# Patient Record
Sex: FEMALE | Race: WHITE | NOT HISPANIC OR LATINO | URBAN - METROPOLITAN AREA
[De-identification: names, ages, dates, MRNs, and addresses within clinical notes are randomized per-mention and may not be internally consistent; named-entity substitution may affect disease eponyms.]

---

## 2017-01-09 ENCOUNTER — OFFICE (OUTPATIENT)
Dept: URBAN - METROPOLITAN AREA INFUSION 3 | Facility: INFUSION | Age: 67
End: 2017-01-09

## 2017-01-09 VITALS
DIASTOLIC BLOOD PRESSURE: 69 MMHG | HEART RATE: 74 BPM | TEMPERATURE: 96.8 F | WEIGHT: 248 LBS | SYSTOLIC BLOOD PRESSURE: 153 MMHG | DIASTOLIC BLOOD PRESSURE: 75 MMHG | SYSTOLIC BLOOD PRESSURE: 148 MMHG | DIASTOLIC BLOOD PRESSURE: 76 MMHG | SYSTOLIC BLOOD PRESSURE: 136 MMHG | SYSTOLIC BLOOD PRESSURE: 140 MMHG | RESPIRATION RATE: 20 BRPM | HEART RATE: 75 BPM | DIASTOLIC BLOOD PRESSURE: 74 MMHG | SYSTOLIC BLOOD PRESSURE: 154 MMHG | HEIGHT: 64 IN | RESPIRATION RATE: 16 BRPM | SYSTOLIC BLOOD PRESSURE: 155 MMHG | SYSTOLIC BLOOD PRESSURE: 132 MMHG | SYSTOLIC BLOOD PRESSURE: 145 MMHG | HEART RATE: 73 BPM | RESPIRATION RATE: 22 BRPM | DIASTOLIC BLOOD PRESSURE: 70 MMHG | HEART RATE: 77 BPM | HEART RATE: 71 BPM

## 2017-01-09 DIAGNOSIS — K50.818 CROHN'S DISEASE OF BOTH SMALL AND LARGE INTESTINE WITH OTHER: ICD-10-CM

## 2017-01-09 PROBLEM — K50.118 CROHN'S DISEASE OF LARGE INTESTINE WITH OTHER COMPLICATION: Status: ACTIVE | Noted: 2017-01-09

## 2017-01-09 PROCEDURE — 96413 CHEMO IV INFUSION 1 HR: CPT

## 2017-01-09 PROCEDURE — 96415 CHEMO IV INFUSION ADDL HR: CPT

## 2017-01-09 RX ORDER — VERAPAMIL HYDROCHLORIDE 120 MG/1
CAPSULE, EXTENDED RELEASE ORAL
Qty: 30 CAPSULE | Refills: 0 | Status: SHIPPED | OUTPATIENT
Start: 2017-01-09 | End: 2017-03-06 | Stop reason: CLARIF

## 2017-01-09 RX ADMIN — Medication 25 MG: at 08:39

## 2017-01-16 ENCOUNTER — TELEPHONE (OUTPATIENT)
Dept: FAMILY MEDICINE CLINIC | Facility: CLINIC | Age: 67
End: 2017-01-16

## 2017-01-16 RX ORDER — NITROFURANTOIN 25; 75 MG/1; MG/1
100 CAPSULE ORAL 2 TIMES DAILY
Qty: 14 CAPSULE | Refills: 0 | Status: SHIPPED | OUTPATIENT
Start: 2017-01-16 | End: 2017-02-13

## 2017-01-16 NOTE — TELEPHONE ENCOUNTER
notify patient I have sent Macrobid antibiotic to pharmacy.  She has no improvement please schedule appointment.

## 2017-01-16 NOTE — TELEPHONE ENCOUNTER
Patient called and stated that she has a UTI, and is requesting something be called in if possible?

## 2017-01-23 ENCOUNTER — OFFICE VISIT (OUTPATIENT)
Dept: FAMILY MEDICINE CLINIC | Facility: CLINIC | Age: 67
End: 2017-01-23

## 2017-01-23 ENCOUNTER — HOSPITAL ENCOUNTER (OUTPATIENT)
Dept: GENERAL RADIOLOGY | Facility: HOSPITAL | Age: 67
Discharge: HOME | End: 2017-01-23

## 2017-01-23 ENCOUNTER — HOSPITAL ENCOUNTER (OUTPATIENT)
Dept: GENERAL RADIOLOGY | Facility: HOSPITAL | Age: 67
Discharge: HOME | End: 2017-01-23
Admitting: PHYSICIAN ASSISTANT

## 2017-01-23 VITALS
RESPIRATION RATE: 16 BRPM | WEIGHT: 252 LBS | SYSTOLIC BLOOD PRESSURE: 140 MMHG | BODY MASS INDEX: 43.02 KG/M2 | TEMPERATURE: 97.9 F | OXYGEN SATURATION: 94 % | DIASTOLIC BLOOD PRESSURE: 70 MMHG | HEIGHT: 64 IN | HEART RATE: 100 BPM

## 2017-01-23 DIAGNOSIS — G89.29 CHRONIC RIGHT HIP PAIN: Primary | ICD-10-CM

## 2017-01-23 DIAGNOSIS — M25.512 CHRONIC PAIN OF BOTH SHOULDERS: ICD-10-CM

## 2017-01-23 DIAGNOSIS — M25.551 CHRONIC RIGHT HIP PAIN: Primary | ICD-10-CM

## 2017-01-23 DIAGNOSIS — M25.511 CHRONIC PAIN OF BOTH SHOULDERS: ICD-10-CM

## 2017-01-23 DIAGNOSIS — G89.29 CHRONIC PAIN OF BOTH SHOULDERS: ICD-10-CM
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
## 2017-01-23 PROCEDURE — 73030 X-RAY EXAM OF SHOULDER: CPT

## 2017-01-23 PROCEDURE — 73502 X-RAY EXAM HIP UNI 2-3 VIEWS: CPT

## 2017-01-23 PROCEDURE — 99213 OFFICE O/P EST LOW 20 MIN: CPT | Performed by: PHYSICIAN ASSISTANT

## 2017-01-23 RX ORDER — HYDROCODONE BITARTRATE AND ACETAMINOPHEN 7.5; 325 MG/1; MG/1
1 TABLET ORAL EVERY 6 HOURS PRN
Qty: 30 TABLET | Refills: 0 | Status: CANCELLED
Start: 2017-01-23

## 2017-01-23 NOTE — MR AVS SNAPSHOT
Mar Camilo   1/23/2017 10:45 AM   Office Visit    Provider:  Vianey Barrios PA-C   Department:  [facility]   Dept Phone:  [phone]                Your Full Care Plan              Your Updated Medication List          This list is accurate as of: 1/23/17 11:59 AM.  Always use your most recent med list.                albuterol (2.5 MG/3ML) 0.083% nebulizer solution   Commonly known as:  PROVENTIL   Take 2.5 mg by nebulization every 4 (four) hours as needed for wheezing.       aspirin 325 MG tablet   Take 1 tablet by mouth daily.       CENTRUM SILVER PO       FLUoxetine 20 MG capsule   Commonly known as:  PROzac   Take 1 capsule by mouth daily.       Fluticasone Furoate-Vilanterol 100-25 MCG/INH aerosol powder    Inhale 1 puff daily.       * HYDROCHLOROTHIAZIDE PO       * hydrochlorothiazide 25 MG tablet   Commonly known as:  HYDRODIURIL   Take 1 tablet by mouth daily.       lisinopril 40 MG tablet   Commonly known as:  PRINIVIL,ZESTRIL   TAKE ONE TABLET BY MOUTH TWICE DAILY       mesalamine 1.2 G EC tablet   Commonly known as:  LIALDA       MUCINEX MAXIMUM STRENGTH PO       nitrofurantoin (macrocrystal-monohydrate) 100 MG capsule   Commonly known as:  MACROBID   Take 1 capsule by mouth 2 (Two) Times a Day.       rosuvastatin 20 MG tablet   Commonly known as:  CRESTOR   Take 1 tablet by mouth daily.       tiotropium 18 MCG per inhalation capsule   Commonly known as:  SPIRIVA HANDIHALER   Place 2 puffs into inhaler and inhale 1 (one) time daily.       * VERAPAMIL HCL ER PO       * verapamil  MG 24 hr capsule   Commonly known as:  VERELAN PM   TAKE ONE CAPSULE BY MOUTH AT BEDTIME       * Notice:  This list has 4 medication(s) that are the same as other medications prescribed for you. Read the directions carefully, and ask your doctor or other care provider to review them with you.            We Performed the Following     XR Hip With or Without  Pelvis 2 - 3 View Right     XR Shoulder 2+ View Bilateral       You Were Diagnosed With        Codes Comments    Chronic right hip pain    -  Primary ICD-10-CM: M25.551, G89.29  ICD-9-CM: 719.45, 338.29     Chronic pain of both shoulders     ICD-10-CM: M25.512, G89.29, M25.511  ICD-9-CM: 719.41, 338.29     Fall, initial encounter     ICD-10-CM: W19.XXXA  ICD-9-CM: E888.9       Instructions     None    Patient Instructions History      [employer] Signup     [employer] allows you to send messages to your doctor, view your test results, renew your prescriptions, schedule appointments, and more. To sign up, go to [employer] and click on the Sign Up Now link in the New User? box. Enter your [employer] Activation Code exactly as it appears below along with the last four digits of your Social Security Number and your Date of Birth () to complete the sign-up process. If you do not sign up before the expiration date, you must request a new code.    [employer] Activation Code: [email or web address]  Expires: 2017  5:34 AM    If you have questions, you can email [email or web address] or call [phone] to talk to our [employer] staff. Remember, [employer] is NOT to be used for urgent needs. For medical emergencies, dial 911.               Other Info from Your Visit           Your Appointments     2017 12:00 PM EST   XR HIP W OR WO PELVIS 2-3 VIEW RIGHT with LAG CREST XR 1   [facility]    [address] [place] [address] [phone]           Bring along any outside films relating to this procedure. Arrive 15 minutes before your scheduled time.            2017 12:15 PM EST   XR lag shoulder 2+ vw bilat with LAG CREST XR 1   [facility] ([place])    [address] [facility] [place] [address] [phone]           Bring along any outside films relating to this procedure. Arrive 15 minutes before your scheduled time.     "          Allergies     Contrast Dye Allergy Hives    Tenoretic [Atenolol-chlorthalidone]  Anaphylaxis    Iodinated Diagnostic Agents        Reason for Visit     Hip Pain Right.    Arm Pain Bilateral, above the elbow.       Vital Signs     Blood Pressure Pulse Temperature Respirations Height Weight    140/70 (BP Location: Right arm, Patient Position: Sitting, Cuff Size: Adult) 100 97.9 °F (36.6 °C) (Oral) 16 64\" (162.6 cm) 252 lb (114 kg)    Last Menstrual Period Oxygen Saturation Body Mass Index Smoking Status          (LMP Unknown) 94% 43.26 kg/m2 Former Smoker        Problems and Diagnoses Noted     Chronic pain of both shoulders    Chronic right hip pain    Fall    Low back pain    Degenerative arthritis of lumbar spine      "

## 2017-01-23 NOTE — PROGRESS NOTES
Subjective   Mar Camilo is a 66 y.o. female.   Chief Complaint   Patient presents with   • Hip Pain     Right.   • Arm Pain     Bilateral, above the elbow.        History of Present Illness     Krys is a 66-year-old female who presents with right hip pain, and bilateral arm pain for the past 1-2 months.  States she fell over the dog about 2 months ago and landed on her arms and knees.   She has been having pain since her fall.  Describes the hip pain as a constant pain with radiation down the back of her right leg. Sitting increases the pain. Her knees get weak after standing for a while. She has been taking Tylenol 600 mg tid.  State it is not helping with her pain.  Describes the pain in arms as a constant pain in shoulders/upper arms.  She has been having problems lifting things.   Sleep has been decreased due to the pain.  Krys has tried Ice and heat without relief.  She can't take NSAID's due to stomach issues.  She has tried OTC Bengay,salonpas and  Lidoderm patches.  Appetite has been normal.  Bowel movements are daily.    The following portions of the patient's history were reviewed and updated as appropriate: allergies, current medications, past family history, past medical history, past social history and past surgical history.    Review of Systems   Constitutional: Negative.  Negative for chills, fatigue and fever.   HENT: Negative.    Eyes: Negative.    Respiratory: Negative.  Negative for cough, shortness of breath and wheezing.    Cardiovascular: Negative.  Negative for chest pain, palpitations and leg swelling.   Gastrointestinal: Negative.    Endocrine: Negative.    Genitourinary: Negative.    Musculoskeletal: Positive for gait problem.        Right Hip pain and bilateral shoulder pain due to fall 2 months ago.   Skin: Negative.    Allergic/Immunologic: Negative.    Hematological: Negative.    Psychiatric/Behavioral: Positive for sleep disturbance.   All other systems reviewed and are  "negative.    Vitals:    01/23/17 1053   BP: 140/70   BP Location: Right arm   Patient Position: Sitting   Cuff Size: Adult   Pulse: 100   Resp: 16   Temp: 97.9 °F (36.6 °C)   TempSrc: Oral   SpO2: 94%   Weight: 252 lb (114 kg)   Height: 64\" (162.6 cm)     Wt Readings from Last 3 Encounters:   01/23/17 252 lb (114 kg)   10/21/16 248 lb (112 kg)   07/22/16 231 lb (105 kg)       BP Readings from Last 3 Encounters:   01/23/17 140/70   10/21/16 136/68   07/22/16 130/58     Body mass index is 43.26 kg/(m^2).    Allergies   Allergen Reactions   • Contrast Dye Hives   • Tenoretic [Atenolol-Chlorthalidone] Anaphylaxis   • Iodinated Diagnostic Agents        Objective   Physical Exam   Constitutional: She is oriented to person, place, and time. Vital signs are normal. She appears well-developed and well-nourished.   Neck: Trachea normal and phonation normal. Neck supple.   Cardiovascular: Normal rate, regular rhythm, S1 normal, S2 normal, normal heart sounds and normal pulses.    Pulmonary/Chest: Effort normal and breath sounds normal.   Abdominal: Soft. Normal appearance and bowel sounds are normal. There is no hepatomegaly. There is no tenderness.   Musculoskeletal:        Right shoulder: She exhibits decreased range of motion, tenderness and pain.        Left shoulder: She exhibits decreased range of motion, tenderness and pain.        Right hip: She exhibits decreased range of motion, decreased strength, tenderness and bony tenderness.   B/L shoulders: Tender to palpation along AC joint to bilateral deltoid area,3/5 muscle strength, decreased range of motion in all fields and 2+ distal pulses.    Right hip:  Tender to palpation along Pelvic girdle of right hip.  Decreased range of motion in all fields and decreased strength.   Neurological: She is alert and oriented to person, place, and time. A sensory deficit is present.   Skin: Skin is warm, dry and intact.   Psychiatric: She has a normal mood and affect. Her speech is " "normal and behavior is normal. Judgment and thought content normal. Cognition and memory are normal.       Assessment/Plan   Mar was seen today for hip pain and arm pain.    Diagnoses and all orders for this visit:    Chronic right hip pain  -     Cancel: XR Hip With or Without Pelvis 2 - 3 View Right; Future  -     XR Hip With or Without Pelvis 2 - 3 View Right  -     HYDROcodone-acetaminophen (NORCO) 7.5-325 MG per tablet; Take 1 tablet by mouth Every 6 (Six) Hours As Needed for moderate pain (4-6).    Chronic pain of both shoulders  -     Cancel: XR Shoulder 2+ View Bilateral; Future  -     XR Shoulder 2+ View Bilateral  -     HYDROcodone-acetaminophen (NORCO) 7.5-325 MG per tablet; Take 1 tablet by mouth Every 6 (Six) Hours As Needed for moderate pain (4-6).    Fall, initial encounter  -     XR Hip With or Without Pelvis 2 - 3 View Right  -     XR Shoulder 2+ View Bilateral  -     HYDROcodone-acetaminophen (NORCO) 7.5-325 MG per tablet; Take 1 tablet by mouth Every 6 (Six) Hours As Needed for moderate pain (4-6).    Other orders  -     Cancel: HYDROcodone-acetaminophen (NORCO) 7.5-325 MG per tablet; Take 1 tablet by mouth Every 6 (Six) Hours As Needed for moderate pain (4-6).      Ms. Manuel \"Krys Camilo was seen in office today for new right hip and bilateral shoulder pain.  Mar fell over her dog approximately 2 months ago.  She did not seek medical attention at that time.  I will schedule a right hip x-ray and bilateral shoulder x-rays for further evaluation of her pain and discomfort.  Dr. Castanon has approved a Lortab 7.5/325 mg prescription for her pain.  She has signed the appropriate agreement and consent forms.  See below for Geoffrey information.  I'll consider possible referral to orthopedist and/or physical therapy if warranted.      As part of this patient's treatment plan I am prescribing controlled substances.  The patient has been made aware of appropriate use of such medications, including " potential risk of somnolence. Limited ability to drive and/or work safely, and potential for dependence or overdose.  It has also been made clear that these medications are for use by this patient only, without concomitant use of alcohol or other substances unless prescribed.  GASPER report has been reviewed and scanned into the patient's chart.  Gasper number is [id number] dated January 21, 2017.    Dragon transcription disclaimer    Much of this encounter note is an electronic transcription/translation of spoken language to printed text.  The electronic translation of spoken language may permit erroneous, or at times, nonsensical words or phrases to be inadvertently transcribed.  Although I have reviewed the note for such errors, some may still exist.    Vianey Barrios PA-C  [facility]

## 2017-01-25 RX ORDER — HYDROCODONE BITARTRATE AND ACETAMINOPHEN 7.5; 325 MG/1; MG/1
1 TABLET ORAL EVERY 6 HOURS PRN
Qty: 30 TABLET | Refills: 0 | Status: ON HOLD
Start: 2017-01-25 | End: 2017-03-21

## 2017-01-25 NOTE — PROGRESS NOTES
I have reviewed the notes, assessments, and/or procedures performed by MICHELLE Grimes, I concur with her/his documentation of.Mar Camilo.

## 2017-01-30 ENCOUNTER — OFFICE VISIT (OUTPATIENT)
Dept: ORTHOPEDIC SURGERY | Facility: CLINIC | Age: 67
End: 2017-01-30

## 2017-01-30 VITALS
WEIGHT: 254 LBS | SYSTOLIC BLOOD PRESSURE: 182 MMHG | DIASTOLIC BLOOD PRESSURE: 88 MMHG | HEART RATE: 97 BPM | HEIGHT: 64 IN | BODY MASS INDEX: 43.36 KG/M2

## 2017-01-30 DIAGNOSIS — M75.50 BURSITIS, SUBACROMIAL: ICD-10-CM

## 2017-01-30 DIAGNOSIS — M54.5 CHRONIC RIGHT-SIDED LOW BACK PAIN, WITH SCIATICA PRESENCE UNSPECIFIED: Primary | ICD-10-CM

## 2017-01-30 DIAGNOSIS — G89.29 CHRONIC RIGHT-SIDED LOW BACK PAIN, WITH SCIATICA PRESENCE UNSPECIFIED: Primary | ICD-10-CM

## 2017-01-30 PROCEDURE — 20610 DRAIN/INJ JOINT/BURSA W/O US: CPT | Performed by: NURSE PRACTITIONER

## 2017-01-30 PROCEDURE — 99213 OFFICE O/P EST LOW 20 MIN: CPT | Performed by: NURSE PRACTITIONER

## 2017-01-30 RX ORDER — BUPIVACAINE HYDROCHLORIDE 5 MG/ML
2 INJECTION, SOLUTION EPIDURAL; INTRACAUDAL
Status: COMPLETED | OUTPATIENT
Start: 2017-01-30 | End: 2017-01-30

## 2017-01-30 RX ORDER — BETAMETHASONE SODIUM PHOSPHATE AND BETAMETHASONE ACETATE 3; 3 MG/ML; MG/ML
12 INJECTION, SUSPENSION INTRA-ARTICULAR; INTRALESIONAL; INTRAMUSCULAR; SOFT TISSUE
Status: COMPLETED | OUTPATIENT
Start: 2017-01-30 | End: 2017-01-30

## 2017-01-30 RX ORDER — NITROFURANTOIN 25; 75 MG/1; MG/1
CAPSULE ORAL
Qty: 14 CAPSULE | Refills: 0 | OUTPATIENT
Start: 2017-01-30

## 2017-01-30 RX ORDER — LIDOCAINE HYDROCHLORIDE 10 MG/ML
2 INJECTION, SOLUTION INFILTRATION; PERINEURAL
Status: COMPLETED | OUTPATIENT
Start: 2017-01-30 | End: 2017-01-30

## 2017-01-30 RX ADMIN — BUPIVACAINE HYDROCHLORIDE 2 ML: 5 INJECTION, SOLUTION EPIDURAL; INTRACAUDAL at 08:59

## 2017-01-30 RX ADMIN — BETAMETHASONE SODIUM PHOSPHATE AND BETAMETHASONE ACETATE 12 MG: 3; 3 INJECTION, SUSPENSION INTRA-ARTICULAR; INTRALESIONAL; INTRAMUSCULAR; SOFT TISSUE at 08:59

## 2017-01-30 RX ADMIN — LIDOCAINE HYDROCHLORIDE 2 ML: 10 INJECTION, SOLUTION INFILTRATION; PERINEURAL at 08:59

## 2017-01-30 NOTE — MR AVS SNAPSHOT
[facility]  [phone]                    Mar Camilo   1/30/2017 8:45 AM   Office Visit    Dept Phone:  [phone]   Encounter #:  [id number]    Provider:  DALE Tang   Department:  [facility]                Your Full Care Plan              Today's Medication Changes          These changes are accurate as of: 1/30/17  9:15 AM.  If you have any questions, ask your nurse or doctor.               New Medication(s)Ordered:     PredniSONE 5 MG tablet therapy pack dosepak   Take 1 tablet by mouth Take As Directed. Take as directed on package instructions.   Started by:  DALE Tang            Where to Get Your Medications      These medications were sent to [id number] - [place] [address] - [phone]  - [phone] FX  [address] [place] [address]     Phone:  [phone]     PredniSONE 5 MG tablet therapy pack dosepak                  Your Updated Medication List          This list is accurate as of: 1/30/17  9:15 AM.  Always use your most recent med list.                albuterol (2.5 MG/3ML) 0.083% nebulizer solution   Commonly known as:  PROVENTIL   Take 2.5 mg by nebulization every 4 (four) hours as needed for wheezing.       aspirin 325 MG tablet   Take 1 tablet by mouth daily.       CENTRUM SILVER PO       FLUoxetine 20 MG capsule   Commonly known as:  PROzac   Take 1 capsule by mouth daily.       Fluticasone Furoate-Vilanterol 100-25 MCG/INH aerosol powder    Inhale 1 puff daily.       * HYDROCHLOROTHIAZIDE PO       * hydrochlorothiazide 25 MG tablet   Commonly known as:  HYDRODIURIL   Take 1 tablet by mouth daily.       HYDROcodone-acetaminophen 7.5-325 MG per tablet   Commonly known as:  NORCO   Take 1 tablet by mouth Every 6 (Six) Hours As Needed for moderate pain (4-6).       lisinopril 40 MG tablet   Commonly known as:  PRINIVIL,ZESTRIL   TAKE ONE TABLET BY MOUTH TWICE DAILY          mesalamine 1.2 G EC tablet   Commonly known as:  LIALDA       MUCINEX MAXIMUM STRENGTH PO       nitrofurantoin (macrocrystal-monohydrate) 100 MG capsule   Commonly known as:  MACROBID   Take 1 capsule by mouth 2 (Two) Times a Day.       PredniSONE 5 MG tablet therapy pack dosepak   Take 1 tablet by mouth Take As Directed. Take as directed on package instructions.       rosuvastatin 20 MG tablet   Commonly known as:  CRESTOR   Take 1 tablet by mouth daily.       tiotropium 18 MCG per inhalation capsule   Commonly known as:  SPIRIVA HANDIHALER   Place 2 puffs into inhaler and inhale 1 (one) time daily.       * VERAPAMIL HCL ER PO       * verapamil  MG 24 hr capsule   Commonly known as:  VERELAN PM   TAKE ONE CAPSULE BY MOUTH AT BEDTIME       * Notice:  This list has 4 medication(s) that are the same as other medications prescribed for you. Read the directions carefully, and ask your doctor or other care provider to review them with you.            We Performed the Following     Ambulatory Referral to Physical Therapy Evaluate and treat     Large Joint Arthrocentesis       You Were Diagnosed With        Codes Comments    Chronic right-sided low back pain, with sciatica presence unspecified    -  Primary ICD-10-CM: M54.5, G89.29  ICD-9-CM: 724.2, 338.29       Instructions           Patient Instructions History      Upcoming Appointments     Visit Type Date Time Department    NEW PATIENT 2017  8:45 AM [facility]    FOLLOW UP 2017  8:30 AM [facility]      [employer]ia[employer] Signup     [facility] [employer]Bill allows you to send messages to your doctor, view your test results, renew your prescriptions, schedule appointments, and more. To sign up, go to [employer] and click on the Sign Up Now link in the New User? box. Enter your [employer] Activation Code exactly as it appears below along with the last four digits of your Social Security Number and your Date of Birth () to  "complete the sign-up process. If you do not sign up before the expiration date, you must request a new code.    [employer] Activation Code: [email or web address]  Expires: 2/1/2017  5:34 AM    If you have questions, you can email [email or web address] or call [phone] to talk to our [employer] staff. Remember, [employer] is NOT to be used for urgent needs. For medical emergencies, dial 911.               Other Info from Your Visit           Your Appointments     Feb 27, 2017  8:30 AM EST   Follow Up with DALE Tang   [facility] (--)    [address]  [facility] [address] [phone]           Arrive 15 minutes prior to appointment.              Allergies     Contrast Dye Allergy Hives    Tenoretic [Atenolol-chlorthalidone]  Anaphylaxis    Iodinated Diagnostic Agents        Reason for Visit     Left Shoulder - Pain     Right Shoulder - Pain     Right Hip - Pain           Vital Signs     Blood Pressure Pulse Height Weight Last Menstrual Period Body Mass Index    182/88 97 64\" (162.6 cm) 254 lb (115 kg) (LMP Unknown) 43.6 kg/m2    Smoking Status                   Former Smoker           Problems and Diagnoses Noted     Chronic back pain        "

## 2017-02-02 ENCOUNTER — APPOINTMENT (OUTPATIENT)
Dept: PHYSICAL THERAPY | Facility: HOSPITAL | Age: 67
End: 2017-02-02

## 2017-02-02 ENCOUNTER — TELEPHONE (OUTPATIENT)
Dept: FAMILY MEDICINE CLINIC | Facility: CLINIC | Age: 67
End: 2017-02-02

## 2017-02-02 NOTE — TELEPHONE ENCOUNTER
----- Message from Vianey Barrios PA-C sent at 1/23/2017  5:18 PM EST -----  notify patient that her hip x-ray showed moderate arthritis.  I will schedule appointment with orthopedist

## 2017-02-02 NOTE — TELEPHONE ENCOUNTER
----- Message from Vianey Barrios PA-C sent at 1/25/2017  7:20 AM EST -----  Please notify patient that bilateral shoulder x-ray showed a mild arthritis in left shoulder.  Right shoulder was normal.  Use over-the-counter arthritis strength Tylenol as needed.  If no improvement,I will schedule appointment with orthopedist for shoulder.

## 2017-02-06 ENCOUNTER — HOSPITAL ENCOUNTER (OUTPATIENT)
Dept: PHYSICAL THERAPY | Facility: HOSPITAL | Age: 67
Setting detail: THERAPIES SERIES
Discharge: HOME | End: 2017-02-06

## 2017-02-06 DIAGNOSIS — M54.5 CHRONIC RIGHT-SIDED LOW BACK PAIN, WITH SCIATICA PRESENCE UNSPECIFIED: Primary | ICD-10-CM

## 2017-02-06 DIAGNOSIS — G89.29 CHRONIC RIGHT-SIDED LOW BACK PAIN, WITH SCIATICA PRESENCE UNSPECIFIED: Primary | ICD-10-CM

## 2017-02-06 PROCEDURE — 97110 THERAPEUTIC EXERCISES: CPT | Performed by: PHYSICAL THERAPIST

## 2017-02-06 PROCEDURE — G0283 ELEC STIM OTHER THAN WOUND: HCPCS | Performed by: PHYSICAL THERAPIST

## 2017-02-06 PROCEDURE — 97162 PT EVAL MOD COMPLEX 30 MIN: CPT | Performed by: PHYSICAL THERAPIST

## 2017-02-07 PROCEDURE — G8981 BODY POS CURRENT STATUS: HCPCS | Performed by: PHYSICAL THERAPIST

## 2017-02-07 PROCEDURE — G8982 BODY POS GOAL STATUS: HCPCS | Performed by: PHYSICAL THERAPIST

## 2017-02-07 NOTE — PROGRESS NOTES
Outpatient Physical Therapy Ortho Initial Evaluation  LIONEL MartinezPowell     Patient Name: Mar Camilo  : 1950  MRN: [id number]  Today's Date: 2017      Visit Date: 2017    Patient Active Problem List   Diagnosis   • Abdominal pain   • Chronic allergic conjunctivitis   • Chronic back pain   • Chronic bronchitis   • Non-specific colitis   • Chronic obstructive pulmonary disease with acute exacerbation   • Degeneration of intervertebral disc of lumbar region   • Depression   • Fatigue   • Heartburn   • Herpes labialis   • Hyperglycemia   • Hyperlipidemia   • Essential hypertension   • Hypocalcemia   • Hypokalemia   • Sprain of back   • Spinal stenosis of lumbar region   • Morbid obesity   • Osteopenia   • Lumbar radiculopathy   • Shortness of breath   • Type 2 diabetes mellitus   • Increased frequency of urination   • Anemia   • Hypopotassemia   • Asymptomatic stenosis of right carotid artery   • Carotid stenosis, asymptomatic   • Chronic respiratory failure with hypoxia   • Carotid stenosis   • Hospital discharge follow-up   • Cellulitis of neck   • Low back pain   • Osteoarthritis of lumbar spine   • Need for immunization against influenza   • Well controlled type 2 diabetes mellitus   • Chronic right hip pain   • Chronic pain of both shoulders   • Fall   • Bursitis, subacromial, right        Past Medical History   Diagnosis Date   • Back pain    • CAD (coronary artery disease)    • COPD (chronic obstructive pulmonary disease)    • Crohn's disease    • DDD (degenerative disc disease)    • Depression    • Hyperlipidemia    • Hypertension    • Hypokalemia    • Morbid obesity    • On home oxygen therapy      2L UNLESS STRESSED THEN 2.5 L   • PVD (peripheral vascular disease)      RT CAROTID STENOSIS   • Radiculopathy      NECK PAIN        Past Surgical History   Procedure Laterality Date   • Appendectomy     • Wrist arthroscopy       WITH RELEASE OF TRANSVERSE CARPAL LIGAMENT   • Lung biopsy        NEGATIVE   • Colon resection     • Pr thromboendartectmy neck,neck incis Right 3/14/2016     Procedure: RT CAROTID ENDARTERECTOMY;  Surgeon: Federico Schuler MD;  Location: [facility];  Service: Vascular       Visit Dx:     ICD-10-CM ICD-9-CM   1. Chronic right-sided low back pain, with sciatica presence unspecified M54.5 724.2    G89.29 338.29             Patient History       02/06/17 0945          History    Chief Complaint Difficulty Walking;Difficulty with daily activities;Pain  -GC      Type of Pain Back pain  -GC      Date Current Problem(s) Began 10/06/16  -GC      Brief Description of Current Complaint Pt reports a several month history of right sided LBP, possibly due to falling over he dog.She was being treated by a chiropractor for existing disc issues (as reported by the pt), but she did not feel comfortable going back to him for this issue. She states she has had 15 shots in her backthat did not really help. She was seen by NARCISO Molina who started her on a steroid pack that she states has helped some. She is now referred to therapy.  -GC      Patient/Caregiver Goals Relieve pain;Return to prior level of function;Improve mobility  -GC      Patient's Rating of General Health Fair  -GC      Hand Dominance right-handed  -GC      History of Previous Related Injuries Pt has history of respiratory issues and has been on O2 for years.  -GC      Pain     Pain Location Back   right sided  -GC      Pain at Present 8  -GC      Pain at Best 6  -GC      Pain at Worst 9  -GC      Pain Frequency Constant/continuous  -GC      Pain Description Aching;Spasm;Sharp  -GC      What Performance Factors Make the Current Problem(s) WORSE? Pt c/opain with sitting, walking, standing  -GC      What Performance Factors Make the Current Problem(s) BETTER? Pt feels better if she rests  -GC      Difficulties at work? Pt is currently on disability  -GC      Difficulties with ADL's? Pt has pain with cooking, cleaning, and  some self care  -GC      Daily Activities    Primary Language English  -GC      Are you able to read Yes  -GC      Are you able to write Yes  -GC      Teaching needs identified Home Exercise Program;Management of Condition  -GC      Patient is concerned about/has problems with Bed Mobility;Climbing Stairs;Difficulty with self care (i.e. bathing, dressing, toileting:;Performing home management (household chores, shopping, care of dependents);Performing job responsibilities/community activities (work, school,;Sitting;Standing;Walking;Transfers (getting out of a chair, bed)  -GC      Barriers to learning None  -GC      Pt Participated in POC and Goals Yes  -GC      Safety    Are you being hurt, hit, or frightened by anyone at home or in your life? No  -GC      Are you being neglected by a caregiver No  -GC        User Key  (r) = Recorded By, (t) = Taken By, (c) = Cosigned By    Initials Name Provider Type    GC Deuce Donahue, PT Physical Therapist                PT Ortho       02/06/17 0945    Posture/Observations    Scoliosis Moderate  -GC    Lumbar lordosis Decreased  -GC    Iliac crests Right:;Elevated  -GC    Posture/Observations Comments Pt has significant lateral shift of shoulders to the left. Pt is on O2 at 3L per nasal canula.  -GC    Sensation    Light Touch No apparent deficits  -GC    Lumbosacral Palpation    SI Right:;Tender  -GC    Lumbosacral Segment Right:;Tender  -GC    Piriformis Right:;Tender;Guarded/taut  -GC    Gluteus Steve Right:;Tender;Guarded/taut  -GC    Erector Spinae (Paraspinals) Right:;Tender;Guarded/taut  -GC    Trunk    Flexion AROM Deficit 50% range with pain  -GC    Extension AROM Deficit 25% range with pain  -GC    Lt Lat Flexion AROM Deficit 25% range with pain  -GC    Right Lateral Flexion AROM Deficit 25% range with pain  -GC    Lt Rotation AROM Deficit 50% range with pain  -GC    Right Rotation AROM Deficit 50% range with pain  -GC    Trunk    Trunk Flexion Gross Movement (3+/5)  fair plus  -GC    Trunk Extension Gross Movement (3+/5) fair plus  -GC    Lower Extremity Flexibility    Hamstrings Bilateral:;Moderately limited  -GC    Hip Flexors Bilateral:;Moderately limited  -GC    Hip External Rotators Bilateral:;Mildly limited  -GC    Hip Internal Rotators Bilateral:;Moderately limited  -GC    Transfers    Transfer, Comment Pt is independent with all bed mobility and transfers.  -    Gait Assessment/Treatment    Gait, Comment Pt ambulates with antalgic gait on right LE.  -      User Key  (r) = Recorded By, (t) = Taken By, (c) = Cosigned By    Initials Name Provider Type    LAVERNE Donahue PT Physical Therapist                            Therapy Education       02/07/17 0851    Therapy Education    Given HEP;Symptoms/condition management;Pain management;Posture/body mechanics  -GC    Program New  -GC    How Provided Verbal;Demonstration  -GC    Provided to Patient  -GC    Level of Understanding Teach back education performed;Verbalized;Demonstrated  -GC      User Key  (r) = Recorded By, (t) = Taken By, (c) = Cosigned By    Initials Name Provider Type    LAVERNE Donahue PT Physical Therapist                PT OP Goals       02/06/17 0945          PT Short Term Goals    STG Date to Achieve 03/06/17  -      STG 1 Decrease LBP to 3-4/10 with activity.  -      STG 2 Pt will be able to stand without lateral shifted posture for light ADLs.  -      STG 3 Increase LE flexibility to only a minimal restriction with testing.  -      STG 4 Increase trunk ROM to at least 75% range with testing.  -      STG 5 Pt will be independent with her HEP issued by this therapist.  -      Long Term Goals    LTG Date to Achieve 04/03/17  -      LTG 1 Decrease LBP to 1-2/10 with activity.  -      LTG 2 Increase LE flexibility to WFL with testing.  -      LTG 3 Increase trunk ROM to WFL all planes with testing.  -      LTG 4 Increase core strength to at least 4+/5 with testing.  -      LTG 5  Pt will be independent with all ADLs without increased LBP.  -      Time Calculation    PT Goal Re-Cert Due Date 03/06/17  -GC        User Key  (r) = Recorded By, (t) = Taken By, (c) = Cosigned By    Initials Name Provider Type    LAVERNE Donahue PT Physical Therapist                PT Assessment/Plan       02/06/17 0980          PT Assessment    Functional Limitations Impaired gait;Limitation in home management;Limitations in community activities;Limitations in functional capacity and performance;Performance in leisure activities;Performance in self-care ADL  -GC      Impairments Gait;Impaired flexibility;Range of motion;Pain;Muscle strength;Posture  -GC      Assessment Comments Pt presents wit several month history of LBP and right LE pain. She rates her pain up to an 8/10 with activity. She has decreased trunk ROM, decreased LE flexibility, decreased core strength, poor posture, decreased ambulatory status, and decreased function secondary to the above.  -      Please refer to paper survey for additional self-reported information Yes  -GC      Rehab Potential Fair  -GC      Patient/caregiver participated in establishment of treatment plan and goals Yes  -GC      Patient would benefit from skilled therapy intervention Yes  -GC      PT Plan    PT Frequency 2x/week  -      Predicted Duration of Therapy Intervention (days/wks) 8 weeks  -GC      Planned CPT's? PT EVAL: 21732;PT THER PROC EA 15 MIN: 44245;PT MANUAL THERAPY EA 15 MIN: 06795;PT HOT OR COLD PACK TREAT MCARE;PT ELECTRICAL STIM UNATTEND: ;PT ULTRASOUND EA 15 MIN: 72474  -GC      PT Plan Comments Pt is to continue her HEP 2x daily.  -GC        User Key  (r) = Recorded By, (t) = Taken By, (c) = Cosigned By    Initials Name Provider Type    LAVERNE Donahue PT Physical Therapist                Modalities       02/06/17 0997          Moist Heat    MH Applied Yes  -GC      Location low back  -GC      Rx Minutes 15 mins   with IFC  -GC      MH Prior  to Rx Yes  -GC      ELECTRICAL STIMULATION    Attended/Unattended Unattended  -GC      Stimulation Type IFC  -GC      Location/Electrode Placement/Other lumbar spine  -GC      Rx Minutes 15 mins  -GC        User Key  (r) = Recorded By, (t) = Taken By, (c) = Cosigned By    Initials Name Provider Type    GC Deuce Donahue, PT Physical Therapist              Exercises       02/06/17 0945          Exercise 1    Exercise Name 1 Sidelying trunk rotation stretch  -GC      Reps 1 5  -GC      Time (Seconds) 1 20 secs  -GC      Exercise 2    Exercise Name 2 Hamstring stretch  -GC      Reps 2 5  -GC      Time (Seconds) 2 10 secs  -GC      Exercise 3    Exercise Name 3 Supine piriformis stretch  -GC      Reps 3 5  -GC      Time (Seconds) 3 10 secs  -GC        User Key  (r) = Recorded By, (t) = Taken By, (c) = Cosigned By    Initials Name Provider Type    GC Deuce Donahue, PT Physical Therapist                              Time Calculation:   Start Time: 0945  Stop Time: 1045  Time Calculation (min): 60 min     Therapy Charges for Today     Code Description Service Date Service Provider Modifiers Qty    37131796253 HC PT EVAL MOD COMPLEXITY 2 2/6/2017 Deuce Donahue, PT GP 1    03726952497 HC PT HOT OR COLD PACK TREAT MCARE 2/6/2017 Deuce Donahue, PT GP 1    74263860761 HC PT THER PROC EA 15 MIN 2/6/2017 Deuce Donahue PT GP 1    73515797538 HC ELECTRICAL STIM UNATTENDED 2/6/2017 Deuce Donahue PT  1    63594976940  PT CHNG MAIN POS CURRENT 2/7/2017 Deuce Donahue PT GP, CK 1    80395454122 HC PT CHNG MAIN POS PROJECTED 2/7/2017 Deuce Donahue PT GP, CI 1          PT G-Codes  PT Professional Judgement Used?: Yes (based on observed limitations in ROM, flexibility, strength, and functional mobility)  Functional Limitation: Changing and maintaining body position  Changing and Maintaining Body Position Current Status (): At least 40 percent but less than 60 percent impaired, limited or restricted  Changing and Maintaining Body  Position Goal Status (): At least 1 percent but less than 20 percent impaired, limited or restricted         Deuce Donahue, PT  2/7/2017

## 2017-02-09 ENCOUNTER — APPOINTMENT (OUTPATIENT)
Dept: PHYSICAL THERAPY | Facility: HOSPITAL | Age: 67
End: 2017-02-09

## 2017-02-13 ENCOUNTER — APPOINTMENT (OUTPATIENT)
Dept: PHYSICAL THERAPY | Facility: HOSPITAL | Age: 67
End: 2017-02-13

## 2017-02-13 ENCOUNTER — OFFICE VISIT (OUTPATIENT)
Dept: FAMILY MEDICINE CLINIC | Facility: CLINIC | Age: 67
End: 2017-02-13

## 2017-02-13 VITALS
RESPIRATION RATE: 16 BRPM | WEIGHT: 254 LBS | DIASTOLIC BLOOD PRESSURE: 70 MMHG | HEIGHT: 64 IN | SYSTOLIC BLOOD PRESSURE: 150 MMHG | BODY MASS INDEX: 43.36 KG/M2 | HEART RATE: 106 BPM | TEMPERATURE: 98 F | OXYGEN SATURATION: 95 %

## 2017-02-13 DIAGNOSIS — N30.01 ACUTE CYSTITIS WITH HEMATURIA: ICD-10-CM

## 2017-02-13 DIAGNOSIS — R35.0 URINE FREQUENCY: Primary | ICD-10-CM

## 2017-02-13 LAB
BILIRUB BLD-MCNC: NEGATIVE MG/DL
CLARITY, POC: ABNORMAL
COLOR UR: YELLOW
GLUCOSE UR STRIP-MCNC: NEGATIVE MG/DL
KETONES UR QL: NEGATIVE
LEUKOCYTE EST, POC: ABNORMAL
NITRITE UR-MCNC: NEGATIVE MG/ML
PH UR: 5 [PH] (ref 5–8)
PROT UR STRIP-MCNC: NEGATIVE MG/DL
RBC # UR STRIP: ABNORMAL /UL
SP GR UR: 1 (ref 1–1.03)
UROBILINOGEN UR QL: NORMAL

## 2017-02-13 PROCEDURE — 99213 OFFICE O/P EST LOW 20 MIN: CPT | Performed by: PHYSICIAN ASSISTANT

## 2017-02-13 PROCEDURE — 81002 URINALYSIS NONAUTO W/O SCOPE: CPT | Performed by: PHYSICIAN ASSISTANT

## 2017-02-13 RX ORDER — NITROFURANTOIN 25; 75 MG/1; MG/1
100 CAPSULE ORAL 2 TIMES DAILY
Qty: 14 CAPSULE | Refills: 0 | Status: SHIPPED | OUTPATIENT
Start: 2017-02-13 | End: 2017-03-21 | Stop reason: HOSPADM

## 2017-02-13 NOTE — PROGRESS NOTES
"Subjective   Mar Camilo is a 66 y.o. female.   Chief Complaint   Patient presents with   • Urinary Tract Infection       History of Present Illness   Mar is a 66-year-old female who presents with urine hesitancy,urine frequency,urgency, and hematuria for the past week. States that her urine has a strong odor. Mar was seen in office last  month and was diagnosed with an UTI.  She had finish her antibiotic at end of January 2016.  Denied any back pain, fevers, chills, vaginal discharge, or change in bowel movements.  Appetite and sleep have been normal.    The following portions of the patient's history were reviewed and updated as appropriate: allergies, current medications, past family history, past medical history, past social history and past surgical history.    Review of Systems   Constitutional: Negative.  Negative for chills, fatigue and fever.   HENT: Negative.    Eyes: Negative.    Respiratory: Negative.    Cardiovascular: Negative.    Gastrointestinal: Negative.    Endocrine: Negative.    Genitourinary: Positive for frequency, hematuria and urgency. Negative for decreased urine volume, dysuria, vaginal bleeding, vaginal discharge and vaginal pain.   Musculoskeletal: Negative.    Skin: Negative.    Allergic/Immunologic: Negative.    Neurological: Negative.  Negative for dizziness and light-headedness.   Hematological: Negative.    Psychiatric/Behavioral: Negative.    All other systems reviewed and are negative.    Vitals:    02/13/17 1427   BP: 150/70   BP Location: Right arm   Patient Position: Sitting   Cuff Size: Adult   Pulse: 106   Resp: 16   Temp: 98 °F (36.7 °C)   TempSrc: Oral   SpO2: 95%   Weight: 254 lb (115 kg)   Height: 64\" (162.6 cm)     Wt Readings from Last 3 Encounters:   02/13/17 254 lb (115 kg)   01/30/17 254 lb (115 kg)   01/23/17 252 lb (114 kg)     BP Readings from Last 3 Encounters:   02/13/17 150/70   01/30/17 (!) 182/88   01/23/17 140/70     SpO2 Readings from Last 3 " Encounters:   02/13/17 95%   01/23/17 94%   10/21/16 92%     Body mass index is 43.6 kg/(m^2).    Allergies   Allergen Reactions   • Contrast Dye Hives   • Tenoretic [Atenolol-Chlorthalidone] Anaphylaxis   • Iodinated Diagnostic Agents        Objective   Physical Exam   Constitutional: She is oriented to person, place, and time. Vital signs are normal. She appears well-developed and well-nourished.   On portable oxygen tank with nasal canula   Neck: Trachea normal and phonation normal. No edema present.   Cardiovascular: Normal rate, regular rhythm, S1 normal, S2 normal, normal heart sounds and normal pulses.    Pulmonary/Chest: Effort normal and breath sounds normal.   Abdominal: Soft. Normal appearance and bowel sounds are normal. There is no hepatomegaly. There is no tenderness. There is no rigidity, no rebound, no guarding, no CVA tenderness, no tenderness at McBurney's point and negative Victor's sign.   Neurological: She is alert and oriented to person, place, and time.   Skin: Skin is warm, dry and intact.   Psychiatric: She has a normal mood and affect. Her speech is normal and behavior is normal. Judgment and thought content normal. Cognition and memory are normal.       Results for orders placed or performed in visit on 02/13/17   POC Urinalysis Dipstick   Result Value Ref Range    Color Yellow Yellow, Straw, Dark Yellow, Cha    Clarity, UA Cloudy (A) Clear    Glucose, UA Negative Negative, 1000 mg/dL (3+) mg/dL    Bilirubin Negative Negative    Ketones, UA Negative Negative    Specific Gravity  1.005 1.005 - 1.030    Blood, UA Large (A) Negative    pH, Urine 5.0 5.0 - 8.0    Protein, POC Negative Negative mg/dL    Urobilinogen, UA Normal Normal    Leukocytes Moderate (2+) (A) Negative    Nitrite, UA Negative Negative     Assessment/Plan   Mar was seen today for urinary tract infection.    Diagnoses and all orders for this visit:    Urine frequency  -     POC Urinalysis Dipstick    Acute cystitis with  hematuria  -     POC Urinalysis Dipstick  -     Urine Culture  -     nitrofurantoin, macrocrystal-monohydrate, (MACROBID) 100 MG capsule; Take 1 capsule by mouth 2 (Two) Times a Day.      Ms. Mar Camilo was seen in office visit today for urine frequency and was diagnosed with acute urinary tract infection.  Her in office urinalysis showed leukocytes and red blood cells.  A urine culture was sent to the laboratory for further evaluation.  I have prescribed Macrobid antibiotic to pharmacy.  Mar will return to office next week for fasting blood work and repeat  urinalysis with culture at that time.  She voiced understanding.        [employer] transcription disclaimer    Much of this encounter note is an electronic transcription/translation of spoken language to printed text.  The electronic translation of spoken language may permit erroneous, or at times, nonsensical words or phrases to be inadvertently transcribed.  Although I have reviewed the note for such errors, some may still exist.    Vianey Barrios PA-C  Family Practice

## 2017-02-16 ENCOUNTER — TELEPHONE (OUTPATIENT)
Dept: FAMILY MEDICINE CLINIC | Facility: CLINIC | Age: 67
End: 2017-02-16

## 2017-02-16 LAB
BACTERIA UR CULT: ABNORMAL
BACTERIA UR CULT: ABNORMAL
OTHER ANTIBIOTIC SUSC ISLT: ABNORMAL

## 2017-02-16 NOTE — TELEPHONE ENCOUNTER
----- Message from Vianey Barrios PA-C sent at 2/16/2017  9:49 AM EST -----  Please notify patient that her urine culture was positive for Escherichia coli.  Please continue antibiotic prescribed at office visit.  Plan to follow up with repeat urinalysis and culture completion of her antibiotic.

## 2017-02-23 ENCOUNTER — TELEPHONE (OUTPATIENT)
Dept: FAMILY MEDICINE CLINIC | Facility: CLINIC | Age: 67
End: 2017-02-23

## 2017-02-23 DIAGNOSIS — R30.0 DYSURIA: Primary | ICD-10-CM

## 2017-02-23 LAB
BILIRUB BLD-MCNC: NEGATIVE MG/DL
CLARITY, POC: ABNORMAL
COLOR UR: YELLOW
GLUCOSE UR STRIP-MCNC: NEGATIVE MG/DL
KETONES UR QL: NEGATIVE
LEUKOCYTE EST, POC: NEGATIVE
NITRITE UR-MCNC: NEGATIVE MG/ML
PH UR: 5 [PH] (ref 5–8)
PROT UR STRIP-MCNC: NEGATIVE MG/DL
RBC # UR STRIP: ABNORMAL /UL
SP GR UR: 1.01 (ref 1–1.03)
UROBILINOGEN UR QL: NORMAL

## 2017-02-23 PROCEDURE — 81002 URINALYSIS NONAUTO W/O SCOPE: CPT | Performed by: PHYSICIAN ASSISTANT

## 2017-02-23 RX ORDER — SULFAMETHOXAZOLE AND TRIMETHOPRIM 800; 160 MG/1; MG/1
1 TABLET ORAL 2 TIMES DAILY
Qty: 14 TABLET | Refills: 0 | Status: SHIPPED | OUTPATIENT
Start: 2017-02-23 | End: 2017-03-21 | Stop reason: HOSPADM

## 2017-02-23 NOTE — TELEPHONE ENCOUNTER
Please notify patient that urine analysis in office was positive for white cells.  A urine culture was sent to the laboratory for further evaluation.  I have prescribed Bactrim antibiotic to pharmacy.  Please notify patient.

## 2017-02-24 DIAGNOSIS — E78.1 HYPERTRIGLYCERIDEMIA: ICD-10-CM

## 2017-02-24 DIAGNOSIS — E78.2 MIXED HYPERLIPIDEMIA: Primary | ICD-10-CM

## 2017-02-24 DIAGNOSIS — E11.8 TYPE 2 DIABETES MELLITUS WITH COMPLICATION, WITHOUT LONG-TERM CURRENT USE OF INSULIN (HCC): Primary | ICD-10-CM

## 2017-02-24 DIAGNOSIS — E78.2 MIXED HYPERLIPIDEMIA: ICD-10-CM

## 2017-02-24 RX ORDER — ROSUVASTATIN CALCIUM 40 MG/1
40 TABLET, COATED ORAL DAILY
Qty: 30 TABLET | Refills: 3 | Status: SHIPPED | OUTPATIENT
Start: 2017-02-24 | End: 2017-10-18 | Stop reason: SDUPTHER

## 2017-02-25 LAB
BACTERIA UR CULT: ABNORMAL
BACTERIA UR CULT: ABNORMAL
OTHER ANTIBIOTIC SUSC ISLT: ABNORMAL

## 2017-02-27 ENCOUNTER — TELEPHONE (OUTPATIENT)
Dept: FAMILY MEDICINE CLINIC | Facility: CLINIC | Age: 67
End: 2017-02-27

## 2017-02-27 ENCOUNTER — OFFICE VISIT (OUTPATIENT)
Dept: ORTHOPEDIC SURGERY | Facility: CLINIC | Age: 67
End: 2017-02-27

## 2017-02-27 DIAGNOSIS — G89.29 CHRONIC RIGHT-SIDED LOW BACK PAIN, WITH SCIATICA PRESENCE UNSPECIFIED: ICD-10-CM

## 2017-02-27 DIAGNOSIS — M75.50 BURSITIS, SUBACROMIAL: Primary | ICD-10-CM

## 2017-02-27 DIAGNOSIS — M54.5 CHRONIC RIGHT-SIDED LOW BACK PAIN, WITH SCIATICA PRESENCE UNSPECIFIED: ICD-10-CM

## 2017-02-27 PROCEDURE — 99212 OFFICE O/P EST SF 10 MIN: CPT | Performed by: NURSE PRACTITIONER

## 2017-02-27 PROCEDURE — 20610 DRAIN/INJ JOINT/BURSA W/O US: CPT | Performed by: NURSE PRACTITIONER

## 2017-02-27 RX ORDER — LIDOCAINE HYDROCHLORIDE 10 MG/ML
2 INJECTION, SOLUTION INFILTRATION; PERINEURAL
Status: COMPLETED | OUTPATIENT
Start: 2017-02-27 | End: 2017-02-27

## 2017-02-27 RX ORDER — ACETAMINOPHEN 325 MG/1
650 TABLET ORAL EVERY 4 HOURS PRN
COMMUNITY
End: 2020-01-01 | Stop reason: HOSPADM

## 2017-02-27 RX ORDER — BUPIVACAINE HYDROCHLORIDE 5 MG/ML
2 INJECTION, SOLUTION EPIDURAL; INTRACAUDAL
Status: COMPLETED | OUTPATIENT
Start: 2017-02-27 | End: 2017-02-27

## 2017-02-27 RX ORDER — BETAMETHASONE SODIUM PHOSPHATE AND BETAMETHASONE ACETATE 3; 3 MG/ML; MG/ML
12 INJECTION, SUSPENSION INTRA-ARTICULAR; INTRALESIONAL; INTRAMUSCULAR; SOFT TISSUE
Status: COMPLETED | OUTPATIENT
Start: 2017-02-27 | End: 2017-02-27

## 2017-02-27 RX ADMIN — BUPIVACAINE HYDROCHLORIDE 2 ML: 5 INJECTION, SOLUTION EPIDURAL; INTRACAUDAL at 08:53

## 2017-02-27 RX ADMIN — BETAMETHASONE SODIUM PHOSPHATE AND BETAMETHASONE ACETATE 12 MG: 3; 3 INJECTION, SUSPENSION INTRA-ARTICULAR; INTRALESIONAL; INTRAMUSCULAR; SOFT TISSUE at 08:53

## 2017-02-27 RX ADMIN — LIDOCAINE HYDROCHLORIDE 2 ML: 10 INJECTION, SOLUTION INFILTRATION; PERINEURAL at 08:53

## 2017-02-27 NOTE — TELEPHONE ENCOUNTER
----- Message from Skylar Rivera sent at 2/27/2017 12:35 PM EST -----      ----- Message -----     From: Vianey Barrios PA-C     Sent: 2/27/2017   7:17 AM       To: Dipesh Wilder MA    Please notify patient that urine culture was still positive.  Please take antibiotic prescribed last week.  Plan to repeat her urine culture after completion of antibiotic.

## 2017-02-27 NOTE — PROGRESS NOTES
Subjective:     Patient ID: Mar Camilo is a 66 y.o. female.    Chief Complaint: Right shoulder pain, subacromial bursitis    History of Present Illness  Patient is 66 y.o female who presents for four week follow-up right shoulder pain. Completed one visit with physical therapy and refuses to return due to increased pain with motion. States that the concern was mainly focused on breathing and meanwhile she continued to experience pain at right shoulder. Has been noncompliant with home exercises as previously discussed. States she is unsure where the exercises are and declines reprint at this time. Has been doing dishes at home which has helped. Requesting pain medication to deal with pain at low back and right shoulder. Denies all other concerns present at this time.      Social History     Occupational History   • Not on file.     Social History Main Topics   • Smoking status: Former Smoker     Years: 22.00   • Smokeless tobacco: Never Used   • Alcohol use No   • Drug use: No   • Sexual activity: Defer      Past Medical History   Diagnosis Date   • Back pain    • CAD (coronary artery disease)    • COPD (chronic obstructive pulmonary disease)    • Crohn's disease    • DDD (degenerative disc disease)    • Depression    • Hyperlipidemia    • Hypertension    • Hypokalemia    • Morbid obesity    • On home oxygen therapy      2L UNLESS STRESSED THEN 2.5 L   • PVD (peripheral vascular disease)      RT CAROTID STENOSIS   • Radiculopathy      NECK PAIN     Past Surgical History   Procedure Laterality Date   • Appendectomy     • Wrist arthroscopy       WITH RELEASE OF TRANSVERSE CARPAL LIGAMENT   • Lung biopsy       NEGATIVE   • Colon resection     • Pr thromboendartectmy neck,neck incis Right 3/14/2016     Procedure: RT CAROTID ENDARTERECTOMY;  Surgeon: Federico Schuler MD;  Location: [facility]  Service: Vascular       Family History   Problem Relation Age of Onset   • Diabetes Mother    • Stroke Mother    •  Other Father      RESPIRATORY FAILURE   • Cancer Other          Review of Systems   Constitutional: Negative for chills, diaphoresis, fever and unexpected weight change.   HENT: Negative for hearing loss, nosebleeds, sore throat and tinnitus.    Eyes: Negative for pain and visual disturbance.   Respiratory: Negative for cough, shortness of breath and wheezing.    Cardiovascular: Negative for chest pain and palpitations.   Gastrointestinal: Negative for abdominal pain, diarrhea, nausea and vomiting.   Endocrine: Negative for cold intolerance, heat intolerance and polydipsia.   Genitourinary: Negative for difficulty urinating, dysuria and hematuria.   Musculoskeletal: Positive for arthralgias. Negative for joint swelling and myalgias.   Skin: Negative for rash and wound.   Allergic/Immunologic: Negative for environmental allergies.   Neurological: Negative for dizziness, syncope and numbness.   Hematological: Does not bruise/bleed easily.   Psychiatric/Behavioral: Negative for dysphoric mood and sleep disturbance. The patient is not nervous/anxious.            Objective:  Physical Exam    General: No acute distress.  Eyes: conjunctiva clear; pupils equally round and reactive  ENT: external ears and nose atraumatic; oropharynx clear  CV: no peripheral edema  Resp: normal respiratory effort  Skin: no rashes or wounds; normal turgor  Psych: mood and affect appropriate; recent and remote memory intact    There were no vitals filed for this visit.  There were no vitals filed for this visit.  There is no height or weight on file to calculate BMI.     Right Shoulder Exam     Tenderness   The patient is experiencing tenderness in the acromion.    Range of Motion   Forward Flexion: 180+   External Rotation: 80     Muscle Strength   Internal Rotation: 4/5   External Rotation: 4/5   Supraspinatus: 4/5   Subscapularis: 4/5   Biceps: 3/5     Tests   Apprehension: negative  Cross Arm: negative  Drop Arm: negative  Hawkin's test:  "negative  Impingement: negative  Sulcus: present    Other   Erythema: absent  Scars: absent  Sensation: normal  Pulse: present      Left Shoulder Exam     Tenderness   The patient is experiencing tenderness in the acromion.    Range of Motion   Forward Flexion: 180+   External Rotation: 80     Muscle Strength   Internal Rotation: 4/5   External Rotation: 4/5   Supraspinatus: 4/5   Subscapularis: 4/5   Biceps: 4/5     Tests   Apprehension: negative  Drop Arm: negative  Hawkin's test: negative  Impingement: negative  Sulcus: present    Other   Erythema: absent  Scars: absent  Sensation: normal  Pulse: present           Assessment       1. Bursitis, subacromial, right    2. Chronic right-sided low back pain, with sciatica presence unspecified          Plan:  1. Discussed plan of care with patient. Wishes to proceed with corticosteroid injection right subacromial aspect. Strength and range of motion has improved since last visit.   2. Will have patient continue home activities, such as washing dishes to help with symptom relief. Patient refuses to return to physical therapy due to increased pain and the only focus was on breathing.   3. Discussed with patient that she will not receive \"pain medication\" from our office unless that she has had surgery. Patient verbalized understanding of all information and agrees with plan of care. Denies all other concerns present at this time.     Large Joint Arthrocentesis  Date/Time: 2/27/2017 8:53 AM  Consent given by: patient  Site marked: site marked  Timeout: Immediately prior to procedure a time out was called to verify the correct patient, procedure, equipment, support staff and site/side marked as required   Supporting Documentation  Indications: pain   Procedure Details  Location: shoulder - R subacromial bursa  Preparation: Patient was prepped and draped in the usual sterile fashion  Needle size: 22 G  Approach: lateral  Medications administered: 2 mL bupivacaine (PF) 0.5 %; " 2 mL lidocaine 1 %; 12 mg betamethasone acetate-betamethasone sodium phosphate 6 (3-3) MG/ML  Patient tolerance: patient tolerated the procedure well with no immediate complications

## 2017-03-01 ENCOUNTER — TELEPHONE (OUTPATIENT)
Dept: FAMILY MEDICINE CLINIC | Facility: CLINIC | Age: 67
End: 2017-03-01

## 2017-03-01 NOTE — TELEPHONE ENCOUNTER
Patient notified of labs.  Appointment made in 1 month for FBS.  Will make other appoint for follow up labs in 3 months at a later date.  Is aware of taking metformin and increasing Crestor.

## 2017-03-01 NOTE — TELEPHONE ENCOUNTER
----- Message from Vianey Barrios PA-C sent at 2/24/2017  7:33 AM EST -----  1.  Please notify patient that fasting blood sugar was 154 with hemoglobin A1c of 7.5.  This has increased.  The diabetes is not controlled with diet at this time.  Need to start medication daily.  I've sent to pharmacy metformin 500 mg to take 1 tablet daily.  Plan to follow-up with fasting blood sugar in one month.  Decrease carbohydrates and sugar in diet.  2.  Cholesterol was 178, triglycerides 359, HDL 48 and LDL was 58.  Her triglycerides were elevated.  This may be related to her blood sugars and hemoglobin A1c.  Please decrease carbohydrates and sugar in diet.  Increase Crestor to 40 mg a day.  I will send a new prescription to pharmacy.   Plan to repeat lipid profile,CMP and A1c in 3 months.

## 2017-03-06 RX ORDER — VERAPAMIL HYDROCHLORIDE 80 MG/1
80 TABLET ORAL 3 TIMES DAILY
Qty: 90 TABLET | Refills: 1 | Status: SHIPPED | OUTPATIENT
Start: 2017-03-06 | End: 2017-03-29 | Stop reason: SDUPTHER

## 2017-03-18 ENCOUNTER — APPOINTMENT (OUTPATIENT)
Dept: CT IMAGING | Facility: HOSPITAL | Age: 67
End: 2017-03-18

## 2017-03-18 ENCOUNTER — ANESTHESIA EVENT (OUTPATIENT)
Dept: PERIOP | Facility: HOSPITAL | Age: 67
End: 2017-03-18

## 2017-03-18 ENCOUNTER — ANESTHESIA (OUTPATIENT)
Dept: PERIOP | Facility: HOSPITAL | Age: 67
End: 2017-03-18

## 2017-03-18 ENCOUNTER — HOSPITAL ENCOUNTER (INPATIENT)
Facility: HOSPITAL | Age: 67
LOS: 3 days | Discharge: HOME | End: 2017-03-21
Attending: EMERGENCY MEDICINE | Admitting: UROLOGY

## 2017-03-18 ENCOUNTER — APPOINTMENT (OUTPATIENT)
Dept: GENERAL RADIOLOGY | Facility: HOSPITAL | Age: 67
End: 2017-03-18

## 2017-03-18 DIAGNOSIS — M25.512 CHRONIC PAIN OF BOTH SHOULDERS: ICD-10-CM

## 2017-03-18 DIAGNOSIS — M25.551 CHRONIC RIGHT HIP PAIN: ICD-10-CM

## 2017-03-18 DIAGNOSIS — G89.29 CHRONIC PAIN OF BOTH SHOULDERS: ICD-10-CM

## 2017-03-18 DIAGNOSIS — M25.511 CHRONIC PAIN OF BOTH SHOULDERS: ICD-10-CM

## 2017-03-18 DIAGNOSIS — G89.29 CHRONIC RIGHT HIP PAIN: ICD-10-CM

## 2017-03-18 DIAGNOSIS — N20.1 RIGHT URETERAL STONE: Primary | ICD-10-CM

## 2017-03-18 DIAGNOSIS — N12 PYELONEPHRITIS: ICD-10-CM
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
## 2017-03-18 LAB
ALBUMIN SERPL-MCNC: 3.8 G/DL (ref 3.5–5.2)
ALBUMIN/GLOB SERPL: 1 G/DL
ALP SERPL-CCNC: 93 U/L (ref 40–129)
ALT SERPL W P-5'-P-CCNC: 51 U/L (ref 5–33)
ANION GAP SERPL CALCULATED.3IONS-SCNC: 15.5 MMOL/L
AST SERPL-CCNC: 47 U/L (ref 5–32)
BACTERIA UR QL AUTO: ABNORMAL /HPF
BASOPHILS # BLD AUTO: 0.04 10*3/MM3 (ref 0–0.2)
BASOPHILS NFR BLD AUTO: 0.2 % (ref 0–2)
BILIRUB SERPL-MCNC: 0.3 MG/DL (ref 0.2–1.2)
BILIRUB UR QL STRIP: ABNORMAL
BUN BLD-MCNC: 24 MG/DL (ref 8–23)
BUN/CREAT SERPL: 24 (ref 7–25)
CALCIUM SPEC-SCNC: 9.7 MG/DL (ref 8.8–10.5)
CHLORIDE SERPL-SCNC: 101 MMOL/L (ref 98–107)
CLARITY UR: ABNORMAL
CO2 SERPL-SCNC: 22.5 MMOL/L (ref 22–29)
COLOR UR: YELLOW
CREAT BLD-MCNC: 1 MG/DL (ref 0.57–1)
DEPRECATED RDW RBC AUTO: 49.9 FL (ref 37–54)
EOSINOPHIL # BLD AUTO: 0.23 10*3/MM3 (ref 0.1–0.3)
EOSINOPHIL NFR BLD AUTO: 1.4 % (ref 0–4)
ERYTHROCYTE [DISTWIDTH] IN BLOOD BY AUTOMATED COUNT: 15.1 % (ref 11.5–14.5)
GFR SERPL CREATININE-BSD FRML MDRD: 55 ML/MIN/1.73
GLOBULIN UR ELPH-MCNC: 3.7 GM/DL
GLUCOSE BLD-MCNC: 189 MG/DL (ref 65–99)
GLUCOSE UR STRIP-MCNC: NEGATIVE MG/DL
HCT VFR BLD AUTO: 44.6 % (ref 37–47)
HGB BLD-MCNC: 14.2 G/DL (ref 12–16)
HGB UR QL STRIP.AUTO: ABNORMAL
HYALINE CASTS UR QL AUTO: ABNORMAL /LPF
IMM GRANULOCYTES # BLD: 0.09 10*3/MM3 (ref 0–0.03)
IMM GRANULOCYTES NFR BLD: 0.5 % (ref 0–0.5)
KETONES UR QL STRIP: ABNORMAL
LEUKOCYTE ESTERASE UR QL STRIP.AUTO: ABNORMAL
LYMPHOCYTES # BLD AUTO: 2.85 10*3/MM3 (ref 0.6–4.8)
LYMPHOCYTES NFR BLD AUTO: 16.8 % (ref 20–45)
MCH RBC QN AUTO: 29 PG (ref 27–31)
MCHC RBC AUTO-ENTMCNC: 31.8 G/DL (ref 31–37)
MCV RBC AUTO: 91 FL (ref 81–99)
MONOCYTES # BLD AUTO: 1.22 10*3/MM3 (ref 0–1)
MONOCYTES NFR BLD AUTO: 7.2 % (ref 3–8)
NEUTROPHILS # BLD AUTO: 12.53 10*3/MM3 (ref 1.5–8.3)
NEUTROPHILS NFR BLD AUTO: 73.9 % (ref 45–70)
NITRITE UR QL STRIP: POSITIVE
NRBC BLD MANUAL-RTO: 0 /100 WBC (ref 0–0)
PH UR STRIP.AUTO: <=5 [PH] (ref 4.5–8)
PLATELET # BLD AUTO: 313 10*3/MM3 (ref 140–500)
PMV BLD AUTO: 9.2 FL (ref 7.4–10.4)
POTASSIUM BLD-SCNC: 4.2 MMOL/L (ref 3.5–5.2)
PROT SERPL-MCNC: 7.5 G/DL (ref 6–8.5)
PROT UR QL STRIP: ABNORMAL
RBC # BLD AUTO: 4.9 10*6/MM3 (ref 4.2–5.4)
RBC # UR: ABNORMAL /HPF
REF LAB TEST METHOD: ABNORMAL
SODIUM BLD-SCNC: 139 MMOL/L (ref 136–145)
SP GR UR STRIP: >1.03 (ref 1–1.03)
SQUAMOUS #/AREA URNS HPF: ABNORMAL /HPF
UROBILINOGEN UR QL STRIP: ABNORMAL
WBC NRBC COR # BLD: 16.96 10*3/MM3 (ref 4.8–10.8)
WBC UR QL AUTO: ABNORMAL /HPF

## 2017-03-18 PROCEDURE — 93010 ELECTROCARDIOGRAM REPORT: CPT | Performed by: INTERNAL MEDICINE

## 2017-03-18 PROCEDURE — 87086 URINE CULTURE/COLONY COUNT: CPT | Performed by: EMERGENCY MEDICINE

## 2017-03-18 PROCEDURE — 76000 FLUOROSCOPY <1 HR PHYS/QHP: CPT

## 2017-03-18 PROCEDURE — 25010000002 DIPHENHYDRAMINE PER 50 MG: Performed by: NURSE ANESTHETIST, CERTIFIED REGISTERED

## 2017-03-18 PROCEDURE — 93005 ELECTROCARDIOGRAM TRACING: CPT | Performed by: NURSE ANESTHETIST, CERTIFIED REGISTERED

## 2017-03-18 PROCEDURE — 74176 CT ABD & PELVIS W/O CONTRAST: CPT

## 2017-03-18 PROCEDURE — C1769 GUIDE WIRE: HCPCS | Performed by: UROLOGY

## 2017-03-18 PROCEDURE — 25010000002 ONDANSETRON PER 1 MG: Performed by: EMERGENCY MEDICINE

## 2017-03-18 PROCEDURE — 25010000002 PROPOFOL 10 MG/ML EMULSION: Performed by: NURSE ANESTHETIST, CERTIFIED REGISTERED

## 2017-03-18 PROCEDURE — 87186 SC STD MICRODIL/AGAR DIL: CPT | Performed by: EMERGENCY MEDICINE

## 2017-03-18 PROCEDURE — C2617 STENT, NON-COR, TEM W/O DEL: HCPCS | Performed by: UROLOGY

## 2017-03-18 PROCEDURE — C1758 CATHETER, URETERAL: HCPCS | Performed by: UROLOGY

## 2017-03-18 PROCEDURE — 25010000002 MIDAZOLAM PER 1 MG: Performed by: NURSE ANESTHETIST, CERTIFIED REGISTERED

## 2017-03-18 PROCEDURE — 81001 URINALYSIS AUTO W/SCOPE: CPT | Performed by: EMERGENCY MEDICINE

## 2017-03-18 PROCEDURE — 94799 UNLISTED PULMONARY SVC/PX: CPT

## 2017-03-18 PROCEDURE — 0T768DZ DILATION OF RIGHT URETER WITH INTRALUMINAL DEVICE, VIA NATURAL OR ARTIFICIAL OPENING ENDOSCOPIC: ICD-10-PCS | Performed by: UROLOGY

## 2017-03-18 PROCEDURE — 25010000002 ONDANSETRON PER 1 MG: Performed by: NURSE ANESTHETIST, CERTIFIED REGISTERED

## 2017-03-18 PROCEDURE — 80053 COMPREHEN METABOLIC PANEL: CPT | Performed by: EMERGENCY MEDICINE

## 2017-03-18 PROCEDURE — 85025 COMPLETE CBC W/AUTO DIFF WBC: CPT | Performed by: EMERGENCY MEDICINE

## 2017-03-18 PROCEDURE — 99283 EMERGENCY DEPT VISIT LOW MDM: CPT

## 2017-03-18 PROCEDURE — 94640 AIRWAY INHALATION TREATMENT: CPT

## 2017-03-18 PROCEDURE — 99284 EMERGENCY DEPT VISIT MOD MDM: CPT | Performed by: EMERGENCY MEDICINE

## 2017-03-18 PROCEDURE — 25010000002 CEFTRIAXONE PER 250 MG: Performed by: EMERGENCY MEDICINE

## 2017-03-18 PROCEDURE — 25010000002 HYDROMORPHONE PER 4 MG: Performed by: EMERGENCY MEDICINE

## 2017-03-18 DEVICE — URETERAL STENT
Type: IMPLANTABLE DEVICE | Status: FUNCTIONAL
Brand: CONTOUR™

## 2017-03-18 RX ORDER — LIDOCAINE HYDROCHLORIDE 20 MG/ML
INJECTION, SOLUTION INFILTRATION; PERINEURAL AS NEEDED
Status: DISCONTINUED | OUTPATIENT
Start: 2017-03-18 | End: 2017-03-18 | Stop reason: SURG

## 2017-03-18 RX ORDER — ALBUTEROL SULFATE 2.5 MG/3ML
2.5 SOLUTION RESPIRATORY (INHALATION) EVERY 4 HOURS PRN
Status: DISCONTINUED | OUTPATIENT
Start: 2017-03-18 | End: 2017-03-21 | Stop reason: HOSPADM

## 2017-03-18 RX ORDER — IPRATROPIUM BROMIDE AND ALBUTEROL SULFATE 2.5; .5 MG/3ML; MG/3ML
SOLUTION RESPIRATORY (INHALATION)
Status: COMPLETED
Start: 2017-03-18 | End: 2017-03-18

## 2017-03-18 RX ORDER — ONDANSETRON 2 MG/ML
4 INJECTION INTRAMUSCULAR; INTRAVENOUS ONCE AS NEEDED
Status: COMPLETED | OUTPATIENT
Start: 2017-03-18 | End: 2017-03-18

## 2017-03-18 RX ORDER — NALOXONE HCL 0.4 MG/ML
0.1 VIAL (ML) INJECTION
Status: DISCONTINUED | OUTPATIENT
Start: 2017-03-18 | End: 2017-03-21 | Stop reason: HOSPADM

## 2017-03-18 RX ORDER — VERAPAMIL HYDROCHLORIDE 80 MG/1
80 TABLET ORAL 3 TIMES DAILY
Status: DISCONTINUED | OUTPATIENT
Start: 2017-03-18 | End: 2017-03-19

## 2017-03-18 RX ORDER — DIPHENHYDRAMINE HYDROCHLORIDE 50 MG/ML
12.5 INJECTION INTRAMUSCULAR; INTRAVENOUS ONCE
Status: COMPLETED | OUTPATIENT
Start: 2017-03-18 | End: 2017-03-18

## 2017-03-18 RX ORDER — MAGNESIUM HYDROXIDE 1200 MG/15ML
LIQUID ORAL AS NEEDED
Status: DISCONTINUED | OUTPATIENT
Start: 2017-03-18 | End: 2017-03-18 | Stop reason: HOSPADM

## 2017-03-18 RX ORDER — KETAMINE HYDROCHLORIDE 100 MG/ML
INJECTION INTRAMUSCULAR; INTRAVENOUS AS NEEDED
Status: DISCONTINUED | OUTPATIENT
Start: 2017-03-18 | End: 2017-03-18 | Stop reason: SURG

## 2017-03-18 RX ORDER — SENNA AND DOCUSATE SODIUM 50; 8.6 MG/1; MG/1
2 TABLET, FILM COATED ORAL 2 TIMES DAILY
Status: DISCONTINUED | OUTPATIENT
Start: 2017-03-18 | End: 2017-03-21 | Stop reason: HOSPADM

## 2017-03-18 RX ORDER — ACETAMINOPHEN 325 MG/1
650 TABLET ORAL EVERY 6 HOURS PRN
Status: DISCONTINUED | OUTPATIENT
Start: 2017-03-18 | End: 2017-03-21 | Stop reason: HOSPADM

## 2017-03-18 RX ORDER — MIDAZOLAM HYDROCHLORIDE 1 MG/ML
1 INJECTION INTRAMUSCULAR; INTRAVENOUS
Status: DISCONTINUED | OUTPATIENT
Start: 2017-03-18 | End: 2017-03-18

## 2017-03-18 RX ORDER — SODIUM CHLORIDE 9 MG/ML
100 INJECTION, SOLUTION INTRAVENOUS CONTINUOUS
Status: DISCONTINUED | OUTPATIENT
Start: 2017-03-18 | End: 2017-03-20

## 2017-03-18 RX ORDER — HYDROCHLOROTHIAZIDE 25 MG/1
25 TABLET ORAL DAILY
Status: DISCONTINUED | OUTPATIENT
Start: 2017-03-19 | End: 2017-03-21 | Stop reason: HOSPADM

## 2017-03-18 RX ORDER — ONDANSETRON 4 MG/1
4 TABLET, FILM COATED ORAL EVERY 6 HOURS PRN
Status: DISCONTINUED | OUTPATIENT
Start: 2017-03-18 | End: 2017-03-21 | Stop reason: HOSPADM

## 2017-03-18 RX ORDER — SODIUM CHLORIDE 0.9 % (FLUSH) 0.9 %
1-10 SYRINGE (ML) INJECTION AS NEEDED
Status: DISCONTINUED | OUTPATIENT
Start: 2017-03-18 | End: 2017-03-18

## 2017-03-18 RX ORDER — SODIUM CHLORIDE 9 MG/ML
INJECTION, SOLUTION INTRAVENOUS CONTINUOUS PRN
Status: DISCONTINUED | OUTPATIENT
Start: 2017-03-18 | End: 2017-03-18 | Stop reason: SURG

## 2017-03-18 RX ORDER — ONDANSETRON 2 MG/ML
4 INJECTION INTRAMUSCULAR; INTRAVENOUS ONCE AS NEEDED
Status: DISCONTINUED | OUTPATIENT
Start: 2017-03-18 | End: 2017-03-18 | Stop reason: HOSPADM

## 2017-03-18 RX ORDER — SODIUM CHLORIDE 9 MG/ML
9 INJECTION, SOLUTION INTRAVENOUS CONTINUOUS PRN
Status: DISCONTINUED | OUTPATIENT
Start: 2017-03-18 | End: 2017-03-18

## 2017-03-18 RX ORDER — VERAPAMIL HYDROCHLORIDE 80 MG/1
80 TABLET ORAL EVERY MORNING
COMMUNITY
End: 2017-03-21 | Stop reason: HOSPADM

## 2017-03-18 RX ORDER — IPRATROPIUM BROMIDE AND ALBUTEROL SULFATE 2.5; .5 MG/3ML; MG/3ML
3 SOLUTION RESPIRATORY (INHALATION) ONCE
Status: DISCONTINUED | OUTPATIENT
Start: 2017-03-18 | End: 2017-03-18

## 2017-03-18 RX ORDER — ONDANSETRON 2 MG/ML
4 INJECTION INTRAMUSCULAR; INTRAVENOUS EVERY 6 HOURS PRN
Status: DISCONTINUED | OUTPATIENT
Start: 2017-03-18 | End: 2017-03-21 | Stop reason: HOSPADM

## 2017-03-18 RX ORDER — ONDANSETRON 4 MG/1
4 TABLET, ORALLY DISINTEGRATING ORAL EVERY 6 HOURS PRN
Status: DISCONTINUED | OUTPATIENT
Start: 2017-03-18 | End: 2017-03-21 | Stop reason: HOSPADM

## 2017-03-18 RX ORDER — MIDAZOLAM HYDROCHLORIDE 1 MG/ML
2 INJECTION INTRAMUSCULAR; INTRAVENOUS
Status: DISCONTINUED | OUTPATIENT
Start: 2017-03-18 | End: 2017-03-18

## 2017-03-18 RX ORDER — PROPOFOL 10 MG/ML
VIAL (ML) INTRAVENOUS AS NEEDED
Status: DISCONTINUED | OUTPATIENT
Start: 2017-03-18 | End: 2017-03-18 | Stop reason: SURG

## 2017-03-18 RX ORDER — LISINOPRIL 20 MG/1
40 TABLET ORAL
Status: DISCONTINUED | OUTPATIENT
Start: 2017-03-19 | End: 2017-03-19

## 2017-03-18 RX ORDER — FAMOTIDINE 10 MG/ML
20 INJECTION, SOLUTION INTRAVENOUS
Status: DISCONTINUED | OUTPATIENT
Start: 2017-03-18 | End: 2017-03-18

## 2017-03-18 RX ORDER — BUDESONIDE AND FORMOTEROL FUMARATE DIHYDRATE 80; 4.5 UG/1; UG/1
2 AEROSOL RESPIRATORY (INHALATION)
Status: DISCONTINUED | OUTPATIENT
Start: 2017-03-18 | End: 2017-03-21 | Stop reason: HOSPADM

## 2017-03-18 RX ORDER — ONDANSETRON 2 MG/ML
4 INJECTION INTRAMUSCULAR; INTRAVENOUS ONCE
Status: COMPLETED | OUTPATIENT
Start: 2017-03-18 | End: 2017-03-18

## 2017-03-18 RX ORDER — FAMOTIDINE 20 MG/1
20 TABLET, FILM COATED ORAL
Status: DISCONTINUED | OUTPATIENT
Start: 2017-03-18 | End: 2017-03-18

## 2017-03-18 RX ORDER — FLUOXETINE HYDROCHLORIDE 20 MG/1
20 CAPSULE ORAL DAILY
Status: DISCONTINUED | OUTPATIENT
Start: 2017-03-19 | End: 2017-03-21 | Stop reason: HOSPADM

## 2017-03-18 RX ORDER — HYDROCODONE BITARTRATE AND ACETAMINOPHEN 7.5; 325 MG/1; MG/1
1 TABLET ORAL EVERY 6 HOURS PRN
Status: DISCONTINUED | OUTPATIENT
Start: 2017-03-18 | End: 2017-03-21 | Stop reason: HOSPADM

## 2017-03-18 RX ORDER — LISINOPRIL 40 MG/1
40 TABLET ORAL EVERY MORNING
COMMUNITY
End: 2017-03-21 | Stop reason: HOSPADM

## 2017-03-18 RX ADMIN — SODIUM CHLORIDE: 9 INJECTION, SOLUTION INTRAVENOUS at 19:53

## 2017-03-18 RX ADMIN — DIPHENHYDRAMINE HYDROCHLORIDE 12.5 MG: 50 INJECTION, SOLUTION INTRAMUSCULAR; INTRAVENOUS at 19:57

## 2017-03-18 RX ADMIN — ONDANSETRON 4 MG: 2 INJECTION, SOLUTION INTRAMUSCULAR; INTRAVENOUS at 19:58

## 2017-03-18 RX ADMIN — CEFTRIAXONE 1 G: 1 INJECTION, SOLUTION INTRAVENOUS at 18:44

## 2017-03-18 RX ADMIN — DOCUSATE SODIUM AND SENNOSIDES 2 TABLET: 8.6; 5 TABLET, FILM COATED ORAL at 22:19

## 2017-03-18 RX ADMIN — HYDROCODONE BITARTRATE AND ACETAMINOPHEN 1 TABLET: 7.5; 325 TABLET ORAL at 22:29

## 2017-03-18 RX ADMIN — PROPOFOL 10 MG: 10 INJECTION, EMULSION INTRAVENOUS at 20:22

## 2017-03-18 RX ADMIN — KETAMINE HYDROCHLORIDE 10 MG: 100 INJECTION INTRAMUSCULAR; INTRAVENOUS at 20:20

## 2017-03-18 RX ADMIN — IPRATROPIUM BROMIDE AND ALBUTEROL SULFATE 3 ML: 2.5; .5 SOLUTION RESPIRATORY (INHALATION) at 19:41

## 2017-03-18 RX ADMIN — LIDOCAINE HYDROCHLORIDE 100 MG: 20 INJECTION, SOLUTION INFILTRATION; PERINEURAL at 20:19

## 2017-03-18 RX ADMIN — MIDAZOLAM HYDROCHLORIDE 1 MG: 1 INJECTION, SOLUTION INTRAMUSCULAR; INTRAVENOUS at 20:17

## 2017-03-18 RX ADMIN — HYDROMORPHONE HYDROCHLORIDE 1 MG: 1 INJECTION, SOLUTION INTRAMUSCULAR; INTRAVENOUS; SUBCUTANEOUS at 17:15

## 2017-03-18 RX ADMIN — IPRATROPIUM BROMIDE AND ALBUTEROL SULFATE 3 ML: .5; 3 SOLUTION RESPIRATORY (INHALATION) at 19:41

## 2017-03-18 RX ADMIN — ONDANSETRON 4 MG: 2 INJECTION, SOLUTION INTRAMUSCULAR; INTRAVENOUS at 17:15

## 2017-03-18 RX ADMIN — MIDAZOLAM HYDROCHLORIDE 1 MG: 1 INJECTION, SOLUTION INTRAMUSCULAR; INTRAVENOUS at 20:06

## 2017-03-18 RX ADMIN — SODIUM CHLORIDE 100 ML/HR: 9 INJECTION, SOLUTION INTRAVENOUS at 22:18

## 2017-03-18 RX ADMIN — FAMOTIDINE 20 MG: 10 INJECTION, SOLUTION INTRAVENOUS at 19:57

## 2017-03-18 NOTE — ANESTHESIA PREPROCEDURE EVALUATION
Anesthesia Evaluation     Patient summary reviewed and Nursing notes reviewed   NPO Status: > 4 hours   Airway   Mallampati: II  TM distance: >3 FB  no difficulty expected  Dental    (+) edentulous    Pulmonary    (+) a smoker (quit 3 yrs ago, 45 pk yr hx) Former, COPD moderate, shortness of breath, wheezes,     ROS comment: Home O2 prn, uses when active  Cardiovascular   Exercise tolerance: poor (<4 METS)    ECG reviewed  Rhythm: regular  Rate: normal    (+) hypertension well controlled, CAD, YADAV, PVD, hyperlipidemia    ROS comment: Accererated Junctional, NSIVC, ST Depression,consider  ischemia    Neuro/Psych  (+) numbness, psychiatric history Anxiety,    GI/Hepatic/Renal/Endo    (+) morbid obesity, diabetes mellitus type 2 poorly controlled,     Musculoskeletal     (+) back pain, chronic pain,   Abdominal   (+) obese,    Substance History - negative use     OB/GYN          Other   (+) arthritis                               Anesthesia Plan    ASA 3 - emergent     MAC     Anesthetic plan and risks discussed with patient.  Use of blood products discussed with patient  Consented to blood products.

## 2017-03-18 NOTE — ED PROVIDER NOTES
Subjective   History of Present Illness  History of Present Illness    Chief complaint: Right flank pain    Location: Right flank and right lower quadrant    Quality/Severity:  Moderate, sharp    Timing/Onset/Duration:  Acute onset    Modifying Factors: Nothing seems to make it better or worse    Associated Symptoms: No headache.  No fever chills or cough.  No sore throat earache or nasal congestion.  No chest pain or shortness of breath.  No diarrhea or burning when she urinates.  No nausea or vomiting.     Narrative: This 66-year-old white female presents with right flank pain and right lower quadrant pain that started a couple of hours ago.  The patient denies any fever or chills.  No cough sore throat earache or nasal congestion.  No headache.  No chest pain or shortness of breath.  Patient denies any diarrhea or burning when she urinates.  She states she's had similar symptoms when she's had a kidney infection before.    PCP:  Vianey Barrios      Review of Systems   Constitutional: Negative for chills and fever.   HENT: Negative for congestion, ear pain and sore throat.    Eyes: Negative for pain and discharge.   Respiratory: Negative for cough, chest tightness, shortness of breath and wheezing.    Cardiovascular: Negative for chest pain.   Gastrointestinal: Positive for abdominal pain. Negative for blood in stool, constipation, diarrhea, nausea and vomiting.   Genitourinary: Negative for decreased urine volume, dysuria, flank pain, frequency, hematuria, pelvic pain, urgency, vaginal bleeding, vaginal discharge and vaginal pain.   Musculoskeletal: Negative for back pain.   Skin: Negative for color change, pallor, rash and wound.   Neurological: Negative for weakness and headaches.   Hematological: Negative for adenopathy.   Psychiatric/Behavioral: Negative for confusion.        Medication List      ASK your doctor about these medications          acetaminophen 325 MG tablet   Commonly known as:  TYLENOL        albuterol (2.5 MG/3ML) 0.083% nebulizer solution   Commonly known as:  PROVENTIL   Take 2.5 mg by nebulization every 4 (four) hours as needed for wheezing.       aspirin 325 MG tablet   Take 1 tablet by mouth daily.       CENTRUM SILVER PO       FLUoxetine 20 MG capsule   Commonly known as:  PROzac   Take 1 capsule by mouth daily.       Fluticasone Furoate-Vilanterol 100-25 MCG/INH aerosol powder    Inhale 1 puff daily.       * HYDROCHLOROTHIAZIDE PO       * hydrochlorothiazide 25 MG tablet   Commonly known as:  HYDRODIURIL   Take 1 tablet by mouth daily.       HYDROcodone-acetaminophen 7.5-325 MG per tablet   Commonly known as:  NORCO   Take 1 tablet by mouth Every 6 (Six) Hours As Needed for moderate pain   (4-6).       lisinopril 40 MG tablet   Commonly known as:  PRINIVIL,ZESTRIL   TAKE ONE TABLET BY MOUTH TWICE DAILY       mesalamine 1.2 G EC tablet   Commonly known as:  LIALDA       metFORMIN 500 MG tablet   Commonly known as:  GLUCOPHAGE   Take 1 tablet by mouth Daily With Breakfast.       MUCINEX MAXIMUM STRENGTH PO       nitrofurantoin (macrocrystal-monohydrate) 100 MG capsule   Commonly known as:  MACROBID   Take 1 capsule by mouth 2 (Two) Times a Day.       rosuvastatin 40 MG tablet   Commonly known as:  CRESTOR   Take 1 tablet by mouth Daily.       sulfamethoxazole-trimethoprim 800-160 MG per tablet   Commonly known as:  BACTRIM DS,SEPTRA DS   Take 1 tablet by mouth 2 (Two) Times a Day.       tiotropium 18 MCG per inhalation capsule   Commonly known as:  SPIRIVA HANDIHALER   Place 2 puffs into inhaler and inhale 1 (one) time daily.       verapamil 80 MG tablet   Commonly known as:  CALAN   Take 1 tablet by mouth 3 (Three) Times a Day.       * Notice:  This list has 2 medication(s) that are the same as other   medications prescribed for you. Read the directions carefully, and ask   your doctor or other care provider to review them with you.        Past Medical History   Diagnosis Date   • Back pain    •  CAD (coronary artery disease)    • COPD (chronic obstructive pulmonary disease)    • Crohn's disease    • DDD (degenerative disc disease)    • Depression    • Diabetes mellitus    • Hyperlipidemia    • Hypertension    • Hypokalemia    • Morbid obesity    • On home oxygen therapy      2L UNLESS STRESSED THEN 2.5 L   • PVD (peripheral vascular disease)      RT CAROTID STENOSIS   • Radiculopathy      NECK PAIN       Allergies   Allergen Reactions   • Contrast Dye Hives   • Tenoretic [Atenolol-Chlorthalidone] Anaphylaxis   • Iodinated Diagnostic Agents        Past Surgical History   Procedure Laterality Date   • Appendectomy     • Wrist arthroscopy       WITH RELEASE OF TRANSVERSE CARPAL LIGAMENT   • Lung biopsy       NEGATIVE   • Colon resection     • Pr thromboendartectmy neck,neck incis Right 3/14/2016     Procedure: RT CAROTID ENDARTERECTOMY;  Surgeon: Federico Schuler MD;  Location: [facility];  Service: Vascular       Family History   Problem Relation Age of Onset   • Diabetes Mother    • Stroke Mother    • Other Father      RESPIRATORY FAILURE   • Cancer Other        Social History     Social History   • Marital status:      Spouse name: N/A   • Number of children: N/A   • Years of education: N/A     Social History Main Topics   • Smoking status: Former Smoker     Years: 22.00   • Smokeless tobacco: Never Used   • Alcohol use No   • Drug use: No   • Sexual activity: Defer     Other Topics Concern   • None     Social History Narrative   • None           Objective   Physical Exam   Constitutional: She is oriented to person, place, and time. She appears well-developed and well-nourished. No distress.   ED Triage Vitals:  Temp: 98 °F (36.7 °C) (03/18/17 1647)  Heart Rate: 109 (03/18/17 1647)  Resp: 20 (03/18/17 1647)  BP: 189/82 (03/18/17 1647)  SpO2: 94 % (03/18/17 1647)  Temp src: Oral (03/18/17 1647)  Heart Rate Source: Monitor (03/18/17 1647)  Patient Position: Lying (03/18/17 1647)  BP Location:  Right arm (03/18/17 1647)  FiO2 (%): n/a    The patient's vitals were reviewed by me.  Unless otherwise noted they are within normal limits.  The patient is tachycardic with a heart rate of 109.  The patient is tight hypertensive with blood pressure 189/82.     HENT:   Head: Normocephalic and atraumatic.   Right Ear: External ear normal.   Left Ear: External ear normal.   Nose: Nose normal.   Mouth/Throat: Oropharynx is clear and moist.   Eyes: Conjunctivae and EOM are normal. Pupils are equal, round, and reactive to light. Right eye exhibits no discharge. Left eye exhibits no discharge.   Neck: Normal range of motion. Neck supple. No JVD present. No tracheal deviation present. No thyromegaly present.   Cardiovascular: Normal rate, regular rhythm, normal heart sounds and intact distal pulses.  Exam reveals no gallop and no friction rub.    No murmur heard.  Pulmonary/Chest: Effort normal and breath sounds normal. No stridor. No respiratory distress. She has no wheezes. She has no rales. She exhibits no tenderness.   Abdominal: Soft. Bowel sounds are normal. She exhibits no distension and no mass. There is tenderness (moderate right lower quadrant and right CVA tenderness.). There is no rebound and no guarding. No hernia.   Musculoskeletal: Normal range of motion. She exhibits no edema or deformity.   Lymphadenopathy:     She has no cervical adenopathy.   Neurological: She is alert and oriented to person, place, and time.   Skin: Skin is warm. No rash noted. She is diaphoretic. No erythema. No pallor.   Psychiatric: Her behavior is normal.   Nursing note and vitals reviewed.      Procedures         ED Course  ED Course   Comment By Time   The laboratory values were reviewed by me.  The glucose is 189.  The BUN is 24.  The ALT is 51.  AST is 47.  The GFR is 55.  The white blood cell count is 16.9.  The neutrophil percent is 73%.  The urine microscopic shows 31-50 RBCs, too numerous to count WBCs, 4+ bacteria, 30-20  squamous epithelial cells.  Leukocyte esterase is small, nitrite is positive.  The laboratory values otherwise are unremarkable. Bladimir Dennis MD 03/18 1754                  [facility]  CT Abdomen Pelvis Without Contrast    (Results Pending)     Labs Reviewed   URINALYSIS W/ CULTURE IF INDICATED - Abnormal; Notable for the following:        Result Value    Appearance, UA Cloudy (*)     Specific Gravity, UA >1.030 (*)     Ketones, UA 15 mg/dL (1+) (*)     Bilirubin, UA Small (1+) (*)     Blood, UA Large (3+) (*)     Protein,  mg/dL (2+) (*)     Leuk Esterase, UA Small (1+) (*)     Nitrite, UA Positive (*)     All other components within normal limits   URINE CULTURE   URINALYSIS, MICROSCOPIC ONLY   COMPREHENSIVE METABOLIC PANEL   CBC WITH AUTO DIFFERENTIAL   CBC AND DIFFERENTIAL    Narrative:     The following orders were created for panel order CBC & Differential.  Procedure                               Abnormality         Status                     ---------                               -----------         ------                     CBC Auto Differential[[id number]]                                                          Please view results for these tests on the individual orders.     6:26 PM, 03/18/17:  CT of the abdomen and pelvis indicates the patient has a 5 mm obstructing stone in the right UVJ with moderate right hydronephrosis.  Fatty infiltrate of the liver.    6:26 PM, 03/18/17: The patient was reassessed.  She has no pain.  Her vital signs were reviewed and are stable.  Abdominal exam: Soft nontender no masses positive bowel sounds.    6:37 PM, 03/18/17:  Spoke with Dr. Sudhakar Bean, on call for urology, he will come in to evaluate the patient.      Final diagnoses:   None         ED Medications:  Medications   HYDROmorphone (DILAUDID) injection 1 mg (not administered)   ondansetron (ZOFRAN) injection 4 mg (not administered)       New Medications:     Medication List      ASK your doctor about these  medications          acetaminophen 325 MG tablet   Commonly known as:  TYLENOL       albuterol (2.5 MG/3ML) 0.083% nebulizer solution   Commonly known as:  PROVENTIL   Take 2.5 mg by nebulization every 4 (four) hours as needed for wheezing.       aspirin 325 MG tablet   Take 1 tablet by mouth daily.       CENTRUM SILVER PO       FLUoxetine 20 MG capsule   Commonly known as:  PROzac   Take 1 capsule by mouth daily.       Fluticasone Furoate-Vilanterol 100-25 MCG/INH aerosol powder    Inhale 1 puff daily.       * HYDROCHLOROTHIAZIDE PO       * hydrochlorothiazide 25 MG tablet   Commonly known as:  HYDRODIURIL   Take 1 tablet by mouth daily.       HYDROcodone-acetaminophen 7.5-325 MG per tablet   Commonly known as:  NORCO   Take 1 tablet by mouth Every 6 (Six) Hours As Needed for moderate pain   (4-6).       lisinopril 40 MG tablet   Commonly known as:  PRINIVIL,ZESTRIL   TAKE ONE TABLET BY MOUTH TWICE DAILY       mesalamine 1.2 G EC tablet   Commonly known as:  LIALDA       metFORMIN 500 MG tablet   Commonly known as:  GLUCOPHAGE   Take 1 tablet by mouth Daily With Breakfast.       MUCINEX MAXIMUM STRENGTH PO       nitrofurantoin (macrocrystal-monohydrate) 100 MG capsule   Commonly known as:  MACROBID   Take 1 capsule by mouth 2 (Two) Times a Day.       rosuvastatin 40 MG tablet   Commonly known as:  CRESTOR   Take 1 tablet by mouth Daily.       sulfamethoxazole-trimethoprim 800-160 MG per tablet   Commonly known as:  BACTRIM DS,SEPTRA DS   Take 1 tablet by mouth 2 (Two) Times a Day.       tiotropium 18 MCG per inhalation capsule   Commonly known as:  SPIRIVA HANDIHALER   Place 2 puffs into inhaler and inhale 1 (one) time daily.       verapamil 80 MG tablet   Commonly known as:  CALAN   Take 1 tablet by mouth 3 (Three) Times a Day.       * Notice:  This list has 2 medication(s) that are the same as other   medications prescribed for you. Read the directions carefully, and ask   your doctor or other care provider to  review them with you.        Stopped Medications:     Medication List      ASK your doctor about these medications          acetaminophen 325 MG tablet   Commonly known as:  TYLENOL       albuterol (2.5 MG/3ML) 0.083% nebulizer solution   Commonly known as:  PROVENTIL   Take 2.5 mg by nebulization every 4 (four) hours as needed for wheezing.       aspirin 325 MG tablet   Take 1 tablet by mouth daily.       CENTRUM SILVER PO       FLUoxetine 20 MG capsule   Commonly known as:  PROzac   Take 1 capsule by mouth daily.       Fluticasone Furoate-Vilanterol 100-25 MCG/INH aerosol powder    Inhale 1 puff daily.       * HYDROCHLOROTHIAZIDE PO       * hydrochlorothiazide 25 MG tablet   Commonly known as:  HYDRODIURIL   Take 1 tablet by mouth daily.       HYDROcodone-acetaminophen 7.5-325 MG per tablet   Commonly known as:  NORCO   Take 1 tablet by mouth Every 6 (Six) Hours As Needed for moderate pain   (4-6).       lisinopril 40 MG tablet   Commonly known as:  PRINIVIL,ZESTRIL   TAKE ONE TABLET BY MOUTH TWICE DAILY       mesalamine 1.2 G EC tablet   Commonly known as:  LIALDA       metFORMIN 500 MG tablet   Commonly known as:  GLUCOPHAGE   Take 1 tablet by mouth Daily With Breakfast.       MUCINEX MAXIMUM STRENGTH PO       nitrofurantoin (macrocrystal-monohydrate) 100 MG capsule   Commonly known as:  MACROBID   Take 1 capsule by mouth 2 (Two) Times a Day.       rosuvastatin 40 MG tablet   Commonly known as:  CRESTOR   Take 1 tablet by mouth Daily.       sulfamethoxazole-trimethoprim 800-160 MG per tablet   Commonly known as:  BACTRIM DS,SEPTRA DS   Take 1 tablet by mouth 2 (Two) Times a Day.       tiotropium 18 MCG per inhalation capsule   Commonly known as:  SPIRIVA HANDIHALER   Place 2 puffs into inhaler and inhale 1 (one) time daily.       verapamil 80 MG tablet   Commonly known as:  CALAN   Take 1 tablet by mouth 3 (Three) Times a Day.       * Notice:  This list has 2 medication(s) that are the same as other    medications prescribed for you. Read the directions carefully, and ask   your doctor or other care provider to review them with you.          Final diagnoses:   Right ureteral stone   Pyelonephritis            Bladimir Dennis MD  03/18/17 3995

## 2017-03-19 LAB
ANION GAP SERPL CALCULATED.3IONS-SCNC: 9.2 MMOL/L
BUN BLD-MCNC: 21 MG/DL (ref 8–23)
BUN/CREAT SERPL: 24.1 (ref 7–25)
CALCIUM SPEC-SCNC: 8.7 MG/DL (ref 8.8–10.5)
CHLORIDE SERPL-SCNC: 104 MMOL/L (ref 98–107)
CO2 SERPL-SCNC: 23.8 MMOL/L (ref 22–29)
CREAT BLD-MCNC: 0.87 MG/DL (ref 0.57–1)
DEPRECATED RDW RBC AUTO: 53 FL (ref 37–54)
ERYTHROCYTE [DISTWIDTH] IN BLOOD BY AUTOMATED COUNT: 15.4 % (ref 11.5–14.5)
GFR SERPL CREATININE-BSD FRML MDRD: 65 ML/MIN/1.73
GLUCOSE BLD-MCNC: 162 MG/DL (ref 65–99)
HCT VFR BLD AUTO: 40.2 % (ref 37–47)
HGB BLD-MCNC: 12.5 G/DL (ref 12–16)
MCH RBC QN AUTO: 28.9 PG (ref 27–31)
MCHC RBC AUTO-ENTMCNC: 31.1 G/DL (ref 31–37)
MCV RBC AUTO: 93.1 FL (ref 81–99)
PLATELET # BLD AUTO: 270 10*3/MM3 (ref 140–500)
PMV BLD AUTO: 9.5 FL (ref 7.4–10.4)
POTASSIUM BLD-SCNC: 4.5 MMOL/L (ref 3.5–5.2)
RBC # BLD AUTO: 4.32 10*6/MM3 (ref 4.2–5.4)
SODIUM BLD-SCNC: 137 MMOL/L (ref 136–145)
WBC NRBC COR # BLD: 14.28 10*3/MM3 (ref 4.8–10.8)

## 2017-03-19 PROCEDURE — 85027 COMPLETE CBC AUTOMATED: CPT | Performed by: UROLOGY

## 2017-03-19 PROCEDURE — 25010000002 CEFTRIAXONE PER 250 MG: Performed by: UROLOGY

## 2017-03-19 PROCEDURE — 93010 ELECTROCARDIOGRAM REPORT: CPT | Performed by: INTERNAL MEDICINE

## 2017-03-19 PROCEDURE — 80048 BASIC METABOLIC PNL TOTAL CA: CPT | Performed by: UROLOGY

## 2017-03-19 PROCEDURE — 93005 ELECTROCARDIOGRAM TRACING: CPT | Performed by: INTERNAL MEDICINE

## 2017-03-19 PROCEDURE — 94799 UNLISTED PULMONARY SVC/PX: CPT

## 2017-03-19 PROCEDURE — 94640 AIRWAY INHALATION TREATMENT: CPT

## 2017-03-19 RX ORDER — LISINOPRIL 20 MG/1
40 TABLET ORAL DAILY
Status: DISCONTINUED | OUTPATIENT
Start: 2017-03-19 | End: 2017-03-21 | Stop reason: HOSPADM

## 2017-03-19 RX ORDER — VERAPAMIL HYDROCHLORIDE 80 MG/1
80 TABLET ORAL DAILY
Status: DISCONTINUED | OUTPATIENT
Start: 2017-03-19 | End: 2017-03-21 | Stop reason: HOSPADM

## 2017-03-19 RX ADMIN — SODIUM CHLORIDE 100 ML/HR: 9 INJECTION, SOLUTION INTRAVENOUS at 17:53

## 2017-03-19 RX ADMIN — HYDROCODONE BITARTRATE AND ACETAMINOPHEN 1 TABLET: 7.5; 325 TABLET ORAL at 06:31

## 2017-03-19 RX ADMIN — VERAPAMIL HYDROCHLORIDE 80 MG: 80 TABLET, FILM COATED ORAL at 08:27

## 2017-03-19 RX ADMIN — LISINOPRIL 40 MG: 20 TABLET ORAL at 08:27

## 2017-03-19 RX ADMIN — TIOTROPIUM BROMIDE 1 CAPSULE: 18 CAPSULE ORAL; RESPIRATORY (INHALATION) at 09:16

## 2017-03-19 RX ADMIN — DOCUSATE SODIUM AND SENNOSIDES 2 TABLET: 8.6; 5 TABLET, FILM COATED ORAL at 08:28

## 2017-03-19 RX ADMIN — CEFTRIAXONE 1 G: 1 INJECTION, SOLUTION INTRAVENOUS at 17:53

## 2017-03-19 RX ADMIN — BUDESONIDE AND FORMOTEROL FUMARATE DIHYDRATE 2 PUFF: 80; 4.5 AEROSOL RESPIRATORY (INHALATION) at 09:11

## 2017-03-19 RX ADMIN — DOCUSATE SODIUM AND SENNOSIDES 2 TABLET: 8.6; 5 TABLET, FILM COATED ORAL at 19:10

## 2017-03-19 RX ADMIN — HYDROCODONE BITARTRATE AND ACETAMINOPHEN 1 TABLET: 7.5; 325 TABLET ORAL at 12:53

## 2017-03-19 RX ADMIN — BUDESONIDE AND FORMOTEROL FUMARATE DIHYDRATE 2 PUFF: 80; 4.5 AEROSOL RESPIRATORY (INHALATION) at 20:47

## 2017-03-19 RX ADMIN — FLUOXETINE 20 MG: 20 CAPSULE ORAL at 08:27

## 2017-03-19 RX ADMIN — HYDROCHLOROTHIAZIDE 25 MG: 25 TABLET ORAL at 08:28

## 2017-03-19 RX ADMIN — HYDROCODONE BITARTRATE AND ACETAMINOPHEN 1 TABLET: 7.5; 325 TABLET ORAL at 21:12

## 2017-03-19 NOTE — PROGRESS NOTES
"   LOS: 1 day   Patient Care Team:  Vianey Barrios PA-C as PCP - General (Family Medicine)  Bhanu Mata MD as Consulting Physician (Cardiology)  Mann Hernadez MD as Consulting Physician (Pulmonary Disease)    Chief Complaint: ureteral stone/pyleonephritis    Subjective     Female Dysuria    The current episode started in the past 7 days.       Subjective:  Symptoms:  Stable.    Activity level: Normal.    Pain:  She complains of pain that is moderate.  She reports pain is unchanged.        History taken from: patient    Objective     Vital Signs  Temp:  [97.2 °F (36.2 °C)-98.4 °F (36.9 °C)] 97.5 °F (36.4 °C)  Heart Rate:  [] 91  Resp:  [16-20] 20  BP: (138-211)/() 138/62    Objective:  General Appearance:  Comfortable and well-appearing.    Vital signs: (most recent): Blood pressure 138/62, pulse 91, temperature 97.5 °F (36.4 °C), temperature source Oral, resp. rate 20, height 64\" (162.6 cm), weight 254 lb (115 kg), SpO2 91 %, not currently breastfeeding.    Output: Producing urine.    HEENT: Normal HEENT exam.    Lungs:  Normal effort.    Heart: Normal rate.    Abdomen: Abdomen is soft.              Results Review:     I reviewed the patient's new clinical results.    Medication Review: reviewed      Assessment/Plan     Principal Problem:    Obstructive pyelonephritis  Active Problems:    Right ureteral stone      Assessment:    Condition: In stable condition.  Improving.       Plan:   Transfer Plan: discharge home Monday.  Encourage ambulation.        Glenis Borrego, DALE  03/19/17  7:41 AM    Time: More than 50% of time spent in counseling and coordination of care:  Total face-to-face/floor time 10 min.  Time spent in counseling 10 min. Counseling included the following topics: 10      "

## 2017-03-19 NOTE — PROGRESS NOTES
Asked to see patient for report of junctional rhythm.  EKG from yesterday shows NSR.  No tele is yet scanned into EPIC.      Will recheck EKG; will review paper chart tomorrow to see if paper strips are available to confirm reported junctional rhythm.    Full consult will be done 3/20/17.

## 2017-03-19 NOTE — PLAN OF CARE
Problem: Patient Care Overview (Adult)  Goal: Plan of Care Review  Outcome: Ongoing (interventions implemented as appropriate)    03/19/17 1725   Coping/Psychosocial Response Interventions   Plan Of Care Reviewed With patient   Outcome Evaluation   Outcome Summary/Follow up Plan patient feeling much better than yesterday, pain pills are controlling the pain today- cardiology consulted & will see the patient tomorrow- ekg done today & cardiologist said it looked fine. IV antibiotics daily. Anticipate home in next day or two.         Problem: Pain, Acute (Adult)  Goal: Identify Related Risk Factors and Signs and Symptoms  Outcome: Ongoing (interventions implemented as appropriate)    03/19/17 1725   Pain, Acute   Related Risk Factors (Acute Pain) surgery   Signs and Symptoms (Acute Pain) questions meaning of pain         03/19/17 1725   Pain, Acute   Related Risk Factors (Acute Pain) surgery   Signs and Symptoms (Acute Pain) questions meaning of pain       Goal: Acceptable Pain Control/Comfort Level  Outcome: Ongoing (interventions implemented as appropriate)    03/19/17 1725   Pain, Acute (Adult)   Acceptable Pain Control/Comfort Level making progress toward outcome

## 2017-03-19 NOTE — ANESTHESIA POSTPROCEDURE EVALUATION
Patient: Mar Camilo    Procedure Summary     Date Anesthesia Start Anesthesia Stop Room / Location    03/18/17 2009 2037 [facility] / [facility]       Procedure Diagnosis Surgeon Provider    CYSTOSCOPY URETERAL CATHETER/STENT INSERTION (Right ) No diagnosis on file. MD Dipesh Lau CRNA          Anesthesia Type: MAC  Last vitals  /57 (03/18/17 2036)    Temp 97.4 °F (36.3 °C) (03/18/17 2036)    Pulse 101 (03/18/17 2036)   Resp 16 (03/18/17 2036)    SpO2 93 % (03/18/17 2036)      Post Anesthesia Care and Evaluation    Patient location during evaluation: PACU  Patient participation: complete - patient participated  Level of consciousness: awake and alert  Pain score: 2  Pain management: adequate  Airway patency: patent  Anesthetic complications: No anesthetic complications  PONV Status: none  Cardiovascular status: acceptable  Respiratory status: acceptable  Hydration status: acceptable

## 2017-03-19 NOTE — NURSING NOTE
3/19/17 @ 1120- I SPOKE WITH PATIENT AT BEDSIDE- SHE LIVES WITH HER SISTER NITHYA- SHE IS NORMALLY ACTIVE, INDEPENDENT WITH ADLS, SHE CAN DRIVE BUT LETS HER SISTER DO THE DRIVING MOST OF THE TIME- SHE HAS HOME OXYGEN WITH [employer] SHE WEARS 2 L @ HS AND PRN, AND A NEBULIZER.  DENIES ANY OTHER MEDICAL EQUIPMENT- SHE PLANS ON RETURNING HOME AT DISCHARGE AND STATES HER SISTER WILL BE THERE TO ASSIST WITH RECOVERY IF NEEDED- DENIES ANY NEEDS AT THIS TIME

## 2017-03-19 NOTE — PLAN OF CARE
Problem: Patient Care Overview (Adult)  Goal: Plan of Care Review  Outcome: Ongoing (interventions implemented as appropriate)    03/18/17 2025   Coping/Psychosocial Response Interventions   Plan Of Care Reviewed With patient;sibling   Outcome Evaluation   Outcome Summary/Follow up Plan VSS. NO C/O. READY FOR PROCEDURE.       Goal: Adult Individualization and Mutuality  Outcome: Ongoing (interventions implemented as appropriate)    Problem: Perioperative Period (Adult)  Goal: Signs and Symptoms of Listed Potential Problems Will be Absent or Manageable (Perioperative Period)  Outcome: Ongoing (interventions implemented as appropriate)

## 2017-03-19 NOTE — PLAN OF CARE
Problem: Patient Care Overview (Adult)  Goal: Discharge Needs Assessment  Outcome: Ongoing (interventions implemented as appropriate)    03/18/17 2217 03/19/17 1119 03/19/17 1125   Discharge Needs Assessment   Concerns To Be Addressed --  denies needs/concerns at this time --    Discharge Planning Comments --  --  PLANS HOME AT DISCHARGE WITH HER SISTER   Living Environment   Transportation Available none --  --    Self-Care   Equipment Currently Used at Home --  oxygen  (NEBULIZER) --

## 2017-03-19 NOTE — H&P
[facility] Physical    Patient Care Team:  Vianey Barrios PA-C as PCP - General (Family Medicine)  Bhanu Mata MD as Consulting Physician (Cardiology)  Mann Hernadez MD as Consulting Physician (Pulmonary Disease)    Chief complaint right flank pain    Subjective     Patient is a 66 y.o. female presents with right distal obstructing stone and elevated WBC with fevers and pyuria. Ct shows obstructing hydro.. Onset of symptoms was abrupt starting several hours ago.   Associated symptoms include nausea. No prior stones or  issues.      Review of Systems   Pertinent items are noted in HPI    Past Medical History   Diagnosis Date   • Back pain    • CAD (coronary artery disease)    • COPD (chronic obstructive pulmonary disease)    • Crohn's disease    • DDD (degenerative disc disease)    • Depression    • Diabetes mellitus    • Hyperlipidemia    • Hypertension    • Hypokalemia    • Morbid obesity    • On home oxygen therapy      2L UNLESS STRESSED THEN 2.5 L   • PVD (peripheral vascular disease)      RT CAROTID STENOSIS   • Radiculopathy      NECK PAIN     Past Surgical History   Procedure Laterality Date   • Appendectomy     • Wrist arthroscopy       WITH RELEASE OF TRANSVERSE CARPAL LIGAMENT   • Lung biopsy       NEGATIVE   • Colon resection     • Pr thromboendartectmy neck,neck incis Right 3/14/2016     Procedure: RT CAROTID ENDARTERECTOMY;  Surgeon: Federico Schuler MD;  Location: [facility] OR;  Service: Vascular     Family History   Problem Relation Age of Onset   • Diabetes Mother    • Stroke Mother    • Other Father      RESPIRATORY FAILURE   • Cancer Other      Social History   Substance Use Topics   • Smoking status: Former Smoker     Years: 22.00   • Smokeless tobacco: Never Used   • Alcohol use No     Prescriptions Prior to Admission   Medication Sig Dispense Refill Last Dose   • acetaminophen (TYLENOL) 325 MG tablet Take 650 mg by mouth Every 6 (Six) Hours As Needed for mild  pain (1-3).   Taking   • albuterol (PROVENTIL) (2.5 MG/3ML) 0.083% nebulizer solution Take 2.5 mg by nebulization every 4 (four) hours as needed for wheezing. 25 vial 12 Taking   • aspirin 325 MG tablet Take 1 tablet by mouth daily. (Patient taking differently: Take 325 mg by mouth every morning. TO STOP DAY OF SX) 90 tablet  Taking   • FLUoxetine (PROzac) 20 MG capsule Take 1 capsule by mouth daily. 90 capsule 3 Taking   • Fluticasone Furoate-Vilanterol 100-25 MCG/INH aerosol powder  Inhale 1 puff daily. 60 each 11 Taking   • GuaiFENesin (MUCINEX MAXIMUM STRENGTH PO) Take 1 tablet by mouth daily as needed.   Taking   • hydrochlorothiazide (HYDRODIURIL) 25 MG tablet Take 1 tablet by mouth daily. 30 tablet 12 Taking   • HYDROCHLOROTHIAZIDE PO Take 25 mg by mouth daily.   Taking   • HYDROcodone-acetaminophen (NORCO) 7.5-325 MG per tablet Take 1 tablet by mouth Every 6 (Six) Hours As Needed for moderate pain (4-6). 30 tablet 0 Not Taking   • lisinopril (PRINIVIL,ZESTRIL) 40 MG tablet TAKE ONE TABLET BY MOUTH TWICE DAILY 60 tablet 0 Taking   • mesalamine (LIALDA) 1.2 G EC tablet Take  by mouth daily with breakfast. 4 TABS in the am   Taking   • metFORMIN (GLUCOPHAGE) 500 MG tablet Take 1 tablet by mouth Daily With Breakfast. 30 tablet 3 Taking   • Multiple Vitamins-Minerals (CENTRUM SILVER PO) Take 1 tablet by mouth every morning.   Taking   • nitrofurantoin, macrocrystal-monohydrate, (MACROBID) 100 MG capsule Take 1 capsule by mouth 2 (Two) Times a Day. 14 capsule 0 Not Taking   • rosuvastatin (CRESTOR) 40 MG tablet Take 1 tablet by mouth Daily. 30 tablet 3 Taking   • sulfamethoxazole-trimethoprim (BACTRIM DS,SEPTRA DS) 800-160 MG per tablet Take 1 tablet by mouth 2 (Two) Times a Day. 14 tablet 0 Taking   • tiotropium (SPIRIVA HANDIHALER) 18 MCG per inhalation capsule Place 2 puffs into inhaler and inhale 1 (one) time daily. 30 capsule 11 Taking   • verapamil (CALAN) 80 MG tablet Take 1 tablet by mouth 3 (Three) Times  "a Day. 90 tablet 1      Allergies:  Contrast dye; Tenoretic [atenolol-chlorthalidone]; and Iodinated diagnostic agents    Objective     Vital Signs  Temp:  [97.4 °F (36.3 °C)-98 °F (36.7 °C)] 97.4 °F (36.3 °C)  Heart Rate:  [] 101  Resp:  [20] 20  BP: (167-189)/(70-82) 185/81  No intake or output data in the 24 hours ending 03/18/17 2008  Flowsheet Rows         First Filed Value    Admission Height  64\" (162.6 cm) Documented at 03/18/2017 1647    Admission Weight  254 lb (115 kg) Documented at 03/18/2017 1647           Physical Exam:      General Appearance:    Alert, cooperative, in no acute distress   Head:    Normocephalic, without obvious abnormality, atraumatic   Eyes:            Lids and lashes normal, conjunctivae and sclerae normal, no   icterus, no pallor, corneas clear, PERRLA   Ears:    Ears appear intact with no abnormalities noted   Throat:   No oral lesions, no thrush, oral mucosa moist   Neck:   No adenopathy, supple, trachea midline, no thyromegaly, no     carotid bruit, no JVD   Back:     No kyphosis present, no scoliosis present, no skin lesions,       erythema or scars, no tenderness to percussion or                   palpation,   range of motion normal   Lungs:     Clear to auscultation,respirations regular, even and                   unlabored    Heart:    Regular rhythm and normal rate, normal S1 and S2, no            murmur, no gallop, no rub, no click   Breast Exam:    Deferred   Abdomen:     Normal bowel sounds, no masses, no organomegaly, soft        non-tender, non-distended, no guarding, no rebound                 tenderness   Genitalia:    Deferred   Extremities:   Moves all extremities well, no edema, no cyanosis, no              redness   Pulses:   Pulses palpable and equal bilaterally   Skin:   No bleeding, bruising or rash   Lymph nodes:   No palpable adenopathy   Neurologic:   Cranial nerves 2 - 12 grossly intact, sensation intact, DTR        present and equal bilaterally "     Results Review:    I reviewed the patient's new clinical results.  Results for orders placed or performed during the hospital encounter of 03/18/17   Urinalysis With / Culture If Indicated   Result Value Ref Range    Color, UA Yellow Yellow, Straw    Appearance, UA Cloudy (A) Clear    pH, UA <=5.0 4.5 - 8.0    Specific Gravity, UA >1.030 (H) 1.003 - 1.030    Glucose, UA Negative Negative    Ketones, UA 15 mg/dL (1+) (A) Negative, 80 mg/dL (3+), >=160 mg/dL (4+)    Bilirubin, UA Small (1+) (A) Negative    Blood, UA Large (3+) (A) Negative    Protein,  mg/dL (2+) (A) Negative    Leuk Esterase, UA Small (1+) (A) Negative    Nitrite, UA Positive (A) Negative    Urobilinogen, UA 1.0 E.U./dL 0.2 - 1.0 E.U./dL   Urinalysis, Microscopic Only   Result Value Ref Range    RBC, UA 31-50 (A) None Seen /HPF    WBC, UA Too Numerous to Count (A) None Seen /HPF    Bacteria, UA 4+ (A) None Seen /HPF    Squamous Epithelial Cells, UA 13-20 (A) None Seen, 0-2 /HPF    Hyaline Casts, UA None Seen None Seen /LPF    Methodology Manual Light Microscopy    Comprehensive Metabolic Panel   Result Value Ref Range    Glucose 189 (H) 65 - 99 mg/dL    BUN 24 (H) 8 - 23 mg/dL    Creatinine 1.00 0.57 - 1.00 mg/dL    Sodium 139 136 - 145 mmol/L    Potassium 4.2 3.5 - 5.2 mmol/L    Chloride 101 98 - 107 mmol/L    CO2 22.5 22.0 - 29.0 mmol/L    Calcium 9.7 8.8 - 10.5 mg/dL    Total Protein 7.5 6.0 - 8.5 g/dL    Albumin 3.80 3.50 - 5.20 g/dL    ALT (SGPT) 51 (H) 5 - 33 U/L    AST (SGOT) 47 (H) 5 - 32 U/L    Alkaline Phosphatase 93 40 - 129 U/L    Total Bilirubin 0.3 0.2 - 1.2 mg/dL    eGFR Non African Amer 55 (L) >60 mL/min/1.73    Globulin 3.7 gm/dL    A/G Ratio 1.0 g/dL    BUN/Creatinine Ratio 24.0 7.0 - 25.0    Anion Gap 15.5 mmol/L   CBC Auto Differential   Result Value Ref Range    WBC 16.96 (H) 4.80 - 10.80 10*3/mm3    RBC 4.90 4.20 - 5.40 10*6/mm3    Hemoglobin 14.2 12.0 - 16.0 g/dL    Hematocrit 44.6 37.0 - 47.0 %    MCV 91.0 81.0 -  99.0 fL    MCH 29.0 27.0 - 31.0 pg    MCHC 31.8 31.0 - 37.0 g/dL    RDW 15.1 (H) 11.5 - 14.5 %    RDW-SD 49.9 37.0 - 54.0 fl    MPV 9.2 7.4 - 10.4 fL    Platelets 313 140 - 500 10*3/mm3    Neutrophil % 73.9 (H) 45.0 - 70.0 %    Lymphocyte % 16.8 (L) 20.0 - 45.0 %    Monocyte % 7.2 3.0 - 8.0 %    Eosinophil % 1.4 0.0 - 4.0 %    Basophil % 0.2 0.0 - 2.0 %    Immature Grans % 0.5 0.0 - 0.5 %    Neutrophils, Absolute 12.53 (H) 1.50 - 8.30 10*3/mm3    Lymphocytes, Absolute 2.85 0.60 - 4.80 10*3/mm3    Monocytes, Absolute 1.22 (H) 0.00 - 1.00 10*3/mm3    Eosinophils, Absolute 0.23 0.10 - 0.30 10*3/mm3    Basophils, Absolute 0.04 0.00 - 0.20 10*3/mm3    Immature Grans, Absolute 0.09 (H) 0.00 - 0.03 10*3/mm3    nRBC 0.0 0.0 - 0.0 /100 WBC        Assessment/Plan:  Assessment/Plan     Active Problems:    Right ureteral stone      Obstructing Pyelo  Plan: stent placement tonite    I discussed the patients findings and my recommendations with patient.     Dipesh Bean MD  03/18/17  8:08 PM

## 2017-03-19 NOTE — OP NOTE
Date of Operation:  03/18/2017    PREOPERATIVE DIAGNOSIS: Distal obstructing right ureteral calculus with obstructing pyelonephritis.     POSTOPERATIVE DIAGNOSIS: Distal obstructing right ureteral calculus with obstructing pyelonephritis.     PROCEDURE PERFORMED: Cystoscopy with right stent placement.     SURGEON: Dipesh Bean MD    ANESTHESIA: MAC.     SPECIMENS: None.     COMPLICATIONS: None.     DRAINS: A 6-Central African x 24 cm stent.    INDICATIONS: This is a 66-year-old female who presents with acute abdominal pain, low-grade fevers, and chills. She is elevated white count, pyuria and a CT scan that shows a stone obstructing the distal segment of the ureter close to the bladder. She now presents for emergent stent placement.     DESCRIPTION OF PROCEDURE: The patient was taken to the operative suite and given IV sedation. She was placed in lithotomy position. She was prepped and draped in the usual sterile fashion. Lidocaine was placed in the urethra with the Urojet. The 19-Central African cystoscope was inserted after adequate anesthesia time. The bladder was visualized. No acute cystitis was seen. The right orifice was edematous. With some difficulty, we were able to cannulate it with a sensor guidewire. This was passed up the renal pelvis. A 6-Central African x 24 cm double-J stent was then placed with good curl proximal and distal. The urine started draining out of the stent. Her bladder was drained. No catheter was placed. She was awoken and taken to recovery in stable condition.         Dipesh Bean M.D.  JULIETH/sergio  D:  03/18/2017 20:46:26   T:  03/18/2017 20:59:43   Job ID:  [id number]   Document ID:  [id number]  cc:  WILIAM Narvaez

## 2017-03-20 LAB — BACTERIA SPEC AEROBE CULT: ABNORMAL

## 2017-03-20 PROCEDURE — 94799 UNLISTED PULMONARY SVC/PX: CPT

## 2017-03-20 PROCEDURE — 99222 1ST HOSP IP/OBS MODERATE 55: CPT | Performed by: INTERNAL MEDICINE

## 2017-03-20 RX ORDER — LEVOFLOXACIN 500 MG/1
500 TABLET, FILM COATED ORAL EVERY 24 HOURS
Status: DISCONTINUED | OUTPATIENT
Start: 2017-03-20 | End: 2017-03-21 | Stop reason: HOSPADM

## 2017-03-20 RX ADMIN — TIOTROPIUM BROMIDE 1 CAPSULE: 18 CAPSULE ORAL; RESPIRATORY (INHALATION) at 08:20

## 2017-03-20 RX ADMIN — FLUOXETINE 20 MG: 20 CAPSULE ORAL at 09:23

## 2017-03-20 RX ADMIN — HYDROCHLOROTHIAZIDE 25 MG: 25 TABLET ORAL at 09:23

## 2017-03-20 RX ADMIN — ACETAMINOPHEN 650 MG: 325 TABLET, FILM COATED ORAL at 02:01

## 2017-03-20 RX ADMIN — SODIUM CHLORIDE 100 ML/HR: 9 INJECTION, SOLUTION INTRAVENOUS at 07:25

## 2017-03-20 RX ADMIN — DOCUSATE SODIUM AND SENNOSIDES 2 TABLET: 8.6; 5 TABLET, FILM COATED ORAL at 09:22

## 2017-03-20 RX ADMIN — HYDROCODONE BITARTRATE AND ACETAMINOPHEN 1 TABLET: 7.5; 325 TABLET ORAL at 03:02

## 2017-03-20 RX ADMIN — HYDROCODONE BITARTRATE AND ACETAMINOPHEN 1 TABLET: 7.5; 325 TABLET ORAL at 09:21

## 2017-03-20 RX ADMIN — HYDROCODONE BITARTRATE AND ACETAMINOPHEN 1 TABLET: 7.5; 325 TABLET ORAL at 15:31

## 2017-03-20 RX ADMIN — LISINOPRIL 40 MG: 20 TABLET ORAL at 09:22

## 2017-03-20 RX ADMIN — DOCUSATE SODIUM AND SENNOSIDES 2 TABLET: 8.6; 5 TABLET, FILM COATED ORAL at 19:49

## 2017-03-20 RX ADMIN — LEVOFLOXACIN 500 MG: 500 TABLET, FILM COATED ORAL at 15:31

## 2017-03-20 RX ADMIN — VERAPAMIL HYDROCHLORIDE 80 MG: 80 TABLET, FILM COATED ORAL at 09:22

## 2017-03-20 NOTE — CONSULTS
Date of Hospital Visit: [unfilled]ENC@  Encounter Provider: Jasmin Kurtz MD  Place of Service: [facility] CARDIOLOGY  Patient Name: Mar Camilo  :1950  Referral Provider: Vianey Barrios PA-C    Chief complaint  Junctional rhythm, pyelonephritis.     History of Present Illness       At this time, she feels well. She is still taking pain medications for the renal calculi. She has no chest pain. She has mild chronic short-windedness, which is unchanged. She has had no fevers or chills and she is not coughing up anything. She has mild chronic wheezing. She denies nausea or vomiting. She says she feels much better and her pain is well controlled with the kidney stone. She has not felt her heart racing or skipping. She has had no dizziness or falls. However, she was taking her medications as directed.        Past Medical History   Diagnosis Date   • Back pain    • CAD (coronary artery disease)    • COPD (chronic obstructive pulmonary disease)    • Crohn's disease    • DDD (degenerative disc disease)    • Depression    • Diabetes mellitus    • Hyperlipidemia    • Hypertension    • Hypokalemia    • Morbid obesity    • Obstructive pyelonephritis    • On home oxygen therapy      2L UNLESS STRESSED THEN 2.5 L   • PVD (peripheral vascular disease)      RT CAROTID STENOSIS   • Radiculopathy      NECK PAIN       Past Surgical History   Procedure Laterality Date   • Appendectomy     • Wrist arthroscopy       WITH RELEASE OF TRANSVERSE CARPAL LIGAMENT   • Lung biopsy       NEGATIVE   • Colon resection     • Pr thromboendartectmy neck,neck incis Right 3/14/2016     Procedure: RT CAROTID ENDARTERECTOMY;  Surgeon: Federico Schuler MD;  Location: [facility];  Service: Vascular      CARDIOLOGY CONSULTATION     CONSULTING PHYSICIAN:  Jasmin Kurtz MD     REASON FOR CONSULTATION:  Junctional rhythm.    HISTORY OF PRESENT ILLNESS:  The patient has previously been evaluated in  our group. She has a history of atrial fibrillation which was diagnosed after a colectomy in 2016. She was loaded on amiodarone, with recommendation of anticoagulation on Pradaxa. But because of an increased bleeding risk, after much discussion they decided to keep her on full-dose aspirin instead. She has chronic dyspnea due to chronic lung disease and is on home oxygen. She saw Dr. Erich Spaulding in the office 03/2016. I do not see that she has been evaluated since then. Her chronic lung disease is due to COPD, and she also has a history of essential hypertension. She had a right carotid ultrasound done 03/14/2016 which was normal. She had a stress nuclear perfusion study done in 07/2015; this showed no evidence of ischemia or infarct, and her ejection fraction at that time was 54%. I do not see anything that looks like an echocardiogram. She has a history of carotid artery disease and had a right carotid endarterectomy done with Dr. Schuler in the past. She also has diabetes mellitus type 2, hyperlipidemia, and hypertension, as well as morbid obesity. There is a history noted on her past medical history of Crohn's disease and a history of also of coronary artery disease. She was seen in consultation by Dr. Carmona after her carotid endarterectomy in 03/2016. I do not see that she has really been followed by anyone else in our group.         Prescriptions Prior to Admission   Medication Sig Dispense Refill Last Dose   • acetaminophen (TYLENOL) 325 MG tablet Take 650 mg by mouth Every 6 (Six) Hours As Needed for mild pain (1-3).   Taking   • albuterol (PROVENTIL) (2.5 MG/3ML) 0.083% nebulizer solution Take 2.5 mg by nebulization every 4 (four) hours as needed for wheezing. 25 vial 12 Taking   • aspirin 325 MG tablet Take 1 tablet by mouth daily. (Patient taking differently: Take 325 mg by mouth every morning. TO STOP DAY OF SX) 90 tablet  Taking   • FLUoxetine (PROzac) 20 MG capsule Take 1 capsule by mouth daily.  90 capsule 3 Taking   • Fluticasone Furoate-Vilanterol 100-25 MCG/INH aerosol powder  Inhale 1 puff daily. 60 each 11 Taking   • hydrochlorothiazide (HYDRODIURIL) 25 MG tablet Take 1 tablet by mouth daily. 30 tablet 12 Taking   • lisinopril (PRINIVIL,ZESTRIL) 40 MG tablet Take 40 mg by mouth Every Morning.      • metFORMIN (GLUCOPHAGE) 500 MG tablet Take 1 tablet by mouth Daily With Breakfast. 30 tablet 3 Taking   • Multiple Vitamins-Minerals (CENTRUM SILVER PO) Take 1 tablet by mouth every morning.   Taking   • rosuvastatin (CRESTOR) 40 MG tablet Take 1 tablet by mouth Daily. 30 tablet 3 Taking   • tiotropium (SPIRIVA HANDIHALER) 18 MCG per inhalation capsule Place 2 puffs into inhaler and inhale 1 (one) time daily. 30 capsule 11 Taking   • verapamil (CALAN) 80 MG tablet Take 80 mg by mouth Every Morning.      • GuaiFENesin (MUCINEX MAXIMUM STRENGTH PO) Take 1 tablet by mouth daily as needed.   Taking   • HYDROCHLOROTHIAZIDE PO Take 25 mg by mouth daily.   Taking   • HYDROcodone-acetaminophen (NORCO) 7.5-325 MG per tablet Take 1 tablet by mouth Every 6 (Six) Hours As Needed for moderate pain (4-6). 30 tablet 0 Not Taking   • lisinopril (PRINIVIL,ZESTRIL) 40 MG tablet TAKE ONE TABLET BY MOUTH TWICE DAILY 60 tablet 0 Taking   • mesalamine (LIALDA) 1.2 G EC tablet Take  by mouth daily with breakfast. 4 TABS in the am   Taking   • nitrofurantoin, macrocrystal-monohydrate, (MACROBID) 100 MG capsule Take 1 capsule by mouth 2 (Two) Times a Day. 14 capsule 0 Not Taking   • sulfamethoxazole-trimethoprim (BACTRIM DS,SEPTRA DS) 800-160 MG per tablet Take 1 tablet by mouth 2 (Two) Times a Day. 14 tablet 0 Taking   • verapamil (CALAN) 80 MG tablet Take 1 tablet by mouth 3 (Three) Times a Day. 90 tablet 1        Current Meds  Scheduled Meds:  budesonide-formoterol 2 puff Inhalation BID - RT   ceftriaxone 1 g Intravenous Q24H   FLUoxetine 20 mg Oral Daily   hydrochlorothiazide 25 mg Oral Daily   lisinopril 40 mg Oral Daily  "  sennosides-docusate sodium 2 tablet Oral BID   tiotropium 1 capsule Inhalation Daily - RT   verapamil 80 mg Oral Daily     Continuous Infusions:  sodium chloride 100 mL/hr Last Rate: 100 mL/hr (03/20/17 0725)     PRN Meds:.•  acetaminophen  •  albuterol  •  HYDROcodone-acetaminophen  •  HYDROmorphone **AND** naloxone  •  ondansetron **OR** ondansetron ODT **OR** ondansetron    Allergies as of 03/18/2017 - Ever as Reviewed 03/18/2017   Allergen Reaction Noted   • Contrast dye Hives 03/08/2016   • Tenoretic [atenolol-chlorthalidone] Anaphylaxis 02/10/2016   • Iodinated diagnostic agents  02/05/2016       Social History     Social History   • Marital status:      Spouse name: N/A   • Number of children: N/A   • Years of education: N/A     Occupational History   • Not on file.     Social History Main Topics   • Smoking status: Former Smoker     Years: 22.00   • Smokeless tobacco: Never Used      Comment: quit 3 yrs ago   • Alcohol use No      Comment: history of heavy drinker    • Drug use: No   • Sexual activity: Defer     Other Topics Concern   • Not on file     Social History Narrative   • No narrative on file       Family History   Problem Relation Age of Onset   • Diabetes Mother    • Stroke Mother    • Other Father      RESPIRATORY FAILURE   • Cancer Other        REVIEW OF SYSTEMS:   12 point ROS was performed and is negative except as outlined in HPI           Objective:   Temp:  [97.5 °F (36.4 °C)-98.5 °F (36.9 °C)] 98.5 °F (36.9 °C)  Heart Rate:  [68-96] 81  Resp:  [18-20] 18  BP: (105-130)/(40-78) 124/75  Body mass index is 43.6 kg/(m^2).  Flowsheet Rows         First Filed Value    Admission Height  64\" (162.6 cm) Documented at 03/18/2017 1647    Admission Weight  254 lb (115 kg) Documented at 03/18/2017 1647        Vitals:    03/20/17 0620   BP: 124/75   Pulse: 81   Resp: 18   Temp: 98.5 °F (36.9 °C)   SpO2: 99%       General Appearance:    Alert, cooperative, in no acute distress   Head:    " Normocephalic, without obvious abnormality, atraumatic   Eyes:            Lids and lashes normal, conjunctivae and sclerae normal, no   icterus, no pallor, corneas clear, PERRLA   Ears:    Ears appear intact with no abnormalities noted   Throat:    oral mucosa moist, dentures in place   Neck:   No adenopathy, supple, trachea midline, no thyromegaly, no   carotid bruit, no JVD   Back:     No kyphosis present, no scoliosis present, no skin lesions, erythema or scars, no tenderness    Lungs:     Decreased with wheezing on the left    Heart:    Regular rhythm and normal rate, normal S1 and S2, no murmur, no gallop, no rub, no click   Chest Wall:    No abnormalities observed   Abdomen:     Normal bowel sounds, no masses, no organomegaly, soft        non-tender, non-distended, no guarding, no rebound  tenderness   Extremities:   Moves all extremities well, no edema, no cyanosis, no redness   Pulses:   Pulses palpable and equal bilaterally. Normal radial, carotid, dorsalis pedis and posterior tibial pulses bilaterally   Skin:  Psychiatric:   No bleeding, bruising or rash    Alert and oriented x 3, normal mood and affect           Lab Review:      Results from last 7 days  Lab Units 03/19/17  0322 03/18/17  1711   SODIUM mmol/L 137 139   POTASSIUM mmol/L 4.5 4.2   CHLORIDE mmol/L 104 101   TOTAL CO2 mmol/L 23.8 22.5   BUN mg/dL 21 24*   CREATININE mg/dL 0.87 1.00   CALCIUM mg/dL 8.7* 9.7   BILIRUBIN mg/dL  --  0.3   ALK PHOS U/L  --  93   ALT (SGPT) U/L  --  51*   AST (SGOT) U/L  --  47*   GLUCOSE mg/dL 162* 189*         @LABRCNTbnp@    Results from last 7 days  Lab Units 03/19/17  0322 03/18/17  1711   WBC 10*3/mm3 14.28* 16.96*   HEMOGLOBIN g/dL 12.5 14.2   HEMATOCRIT % 40.2 44.6   PLATELETS 10*3/mm3 270 313                 I personally viewed and interpreted the patient's EKG/Telemetry data  )  Patient Active Problem List   Diagnosis   • Obstructive pyelonephritis   • Chronic allergic conjunctivitis   • Chronic back pain    • Chronic bronchitis   • Non-specific colitis   • Chronic obstructive pulmonary disease with acute exacerbation   • Degeneration of intervertebral disc of lumbar region   • Depression   • Fatigue   • Heartburn   • Herpes labialis   • Hyperglycemia   • Mixed hyperlipidemia   • Essential hypertension   • Hypocalcemia   • Hypokalemia   • Sprain of back   • Spinal stenosis of lumbar region   • Morbid obesity   • Osteopenia   • Lumbar radiculopathy   • Shortness of breath   • Type 2 diabetes mellitus with complication, without long-term current use of insulin   • Increased frequency of urination   • Anemia   • Hypopotassemia   • Asymptomatic stenosis of right carotid artery   • Carotid stenosis, asymptomatic   • Chronic respiratory failure with hypoxia   • Carotid stenosis   • Hospital discharge follow-up   • Cellulitis of neck   • Low back pain   • Osteoarthritis of lumbar spine   • Need for immunization against influenza   • Well controlled type 2 diabetes mellitus   • Chronic right hip pain   • Chronic pain of both shoulders   • Fall   • Bursitis, subacromial, right   • Urine frequency   • Acute cystitis with hematuria   • Right ureteral stone     Assessment and Plan:  1. Obstructing pyelonephritis, s/p right stent for ureteral stone.   2. Sinus rhythm, no junctional rhythm  3. Copd, on oxygen  4. Htn, controlled.      Overall stable CV status, no arrhythmia.  Will see as needed, f/u in office in 3 months.     Jasmin Kurzt MD  03/20/17  7:38 AM.  Time spent in reviewing chart, discussion and examination:

## 2017-03-20 NOTE — PLAN OF CARE
Problem: Patient Care Overview (Adult)  Goal: Plan of Care Review  Outcome: Ongoing (interventions implemented as appropriate)    03/20/17 0513   Coping/Psychosocial Response Interventions   Plan Of Care Reviewed With patient   Patient Care Overview   Progress improving       Goal: Adult Individualization and Mutuality  Outcome: Ongoing (interventions implemented as appropriate)    03/20/17 0513   Individualization   Patient Specific Preferences Prefers to be called Krys       Goal: Discharge Needs Assessment  Outcome: Ongoing (interventions implemented as appropriate)    03/20/17 0513   Discharge Needs Assessment   Concerns To Be Addressed denies needs/concerns at this time   Readmission Within The Last 30 Days no previous admission in last 30 days   Self-Care   Equipment Currently Used at Home oxygen   Living Environment   Transportation Available family or friend will provide         Problem: Perioperative Period (Adult)  Goal: Signs and Symptoms of Listed Potential Problems Will be Absent or Manageable (Perioperative Period)  Outcome: Ongoing (interventions implemented as appropriate)    03/20/17 0513   Perioperative Period   Problems Assessed (Perioperative Period) all   Problems Present (Perioperative Period) pain         Problem: Pain, Acute (Adult)  Goal: Identify Related Risk Factors and Signs and Symptoms  Outcome: Ongoing (interventions implemented as appropriate)    03/20/17 0513   Pain, Acute   Related Risk Factors (Acute Pain) surgery   Signs and Symptoms (Acute Pain) verbalization of pain descriptors       Goal: Acceptable Pain Control/Comfort Level  Outcome: Ongoing (interventions implemented as appropriate)    03/20/17 0513   Pain, Acute (Adult)   Acceptable Pain Control/Comfort Level making progress toward outcome

## 2017-03-21 VITALS
WEIGHT: 254 LBS | TEMPERATURE: 97.4 F | BODY MASS INDEX: 43.36 KG/M2 | SYSTOLIC BLOOD PRESSURE: 125 MMHG | DIASTOLIC BLOOD PRESSURE: 60 MMHG | HEART RATE: 76 BPM | HEIGHT: 64 IN | OXYGEN SATURATION: 93 % | RESPIRATION RATE: 20 BRPM

## 2017-03-21 PROCEDURE — 94799 UNLISTED PULMONARY SVC/PX: CPT

## 2017-03-21 RX ORDER — LEVOFLOXACIN 500 MG/1
500 TABLET, FILM COATED ORAL EVERY 24 HOURS
Qty: 10 TABLET | Refills: 0 | Status: SHIPPED | OUTPATIENT
Start: 2017-03-21 | End: 2017-05-03

## 2017-03-21 RX ORDER — HYDROCODONE BITARTRATE AND ACETAMINOPHEN 7.5; 325 MG/1; MG/1
1 TABLET ORAL EVERY 6 HOURS PRN
Qty: 30 TABLET | Refills: 0
Start: 2017-03-21 | End: 2017-04-11

## 2017-03-21 RX ADMIN — FLUOXETINE 20 MG: 20 CAPSULE ORAL at 08:30

## 2017-03-21 RX ADMIN — VERAPAMIL HYDROCHLORIDE 80 MG: 80 TABLET, FILM COATED ORAL at 08:32

## 2017-03-21 RX ADMIN — TIOTROPIUM BROMIDE 1 CAPSULE: 18 CAPSULE ORAL; RESPIRATORY (INHALATION) at 07:37

## 2017-03-21 RX ADMIN — DOCUSATE SODIUM AND SENNOSIDES 2 TABLET: 8.6; 5 TABLET, FILM COATED ORAL at 08:31

## 2017-03-21 RX ADMIN — HYDROCODONE BITARTRATE AND ACETAMINOPHEN 1 TABLET: 7.5; 325 TABLET ORAL at 06:03

## 2017-03-21 RX ADMIN — HYDROCHLOROTHIAZIDE 25 MG: 25 TABLET ORAL at 08:32

## 2017-03-21 RX ADMIN — LISINOPRIL 40 MG: 20 TABLET ORAL at 08:33

## 2017-03-21 RX ADMIN — HYDROCODONE BITARTRATE AND ACETAMINOPHEN 1 TABLET: 7.5; 325 TABLET ORAL at 00:05

## 2017-03-21 NOTE — PLAN OF CARE
Problem: Patient Care Overview (Adult)  Goal: Plan of Care Review  Outcome: Outcome(s) achieved Date Met:  03/21/17 03/21/17 0921   Coping/Psychosocial Response Interventions   Plan Of Care Reviewed With patient   Outcome Evaluation   Outcome Summary/Follow up Plan home today   Patient Care Overview   Progress improving       Goal: Adult Individualization and Mutuality  Outcome: Outcome(s) achieved Date Met:  03/21/17  Goal: Discharge Needs Assessment  Outcome: Outcome(s) achieved Date Met:  03/21/17    Problem: Perioperative Period (Adult)  Goal: Signs and Symptoms of Listed Potential Problems Will be Absent or Manageable (Perioperative Period)  Outcome: Outcome(s) achieved Date Met:  03/21/17    Problem: Pain, Acute (Adult)  Goal: Identify Related Risk Factors and Signs and Symptoms  Outcome: Outcome(s) achieved Date Met:  03/21/17  Goal: Acceptable Pain Control/Comfort Level  Outcome: Outcome(s) achieved Date Met:  03/21/17

## 2017-03-21 NOTE — DISCHARGE INSTR - APPOINTMENTS
03/21/17 0845  Dipesh Fuentes MD            Order Details                                Start Date/Time        Start Date       03/21/17           Order Questions        Question Answer Comment       To: ernst fuentes        Follow Up Details my office will call to schedule follow-up        Has the patient’s follow-up appointment been scheduled and documented in the discharge navigator? Patient to schedule, documented in the follow-up section

## 2017-03-29 ENCOUNTER — OFFICE VISIT (OUTPATIENT)
Dept: FAMILY MEDICINE CLINIC | Facility: CLINIC | Age: 67
End: 2017-03-29

## 2017-03-29 VITALS
SYSTOLIC BLOOD PRESSURE: 142 MMHG | HEIGHT: 64 IN | DIASTOLIC BLOOD PRESSURE: 88 MMHG | OXYGEN SATURATION: 93 % | BODY MASS INDEX: 43.19 KG/M2 | TEMPERATURE: 98 F | RESPIRATION RATE: 16 BRPM | WEIGHT: 253 LBS | HEART RATE: 118 BPM

## 2017-03-29 DIAGNOSIS — N20.1 RIGHT URETERAL STONE: ICD-10-CM

## 2017-03-29 DIAGNOSIS — I10 ESSENTIAL HYPERTENSION: Primary | ICD-10-CM

## 2017-03-29 PROCEDURE — 99213 OFFICE O/P EST LOW 20 MIN: CPT | Performed by: PHYSICIAN ASSISTANT

## 2017-03-29 RX ORDER — VERAPAMIL HYDROCHLORIDE 80 MG/1
80 TABLET ORAL 3 TIMES DAILY
Qty: 90 TABLET | Refills: 1 | Status: SHIPPED | OUTPATIENT
Start: 2017-03-29 | End: 2017-05-03 | Stop reason: DRUGHIGH

## 2017-03-29 NOTE — PROGRESS NOTES
Subjective   Mra Camilo is a 66 y.o. female.   Chief Complaint   Patient presents with   • Flank Pain     Kidney stone removal, [facility] F/U       History of Present Illness   Krys is a 66 year old female who presents for follow up from [facility].  She was admitted [facility] on March 18, 2017 due to a obstructing ureter stone. Krys was told she had an obstructing ureter stone which was causing hydronephrosis.  She was admitted to hospital with discharge date of March 21, 2017.  While at Hospital she saw Dr. Dipesh Bean, urologist, who placed a stent. Krys states that she was informed that the urologist office would call with an appointment for follow-up.  Krys has not heard from Dr. Bean's office at this time.  She is still having increased right lower quadrant pain with gross hematuria.  She has been compliant with her antibiotic and pain medications.  Denied any fevers, chills, nausea, vomiting, diarrhea, or dysuria.  Her appetite has been slightly decreased and sleep has been restless due to the stent placement. She has been out of her verapamil medication for the past couple weeks.  States she was told by insurance there was formulary change.  Denied any chest pain, shortness of air, dizziness or swelling of her ankles.      The following portions of the patient's history were reviewed and updated as appropriate: allergies, current medications, past family history, past medical history, past social history and past surgical history.    Review of Systems   Constitutional: Negative.  Negative for chills, fatigue and fever.   HENT: Negative.    Eyes: Negative.    Respiratory: Negative.  Negative for cough, shortness of breath and wheezing.    Cardiovascular: Negative.  Negative for chest pain, palpitations and leg swelling.   Gastrointestinal: Positive for abdominal pain. Negative for diarrhea, nausea and vomiting.   Endocrine: Negative.    Genitourinary: Positive for frequency and hematuria.  "Negative for decreased urine volume, dysuria, flank pain and vaginal discharge.   Musculoskeletal: Negative.    Skin: Negative.    Allergic/Immunologic: Negative.    Neurological: Negative.    Hematological: Negative.    Psychiatric/Behavioral: Negative.    All other systems reviewed and are negative.    Vitals:    03/29/17 1338   BP: 142/88   BP Location: Right arm   Patient Position: Sitting   Cuff Size: Adult   Pulse: 118   Resp: 16   Temp: 98 °F (36.7 °C)   TempSrc: Oral   SpO2: 93%   Weight: 253 lb (115 kg)   Height: 64\" (162.6 cm)     Wt Readings from Last 3 Encounters:   03/29/17 253 lb (115 kg)   03/18/17 254 lb (115 kg)   02/13/17 254 lb (115 kg)       BP Readings from Last 3 Encounters:   03/29/17 142/88   03/21/17 125/60   02/13/17 150/70     Body mass index is 43.43 kg/(m^2).    Allergies   Allergen Reactions   • Contrast Dye Hives   • Tenoretic [Atenolol-Chlorthalidone] Anaphylaxis   • Iodinated Diagnostic Agents        Objective   Physical Exam   Constitutional: She is oriented to person, place, and time. Vital signs are normal. She appears well-developed and well-nourished.   Wearing nasal canula with portable O2.     Neck: Trachea normal and phonation normal. Neck supple. No edema present.   Cardiovascular: Normal rate, regular rhythm, S1 normal, S2 normal, normal heart sounds and normal pulses.    Pulmonary/Chest: Effort normal and breath sounds normal.   Abdominal: Soft. Normal appearance and bowel sounds are normal. There is no hepatomegaly. There is no tenderness. There is no CVA tenderness.   Neurological: She is alert and oriented to person, place, and time.   Skin: Skin is warm, dry and intact.   Psychiatric: She has a normal mood and affect. Her speech is normal and behavior is normal. Judgment and thought content normal. Cognition and memory are normal.       Assessment/Plan   Mar was seen today for flank pain.    Diagnoses and all orders for this visit:    Essential hypertension  -     " verapamil (CALAN) 80 MG tablet; Take 1 tablet by mouth 3 (Three) Times a Day.    Right ureteral stone      1.  Stable hypertension: Krys is doing well with her medication.  She has been out oft he verapamil medication due to a change in formulary.  I have refilled plain verapamil 80 mg to take 3 times a day.  I will reevaluate blood pressure in one month with office visit.  2.  New right ureteral stone with follow-up from admission to [facility]: I have reviewed Krys's urology history and physical report and lab results with her at today's office visit.  She was admitted to [facility] on March 18, 2017 and discharged on March 21, 2017.  I will have office contact Dr. Bean's office about a follow-up appointment for Krys's urethral stone and stent placement.  She is still symptomatic.  I have instructed her if her symptoms worsen she is to call Dr. Bean's office or go to the nearest emergency room.  She voiced understanding.        Yasmine transcription disclaimer    Much of this encounter note is an electronic transcription/translation of spoken language to printed text.  The electronic translation of spoken language may permit erroneous, or at times, nonsensical words or phrases to be inadvertently transcribed.  Although I have reviewed the note for such errors, some may still exist.    Vianey Barrios PA-C  Family Practice

## 2017-03-31 ENCOUNTER — TELEPHONE (OUTPATIENT)
Dept: FAMILY MEDICINE CLINIC | Facility: CLINIC | Age: 67
End: 2017-03-31

## 2017-04-03 DIAGNOSIS — E11.8 TYPE 2 DIABETES MELLITUS WITH COMPLICATION, WITHOUT LONG-TERM CURRENT USE OF INSULIN (HCC): Primary | ICD-10-CM

## 2017-04-11 ENCOUNTER — APPOINTMENT (OUTPATIENT)
Dept: PREADMISSION TESTING | Facility: HOSPITAL | Age: 67
End: 2017-04-11

## 2017-04-11 ENCOUNTER — ANESTHESIA EVENT (OUTPATIENT)
Dept: PERIOP | Facility: HOSPITAL | Age: 67
End: 2017-04-11

## 2017-04-11 VITALS
OXYGEN SATURATION: 94 % | SYSTOLIC BLOOD PRESSURE: 166 MMHG | DIASTOLIC BLOOD PRESSURE: 84 MMHG | BODY MASS INDEX: 43.19 KG/M2 | RESPIRATION RATE: 18 BRPM | WEIGHT: 253 LBS | HEIGHT: 64 IN | HEART RATE: 112 BPM

## 2017-04-11 LAB
ANION GAP SERPL CALCULATED.3IONS-SCNC: 16.8 MMOL/L
BUN BLD-MCNC: 20 MG/DL (ref 8–23)
BUN/CREAT SERPL: 23.3 (ref 7–25)
CALCIUM SPEC-SCNC: 9.3 MG/DL (ref 8.8–10.5)
CHLORIDE SERPL-SCNC: 100 MMOL/L (ref 98–107)
CO2 SERPL-SCNC: 20.2 MMOL/L (ref 22–29)
CREAT BLD-MCNC: 0.86 MG/DL (ref 0.57–1)
DEPRECATED RDW RBC AUTO: 49.1 FL (ref 37–54)
ERYTHROCYTE [DISTWIDTH] IN BLOOD BY AUTOMATED COUNT: 14.6 % (ref 11.5–14.5)
GFR SERPL CREATININE-BSD FRML MDRD: 66 ML/MIN/1.73
GLUCOSE BLD-MCNC: 174 MG/DL (ref 65–99)
HCT VFR BLD AUTO: 46.1 % (ref 37–47)
HGB BLD-MCNC: 14.5 G/DL (ref 12–16)
MCH RBC QN AUTO: 28.7 PG (ref 27–31)
MCHC RBC AUTO-ENTMCNC: 31.5 G/DL (ref 31–37)
MCV RBC AUTO: 91.1 FL (ref 81–99)
PLATELET # BLD AUTO: 322 10*3/MM3 (ref 140–500)
PMV BLD AUTO: 9.2 FL (ref 7.4–10.4)
POTASSIUM BLD-SCNC: 4.2 MMOL/L (ref 3.5–5.2)
RBC # BLD AUTO: 5.06 10*6/MM3 (ref 4.2–5.4)
SODIUM BLD-SCNC: 137 MMOL/L (ref 136–145)
WBC NRBC COR # BLD: 10.88 10*3/MM3 (ref 4.8–10.8)

## 2017-04-11 PROCEDURE — 36415 COLL VENOUS BLD VENIPUNCTURE: CPT

## 2017-04-11 PROCEDURE — 85027 COMPLETE CBC AUTOMATED: CPT

## 2017-04-11 PROCEDURE — 80048 BASIC METABOLIC PNL TOTAL CA: CPT

## 2017-04-11 RX ORDER — OXYCODONE AND ACETAMINOPHEN 7.5; 325 MG/1; MG/1
1 TABLET ORAL EVERY 4 HOURS PRN
COMMUNITY
End: 2017-05-03

## 2017-04-11 RX ORDER — DIPHENHYDRAMINE HCL 25 MG
50 CAPSULE ORAL EVERY 6 HOURS PRN
COMMUNITY
End: 2018-01-06 | Stop reason: HOSPADM

## 2017-04-11 RX ORDER — ALBUTEROL SULFATE 90 UG/1
2 AEROSOL, METERED RESPIRATORY (INHALATION) EVERY 4 HOURS PRN
COMMUNITY
End: 2018-02-28 | Stop reason: SDUPTHER

## 2017-04-11 NOTE — PAT
Pt here for PAT visit.  Pre-op tests completed, chg soap given, and instructions reviewed.  Dr Kurtz consulted on patient in hospital 3/2017, cleared for procedure at that time.  Pt states was told no f/u needed.  Dr Hernadez follows for COPD, will obtain lov notes. Will have anesthesia review.  Needs accu-check and potassium day of surgery.

## 2017-04-11 NOTE — DISCHARGE INSTRUCTIONS
PRE-ADMISSION TESTING INSTRUCTIONS FOR ADULTS  Take your Crestor, Prozac, and Verapamil the morning of surgery.  Use your Spiriva and your albuterol mini-neb the morning of surgery.    No aspirin, advil, aleve, ibuprofen, naproxen, diet pills, decongestants, or herbal/vitamins for a week prior to surgery.    General Instructions:    • Do not eat or drink after midnight: includes water, mints, or gum. You may brush your teeth.  • Do not smoke, chew tobacco, or drink alcohol.  • Bring medications in original bottles, any inhalers and if applicable your C-PAP/ BI-PAP machine.  • Wear clean comfortable clothes and socks.  • Do not wear contact lenses, lotion, deodorant, or make-up.  Bring a case for your glasses if applicable.   • Bring crutches or walker if applicable.  • Leave all other valuables and jewelry at home.      Preventing a Surgical Site Infection:    • Shower the night before and on the morning of surgery using the chlorhexidine soap you were given.  Use a clean washcloth with the soap. Do not use the CHG soap on your hair, face, or private areas. Wash your body gently for five (5) minutes. Do not scrub your skin too hard.  Dry with a clean towel and dress in clean clothing.    • Do not shave the surgical area for a week prior to surgery  because the razor can irritate skin and make it easier to develop an infection.    • Make sure you, your family, and all healthcare providers clean their hands with soap and water or an alcohol based hand  before caring for you or your wound.    • If at all possible, quit smoking as many days before surgery as you can.    Day of surgery:    Your surgeon’s office will advise you of your arrival time for the day of surgery.    Upon arrival, a Pre-op nurse and Anesthesia provider will review your health history, obtain vital signs, and answer questions you may have.  The only belongings needed at this time will be your home medications and if applicable your  C-PAP/BI-PAP machine.  If you are staying overnight your family can leave the rest of your belongings in the car and bring them to your room later.  A Pre-op nurse will start an IV and you may receive medication in preparation for surgery, including something to help you relax.  Your family will be able to see you in the Pre-op area.  While you are in surgery your family should notify the waiting room  if they leave the waiting room area and provide a contact phone number.    IF you have any questions, you can call the Pre-Admission Department at [phone] or your surgeon's office.    Please be aware that surgery does come with discomfort.  We want to make every effort to control your discomfort so please discuss any uncontrolled symptoms with your nurse.   Your doctor will most likely have prescribed pain medications.      If you are going home after surgery, you will receive individualized written care instructions before being discharged.  A responsible adult (over the age of 18) must drive you to and from the hospital on the day of your surgery and stay with you for 24 hours after anesthesia.    If you are staying overnight following surgery, you will be transported to your hospital room following the recovery period.  [facility] has all private rooms.    Deductibles and co-payments are collected on the day of service. Please be prepared to pay the required co-pay, deductible or deposit on the day of service as defined by your plan.

## 2017-04-11 NOTE — PAT
Please obtain pulmonary clearance from Dr Hernadez for general anesthesia due to her chronic respiratory status.

## 2017-04-17 ENCOUNTER — APPOINTMENT (OUTPATIENT)
Dept: GENERAL RADIOLOGY | Facility: HOSPITAL | Age: 67
End: 2017-04-17

## 2017-04-17 ENCOUNTER — HOSPITAL ENCOUNTER (OUTPATIENT)
Facility: HOSPITAL | Age: 67
Setting detail: HOSPITAL OUTPATIENT SURGERY
Discharge: HOME | End: 2017-04-17
Attending: UROLOGY | Admitting: UROLOGY

## 2017-04-17 ENCOUNTER — ANESTHESIA (OUTPATIENT)
Dept: PERIOP | Facility: HOSPITAL | Age: 67
End: 2017-04-17

## 2017-04-17 VITALS
BODY MASS INDEX: 42.43 KG/M2 | WEIGHT: 247.2 LBS | OXYGEN SATURATION: 97 % | SYSTOLIC BLOOD PRESSURE: 142 MMHG | HEART RATE: 89 BPM | DIASTOLIC BLOOD PRESSURE: 48 MMHG | TEMPERATURE: 98.4 F | RESPIRATION RATE: 18 BRPM

## 2017-04-17 DIAGNOSIS — N20.2 CALCULUS OF KIDNEY AND URETER: ICD-10-CM

## 2017-04-17 LAB
GLUCOSE BLDC GLUCOMTR-MCNC: 149 MG/DL (ref 70–130)
POTASSIUM BLD-SCNC: 3.5 MMOL/L (ref 3.5–5.2)

## 2017-04-17 PROCEDURE — 82360 CALCULUS ASSAY QUANT: CPT | Performed by: UROLOGY

## 2017-04-17 PROCEDURE — 84132 ASSAY OF SERUM POTASSIUM: CPT | Performed by: NURSE ANESTHETIST, CERTIFIED REGISTERED

## 2017-04-17 PROCEDURE — 82962 GLUCOSE BLOOD TEST: CPT

## 2017-04-17 PROCEDURE — 25010000002 PROPOFOL 10 MG/ML EMULSION: Performed by: NURSE ANESTHETIST, CERTIFIED REGISTERED

## 2017-04-17 PROCEDURE — 25010000002 GENTAMICIN PER 80 MG

## 2017-04-17 PROCEDURE — 25010000002 ONDANSETRON PER 1 MG: Performed by: NURSE ANESTHETIST, CERTIFIED REGISTERED

## 2017-04-17 PROCEDURE — C1758 CATHETER, URETERAL: HCPCS | Performed by: UROLOGY

## 2017-04-17 PROCEDURE — 25010000002 MIDAZOLAM PER 1 MG: Performed by: NURSE ANESTHETIST, CERTIFIED REGISTERED

## 2017-04-17 PROCEDURE — 76000 FLUOROSCOPY <1 HR PHYS/QHP: CPT

## 2017-04-17 PROCEDURE — 25010000002 FENTANYL CITRATE (PF) 100 MCG/2ML SOLUTION: Performed by: NURSE ANESTHETIST, CERTIFIED REGISTERED

## 2017-04-17 PROCEDURE — 94640 AIRWAY INHALATION TREATMENT: CPT

## 2017-04-17 PROCEDURE — 25010000002 DIPHENHYDRAMINE PER 50 MG: Performed by: NURSE ANESTHETIST, CERTIFIED REGISTERED

## 2017-04-17 RX ORDER — ONDANSETRON 2 MG/ML
4 INJECTION INTRAMUSCULAR; INTRAVENOUS ONCE AS NEEDED
Status: COMPLETED | OUTPATIENT
Start: 2017-04-17 | End: 2017-04-17

## 2017-04-17 RX ORDER — PROPOFOL 10 MG/ML
VIAL (ML) INTRAVENOUS AS NEEDED
Status: DISCONTINUED | OUTPATIENT
Start: 2017-04-17 | End: 2017-04-17 | Stop reason: SURG

## 2017-04-17 RX ORDER — ACETAMINOPHEN 325 MG/1
650 TABLET ORAL ONCE AS NEEDED
Status: DISCONTINUED | OUTPATIENT
Start: 2017-04-17 | End: 2017-04-17 | Stop reason: HOSPADM

## 2017-04-17 RX ORDER — DIPHENHYDRAMINE HYDROCHLORIDE 50 MG/ML
12.5 INJECTION INTRAMUSCULAR; INTRAVENOUS
Status: DISCONTINUED | OUTPATIENT
Start: 2017-04-17 | End: 2017-04-17 | Stop reason: HOSPADM

## 2017-04-17 RX ORDER — MAGNESIUM HYDROXIDE 1200 MG/15ML
LIQUID ORAL AS NEEDED
Status: DISCONTINUED | OUTPATIENT
Start: 2017-04-17 | End: 2017-04-17 | Stop reason: HOSPADM

## 2017-04-17 RX ORDER — LIDOCAINE HYDROCHLORIDE 10 MG/ML
0.3 INJECTION, SOLUTION EPIDURAL; INFILTRATION; INTRACAUDAL; PERINEURAL ONCE
Status: COMPLETED | OUTPATIENT
Start: 2017-04-17 | End: 2017-04-17

## 2017-04-17 RX ORDER — GENTAMICIN SULFATE 40 MG/ML
INJECTION, SOLUTION INTRAMUSCULAR; INTRAVENOUS
Status: COMPLETED
Start: 2017-04-17 | End: 2017-04-17

## 2017-04-17 RX ORDER — FAMOTIDINE 10 MG/ML
20 INJECTION, SOLUTION INTRAVENOUS
Status: DISCONTINUED | OUTPATIENT
Start: 2017-04-17 | End: 2017-04-17 | Stop reason: HOSPADM

## 2017-04-17 RX ORDER — MIDAZOLAM HYDROCHLORIDE 1 MG/ML
1 INJECTION INTRAMUSCULAR; INTRAVENOUS
Status: DISCONTINUED | OUTPATIENT
Start: 2017-04-17 | End: 2017-04-17 | Stop reason: HOSPADM

## 2017-04-17 RX ORDER — LIDOCAINE HYDROCHLORIDE 20 MG/ML
INJECTION, SOLUTION INFILTRATION; PERINEURAL AS NEEDED
Status: DISCONTINUED | OUTPATIENT
Start: 2017-04-17 | End: 2017-04-17 | Stop reason: SURG

## 2017-04-17 RX ORDER — ONDANSETRON 2 MG/ML
4 INJECTION INTRAMUSCULAR; INTRAVENOUS ONCE AS NEEDED
Status: DISCONTINUED | OUTPATIENT
Start: 2017-04-17 | End: 2017-04-17 | Stop reason: HOSPADM

## 2017-04-17 RX ORDER — ACETAMINOPHEN 650 MG/1
650 SUPPOSITORY RECTAL ONCE AS NEEDED
Status: DISCONTINUED | OUTPATIENT
Start: 2017-04-17 | End: 2017-04-17 | Stop reason: HOSPADM

## 2017-04-17 RX ORDER — FENTANYL CITRATE 50 UG/ML
INJECTION, SOLUTION INTRAMUSCULAR; INTRAVENOUS AS NEEDED
Status: DISCONTINUED | OUTPATIENT
Start: 2017-04-17 | End: 2017-04-17 | Stop reason: SURG

## 2017-04-17 RX ORDER — DIPHENHYDRAMINE HYDROCHLORIDE 50 MG/ML
12.5 INJECTION INTRAMUSCULAR; INTRAVENOUS ONCE
Status: COMPLETED | OUTPATIENT
Start: 2017-04-17 | End: 2017-04-17

## 2017-04-17 RX ORDER — MIDAZOLAM HYDROCHLORIDE 1 MG/ML
2 INJECTION INTRAMUSCULAR; INTRAVENOUS
Status: DISCONTINUED | OUTPATIENT
Start: 2017-04-17 | End: 2017-04-17 | Stop reason: HOSPADM

## 2017-04-17 RX ORDER — MEPERIDINE HYDROCHLORIDE 25 MG/ML
12.5 INJECTION INTRAMUSCULAR; INTRAVENOUS; SUBCUTANEOUS
Status: DISCONTINUED | OUTPATIENT
Start: 2017-04-17 | End: 2017-04-17 | Stop reason: HOSPADM

## 2017-04-17 RX ORDER — OXYCODONE AND ACETAMINOPHEN 7.5; 325 MG/1; MG/1
1 TABLET ORAL ONCE AS NEEDED
Status: COMPLETED | OUTPATIENT
Start: 2017-04-17 | End: 2017-04-17

## 2017-04-17 RX ORDER — LEVOFLOXACIN 5 MG/ML
500 INJECTION, SOLUTION INTRAVENOUS EVERY 24 HOURS
Status: DISCONTINUED | OUTPATIENT
Start: 2017-04-17 | End: 2017-04-17

## 2017-04-17 RX ORDER — SODIUM CHLORIDE 0.9 % (FLUSH) 0.9 %
1-10 SYRINGE (ML) INJECTION AS NEEDED
Status: DISCONTINUED | OUTPATIENT
Start: 2017-04-17 | End: 2017-04-17 | Stop reason: HOSPADM

## 2017-04-17 RX ORDER — IPRATROPIUM BROMIDE AND ALBUTEROL SULFATE 2.5; .5 MG/3ML; MG/3ML
3 SOLUTION RESPIRATORY (INHALATION) ONCE
Status: COMPLETED | OUTPATIENT
Start: 2017-04-17 | End: 2017-04-17

## 2017-04-17 RX ORDER — SODIUM CHLORIDE, SODIUM LACTATE, POTASSIUM CHLORIDE, CALCIUM CHLORIDE 600; 310; 30; 20 MG/100ML; MG/100ML; MG/100ML; MG/100ML
9 INJECTION, SOLUTION INTRAVENOUS CONTINUOUS
Status: DISCONTINUED | OUTPATIENT
Start: 2017-04-17 | End: 2017-04-17 | Stop reason: HOSPADM

## 2017-04-17 RX ADMIN — FAMOTIDINE 20 MG: 10 INJECTION INTRAVENOUS at 11:16

## 2017-04-17 RX ADMIN — OXYCODONE HYDROCHLORIDE AND ACETAMINOPHEN 1 TABLET: 7.5; 325 TABLET ORAL at 13:44

## 2017-04-17 RX ADMIN — GENTAMICIN SULFATE 80 MG: 40 INJECTION, SOLUTION INTRAMUSCULAR; INTRAVENOUS at 12:08

## 2017-04-17 RX ADMIN — SODIUM CHLORIDE, POTASSIUM CHLORIDE, SODIUM LACTATE AND CALCIUM CHLORIDE: 600; 310; 30; 20 INJECTION, SOLUTION INTRAVENOUS at 12:03

## 2017-04-17 RX ADMIN — FENTANYL CITRATE 25 MCG: 50 INJECTION, SOLUTION INTRAMUSCULAR; INTRAVENOUS at 12:47

## 2017-04-17 RX ADMIN — MIDAZOLAM HYDROCHLORIDE 1 MG: 1 INJECTION, SOLUTION INTRAMUSCULAR; INTRAVENOUS at 11:47

## 2017-04-17 RX ADMIN — FENTANYL CITRATE 25 MCG: 50 INJECTION, SOLUTION INTRAMUSCULAR; INTRAVENOUS at 12:30

## 2017-04-17 RX ADMIN — FENTANYL CITRATE 50 MCG: 50 INJECTION, SOLUTION INTRAMUSCULAR; INTRAVENOUS at 12:18

## 2017-04-17 RX ADMIN — MIDAZOLAM HYDROCHLORIDE 1 MG: 1 INJECTION, SOLUTION INTRAMUSCULAR; INTRAVENOUS at 11:54

## 2017-04-17 RX ADMIN — SODIUM CHLORIDE, POTASSIUM CHLORIDE, SODIUM LACTATE AND CALCIUM CHLORIDE 9 ML/HR: 600; 310; 30; 20 INJECTION, SOLUTION INTRAVENOUS at 10:30

## 2017-04-17 RX ADMIN — DIPHENHYDRAMINE HYDROCHLORIDE 12.5 MG: 50 INJECTION, SOLUTION INTRAMUSCULAR; INTRAVENOUS at 11:16

## 2017-04-17 RX ADMIN — PROPOFOL 150 MG: 10 INJECTION, EMULSION INTRAVENOUS at 12:06

## 2017-04-17 RX ADMIN — IPRATROPIUM BROMIDE AND ALBUTEROL SULFATE 3 ML: .5; 3 SOLUTION RESPIRATORY (INHALATION) at 11:03

## 2017-04-17 RX ADMIN — ONDANSETRON 4 MG: 2 INJECTION, SOLUTION INTRAMUSCULAR; INTRAVENOUS at 11:15

## 2017-04-17 RX ADMIN — LIDOCAINE HYDROCHLORIDE 100 MG: 20 INJECTION, SOLUTION INFILTRATION; PERINEURAL at 12:05

## 2017-04-17 RX ADMIN — LIDOCAINE HYDROCHLORIDE 0.1 ML: 10 INJECTION, SOLUTION EPIDURAL; INFILTRATION; INTRACAUDAL; PERINEURAL at 10:30

## 2017-04-17 NOTE — ANESTHESIA PREPROCEDURE EVALUATION
Anesthesia Evaluation     Patient summary reviewed and Nursing notes reviewed   no history of anesthetic complications:  NPO Status: > 8 hours   Airway   Mallampati: II  TM distance: >3 FB  Neck ROM: full  possible difficult intubation  Dental    (+) edentulous, upper dentures and lower dentures    Pulmonary    (+) COPD moderate, shortness of breath, sleep apnea (snores), decreased breath sounds,   Cardiovascular - normal exam    ECG reviewed  Rhythm: regular  Rate: normal    (+) hypertension well controlled, CAD,   Past MI: denies.      Neuro/Psych  (+) psychiatric history Depression,    GI/Hepatic/Renal/Endo    (+) obesity, morbid obesity, chronic renal disease stones, diabetes mellitus type 2,     Musculoskeletal     (+) back pain, chronic pain,   Abdominal   (+) obese,    Substance History - negative use     OB/GYN negative ob/gyn ROS         Other   (+) arthritis                                 Anesthesia Plan    ASA 3     general     intravenous induction   Anesthetic plan and risks discussed with patient.  Use of blood products discussed with patient  Consented to blood products.

## 2017-04-17 NOTE — OP NOTE
DATE OF OPERATION:  04/17/2017    PREOPERATIVE DIAGNOSES: Right mid ureteral calculus.     POSTOPERATIVE DIAGNOSIS:  Right mid ureteral calculus.      PROCEDURES PERFORMED:   1.   Cystoscopy with right stent extraction.   2.   Right ureteroscopy with laser lithotripsy and basket extraction of stone fragments.     SURGEON: Dipesh Bean MD     ASSISTANT:  None.     ANESTHESIA: General.     DRAINS: None.     COMPLICATIONS: None.     SPECIMENS: Stone fragments.     INDICATIONS: This 66-year-old female who has a right ureteral stone and recent episode of pyelonephritis.  She now presents for stone extraction.     DESCRIPTION OF PROCEDURE:  The patient was taken to the operative suite and given general anesthesia. She was placed in a comfortable supine position and prepped and draped in the usual sterile fashion in lithotomy. The cystoscope was inserted in the bladder. The bladder was thoroughly visualized. The stent was visualized and pulled out intact. The rigid ureteroscope was then passed into the bladder. The orifice was cannulated.   I passed this all the way up to the mid ureter, where the stone was encountered. Using a 200 micron holmium fiber, I was able to break the stone down into several some more manageable pieces and these were extracted intact with a basket. I elected not to replace her stent. Everything was flowing freely. Minimal trauma of the ureter was noted. She was subsequently awoken and taken to recovery in stable condition. I will have her follow up in the office in a few weeks to recheck her urine and make sure everything is cleared out.            Dipesh Bean M.D.  JULIETH/anna  D:  04/17/2017 12:40:20   T:  04/17/2017 13:33:57   Job ID:  [id number]   Document ID:  [id number]  cc:  WILIAM Narvaez

## 2017-04-17 NOTE — PLAN OF CARE
Problem: Patient Care Overview (Adult)  Goal: Plan of Care Review  Outcome: Outcome(s) achieved Date Met:  04/17/17 04/17/17 1403   Coping/Psychosocial Response Interventions   Plan Of Care Reviewed With patient;sibling   Patient Care Overview   Progress improving   Outcome Evaluation   Outcome Summary/Follow up Plan VSS, PAIN MED. GIVEN WITH SOME RELIEF, TAKING PO, PT. WANTING TO GO HOME, READY FOR D/C HOME       Goal: Adult Individualization and Mutuality  Outcome: Outcome(s) achieved Date Met:  04/17/17    Problem: Perioperative Period (Adult)  Goal: Signs and Symptoms of Listed Potential Problems Will be Absent or Manageable (Perioperative Period)  Outcome: Outcome(s) achieved Date Met:  04/17/17

## 2017-04-17 NOTE — ANESTHESIA POSTPROCEDURE EVALUATION
Patient: Mar Camilo    Procedure Summary     Date Anesthesia Start Anesthesia Stop Room / Location    04/17/17 1203 1248 [facility] / [facility]       Procedure Diagnosis Surgeon Provider    CYSTOSCOPY with  right stent removal, right URETEROSCOPY LASER LITHOTRIPSY,  with STONE BASKET EXTRACTION (Right Ureter) Right ureteral calculus  (N20.0) MD Bruce Lau CRNA          Anesthesia Type: general  Last vitals  /48 (04/17/17 1350)    Temp      Pulse 89 (04/17/17 1350)   Resp 18 (04/17/17 1350)    SpO2 97 % (04/17/17 1350)      Post Anesthesia Care and Evaluation    Patient location during evaluation: bedside  Patient participation: complete - patient participated  Level of consciousness: awake and alert  Pain score: 0  Pain management: adequate  Airway patency: patent  Anesthetic complications: No anesthetic complications    Cardiovascular status: acceptable  Respiratory status: acceptable  Hydration status: acceptable

## 2017-04-17 NOTE — BRIEF OP NOTE
CYSTOSCOPY URETEROSCOPY LASER LITHOTRIPSY  Procedure Note    Mar MARROQUIN Scriver  4/17/2017    Pre-op Diagnosis:   N20.0    Post-op Diagnosis:     Post-Op Diagnosis Codes:     * Right ureteral calculus [N20.1]    Procedure/CPT® Codes:      Procedure(s):  CYSTOSCOPY URETEROSCOPY LASER LITHOTRIPSY, STONE BASKET EXTRACTION    Surgeon(s):  Dipesh Bean MD    Anesthesia: General    Staff:   Circulator: Mary Uribe RN  Radiology Technologist: Constance Lee Snellen  Scrub Person: Judy Gomez    Estimated Blood Loss: * No values recorded between 4/17/2017 11:55 AM and 4/17/2017 12:34 PM *  Urine Voided: * No values recorded between 4/17/2017 11:55 AM and 4/17/2017 12:34 PM *    Specimens:                * No specimens in log *      Drains:           Findings: mid ureteal stone litho and extracted    Complications: none      Dipesh Bean MD     Date: 4/17/2017  Time: 12:43 PM

## 2017-04-17 NOTE — PLAN OF CARE
Problem: Patient Care Overview (Adult)  Goal: Plan of Care Review  Outcome: Ongoing (interventions implemented as appropriate)    04/17/17 1046   Coping/Psychosocial Response Interventions   Plan Of Care Reviewed With patient;sibling   Patient Care Overview   Progress no change   Outcome Evaluation   Outcome Summary/Follow up Plan VSS, C/O RT. FLANK PAIN, READY FOR PROCEDURE       Goal: Adult Individualization and Mutuality  Outcome: Ongoing (interventions implemented as appropriate)    Problem: Perioperative Period (Adult)  Goal: Signs and Symptoms of Listed Potential Problems Will be Absent or Manageable (Perioperative Period)  Outcome: Ongoing (interventions implemented as appropriate)

## 2017-04-17 NOTE — H&P
[facility] History and Physical    Patient Care Team:  Vianey Barrios PA-C as PCP - General (Family Medicine)  Bhanu Mata MD as Consulting Physician (Cardiology)  Mann Hernadez MD as Consulting Physician (Pulmonary Disease)    Chief complaint right flank pain    Subjective     Patient is a 66 y.o. female presented with right distal obstructing stone and elevated WBC with fevers and pyuria last month. Ct shows obstructing hydro. She underwent emergent stent placement.  Her onset of symptoms was abrupt starting several hours priro to last admission with fevers and chills.   Associated symptoms include nausea. No prior stones or  issues. She has tolerated her stent fairly well.      Review of Systems   Pertinent items are noted in HPI    Past Medical History:   Diagnosis Date   • Artery stenosis     left, states no bp/hr in Left arm d/t inaccuracy   • Back pain    • CAD (coronary artery disease)    • COPD (chronic obstructive pulmonary disease)    • Crohn's disease     Remicade on hold d/t antibiotics   • DDD (degenerative disc disease)    • Depression    • Diabetes mellitus     states not diabetic, sugars only high when sick   • Hyperlipidemia    • Hypertension    • Hypokalemia    • Kidney stone     sched scope, lithotripsy   • Morbid obesity    • Obstructive pyelonephritis    • On home oxygen therapy     2L UNLESS STRESSED THEN 2.5-3 L   • PVD (peripheral vascular disease)     RT CAROTID STENOSIS   • Radiculopathy     NECK PAIN     Past Surgical History:   Procedure Laterality Date   • APPENDECTOMY     • COLON RESECTION     • COLONOSCOPY      x2   • CYSTOSCOPY W/ URETERAL STENT PLACEMENT Right 3/18/2017    Procedure: CYSTOSCOPY URETERAL CATHETER/STENT INSERTION;  Surgeon: Dipesh Bean MD;  Location: [facility] OR;  Service:    • LUNG BIOPSY      NEGATIVE   • KS THROMBOENDARTECTMY NECK,NECK INCIS Right 3/14/2016    Procedure: RT CAROTID ENDARTERECTOMY;  Surgeon: Federico Schuler MD;   Location: [facility] MAIN OR;  Service: Vascular   • WRIST ARTHROSCOPY Right     WITH RELEASE OF TRANSVERSE CARPAL LIGAMENT     Family History   Problem Relation Age of Onset   • Diabetes Mother    • Stroke Mother    • Other Father      RESPIRATORY FAILURE   • Cancer Other      Social History   Substance Use Topics   • Smoking status: Former Smoker     Packs/day: 2.00     Years: 40.00     Types: Cigarettes     Quit date: 04/2014   • Smokeless tobacco: Never Used   • Alcohol use No      Comment: history of heavy drinker      Prescriptions Prior to Admission   Medication Sig Dispense Refill Last Dose   • acetaminophen (TYLENOL) 325 MG tablet Take 650 mg by mouth 3 (Three) Times a Day.   4/16/2017 at 0800   • albuterol (PROVENTIL HFA;VENTOLIN HFA) 108 (90 BASE) MCG/ACT inhaler Inhale 2 puffs Every 4 (Four) Hours As Needed for Wheezing or Shortness of Air.   4/17/2017 at 0800S   • FLUoxetine (PROzac) 20 MG capsule Take 1 capsule by mouth daily. 90 capsule 3 4/17/2017 at 0800   • Fluticasone Furoate-Vilanterol 100-25 MCG/INH aerosol powder  Inhale 1 puff daily. (Patient taking differently: Inhale 1 puff Every Night.) 60 each 11 4/16/2017 at 2000   • hydrochlorothiazide (HYDRODIURIL) 25 MG tablet Take 1 tablet by mouth daily. 30 tablet 12 4/17/2017 at 0800   • levoFLOXacin (LEVAQUIN) 500 MG tablet Take 1 tablet by mouth Daily. Indications: Urinary Tract Infection 10 tablet 0 4/17/2017 at 0800   • lisinopril (PRINIVIL,ZESTRIL) 40 MG tablet TAKE ONE TABLET BY MOUTH TWICE DAILY 60 tablet 0 4/17/2017 at 0800   • oxyCODONE-acetaminophen (PERCOCET) 7.5-325 MG per tablet Take 1 tablet by mouth Every 4 (Four) Hours As Needed for Moderate Pain (4-6).   4/16/2017 at 2000   • rosuvastatin (CRESTOR) 40 MG tablet Take 1 tablet by mouth Daily. 30 tablet 3 4/17/2017 at 0800   • tiotropium (SPIRIVA HANDIHALER) 18 MCG per inhalation capsule Place 2 puffs into inhaler and inhale 1 (one) time daily. 30 capsule 11 4/17/2017 at 0800   • verapamil  "(CALAN) 80 MG tablet Take 1 tablet by mouth 3 (Three) Times a Day. 90 tablet 1 4/17/2017 at 0800   • albuterol (PROVENTIL) (2.5 MG/3ML) 0.083% nebulizer solution Take 2.5 mg by nebulization every 4 (four) hours as needed for wheezing. 25 vial 12 Unknown at Unknown time   • aspirin 325 MG tablet Take 1 tablet by mouth daily. (Patient taking differently: Take 325 mg by mouth Every Morning.) 90 tablet  4/11/2017   • diphenhydrAMINE (BENADRYL) 25 mg capsule Take 50 mg by mouth Every 6 (Six) Hours As Needed for Itching.   3/27/2017   • metFORMIN (GLUCOPHAGE) 500 MG tablet Take 1 tablet by mouth Daily With Breakfast. 30 tablet 3 3/27/2017   • Multiple Vitamins-Minerals (CENTRUM SILVER PO) Take 1 tablet by mouth every morning.   4/11/2017   • Multiple Vitamins-Minerals (VISION FORMULA/LUTEIN PO) Take 1 tablet by mouth Daily.   4/10/2017     Allergies:  Contrast dye; Tenoretic [atenolol-chlorthalidone]; and Iodinated diagnostic agents    Objective     Vital Signs  Temp:  [97.4 °F (36.3 °C)] 97.4 °F (36.3 °C)  Heart Rate:  [101-103] 103  Resp:  [16-18] 16  BP: (136)/(72) 136/72  No intake or output data in the 24 hours ending 04/17/17 1151  Flowsheet Rows         First Filed Value    Admission Height  64\" (162.6 cm) Documented at 03/18/2017 1647    Admission Weight  254 lb (115 kg) Documented at 03/18/2017 1647           Physical Exam:      General Appearance:    Alert, cooperative, in no acute distress   Head:    Normocephalic, without obvious abnormality, atraumatic   Eyes:            Lids and lashes normal, conjunctivae and sclerae normal, no   icterus, no pallor, corneas clear, PERRLA   Ears:    Ears appear intact with no abnormalities noted   Throat:   No oral lesions, no thrush, oral mucosa moist   Neck:   No adenopathy, supple, trachea midline, no thyromegaly, no     carotid bruit, no JVD   Back:     No kyphosis present, no scoliosis present, no skin lesions,       erythema or scars, no tenderness to percussion or     "               palpation,   range of motion normal   Lungs:     Clear to auscultation,respirations regular, even and                   unlabored    Heart:    Regular rhythm and normal rate, normal S1 and S2, no            murmur, no gallop, no rub, no click   Breast Exam:    Deferred   Abdomen:     Normal bowel sounds, no masses, no organomegaly, soft        non-tender, non-distended, no guarding, no rebound                 tenderness   Genitalia:    Deferred   Extremities:   Moves all extremities well, no edema, no cyanosis, no              redness   Pulses:   Pulses palpable and equal bilaterally   Skin:   No bleeding, bruising or rash   Lymph nodes:   No palpable adenopathy   Neurologic:   Cranial nerves 2 - 12 grossly intact, sensation intact, DTR        present and equal bilaterally     Results Review:    I reviewed the patient's new clinical results.  Results for orders placed or performed during the hospital encounter of 04/17/17   Potassium   Result Value Ref Range    Potassium 3.5 3.5 - 5.2 mmol/L   POC Glucose Fingerstick   Result Value Ref Range    Glucose 149 (H) 70 - 130 mg/dL        Assessment/Plan:  Assessment/Plan     Active Problems:    * No active hospital problems. *      Right Ureteral Stone    Plan: cysto, stent extraction ureteroscopy with laserlitho    I discussed the patients findings and my recommendations with patient.     Dipesh Bean MD  04/17/17  11:51 AM

## 2017-04-17 NOTE — PLAN OF CARE
Problem: Patient Care Overview (Adult)  Goal: Plan of Care Review  Outcome: Ongoing (interventions implemented as appropriate)    04/17/17 1304   Coping/Psychosocial Response Interventions   Plan Of Care Reviewed With patient   Patient Care Overview   Progress improving   Outcome Evaluation   Outcome Summary/Follow up Plan VSS, PAIN MED. GIVEN WITH SOME RELIEF, TAKING PO, READY FOR PHASE 2       Goal: Adult Individualization and Mutuality  Outcome: Ongoing (interventions implemented as appropriate)    Problem: Perioperative Period (Adult)  Goal: Signs and Symptoms of Listed Potential Problems Will be Absent or Manageable (Perioperative Period)  Outcome: Ongoing (interventions implemented as appropriate)

## 2017-04-24 LAB
CA PHOS CRY STONE QL IR: 30 %
COD CRY STONE QL IR: 10 %
COLOR STONE: NORMAL
COM CRY STONE QL IR: 60 %
COMPN STONE: NORMAL
Lab: NORMAL
Lab: NORMAL
NIDUS STONE QL: NORMAL
PATH REPORT.COMMENTS IMP SPEC: NORMAL
SIZE STONE: NORMAL MM
SURFACE CRYSTALS: NORMAL
WT STONE: 28 MG

## 2017-04-27 ENCOUNTER — TELEPHONE (OUTPATIENT)
Dept: FAMILY MEDICINE CLINIC | Facility: CLINIC | Age: 67
End: 2017-04-27

## 2017-04-27 NOTE — TELEPHONE ENCOUNTER
[facility] nurse called.  She said she saw Mrs Camilo today.  She was clammy and her blood pressure was high.  She had her take another BP pill.  They want to see her 2 times a week for 6 weeks.

## 2017-04-27 NOTE — TELEPHONE ENCOUNTER
Please notify [facility] that is okay.  Please keep us posted about her blood pressure.  When she is available have her schedule appointment for reevaluation in office.

## 2017-05-03 ENCOUNTER — OFFICE VISIT (OUTPATIENT)
Dept: FAMILY MEDICINE CLINIC | Facility: CLINIC | Age: 67
End: 2017-05-03

## 2017-05-03 VITALS
OXYGEN SATURATION: 94 % | BODY MASS INDEX: 42.51 KG/M2 | WEIGHT: 249 LBS | SYSTOLIC BLOOD PRESSURE: 144 MMHG | HEART RATE: 103 BPM | RESPIRATION RATE: 24 BRPM | HEIGHT: 64 IN | DIASTOLIC BLOOD PRESSURE: 60 MMHG

## 2017-05-03 DIAGNOSIS — M25.512 CHRONIC PAIN OF BOTH SHOULDERS: ICD-10-CM

## 2017-05-03 DIAGNOSIS — R56.9 SEIZURE (HCC): Primary | ICD-10-CM

## 2017-05-03 DIAGNOSIS — M25.511 CHRONIC PAIN OF BOTH SHOULDERS: ICD-10-CM

## 2017-05-03 DIAGNOSIS — Z86.73 HISTORY OF STROKE: ICD-10-CM

## 2017-05-03 DIAGNOSIS — Z09 HOSPITAL DISCHARGE FOLLOW-UP: ICD-10-CM

## 2017-05-03 DIAGNOSIS — I10 ESSENTIAL HYPERTENSION: ICD-10-CM

## 2017-05-03 DIAGNOSIS — G89.29 CHRONIC PAIN OF BOTH SHOULDERS: ICD-10-CM

## 2017-05-03 PROBLEM — D72.829 LEUKOCYTOSIS: Status: ACTIVE | Noted: 2017-04-23

## 2017-05-03 PROBLEM — R35.0 URINE FREQUENCY: Status: RESOLVED | Noted: 2017-02-13 | Resolved: 2017-05-03

## 2017-05-03 PROBLEM — N30.01 ACUTE CYSTITIS WITH HEMATURIA: Status: RESOLVED | Noted: 2017-02-13 | Resolved: 2017-05-03

## 2017-05-03 PROCEDURE — 99214 OFFICE O/P EST MOD 30 MIN: CPT | Performed by: PHYSICIAN ASSISTANT

## 2017-05-03 RX ORDER — CLOPIDOGREL BISULFATE 75 MG/1
75 TABLET ORAL DAILY
COMMUNITY
End: 2017-08-16 | Stop reason: SDUPTHER

## 2017-05-03 RX ORDER — LEVETIRACETAM 500 MG/1
500 TABLET ORAL
COMMUNITY
End: 2018-07-30 | Stop reason: SINTOL

## 2017-05-03 RX ORDER — VERAPAMIL HYDROCHLORIDE 120 MG/1
120 TABLET, FILM COATED ORAL 3 TIMES DAILY
Qty: 90 TABLET | Refills: 0 | Status: SHIPPED | OUTPATIENT
Start: 2017-05-03 | End: 2017-09-22 | Stop reason: SDUPTHER

## 2017-05-03 RX ORDER — LISINOPRIL 40 MG/1
40 TABLET ORAL DAILY
Qty: 30 TABLET | Refills: 6 | Status: SHIPPED | OUTPATIENT
Start: 2017-05-03 | End: 2018-03-27 | Stop reason: SDUPTHER

## 2017-05-03 RX ORDER — ASPIRIN 81 MG/1
81 TABLET ORAL DAILY
COMMUNITY
End: 2018-01-06 | Stop reason: HOSPADM

## 2017-05-04 RX ORDER — HYDROCODONE BITARTRATE AND ACETAMINOPHEN 5; 325 MG/1; MG/1
TABLET ORAL
Qty: 60 TABLET | Refills: 0
Start: 2017-05-04 | End: 2018-01-22 | Stop reason: SDUPTHER

## 2017-05-07 DIAGNOSIS — J44.1 CHRONIC OBSTRUCTIVE PULMONARY DISEASE WITH ACUTE EXACERBATION (HCC): ICD-10-CM

## 2017-05-07 RX ORDER — TIOTROPIUM BROMIDE 18 UG/1
CAPSULE ORAL; RESPIRATORY (INHALATION)
Qty: 30 CAPSULE | Refills: 6 | Status: SHIPPED | OUTPATIENT
Start: 2017-05-07 | End: 2018-08-13 | Stop reason: SDUPTHER

## 2017-05-08 ENCOUNTER — OFFICE VISIT (OUTPATIENT)
Dept: ORTHOPEDIC SURGERY | Facility: CLINIC | Age: 67
End: 2017-05-08

## 2017-05-08 DIAGNOSIS — M75.50 BURSITIS, SUBACROMIAL: Primary | ICD-10-CM

## 2017-05-08 DIAGNOSIS — M67.911 TENDINOPATHY OF ROTATOR CUFF, RIGHT: ICD-10-CM

## 2017-05-08 DIAGNOSIS — M75.50 SUBACROMIAL BURSITIS: ICD-10-CM

## 2017-05-08 DIAGNOSIS — M19.011 PRIMARY OSTEOARTHRITIS, RIGHT SHOULDER: ICD-10-CM

## 2017-05-08 DIAGNOSIS — F32.A DEPRESSION: ICD-10-CM

## 2017-05-08 DIAGNOSIS — M19.012 PRIMARY OSTEOARTHRITIS, LEFT SHOULDER: ICD-10-CM

## 2017-05-08 PROBLEM — M67.919 TENDINOPATHY OF ROTATOR CUFF: Status: ACTIVE | Noted: 2017-05-08

## 2017-05-08 PROCEDURE — 20610 DRAIN/INJ JOINT/BURSA W/O US: CPT | Performed by: NURSE PRACTITIONER

## 2017-05-08 PROCEDURE — 99213 OFFICE O/P EST LOW 20 MIN: CPT | Performed by: NURSE PRACTITIONER

## 2017-05-08 RX ORDER — LIDOCAINE HYDROCHLORIDE 10 MG/ML
2 INJECTION, SOLUTION INFILTRATION; PERINEURAL
Status: COMPLETED | OUTPATIENT
Start: 2017-05-08 | End: 2017-05-08

## 2017-05-08 RX ORDER — FLUOXETINE HYDROCHLORIDE 20 MG/1
CAPSULE ORAL
Qty: 90 CAPSULE | Refills: 3 | Status: SHIPPED | OUTPATIENT
Start: 2017-05-08 | End: 2018-08-24 | Stop reason: DRUGHIGH

## 2017-05-08 RX ORDER — BETAMETHASONE SODIUM PHOSPHATE AND BETAMETHASONE ACETATE 3; 3 MG/ML; MG/ML
12 INJECTION, SUSPENSION INTRA-ARTICULAR; INTRALESIONAL; INTRAMUSCULAR; SOFT TISSUE
Status: COMPLETED | OUTPATIENT
Start: 2017-05-08 | End: 2017-05-08

## 2017-05-08 RX ORDER — BUPIVACAINE HYDROCHLORIDE 5 MG/ML
2 INJECTION, SOLUTION EPIDURAL; INTRACAUDAL
Status: COMPLETED | OUTPATIENT
Start: 2017-05-08 | End: 2017-05-08

## 2017-05-08 RX ADMIN — LIDOCAINE HYDROCHLORIDE 2 ML: 10 INJECTION, SOLUTION INFILTRATION; PERINEURAL at 09:50

## 2017-05-08 RX ADMIN — BUPIVACAINE HYDROCHLORIDE 2 ML: 5 INJECTION, SOLUTION EPIDURAL; INTRACAUDAL at 09:50

## 2017-05-08 RX ADMIN — BETAMETHASONE SODIUM PHOSPHATE AND BETAMETHASONE ACETATE 12 MG: 3; 3 INJECTION, SUSPENSION INTRA-ARTICULAR; INTRALESIONAL; INTRAMUSCULAR; SOFT TISSUE at 09:50

## 2017-05-11 ENCOUNTER — HOSPITAL ENCOUNTER (OUTPATIENT)
Dept: MRI IMAGING | Facility: HOSPITAL | Age: 67
Discharge: HOME | End: 2017-05-11
Admitting: NURSE PRACTITIONER

## 2017-05-11 DIAGNOSIS — M19.011 PRIMARY OSTEOARTHRITIS, RIGHT SHOULDER: ICD-10-CM

## 2017-05-11 DIAGNOSIS — M75.50 BURSITIS, SUBACROMIAL: ICD-10-CM

## 2017-05-11 DIAGNOSIS — M67.911 TENDINOPATHY OF ROTATOR CUFF, RIGHT: ICD-10-CM

## 2017-05-11 PROCEDURE — 73221 MRI JOINT UPR EXTREM W/O DYE: CPT

## 2017-05-15 ENCOUNTER — TELEPHONE (OUTPATIENT)
Dept: ORTHOPEDIC SURGERY | Facility: CLINIC | Age: 67
End: 2017-05-15

## 2017-05-23 ENCOUNTER — DOCUMENTATION (OUTPATIENT)
Dept: PHYSICAL THERAPY | Facility: HOSPITAL | Age: 67
End: 2017-05-23

## 2017-05-23 DIAGNOSIS — G89.29 CHRONIC RIGHT-SIDED LOW BACK PAIN, WITH SCIATICA PRESENCE UNSPECIFIED: Primary | ICD-10-CM

## 2017-05-23 DIAGNOSIS — M54.5 CHRONIC RIGHT-SIDED LOW BACK PAIN, WITH SCIATICA PRESENCE UNSPECIFIED: Primary | ICD-10-CM

## 2017-06-11 DIAGNOSIS — I10 ESSENTIAL HYPERTENSION: ICD-10-CM

## 2017-06-12 DIAGNOSIS — I10 ESSENTIAL HYPERTENSION: ICD-10-CM

## 2017-06-12 RX ORDER — HYDROCHLOROTHIAZIDE 25 MG/1
TABLET ORAL
Qty: 30 TABLET | Refills: 3 | Status: SHIPPED | OUTPATIENT
Start: 2017-06-12 | End: 2017-06-12 | Stop reason: SDUPTHER

## 2017-06-12 RX ORDER — HYDROCHLOROTHIAZIDE 25 MG/1
25 TABLET ORAL DAILY
Qty: 30 TABLET | Refills: 3 | Status: SHIPPED | OUTPATIENT
Start: 2017-06-12 | End: 2018-01-06 | Stop reason: HOSPADM

## 2017-06-27 DIAGNOSIS — J44.1 CHRONIC OBSTRUCTIVE PULMONARY DISEASE WITH ACUTE EXACERBATION (HCC): ICD-10-CM

## 2017-08-07 ENCOUNTER — OFFICE (OUTPATIENT)
Dept: URBAN - METROPOLITAN AREA CLINIC 71 | Facility: CLINIC | Age: 67
End: 2017-08-07

## 2017-08-07 VITALS
WEIGHT: 230 LBS | HEART RATE: 92 BPM | SYSTOLIC BLOOD PRESSURE: 160 MMHG | HEIGHT: 64 IN | DIASTOLIC BLOOD PRESSURE: 55 MMHG

## 2017-08-07 DIAGNOSIS — K56.69 OTHER INTESTINAL OBSTRUCTION: ICD-10-CM

## 2017-08-07 DIAGNOSIS — K50.818 CROHN'S DISEASE OF BOTH SMALL AND LARGE INTESTINE WITH OTHER: ICD-10-CM

## 2017-08-07 PROCEDURE — 99213 OFFICE O/P EST LOW 20 MIN: CPT

## 2017-08-16 RX ORDER — CLOPIDOGREL BISULFATE 75 MG/1
75 TABLET ORAL DAILY
Qty: 30 TABLET | Refills: 3 | Status: SHIPPED | OUTPATIENT
Start: 2017-08-16 | End: 2018-07-02

## 2017-08-23 ENCOUNTER — TRANSCRIBE ORDERS (OUTPATIENT)
Dept: ADMINISTRATIVE | Facility: HOSPITAL | Age: 67
End: 2017-08-23

## 2017-08-23 DIAGNOSIS — K50.118 CROHN'S COLITIS, OTHER COMPLICATION (HCC): Primary | ICD-10-CM

## 2017-08-24 ENCOUNTER — OFFICE VISIT (OUTPATIENT)
Dept: FAMILY MEDICINE CLINIC | Facility: CLINIC | Age: 67
End: 2017-08-24

## 2017-08-24 VITALS
OXYGEN SATURATION: 93 % | BODY MASS INDEX: 42.51 KG/M2 | RESPIRATION RATE: 16 BRPM | SYSTOLIC BLOOD PRESSURE: 124 MMHG | WEIGHT: 249 LBS | HEIGHT: 64 IN | HEART RATE: 106 BPM | DIASTOLIC BLOOD PRESSURE: 80 MMHG | TEMPERATURE: 97.9 F

## 2017-08-24 DIAGNOSIS — K56.699 STRICTURE OF COLON (HCC): ICD-10-CM

## 2017-08-24 DIAGNOSIS — J44.1 CHRONIC OBSTRUCTIVE PULMONARY DISEASE WITH ACUTE EXACERBATION (HCC): Primary | ICD-10-CM

## 2017-08-24 DIAGNOSIS — K50.818 CROHN'S DISEASE OF BOTH SMALL AND LARGE INTESTINE WITH OTHER COMPLICATION (HCC): ICD-10-CM

## 2017-08-24 DIAGNOSIS — E11.8 TYPE 2 DIABETES MELLITUS WITH COMPLICATION, WITHOUT LONG-TERM CURRENT USE OF INSULIN (HCC): Primary | ICD-10-CM

## 2017-08-24 PROCEDURE — 99213 OFFICE O/P EST LOW 20 MIN: CPT | Performed by: PHYSICIAN ASSISTANT

## 2017-08-24 RX ORDER — AMOXICILLIN 875 MG/1
875 TABLET, COATED ORAL 2 TIMES DAILY
Qty: 20 TABLET | Refills: 0 | Status: SHIPPED | OUTPATIENT
Start: 2017-08-24 | End: 2017-09-03

## 2017-08-24 NOTE — PROGRESS NOTES
Subjective   Mar Camilo is a 67 y.o. female.   Chief Complaint   Patient presents with   • Sinusitis       History of Present Illness     Mar is a 67 year old female who presents chest tightness, headaches,head congestion,stuffy nose,wheezing,shortness of air,chills,ears are itching,scratchy throat,post nasal drip and dry cough for the past several days.  Mar has not been using her nebulizer but has been using her inhaler. States that she' hates it'.  Appetite and sleep has been normal.  She has seen Dr. Hernadez for her COPD.  She has been off of the Metformin since she was in the hospital with her seizure.  Mar has not been using any over-the-counter medications.  Denied any fevers, nausea, vomiting, diarrhea, chest pain, or rhinorrhea.      The following portions of the patient's history were reviewed and updated as appropriate: allergies, current medications, past family history, past medical history, past social history and past surgical history.    Review of Systems   Constitutional: Positive for chills. Negative for fatigue and fever.   HENT: Positive for congestion, ear pain, postnasal drip, sinus pressure and sore throat. Negative for rhinorrhea.    Eyes: Negative.    Respiratory: Positive for cough, chest tightness, shortness of breath and wheezing.    Cardiovascular: Negative.  Negative for chest pain, palpitations and leg swelling.   Gastrointestinal: Negative.  Negative for constipation, diarrhea, nausea and vomiting.   Endocrine: Negative.    Genitourinary: Negative.    Musculoskeletal: Negative.    Skin: Negative.    Allergic/Immunologic: Negative.    Neurological: Positive for headaches.   Hematological: Negative.    Psychiatric/Behavioral: Negative.  Negative for sleep disturbance.   All other systems reviewed and are negative.    Vitals:    08/24/17 0910   BP: 124/80   BP Location: Left arm   Patient Position: Sitting   Cuff Size: Large Adult   Pulse: 106   Resp: 16   Temp: 97.9 °F (36.6  "°C)   TempSrc: Oral   SpO2: 93%   Weight: 249 lb (113 kg)   Height: 64\" (162.6 cm)     Wt Readings from Last 3 Encounters:   08/24/17 249 lb (113 kg)   05/03/17 249 lb (113 kg)   04/17/17 247 lb 3.2 oz (112 kg)     SpO2 Readings from Last 3 Encounters:   08/24/17 93%   05/03/17 94%   04/17/17 97%     BP Readings from Last 3 Encounters:   08/24/17 124/80   05/03/17 144/60   04/17/17 142/48     Body mass index is 42.74 kg/(m^2).  Allergies   Allergen Reactions   • Contrast Dye Hives   • Tenoretic [Atenolol-Chlorthalidone] Anaphylaxis   • Iodinated Diagnostic Agents        Objective   Physical Exam   Constitutional: She is oriented to person, place, and time. Vital signs are normal. She appears well-developed and well-nourished.   Sitting in wheelchair with nasal cannula with 2 L of oxygen   HENT:   Head: Normocephalic and atraumatic.   Right Ear: Hearing, external ear and ear canal normal. Tympanic membrane is bulging.   Left Ear: Hearing, external ear and ear canal normal. Tympanic membrane is bulging.   Nose: Nose normal. Right sinus exhibits no maxillary sinus tenderness and no frontal sinus tenderness. Left sinus exhibits no maxillary sinus tenderness and no frontal sinus tenderness.   Mouth/Throat: Uvula is midline and mucous membranes are normal.   1+ post nasal drip   Eyes: Conjunctivae, EOM and lids are normal. Pupils are equal, round, and reactive to light.   Fundoscopic exam:       The right eye shows no hemorrhage and no papilledema.        The left eye shows no hemorrhage and no papilledema.   Neck: Trachea normal and phonation normal. Neck supple. No edema present.   Cardiovascular: Normal rate, regular rhythm, S1 normal, S2 normal, normal heart sounds and normal pulses.    Pulmonary/Chest: Effort normal. She has rhonchi in the right upper field and the left upper field.   Abdominal: Soft. Normal appearance, normal aorta and bowel sounds are normal. There is no hepatomegaly. There is no tenderness. "   Lymphadenopathy:     She has no cervical adenopathy.   Neurological: She is alert and oriented to person, place, and time.   Skin: Skin is warm, dry and intact.   Psychiatric: She has a normal mood and affect. Her speech is normal and behavior is normal. Judgment and thought content normal. Cognition and memory are normal.       Assessment/Plan   Mar was seen today for sinusitis.    Diagnoses and all orders for this visit:    Chronic obstructive pulmonary disease with acute exacerbation  -     amoxicillin (AMOXIL) 875 MG tablet; Take 1 tablet by mouth 2 (Two) Times a Day for 10 days.    Mrs. Mar Camilo seen in office today and diagnosed with COPD exacerbation.  I have prescribed amoxicillin to pharmacy.  She will continue her inhalers and nebulizer at home.  She was encouraged to continue using her oxygen at home and when she is traveling.  Krys voiced understanding.  She will keep her follow-up appointments with her pulmonologist.          Dragon transcription disclaimer    Much of this encounter note is an electronic transcription/translation of spoken language to printed text.  The electronic translation of spoken language may permit erroneous, or at times, nonsensical words or phrases to be inadvertently transcribed.  Although I have reviewed the note for such errors, some may still exist.    Vianey Barrios PA-C  Family Practice

## 2017-08-25 ENCOUNTER — TELEPHONE (OUTPATIENT)
Dept: FAMILY MEDICINE CLINIC | Facility: CLINIC | Age: 67
End: 2017-08-25

## 2017-08-25 DIAGNOSIS — E78.1 HYPERTRIGLYCERIDEMIA: ICD-10-CM

## 2017-08-25 DIAGNOSIS — E11.8 TYPE 2 DIABETES MELLITUS WITH COMPLICATION, WITHOUT LONG-TERM CURRENT USE OF INSULIN (HCC): Primary | ICD-10-CM

## 2017-08-25 DIAGNOSIS — E78.2 MIXED HYPERLIPIDEMIA: ICD-10-CM

## 2017-08-25 LAB
ALBUMIN SERPL-MCNC: 4.2 G/DL (ref 3.5–5.2)
ALBUMIN/GLOB SERPL: 1.3 G/DL
ALP SERPL-CCNC: 107 U/L (ref 40–129)
ALT SERPL-CCNC: 31 U/L (ref 5–33)
AST SERPL-CCNC: 25 U/L (ref 5–32)
BASOPHILS # BLD AUTO: 0.05 10*3/MM3 (ref 0–0.2)
BASOPHILS NFR BLD AUTO: 0.3 % (ref 0–2)
BILIRUB SERPL-MCNC: 0.2 MG/DL (ref 0.2–1.2)
BUN SERPL-MCNC: 21 MG/DL (ref 8–23)
BUN/CREAT SERPL: 20 (ref 7–25)
CALCIUM SERPL-MCNC: 10.4 MG/DL (ref 8.8–10.5)
CHLORIDE SERPL-SCNC: 98 MMOL/L (ref 98–107)
CHOLEST SERPL-MCNC: 137 MG/DL (ref 0–200)
CO2 SERPL-SCNC: 25 MMOL/L (ref 22–29)
CREAT SERPL-MCNC: 1.05 MG/DL (ref 0.57–1)
CRP SERPL-MCNC: 1.79 MG/DL (ref 0–0.5)
EOSINOPHIL # BLD AUTO: 0.17 10*3/MM3 (ref 0.1–0.3)
EOSINOPHIL NFR BLD AUTO: 1.1 % (ref 0–4)
ERYTHROCYTE [DISTWIDTH] IN BLOOD BY AUTOMATED COUNT: 14.7 % (ref 11.5–14.5)
GLOBULIN SER CALC-MCNC: 3.3 GM/DL
GLUCOSE SERPL-MCNC: 196 MG/DL (ref 65–99)
HBA1C MFR BLD: 7.4 % (ref 4.8–5.6)
HCT VFR BLD AUTO: 46.4 % (ref 37–47)
HDLC SERPL-MCNC: 42 MG/DL (ref 40–60)
HGB BLD-MCNC: 14.2 G/DL (ref 12–16)
IMM GRANULOCYTES # BLD: 0.11 10*3/MM3 (ref 0–0.03)
IMM GRANULOCYTES NFR BLD: 0.7 % (ref 0–0.5)
LDLC SERPL CALC-MCNC: 39 MG/DL (ref 0–100)
LYMPHOCYTES # BLD AUTO: 3.15 10*3/MM3 (ref 0.6–4.8)
LYMPHOCYTES NFR BLD AUTO: 20.7 % (ref 20–45)
MCH RBC QN AUTO: 28.5 PG (ref 27–31)
MCHC RBC AUTO-ENTMCNC: 30.6 G/DL (ref 31–37)
MCV RBC AUTO: 93.2 FL (ref 81–99)
MONOCYTES # BLD AUTO: 0.92 10*3/MM3 (ref 0–1)
MONOCYTES NFR BLD AUTO: 6.1 % (ref 3–8)
NEUTROPHILS # BLD AUTO: 10.8 10*3/MM3 (ref 1.5–8.3)
NEUTROPHILS NFR BLD AUTO: 71.1 % (ref 45–70)
NRBC BLD AUTO-RTO: 0 /100 WBC (ref 0–0)
PLATELET # BLD AUTO: 408 10*3/MM3 (ref 140–500)
POTASSIUM SERPL-SCNC: 5 MMOL/L (ref 3.5–5.2)
PROT SERPL-MCNC: 7.5 G/DL (ref 6–8.5)
RBC # BLD AUTO: 4.98 10*6/MM3 (ref 4.2–5.4)
SODIUM SERPL-SCNC: 141 MMOL/L (ref 136–145)
TRIGL SERPL-MCNC: 280 MG/DL (ref 0–150)
VLDLC SERPL CALC-MCNC: 56 MG/DL (ref 7–27)
WBC # BLD AUTO: 15.2 10*3/MM3 (ref 4.8–10.8)

## 2017-08-25 NOTE — TELEPHONE ENCOUNTER
Notified patient of lab results and to start Metformin and repeat labs in 3 months. Also faxed labs to Dr. Laws office

## 2017-08-25 NOTE — TELEPHONE ENCOUNTER
----- Message from Vianey Barrios PA-C sent at 8/25/2017  7:17 AM EDT -----  1.  Notify patient that her fasting blood sugar was 196 , globin A1c of 7.4.  Her diabetes is not well-controlled.  We have to restart her metformin at 500 mg twice a day.  I will sent prescription to pharmacy.  Please schedule follow-up after blood sugar in one month.  Also schedule follow-up office visit in 3 months.  2.  Cholesterol 137, triglycerides 280, HDL 42 and LDL was 39.  Triglycerides were elevated.  This is related to her increased sugar.  Please decrease carbohydrates and sugar in diet.  Continue her current Crestor medication at home.  3.  White count was elevated at 15.2.  Please continue her antibiotic prescribed at office visit yesterday.  She does have a infection.  4.  I will send laboratory results to Dr. Roberto Carlos Umaña as well.  He ordered a CBC, CMP and a CRP.

## 2017-08-28 ENCOUNTER — HOSPITAL ENCOUNTER (OUTPATIENT)
Dept: CT IMAGING | Facility: HOSPITAL | Age: 67
End: 2017-08-28
Attending: INTERNAL MEDICINE

## 2017-08-29 ENCOUNTER — APPOINTMENT (OUTPATIENT)
Dept: CT IMAGING | Facility: HOSPITAL | Age: 67
End: 2017-08-29
Attending: INTERNAL MEDICINE

## 2017-09-05 ENCOUNTER — TRANSCRIBE ORDERS (OUTPATIENT)
Dept: ADMINISTRATIVE | Facility: HOSPITAL | Age: 67
End: 2017-09-05

## 2017-09-05 DIAGNOSIS — K50.818 CROHN'S DISEASE OF BOTH SMALL AND LARGE INTESTINE WITH OTHER COMPLICATION (HCC): Primary | ICD-10-CM

## 2017-09-05 DIAGNOSIS — K56.699 STRICTURE OF COLON (HCC): ICD-10-CM

## 2017-09-13 ENCOUNTER — HOSPITAL ENCOUNTER (OUTPATIENT)
Dept: CT IMAGING | Facility: HOSPITAL | Age: 67
Discharge: HOME | End: 2017-09-13
Attending: INTERNAL MEDICINE | Admitting: INTERNAL MEDICINE

## 2017-09-13 DIAGNOSIS — K56.699 STRICTURE OF COLON (HCC): ICD-10-CM

## 2017-09-13 DIAGNOSIS — K50.818 CROHN'S DISEASE OF BOTH SMALL AND LARGE INTESTINE WITH OTHER COMPLICATION (HCC): ICD-10-CM

## 2017-09-13 PROCEDURE — 74177 CT ABD & PELVIS W/CONTRAST: CPT

## 2017-09-13 PROCEDURE — 0 IOPAMIDOL PER 1 ML: Performed by: INTERNAL MEDICINE

## 2017-09-13 RX ADMIN — IOPAMIDOL 100 ML: 755 INJECTION, SOLUTION INTRAVENOUS at 15:53

## 2017-09-17 DIAGNOSIS — J44.1 CHRONIC OBSTRUCTIVE PULMONARY DISEASE WITH ACUTE EXACERBATION (HCC): ICD-10-CM

## 2017-09-22 DIAGNOSIS — I10 ESSENTIAL HYPERTENSION: ICD-10-CM

## 2017-09-22 DIAGNOSIS — Z00.00 ROUTINE HEALTH MAINTENANCE: Primary | ICD-10-CM

## 2017-09-22 LAB
INDURATION: 0 MM (ref 0–10)
TB SKIN TEST: NEGATIVE

## 2017-09-22 PROCEDURE — 86580 TB INTRADERMAL TEST: CPT | Performed by: PHYSICIAN ASSISTANT

## 2017-09-22 RX ORDER — VERAPAMIL HYDROCHLORIDE 120 MG/1
TABLET, FILM COATED ORAL
Qty: 90 TABLET | Refills: 0 | Status: SHIPPED | OUTPATIENT
Start: 2017-09-22 | End: 2017-11-26 | Stop reason: SDUPTHER

## 2017-10-02 ENCOUNTER — OFFICE (OUTPATIENT)
Dept: URBAN - METROPOLITAN AREA INFUSION 3 | Facility: INFUSION | Age: 67
End: 2017-10-02

## 2017-10-02 VITALS
DIASTOLIC BLOOD PRESSURE: 62 MMHG | RESPIRATION RATE: 20 BRPM | HEART RATE: 71 BPM | DIASTOLIC BLOOD PRESSURE: 69 MMHG | SYSTOLIC BLOOD PRESSURE: 149 MMHG | SYSTOLIC BLOOD PRESSURE: 139 MMHG | HEART RATE: 74 BPM | TEMPERATURE: 98.2 F | RESPIRATION RATE: 22 BRPM | DIASTOLIC BLOOD PRESSURE: 66 MMHG | WEIGHT: 249 LBS | DIASTOLIC BLOOD PRESSURE: 67 MMHG | TEMPERATURE: 98 F | DIASTOLIC BLOOD PRESSURE: 64 MMHG | SYSTOLIC BLOOD PRESSURE: 152 MMHG | HEART RATE: 73 BPM | HEART RATE: 72 BPM | SYSTOLIC BLOOD PRESSURE: 132 MMHG | HEART RATE: 80 BPM | HEART RATE: 88 BPM | HEART RATE: 81 BPM | SYSTOLIC BLOOD PRESSURE: 130 MMHG | RESPIRATION RATE: 18 BRPM | SYSTOLIC BLOOD PRESSURE: 135 MMHG | HEIGHT: 64 IN | SYSTOLIC BLOOD PRESSURE: 126 MMHG

## 2017-10-02 DIAGNOSIS — K50.118 CROHN'S DISEASE OF LARGE INTESTINE WITH OTHER COMPLICATION: ICD-10-CM

## 2017-10-02 PROCEDURE — 96413 CHEMO IV INFUSION 1 HR: CPT

## 2017-10-02 PROCEDURE — 96415 CHEMO IV INFUSION ADDL HR: CPT

## 2017-10-02 RX ADMIN — Medication 25 MG: at 08:40

## 2017-10-16 ENCOUNTER — OFFICE (OUTPATIENT)
Dept: URBAN - METROPOLITAN AREA INFUSION 3 | Facility: INFUSION | Age: 67
End: 2017-10-16

## 2017-10-16 VITALS
HEART RATE: 90 BPM | DIASTOLIC BLOOD PRESSURE: 82 MMHG | HEART RATE: 93 BPM | HEART RATE: 99 BPM | SYSTOLIC BLOOD PRESSURE: 165 MMHG | DIASTOLIC BLOOD PRESSURE: 79 MMHG | DIASTOLIC BLOOD PRESSURE: 81 MMHG | RESPIRATION RATE: 20 BRPM | SYSTOLIC BLOOD PRESSURE: 173 MMHG | HEART RATE: 102 BPM | HEART RATE: 87 BPM | SYSTOLIC BLOOD PRESSURE: 170 MMHG | TEMPERATURE: 97.8 F | DIASTOLIC BLOOD PRESSURE: 84 MMHG | RESPIRATION RATE: 22 BRPM | HEIGHT: 64 IN | SYSTOLIC BLOOD PRESSURE: 172 MMHG | WEIGHT: 254 LBS | SYSTOLIC BLOOD PRESSURE: 168 MMHG | DIASTOLIC BLOOD PRESSURE: 77 MMHG | RESPIRATION RATE: 18 BRPM | SYSTOLIC BLOOD PRESSURE: 186 MMHG | SYSTOLIC BLOOD PRESSURE: 167 MMHG | HEART RATE: 95 BPM | TEMPERATURE: 97.7 F

## 2017-10-16 DIAGNOSIS — K50.118 CROHN'S DISEASE OF LARGE INTESTINE WITH OTHER COMPLICATION: ICD-10-CM

## 2017-10-16 PROCEDURE — 96415 CHEMO IV INFUSION ADDL HR: CPT

## 2017-10-16 PROCEDURE — 96413 CHEMO IV INFUSION 1 HR: CPT

## 2017-10-16 RX ADMIN — Medication 25 MG: at 09:05

## 2017-10-16 NOTE — SERVICENOTES
MEDS PROVIDED BY [facility]
NEGATIVE PPD or Quantiferon Gold:9/17

## 2017-10-18 DIAGNOSIS — E78.1 HYPERTRIGLYCERIDEMIA: ICD-10-CM

## 2017-10-18 DIAGNOSIS — E78.2 MIXED HYPERLIPIDEMIA: ICD-10-CM

## 2017-10-18 RX ORDER — ROSUVASTATIN CALCIUM 40 MG/1
TABLET, COATED ORAL
Qty: 30 TABLET | Refills: 3 | Status: SHIPPED | OUTPATIENT
Start: 2017-10-18 | End: 2018-03-27 | Stop reason: SDUPTHER

## 2017-11-13 ENCOUNTER — OFFICE (OUTPATIENT)
Dept: URBAN - METROPOLITAN AREA INFUSION 3 | Facility: INFUSION | Age: 67
End: 2017-11-13

## 2017-11-13 VITALS
DIASTOLIC BLOOD PRESSURE: 59 MMHG | HEIGHT: 64 IN | DIASTOLIC BLOOD PRESSURE: 60 MMHG | DIASTOLIC BLOOD PRESSURE: 62 MMHG | SYSTOLIC BLOOD PRESSURE: 132 MMHG | TEMPERATURE: 96.6 F | HEART RATE: 70 BPM | SYSTOLIC BLOOD PRESSURE: 131 MMHG | RESPIRATION RATE: 20 BRPM | RESPIRATION RATE: 22 BRPM | DIASTOLIC BLOOD PRESSURE: 58 MMHG | SYSTOLIC BLOOD PRESSURE: 129 MMHG | DIASTOLIC BLOOD PRESSURE: 72 MMHG | WEIGHT: 248 LBS | HEART RATE: 77 BPM | HEART RATE: 73 BPM | SYSTOLIC BLOOD PRESSURE: 133 MMHG | TEMPERATURE: 97.1 F | HEART RATE: 80 BPM | SYSTOLIC BLOOD PRESSURE: 144 MMHG | HEART RATE: 79 BPM | RESPIRATION RATE: 18 BRPM | SYSTOLIC BLOOD PRESSURE: 124 MMHG | DIASTOLIC BLOOD PRESSURE: 64 MMHG | SYSTOLIC BLOOD PRESSURE: 118 MMHG | HEART RATE: 81 BPM | DIASTOLIC BLOOD PRESSURE: 71 MMHG

## 2017-11-13 DIAGNOSIS — K50.818 CROHN'S DISEASE OF BOTH SMALL AND LARGE INTESTINE WITH OTHER: ICD-10-CM

## 2017-11-13 PROCEDURE — 96415 CHEMO IV INFUSION ADDL HR: CPT

## 2017-11-13 PROCEDURE — 96413 CHEMO IV INFUSION 1 HR: CPT

## 2017-11-13 RX ADMIN — Medication 25 MG: at 08:11

## 2017-11-26 DIAGNOSIS — I10 ESSENTIAL HYPERTENSION: ICD-10-CM

## 2017-11-27 RX ORDER — VERAPAMIL HYDROCHLORIDE 120 MG/1
TABLET, FILM COATED ORAL
Qty: 90 TABLET | Refills: 0 | Status: SHIPPED | OUTPATIENT
Start: 2017-11-27 | End: 2018-01-06 | Stop reason: HOSPADM

## 2017-12-11 ENCOUNTER — OFFICE (OUTPATIENT)
Dept: URBAN - METROPOLITAN AREA CLINIC 71 | Facility: CLINIC | Age: 67
End: 2017-12-11

## 2017-12-11 VITALS — DIASTOLIC BLOOD PRESSURE: 68 MMHG | SYSTOLIC BLOOD PRESSURE: 168 MMHG | HEART RATE: 96 BPM | HEIGHT: 64 IN

## 2017-12-11 DIAGNOSIS — K50.90 CROHN'S DISEASE, UNSPECIFIED, WITHOUT COMPLICATIONS: ICD-10-CM

## 2017-12-11 PROCEDURE — 99212 OFFICE O/P EST SF 10 MIN: CPT

## 2017-12-24 ENCOUNTER — HOSPITAL ENCOUNTER (INPATIENT)
Facility: HOSPITAL | Age: 67
LOS: 13 days | Discharge: HOME | End: 2018-01-06
Attending: EMERGENCY MEDICINE | Admitting: INTERNAL MEDICINE

## 2017-12-24 ENCOUNTER — APPOINTMENT (OUTPATIENT)
Dept: GENERAL RADIOLOGY | Facility: HOSPITAL | Age: 67
End: 2017-12-24

## 2017-12-24 DIAGNOSIS — A41.9 SEPSIS, DUE TO UNSPECIFIED ORGANISM: ICD-10-CM

## 2017-12-24 DIAGNOSIS — J44.1 COPD EXACERBATION (HCC): ICD-10-CM

## 2017-12-24 DIAGNOSIS — G47.33 OSA (OBSTRUCTIVE SLEEP APNEA): ICD-10-CM

## 2017-12-24 DIAGNOSIS — J96.21 ACUTE ON CHRONIC RESPIRATORY FAILURE WITH HYPOXIA (HCC): ICD-10-CM

## 2017-12-24 DIAGNOSIS — J18.9 PNEUMONIA OF RIGHT LOWER LOBE DUE TO INFECTIOUS ORGANISM: Primary | ICD-10-CM

## 2017-12-24 LAB
ALBUMIN SERPL-MCNC: 3.9 G/DL (ref 3.5–5.2)
ALBUMIN/GLOB SERPL: 1.1 G/DL
ALP SERPL-CCNC: 112 U/L (ref 40–129)
ALT SERPL W P-5'-P-CCNC: 43 U/L (ref 5–33)
ANION GAP SERPL CALCULATED.3IONS-SCNC: 15.4 MMOL/L
AST SERPL-CCNC: 36 U/L (ref 5–32)
BASOPHILS # BLD AUTO: 0.05 10*3/MM3 (ref 0–0.2)
BASOPHILS NFR BLD AUTO: 0.3 % (ref 0–2)
BILIRUB SERPL-MCNC: 0.2 MG/DL (ref 0.2–1.2)
BUN BLD-MCNC: 17 MG/DL (ref 8–23)
BUN/CREAT SERPL: 21.5 (ref 7–25)
CALCIUM SPEC-SCNC: 9.3 MG/DL (ref 8.8–10.5)
CHLORIDE SERPL-SCNC: 99 MMOL/L (ref 98–107)
CO2 SERPL-SCNC: 24.6 MMOL/L (ref 22–29)
CREAT BLD-MCNC: 0.79 MG/DL (ref 0.57–1)
D-LACTATE SERPL-SCNC: 2.7 MMOL/L (ref 0.5–2)
DEPRECATED RDW RBC AUTO: 51.2 FL (ref 37–54)
EOSINOPHIL # BLD AUTO: 0.09 10*3/MM3 (ref 0.1–0.3)
EOSINOPHIL NFR BLD AUTO: 0.6 % (ref 0–4)
ERYTHROCYTE [DISTWIDTH] IN BLOOD BY AUTOMATED COUNT: 15.1 % (ref 11.5–14.5)
FLUAV AG NPH QL: NEGATIVE
FLUBV AG NPH QL IA: NEGATIVE
GFR SERPL CREATININE-BSD FRML MDRD: 72 ML/MIN/1.73
GLOBULIN UR ELPH-MCNC: 3.7 GM/DL
GLUCOSE BLD-MCNC: 191 MG/DL (ref 65–99)
HCT VFR BLD AUTO: 43.4 % (ref 37–47)
HGB BLD-MCNC: 13.7 G/DL (ref 12–16)
IMM GRANULOCYTES # BLD: 0.07 10*3/MM3 (ref 0–0.03)
IMM GRANULOCYTES NFR BLD: 0.5 % (ref 0–0.5)
LYMPHOCYTES # BLD AUTO: 2.96 10*3/MM3 (ref 0.6–4.8)
LYMPHOCYTES NFR BLD AUTO: 19.7 % (ref 20–45)
MCH RBC QN AUTO: 29.1 PG (ref 27–31)
MCHC RBC AUTO-ENTMCNC: 31.6 G/DL (ref 31–37)
MCV RBC AUTO: 92.1 FL (ref 81–99)
MONOCYTES # BLD AUTO: 1.31 10*3/MM3 (ref 0–1)
MONOCYTES NFR BLD AUTO: 8.7 % (ref 3–8)
NEUTROPHILS # BLD AUTO: 10.52 10*3/MM3 (ref 1.5–8.3)
NEUTROPHILS NFR BLD AUTO: 70.2 % (ref 45–70)
NRBC BLD MANUAL-RTO: 0 /100 WBC (ref 0–0)
PLATELET # BLD AUTO: 377 10*3/MM3 (ref 140–500)
PMV BLD AUTO: 9.3 FL (ref 7.4–10.4)
POTASSIUM BLD-SCNC: 4.1 MMOL/L (ref 3.5–5.2)
PROT SERPL-MCNC: 7.6 G/DL (ref 6–8.5)
RBC # BLD AUTO: 4.71 10*6/MM3 (ref 4.2–5.4)
SODIUM BLD-SCNC: 139 MMOL/L (ref 136–145)
TROPONIN T SERPL-MCNC: <0.01 NG/ML (ref 0–0.03)
WBC NRBC COR # BLD: 15 10*3/MM3 (ref 4.8–10.8)

## 2017-12-24 PROCEDURE — 71020 HC CHEST PA AND LATERAL: CPT

## 2017-12-24 PROCEDURE — 83605 ASSAY OF LACTIC ACID: CPT | Performed by: EMERGENCY MEDICINE

## 2017-12-24 PROCEDURE — 85025 COMPLETE CBC W/AUTO DIFF WBC: CPT | Performed by: EMERGENCY MEDICINE

## 2017-12-24 PROCEDURE — 93005 ELECTROCARDIOGRAM TRACING: CPT | Performed by: EMERGENCY MEDICINE

## 2017-12-24 PROCEDURE — 93010 ELECTROCARDIOGRAM REPORT: CPT | Performed by: INTERNAL MEDICINE

## 2017-12-24 PROCEDURE — 87040 BLOOD CULTURE FOR BACTERIA: CPT | Performed by: EMERGENCY MEDICINE

## 2017-12-24 PROCEDURE — 80053 COMPREHEN METABOLIC PANEL: CPT | Performed by: EMERGENCY MEDICINE

## 2017-12-24 PROCEDURE — 25010000002 METHYLPREDNISOLONE PER 125 MG: Performed by: EMERGENCY MEDICINE

## 2017-12-24 PROCEDURE — 84484 ASSAY OF TROPONIN QUANT: CPT | Performed by: EMERGENCY MEDICINE

## 2017-12-24 PROCEDURE — 81001 URINALYSIS AUTO W/SCOPE: CPT | Performed by: EMERGENCY MEDICINE

## 2017-12-24 PROCEDURE — 25010000002 AZITHROMYCIN: Performed by: EMERGENCY MEDICINE

## 2017-12-24 PROCEDURE — 99284 EMERGENCY DEPT VISIT MOD MDM: CPT

## 2017-12-24 PROCEDURE — 25010000002 CEFTRIAXONE PER 250 MG: Performed by: EMERGENCY MEDICINE

## 2017-12-24 PROCEDURE — 99285 EMERGENCY DEPT VISIT HI MDM: CPT | Performed by: EMERGENCY MEDICINE

## 2017-12-24 PROCEDURE — 94640 AIRWAY INHALATION TREATMENT: CPT

## 2017-12-24 PROCEDURE — 94799 UNLISTED PULMONARY SVC/PX: CPT

## 2017-12-24 PROCEDURE — 87070 CULTURE OTHR SPECIMN AEROBIC: CPT | Performed by: EMERGENCY MEDICINE

## 2017-12-24 PROCEDURE — 87804 INFLUENZA ASSAY W/OPTIC: CPT | Performed by: EMERGENCY MEDICINE

## 2017-12-24 PROCEDURE — 87086 URINE CULTURE/COLONY COUNT: CPT | Performed by: EMERGENCY MEDICINE

## 2017-12-24 PROCEDURE — 87205 SMEAR GRAM STAIN: CPT | Performed by: EMERGENCY MEDICINE

## 2017-12-24 RX ORDER — SODIUM CHLORIDE 9 MG/ML
INJECTION, SOLUTION INTRAVENOUS
Status: DISPENSED
Start: 2017-12-24 | End: 2017-12-25

## 2017-12-24 RX ORDER — IPRATROPIUM BROMIDE AND ALBUTEROL SULFATE 2.5; .5 MG/3ML; MG/3ML
3 SOLUTION RESPIRATORY (INHALATION) ONCE
Status: COMPLETED | OUTPATIENT
Start: 2017-12-24 | End: 2017-12-24

## 2017-12-24 RX ORDER — AZITHROMYCIN 500 MG/1
INJECTION, POWDER, LYOPHILIZED, FOR SOLUTION INTRAVENOUS
Status: DISPENSED
Start: 2017-12-24 | End: 2017-12-25

## 2017-12-24 RX ORDER — SODIUM CHLORIDE 0.9 % (FLUSH) 0.9 %
10 SYRINGE (ML) INJECTION AS NEEDED
Status: DISCONTINUED | OUTPATIENT
Start: 2017-12-24 | End: 2018-01-06 | Stop reason: HOSPADM

## 2017-12-24 RX ORDER — METHYLPREDNISOLONE SODIUM SUCCINATE 125 MG/2ML
125 INJECTION, POWDER, LYOPHILIZED, FOR SOLUTION INTRAMUSCULAR; INTRAVENOUS ONCE
Status: COMPLETED | OUTPATIENT
Start: 2017-12-24 | End: 2017-12-24

## 2017-12-24 RX ORDER — HYDRALAZINE HYDROCHLORIDE 20 MG/ML
10 INJECTION INTRAMUSCULAR; INTRAVENOUS ONCE
Status: DISCONTINUED | OUTPATIENT
Start: 2017-12-24 | End: 2018-01-06

## 2017-12-24 RX ADMIN — CEFTRIAXONE 1 G: 1 INJECTION, SOLUTION INTRAVENOUS at 23:25

## 2017-12-24 RX ADMIN — IPRATROPIUM BROMIDE AND ALBUTEROL SULFATE 3 ML: .5; 3 SOLUTION RESPIRATORY (INHALATION) at 23:33

## 2017-12-24 RX ADMIN — IPRATROPIUM BROMIDE AND ALBUTEROL SULFATE 3 ML: .5; 3 SOLUTION RESPIRATORY (INHALATION) at 22:22

## 2017-12-24 RX ADMIN — METHYLPREDNISOLONE SODIUM SUCCINATE 125 MG: 125 INJECTION, POWDER, FOR SOLUTION INTRAMUSCULAR; INTRAVENOUS at 22:42

## 2017-12-24 RX ADMIN — IPRATROPIUM BROMIDE AND ALBUTEROL SULFATE 3 ML: .5; 3 SOLUTION RESPIRATORY (INHALATION) at 22:47

## 2017-12-24 RX ADMIN — AZITHROMYCIN 500 MG: 500 INJECTION, POWDER, LYOPHILIZED, FOR SOLUTION INTRAVENOUS at 23:53

## 2017-12-25 LAB
ANION GAP SERPL CALCULATED.3IONS-SCNC: 14.6 MMOL/L
ARTERIAL PATENCY WRIST A: POSITIVE
ATMOSPHERIC PRESS: 747 MMHG
B PERT DNA SPEC QL NAA+PROBE: NOT DETECTED
BACTERIA UR QL AUTO: ABNORMAL /HPF
BASE EXCESS BLDA CALC-SCNC: -2.5 MMOL/L (ref 0–2)
BASOPHILS # BLD AUTO: 0.01 10*3/MM3 (ref 0–0.2)
BASOPHILS NFR BLD AUTO: 0.1 % (ref 0–2)
BDY SITE: ABNORMAL
BILIRUB UR QL STRIP: ABNORMAL
BODY TEMPERATURE: 37 C
BUN BLD-MCNC: 18 MG/DL (ref 8–23)
BUN/CREAT SERPL: 27.3 (ref 7–25)
C PNEUM DNA NPH QL NAA+NON-PROBE: NOT DETECTED
CALCIUM SPEC-SCNC: 9.1 MG/DL (ref 8.8–10.5)
CHLORIDE SERPL-SCNC: 101 MMOL/L (ref 98–107)
CLARITY UR: CLEAR
CO2 SERPL-SCNC: 21.4 MMOL/L (ref 22–29)
COLOR UR: YELLOW
CREAT BLD-MCNC: 0.66 MG/DL (ref 0.57–1)
D-LACTATE SERPL-SCNC: 2 MMOL/L (ref 0.5–2)
D-LACTATE SERPL-SCNC: 2.2 MMOL/L (ref 0.5–2)
DEPRECATED RDW RBC AUTO: 52.4 FL (ref 37–54)
EOSINOPHIL # BLD AUTO: 0 10*3/MM3 (ref 0.1–0.3)
EOSINOPHIL NFR BLD AUTO: 0 % (ref 0–4)
ERYTHROCYTE [DISTWIDTH] IN BLOOD BY AUTOMATED COUNT: 15.4 % (ref 11.5–14.5)
FLUAV H1 2009 PAND RNA NPH QL NAA+PROBE: NOT DETECTED
FLUAV H1 HA GENE NPH QL NAA+PROBE: NOT DETECTED
FLUAV H3 RNA NPH QL NAA+PROBE: NOT DETECTED
FLUAV SUBTYP SPEC NAA+PROBE: NOT DETECTED
FLUBV RNA ISLT QL NAA+PROBE: NOT DETECTED
GAS FLOW AIRWAY: 3 LPM
GFR SERPL CREATININE-BSD FRML MDRD: 89 ML/MIN/1.73
GLUCOSE BLD-MCNC: 311 MG/DL (ref 65–99)
GLUCOSE BLDC GLUCOMTR-MCNC: 304 MG/DL (ref 70–130)
GLUCOSE BLDC GLUCOMTR-MCNC: 383 MG/DL (ref 70–130)
GLUCOSE BLDC GLUCOMTR-MCNC: 386 MG/DL (ref 70–130)
GLUCOSE BLDC GLUCOMTR-MCNC: 388 MG/DL (ref 70–130)
GLUCOSE UR STRIP-MCNC: NEGATIVE MG/DL
HADV DNA SPEC NAA+PROBE: NOT DETECTED
HBA1C MFR BLD: 7.5 % (ref 4.8–5.6)
HCO3 BLDA-SCNC: 22.7 MMOL/L (ref 20–26)
HCOV 229E RNA SPEC QL NAA+PROBE: NOT DETECTED
HCOV HKU1 RNA SPEC QL NAA+PROBE: NOT DETECTED
HCOV NL63 RNA SPEC QL NAA+PROBE: NOT DETECTED
HCOV OC43 RNA SPEC QL NAA+PROBE: NOT DETECTED
HCT VFR BLD AUTO: 42.6 % (ref 37–47)
HGB BLD-MCNC: 13.1 G/DL (ref 12–16)
HGB BLDA-MCNC: 13.1 G/DL (ref 13.5–17.5)
HGB UR QL STRIP.AUTO: NEGATIVE
HMPV RNA NPH QL NAA+NON-PROBE: NOT DETECTED
HOLD SPECIMEN: NORMAL
HPIV1 RNA SPEC QL NAA+PROBE: NOT DETECTED
HPIV2 RNA SPEC QL NAA+PROBE: NOT DETECTED
HPIV3 RNA NPH QL NAA+PROBE: NOT DETECTED
HPIV4 P GENE NPH QL NAA+PROBE: NOT DETECTED
HYALINE CASTS UR QL AUTO: ABNORMAL /LPF
IMM GRANULOCYTES # BLD: 0.06 10*3/MM3 (ref 0–0.03)
IMM GRANULOCYTES NFR BLD: 0.5 % (ref 0–0.5)
KETONES UR QL STRIP: ABNORMAL
LEUKOCYTE ESTERASE UR QL STRIP.AUTO: NEGATIVE
LYMPHOCYTES # BLD AUTO: 1.06 10*3/MM3 (ref 0.6–4.8)
LYMPHOCYTES NFR BLD AUTO: 9 % (ref 20–45)
Lab: ABNORMAL
M PNEUMO IGG SER IA-ACNC: NOT DETECTED
MCH RBC QN AUTO: 28.5 PG (ref 27–31)
MCHC RBC AUTO-ENTMCNC: 30.8 G/DL (ref 31–37)
MCV RBC AUTO: 92.8 FL (ref 81–99)
MODALITY: ABNORMAL
MONOCYTES # BLD AUTO: 0.06 10*3/MM3 (ref 0–1)
MONOCYTES NFR BLD AUTO: 0.5 % (ref 3–8)
MUCOUS THREADS URNS QL MICRO: ABNORMAL /HPF
NEUTROPHILS # BLD AUTO: 10.54 10*3/MM3 (ref 1.5–8.3)
NEUTROPHILS NFR BLD AUTO: 89.9 % (ref 45–70)
NITRITE UR QL STRIP: NEGATIVE
NRBC BLD MANUAL-RTO: 0 /100 WBC (ref 0–0)
PCO2 BLDA: 39.7 MM HG (ref 35–45)
PCO2 TEMP ADJ BLD: 39.7 MM HG (ref 35–45)
PH BLDA: 7.37 PH UNITS (ref 7.35–7.45)
PH UR STRIP.AUTO: 5.5 [PH] (ref 4.5–8)
PH, TEMP CORRECTED: 7.37 PH UNITS (ref 7.35–7.45)
PLATELET # BLD AUTO: 323 10*3/MM3 (ref 140–500)
PMV BLD AUTO: 9.3 FL (ref 7.4–10.4)
PO2 BLDA: 66.6 MM HG (ref 83–108)
PO2 TEMP ADJ BLD: 66.6 MM HG (ref 83–108)
POTASSIUM BLD-SCNC: 4.9 MMOL/L (ref 3.5–5.2)
PROCALCITONIN SERPL-MCNC: 0.09 NG/ML (ref 0.1–0.25)
PROT UR QL STRIP: ABNORMAL
RBC # BLD AUTO: 4.59 10*6/MM3 (ref 4.2–5.4)
RBC # UR: ABNORMAL /HPF
REF LAB TEST METHOD: ABNORMAL
RHINOVIRUS RNA SPEC NAA+PROBE: DETECTED
RSV RNA NPH QL NAA+NON-PROBE: NOT DETECTED
SAO2 % BLDCOA: 92.4 % (ref 94–99)
SODIUM BLD-SCNC: 137 MMOL/L (ref 136–145)
SP GR UR STRIP: 1.04 (ref 1–1.03)
SQUAMOUS #/AREA URNS HPF: ABNORMAL /HPF
UROBILINOGEN UR QL STRIP: ABNORMAL
VENTILATOR MODE: ABNORMAL
WBC NRBC COR # BLD: 11.73 10*3/MM3 (ref 4.8–10.8)
WBC UR QL AUTO: ABNORMAL /HPF

## 2017-12-25 PROCEDURE — 63710000001 INSULIN ASPART PER 5 UNITS: Performed by: INTERNAL MEDICINE

## 2017-12-25 PROCEDURE — 87486 CHLMYD PNEUM DNA AMP PROBE: CPT | Performed by: INTERNAL MEDICINE

## 2017-12-25 PROCEDURE — 82803 BLOOD GASES ANY COMBINATION: CPT

## 2017-12-25 PROCEDURE — 83605 ASSAY OF LACTIC ACID: CPT | Performed by: INTERNAL MEDICINE

## 2017-12-25 PROCEDURE — 82962 GLUCOSE BLOOD TEST: CPT

## 2017-12-25 PROCEDURE — 36600 WITHDRAWAL OF ARTERIAL BLOOD: CPT

## 2017-12-25 PROCEDURE — 25010000002 ENOXAPARIN PER 10 MG: Performed by: INTERNAL MEDICINE

## 2017-12-25 PROCEDURE — 85025 COMPLETE CBC W/AUTO DIFF WBC: CPT | Performed by: INTERNAL MEDICINE

## 2017-12-25 PROCEDURE — 83036 HEMOGLOBIN GLYCOSYLATED A1C: CPT | Performed by: INTERNAL MEDICINE

## 2017-12-25 PROCEDURE — 83605 ASSAY OF LACTIC ACID: CPT | Performed by: EMERGENCY MEDICINE

## 2017-12-25 PROCEDURE — 99221 1ST HOSP IP/OBS SF/LOW 40: CPT | Performed by: HOSPITALIST

## 2017-12-25 PROCEDURE — 25010000002 AZITHROMYCIN: Performed by: INTERNAL MEDICINE

## 2017-12-25 PROCEDURE — 94799 UNLISTED PULMONARY SVC/PX: CPT

## 2017-12-25 PROCEDURE — 87633 RESP VIRUS 12-25 TARGETS: CPT | Performed by: INTERNAL MEDICINE

## 2017-12-25 PROCEDURE — 25010000002 METHYLPREDNISOLONE PER 40 MG: Performed by: INTERNAL MEDICINE

## 2017-12-25 PROCEDURE — 84145 PROCALCITONIN (PCT): CPT | Performed by: INTERNAL MEDICINE

## 2017-12-25 PROCEDURE — 25010000002 METHYLPREDNISOLONE PER 125 MG: Performed by: INTERNAL MEDICINE

## 2017-12-25 PROCEDURE — 94640 AIRWAY INHALATION TREATMENT: CPT

## 2017-12-25 PROCEDURE — 87581 M.PNEUMON DNA AMP PROBE: CPT | Performed by: INTERNAL MEDICINE

## 2017-12-25 PROCEDURE — 80048 BASIC METABOLIC PNL TOTAL CA: CPT | Performed by: INTERNAL MEDICINE

## 2017-12-25 PROCEDURE — 87798 DETECT AGENT NOS DNA AMP: CPT | Performed by: INTERNAL MEDICINE

## 2017-12-25 RX ORDER — VERAPAMIL HYDROCHLORIDE 120 MG/1
120 TABLET, FILM COATED ORAL EVERY 8 HOURS SCHEDULED
Status: DISCONTINUED | OUTPATIENT
Start: 2017-12-25 | End: 2017-12-31

## 2017-12-25 RX ORDER — BUDESONIDE AND FORMOTEROL FUMARATE DIHYDRATE 80; 4.5 UG/1; UG/1
2 AEROSOL RESPIRATORY (INHALATION)
Status: DISCONTINUED | OUTPATIENT
Start: 2017-12-25 | End: 2017-12-25

## 2017-12-25 RX ORDER — HYDROCODONE BITARTRATE AND ACETAMINOPHEN 5; 325 MG/1; MG/1
1 TABLET ORAL EVERY 6 HOURS PRN
Status: DISCONTINUED | OUTPATIENT
Start: 2017-12-25 | End: 2018-01-06 | Stop reason: HOSPADM

## 2017-12-25 RX ORDER — NICOTINE POLACRILEX 4 MG
15 LOZENGE BUCCAL
Status: DISCONTINUED | OUTPATIENT
Start: 2017-12-25 | End: 2018-01-06 | Stop reason: HOSPADM

## 2017-12-25 RX ORDER — ASPIRIN 81 MG/1
81 TABLET ORAL DAILY
Status: DISCONTINUED | OUTPATIENT
Start: 2017-12-25 | End: 2017-12-31

## 2017-12-25 RX ORDER — SODIUM CHLORIDE 9 MG/ML
125 INJECTION, SOLUTION INTRAVENOUS CONTINUOUS
Status: DISCONTINUED | OUTPATIENT
Start: 2017-12-25 | End: 2017-12-25

## 2017-12-25 RX ORDER — IPRATROPIUM BROMIDE AND ALBUTEROL SULFATE 2.5; .5 MG/3ML; MG/3ML
3 SOLUTION RESPIRATORY (INHALATION)
Status: DISCONTINUED | OUTPATIENT
Start: 2017-12-25 | End: 2017-12-25

## 2017-12-25 RX ORDER — ONDANSETRON 2 MG/ML
4 INJECTION INTRAMUSCULAR; INTRAVENOUS EVERY 6 HOURS PRN
Status: DISCONTINUED | OUTPATIENT
Start: 2017-12-25 | End: 2017-12-31

## 2017-12-25 RX ORDER — SODIUM CHLORIDE 9 MG/ML
75 INJECTION, SOLUTION INTRAVENOUS CONTINUOUS
Status: DISCONTINUED | OUTPATIENT
Start: 2017-12-25 | End: 2017-12-26

## 2017-12-25 RX ORDER — ALBUTEROL SULFATE 2.5 MG/3ML
2.5 SOLUTION RESPIRATORY (INHALATION)
Status: DISCONTINUED | OUTPATIENT
Start: 2017-12-25 | End: 2017-12-26

## 2017-12-25 RX ORDER — METHYLPREDNISOLONE SODIUM SUCCINATE 125 MG/2ML
80 INJECTION, POWDER, LYOPHILIZED, FOR SOLUTION INTRAMUSCULAR; INTRAVENOUS EVERY 6 HOURS
Status: DISCONTINUED | OUTPATIENT
Start: 2017-12-25 | End: 2017-12-26

## 2017-12-25 RX ORDER — HYDRALAZINE HYDROCHLORIDE 20 MG/ML
20 INJECTION INTRAMUSCULAR; INTRAVENOUS EVERY 6 HOURS PRN
Status: CANCELLED | OUTPATIENT
Start: 2017-12-25

## 2017-12-25 RX ORDER — FLUOXETINE HYDROCHLORIDE 20 MG/1
20 CAPSULE ORAL DAILY
Status: DISCONTINUED | OUTPATIENT
Start: 2017-12-25 | End: 2018-01-06 | Stop reason: HOSPADM

## 2017-12-25 RX ORDER — DEXTROSE MONOHYDRATE 25 G/50ML
25 INJECTION, SOLUTION INTRAVENOUS
Status: DISCONTINUED | OUTPATIENT
Start: 2017-12-25 | End: 2018-01-06 | Stop reason: HOSPADM

## 2017-12-25 RX ORDER — CLOPIDOGREL BISULFATE 75 MG/1
75 TABLET ORAL DAILY
Status: DISCONTINUED | OUTPATIENT
Start: 2017-12-25 | End: 2018-01-06 | Stop reason: HOSPADM

## 2017-12-25 RX ORDER — LEVETIRACETAM 500 MG/1
500 TABLET ORAL EVERY 12 HOURS SCHEDULED
Status: DISCONTINUED | OUTPATIENT
Start: 2017-12-25 | End: 2018-01-06 | Stop reason: HOSPADM

## 2017-12-25 RX ORDER — ROSUVASTATIN CALCIUM 5 MG/1
40 TABLET, COATED ORAL DAILY
Status: DISCONTINUED | OUTPATIENT
Start: 2017-12-25 | End: 2018-01-06 | Stop reason: HOSPADM

## 2017-12-25 RX ORDER — LISINOPRIL 20 MG/1
40 TABLET ORAL DAILY
Status: DISCONTINUED | OUTPATIENT
Start: 2017-12-25 | End: 2018-01-06 | Stop reason: HOSPADM

## 2017-12-25 RX ORDER — ACETAMINOPHEN 325 MG/1
650 TABLET ORAL 3 TIMES DAILY
Status: DISCONTINUED | OUTPATIENT
Start: 2017-12-25 | End: 2018-01-06 | Stop reason: HOSPADM

## 2017-12-25 RX ORDER — SODIUM CHLORIDE 0.9 % (FLUSH) 0.9 %
1-10 SYRINGE (ML) INJECTION AS NEEDED
Status: DISCONTINUED | OUTPATIENT
Start: 2017-12-25 | End: 2018-01-06 | Stop reason: HOSPADM

## 2017-12-25 RX ORDER — SODIUM CHLORIDE 9 MG/ML
40 INJECTION, SOLUTION INTRAVENOUS AS NEEDED
Status: DISCONTINUED | OUTPATIENT
Start: 2017-12-25 | End: 2018-01-06 | Stop reason: HOSPADM

## 2017-12-25 RX ORDER — ALBUTEROL SULFATE 2.5 MG/3ML
2.5 SOLUTION RESPIRATORY (INHALATION) EVERY 6 HOURS PRN
Status: DISCONTINUED | OUTPATIENT
Start: 2017-12-25 | End: 2017-12-25

## 2017-12-25 RX ORDER — METHYLPREDNISOLONE SODIUM SUCCINATE 40 MG/ML
INJECTION, POWDER, LYOPHILIZED, FOR SOLUTION INTRAMUSCULAR; INTRAVENOUS
Status: DISPENSED
Start: 2017-12-25 | End: 2017-12-25

## 2017-12-25 RX ADMIN — AZITHROMYCIN 500 MG: 500 INJECTION, POWDER, LYOPHILIZED, FOR SOLUTION INTRAVENOUS at 23:31

## 2017-12-25 RX ADMIN — METFORMIN HYDROCHLORIDE 500 MG: 500 TABLET ORAL at 16:40

## 2017-12-25 RX ADMIN — METHYLPREDNISOLONE SODIUM SUCCINATE 80 MG: 125 INJECTION, POWDER, FOR SOLUTION INTRAMUSCULAR; INTRAVENOUS at 03:48

## 2017-12-25 RX ADMIN — METHYLPREDNISOLONE SODIUM SUCCINATE 80 MG: 125 INJECTION, POWDER, FOR SOLUTION INTRAMUSCULAR; INTRAVENOUS at 21:46

## 2017-12-25 RX ADMIN — ROSUVASTATIN CALCIUM 40 MG: 5 TABLET, FILM COATED ORAL at 14:17

## 2017-12-25 RX ADMIN — LISINOPRIL 40 MG: 20 TABLET ORAL at 11:54

## 2017-12-25 RX ADMIN — IPRATROPIUM BROMIDE AND ALBUTEROL SULFATE 3 ML: .5; 3 SOLUTION RESPIRATORY (INHALATION) at 07:34

## 2017-12-25 RX ADMIN — INSULIN ASPART 6 UNITS: 100 INJECTION, SOLUTION INTRAVENOUS; SUBCUTANEOUS at 17:01

## 2017-12-25 RX ADMIN — ACETAMINOPHEN 650 MG: 325 TABLET, FILM COATED ORAL at 11:53

## 2017-12-25 RX ADMIN — SODIUM CHLORIDE 125 ML/HR: 9 INJECTION, SOLUTION INTRAVENOUS at 03:47

## 2017-12-25 RX ADMIN — INSULIN ASPART 5 UNITS: 100 INJECTION, SOLUTION INTRAVENOUS; SUBCUTANEOUS at 08:08

## 2017-12-25 RX ADMIN — BUDESONIDE AND FORMOTEROL FUMARATE DIHYDRATE 2 PUFF: 80; 4.5 AEROSOL RESPIRATORY (INHALATION) at 11:44

## 2017-12-25 RX ADMIN — ALBUTEROL SULFATE 2.5 MG: 2.5 SOLUTION RESPIRATORY (INHALATION) at 18:57

## 2017-12-25 RX ADMIN — ASPIRIN 81 MG: 81 TABLET, COATED ORAL at 11:54

## 2017-12-25 RX ADMIN — METHYLPREDNISOLONE SODIUM SUCCINATE 80 MG: 125 INJECTION, POWDER, FOR SOLUTION INTRAMUSCULAR; INTRAVENOUS at 16:36

## 2017-12-25 RX ADMIN — VERAPAMIL HYDROCHLORIDE 120 MG: 120 TABLET, FILM COATED ORAL at 21:45

## 2017-12-25 RX ADMIN — HYDROCODONE BITARTRATE AND ACETAMINOPHEN 1 TABLET: 5; 325 TABLET ORAL at 21:42

## 2017-12-25 RX ADMIN — HYDROCODONE BITARTRATE AND ACETAMINOPHEN 1 TABLET: 5; 325 TABLET ORAL at 14:25

## 2017-12-25 RX ADMIN — IPRATROPIUM BROMIDE AND ALBUTEROL SULFATE 3 ML: .5; 3 SOLUTION RESPIRATORY (INHALATION) at 11:34

## 2017-12-25 RX ADMIN — METHYLPREDNISOLONE SODIUM SUCCINATE 80 MG: 125 INJECTION, POWDER, FOR SOLUTION INTRAMUSCULAR; INTRAVENOUS at 09:43

## 2017-12-25 RX ADMIN — LEVETIRACETAM 500 MG: 500 TABLET, FILM COATED ORAL at 21:40

## 2017-12-25 RX ADMIN — FLUOXETINE 20 MG: 20 CAPSULE ORAL at 11:54

## 2017-12-25 RX ADMIN — SODIUM CHLORIDE 125 ML/HR: 9 INJECTION, SOLUTION INTRAVENOUS at 11:53

## 2017-12-25 RX ADMIN — ENOXAPARIN SODIUM 40 MG: 40 INJECTION SUBCUTANEOUS at 08:28

## 2017-12-25 RX ADMIN — INSULIN ASPART 6 UNITS: 100 INJECTION, SOLUTION INTRAVENOUS; SUBCUTANEOUS at 11:54

## 2017-12-25 RX ADMIN — VERAPAMIL HYDROCHLORIDE 120 MG: 120 TABLET, FILM COATED ORAL at 14:19

## 2017-12-25 RX ADMIN — LEVETIRACETAM 500 MG: 500 TABLET, FILM COATED ORAL at 11:54

## 2017-12-25 RX ADMIN — SODIUM CHLORIDE 75 ML/HR: 9 INJECTION, SOLUTION INTRAVENOUS at 23:24

## 2017-12-25 RX ADMIN — INSULIN ASPART 6 UNITS: 100 INJECTION, SOLUTION INTRAVENOUS; SUBCUTANEOUS at 20:55

## 2017-12-25 RX ADMIN — SODIUM CHLORIDE 75 ML/HR: 9 INJECTION, SOLUTION INTRAVENOUS at 15:26

## 2017-12-25 RX ADMIN — CLOPIDOGREL 75 MG: 75 TABLET, FILM COATED ORAL at 11:54

## 2017-12-26 LAB
ANION GAP SERPL CALCULATED.3IONS-SCNC: 16.5 MMOL/L
BACTERIA SPEC AEROBE CULT: NO GROWTH
BASOPHILS # BLD AUTO: 0.03 10*3/MM3 (ref 0–0.2)
BASOPHILS NFR BLD AUTO: 0.2 % (ref 0–2)
BUN BLD-MCNC: 24 MG/DL (ref 8–23)
BUN/CREAT SERPL: 30.8 (ref 7–25)
CALCIUM SPEC-SCNC: 8.9 MG/DL (ref 8.8–10.5)
CHLORIDE SERPL-SCNC: 102 MMOL/L (ref 98–107)
CO2 SERPL-SCNC: 20.5 MMOL/L (ref 22–29)
CREAT BLD-MCNC: 0.78 MG/DL (ref 0.57–1)
DEPRECATED RDW RBC AUTO: 54 FL (ref 37–54)
EOSINOPHIL # BLD AUTO: 0 10*3/MM3 (ref 0.1–0.3)
EOSINOPHIL NFR BLD AUTO: 0 % (ref 0–4)
ERYTHROCYTE [DISTWIDTH] IN BLOOD BY AUTOMATED COUNT: 15.5 % (ref 11.5–14.5)
GFR SERPL CREATININE-BSD FRML MDRD: 73 ML/MIN/1.73
GLUCOSE BLD-MCNC: 351 MG/DL (ref 65–99)
GLUCOSE BLDC GLUCOMTR-MCNC: 288 MG/DL (ref 70–130)
GLUCOSE BLDC GLUCOMTR-MCNC: 318 MG/DL (ref 70–130)
GLUCOSE BLDC GLUCOMTR-MCNC: 368 MG/DL (ref 70–130)
GLUCOSE BLDC GLUCOMTR-MCNC: 374 MG/DL (ref 70–130)
GLUCOSE BLDC GLUCOMTR-MCNC: 397 MG/DL (ref 70–130)
HCT VFR BLD AUTO: 39.6 % (ref 37–47)
HGB BLD-MCNC: 12.2 G/DL (ref 12–16)
IMM GRANULOCYTES # BLD: 0.3 10*3/MM3 (ref 0–0.03)
IMM GRANULOCYTES NFR BLD: 1.6 % (ref 0–0.5)
LYMPHOCYTES # BLD AUTO: 1.98 10*3/MM3 (ref 0.6–4.8)
LYMPHOCYTES NFR BLD AUTO: 10.8 % (ref 20–45)
MCH RBC QN AUTO: 29 PG (ref 27–31)
MCHC RBC AUTO-ENTMCNC: 30.8 G/DL (ref 31–37)
MCV RBC AUTO: 94.1 FL (ref 81–99)
MONOCYTES # BLD AUTO: 0.93 10*3/MM3 (ref 0–1)
MONOCYTES NFR BLD AUTO: 5.1 % (ref 3–8)
NEUTROPHILS # BLD AUTO: 15.17 10*3/MM3 (ref 1.5–8.3)
NEUTROPHILS NFR BLD AUTO: 82.3 % (ref 45–70)
NRBC BLD MANUAL-RTO: 0 /100 WBC (ref 0–0)
PLATELET # BLD AUTO: 361 10*3/MM3 (ref 140–500)
PMV BLD AUTO: 9.8 FL (ref 7.4–10.4)
POTASSIUM BLD-SCNC: 4.6 MMOL/L (ref 3.5–5.2)
RBC # BLD AUTO: 4.21 10*6/MM3 (ref 4.2–5.4)
SODIUM BLD-SCNC: 139 MMOL/L (ref 136–145)
WBC NRBC COR # BLD: 18.41 10*3/MM3 (ref 4.8–10.8)

## 2017-12-26 PROCEDURE — 63710000001 INSULIN ASPART PER 5 UNITS: Performed by: INTERNAL MEDICINE

## 2017-12-26 PROCEDURE — 80048 BASIC METABOLIC PNL TOTAL CA: CPT | Performed by: INTERNAL MEDICINE

## 2017-12-26 PROCEDURE — 25010000002 METHYLPREDNISOLONE PER 125 MG: Performed by: INTERNAL MEDICINE

## 2017-12-26 PROCEDURE — 82962 GLUCOSE BLOOD TEST: CPT

## 2017-12-26 PROCEDURE — 63710000001 INSULIN ASPART PER 5 UNITS: Performed by: HOSPITALIST

## 2017-12-26 PROCEDURE — 25010000002 ENOXAPARIN PER 10 MG: Performed by: INTERNAL MEDICINE

## 2017-12-26 PROCEDURE — 85025 COMPLETE CBC W/AUTO DIFF WBC: CPT | Performed by: INTERNAL MEDICINE

## 2017-12-26 PROCEDURE — 94799 UNLISTED PULMONARY SVC/PX: CPT

## 2017-12-26 PROCEDURE — 25010000002 METHYLPREDNISOLONE PER 125 MG: Performed by: HOSPITALIST

## 2017-12-26 PROCEDURE — 99232 SBSQ HOSP IP/OBS MODERATE 35: CPT | Performed by: HOSPITALIST

## 2017-12-26 PROCEDURE — 25010000002 CEFTRIAXONE: Performed by: INTERNAL MEDICINE

## 2017-12-26 RX ORDER — ALBUTEROL SULFATE 2.5 MG/3ML
2.5 SOLUTION RESPIRATORY (INHALATION) EVERY 4 HOURS PRN
Status: DISCONTINUED | OUTPATIENT
Start: 2017-12-26 | End: 2018-01-06 | Stop reason: HOSPADM

## 2017-12-26 RX ORDER — METHYLPREDNISOLONE SODIUM SUCCINATE 125 MG/2ML
60 INJECTION, POWDER, LYOPHILIZED, FOR SOLUTION INTRAMUSCULAR; INTRAVENOUS EVERY 6 HOURS
Status: DISCONTINUED | OUTPATIENT
Start: 2017-12-26 | End: 2018-01-01

## 2017-12-26 RX ORDER — ALBUTEROL SULFATE 2.5 MG/3ML
2.5 SOLUTION RESPIRATORY (INHALATION)
Status: DISCONTINUED | OUTPATIENT
Start: 2017-12-26 | End: 2017-12-28

## 2017-12-26 RX ADMIN — HYDROCODONE BITARTRATE AND ACETAMINOPHEN 1 TABLET: 5; 325 TABLET ORAL at 22:00

## 2017-12-26 RX ADMIN — FLUOXETINE 20 MG: 20 CAPSULE ORAL at 09:30

## 2017-12-26 RX ADMIN — METHYLPREDNISOLONE SODIUM SUCCINATE 80 MG: 125 INJECTION, POWDER, FOR SOLUTION INTRAMUSCULAR; INTRAVENOUS at 09:33

## 2017-12-26 RX ADMIN — METHYLPREDNISOLONE SODIUM SUCCINATE 80 MG: 125 INJECTION, POWDER, FOR SOLUTION INTRAMUSCULAR; INTRAVENOUS at 03:42

## 2017-12-26 RX ADMIN — CLOPIDOGREL 75 MG: 75 TABLET, FILM COATED ORAL at 09:30

## 2017-12-26 RX ADMIN — TIOTROPIUM BROMIDE 1 CAPSULE: 18 CAPSULE ORAL; RESPIRATORY (INHALATION) at 07:02

## 2017-12-26 RX ADMIN — VERAPAMIL HYDROCHLORIDE 120 MG: 120 TABLET, FILM COATED ORAL at 14:13

## 2017-12-26 RX ADMIN — METHYLPREDNISOLONE SODIUM SUCCINATE 60 MG: 125 INJECTION, POWDER, FOR SOLUTION INTRAMUSCULAR; INTRAVENOUS at 21:54

## 2017-12-26 RX ADMIN — INSULIN ASPART 12 UNITS: 100 INJECTION, SOLUTION INTRAVENOUS; SUBCUTANEOUS at 17:50

## 2017-12-26 RX ADMIN — LEVETIRACETAM 500 MG: 500 TABLET, FILM COATED ORAL at 09:30

## 2017-12-26 RX ADMIN — ENOXAPARIN SODIUM 40 MG: 40 INJECTION SUBCUTANEOUS at 06:28

## 2017-12-26 RX ADMIN — ASPIRIN 81 MG: 81 TABLET, COATED ORAL at 09:30

## 2017-12-26 RX ADMIN — ALBUTEROL SULFATE 2.5 MG: 2.5 SOLUTION RESPIRATORY (INHALATION) at 22:26

## 2017-12-26 RX ADMIN — ROSUVASTATIN CALCIUM 40 MG: 5 TABLET, FILM COATED ORAL at 09:29

## 2017-12-26 RX ADMIN — VERAPAMIL HYDROCHLORIDE 120 MG: 120 TABLET, FILM COATED ORAL at 21:52

## 2017-12-26 RX ADMIN — ALBUTEROL SULFATE 2.5 MG: 2.5 SOLUTION RESPIRATORY (INHALATION) at 17:17

## 2017-12-26 RX ADMIN — CEFTRIAXONE 2 G: 2 INJECTION, POWDER, FOR SOLUTION INTRAMUSCULAR; INTRAVENOUS at 02:10

## 2017-12-26 RX ADMIN — INSULIN ASPART 6 UNITS: 100 INJECTION, SOLUTION INTRAVENOUS; SUBCUTANEOUS at 12:34

## 2017-12-26 RX ADMIN — INSULIN ASPART 4 UNITS: 100 INJECTION, SOLUTION INTRAVENOUS; SUBCUTANEOUS at 08:19

## 2017-12-26 RX ADMIN — ALBUTEROL SULFATE 2.5 MG: 2.5 SOLUTION RESPIRATORY (INHALATION) at 07:02

## 2017-12-26 RX ADMIN — VERAPAMIL HYDROCHLORIDE 120 MG: 120 TABLET, FILM COATED ORAL at 06:22

## 2017-12-26 RX ADMIN — ALBUTEROL SULFATE 2.5 MG: 2.5 SOLUTION RESPIRATORY (INHALATION) at 12:49

## 2017-12-26 RX ADMIN — METFORMIN HYDROCHLORIDE 500 MG: 500 TABLET ORAL at 09:30

## 2017-12-26 RX ADMIN — METFORMIN HYDROCHLORIDE 500 MG: 500 TABLET ORAL at 17:50

## 2017-12-26 RX ADMIN — LEVETIRACETAM 500 MG: 500 TABLET, FILM COATED ORAL at 21:52

## 2017-12-26 RX ADMIN — ALBUTEROL SULFATE 2.5 MG: 2.5 SOLUTION RESPIRATORY (INHALATION) at 02:18

## 2017-12-26 RX ADMIN — METHYLPREDNISOLONE SODIUM SUCCINATE 60 MG: 125 INJECTION, POWDER, FOR SOLUTION INTRAMUSCULAR; INTRAVENOUS at 16:25

## 2017-12-26 RX ADMIN — LISINOPRIL 40 MG: 20 TABLET ORAL at 09:30

## 2017-12-26 RX ADMIN — INSULIN ASPART 12 UNITS: 100 INJECTION, SOLUTION INTRAVENOUS; SUBCUTANEOUS at 22:01

## 2017-12-26 RX ADMIN — HYDROCODONE BITARTRATE AND ACETAMINOPHEN 1 TABLET: 5; 325 TABLET ORAL at 09:38

## 2017-12-26 RX ADMIN — ACETAMINOPHEN 650 MG: 325 TABLET, FILM COATED ORAL at 09:30

## 2017-12-27 ENCOUNTER — APPOINTMENT (OUTPATIENT)
Dept: GENERAL RADIOLOGY | Facility: HOSPITAL | Age: 67
End: 2017-12-27

## 2017-12-27 ENCOUNTER — APPOINTMENT (OUTPATIENT)
Dept: CARDIOLOGY | Facility: HOSPITAL | Age: 67
End: 2017-12-27
Attending: INTERNAL MEDICINE

## 2017-12-27 LAB
ANION GAP SERPL CALCULATED.3IONS-SCNC: 12.3 MMOL/L
ASCENDING AORTA: 2.9 CM
BACTERIA SPEC RESP CULT: NORMAL
BACTERIA SPEC RESP CULT: NORMAL
BASOPHILS # BLD AUTO: 0.03 10*3/MM3 (ref 0–0.2)
BASOPHILS NFR BLD AUTO: 0.2 % (ref 0–2)
BH CV ECHO MEAS - ACS: 1.6 CM
BH CV ECHO MEAS - AO MAX PG (FULL): -0.85 MMHG
BH CV ECHO MEAS - AO MAX PG: 6.7 MMHG
BH CV ECHO MEAS - AO MEAN PG (FULL): -1 MMHG
BH CV ECHO MEAS - AO MEAN PG: 3 MMHG
BH CV ECHO MEAS - AO ROOT AREA (BSA CORRECTED): 1.4
BH CV ECHO MEAS - AO ROOT AREA: 6.6 CM^2
BH CV ECHO MEAS - AO ROOT DIAM: 2.9 CM
BH CV ECHO MEAS - AO V2 MAX: 129 CM/SEC
BH CV ECHO MEAS - AO V2 MEAN: 82.4 CM/SEC
BH CV ECHO MEAS - AO V2 VTI: 28.7 CM
BH CV ECHO MEAS - ASC AORTA: 3.1 CM
BH CV ECHO MEAS - AVA(I,A): 2.6 CM^2
BH CV ECHO MEAS - AVA(I,D): 2.6 CM^2
BH CV ECHO MEAS - AVA(V,A): 3 CM^2
BH CV ECHO MEAS - AVA(V,D): 3 CM^2
BH CV ECHO MEAS - BSA(HAYCOCK): 2.3 M^2
BH CV ECHO MEAS - BSA: 2.1 M^2
BH CV ECHO MEAS - BZI_BMI: 42.6 KILOGRAMS/M^2
BH CV ECHO MEAS - BZI_METRIC_HEIGHT: 162.6 CM
BH CV ECHO MEAS - BZI_METRIC_WEIGHT: 112.5 KG
BH CV ECHO MEAS - CONTRAST EF (2CH): 69.6 ML/M^2
BH CV ECHO MEAS - CONTRAST EF 4CH: 72.2 ML/M^2
BH CV ECHO MEAS - EDV(CUBED): 91.7 ML
BH CV ECHO MEAS - EDV(MOD-SP2): 161 ML
BH CV ECHO MEAS - EDV(MOD-SP4): 148 ML
BH CV ECHO MEAS - EDV(TEICH): 92.9 ML
BH CV ECHO MEAS - EF(CUBED): 81.5 %
BH CV ECHO MEAS - EF(MOD-SP2): 69.6 %
BH CV ECHO MEAS - EF(MOD-SP4): 72.2 %
BH CV ECHO MEAS - EF(TEICH): 74.3 %
BH CV ECHO MEAS - ESV(CUBED): 17 ML
BH CV ECHO MEAS - ESV(MOD-SP2): 49 ML
BH CV ECHO MEAS - ESV(MOD-SP4): 41.2 ML
BH CV ECHO MEAS - ESV(TEICH): 23.9 ML
BH CV ECHO MEAS - FS: 43 %
BH CV ECHO MEAS - IVS/LVPW: 1.1
BH CV ECHO MEAS - IVSD: 1.2 CM
BH CV ECHO MEAS - LAT PEAK E' VEL: 7 CM/SEC
BH CV ECHO MEAS - LV DIASTOLIC VOL/BSA (35-75): 69.1 ML/M^2
BH CV ECHO MEAS - LV MASS(C)D: 196.4 GRAMS
BH CV ECHO MEAS - LV MASS(C)DI: 91.6 GRAMS/M^2
BH CV ECHO MEAS - LV MAX PG: 7.5 MMHG
BH CV ECHO MEAS - LV MEAN PG: 4 MMHG
BH CV ECHO MEAS - LV SYSTOLIC VOL/BSA (12-30): 19.2 ML/M^2
BH CV ECHO MEAS - LV V1 MAX: 137 CM/SEC
BH CV ECHO MEAS - LV V1 MEAN: 97.3 CM/SEC
BH CV ECHO MEAS - LV V1 VTI: 26.6 CM
BH CV ECHO MEAS - LVIDD: 4.5 CM
BH CV ECHO MEAS - LVIDS: 2.6 CM
BH CV ECHO MEAS - LVLD AP2: 9.5 CM
BH CV ECHO MEAS - LVLD AP4: 9.2 CM
BH CV ECHO MEAS - LVLS AP2: 7.9 CM
BH CV ECHO MEAS - LVLS AP4: 7.2 CM
BH CV ECHO MEAS - LVOT AREA (M): 2.8 CM^2
BH CV ECHO MEAS - LVOT AREA: 2.8 CM^2
BH CV ECHO MEAS - LVOT DIAM: 1.9 CM
BH CV ECHO MEAS - LVPWD: 1.1 CM
BH CV ECHO MEAS - MED PEAK E' VEL: 9 CM/SEC
BH CV ECHO MEAS - MR MAX PG: 57.2 MMHG
BH CV ECHO MEAS - MR MAX VEL: 378 CM/SEC
BH CV ECHO MEAS - MV A DUR: 0.08 SEC
BH CV ECHO MEAS - MV A MAX VEL: 61.6 CM/SEC
BH CV ECHO MEAS - MV DEC SLOPE: 864 CM/SEC^2
BH CV ECHO MEAS - MV DEC TIME: 0.18 SEC
BH CV ECHO MEAS - MV E MAX VEL: 131 CM/SEC
BH CV ECHO MEAS - MV E/A: 2.1
BH CV ECHO MEAS - MV MAX PG: 7.8 MMHG
BH CV ECHO MEAS - MV MEAN PG: 2 MMHG
BH CV ECHO MEAS - MV P1/2T MAX VEL: 144 CM/SEC
BH CV ECHO MEAS - MV P1/2T: 48.8 MSEC
BH CV ECHO MEAS - MV V2 MAX: 140 CM/SEC
BH CV ECHO MEAS - MV V2 MEAN: 66.7 CM/SEC
BH CV ECHO MEAS - MV V2 VTI: 25.8 CM
BH CV ECHO MEAS - MVA P1/2T LCG: 1.5 CM^2
BH CV ECHO MEAS - MVA(P1/2T): 4.5 CM^2
BH CV ECHO MEAS - MVA(VTI): 2.9 CM^2
BH CV ECHO MEAS - PA ACC TIME: 0.12 SEC
BH CV ECHO MEAS - PA MAX PG (FULL): -0.25 MMHG
BH CV ECHO MEAS - PA MAX PG: 4.2 MMHG
BH CV ECHO MEAS - PA PR(ACCEL): 23.7 MMHG
BH CV ECHO MEAS - PA V2 MAX: 103 CM/SEC
BH CV ECHO MEAS - PULM A REVS DUR: 0.12 SEC
BH CV ECHO MEAS - PULM A REVS VEL: 23.9 CM/SEC
BH CV ECHO MEAS - PULM DIAS VEL: 47.4 CM/SEC
BH CV ECHO MEAS - PULM S/D: 0.85
BH CV ECHO MEAS - PULM SYS VEL: 40.3 CM/SEC
BH CV ECHO MEAS - PVA(V,A): 3.9 CM^2
BH CV ECHO MEAS - PVA(V,D): 3.9 CM^2
BH CV ECHO MEAS - QP/QS: 0.98
BH CV ECHO MEAS - RV MAX PG: 4.5 MMHG
BH CV ECHO MEAS - RV MEAN PG: 2 MMHG
BH CV ECHO MEAS - RV V1 MAX: 106 CM/SEC
BH CV ECHO MEAS - RV V1 MEAN: 72.2 CM/SEC
BH CV ECHO MEAS - RV V1 VTI: 19.5 CM
BH CV ECHO MEAS - RVOT AREA: 3.8 CM^2
BH CV ECHO MEAS - RVOT DIAM: 2.2 CM
BH CV ECHO MEAS - SI(AO): 88.4 ML/M^2
BH CV ECHO MEAS - SI(CUBED): 34.9 ML/M^2
BH CV ECHO MEAS - SI(LVOT): 35.2 ML/M^2
BH CV ECHO MEAS - SI(MOD-SP2): 52.3 ML/M^2
BH CV ECHO MEAS - SI(MOD-SP4): 49.8 ML/M^2
BH CV ECHO MEAS - SI(TEICH): 32.2 ML/M^2
BH CV ECHO MEAS - SV(AO): 189.6 ML
BH CV ECHO MEAS - SV(CUBED): 74.8 ML
BH CV ECHO MEAS - SV(LVOT): 75.4 ML
BH CV ECHO MEAS - SV(MOD-SP2): 112 ML
BH CV ECHO MEAS - SV(MOD-SP4): 106.8 ML
BH CV ECHO MEAS - SV(RVOT): 74.1 ML
BH CV ECHO MEAS - SV(TEICH): 69 ML
BH CV ECHO MEAS - TAPSE (>1.6): 2.4 CM2
BH CV ECHO MEAS - TR MAX VEL: 275 CM/SEC
BH CV VAS BP RIGHT ARM: NORMAL MMHG
BH CV XLRA - RV BASE: 3.4 CM
BH CV XLRA - TDI S': 14 CM/SEC
BUN BLD-MCNC: 34 MG/DL (ref 8–23)
BUN/CREAT SERPL: 44.2 (ref 7–25)
CALCIUM SPEC-SCNC: 9.1 MG/DL (ref 8.8–10.5)
CHLORIDE SERPL-SCNC: 105 MMOL/L (ref 98–107)
CO2 SERPL-SCNC: 22.7 MMOL/L (ref 22–29)
CREAT BLD-MCNC: 0.77 MG/DL (ref 0.57–1)
DEPRECATED RDW RBC AUTO: 54.4 FL (ref 37–54)
E/E' RATIO: 16
EOSINOPHIL # BLD AUTO: 0 10*3/MM3 (ref 0.1–0.3)
EOSINOPHIL NFR BLD AUTO: 0 % (ref 0–4)
ERYTHROCYTE [DISTWIDTH] IN BLOOD BY AUTOMATED COUNT: 15.7 % (ref 11.5–14.5)
GFR SERPL CREATININE-BSD FRML MDRD: 75 ML/MIN/1.73
GLUCOSE BLD-MCNC: 265 MG/DL (ref 65–99)
GLUCOSE BLDC GLUCOMTR-MCNC: 299 MG/DL (ref 70–130)
GLUCOSE BLDC GLUCOMTR-MCNC: 301 MG/DL (ref 70–130)
GLUCOSE BLDC GLUCOMTR-MCNC: 318 MG/DL (ref 70–130)
GLUCOSE BLDC GLUCOMTR-MCNC: 405 MG/DL (ref 70–130)
GLUCOSE BLDC GLUCOMTR-MCNC: 416 MG/DL (ref 70–130)
GRAM STN SPEC: NORMAL
GRAM STN SPEC: NORMAL
HCT VFR BLD AUTO: 38.4 % (ref 37–47)
HGB BLD-MCNC: 11.6 G/DL (ref 12–16)
IMM GRANULOCYTES # BLD: 0.3 10*3/MM3 (ref 0–0.03)
IMM GRANULOCYTES NFR BLD: 1.5 % (ref 0–0.5)
LEFT ATRIUM VOLUME INDEX: 32 ML/M2
LYMPHOCYTES # BLD AUTO: 1.55 10*3/MM3 (ref 0.6–4.8)
LYMPHOCYTES NFR BLD AUTO: 7.9 % (ref 20–45)
MAXIMAL PREDICTED HEART RATE: 152 BPM
MCH RBC QN AUTO: 28.4 PG (ref 27–31)
MCHC RBC AUTO-ENTMCNC: 30.2 G/DL (ref 31–37)
MCV RBC AUTO: 94.1 FL (ref 81–99)
MONOCYTES # BLD AUTO: 0.85 10*3/MM3 (ref 0–1)
MONOCYTES NFR BLD AUTO: 4.3 % (ref 3–8)
NEUTROPHILS # BLD AUTO: 16.85 10*3/MM3 (ref 1.5–8.3)
NEUTROPHILS NFR BLD AUTO: 86.1 % (ref 45–70)
NRBC BLD MANUAL-RTO: 0 /100 WBC (ref 0–0)
PLATELET # BLD AUTO: 344 10*3/MM3 (ref 140–500)
PMV BLD AUTO: 9.6 FL (ref 7.4–10.4)
POTASSIUM BLD-SCNC: 4.8 MMOL/L (ref 3.5–5.2)
PROCALCITONIN SERPL-MCNC: 0.05 NG/ML (ref 0.1–0.25)
RBC # BLD AUTO: 4.08 10*6/MM3 (ref 4.2–5.4)
SODIUM BLD-SCNC: 140 MMOL/L (ref 136–145)
STRESS TARGET HR: 129 BPM
WBC NRBC COR # BLD: 19.58 10*3/MM3 (ref 4.8–10.8)

## 2017-12-27 PROCEDURE — 94799 UNLISTED PULMONARY SVC/PX: CPT

## 2017-12-27 PROCEDURE — 93306 TTE W/DOPPLER COMPLETE: CPT | Performed by: INTERNAL MEDICINE

## 2017-12-27 PROCEDURE — 25010000002 AZITHROMYCIN: Performed by: INTERNAL MEDICINE

## 2017-12-27 PROCEDURE — 25010000002 METHYLPREDNISOLONE PER 125 MG: Performed by: HOSPITALIST

## 2017-12-27 PROCEDURE — 71020 HC CHEST PA AND LATERAL: CPT

## 2017-12-27 PROCEDURE — 87070 CULTURE OTHR SPECIMN AEROBIC: CPT | Performed by: INTERNAL MEDICINE

## 2017-12-27 PROCEDURE — 82962 GLUCOSE BLOOD TEST: CPT

## 2017-12-27 PROCEDURE — 93306 TTE W/DOPPLER COMPLETE: CPT

## 2017-12-27 PROCEDURE — 84145 PROCALCITONIN (PCT): CPT | Performed by: HOSPITALIST

## 2017-12-27 PROCEDURE — 63710000001 INSULIN ASPART PER 5 UNITS: Performed by: HOSPITALIST

## 2017-12-27 PROCEDURE — 80048 BASIC METABOLIC PNL TOTAL CA: CPT | Performed by: INTERNAL MEDICINE

## 2017-12-27 PROCEDURE — 87186 SC STD MICRODIL/AGAR DIL: CPT | Performed by: INTERNAL MEDICINE

## 2017-12-27 PROCEDURE — 25010000002 CEFTRIAXONE PER 250 MG: Performed by: HOSPITALIST

## 2017-12-27 PROCEDURE — 87205 SMEAR GRAM STAIN: CPT | Performed by: INTERNAL MEDICINE

## 2017-12-27 PROCEDURE — 25010000002 ENOXAPARIN PER 10 MG: Performed by: INTERNAL MEDICINE

## 2017-12-27 PROCEDURE — 99232 SBSQ HOSP IP/OBS MODERATE 35: CPT | Performed by: HOSPITALIST

## 2017-12-27 PROCEDURE — 85025 COMPLETE CBC W/AUTO DIFF WBC: CPT | Performed by: INTERNAL MEDICINE

## 2017-12-27 PROCEDURE — 25010000002 PERFLUTREN (DEFINITY) 8.476 MG IN SODIUM CHLORIDE 0.9 % 10 ML INJECTION: Performed by: INTERNAL MEDICINE

## 2017-12-27 RX ORDER — ACETYLCYSTEINE 200 MG/ML
4 SOLUTION ORAL; RESPIRATORY (INHALATION) EVERY 4 HOURS
Status: DISCONTINUED | OUTPATIENT
Start: 2017-12-27 | End: 2017-12-28

## 2017-12-27 RX ORDER — ZOLPIDEM TARTRATE 5 MG/1
TABLET ORAL
Qty: 2 TABLET | Refills: 0 | Status: SHIPPED | OUTPATIENT
Start: 2017-12-27 | End: 2018-07-02

## 2017-12-27 RX ADMIN — LEVETIRACETAM 500 MG: 500 TABLET, FILM COATED ORAL at 08:45

## 2017-12-27 RX ADMIN — VERAPAMIL HYDROCHLORIDE 120 MG: 120 TABLET, FILM COATED ORAL at 14:06

## 2017-12-27 RX ADMIN — ACETYLCYSTEINE 4 ML: 200 SOLUTION ORAL; RESPIRATORY (INHALATION) at 23:03

## 2017-12-27 RX ADMIN — METHYLPREDNISOLONE SODIUM SUCCINATE 60 MG: 125 INJECTION, POWDER, FOR SOLUTION INTRAMUSCULAR; INTRAVENOUS at 16:52

## 2017-12-27 RX ADMIN — CEFTRIAXONE 1 G: 1 INJECTION, SOLUTION INTRAVENOUS at 17:50

## 2017-12-27 RX ADMIN — PERFLUTREN 3 ML: 6.52 INJECTION, SUSPENSION INTRAVENOUS at 12:05

## 2017-12-27 RX ADMIN — ENOXAPARIN SODIUM 40 MG: 40 INJECTION SUBCUTANEOUS at 06:08

## 2017-12-27 RX ADMIN — ACETYLCYSTEINE 4 ML: 200 SOLUTION ORAL; RESPIRATORY (INHALATION) at 19:34

## 2017-12-27 RX ADMIN — VERAPAMIL HYDROCHLORIDE 120 MG: 120 TABLET, FILM COATED ORAL at 22:32

## 2017-12-27 RX ADMIN — ACETAMINOPHEN 650 MG: 325 TABLET, FILM COATED ORAL at 22:32

## 2017-12-27 RX ADMIN — ALBUTEROL SULFATE 2.5 MG: 2.5 SOLUTION RESPIRATORY (INHALATION) at 19:34

## 2017-12-27 RX ADMIN — METFORMIN HYDROCHLORIDE 500 MG: 500 TABLET ORAL at 08:45

## 2017-12-27 RX ADMIN — METFORMIN HYDROCHLORIDE 500 MG: 500 TABLET ORAL at 17:02

## 2017-12-27 RX ADMIN — METHYLPREDNISOLONE SODIUM SUCCINATE 60 MG: 125 INJECTION, POWDER, FOR SOLUTION INTRAMUSCULAR; INTRAVENOUS at 04:25

## 2017-12-27 RX ADMIN — ACETAMINOPHEN 650 MG: 325 TABLET, FILM COATED ORAL at 08:46

## 2017-12-27 RX ADMIN — FLUOXETINE 20 MG: 20 CAPSULE ORAL at 08:45

## 2017-12-27 RX ADMIN — ACETYLCYSTEINE 4 ML: 200 SOLUTION ORAL; RESPIRATORY (INHALATION) at 16:24

## 2017-12-27 RX ADMIN — ROSUVASTATIN CALCIUM 40 MG: 5 TABLET, FILM COATED ORAL at 08:45

## 2017-12-27 RX ADMIN — ALBUTEROL SULFATE 2.5 MG: 2.5 SOLUTION RESPIRATORY (INHALATION) at 23:03

## 2017-12-27 RX ADMIN — METHYLPREDNISOLONE SODIUM SUCCINATE 60 MG: 125 INJECTION, POWDER, FOR SOLUTION INTRAMUSCULAR; INTRAVENOUS at 22:30

## 2017-12-27 RX ADMIN — INSULIN ASPART 8 UNITS: 100 INJECTION, SOLUTION INTRAVENOUS; SUBCUTANEOUS at 08:38

## 2017-12-27 RX ADMIN — INSULIN ASPART 16 UNITS: 100 INJECTION, SOLUTION INTRAVENOUS; SUBCUTANEOUS at 12:12

## 2017-12-27 RX ADMIN — ALBUTEROL SULFATE 2.5 MG: 2.5 SOLUTION RESPIRATORY (INHALATION) at 03:09

## 2017-12-27 RX ADMIN — ALBUTEROL SULFATE 2.5 MG: 2.5 SOLUTION RESPIRATORY (INHALATION) at 12:45

## 2017-12-27 RX ADMIN — LISINOPRIL 40 MG: 20 TABLET ORAL at 08:45

## 2017-12-27 RX ADMIN — HYDROCODONE BITARTRATE AND ACETAMINOPHEN 1 TABLET: 5; 325 TABLET ORAL at 08:45

## 2017-12-27 RX ADMIN — CLOPIDOGREL 75 MG: 75 TABLET, FILM COATED ORAL at 08:45

## 2017-12-27 RX ADMIN — AZITHROMYCIN 500 MG: 500 INJECTION, POWDER, LYOPHILIZED, FOR SOLUTION INTRAVENOUS at 22:31

## 2017-12-27 RX ADMIN — ACETYLCYSTEINE 4 ML: 200 SOLUTION ORAL; RESPIRATORY (INHALATION) at 12:44

## 2017-12-27 RX ADMIN — AZITHROMYCIN 500 MG: 500 INJECTION, POWDER, LYOPHILIZED, FOR SOLUTION INTRAVENOUS at 00:14

## 2017-12-27 RX ADMIN — HYDROCODONE BITARTRATE AND ACETAMINOPHEN 1 TABLET: 5; 325 TABLET ORAL at 17:01

## 2017-12-27 RX ADMIN — LEVETIRACETAM 500 MG: 500 TABLET, FILM COATED ORAL at 22:31

## 2017-12-27 RX ADMIN — METHYLPREDNISOLONE SODIUM SUCCINATE 60 MG: 125 INJECTION, POWDER, FOR SOLUTION INTRAMUSCULAR; INTRAVENOUS at 10:00

## 2017-12-27 RX ADMIN — VERAPAMIL HYDROCHLORIDE 120 MG: 120 TABLET, FILM COATED ORAL at 06:08

## 2017-12-27 RX ADMIN — TIOTROPIUM BROMIDE 1 CAPSULE: 18 CAPSULE ORAL; RESPIRATORY (INHALATION) at 07:38

## 2017-12-27 RX ADMIN — ALBUTEROL SULFATE 2.5 MG: 2.5 SOLUTION RESPIRATORY (INHALATION) at 07:31

## 2017-12-27 RX ADMIN — ALBUTEROL SULFATE 2.5 MG: 2.5 SOLUTION RESPIRATORY (INHALATION) at 16:24

## 2017-12-27 RX ADMIN — INSULIN ASPART 10 UNITS: 100 INJECTION, SOLUTION INTRAVENOUS; SUBCUTANEOUS at 22:31

## 2017-12-27 RX ADMIN — ASPIRIN 81 MG: 81 TABLET, COATED ORAL at 08:45

## 2017-12-27 RX ADMIN — INSULIN ASPART 10 UNITS: 100 INJECTION, SOLUTION INTRAVENOUS; SUBCUTANEOUS at 16:53

## 2017-12-28 ENCOUNTER — APPOINTMENT (OUTPATIENT)
Dept: CT IMAGING | Facility: HOSPITAL | Age: 67
End: 2017-12-28

## 2017-12-28 LAB
ANION GAP SERPL CALCULATED.3IONS-SCNC: 11.4 MMOL/L
ARTERIAL PATENCY WRIST A: POSITIVE
ATMOSPHERIC PRESS: 753 MMHG
BASE EXCESS BLDA CALC-SCNC: -1 MMOL/L (ref 0–2)
BASOPHILS # BLD AUTO: 0.02 10*3/MM3 (ref 0–0.2)
BASOPHILS NFR BLD AUTO: 0.1 % (ref 0–2)
BDY SITE: ABNORMAL
BODY TEMPERATURE: 37 C
BUN BLD-MCNC: 29 MG/DL (ref 8–23)
BUN/CREAT SERPL: 40.3 (ref 7–25)
CALCIUM SPEC-SCNC: 8.6 MG/DL (ref 8.8–10.5)
CHLORIDE SERPL-SCNC: 101 MMOL/L (ref 98–107)
CO2 SERPL-SCNC: 25.6 MMOL/L (ref 22–29)
CREAT BLD-MCNC: 0.72 MG/DL (ref 0.57–1)
DEPRECATED RDW RBC AUTO: 52.8 FL (ref 37–54)
EOSINOPHIL # BLD AUTO: 0 10*3/MM3 (ref 0.1–0.3)
EOSINOPHIL NFR BLD AUTO: 0 % (ref 0–4)
ERYTHROCYTE [DISTWIDTH] IN BLOOD BY AUTOMATED COUNT: 15.4 % (ref 11.5–14.5)
GAS FLOW AIRWAY: 15 LPM
GFR SERPL CREATININE-BSD FRML MDRD: 81 ML/MIN/1.73
GLUCOSE BLD-MCNC: 335 MG/DL (ref 65–99)
GLUCOSE BLDC GLUCOMTR-MCNC: 271 MG/DL (ref 70–130)
GLUCOSE BLDC GLUCOMTR-MCNC: 307 MG/DL (ref 70–130)
GLUCOSE BLDC GLUCOMTR-MCNC: 318 MG/DL (ref 70–130)
GLUCOSE BLDC GLUCOMTR-MCNC: 321 MG/DL (ref 70–130)
HCO3 BLDA-SCNC: 25.4 MMOL/L (ref 20–26)
HCT VFR BLD AUTO: 36.2 % (ref 37–47)
HGB BLD-MCNC: 11.2 G/DL (ref 12–16)
HGB BLDA-MCNC: 12.4 G/DL (ref 13.5–17.5)
IMM GRANULOCYTES # BLD: 0.33 10*3/MM3 (ref 0–0.03)
IMM GRANULOCYTES NFR BLD: 2.4 % (ref 0–0.5)
LYMPHOCYTES # BLD AUTO: 1.22 10*3/MM3 (ref 0.6–4.8)
LYMPHOCYTES NFR BLD AUTO: 8.9 % (ref 20–45)
Lab: ABNORMAL
MCH RBC QN AUTO: 28.9 PG (ref 27–31)
MCHC RBC AUTO-ENTMCNC: 30.9 G/DL (ref 31–37)
MCV RBC AUTO: 93.3 FL (ref 81–99)
MODALITY: ABNORMAL
MONOCYTES # BLD AUTO: 0.74 10*3/MM3 (ref 0–1)
MONOCYTES NFR BLD AUTO: 5.4 % (ref 3–8)
NEUTROPHILS # BLD AUTO: 11.37 10*3/MM3 (ref 1.5–8.3)
NEUTROPHILS NFR BLD AUTO: 83.2 % (ref 45–70)
NRBC BLD MANUAL-RTO: 0 /100 WBC (ref 0–0)
PCO2 BLDA: 48.2 MM HG (ref 35–45)
PCO2 TEMP ADJ BLD: 48.2 MM HG (ref 35–45)
PH BLDA: 7.33 PH UNITS (ref 7.35–7.45)
PH, TEMP CORRECTED: 7.33 PH UNITS (ref 7.35–7.45)
PLATELET # BLD AUTO: 304 10*3/MM3 (ref 140–500)
PMV BLD AUTO: 10.1 FL (ref 7.4–10.4)
PO2 BLDA: 79.9 MM HG (ref 83–108)
PO2 TEMP ADJ BLD: 79.9 MM HG (ref 83–108)
POTASSIUM BLD-SCNC: 4.4 MMOL/L (ref 3.5–5.2)
RBC # BLD AUTO: 3.88 10*6/MM3 (ref 4.2–5.4)
SAO2 % BLDCOA: 95.5 % (ref 94–99)
SODIUM BLD-SCNC: 138 MMOL/L (ref 136–145)
VENTILATOR MODE: ABNORMAL
WBC NRBC COR # BLD: 13.68 10*3/MM3 (ref 4.8–10.8)

## 2017-12-28 PROCEDURE — 0 IOPAMIDOL PER 1 ML: Performed by: INTERNAL MEDICINE

## 2017-12-28 PROCEDURE — 80048 BASIC METABOLIC PNL TOTAL CA: CPT | Performed by: INTERNAL MEDICINE

## 2017-12-28 PROCEDURE — 94799 UNLISTED PULMONARY SVC/PX: CPT

## 2017-12-28 PROCEDURE — 25010000002 DIPHENHYDRAMINE PER 50 MG

## 2017-12-28 PROCEDURE — 82962 GLUCOSE BLOOD TEST: CPT

## 2017-12-28 PROCEDURE — 71275 CT ANGIOGRAPHY CHEST: CPT

## 2017-12-28 PROCEDURE — 63710000001 INSULIN ASPART PER 5 UNITS: Performed by: HOSPITALIST

## 2017-12-28 PROCEDURE — 85025 COMPLETE CBC W/AUTO DIFF WBC: CPT | Performed by: INTERNAL MEDICINE

## 2017-12-28 PROCEDURE — 25010000002 METHYLPREDNISOLONE PER 125 MG: Performed by: HOSPITALIST

## 2017-12-28 PROCEDURE — 36600 WITHDRAWAL OF ARTERIAL BLOOD: CPT

## 2017-12-28 PROCEDURE — 25010000002 METHYLPREDNISOLONE PER 40 MG

## 2017-12-28 PROCEDURE — 25010000002 CEFEPIME PER 500 MG: Performed by: INTERNAL MEDICINE

## 2017-12-28 PROCEDURE — 99232 SBSQ HOSP IP/OBS MODERATE 35: CPT | Performed by: HOSPITALIST

## 2017-12-28 PROCEDURE — 25010000002 ENOXAPARIN PER 10 MG: Performed by: INTERNAL MEDICINE

## 2017-12-28 PROCEDURE — 94660 CPAP INITIATION&MGMT: CPT

## 2017-12-28 PROCEDURE — 82803 BLOOD GASES ANY COMBINATION: CPT

## 2017-12-28 RX ORDER — METHYLPREDNISOLONE SODIUM SUCCINATE 40 MG/ML
INJECTION, POWDER, LYOPHILIZED, FOR SOLUTION INTRAMUSCULAR; INTRAVENOUS
Status: COMPLETED
Start: 2017-12-28 | End: 2017-12-28

## 2017-12-28 RX ORDER — DIPHENHYDRAMINE HYDROCHLORIDE 50 MG/ML
INJECTION INTRAMUSCULAR; INTRAVENOUS
Status: COMPLETED
Start: 2017-12-28 | End: 2017-12-28

## 2017-12-28 RX ORDER — SODIUM CHLORIDE 0.9 % (FLUSH) 0.9 %
1-10 SYRINGE (ML) INJECTION AS NEEDED
Status: DISCONTINUED | OUTPATIENT
Start: 2017-12-28 | End: 2018-01-06 | Stop reason: HOSPADM

## 2017-12-28 RX ORDER — IPRATROPIUM BROMIDE AND ALBUTEROL SULFATE 2.5; .5 MG/3ML; MG/3ML
3 SOLUTION RESPIRATORY (INHALATION)
Status: DISCONTINUED | OUTPATIENT
Start: 2017-12-28 | End: 2018-01-06 | Stop reason: HOSPADM

## 2017-12-28 RX ORDER — DEXMEDETOMIDINE HYDROCHLORIDE 4 UG/ML
.2-1.5 INJECTION, SOLUTION INTRAVENOUS
Status: DISCONTINUED | OUTPATIENT
Start: 2017-12-28 | End: 2018-01-01

## 2017-12-28 RX ORDER — METHYLPREDNISOLONE SODIUM SUCCINATE 125 MG/2ML
60 INJECTION, POWDER, LYOPHILIZED, FOR SOLUTION INTRAMUSCULAR; INTRAVENOUS ONCE
Status: COMPLETED | OUTPATIENT
Start: 2017-12-28 | End: 2017-12-28

## 2017-12-28 RX ORDER — DIPHENHYDRAMINE HYDROCHLORIDE 50 MG/ML
25 INJECTION INTRAMUSCULAR; INTRAVENOUS ONCE
Status: COMPLETED | OUTPATIENT
Start: 2017-12-28 | End: 2017-12-28

## 2017-12-28 RX ORDER — SODIUM CHLORIDE 9 MG/ML
40 INJECTION, SOLUTION INTRAVENOUS AS NEEDED
Status: DISCONTINUED | OUTPATIENT
Start: 2017-12-28 | End: 2018-01-05

## 2017-12-28 RX ORDER — ARFORMOTEROL TARTRATE 15 UG/2ML
15 SOLUTION RESPIRATORY (INHALATION)
Status: DISCONTINUED | OUTPATIENT
Start: 2017-12-28 | End: 2018-01-06 | Stop reason: HOSPADM

## 2017-12-28 RX ADMIN — METHYLPREDNISOLONE SODIUM SUCCINATE 60 MG: 125 INJECTION, POWDER, FOR SOLUTION INTRAMUSCULAR; INTRAVENOUS at 16:05

## 2017-12-28 RX ADMIN — METHYLPREDNISOLONE SODIUM SUCCINATE 60 MG: 125 INJECTION, POWDER, FOR SOLUTION INTRAMUSCULAR; INTRAVENOUS at 04:41

## 2017-12-28 RX ADMIN — ACETYLCYSTEINE 4 ML: 200 SOLUTION ORAL; RESPIRATORY (INHALATION) at 03:04

## 2017-12-28 RX ADMIN — DIPHENHYDRAMINE HYDROCHLORIDE 25 MG: 50 INJECTION INTRAMUSCULAR; INTRAVENOUS at 10:43

## 2017-12-28 RX ADMIN — CLOPIDOGREL 75 MG: 75 TABLET, FILM COATED ORAL at 08:55

## 2017-12-28 RX ADMIN — VERAPAMIL HYDROCHLORIDE 120 MG: 120 TABLET, FILM COATED ORAL at 16:06

## 2017-12-28 RX ADMIN — VERAPAMIL HYDROCHLORIDE 120 MG: 120 TABLET, FILM COATED ORAL at 05:34

## 2017-12-28 RX ADMIN — ROSUVASTATIN CALCIUM 40 MG: 5 TABLET, FILM COATED ORAL at 08:55

## 2017-12-28 RX ADMIN — ENOXAPARIN SODIUM 40 MG: 40 INJECTION SUBCUTANEOUS at 05:35

## 2017-12-28 RX ADMIN — INSULIN ASPART 12 UNITS: 100 INJECTION, SOLUTION INTRAVENOUS; SUBCUTANEOUS at 16:58

## 2017-12-28 RX ADMIN — IPRATROPIUM BROMIDE AND ALBUTEROL SULFATE 3 ML: .5; 3 SOLUTION RESPIRATORY (INHALATION) at 18:31

## 2017-12-28 RX ADMIN — TIOTROPIUM BROMIDE 1 CAPSULE: 18 CAPSULE ORAL; RESPIRATORY (INHALATION) at 08:15

## 2017-12-28 RX ADMIN — METHYLPREDNISOLONE SODIUM SUCCINATE 60 MG: 125 INJECTION, POWDER, FOR SOLUTION INTRAMUSCULAR; INTRAVENOUS at 09:01

## 2017-12-28 RX ADMIN — METHYLPREDNISOLONE SODIUM SUCCINATE 60 MG: 125 INJECTION, POWDER, FOR SOLUTION INTRAMUSCULAR; INTRAVENOUS at 21:14

## 2017-12-28 RX ADMIN — DIPHENHYDRAMINE HYDROCHLORIDE 25 MG: 50 INJECTION, SOLUTION INTRAMUSCULAR; INTRAVENOUS at 10:43

## 2017-12-28 RX ADMIN — INSULIN ASPART 16 UNITS: 100 INJECTION, SOLUTION INTRAVENOUS; SUBCUTANEOUS at 08:16

## 2017-12-28 RX ADMIN — LISINOPRIL 40 MG: 20 TABLET ORAL at 08:55

## 2017-12-28 RX ADMIN — HYDROCODONE BITARTRATE AND ACETAMINOPHEN 1 TABLET: 5; 325 TABLET ORAL at 08:55

## 2017-12-28 RX ADMIN — ALBUTEROL SULFATE 2.5 MG: 2.5 SOLUTION RESPIRATORY (INHALATION) at 08:15

## 2017-12-28 RX ADMIN — INSULIN ASPART 16 UNITS: 100 INJECTION, SOLUTION INTRAVENOUS; SUBCUTANEOUS at 12:31

## 2017-12-28 RX ADMIN — ASPIRIN 81 MG: 81 TABLET, COATED ORAL at 08:55

## 2017-12-28 RX ADMIN — IOPAMIDOL 100 ML: 755 INJECTION, SOLUTION INTRAVENOUS at 12:45

## 2017-12-28 RX ADMIN — LEVETIRACETAM 500 MG: 500 TABLET, FILM COATED ORAL at 08:55

## 2017-12-28 RX ADMIN — METHYLPREDNISOLONE SODIUM SUCCINATE 60 MG: 125 INJECTION, POWDER, FOR SOLUTION INTRAMUSCULAR; INTRAVENOUS at 10:42

## 2017-12-28 RX ADMIN — METFORMIN HYDROCHLORIDE 500 MG: 500 TABLET ORAL at 08:55

## 2017-12-28 RX ADMIN — VERAPAMIL HYDROCHLORIDE 120 MG: 120 TABLET, FILM COATED ORAL at 21:14

## 2017-12-28 RX ADMIN — ALBUTEROL SULFATE 2.5 MG: 2.5 SOLUTION RESPIRATORY (INHALATION) at 03:04

## 2017-12-28 RX ADMIN — INSULIN ASPART 16 UNITS: 100 INJECTION, SOLUTION INTRAVENOUS; SUBCUTANEOUS at 21:14

## 2017-12-28 RX ADMIN — ARFORMOTEROL TARTRATE 15 MCG: 15 SOLUTION RESPIRATORY (INHALATION) at 18:40

## 2017-12-28 RX ADMIN — FLUOXETINE 20 MG: 20 CAPSULE ORAL at 08:55

## 2017-12-28 RX ADMIN — LEVETIRACETAM 500 MG: 500 TABLET, FILM COATED ORAL at 21:02

## 2017-12-28 RX ADMIN — CEFEPIME HYDROCHLORIDE 2 G: 2 INJECTION, POWDER, FOR SOLUTION INTRAVENOUS at 16:05

## 2017-12-28 RX ADMIN — DEXMEDETOMIDINE HYDROCHLORIDE 0.2 MCG/KG/HR: 4 INJECTION, SOLUTION INTRAVENOUS at 19:31

## 2017-12-28 RX ADMIN — ACETYLCYSTEINE 4 ML: 200 SOLUTION ORAL; RESPIRATORY (INHALATION) at 11:24

## 2017-12-28 RX ADMIN — IPRATROPIUM BROMIDE AND ALBUTEROL SULFATE 3 ML: .5; 3 SOLUTION RESPIRATORY (INHALATION) at 15:49

## 2017-12-28 RX ADMIN — METHYLPREDNISOLONE SODIUM SUCCINATE 60 MG: 40 INJECTION, POWDER, FOR SOLUTION INTRAMUSCULAR; INTRAVENOUS at 04:41

## 2017-12-28 RX ADMIN — ACETYLCYSTEINE 4 ML: 200 SOLUTION ORAL; RESPIRATORY (INHALATION) at 08:15

## 2017-12-28 RX ADMIN — ALBUTEROL SULFATE 2.5 MG: 2.5 SOLUTION RESPIRATORY (INHALATION) at 11:24

## 2017-12-29 LAB
ANION GAP SERPL CALCULATED.3IONS-SCNC: 9.9 MMOL/L
ARTERIAL PATENCY WRIST A: POSITIVE
ATMOSPHERIC PRESS: 748 MMHG
BACTERIA SPEC AEROBE CULT: NORMAL
BACTERIA SPEC AEROBE CULT: NORMAL
BACTERIA SPEC RESP CULT: ABNORMAL
BACTERIA SPEC RESP CULT: ABNORMAL
BASE EXCESS BLDA CALC-SCNC: 3 MMOL/L (ref 0–2)
BASOPHILS # BLD AUTO: 0.02 10*3/MM3 (ref 0–0.2)
BASOPHILS NFR BLD AUTO: 0.1 % (ref 0–2)
BDY SITE: ABNORMAL
BODY TEMPERATURE: 37 C
BUN BLD-MCNC: 31 MG/DL (ref 8–23)
BUN/CREAT SERPL: 42.5 (ref 7–25)
CALCIUM SPEC-SCNC: 8.5 MG/DL (ref 8.8–10.5)
CHLORIDE SERPL-SCNC: 98 MMOL/L (ref 98–107)
CO2 SERPL-SCNC: 27.1 MMOL/L (ref 22–29)
CREAT BLD-MCNC: 0.73 MG/DL (ref 0.57–1)
DEPRECATED RDW RBC AUTO: 50.7 FL (ref 37–54)
EOSINOPHIL # BLD AUTO: 0 10*3/MM3 (ref 0.1–0.3)
EOSINOPHIL NFR BLD AUTO: 0 % (ref 0–4)
EPAP: 6
ERYTHROCYTE [DISTWIDTH] IN BLOOD BY AUTOMATED COUNT: 15 % (ref 11.5–14.5)
GFR SERPL CREATININE-BSD FRML MDRD: 79 ML/MIN/1.73
GLUCOSE BLD-MCNC: 324 MG/DL (ref 65–99)
GLUCOSE BLDC GLUCOMTR-MCNC: 265 MG/DL (ref 70–130)
GLUCOSE BLDC GLUCOMTR-MCNC: 284 MG/DL (ref 70–130)
GLUCOSE BLDC GLUCOMTR-MCNC: 285 MG/DL (ref 70–130)
GLUCOSE BLDC GLUCOMTR-MCNC: 315 MG/DL (ref 70–130)
GRAM STN SPEC: ABNORMAL
HCO3 BLDA-SCNC: 28.5 MMOL/L (ref 20–26)
HCT VFR BLD AUTO: 37 % (ref 37–47)
HGB BLD-MCNC: 11.6 G/DL (ref 12–16)
HGB BLDA-MCNC: 11.9 G/DL (ref 13.5–17.5)
HOROWITZ INDEX BLD+IHG-RTO: 50 %
IMM GRANULOCYTES # BLD: 0.25 10*3/MM3 (ref 0–0.03)
IMM GRANULOCYTES NFR BLD: 1.7 % (ref 0–0.5)
IPAP: 12
LYMPHOCYTES # BLD AUTO: 0.67 10*3/MM3 (ref 0.6–4.8)
LYMPHOCYTES NFR BLD AUTO: 4.6 % (ref 20–45)
Lab: ABNORMAL
MCH RBC QN AUTO: 29 PG (ref 27–31)
MCHC RBC AUTO-ENTMCNC: 31.4 G/DL (ref 31–37)
MCV RBC AUTO: 92.5 FL (ref 81–99)
MODALITY: ABNORMAL
MONOCYTES # BLD AUTO: 0.93 10*3/MM3 (ref 0–1)
MONOCYTES NFR BLD AUTO: 6.3 % (ref 3–8)
NEUTROPHILS # BLD AUTO: 12.8 10*3/MM3 (ref 1.5–8.3)
NEUTROPHILS NFR BLD AUTO: 87.3 % (ref 45–70)
NRBC BLD MANUAL-RTO: 0 /100 WBC (ref 0–0)
PCO2 BLDA: 46.6 MM HG (ref 35–45)
PCO2 TEMP ADJ BLD: 46.6 MM HG (ref 35–45)
PH BLDA: 7.4 PH UNITS (ref 7.35–7.45)
PH, TEMP CORRECTED: 7.4 PH UNITS (ref 7.35–7.45)
PLATELET # BLD AUTO: 280 10*3/MM3 (ref 140–500)
PMV BLD AUTO: 9.9 FL (ref 7.4–10.4)
PO2 BLDA: 68.9 MM HG (ref 83–108)
PO2 TEMP ADJ BLD: 68.9 MM HG (ref 83–108)
POTASSIUM BLD-SCNC: 4.4 MMOL/L (ref 3.5–5.2)
RBC # BLD AUTO: 4 10*6/MM3 (ref 4.2–5.4)
SAO2 % BLDCOA: 93.7 % (ref 94–99)
SET MECH RESP RATE: 10
SODIUM BLD-SCNC: 135 MMOL/L (ref 136–145)
VENTILATOR MODE: ABNORMAL
WBC NRBC COR # BLD: 14.67 10*3/MM3 (ref 4.8–10.8)

## 2017-12-29 PROCEDURE — 36600 WITHDRAWAL OF ARTERIAL BLOOD: CPT

## 2017-12-29 PROCEDURE — 25010000002 AZITHROMYCIN: Performed by: INTERNAL MEDICINE

## 2017-12-29 PROCEDURE — 82962 GLUCOSE BLOOD TEST: CPT

## 2017-12-29 PROCEDURE — 99232 SBSQ HOSP IP/OBS MODERATE 35: CPT | Performed by: HOSPITALIST

## 2017-12-29 PROCEDURE — 82803 BLOOD GASES ANY COMBINATION: CPT

## 2017-12-29 PROCEDURE — 25010000002 CEFEPIME PER 500 MG: Performed by: INTERNAL MEDICINE

## 2017-12-29 PROCEDURE — 94799 UNLISTED PULMONARY SVC/PX: CPT

## 2017-12-29 PROCEDURE — 63710000001 INSULIN DETEMIR PER 5 UNITS: Performed by: HOSPITALIST

## 2017-12-29 PROCEDURE — 25010000002 ENOXAPARIN PER 10 MG: Performed by: INTERNAL MEDICINE

## 2017-12-29 PROCEDURE — 80048 BASIC METABOLIC PNL TOTAL CA: CPT | Performed by: INTERNAL MEDICINE

## 2017-12-29 PROCEDURE — 25010000002 METHYLPREDNISOLONE PER 125 MG: Performed by: HOSPITALIST

## 2017-12-29 PROCEDURE — 63710000001 INSULIN ASPART PER 5 UNITS: Performed by: HOSPITALIST

## 2017-12-29 PROCEDURE — 85025 COMPLETE CBC W/AUTO DIFF WBC: CPT | Performed by: INTERNAL MEDICINE

## 2017-12-29 RX ADMIN — Medication 10 ML: at 11:42

## 2017-12-29 RX ADMIN — ROSUVASTATIN CALCIUM 40 MG: 5 TABLET, FILM COATED ORAL at 11:42

## 2017-12-29 RX ADMIN — VERAPAMIL HYDROCHLORIDE 120 MG: 120 TABLET, FILM COATED ORAL at 14:36

## 2017-12-29 RX ADMIN — CLOPIDOGREL 75 MG: 75 TABLET, FILM COATED ORAL at 08:36

## 2017-12-29 RX ADMIN — IPRATROPIUM BROMIDE AND ALBUTEROL SULFATE 3 ML: .5; 3 SOLUTION RESPIRATORY (INHALATION) at 19:21

## 2017-12-29 RX ADMIN — ARFORMOTEROL TARTRATE 15 MCG: 15 SOLUTION RESPIRATORY (INHALATION) at 08:55

## 2017-12-29 RX ADMIN — CEFEPIME HYDROCHLORIDE 2 G: 2 INJECTION, POWDER, FOR SOLUTION INTRAVENOUS at 06:56

## 2017-12-29 RX ADMIN — ASPIRIN 81 MG: 81 TABLET, COATED ORAL at 08:36

## 2017-12-29 RX ADMIN — CEFEPIME HYDROCHLORIDE 2 G: 2 INJECTION, POWDER, FOR SOLUTION INTRAVENOUS at 14:35

## 2017-12-29 RX ADMIN — VERAPAMIL HYDROCHLORIDE 120 MG: 120 TABLET, FILM COATED ORAL at 06:53

## 2017-12-29 RX ADMIN — IPRATROPIUM BROMIDE AND ALBUTEROL SULFATE 3 ML: .5; 3 SOLUTION RESPIRATORY (INHALATION) at 11:09

## 2017-12-29 RX ADMIN — AZITHROMYCIN 500 MG: 500 INJECTION, POWDER, LYOPHILIZED, FOR SOLUTION INTRAVENOUS at 01:46

## 2017-12-29 RX ADMIN — Medication 10 ML: at 16:33

## 2017-12-29 RX ADMIN — FLUOXETINE 20 MG: 20 CAPSULE ORAL at 08:36

## 2017-12-29 RX ADMIN — LEVETIRACETAM 500 MG: 500 TABLET, FILM COATED ORAL at 08:36

## 2017-12-29 RX ADMIN — LEVETIRACETAM 500 MG: 500 TABLET, FILM COATED ORAL at 20:13

## 2017-12-29 RX ADMIN — INSULIN DETEMIR 10 UNITS: 100 INJECTION, SOLUTION SUBCUTANEOUS at 21:24

## 2017-12-29 RX ADMIN — METHYLPREDNISOLONE SODIUM SUCCINATE 60 MG: 125 INJECTION, POWDER, FOR SOLUTION INTRAMUSCULAR; INTRAVENOUS at 21:26

## 2017-12-29 RX ADMIN — INSULIN ASPART 12 UNITS: 100 INJECTION, SOLUTION INTRAVENOUS; SUBCUTANEOUS at 21:24

## 2017-12-29 RX ADMIN — IPRATROPIUM BROMIDE AND ALBUTEROL SULFATE 3 ML: .5; 3 SOLUTION RESPIRATORY (INHALATION) at 07:17

## 2017-12-29 RX ADMIN — INSULIN ASPART 16 UNITS: 100 INJECTION, SOLUTION INTRAVENOUS; SUBCUTANEOUS at 06:56

## 2017-12-29 RX ADMIN — METHYLPREDNISOLONE SODIUM SUCCINATE 60 MG: 125 INJECTION, POWDER, FOR SOLUTION INTRAMUSCULAR; INTRAVENOUS at 04:39

## 2017-12-29 RX ADMIN — INSULIN ASPART 12 UNITS: 100 INJECTION, SOLUTION INTRAVENOUS; SUBCUTANEOUS at 12:27

## 2017-12-29 RX ADMIN — CEFEPIME HYDROCHLORIDE 2 G: 2 INJECTION, POWDER, FOR SOLUTION INTRAVENOUS at 22:46

## 2017-12-29 RX ADMIN — METHYLPREDNISOLONE SODIUM SUCCINATE 60 MG: 125 INJECTION, POWDER, FOR SOLUTION INTRAMUSCULAR; INTRAVENOUS at 16:33

## 2017-12-29 RX ADMIN — METHYLPREDNISOLONE SODIUM SUCCINATE 60 MG: 125 INJECTION, POWDER, FOR SOLUTION INTRAMUSCULAR; INTRAVENOUS at 11:41

## 2017-12-29 RX ADMIN — LISINOPRIL 40 MG: 20 TABLET ORAL at 08:36

## 2017-12-29 RX ADMIN — IPRATROPIUM BROMIDE AND ALBUTEROL SULFATE 3 ML: .5; 3 SOLUTION RESPIRATORY (INHALATION) at 15:12

## 2017-12-29 RX ADMIN — ARFORMOTEROL TARTRATE 15 MCG: 15 SOLUTION RESPIRATORY (INHALATION) at 21:32

## 2017-12-29 RX ADMIN — ENOXAPARIN SODIUM 40 MG: 40 INJECTION SUBCUTANEOUS at 06:53

## 2017-12-29 RX ADMIN — INSULIN ASPART 12 UNITS: 100 INJECTION, SOLUTION INTRAVENOUS; SUBCUTANEOUS at 16:39

## 2017-12-29 RX ADMIN — HYDROCODONE BITARTRATE AND ACETAMINOPHEN 1 TABLET: 5; 325 TABLET ORAL at 16:32

## 2017-12-29 RX ADMIN — VERAPAMIL HYDROCHLORIDE 120 MG: 120 TABLET, FILM COATED ORAL at 21:25

## 2017-12-29 RX ADMIN — CEFEPIME HYDROCHLORIDE 2 G: 2 INJECTION, POWDER, FOR SOLUTION INTRAVENOUS at 01:46

## 2017-12-30 LAB
ANION GAP SERPL CALCULATED.3IONS-SCNC: 8.5 MMOL/L
BASOPHILS # BLD AUTO: 0.03 10*3/MM3 (ref 0–0.2)
BASOPHILS NFR BLD AUTO: 0.2 % (ref 0–2)
BUN BLD-MCNC: 20 MG/DL (ref 8–23)
BUN/CREAT SERPL: 33.9 (ref 7–25)
CALCIUM SPEC-SCNC: 8.3 MG/DL (ref 8.8–10.5)
CHLORIDE SERPL-SCNC: 98 MMOL/L (ref 98–107)
CO2 SERPL-SCNC: 32.5 MMOL/L (ref 22–29)
CREAT BLD-MCNC: 0.59 MG/DL (ref 0.57–1)
DEPRECATED RDW RBC AUTO: 51.4 FL (ref 37–54)
EOSINOPHIL # BLD AUTO: 0 10*3/MM3 (ref 0.1–0.3)
EOSINOPHIL NFR BLD AUTO: 0 % (ref 0–4)
ERYTHROCYTE [DISTWIDTH] IN BLOOD BY AUTOMATED COUNT: 15.2 % (ref 11.5–14.5)
GFR SERPL CREATININE-BSD FRML MDRD: 101 ML/MIN/1.73
GLUCOSE BLD-MCNC: 252 MG/DL (ref 65–99)
GLUCOSE BLDC GLUCOMTR-MCNC: 256 MG/DL (ref 70–130)
GLUCOSE BLDC GLUCOMTR-MCNC: 314 MG/DL (ref 70–130)
GLUCOSE BLDC GLUCOMTR-MCNC: 320 MG/DL (ref 70–130)
GLUCOSE BLDC GLUCOMTR-MCNC: 331 MG/DL (ref 70–130)
HCT VFR BLD AUTO: 37.6 % (ref 37–47)
HGB BLD-MCNC: 11.7 G/DL (ref 12–16)
IMM GRANULOCYTES # BLD: 0.53 10*3/MM3 (ref 0–0.03)
IMM GRANULOCYTES NFR BLD: 4 % (ref 0–0.5)
LYMPHOCYTES # BLD AUTO: 1.21 10*3/MM3 (ref 0.6–4.8)
LYMPHOCYTES NFR BLD AUTO: 9.2 % (ref 20–45)
MCH RBC QN AUTO: 28.4 PG (ref 27–31)
MCHC RBC AUTO-ENTMCNC: 31.1 G/DL (ref 31–37)
MCV RBC AUTO: 91.3 FL (ref 81–99)
MONOCYTES # BLD AUTO: 0.66 10*3/MM3 (ref 0–1)
MONOCYTES NFR BLD AUTO: 5 % (ref 3–8)
NEUTROPHILS # BLD AUTO: 10.72 10*3/MM3 (ref 1.5–8.3)
NEUTROPHILS NFR BLD AUTO: 81.6 % (ref 45–70)
NRBC BLD MANUAL-RTO: 0.2 /100 WBC (ref 0–0)
PLATELET # BLD AUTO: 270 10*3/MM3 (ref 140–500)
PMV BLD AUTO: 10.2 FL (ref 7.4–10.4)
POTASSIUM BLD-SCNC: 3.9 MMOL/L (ref 3.5–5.2)
RBC # BLD AUTO: 4.12 10*6/MM3 (ref 4.2–5.4)
SODIUM BLD-SCNC: 139 MMOL/L (ref 136–145)
WBC NRBC COR # BLD: 13.15 10*3/MM3 (ref 4.8–10.8)

## 2017-12-30 PROCEDURE — 99232 SBSQ HOSP IP/OBS MODERATE 35: CPT | Performed by: HOSPITALIST

## 2017-12-30 PROCEDURE — 63710000001 INSULIN DETEMIR PER 5 UNITS: Performed by: HOSPITALIST

## 2017-12-30 PROCEDURE — 80048 BASIC METABOLIC PNL TOTAL CA: CPT | Performed by: INTERNAL MEDICINE

## 2017-12-30 PROCEDURE — 63710000001 INSULIN ASPART PER 5 UNITS: Performed by: HOSPITALIST

## 2017-12-30 PROCEDURE — 25010000002 METHYLPREDNISOLONE PER 125 MG: Performed by: HOSPITALIST

## 2017-12-30 PROCEDURE — 25010000002 CEFEPIME PER 500 MG: Performed by: INTERNAL MEDICINE

## 2017-12-30 PROCEDURE — 85025 COMPLETE CBC W/AUTO DIFF WBC: CPT | Performed by: INTERNAL MEDICINE

## 2017-12-30 PROCEDURE — 82962 GLUCOSE BLOOD TEST: CPT

## 2017-12-30 PROCEDURE — 94799 UNLISTED PULMONARY SVC/PX: CPT

## 2017-12-30 PROCEDURE — 25010000002 ENOXAPARIN PER 10 MG: Performed by: INTERNAL MEDICINE

## 2017-12-30 PROCEDURE — 25010000002 AZITHROMYCIN: Performed by: INTERNAL MEDICINE

## 2017-12-30 RX ADMIN — INSULIN ASPART 16 UNITS: 100 INJECTION, SOLUTION INTRAVENOUS; SUBCUTANEOUS at 20:42

## 2017-12-30 RX ADMIN — FLUOXETINE 20 MG: 20 CAPSULE ORAL at 08:46

## 2017-12-30 RX ADMIN — Medication 10 ML: at 08:53

## 2017-12-30 RX ADMIN — CEFEPIME HYDROCHLORIDE 2 G: 2 INJECTION, POWDER, FOR SOLUTION INTRAVENOUS at 06:20

## 2017-12-30 RX ADMIN — VERAPAMIL HYDROCHLORIDE 120 MG: 120 TABLET, FILM COATED ORAL at 22:28

## 2017-12-30 RX ADMIN — INSULIN ASPART 12 UNITS: 100 INJECTION, SOLUTION INTRAVENOUS; SUBCUTANEOUS at 08:46

## 2017-12-30 RX ADMIN — IPRATROPIUM BROMIDE AND ALBUTEROL SULFATE 3 ML: .5; 3 SOLUTION RESPIRATORY (INHALATION) at 19:18

## 2017-12-30 RX ADMIN — METHYLPREDNISOLONE SODIUM SUCCINATE 60 MG: 125 INJECTION, POWDER, FOR SOLUTION INTRAMUSCULAR; INTRAVENOUS at 22:50

## 2017-12-30 RX ADMIN — METHYLPREDNISOLONE SODIUM SUCCINATE 60 MG: 125 INJECTION, POWDER, FOR SOLUTION INTRAMUSCULAR; INTRAVENOUS at 18:10

## 2017-12-30 RX ADMIN — CLOPIDOGREL 75 MG: 75 TABLET, FILM COATED ORAL at 08:46

## 2017-12-30 RX ADMIN — AZITHROMYCIN 500 MG: 500 INJECTION, POWDER, LYOPHILIZED, FOR SOLUTION INTRAVENOUS at 23:22

## 2017-12-30 RX ADMIN — METHYLPREDNISOLONE SODIUM SUCCINATE 60 MG: 125 INJECTION, POWDER, FOR SOLUTION INTRAMUSCULAR; INTRAVENOUS at 09:03

## 2017-12-30 RX ADMIN — INSULIN DETEMIR 10 UNITS: 100 INJECTION, SOLUTION SUBCUTANEOUS at 20:41

## 2017-12-30 RX ADMIN — METHYLPREDNISOLONE SODIUM SUCCINATE 60 MG: 125 INJECTION, POWDER, FOR SOLUTION INTRAMUSCULAR; INTRAVENOUS at 03:30

## 2017-12-30 RX ADMIN — CEFEPIME HYDROCHLORIDE 2 G: 2 INJECTION, POWDER, FOR SOLUTION INTRAVENOUS at 14:15

## 2017-12-30 RX ADMIN — ARFORMOTEROL TARTRATE 15 MCG: 15 SOLUTION RESPIRATORY (INHALATION) at 07:42

## 2017-12-30 RX ADMIN — IPRATROPIUM BROMIDE AND ALBUTEROL SULFATE 3 ML: .5; 3 SOLUTION RESPIRATORY (INHALATION) at 15:06

## 2017-12-30 RX ADMIN — AZITHROMYCIN 500 MG: 500 INJECTION, POWDER, LYOPHILIZED, FOR SOLUTION INTRAVENOUS at 00:04

## 2017-12-30 RX ADMIN — INSULIN ASPART 16 UNITS: 100 INJECTION, SOLUTION INTRAVENOUS; SUBCUTANEOUS at 12:01

## 2017-12-30 RX ADMIN — Medication 10 ML: at 22:51

## 2017-12-30 RX ADMIN — ARFORMOTEROL TARTRATE 15 MCG: 15 SOLUTION RESPIRATORY (INHALATION) at 21:03

## 2017-12-30 RX ADMIN — ENOXAPARIN SODIUM 40 MG: 40 INJECTION SUBCUTANEOUS at 06:20

## 2017-12-30 RX ADMIN — VERAPAMIL HYDROCHLORIDE 120 MG: 120 TABLET, FILM COATED ORAL at 06:20

## 2017-12-30 RX ADMIN — LISINOPRIL 40 MG: 20 TABLET ORAL at 08:46

## 2017-12-30 RX ADMIN — INSULIN ASPART 16 UNITS: 100 INJECTION, SOLUTION INTRAVENOUS; SUBCUTANEOUS at 18:11

## 2017-12-30 RX ADMIN — CEFEPIME HYDROCHLORIDE 2 G: 2 INJECTION, POWDER, FOR SOLUTION INTRAVENOUS at 22:52

## 2017-12-30 RX ADMIN — Medication 10 ML: at 18:12

## 2017-12-30 RX ADMIN — IPRATROPIUM BROMIDE AND ALBUTEROL SULFATE 3 ML: .5; 3 SOLUTION RESPIRATORY (INHALATION) at 11:22

## 2017-12-30 RX ADMIN — LEVETIRACETAM 500 MG: 500 TABLET, FILM COATED ORAL at 08:46

## 2017-12-30 RX ADMIN — ASPIRIN 81 MG: 81 TABLET, COATED ORAL at 08:46

## 2017-12-30 RX ADMIN — Medication 10 ML: at 20:52

## 2017-12-30 RX ADMIN — IPRATROPIUM BROMIDE AND ALBUTEROL SULFATE 3 ML: .5; 3 SOLUTION RESPIRATORY (INHALATION) at 07:41

## 2017-12-30 RX ADMIN — VERAPAMIL HYDROCHLORIDE 120 MG: 120 TABLET, FILM COATED ORAL at 14:14

## 2017-12-30 RX ADMIN — LEVETIRACETAM 500 MG: 500 TABLET, FILM COATED ORAL at 20:44

## 2017-12-30 RX ADMIN — ROSUVASTATIN CALCIUM 40 MG: 5 TABLET, FILM COATED ORAL at 08:46

## 2017-12-31 ENCOUNTER — APPOINTMENT (OUTPATIENT)
Dept: GENERAL RADIOLOGY | Facility: HOSPITAL | Age: 67
End: 2017-12-31

## 2017-12-31 LAB
ANION GAP SERPL CALCULATED.3IONS-SCNC: 9.9 MMOL/L
BASOPHILS # BLD AUTO: 0.03 10*3/MM3 (ref 0–0.2)
BASOPHILS NFR BLD AUTO: 0.2 % (ref 0–2)
BUN BLD-MCNC: 20 MG/DL (ref 8–23)
BUN/CREAT SERPL: 33.3 (ref 7–25)
CALCIUM SPEC-SCNC: 8.2 MG/DL (ref 8.8–10.5)
CHLORIDE SERPL-SCNC: 98 MMOL/L (ref 98–107)
CO2 SERPL-SCNC: 33.1 MMOL/L (ref 22–29)
CREAT BLD-MCNC: 0.6 MG/DL (ref 0.57–1)
DEPRECATED RDW RBC AUTO: 49.8 FL (ref 37–54)
EOSINOPHIL # BLD AUTO: 0 10*3/MM3 (ref 0.1–0.3)
EOSINOPHIL NFR BLD AUTO: 0 % (ref 0–4)
ERYTHROCYTE [DISTWIDTH] IN BLOOD BY AUTOMATED COUNT: 14.8 % (ref 11.5–14.5)
GFR SERPL CREATININE-BSD FRML MDRD: 99 ML/MIN/1.73
GLUCOSE BLD-MCNC: 285 MG/DL (ref 65–99)
GLUCOSE BLDC GLUCOMTR-MCNC: 330 MG/DL (ref 70–130)
GLUCOSE BLDC GLUCOMTR-MCNC: 332 MG/DL (ref 70–130)
GLUCOSE BLDC GLUCOMTR-MCNC: 375 MG/DL (ref 70–130)
HCT VFR BLD AUTO: 39.6 % (ref 37–47)
HGB BLD-MCNC: 12.4 G/DL (ref 12–16)
IMM GRANULOCYTES # BLD: 0.45 10*3/MM3 (ref 0–0.03)
IMM GRANULOCYTES NFR BLD: 2.7 % (ref 0–0.5)
LYMPHOCYTES # BLD AUTO: 1.49 10*3/MM3 (ref 0.6–4.8)
LYMPHOCYTES NFR BLD AUTO: 9 % (ref 20–45)
MCH RBC QN AUTO: 28.4 PG (ref 27–31)
MCHC RBC AUTO-ENTMCNC: 31.3 G/DL (ref 31–37)
MCV RBC AUTO: 90.8 FL (ref 81–99)
MONOCYTES # BLD AUTO: 0.72 10*3/MM3 (ref 0–1)
MONOCYTES NFR BLD AUTO: 4.4 % (ref 3–8)
NEUTROPHILS # BLD AUTO: 13.83 10*3/MM3 (ref 1.5–8.3)
NEUTROPHILS NFR BLD AUTO: 83.7 % (ref 45–70)
NRBC BLD MANUAL-RTO: 0.1 /100 WBC (ref 0–0)
PLATELET # BLD AUTO: 316 10*3/MM3 (ref 140–500)
PMV BLD AUTO: 10.2 FL (ref 7.4–10.4)
POTASSIUM BLD-SCNC: 3.8 MMOL/L (ref 3.5–5.2)
PROCALCITONIN SERPL-MCNC: 0.05 NG/ML (ref 0.1–0.25)
RBC # BLD AUTO: 4.36 10*6/MM3 (ref 4.2–5.4)
SODIUM BLD-SCNC: 141 MMOL/L (ref 136–145)
WBC NRBC COR # BLD: 16.52 10*3/MM3 (ref 4.8–10.8)

## 2017-12-31 PROCEDURE — 71010 HC CHEST PA OR AP: CPT

## 2017-12-31 PROCEDURE — 93005 ELECTROCARDIOGRAM TRACING: CPT | Performed by: HOSPITALIST

## 2017-12-31 PROCEDURE — 80048 BASIC METABOLIC PNL TOTAL CA: CPT | Performed by: INTERNAL MEDICINE

## 2017-12-31 PROCEDURE — 25010000002 CEFEPIME PER 500 MG: Performed by: INTERNAL MEDICINE

## 2017-12-31 PROCEDURE — 25010000002 AMIODARONE IN DEXTROSE 5% 150-4.21 MG/100ML-% SOLUTION: Performed by: HOSPITALIST

## 2017-12-31 PROCEDURE — 84145 PROCALCITONIN (PCT): CPT | Performed by: INTERNAL MEDICINE

## 2017-12-31 PROCEDURE — 63710000001 INSULIN DETEMIR PER 5 UNITS: Performed by: HOSPITALIST

## 2017-12-31 PROCEDURE — 25010000002 ENOXAPARIN PER 10 MG: Performed by: INTERNAL MEDICINE

## 2017-12-31 PROCEDURE — 99232 SBSQ HOSP IP/OBS MODERATE 35: CPT | Performed by: HOSPITALIST

## 2017-12-31 PROCEDURE — 25010000002 ENOXAPARIN PER 10 MG: Performed by: HOSPITALIST

## 2017-12-31 PROCEDURE — 94799 UNLISTED PULMONARY SVC/PX: CPT

## 2017-12-31 PROCEDURE — 63710000001 INSULIN ASPART PER 5 UNITS: Performed by: HOSPITALIST

## 2017-12-31 PROCEDURE — 93010 ELECTROCARDIOGRAM REPORT: CPT | Performed by: INTERNAL MEDICINE

## 2017-12-31 PROCEDURE — 94660 CPAP INITIATION&MGMT: CPT

## 2017-12-31 PROCEDURE — 82962 GLUCOSE BLOOD TEST: CPT

## 2017-12-31 PROCEDURE — 25010000002 METHYLPREDNISOLONE PER 125 MG: Performed by: HOSPITALIST

## 2017-12-31 PROCEDURE — 25010000002 AMIODARONE IN DEXTROSE 5% 360-4.14 MG/200ML-% SOLUTION: Performed by: HOSPITALIST

## 2017-12-31 PROCEDURE — 85025 COMPLETE CBC W/AUTO DIFF WBC: CPT | Performed by: INTERNAL MEDICINE

## 2017-12-31 RX ORDER — ENALAPRILAT 2.5 MG/2ML
1.25 INJECTION INTRAVENOUS EVERY 6 HOURS PRN
Status: DISCONTINUED | OUTPATIENT
Start: 2017-12-31 | End: 2018-01-06 | Stop reason: HOSPADM

## 2017-12-31 RX ORDER — DILTIAZEM HYDROCHLORIDE 5 MG/ML
10 INJECTION INTRAVENOUS ONCE
Status: DISCONTINUED | OUTPATIENT
Start: 2017-12-31 | End: 2017-12-31

## 2017-12-31 RX ORDER — ASPIRIN 81 MG/1
81 TABLET ORAL DAILY
Status: DISCONTINUED | OUTPATIENT
Start: 2018-01-01 | End: 2018-01-02

## 2017-12-31 RX ORDER — VERAPAMIL HYDROCHLORIDE 120 MG/1
120 TABLET, FILM COATED ORAL EVERY 6 HOURS SCHEDULED
Status: DISCONTINUED | OUTPATIENT
Start: 2017-12-31 | End: 2017-12-31

## 2017-12-31 RX ORDER — VERAPAMIL HYDROCHLORIDE 120 MG/1
120 TABLET, FILM COATED ORAL EVERY 8 HOURS SCHEDULED
Status: DISCONTINUED | OUTPATIENT
Start: 2017-12-31 | End: 2018-01-02

## 2017-12-31 RX ORDER — ASPIRIN 325 MG
325 TABLET, DELAYED RELEASE (ENTERIC COATED) ORAL DAILY
Status: DISCONTINUED | OUTPATIENT
Start: 2017-12-31 | End: 2017-12-31

## 2017-12-31 RX ADMIN — ARFORMOTEROL TARTRATE 15 MCG: 15 SOLUTION RESPIRATORY (INHALATION) at 19:47

## 2017-12-31 RX ADMIN — ENOXAPARIN SODIUM 120 MG: 120 INJECTION SUBCUTANEOUS at 17:55

## 2017-12-31 RX ADMIN — ROSUVASTATIN CALCIUM 40 MG: 5 TABLET, FILM COATED ORAL at 08:38

## 2017-12-31 RX ADMIN — IPRATROPIUM BROMIDE AND ALBUTEROL SULFATE 3 ML: .5; 3 SOLUTION RESPIRATORY (INHALATION) at 15:53

## 2017-12-31 RX ADMIN — INSULIN ASPART 20 UNITS: 100 INJECTION, SOLUTION INTRAVENOUS; SUBCUTANEOUS at 16:42

## 2017-12-31 RX ADMIN — IPRATROPIUM BROMIDE AND ALBUTEROL SULFATE 3 ML: .5; 3 SOLUTION RESPIRATORY (INHALATION) at 19:48

## 2017-12-31 RX ADMIN — IPRATROPIUM BROMIDE AND ALBUTEROL SULFATE 3 ML: .5; 3 SOLUTION RESPIRATORY (INHALATION) at 11:20

## 2017-12-31 RX ADMIN — ENALAPRILAT 1.25 MG: 2.5 INJECTION INTRAVENOUS at 20:28

## 2017-12-31 RX ADMIN — LEVETIRACETAM 500 MG: 500 TABLET, FILM COATED ORAL at 22:15

## 2017-12-31 RX ADMIN — ENOXAPARIN SODIUM 40 MG: 40 INJECTION SUBCUTANEOUS at 06:51

## 2017-12-31 RX ADMIN — LEVETIRACETAM 500 MG: 500 TABLET, FILM COATED ORAL at 08:37

## 2017-12-31 RX ADMIN — FLUOXETINE 20 MG: 20 CAPSULE ORAL at 08:38

## 2017-12-31 RX ADMIN — ACETAMINOPHEN 650 MG: 325 TABLET, FILM COATED ORAL at 22:13

## 2017-12-31 RX ADMIN — ASPIRIN 325 MG: 325 TABLET, DELAYED RELEASE ORAL at 11:34

## 2017-12-31 RX ADMIN — IPRATROPIUM BROMIDE AND ALBUTEROL SULFATE 3 ML: .5; 3 SOLUTION RESPIRATORY (INHALATION) at 07:01

## 2017-12-31 RX ADMIN — METHYLPREDNISOLONE SODIUM SUCCINATE 60 MG: 125 INJECTION, POWDER, FOR SOLUTION INTRAMUSCULAR; INTRAVENOUS at 22:16

## 2017-12-31 RX ADMIN — METHYLPREDNISOLONE SODIUM SUCCINATE 60 MG: 125 INJECTION, POWDER, FOR SOLUTION INTRAMUSCULAR; INTRAVENOUS at 15:47

## 2017-12-31 RX ADMIN — INSULIN ASPART 16 UNITS: 100 INJECTION, SOLUTION INTRAVENOUS; SUBCUTANEOUS at 11:35

## 2017-12-31 RX ADMIN — INSULIN ASPART 16 UNITS: 100 INJECTION, SOLUTION INTRAVENOUS; SUBCUTANEOUS at 20:27

## 2017-12-31 RX ADMIN — CLOPIDOGREL 75 MG: 75 TABLET, FILM COATED ORAL at 08:38

## 2017-12-31 RX ADMIN — AMIODARONE HYDROCHLORIDE 150 MG: 1.5 INJECTION, SOLUTION INTRAVENOUS at 16:19

## 2017-12-31 RX ADMIN — CEFEPIME HYDROCHLORIDE 2 G: 2 INJECTION, POWDER, FOR SOLUTION INTRAVENOUS at 14:08

## 2017-12-31 RX ADMIN — INSULIN ASPART 12 UNITS: 100 INJECTION, SOLUTION INTRAVENOUS; SUBCUTANEOUS at 08:36

## 2017-12-31 RX ADMIN — INSULIN DETEMIR 20 UNITS: 100 INJECTION, SOLUTION SUBCUTANEOUS at 20:26

## 2017-12-31 RX ADMIN — Medication 10 ML: at 04:54

## 2017-12-31 RX ADMIN — METHYLPREDNISOLONE SODIUM SUCCINATE 60 MG: 125 INJECTION, POWDER, FOR SOLUTION INTRAMUSCULAR; INTRAVENOUS at 04:53

## 2017-12-31 RX ADMIN — VERAPAMIL HYDROCHLORIDE 120 MG: 120 TABLET, FILM COATED ORAL at 22:15

## 2017-12-31 RX ADMIN — ARFORMOTEROL TARTRATE 15 MCG: 15 SOLUTION RESPIRATORY (INHALATION) at 07:01

## 2017-12-31 RX ADMIN — METHYLPREDNISOLONE SODIUM SUCCINATE 60 MG: 125 INJECTION, POWDER, FOR SOLUTION INTRAMUSCULAR; INTRAVENOUS at 09:31

## 2017-12-31 RX ADMIN — Medication 10 ML: at 03:03

## 2017-12-31 RX ADMIN — CEFEPIME HYDROCHLORIDE 2 G: 2 INJECTION, POWDER, FOR SOLUTION INTRAVENOUS at 23:15

## 2017-12-31 RX ADMIN — ASPIRIN 81 MG: 81 TABLET, COATED ORAL at 08:38

## 2017-12-31 RX ADMIN — AMIODARONE HYDROCHLORIDE 0.5 MG/MIN: 1.8 INJECTION, SOLUTION INTRAVENOUS at 22:13

## 2017-12-31 RX ADMIN — AMIODARONE HYDROCHLORIDE 0.5 MG/MIN: 1.8 INJECTION, SOLUTION INTRAVENOUS at 22:27

## 2017-12-31 RX ADMIN — VERAPAMIL HYDROCHLORIDE 120 MG: 120 TABLET, FILM COATED ORAL at 05:25

## 2017-12-31 RX ADMIN — VERAPAMIL HYDROCHLORIDE 120 MG: 120 TABLET, FILM COATED ORAL at 11:33

## 2017-12-31 RX ADMIN — AMIODARONE HYDROCHLORIDE 1 MG/MIN: 1.8 INJECTION, SOLUTION INTRAVENOUS at 16:25

## 2017-12-31 RX ADMIN — LISINOPRIL 40 MG: 20 TABLET ORAL at 08:37

## 2017-12-31 RX ADMIN — ACETAMINOPHEN 650 MG: 325 TABLET, FILM COATED ORAL at 06:57

## 2017-12-31 RX ADMIN — CEFEPIME HYDROCHLORIDE 2 G: 2 INJECTION, POWDER, FOR SOLUTION INTRAVENOUS at 06:51

## 2018-01-01 LAB
ANION GAP SERPL CALCULATED.3IONS-SCNC: 6.8 MMOL/L
BASOPHILS # BLD AUTO: 0.03 10*3/MM3 (ref 0–0.2)
BASOPHILS NFR BLD AUTO: 0.2 % (ref 0–2)
BUN BLD-MCNC: 21 MG/DL (ref 8–23)
BUN/CREAT SERPL: 35 (ref 7–25)
CALCIUM SPEC-SCNC: 8.1 MG/DL (ref 8.8–10.5)
CHLORIDE SERPL-SCNC: 100 MMOL/L (ref 98–107)
CO2 SERPL-SCNC: 35.2 MMOL/L (ref 22–29)
CREAT BLD-MCNC: 0.6 MG/DL (ref 0.57–1)
DEPRECATED RDW RBC AUTO: 48.5 FL (ref 37–54)
EOSINOPHIL # BLD AUTO: 0 10*3/MM3 (ref 0.1–0.3)
EOSINOPHIL NFR BLD AUTO: 0 % (ref 0–4)
ERYTHROCYTE [DISTWIDTH] IN BLOOD BY AUTOMATED COUNT: 14.6 % (ref 11.5–14.5)
GFR SERPL CREATININE-BSD FRML MDRD: 99 ML/MIN/1.73
GLUCOSE BLD-MCNC: 280 MG/DL (ref 65–99)
GLUCOSE BLDC GLUCOMTR-MCNC: 240 MG/DL (ref 70–130)
GLUCOSE BLDC GLUCOMTR-MCNC: 268 MG/DL (ref 70–130)
GLUCOSE BLDC GLUCOMTR-MCNC: 282 MG/DL (ref 70–130)
GLUCOSE BLDC GLUCOMTR-MCNC: 372 MG/DL (ref 70–130)
HCT VFR BLD AUTO: 38.1 % (ref 37–47)
HGB BLD-MCNC: 12.1 G/DL (ref 12–16)
IMM GRANULOCYTES # BLD: 0.39 10*3/MM3 (ref 0–0.03)
IMM GRANULOCYTES NFR BLD: 2.2 % (ref 0–0.5)
LYMPHOCYTES # BLD AUTO: 1.07 10*3/MM3 (ref 0.6–4.8)
LYMPHOCYTES NFR BLD AUTO: 6 % (ref 20–45)
MCH RBC QN AUTO: 28.9 PG (ref 27–31)
MCHC RBC AUTO-ENTMCNC: 31.8 G/DL (ref 31–37)
MCV RBC AUTO: 90.9 FL (ref 81–99)
MONOCYTES # BLD AUTO: 0.76 10*3/MM3 (ref 0–1)
MONOCYTES NFR BLD AUTO: 4.3 % (ref 3–8)
NEUTROPHILS # BLD AUTO: 15.5 10*3/MM3 (ref 1.5–8.3)
NEUTROPHILS NFR BLD AUTO: 87.3 % (ref 45–70)
NRBC BLD MANUAL-RTO: 0.1 /100 WBC (ref 0–0)
PLATELET # BLD AUTO: 284 10*3/MM3 (ref 140–500)
PMV BLD AUTO: 10.1 FL (ref 7.4–10.4)
POTASSIUM BLD-SCNC: 3.8 MMOL/L (ref 3.5–5.2)
RBC # BLD AUTO: 4.19 10*6/MM3 (ref 4.2–5.4)
SODIUM BLD-SCNC: 142 MMOL/L (ref 136–145)
WBC NRBC COR # BLD: 17.75 10*3/MM3 (ref 4.8–10.8)

## 2018-01-01 PROCEDURE — 25010000002 ENOXAPARIN PER 10 MG: Performed by: HOSPITALIST

## 2018-01-01 PROCEDURE — 63710000001 INSULIN DETEMIR PER 5 UNITS: Performed by: NURSE PRACTITIONER

## 2018-01-01 PROCEDURE — 63710000001 INSULIN ASPART PER 5 UNITS: Performed by: HOSPITALIST

## 2018-01-01 PROCEDURE — 63710000001 PREDNISONE PER 1 MG: Performed by: INTERNAL MEDICINE

## 2018-01-01 PROCEDURE — 25010000002 METHYLPREDNISOLONE PER 125 MG: Performed by: HOSPITALIST

## 2018-01-01 PROCEDURE — 82962 GLUCOSE BLOOD TEST: CPT

## 2018-01-01 PROCEDURE — 99223 1ST HOSP IP/OBS HIGH 75: CPT | Performed by: INTERNAL MEDICINE

## 2018-01-01 PROCEDURE — 85025 COMPLETE CBC W/AUTO DIFF WBC: CPT | Performed by: INTERNAL MEDICINE

## 2018-01-01 PROCEDURE — 94799 UNLISTED PULMONARY SVC/PX: CPT

## 2018-01-01 PROCEDURE — 25010000002 AMIODARONE IN DEXTROSE 5% 360-4.14 MG/200ML-% SOLUTION: Performed by: HOSPITALIST

## 2018-01-01 PROCEDURE — 80048 BASIC METABOLIC PNL TOTAL CA: CPT | Performed by: INTERNAL MEDICINE

## 2018-01-01 PROCEDURE — 99232 SBSQ HOSP IP/OBS MODERATE 35: CPT | Performed by: NURSE PRACTITIONER

## 2018-01-01 PROCEDURE — 25010000002 CEFEPIME PER 500 MG: Performed by: INTERNAL MEDICINE

## 2018-01-01 RX ORDER — LEVOFLOXACIN 750 MG/1
750 TABLET ORAL EVERY 24 HOURS
Status: COMPLETED | OUTPATIENT
Start: 2018-01-01 | End: 2018-01-03

## 2018-01-01 RX ORDER — GUAIFENESIN 600 MG/1
1200 TABLET, EXTENDED RELEASE ORAL EVERY 12 HOURS SCHEDULED
Status: DISCONTINUED | OUTPATIENT
Start: 2018-01-01 | End: 2018-01-06 | Stop reason: HOSPADM

## 2018-01-01 RX ORDER — PREDNISONE 20 MG/1
60 TABLET ORAL
Status: DISCONTINUED | OUTPATIENT
Start: 2018-01-01 | End: 2018-01-04

## 2018-01-01 RX ADMIN — GUAIFENESIN 1200 MG: 600 TABLET, EXTENDED RELEASE ORAL at 20:54

## 2018-01-01 RX ADMIN — INSULIN ASPART 20 UNITS: 100 INJECTION, SOLUTION INTRAVENOUS; SUBCUTANEOUS at 11:26

## 2018-01-01 RX ADMIN — GUAIFENESIN 1200 MG: 600 TABLET, EXTENDED RELEASE ORAL at 10:51

## 2018-01-01 RX ADMIN — LISINOPRIL 40 MG: 20 TABLET ORAL at 08:17

## 2018-01-01 RX ADMIN — INSULIN DETEMIR 25 UNITS: 100 INJECTION, SOLUTION SUBCUTANEOUS at 20:55

## 2018-01-01 RX ADMIN — AMIODARONE HYDROCHLORIDE 0.5 MG/MIN: 1.8 INJECTION, SOLUTION INTRAVENOUS at 22:05

## 2018-01-01 RX ADMIN — ROSUVASTATIN CALCIUM 40 MG: 5 TABLET, FILM COATED ORAL at 08:17

## 2018-01-01 RX ADMIN — ENALAPRILAT 1.25 MG: 2.5 INJECTION INTRAVENOUS at 04:07

## 2018-01-01 RX ADMIN — ACETAMINOPHEN 650 MG: 325 TABLET, FILM COATED ORAL at 08:18

## 2018-01-01 RX ADMIN — LEVETIRACETAM 500 MG: 500 TABLET, FILM COATED ORAL at 08:17

## 2018-01-01 RX ADMIN — ARFORMOTEROL TARTRATE 15 MCG: 15 SOLUTION RESPIRATORY (INHALATION) at 08:27

## 2018-01-01 RX ADMIN — ENOXAPARIN SODIUM 120 MG: 120 INJECTION SUBCUTANEOUS at 17:59

## 2018-01-01 RX ADMIN — INSULIN ASPART 8 UNITS: 100 INJECTION, SOLUTION INTRAVENOUS; SUBCUTANEOUS at 16:49

## 2018-01-01 RX ADMIN — INSULIN ASPART 12 UNITS: 100 INJECTION, SOLUTION INTRAVENOUS; SUBCUTANEOUS at 20:54

## 2018-01-01 RX ADMIN — INSULIN ASPART 12 UNITS: 100 INJECTION, SOLUTION INTRAVENOUS; SUBCUTANEOUS at 08:18

## 2018-01-01 RX ADMIN — ENOXAPARIN SODIUM 120 MG: 120 INJECTION SUBCUTANEOUS at 05:21

## 2018-01-01 RX ADMIN — LEVETIRACETAM 500 MG: 500 TABLET, FILM COATED ORAL at 20:53

## 2018-01-01 RX ADMIN — ENALAPRILAT 1.25 MG: 2.5 INJECTION INTRAVENOUS at 19:39

## 2018-01-01 RX ADMIN — METFORMIN HYDROCHLORIDE 500 MG: 500 TABLET ORAL at 10:51

## 2018-01-01 RX ADMIN — ALBUTEROL SULFATE 2.5 MG: 2.5 SOLUTION RESPIRATORY (INHALATION) at 14:33

## 2018-01-01 RX ADMIN — IPRATROPIUM BROMIDE AND ALBUTEROL SULFATE 3 ML: .5; 3 SOLUTION RESPIRATORY (INHALATION) at 16:36

## 2018-01-01 RX ADMIN — PREDNISONE 60 MG: 20 TABLET ORAL at 10:52

## 2018-01-01 RX ADMIN — VERAPAMIL HYDROCHLORIDE 120 MG: 120 TABLET, FILM COATED ORAL at 14:49

## 2018-01-01 RX ADMIN — CLOPIDOGREL 75 MG: 75 TABLET, FILM COATED ORAL at 08:17

## 2018-01-01 RX ADMIN — LEVOFLOXACIN 750 MG: 750 TABLET, FILM COATED ORAL at 10:51

## 2018-01-01 RX ADMIN — FLUOXETINE 20 MG: 20 CAPSULE ORAL at 08:17

## 2018-01-01 RX ADMIN — ASPIRIN 81 MG: 81 TABLET, COATED ORAL at 08:18

## 2018-01-01 RX ADMIN — VERAPAMIL HYDROCHLORIDE 120 MG: 120 TABLET, FILM COATED ORAL at 05:21

## 2018-01-01 RX ADMIN — METFORMIN HYDROCHLORIDE 500 MG: 500 TABLET ORAL at 17:59

## 2018-01-01 RX ADMIN — CEFEPIME HYDROCHLORIDE 2 G: 2 INJECTION, POWDER, FOR SOLUTION INTRAVENOUS at 05:55

## 2018-01-01 RX ADMIN — METHYLPREDNISOLONE SODIUM SUCCINATE 60 MG: 125 INJECTION, POWDER, FOR SOLUTION INTRAMUSCULAR; INTRAVENOUS at 04:08

## 2018-01-01 RX ADMIN — IPRATROPIUM BROMIDE AND ALBUTEROL SULFATE 3 ML: .5; 3 SOLUTION RESPIRATORY (INHALATION) at 08:26

## 2018-01-01 RX ADMIN — ARFORMOTEROL TARTRATE 15 MCG: 15 SOLUTION RESPIRATORY (INHALATION) at 19:13

## 2018-01-01 RX ADMIN — AMIODARONE HYDROCHLORIDE 0.5 MG/MIN: 1.8 INJECTION, SOLUTION INTRAVENOUS at 10:53

## 2018-01-01 RX ADMIN — IPRATROPIUM BROMIDE AND ALBUTEROL SULFATE 3 ML: .5; 3 SOLUTION RESPIRATORY (INHALATION) at 19:07

## 2018-01-01 RX ADMIN — VERAPAMIL HYDROCHLORIDE 120 MG: 120 TABLET, FILM COATED ORAL at 21:56

## 2018-01-01 RX ADMIN — Medication 10 ML: at 19:43

## 2018-01-01 NOTE — PLAN OF CARE
Problem: COPD, Chronic Bronchitis/Emphysema (Adult)  Goal: Signs and Symptoms of Listed Potential Problems Will be Absent or Manageable (COPD, Chronic Bronchitis/Emphysema)  Outcome: Ongoing (interventions implemented as appropriate)   01/01/18 0108   COPD, Chronic Bronchitis/Emphysema   Problems Assessed (COPD, Chronic Bronchitis/Emphysema) situational response   Problems Present (COPD, Chronic Bronchitis/Emphysema) dyspnea;situational response

## 2018-01-01 NOTE — PLAN OF CARE
Problem: Respiratory Insufficiency (Adult)  Goal: Effective Ventilation  Outcome: Ongoing (interventions implemented as appropriate)  Pt using Bipap    01/01/18 0157   Respiratory Insufficiency (Adult)   Effective Ventilation making progress toward outcome

## 2018-01-01 NOTE — CONSULTS
Date of Hospital Visit:18  Encounter Provider: Jay Jay Cortez MD  Place of Service: [facility]  Patient Name: Mar Camilo  :1950  Referral Provider: No ref. provider found    Chief complaint: PAF    History of Present Illness:  Patient is a pleasant 59-year-old female with history of severe peripheral vascular disease with severe right carotid artery disease moderate left carotid artery disease subclavian stenosis probable peripheral vascular disease following with vascular in addition she has hypertension, diabetes, hyperlipidemia, hypertension.  In 2016 she was seen for preoperative clearance for colon surgery at that time had a perfusion stress test that showed no evidence of ischemia.  She had her colon surgery but at that time did go into atrial fibrillation was seen by arrhythmia service was placed on amiodarone did convert back to sinus was on anticoagulation but they were concerned about that that was discontinued and ultimately she remained in sinus rhythm off amiodarone.    She now presents to the hospital with respiratory distress was found to have Rhinio virus bronchitis and then yesterday went into H fibrillation.  She is started on IV amiodarone and is subtotally converted back to sinus rhythm.  This hospitalization she had an echocardiogram that was technically suboptimal but normal LV function grade 2 diastolic dysfunction.  However she then went into atrial fibrillation.  She had no chest pain or pressure no palpitations no dizziness no near-syncope or syncope.      Past Medical History:   Diagnosis Date   • Artery stenosis     left, states no bp/hr in Left arm d/t inaccuracy   • Back pain    • CAD (coronary artery disease)    • COPD (chronic obstructive pulmonary disease)    • Crohn's disease     Remicade on hold d/t antibiotics   • DDD (degenerative disc disease)    • Depression    • Diabetes mellitus     states not diabetic,  sugars only high when sick   • Hyperlipidemia    • Hypertension    • Hypokalemia    • Kidney stone     sched scope, lithotripsy   • Morbid obesity    • Obstructive pyelonephritis    • On home oxygen therapy     2L UNLESS STRESSED THEN 2.5-3 L   • PVD (peripheral vascular disease)     RT CAROTID STENOSIS   • Radiculopathy     NECK PAIN       Past Surgical History:   Procedure Laterality Date   • APPENDECTOMY     • COLON RESECTION     • COLONOSCOPY      x2   • CYSTOSCOPY URETEROSCOPY LASER LITHOTRIPSY Right 4/17/2017    Procedure: CYSTOSCOPY with  right stent removal, right URETEROSCOPY LASER LITHOTRIPSY,  with STONE BASKET EXTRACTION;  Surgeon: Dipesh Bean MD;  Location: [facility] OR;  Service:    • CYSTOSCOPY W/ URETERAL STENT PLACEMENT Right 3/18/2017    Procedure: CYSTOSCOPY URETERAL CATHETER/STENT INSERTION;  Surgeon: Dipesh Bean MD;  Location: [facility] OR;  Service:    • LUNG BIOPSY      NEGATIVE   • NJ THROMBOENDARTECTMY NECK,NECK INCIS Right 3/14/2016    Procedure: RT CAROTID ENDARTERECTOMY;  Surgeon: Federico Schuler MD;  Location: [facility] OR;  Service: Vascular   • WRIST ARTHROSCOPY Right     WITH RELEASE OF TRANSVERSE CARPAL LIGAMENT       No current facility-administered medications on file prior to encounter.      Current Outpatient Prescriptions on File Prior to Encounter   Medication Sig Dispense Refill   • acetaminophen (TYLENOL) 325 MG tablet Take 650 mg by mouth 3 (Three) Times a Day.     • albuterol (PROVENTIL HFA;VENTOLIN HFA) 108 (90 BASE) MCG/ACT inhaler Inhale 2 puffs Every 4 (Four) Hours As Needed for Wheezing or Shortness of Air.     • aspirin 325 MG tablet Take 1 tablet by mouth daily. (Patient taking differently: Take 325 mg by mouth Every Morning.) 90 tablet    • BREO ELLIPTA 100-25 MCG/INH aerosol powder  INHALE ONE PUFF BY MOUTH ONCE DAILY 60 each 6   • FLUoxetine (PROzac) 20 MG capsule TAKE ONE CAPSULE BY MOUTH ONCE DAILY 90 capsule 3   • hydrochlorothiazide  (HYDRODIURIL) 25 MG tablet Take 1 tablet by mouth Daily. 30 tablet 3   • levETIRAcetam (KEPPRA) 500 MG tablet Take 500 mg by mouth every night at bedtime.     • lisinopril (PRINIVIL,ZESTRIL) 40 MG tablet Take 1 tablet by mouth Daily. 30 tablet 6   • metFORMIN (GLUCOPHAGE) 500 MG tablet Take 1 tablet by mouth 2 (Two) Times a Day With Meals. 60 tablet 3   • Multiple Vitamins-Minerals (CENTRUM SILVER PO) Take 1 tablet by mouth every morning.     • Multiple Vitamins-Minerals (VISION FORMULA/LUTEIN PO) Take 1 tablet by mouth Daily.     • rosuvastatin (CRESTOR) 40 MG tablet TAKE ONE TABLET BY MOUTH ONCE DAILY 30 tablet 3   • SPIRIVA HANDIHALER 18 MCG per inhalation capsule INHALE ONE DOSE BY MOUTH ONCE DAILY 30 capsule 6   • verapamil (CALAN) 120 MG tablet TAKE ONE TABLET BY MOUTH THREE TIMES DAILY 90 tablet 0   • albuterol (PROVENTIL) (2.5 MG/3ML) 0.083% nebulizer solution Take 2.5 mg by nebulization every 4 (four) hours as needed for wheezing. 25 vial 12   • aspirin 81 MG EC tablet Take 81 mg by mouth Daily.     • clopidogrel (PLAVIX) 75 MG tablet Take 1 tablet by mouth Daily. 30 tablet 3   • diphenhydrAMINE (BENADRYL) 25 mg capsule Take 50 mg by mouth Every 6 (Six) Hours As Needed for Itching.     • HYDROcodone-acetaminophen (NORCO) 5-325 MG per tablet Take one tablet twice a day as needed for pain 60 tablet 0       Social History     Social History   • Marital status:      Spouse name: N/A   • Number of children: N/A   • Years of education: N/A     Occupational History   • Not on file.     Social History Main Topics   • Smoking status: Former Smoker     Packs/day: 2.00     Years: 40.00     Types: Cigarettes     Quit date: 04/2014   • Smokeless tobacco: Never Used   • Alcohol use No      Comment: history of heavy drinker    • Drug use: No   • Sexual activity: Defer     Other Topics Concern   • Not on file     Social History Narrative       Family History   Problem Relation Age of Onset   • Diabetes Mother    •  "Stroke Mother    • Other Father      RESPIRATORY FAILURE   • Cancer Other        REVIEW OF SYSTEMS:   All ROS was performed and is Negative except as outlined in HPI    Objective:     Vitals:    12/31/17 1954 12/31/17 2305 01/01/18 0325 01/01/18 0600   BP:  159/99 163/86 (!) 169/102   BP Location:  Right arm Right arm Right arm   Patient Position:  Lying Lying Lying   Pulse: 94 88 79 96   Resp: 20 20 20 20   Temp:  98.1 °F (36.7 °C) 97.5 °F (36.4 °C) 97.7 °F (36.5 °C)   TempSrc:  Oral Oral Oral   SpO2: 92% 97% 95% 94%   Weight:       Height:         Body mass index is 43.43 kg/(m^2).  Flowsheet Rows         First Filed Value    Admission Height  162.6 cm (64\") Documented at 12/24/2017 2216    Admission Weight  112 kg (248 lb) Documented at 12/24/2017 2216          Physical Exam   Physical Exam   Constitutional: She is oriented to person, place, and time. She appears well-developed and well-nourished. No distress.   HENT:   Head: Normocephalic.   Eyes: Conjunctivae are normal. Pupils are equal, round, and reactive to light. No scleral icterus.   Neck: Normal carotid pulses, no hepatojugular reflux and no JVD present. Carotid bruit is not present. No tracheal deviation, no edema and no erythema present. No thyromegaly present.   Cardiovascular: Normal rate, regular rhythm, S1 normal, S2 normal, normal heart sounds and intact distal pulses.   No extrasystoles are present. PMI is not displaced.  Exam reveals no gallop, no distant heart sounds and no friction rub.    No murmur heard.  Pulses:       Carotid pulses are 2+ on the right side with bruit, and 2+ on the left side with bruit.       Radial pulses are 2+ on the right side, and 2+ on the left side.        Femoral pulses are 2+ on the right side, and 2+ on the left side.       Dorsalis pedis pulses are 1+ on the right side, and 1+ on the left side.        Posterior tibial pulses are 1+ on the right side   Pulmonary/Chest: Effort normal. No respiratory distress. She " has no decreased breath sounds. She has wheezes (diffuse). She has rhonchi (scattered). She has no rales. She exhibits no tenderness.   Abdominal: Soft. Bowel sounds are normal. She exhibits no distension and no mass. There is no hepatosplenomegaly. There is no tenderness. There is no rebound and no guarding.   Musculoskeletal: She exhibits no edema, tenderness or deformity.   Neurological: She is alert and oriented to person, place, and time.   Skin: Skin is warm and dry. No rash noted. She is not diaphoretic. No cyanosis or erythema. No pallor. Nails show no clubbing.   Psychiatric: She has a normal mood and affect. Her speech is normal and behavior is normal. Judgment and thought content normal.       Lab Review:                  Results from last 7 days  Lab Units 01/01/18  0517   SODIUM mmol/L 142   POTASSIUM mmol/L 3.8   CHLORIDE mmol/L 100   CO2 mmol/L 35.2*   BUN mg/dL 21   CREATININE mg/dL 0.60   GLUCOSE mg/dL 280*   CALCIUM mg/dL 8.1*           Results from last 7 days  Lab Units 01/01/18  0517   WBC 10*3/mm3 17.75*   HEMOGLOBIN g/dL 12.1   HEMATOCRIT % 38.1   PLATELETS 10*3/mm3 284                     @LABRCNT(bnp)@    I personally viewed and interpreted the patient's EKG/Telemetry data    Assessment:   1.  Paroxysmal atrial fibrillation.  Chads 2 Vascor of 5 which puts her at high risk of embolic event.  She's back in sinus rhythm on IV amiodarone.  Would continue that switch to by mouth in the morning may be to consider getting her off that down the road.  New addition she should be anticoagulated for the time being.  2.  COPD with exacerbation per pulmonary.  3.  History of diabetes mellitus, blood sugars.  4.  Hypertension follow blood pressure.  6.  Hyperlipidemia chemotherapy.  7.  Probable obstructive sleep apnea.        Plan:

## 2018-01-01 NOTE — PLAN OF CARE
Problem: Patient Care Overview (Adult)  Goal: Plan of Care Review  Outcome: Ongoing (interventions implemented as appropriate)   01/01/18 1747   Coping/Psychosocial Response Interventions   Plan Of Care Reviewed With patient   Patient Care Overview   Progress improving   Outcome Evaluation   Outcome Summary/Follow up Plan O2 decreased to 5L oxymizer, coughing easier and less, mostly white sputum       Problem: COPD, Chronic Bronchitis/Emphysema (Adult)  Intervention: Match Activity with Ability/Tolerance   01/01/18 1747   Activity   Activity Type activity adjusted per tolerance     Intervention: Optimize Oxygenation/Ventilation/Perfusion   01/01/18 1747   Respiratory Interventions   Airway/Ventilation Management airway patency maintained;pulmonary hygiene promoted   Promote Aggressive Pulmonary Hygiene/Secretion Management   Cough And Deep Breathing done independently per patient     Intervention: Support/Optimize Psychosocial Response to Chronic Pulmonary Disease   01/01/18 1747   Coping Strategies   Trust Relationship/Rapport care explained     Intervention: Reduce/Relieve Breathlessness   01/01/18 1747   Respiratory Interventions   Breathing Techniques/Airway Clearance deep/controlled cough encouraged   Positioning   Head Of Bed (HOB) Position HOB elevated       Goal: Signs and Symptoms of Listed Potential Problems Will be Absent or Manageable (COPD, Chronic Bronchitis/Emphysema)  Outcome: Ongoing (interventions implemented as appropriate)   01/01/18 1747   COPD, Chronic Bronchitis/Emphysema   Problems Assessed (COPD, Chronic Bronchitis/Emphysema) dyspnea;hypoxia/hypoxemia   Problems Present (COPD, Chronic Bronchitis/Emphysema) dyspnea;hypoxia/hypoxemia

## 2018-01-01 NOTE — PROGRESS NOTES
"  Daily Progress Note.   [facility] ICU  1/1/2018    Patient:  Name:  Mar Camilo  MRN:  [id number]  1950  67 y.o.  female       HPI:  67-year-old female who saw Dr. Hernadez in the office over a year ago.  She has been having cough with greenish phlegm for the past 1 week.  She has had a fever for the past 2 days.  She has been extremely short of breath with minimal exertion.  At baseline she can ambulatory around the house even without oxygen.  She is on 2 to a half liters of oxygen at home.  She is currently requiring much higher flows of oxygen.  She quit smoking in 2014.  He does have a smoker.  She denies any chest pain but reports chest tightness.     She denies any personal history of cancer or heart disease.  She reports a diagnosis of diabetes which worsens in the hospital because of IV steroids.  She had pneumonia 3 or 4 years ago.  She denies any leg swelling.  She's been getting nauseous with a duo nebs but is doing okay with the albuterol nebs.  She has underlying ulcerative colitis.  She reports daytime sleepiness but has never been checked for obstructive sleep apnea.     Interval History:  States she is feeling better - oxygen requirements downtrending.    Physical Exam:  BP (!) 169/102 (BP Location: Right arm, Patient Position: Lying)  Pulse 96  Temp 97.7 °F (36.5 °C) (Oral)   Resp 20  Ht 162.6 cm (64\")  Wt 115 kg (253 lb)  LMP  (LMP Unknown)  SpO2 94%  BMI 43.43 kg/m2  Body mass index is 43.43 kg/(m^2).    Intake/Output Summary (Last 24 hours) at 01/01/18 0647  Last data filed at 12/31/17 1759   Gross per 24 hour   Intake              580 ml   Output             1400 ml   Net             -820 ml     GENERAL:  On nc Aox3  HEENT:  EOMI, nonicteric sclera, no JVD  PULMONARY:    No conversational dyspnea, unlabored resp effort, no rhonchi faint exp wheeze entry, coarse air entry with forceful coughing intermittently symmetric air entry  CARDIAC:  tachy  ABD: truncal obesity " BS+  EXT: no c/c/e, pulses symmetric 2+ bilaterally  NEURO:  CNs II-XII intact, no focal deficits  SKIN: no lesions, no rash  PSYCH: appropriate mood    Data Review:    Notable Labs:    Results from last 7 days  Lab Units 01/01/18  0517 12/31/17  0524 12/30/17  0615 12/29/17  0650 12/28/17  0430 12/27/17  0414 12/26/17  0349   WBC 10*3/mm3 17.75* 16.52* 13.15* 14.67* 13.68* 19.58* 18.41*   HEMOGLOBIN g/dL 12.1 12.4 11.7* 11.6* 11.2* 11.6* 12.2   PLATELETS 10*3/mm3 284 316 270 280 304 344 361       Results from last 7 days  Lab Units 01/01/18  0517 12/31/17  0524 12/30/17  0615 12/29/17  0650 12/28/17  0430 12/27/17  0415 12/26/17  0349   SODIUM mmol/L 142 141 139 135* 138 140 139   POTASSIUM mmol/L 3.8 3.8 3.9 4.4 4.4 4.8 4.6   CHLORIDE mmol/L 100 98 98 98 101 105 102   CO2 mmol/L 35.2* 33.1* 32.5* 27.1 25.6 22.7 20.5*   BUN mg/dL 21 20 20 31* 29* 34* 24*   CREATININE mg/dL 0.60 0.60 0.59 0.73 0.72 0.77 0.78   GLUCOSE mg/dL 280* 285* 252* 324* 335* 265* 351*   CALCIUM mg/dL 8.1* 8.2* 8.3* 8.5* 8.6* 9.1 8.9   Estimated Creatinine Clearance: 83.7 mL/min (by C-G formula based on Cr of 0.6).    Micro:Roseomonas gilardii (A)  +rvp  Imaging:  Ct chest 12.28.2017 reviewed - no pe, horrible bilateral emphysema, bilateral trace effusions, pneumonia (personally reviewed imaging)    Scheduled meds:      acetaminophen 650 mg Oral TID   arformoterol 15 mcg Nebulization BID - RT   aspirin 81 mg Oral Daily   cefepime 2 g Intravenous Q8H   clopidogrel 75 mg Oral Daily   enoxaparin 1 mg/kg Subcutaneous Q12H   FLUoxetine 20 mg Oral Daily   hydrALAZINE 10 mg Intravenous Once   insulin aspart 0-24 Units Subcutaneous 4x Daily AC & at Bedtime   insulin detemir 20 Units Subcutaneous Nightly   ipratropium-albuterol 3 mL Nebulization 4x Daily - RT   levETIRAcetam 500 mg Oral Q12H   lisinopril 40 mg Oral Daily   methylPREDNISolone sodium succinate 60 mg Intravenous Q6H   rosuvastatin 40 mg Oral Daily   verapamil 120 mg Oral Q8H        ASSESSMENT  /  PLAN:  Acute on chronic hypoxic respiratory failure  Chronic hypoxia on home oxygen  Rhinovirus viral bronchitis  Bacterial Pneumonia vs colonization  COPD with exacerbation   Suspected obstructive sleep apnea  Morbid obesity   Hyperglycemia     Plan of Treatment  - monitor in icu  -continue BD therapy  Po abx and 60 mg pred today  -weaning oxygen, doesn't nee dto be at 99%, fine btw 88-92%      Likely underlying sleep apnea.  Will need outpatient sleep study.    D/w pt and primary team.      Nirmal Ashley MD  [facility]  01/01/18  1:43 PM    EMR Dragon/Transcription disclaimer:   Much of this encounter note is an electronic transcription/translation of spoken language to printed text. The electronic translation of spoken language may permit erroneous, or at times, nonsensical words or phrases to be inadvertently transcribed; Although I have reviewed the note for such errors, some may still exist.

## 2018-01-01 NOTE — PROGRESS NOTES
"SERVICE: [facility] HOSPITALIST    CONSULTANTS: pulmonary    CHIEF COMPLAINT: Follow up acute hypoxic respiratory failure, AECOPD, rhinovirus bronchitis, roseomonas PNA    SUBJECTIVE: The patient reports she is feeling better every day. She reports cough is minimal, soa improved. She is tolerating BIPAP at night. She is tolerating po without difficulty, ambulating to bathroom. She otherwise denies f/c/chest pain/n/v/d/abdominal pain or other new concerns.    OBJECTIVE:    BP (!) 169/102 (BP Location: Right arm, Patient Position: Lying)  Pulse 81  Temp 97.7 °F (36.5 °C) (Oral)   Resp 18  Ht 162.6 cm (64\")  Wt 115 kg (253 lb)  LMP  (LMP Unknown)  SpO2 92%  BMI 43.43 kg/m2    MEDS/LABS REVIEWED AND ORDERED    acetaminophen 650 mg Oral TID   arformoterol 15 mcg Nebulization BID - RT   aspirin 81 mg Oral Daily   clopidogrel 75 mg Oral Daily   enoxaparin 1 mg/kg Subcutaneous Q12H   FLUoxetine 20 mg Oral Daily   guaiFENesin 1,200 mg Oral Q12H   hydrALAZINE 10 mg Intravenous Once   insulin aspart 0-24 Units Subcutaneous 4x Daily AC & at Bedtime   insulin detemir 25 Units Subcutaneous Nightly   ipratropium-albuterol 3 mL Nebulization 4x Daily - RT   levETIRAcetam 500 mg Oral Q12H   levoFLOXacin 750 mg Oral Q24H   lisinopril 40 mg Oral Daily   metFORMIN 500 mg Oral BID With Meals   predniSONE 60 mg Oral Daily With Breakfast   rosuvastatin 40 mg Oral Daily   verapamil 120 mg Oral Q8H     Physical Exam   Constitutional: She appears well-developed and well-nourished.   obese   HENT:   Head: Normocephalic and atraumatic.   Eyes: EOM are normal. Pupils are equal, round, and reactive to light.   Cardiovascular: Normal rate, regular rhythm and normal heart sounds.    Pulmonary/Chest: Effort normal.   Diminished all fields with expiratory wheezes   Abdominal: Soft. Bowel sounds are normal. She exhibits no distension. There is no tenderness.   obese   Musculoskeletal: She exhibits no edema.   Neurological: " She is alert.   Skin: Skin is warm and dry. No erythema.   Psychiatric: She has a normal mood and affect. Her behavior is normal. Judgment and thought content normal.   Vitals reviewed.    LAB/DIAGNOSTICS:    Lab Results (last 24 hours)     Procedure Component Value Units Date/Time    POC Glucose Once [[id number]]  (Abnormal) Collected:  12/31/17 1124    Specimen:  Blood Updated:  12/31/17 1131     Glucose 330 (H) mg/dL     Narrative:       Meter: [id number] : [id number] Maryuri Alicea RN    Procalcitonin [[id number]]  (Abnormal) Collected:  12/31/17 1426    Specimen:  Blood Updated:  12/31/17 1453     Procalcitonin 0.05 (L) ng/mL     Narrative:       As a Marker for Sepsis (Non-Neonates):   1. <0.5 ng/mL represents a low risk of severe sepsis and/or septic shock.  2. >2 ng/mL represents a high risk of severe sepsis and/or septic shock.    As a Marker for Lower Respiratory Tract Infections that require antibiotic therapy:    PCT on Admission     Antibiotic Therapy       6-12 Hrs later  > 0.5                Strongly Recommended             >0.25 - <0.5         Recommended  0.1 - 0.25           Discouraged              Remeasure/reassess PCT  <0.1                 Strongly Discouraged     Remeasure/reassess PCT                     PCT values of < 0.5 ng/mL do not exclude an infection, because localized infections (without systemic signs) may be associated with such low concentrations, or a systemic infection in its initial stages (< 6 hours). Furthermore, increased PCT can occur without infection. PCT concentrations between 0.5 and 2.0 ng/mL should be interpreted taking into account the patient's history. It is recommended to retest PCT within 6-24 hours if any concentrations < 2 ng/mL are obtained.    POC Glucose Once [[id number]]  (Abnormal) Collected:  12/31/17 1630    Specimen:  Blood Updated:  12/31/17 1644     Glucose 375 (H) mg/dL     Narrative:       Meter: [id number] : [id number] Maryuri Alicea  RN    POC Glucose Once [[id number]]  (Abnormal) Collected:  12/31/17 2000    Specimen:  Blood Updated:  12/31/17 2006     Glucose 332 (H) mg/dL     Narrative:       Meter: [id number] : [id number] Jackie Forde CNA    CBC & Differential [[id number]] Collected:  01/01/18 0517    Specimen:  Blood Updated:  01/01/18 0528    Narrative:       The following orders were created for panel order CBC & Differential.  Procedure                               Abnormality         Status                     ---------                               -----------         ------                     CBC Auto Differential[[id number]]        Abnormal            Final result                 Please view results for these tests on the individual orders.    CBC Auto Differential [[id number]]  (Abnormal) Collected:  01/01/18 0517    Specimen:  Blood Updated:  01/01/18 0528     WBC 17.75 (H) 10*3/mm3      RBC 4.19 (L) 10*6/mm3      Hemoglobin 12.1 g/dL      Hematocrit 38.1 %      MCV 90.9 fL      MCH 28.9 pg      MCHC 31.8 g/dL      RDW 14.6 (H) %      RDW-SD 48.5 fl      MPV 10.1 fL      Platelets 284 10*3/mm3      Neutrophil % 87.3 (H) %      Lymphocyte % 6.0 (L) %      Monocyte % 4.3 %      Eosinophil % 0.0 %      Basophil % 0.2 %      Immature Grans % 2.2 (H) %      Neutrophils, Absolute 15.50 (H) 10*3/mm3      Lymphocytes, Absolute 1.07 10*3/mm3      Monocytes, Absolute 0.76 10*3/mm3      Eosinophils, Absolute 0.00 (L) 10*3/mm3      Basophils, Absolute 0.03 10*3/mm3      Immature Grans, Absolute 0.39 (H) 10*3/mm3      nRBC 0.1 (H) /100 WBC     Basic Metabolic Panel [[id number]]  (Abnormal) Collected:  01/01/18 0517    Specimen:  Blood Updated:  01/01/18 0610     Glucose 280 (H) mg/dL      BUN 21 mg/dL      Creatinine 0.60 mg/dL      Sodium 142 mmol/L      Potassium 3.8 mmol/L      Chloride 100 mmol/L      CO2 35.2 (H) mmol/L      Calcium 8.1 (L) mg/dL      eGFR Non African Amer 99 mL/min/1.73      BUN/Creatinine Ratio 35.0 (H)     Anion Gap  6.8 mmol/L     Narrative:       GFR Normal >60  Chronic Kidney Disease <60  Kidney Failure <15    POC Glucose Once [[id number]]  (Abnormal) Collected:  01/01/18 0718    Specimen:  Blood Updated:  01/01/18 0725     Glucose 268 (H) mg/dL     Narrative:       Meter: [id number] : [id number] Dylanumesh Jassoh NURSING ASSISTANT        ASSESSMENT/PLAN:  1. Acute on chronic hypoxic respiratory failure: pulmonary following  2. Sepsis secondary to Acute Rhinovirus Bronchitis and bilateral lower lobe Roseomonas PNA:   3. AECOPD: Pulmonary following  Sepsis resolved, continue cefepime per culture data    Now off BiPAP except at night, on 6-8 L Oxymizer, continue to titrate down as able  Changed to oral steroids today, continue duo nebs, Brovana  Add mucinex/acapella   Oral levaquin nearly completed  Monitor    4. A-fib with RVR: cardiology following  Now converted to normal sinus on amiodarone  Previously on oral amiodarone until 3/2016 when it was discontinued  Now on therapeutic dose Lovenox with aspirin decreased to 81 mg daily  Monitor     5. Leukocytosis: elevated secondary to steroids, trending down at 17.75 today, procal remains low      6. DM-2: A1c 7.5%  Glucose elevated secondary to steroids, resume metformin today  Continue high dose insulin by sliding scale, Accu-Cheks before meals/at bedtime  Increase Levemir 25 units nightly, (started here), monitor    7. Hypertension:   Intermittent elevation, continue home lisinopril/verapamil  No change today    8. Probable RANDOLPH: Pulmonary following and will order an outpatient sleep study.       9. Depression: no acute issues on home prozac       10. Dyslipidemia: no acute issues on home dose crestor.     11. Morbid Obesity: Nutrition to  prior to discharge.   Body mass index is 43.43 kg/(m^2).    12. Chronic back pain: No acute issues here.       13. Crohn's disease: Patient follows with Dr. Umaña and receives Remicade as an outpatient. No acute issues currently.  Will need next dose deferred due to this current infection (will need to let Dr. Umaña's office know).       14. H/O CVA: continues on ASA, plavix, statin, no acute issues

## 2018-01-01 NOTE — PLAN OF CARE
Problem: Patient Care Overview (Adult)  Goal: Plan of Care Review  Outcome: Ongoing (interventions implemented as appropriate)   01/01/18 1703   Coping/Psychosocial Response Interventions   Plan Of Care Reviewed With patient   Patient Care Overview   Progress improving   Outcome Evaluation   Outcome Summary/Follow up Plan Pt weaned down to 5L oximizer and tolerating well. Switched to PO steroid and abx. Remains on amiodarone gtt. Mostly NSR with intermittent afib. Restarted home metformin. VSS. Will continue to monitor.        Problem: COPD, Chronic Bronchitis/Emphysema (Adult)  Goal: Signs and Symptoms of Listed Potential Problems Will be Absent or Manageable (COPD, Chronic Bronchitis/Emphysema)  Outcome: Ongoing (interventions implemented as appropriate)      Problem: Respiratory Insufficiency (Adult)  Goal: Identify Related Risk Factors and Signs and Symptoms  Outcome: Ongoing (interventions implemented as appropriate)

## 2018-01-02 PROBLEM — I48.0 PAF (PAROXYSMAL ATRIAL FIBRILLATION) (HCC): Status: ACTIVE | Noted: 2018-01-02

## 2018-01-02 LAB
ANION GAP SERPL CALCULATED.3IONS-SCNC: 8.5 MMOL/L
BASOPHILS # BLD AUTO: 0.04 10*3/MM3 (ref 0–0.2)
BASOPHILS NFR BLD AUTO: 0.2 % (ref 0–2)
BUN BLD-MCNC: 21 MG/DL (ref 8–23)
BUN/CREAT SERPL: 33.9 (ref 7–25)
CALCIUM SPEC-SCNC: 8.1 MG/DL (ref 8.8–10.5)
CHLORIDE SERPL-SCNC: 101 MMOL/L (ref 98–107)
CO2 SERPL-SCNC: 34.5 MMOL/L (ref 22–29)
CREAT BLD-MCNC: 0.62 MG/DL (ref 0.57–1)
DEPRECATED RDW RBC AUTO: 47.1 FL (ref 37–54)
EOSINOPHIL # BLD AUTO: 0 10*3/MM3 (ref 0.1–0.3)
EOSINOPHIL NFR BLD AUTO: 0 % (ref 0–4)
ERYTHROCYTE [DISTWIDTH] IN BLOOD BY AUTOMATED COUNT: 14.4 % (ref 11.5–14.5)
GFR SERPL CREATININE-BSD FRML MDRD: 96 ML/MIN/1.73
GLUCOSE BLD-MCNC: 145 MG/DL (ref 65–99)
GLUCOSE BLDC GLUCOMTR-MCNC: 129 MG/DL (ref 70–130)
GLUCOSE BLDC GLUCOMTR-MCNC: 199 MG/DL (ref 70–130)
GLUCOSE BLDC GLUCOMTR-MCNC: 286 MG/DL (ref 70–130)
GLUCOSE BLDC GLUCOMTR-MCNC: 322 MG/DL (ref 70–130)
HCT VFR BLD AUTO: 38.6 % (ref 37–47)
HGB BLD-MCNC: 12.5 G/DL (ref 12–16)
IMM GRANULOCYTES # BLD: 0.44 10*3/MM3 (ref 0–0.03)
IMM GRANULOCYTES NFR BLD: 2.5 % (ref 0–0.5)
LYMPHOCYTES # BLD AUTO: 2.11 10*3/MM3 (ref 0.6–4.8)
LYMPHOCYTES NFR BLD AUTO: 12.1 % (ref 20–45)
MCH RBC QN AUTO: 29.2 PG (ref 27–31)
MCHC RBC AUTO-ENTMCNC: 32.4 G/DL (ref 31–37)
MCV RBC AUTO: 90.2 FL (ref 81–99)
MONOCYTES # BLD AUTO: 1.35 10*3/MM3 (ref 0–1)
MONOCYTES NFR BLD AUTO: 7.7 % (ref 3–8)
NEUTROPHILS # BLD AUTO: 13.53 10*3/MM3 (ref 1.5–8.3)
NEUTROPHILS NFR BLD AUTO: 77.5 % (ref 45–70)
NRBC BLD MANUAL-RTO: 0 /100 WBC (ref 0–0)
PLATELET # BLD AUTO: 249 10*3/MM3 (ref 140–500)
PMV BLD AUTO: 10 FL (ref 7.4–10.4)
POTASSIUM BLD-SCNC: 3.6 MMOL/L (ref 3.5–5.2)
RBC # BLD AUTO: 4.28 10*6/MM3 (ref 4.2–5.4)
SODIUM BLD-SCNC: 144 MMOL/L (ref 136–145)
WBC NRBC COR # BLD: 17.47 10*3/MM3 (ref 4.8–10.8)

## 2018-01-02 PROCEDURE — 85025 COMPLETE CBC W/AUTO DIFF WBC: CPT | Performed by: INTERNAL MEDICINE

## 2018-01-02 PROCEDURE — 94799 UNLISTED PULMONARY SVC/PX: CPT

## 2018-01-02 PROCEDURE — 93005 ELECTROCARDIOGRAM TRACING: CPT | Performed by: INTERNAL MEDICINE

## 2018-01-02 PROCEDURE — 25010000002 ENOXAPARIN PER 10 MG: Performed by: HOSPITALIST

## 2018-01-02 PROCEDURE — 25010000002 METHYLPREDNISOLONE PER 125 MG: Performed by: INTERNAL MEDICINE

## 2018-01-02 PROCEDURE — 63710000001 INSULIN DETEMIR PER 5 UNITS: Performed by: NURSE PRACTITIONER

## 2018-01-02 PROCEDURE — 63710000001 PREDNISONE PER 1 MG: Performed by: INTERNAL MEDICINE

## 2018-01-02 PROCEDURE — 99232 SBSQ HOSP IP/OBS MODERATE 35: CPT | Performed by: NURSE PRACTITIONER

## 2018-01-02 PROCEDURE — 99232 SBSQ HOSP IP/OBS MODERATE 35: CPT | Performed by: INTERNAL MEDICINE

## 2018-01-02 PROCEDURE — 82962 GLUCOSE BLOOD TEST: CPT

## 2018-01-02 PROCEDURE — 63710000001 INSULIN ASPART PER 5 UNITS: Performed by: HOSPITALIST

## 2018-01-02 PROCEDURE — 93010 ELECTROCARDIOGRAM REPORT: CPT | Performed by: INTERNAL MEDICINE

## 2018-01-02 PROCEDURE — 80048 BASIC METABOLIC PNL TOTAL CA: CPT | Performed by: INTERNAL MEDICINE

## 2018-01-02 RX ORDER — ACETYLCYSTEINE 200 MG/ML
SOLUTION ORAL; RESPIRATORY (INHALATION)
Status: COMPLETED
Start: 2018-01-02 | End: 2018-01-02

## 2018-01-02 RX ORDER — VERAPAMIL HYDROCHLORIDE 240 MG/1
240 TABLET, FILM COATED, EXTENDED RELEASE ORAL EVERY 12 HOURS SCHEDULED
Status: DISCONTINUED | OUTPATIENT
Start: 2018-01-02 | End: 2018-01-06 | Stop reason: HOSPADM

## 2018-01-02 RX ORDER — HYDROCHLOROTHIAZIDE 25 MG/1
25 TABLET ORAL DAILY
Status: DISCONTINUED | OUTPATIENT
Start: 2018-01-02 | End: 2018-01-06

## 2018-01-02 RX ORDER — AMIODARONE HYDROCHLORIDE 200 MG/1
400 TABLET ORAL
Status: DISCONTINUED | OUTPATIENT
Start: 2018-01-02 | End: 2018-01-06

## 2018-01-02 RX ORDER — METHYLPREDNISOLONE SODIUM SUCCINATE 125 MG/2ML
125 INJECTION, POWDER, LYOPHILIZED, FOR SOLUTION INTRAMUSCULAR; INTRAVENOUS ONCE
Status: COMPLETED | OUTPATIENT
Start: 2018-01-02 | End: 2018-01-02

## 2018-01-02 RX ORDER — ACETYLCYSTEINE 200 MG/ML
4 SOLUTION ORAL; RESPIRATORY (INHALATION)
Status: DISCONTINUED | OUTPATIENT
Start: 2018-01-02 | End: 2018-01-05

## 2018-01-02 RX ADMIN — ENALAPRILAT 1.25 MG: 2.5 INJECTION INTRAVENOUS at 04:26

## 2018-01-02 RX ADMIN — GUAIFENESIN 1200 MG: 600 TABLET, EXTENDED RELEASE ORAL at 20:58

## 2018-01-02 RX ADMIN — LISINOPRIL 40 MG: 20 TABLET ORAL at 08:10

## 2018-01-02 RX ADMIN — INSULIN ASPART 12 UNITS: 100 INJECTION, SOLUTION INTRAVENOUS; SUBCUTANEOUS at 17:12

## 2018-01-02 RX ADMIN — ENOXAPARIN SODIUM 120 MG: 120 INJECTION SUBCUTANEOUS at 06:23

## 2018-01-02 RX ADMIN — ARFORMOTEROL TARTRATE 15 MCG: 15 SOLUTION RESPIRATORY (INHALATION) at 10:04

## 2018-01-02 RX ADMIN — LEVOFLOXACIN 750 MG: 750 TABLET, FILM COATED ORAL at 11:10

## 2018-01-02 RX ADMIN — IPRATROPIUM BROMIDE AND ALBUTEROL SULFATE 3 ML: .5; 3 SOLUTION RESPIRATORY (INHALATION) at 16:58

## 2018-01-02 RX ADMIN — RIVAROXABAN 20 MG: 20 TABLET, FILM COATED ORAL at 17:14

## 2018-01-02 RX ADMIN — GUAIFENESIN 1200 MG: 600 TABLET, EXTENDED RELEASE ORAL at 08:22

## 2018-01-02 RX ADMIN — ACETYLCYSTEINE 4 ML: 200 SOLUTION ORAL; RESPIRATORY (INHALATION) at 12:57

## 2018-01-02 RX ADMIN — Medication 10 ML: at 04:25

## 2018-01-02 RX ADMIN — AMIODARONE HYDROCHLORIDE 400 MG: 200 TABLET ORAL at 09:40

## 2018-01-02 RX ADMIN — METFORMIN HYDROCHLORIDE 500 MG: 500 TABLET ORAL at 17:14

## 2018-01-02 RX ADMIN — ASPIRIN 81 MG: 81 TABLET, COATED ORAL at 08:14

## 2018-01-02 RX ADMIN — IPRATROPIUM BROMIDE AND ALBUTEROL SULFATE 3 ML: .5; 3 SOLUTION RESPIRATORY (INHALATION) at 09:09

## 2018-01-02 RX ADMIN — VERAPAMIL HYDROCHLORIDE 240 MG: 240 TABLET, FILM COATED, EXTENDED RELEASE ORAL at 11:09

## 2018-01-02 RX ADMIN — ROSUVASTATIN CALCIUM 40 MG: 5 TABLET, FILM COATED ORAL at 09:08

## 2018-01-02 RX ADMIN — ACETYLCYSTEINE 4 ML: 200 SOLUTION ORAL; RESPIRATORY (INHALATION) at 19:22

## 2018-01-02 RX ADMIN — INSULIN ASPART 16 UNITS: 100 INJECTION, SOLUTION INTRAVENOUS; SUBCUTANEOUS at 21:00

## 2018-01-02 RX ADMIN — METFORMIN HYDROCHLORIDE 500 MG: 500 TABLET ORAL at 08:22

## 2018-01-02 RX ADMIN — LEVETIRACETAM 500 MG: 500 TABLET, FILM COATED ORAL at 08:10

## 2018-01-02 RX ADMIN — IPRATROPIUM BROMIDE AND ALBUTEROL SULFATE 3 ML: .5; 3 SOLUTION RESPIRATORY (INHALATION) at 12:54

## 2018-01-02 RX ADMIN — ARFORMOTEROL TARTRATE 15 MCG: 15 SOLUTION RESPIRATORY (INHALATION) at 19:30

## 2018-01-02 RX ADMIN — INSULIN ASPART 5 UNITS: 100 INJECTION, SOLUTION INTRAVENOUS; SUBCUTANEOUS at 13:02

## 2018-01-02 RX ADMIN — VERAPAMIL HYDROCHLORIDE 240 MG: 240 TABLET, FILM COATED, EXTENDED RELEASE ORAL at 20:59

## 2018-01-02 RX ADMIN — PREDNISONE 60 MG: 20 TABLET ORAL at 09:09

## 2018-01-02 RX ADMIN — INSULIN DETEMIR 25 UNITS: 100 INJECTION, SOLUTION SUBCUTANEOUS at 21:01

## 2018-01-02 RX ADMIN — CLOPIDOGREL 75 MG: 75 TABLET, FILM COATED ORAL at 08:11

## 2018-01-02 RX ADMIN — ACETYLCYSTEINE 4 ML: 200 SOLUTION ORAL; RESPIRATORY (INHALATION) at 16:58

## 2018-01-02 RX ADMIN — FLUOXETINE 20 MG: 20 CAPSULE ORAL at 08:11

## 2018-01-02 RX ADMIN — IPRATROPIUM BROMIDE AND ALBUTEROL SULFATE 3 ML: .5; 3 SOLUTION RESPIRATORY (INHALATION) at 19:22

## 2018-01-02 RX ADMIN — VERAPAMIL HYDROCHLORIDE 120 MG: 120 TABLET, FILM COATED ORAL at 06:23

## 2018-01-02 RX ADMIN — LEVETIRACETAM 500 MG: 500 TABLET, FILM COATED ORAL at 20:59

## 2018-01-02 RX ADMIN — METHYLPREDNISOLONE SODIUM SUCCINATE 125 MG: 125 INJECTION, POWDER, FOR SOLUTION INTRAMUSCULAR; INTRAVENOUS at 13:53

## 2018-01-02 RX ADMIN — HYDROCHLOROTHIAZIDE 25 MG: 25 TABLET ORAL at 09:40

## 2018-01-02 NOTE — PROGRESS NOTES
[facility]  Phone: [phone]  Gurmeet Rosa MD    Subjective:  LOS: 9    She continues to require high flow oxygen.  She is slowly better but not all the way up.  She is still has cough and wheezing.  She feels the starting to mobilize some secretions.    Objective   Vital Signs past 24hrs  BP range: BP: (156-210)/() 172/68  Pulse range: Heart Rate:  [] 79  Resp rate range: Resp:  [12-20] 20  Temp range: Temp (24hrs), Av.7 °F (36.5 °C), Min:97.5 °F (36.4 °C), Max:98.3 °F (36.8 °C)    O2 Device: nasal cannula with reservoir (i.e. oximizer)Flow (L/min):  [5-8] 5  Oxygen range:SpO2:  [88 %-95 %] 93 %   114 kg (251 lb 1.6 oz); Body mass index is 43.1 kg/(m^2).    Intake/Output Summary (Last 24 hours) at 18 0914  Last data filed at 18 0608   Gross per 24 hour   Intake              195 ml   Output             2000 ml   Net            -1805 ml       Physical Exam   Constitutional: She appears well-developed.   Eyes: Conjunctivae are normal.   Neck: Normal range of motion. Neck supple.   Cardiovascular: Normal rate and regular rhythm.    No murmur heard.  Pulmonary/Chest: Effort normal. No respiratory distress. She has decreased breath sounds. She has wheezes (mild coarse). She has rales in the left lower field.   Abdominal: Soft. Bowel sounds are normal. She exhibits no mass. There is no tenderness.   Musculoskeletal: She exhibits no edema.   Neurological: She is alert.   Skin: Skin is warm. No rash noted.     Results Review:    I have reviewed the laboratory and imaging data since the last note by [facility] physician.  My annotations are noted in assessment and plan.    Medication Review:  I have reviewed the current MAR.  My annotations are noted in assessment and plan.       Plan   PCCM Problems  Acute hypoxic respiratory failure  Bilateral pleural effusion  Bibasilar atelectasis vs pneumonia  Chronic hypoxia on home oxygen  RSV bronchitis  COPD with exacerbation   Suspected  obstructive sleep apnea  Morbid obesity  Relevant Medical Diagnoses  Pafib  DM2  HTN    Plan of Treatment  She is still requiring high flows of oxygen.  This is despite fairly aggressive diuresis.  She also appears to have thick secretions.  I will trial her on some acetylcysteine.  I did however caution her that failure to improve may require us to do a bronchoscopy.  She is on anticoagulation with Xarelto.    She still has wheezing.  I will give her an additional dose of IV Solu-Medrol today.  We will wait to see how she responds.  Keep her on the same dose of prednisone.    Her CT did show bibasilar atelectasis versus pneumonia.  On exam she has left lower lobe crackles.  She is finishing out a course of Levaquin.  This should be adequate.    She requires outpatient sleep study evaluation.  This has been ordered previously.  She will need follow-up in the sleep lab after.    Gurmeet Rosa MD  01/02/18  9:14 AM    Part of this note may be an electronic transcription/translation of spoken language to printed text using the Dragon Dictation System.

## 2018-01-02 NOTE — PLAN OF CARE
Problem: COPD, Chronic Bronchitis/Emphysema (Adult)  Intervention: Optimize Oxygenation/Ventilation/Perfusion   01/02/18 0445   Respiratory Interventions   Airway/Ventilation Management airway patency maintained   Promote Aggressive Pulmonary Hygiene/Secretion Management   Cough And Deep Breathing done with encouragement     Intervention: Support/Optimize Psychosocial Response to Chronic Pulmonary Disease   01/02/18 0445   Coping Strategies   Trust Relationship/Rapport care explained;emotional support provided   Supportive Measures active listening utilized     Intervention: Reduce/Relieve Breathlessness   01/02/18 0445   Respiratory Interventions   Breathing Techniques/Airway Clearance pursed-lip breathing encouraged   Positioning   Head Of Bed (HOB) Position HOB at 30-45 degrees       Goal: Signs and Symptoms of Listed Potential Problems Will be Absent or Manageable (COPD, Chronic Bronchitis/Emphysema)  Outcome: Ongoing (interventions implemented as appropriate)   01/02/18 0445   COPD, Chronic Bronchitis/Emphysema   Problems Assessed (COPD, Chronic Bronchitis/Emphysema) dyspnea;hypoxia/hypoxemia;situational response   Problems Present (COPD, Chronic Bronchitis/Emphysema) dyspnea

## 2018-01-02 NOTE — PROGRESS NOTES
LOS: 9 days   Patient Care Team:  Vianey Barrios PA-C as PCP - General (Family Medicine)  Bhanu Mata MD as Consulting Physician (Cardiology)  Mann Hernadez MD as Consulting Physician (Pulmonary Disease)    Chief Complaint: AF       Interval History:  Had ~ 30 minutes of AF last night in the 130's.  Requiring IV medications for BP control.  Complains bitterly of nosebleeds/nasal congestion.  Still wheezy/SOA but coughing up more sputum.      Objective   Vital Signs  Temp:  [97.5 °F (36.4 °C)-98.3 °F (36.8 °C)] 97.5 °F (36.4 °C)  Heart Rate:  [] 77  Resp:  [12-20] 16  BP: (156-210)/() 172/68    Intake/Output Summary (Last 24 hours) at 01/02/18 0820  Last data filed at 01/02/18 0608   Gross per 24 hour   Intake              195 ml   Output             2000 ml   Net            -1805 ml           Physical Exam   Constitutional: She is oriented to person, place, and time.   obese   HENT:   Head: Normocephalic.   Mouth/Throat: Oropharynx is clear and moist.   Eyes: Conjunctivae and EOM are normal. Pupils are equal, round, and reactive to light.   Neck: Normal range of motion.   Cannot assess JVD due to body habitus   Cardiovascular: Normal rate, regular rhythm, normal heart sounds and intact distal pulses.  Frequent extrasystoles are present.   Pulmonary/Chest: Effort normal. She has wheezes. She has rhonchi.   Abdominal: Soft.   Cannot feel organs or aorta   Musculoskeletal: Normal range of motion. She exhibits no edema.   Neurological: She is alert and oriented to person, place, and time. No cranial nerve deficit.   Skin: Skin is warm and dry. No erythema.   Psychiatric: She has a normal mood and affect. Her behavior is normal. Judgment and thought content normal.   Vitals reviewed.      Results Review:        Results from last 7 days  Lab Units 01/02/18  0629 01/01/18  0517 12/31/17  0524   SODIUM mmol/L 144 142 141   POTASSIUM mmol/L 3.6 3.8 3.8   CHLORIDE mmol/L 101 100 98   CO2 mmol/L 34.5*  35.2* 33.1*   BUN mg/dL 21 21 20   CREATININE mg/dL 0.62 0.60 0.60   GLUCOSE mg/dL 145* 280* 285*   CALCIUM mg/dL 8.1* 8.1* 8.2*           Results from last 7 days  Lab Units 01/02/18  0629 01/01/18  0517 12/31/17  0524   WBC 10*3/mm3 17.47* 17.75* 16.52*   HEMOGLOBIN g/dL 12.5 12.1 12.4   HEMATOCRIT % 38.6 38.1 39.6   PLATELETS 10*3/mm3 249 284 316                       I reviewed the patient's new clinical results.  I personally viewed and interpreted the patient's EKG/Telemetry data        Medication Review:     acetaminophen 650 mg Oral TID   arformoterol 15 mcg Nebulization BID - RT   aspirin 81 mg Oral Daily   clopidogrel 75 mg Oral Daily   enoxaparin 1 mg/kg Subcutaneous Q12H   FLUoxetine 20 mg Oral Daily   guaiFENesin 1,200 mg Oral Q12H   hydrALAZINE 10 mg Intravenous Once   insulin aspart 0-24 Units Subcutaneous 4x Daily AC & at Bedtime   insulin detemir 25 Units Subcutaneous Nightly   ipratropium-albuterol 3 mL Nebulization 4x Daily - RT   levETIRAcetam 500 mg Oral Q12H   levoFLOXacin 750 mg Oral Q24H   lisinopril 40 mg Oral Daily   metFORMIN 500 mg Oral BID With Meals   predniSONE 60 mg Oral Daily With Breakfast   rosuvastatin 40 mg Oral Daily   verapamil 120 mg Oral Q8H         amiodarone 0.5 mg/min Last Rate: 0.5 mg/min (01/01/18 2205)       Assessment/Plan     1.  Paroxysmal atrial fibrillation.  CHADSVASc = 5.  Echo with hyperdynamic LV systolic function.  She was in AF on arrival but converted to NSR with IV amiodarone; she remains on IV amiodarone and had a brief burst of AF last night.  She is still having a lot of atrial ectopy on tele.  I'm going to change her to oral amiodarone and increase her verapamil further (although 480 mg daily will be the maximum).  I'm avoiding a BB due to active wheezing.  She's on enoxaparin -- I'll change to rivaroxaban.  She's also on DAPT due to PAD; I'll stop aspirin and just continue clopidogrel in the setting of OAC.  We may still struggle with ectopy given her  severe lung disease.    2.  COPD with exacerbation / pneumonia --  per pulmonary/primary team    3.  DM2    4.  Morbid obesity    5.  Suspected RANDOLPH    6.  HTN -- poorly controlled.  I'm increasing verapamil.  The steroids are contributing for sure.  She's on lisinopril.  I'll add HCTZ back (it's a home medication).      **Needs daily EKG while on both levofloxacin and amiodarone!  Will check now.    Bhanu Mata MD  01/02/18  8:20 AM

## 2018-01-02 NOTE — SIGNIFICANT NOTE
Pt with SpO2 94% on 4 LPM oxymizer.  Attempted to transition to NC at 6 LPM, unable to tolerate SpO2 to 86%. Placed back on oxymizer and increased to 6 LPM. SpO2 to 88-90%.

## 2018-01-02 NOTE — PLAN OF CARE
Problem: Patient Care Overview (Adult)  Goal: Plan of Care Review  Outcome: Ongoing (interventions implemented as appropriate)   01/02/18 2408   Coping/Psychosocial Response Interventions   Plan Of Care Reviewed With patient   Patient Care Overview   Progress improving   Outcome Evaluation   Outcome Summary/Follow up Plan transfered to [facility],  with occasional bouts of A-Fib. MD aware, Insulin dose adjusted due to steriod use,up in chair ,SOA with ADL's , continues on Oxymizer 5-6 liters     Goal: Adult Individualization and Mutuality  Outcome: Ongoing (interventions implemented as appropriate)    Goal: Discharge Needs Assessment  Outcome: Ongoing (interventions implemented as appropriate)      Problem: Fall Risk (Adult)  Goal: Absence of Falls  Outcome: Ongoing (interventions implemented as appropriate)      Problem: Pain, Acute (Adult)  Goal: Acceptable Pain Control/Comfort Level  Outcome: Ongoing (interventions implemented as appropriate)      Problem: COPD, Chronic Bronchitis/Emphysema (Adult)  Goal: Signs and Symptoms of Listed Potential Problems Will be Absent or Manageable (COPD, Chronic Bronchitis/Emphysema)  Outcome: Ongoing (interventions implemented as appropriate)      Problem: Respiratory Insufficiency (Adult)  Goal: Identify Related Risk Factors and Signs and Symptoms  Outcome: Outcome(s) achieved Date Met: 01/02/18    Goal: Acid/Base Balance  Outcome: Ongoing (interventions implemented as appropriate)    Goal: Effective Ventilation  Outcome: Ongoing (interventions implemented as appropriate)

## 2018-01-02 NOTE — PLAN OF CARE
Problem: Patient Care Overview (Adult)  Goal: Plan of Care Review  Outcome: Ongoing (interventions implemented as appropriate)   01/02/18 0213   Coping/Psychosocial Response Interventions   Plan Of Care Reviewed With patient   Patient Care Overview   Progress progress toward functional goals as expected     Goal: Adult Individualization and Mutuality  Outcome: Ongoing (interventions implemented as appropriate)    Goal: Discharge Needs Assessment  Outcome: Ongoing (interventions implemented as appropriate)      Problem: Fall Risk (Adult)  Goal: Absence of Falls  Outcome: Ongoing (interventions implemented as appropriate)   01/02/18 0213   Fall Risk (Adult)   Absence of Falls making progress toward outcome       Problem: Pain, Acute (Adult)  Goal: Acceptable Pain Control/Comfort Level  Outcome: Ongoing (interventions implemented as appropriate)   01/02/18 0213   Pain, Acute (Adult)   Acceptable Pain Control/Comfort Level making progress toward outcome       Problem: COPD, Chronic Bronchitis/Emphysema (Adult)  Goal: Signs and Symptoms of Listed Potential Problems Will be Absent or Manageable (COPD, Chronic Bronchitis/Emphysema)  Outcome: Ongoing (interventions implemented as appropriate)   01/01/18 1747   COPD, Chronic Bronchitis/Emphysema   Problems Assessed (COPD, Chronic Bronchitis/Emphysema) dyspnea;hypoxia/hypoxemia   Problems Present (COPD, Chronic Bronchitis/Emphysema) dyspnea;hypoxia/hypoxemia       Problem: Respiratory Insufficiency (Adult)  Goal: Identify Related Risk Factors and Signs and Symptoms  Outcome: Ongoing (interventions implemented as appropriate)   01/02/18 0213   Respiratory Insufficiency   Related Risk Factors (Respiratory Insufficiency) activity intolerance   Signs and Symptoms (Respiratory Insufficiency) abnormal breath sounds;blood pressure/heart rate changes;dyspnea     Goal: Acid/Base Balance  Outcome: Ongoing (interventions implemented as appropriate)   01/02/18 0213   Respiratory  Insufficiency (Adult)   Acid/Base Balance making progress toward outcome     Goal: Effective Ventilation  Outcome: Ongoing (interventions implemented as appropriate)   01/02/18 0213   Respiratory Insufficiency (Adult)   Effective Ventilation making progress toward outcome

## 2018-01-02 NOTE — NURSING NOTE
Continued Stay Note  LIONEL Barton     Patient Name: Mar Camilo  MRN: [id number]  Today's Date: 1/2/2018    Admit Date: 12/24/2017          Discharge Plan       01/02/18 1433    Case Management/Social Work Plan    Plan plan home when stable    Patient/Family In Agreement With Plan yes    Additional Comments Spoke with patient at bedside. She states she is 'uncomfortable today' but feels beeter in general. Will continue to follow for any dc needs.              Discharge Codes     None            Villa Cross RN

## 2018-01-02 NOTE — PROGRESS NOTES
Adult Nutrition  Assessment/PES    Patient Name:  Mar MARROQUIN Scriver  YOB: 1950  MRN: [id number]  Admit Date:  12/24/2017    Assessment Date:  1/2/2018    Comments:  Good po intake, educated earlier in admit, will cont to follow.           Reason for Assessment       01/02/18 1304    Reason for Assessment    Reason For Assessment/Visit follow up protocol    Cardiac HTN    Endocrine DM    Pulmonary/Critical Care COPD;Pneumonia;Acute respiratory failure            Data Eval/ Notes       01/02/18 1305     Notes    Note Pt with RT doing treatment, able to communicate with thumbs up and down to questions.               Anthropometrics       01/02/18 1305    Anthropometrics    RD Documented Current Weight  114 kg (251 lb 5.2 oz)    Anthropometrics (Special Considerations)    RD Calculated BMI (kg/m2) 43.1    Body Mass Index (BMI)    BMI Grade greater than 40 - obesity grade III            Labs/Tests/Procedures/Meds       01/02/18 1305    Labs/Tests/Procedures/Meds    Labs/Tests Review Reviewed;Glucose;CO2    Medication Review Reviewed, pertinent;Diuretic;Steroid;Insulin;Antibiotic                Nutrition Prescription Ordered       01/02/18 1319    Nutrition Prescription PO    Common Modifiers Cardiac;Consistent Carbohydrate            Evaluation of Received Nutrient/Fluid Intake       01/02/18 1319    Fluid Intake Evaluation    Oral Fluid (mL) 513    Total Fluid Intake (mL) 513    PO Evaluation    Number of Meals 6    % PO Intake 92            Problem/Interventions:        Problem 1       01/02/18 1322    Nutrition Diagnoses Problem 1    Problem 1 Altered Nutrition Related to Labs    Etiology (related to) Medical Diagnosis    Signs/Symptoms (evidenced by) Biochemical    Specific Labs Noted Glucose                    Intervention Goal       01/02/18 1323    Intervention Goal    General --    PO Maintain intake            Nutrition Intervention       01/02/18 1324    Nutrition  Intervention    RD/Tech Action Follow Tx progress              Education/Evaluation       01/02/18 4787    Education    Education Other (comment)   No edu today, pt remembers previous edu, will follow.     Monitor/Evaluation    Monitor Per protocol;I&O;PO intake;Pertinent labs;Weight;Symptoms   No d/c needs at this time        Electronically signed by:  Rebekah Phoenix RD  01/02/18 1:25 PM

## 2018-01-02 NOTE — PROGRESS NOTES
"SERVICE: [facility] HOSPITALIST    CONSULTANTS: Pulmonary, cardiology    CHIEF COMPLAINT: follow up atrial fibrillation, respiratory failure, AECOPD, rhinovirus bronchitis, roseomonas pneumonia    SUBJECTIVE: The patient reports feeling much better today, improved air movement, less soa. She is tolerating po, up with assistance and feels her mobility has improved as well. She otherwise denies f/c/cough/soa/chest pain/n/v/d/abdominal pain or other new concerns.    OBJECTIVE:    /65 (BP Location: Right arm, Patient Position: Sitting)  Pulse 85  Temp 97.4 °F (36.3 °C) (Oral)   Resp 20  Ht 162.6 cm (64\")  Wt 114 kg (251 lb 1.6 oz)  LMP  (LMP Unknown)  SpO2 90%  BMI 43.1 kg/m2    MEDS/LABS REVIEWED AND ORDERED    acetaminophen 650 mg Oral TID   amiodarone 400 mg Oral Q24H   arformoterol 15 mcg Nebulization BID - RT   clopidogrel 75 mg Oral Daily   FLUoxetine 20 mg Oral Daily   guaiFENesin 1,200 mg Oral Q12H   hydrALAZINE 10 mg Intravenous Once   hydrochlorothiazide 25 mg Oral Daily   insulin aspart 0-24 Units Subcutaneous 4x Daily AC & at Bedtime   insulin detemir 25 Units Subcutaneous Nightly   ipratropium-albuterol 3 mL Nebulization 4x Daily - RT   levETIRAcetam 500 mg Oral Q12H   levoFLOXacin 750 mg Oral Q24H   lisinopril 40 mg Oral Daily   metFORMIN 500 mg Oral BID With Meals   predniSONE 60 mg Oral Daily With Breakfast   rivaroxaban 20 mg Oral Daily With Dinner   rosuvastatin 40 mg Oral Daily   verapamil  mg Oral Q12H     Physical Exam   Constitutional: She is oriented to person, place, and time. She appears well-developed and well-nourished.   obese   HENT:   Head: Normocephalic and atraumatic.   Eyes: EOM are normal. Pupils are equal, round, and reactive to light.   Cardiovascular: Normal rate.    irregular   Pulmonary/Chest: Effort normal.   Improved air movement, expiratory wheezes throughout, bibasilar crackles   Abdominal: Soft. Bowel sounds are normal. She exhibits no " distension. There is no tenderness.   obese   Musculoskeletal: She exhibits no edema.   Neurological: She is alert and oriented to person, place, and time.   Skin: Skin is dry. No erythema.   Psychiatric: She has a normal mood and affect. Her behavior is normal. Judgment and thought content normal.   Vitals reviewed.    LAB/DIAGNOSTICS:    Lab Results (last 24 hours)     Procedure Component Value Units Date/Time    POC Glucose Once [[id number]]  (Abnormal) Collected:  01/01/18 1641    Specimen:  Blood Updated:  01/01/18 1657     Glucose 240 (H) mg/dL     Narrative:       Meter: [id number] : [id number] Dylanumesh Jassoh NURSING ASSISTANT    POC Glucose Once [[id number]]  (Abnormal) Collected:  01/01/18 1923    Specimen:  Blood Updated:  01/01/18 1929     Glucose 282 (H) mg/dL     Narrative:       Meter: [id number] : [id number] Jackiechandana EsparzaFordeRadha JOSEPH    CBC & Differential [[id number]] Collected:  01/02/18 0629    Specimen:  Blood Updated:  01/02/18 0639    Narrative:       The following orders were created for panel order CBC & Differential.  Procedure                               Abnormality         Status                     ---------                               -----------         ------                     CBC Auto Differential[[id number]]        Abnormal            Final result                 Please view results for these tests on the individual orders.    CBC Auto Differential [[id number]]  (Abnormal) Collected:  01/02/18 0629    Specimen:  Blood Updated:  01/02/18 0639     WBC 17.47 (H) 10*3/mm3      RBC 4.28 10*6/mm3      Hemoglobin 12.5 g/dL      Hematocrit 38.6 %      MCV 90.2 fL      MCH 29.2 pg      MCHC 32.4 g/dL      RDW 14.4 %      RDW-SD 47.1 fl      MPV 10.0 fL      Platelets 249 10*3/mm3      Neutrophil % 77.5 (H) %      Lymphocyte % 12.1 (L) %      Monocyte % 7.7 %      Eosinophil % 0.0 %      Basophil % 0.2 %      Immature Grans % 2.5 (H) %      Neutrophils, Absolute 13.53 (H) 10*3/mm3       Lymphocytes, Absolute 2.11 10*3/mm3      Monocytes, Absolute 1.35 (H) 10*3/mm3      Eosinophils, Absolute 0.00 (L) 10*3/mm3      Basophils, Absolute 0.04 10*3/mm3      Immature Grans, Absolute 0.44 (H) 10*3/mm3      nRBC 0.0 /100 WBC     Basic Metabolic Panel [[id number]]  (Abnormal) Collected:  01/02/18 0629    Specimen:  Blood Updated:  01/02/18 0655     Glucose 145 (H) mg/dL      BUN 21 mg/dL      Creatinine 0.62 mg/dL      Sodium 144 mmol/L      Potassium 3.6 mmol/L      Chloride 101 mmol/L      CO2 34.5 (H) mmol/L      Calcium 8.1 (L) mg/dL      eGFR Non African Amer 96 mL/min/1.73      BUN/Creatinine Ratio 33.9 (H)     Anion Gap 8.5 mmol/L     Narrative:       GFR Normal >60  Chronic Kidney Disease <60  Kidney Failure <15    POC Glucose Once [[id number]]  (Normal) Collected:  01/02/18 0716    Specimen:  Blood Updated:  01/02/18 0723     Glucose 129 mg/dL     Narrative:       Meter: [id number] : [id number] Vikas Dobbins CNA Float        ASSESSMENT/PLAN:  1. Acute on chronic hypoxic respiratory failure: pulmonary following  2. Sepsis secondary to Acute Rhinovirus Bronchitis and bilateral lower lobe Roseomonas PNA:   3. AECOPD: Pulmonary following  Sepsis resolved, continue cefepime per culture data    Continues on 4 L Oxymizer, continue to titrate down as able  Continue oral steroids and taper, continue duo nebs, Brovana, mucinex/acapella   Oral levaquin nearly completed  Monitor, continues to improve daily     4. PAF with RVR: cardiology following  Some A-fib overnight after previously converted to NSR  Echo showed grade II diastolic dysfunction/hyperdynamic LV with EF>70%  Now on oral amio 400 mg daily/HCTZ 25 mg daily/lisinopril 40 mg daily/verapamil  mg daily/Xarelto   Off ASA,continued on plavix  Rate controlled  Daily EKGs     5. Leukocytosis: elevated secondary to steroids  Unchanged today at 17.47, procal low, afebrile      6. DM-2: A1c 7.5%  AM glucose at goal on levemir 25 units nightly (started  this admit)  Monitor closely and alter as needed  Continue metformin home dose, high dose SSI/Accuchecks ac/hs     7. Hypertension:   Intermittent elevation, meds adjusted as per number 4, monitor     8. Probable RANDOLPH: Pulmonary following and will order an outpatient sleep study after discharge.       9. Depression: no acute issues on home prozac       10. Dyslipidemia: no acute issues on home dose crestor.      11. Morbid Obesity: Nutrition to  prior to discharge.   Body mass index is 43.43 kg/(m^2).     12. Chronic back pain: No acute issues here.       13. Crohn's disease: Patient follows with Dr. Umaña and receives Remicade as an outpatient. No acute issues currently. Will need next dose deferred due to this current infection (will need to let Dr. Umaña's office know).       14. H/O CVA: continues on ASA, plavix, statin, no acute issues      Plan: downgrade to med-surg tele today

## 2018-01-03 LAB
ANION GAP SERPL CALCULATED.3IONS-SCNC: 7.6 MMOL/L
ARTERIAL PATENCY WRIST A: POSITIVE
ATMOSPHERIC PRESS: 744 MMHG
BASE EXCESS BLDA CALC-SCNC: 10.9 MMOL/L (ref 0–2)
BASOPHILS # BLD AUTO: 0.04 10*3/MM3 (ref 0–0.2)
BASOPHILS NFR BLD AUTO: 0.2 % (ref 0–2)
BDY SITE: ABNORMAL
BODY TEMPERATURE: 37 C
BUN BLD-MCNC: 21 MG/DL (ref 8–23)
BUN/CREAT SERPL: 36.2 (ref 7–25)
CALCIUM SPEC-SCNC: 8.4 MG/DL (ref 8.8–10.5)
CHLORIDE SERPL-SCNC: 96 MMOL/L (ref 98–107)
CO2 SERPL-SCNC: 33.4 MMOL/L (ref 22–29)
CREAT BLD-MCNC: 0.58 MG/DL (ref 0.57–1)
DEPRECATED RDW RBC AUTO: 46.8 FL (ref 37–54)
EOSINOPHIL # BLD AUTO: 0 10*3/MM3 (ref 0.1–0.3)
EOSINOPHIL NFR BLD AUTO: 0 % (ref 0–4)
ERYTHROCYTE [DISTWIDTH] IN BLOOD BY AUTOMATED COUNT: 14.3 % (ref 11.5–14.5)
GAS FLOW AIRWAY: 8 LPM
GFR SERPL CREATININE-BSD FRML MDRD: 103 ML/MIN/1.73
GLUCOSE BLD-MCNC: 202 MG/DL (ref 65–99)
GLUCOSE BLDC GLUCOMTR-MCNC: 169 MG/DL (ref 70–130)
GLUCOSE BLDC GLUCOMTR-MCNC: 183 MG/DL (ref 70–130)
GLUCOSE BLDC GLUCOMTR-MCNC: 201 MG/DL (ref 70–130)
GLUCOSE BLDC GLUCOMTR-MCNC: 286 MG/DL (ref 70–130)
HCO3 BLDA-SCNC: 35.1 MMOL/L (ref 20–26)
HCT VFR BLD AUTO: 39.7 % (ref 37–47)
HGB BLD-MCNC: 12.6 G/DL (ref 12–16)
HGB BLDA-MCNC: 13.6 G/DL (ref 13.5–17.5)
IMM GRANULOCYTES # BLD: 0.72 10*3/MM3 (ref 0–0.03)
IMM GRANULOCYTES NFR BLD: 4 % (ref 0–0.5)
LYMPHOCYTES # BLD AUTO: 1.7 10*3/MM3 (ref 0.6–4.8)
LYMPHOCYTES NFR BLD AUTO: 9.5 % (ref 20–45)
Lab: ABNORMAL
MCH RBC QN AUTO: 28.4 PG (ref 27–31)
MCHC RBC AUTO-ENTMCNC: 31.7 G/DL (ref 31–37)
MCV RBC AUTO: 89.4 FL (ref 81–99)
MODALITY: ABNORMAL
MONOCYTES # BLD AUTO: 0.93 10*3/MM3 (ref 0–1)
MONOCYTES NFR BLD AUTO: 5.2 % (ref 3–8)
NEUTROPHILS # BLD AUTO: 14.58 10*3/MM3 (ref 1.5–8.3)
NEUTROPHILS NFR BLD AUTO: 81.1 % (ref 45–70)
NRBC BLD MANUAL-RTO: 0 /100 WBC (ref 0–0)
PCO2 BLDA: 43.2 MM HG (ref 35–45)
PCO2 TEMP ADJ BLD: 43.2 MM HG (ref 35–45)
PH BLDA: 7.52 PH UNITS (ref 7.35–7.45)
PH, TEMP CORRECTED: 7.52 PH UNITS (ref 7.35–7.45)
PLATELET # BLD AUTO: 269 10*3/MM3 (ref 140–500)
PMV BLD AUTO: 10.2 FL (ref 7.4–10.4)
PO2 BLDA: 60.8 MM HG (ref 83–108)
PO2 TEMP ADJ BLD: 60.8 MM HG (ref 83–108)
POTASSIUM BLD-SCNC: 4.1 MMOL/L (ref 3.5–5.2)
RBC # BLD AUTO: 4.44 10*6/MM3 (ref 4.2–5.4)
SAO2 % BLDCOA: 92.2 % (ref 94–99)
SODIUM BLD-SCNC: 137 MMOL/L (ref 136–145)
VENTILATOR MODE: ABNORMAL
WBC NRBC COR # BLD: 17.97 10*3/MM3 (ref 4.8–10.8)

## 2018-01-03 PROCEDURE — 99232 SBSQ HOSP IP/OBS MODERATE 35: CPT | Performed by: INTERNAL MEDICINE

## 2018-01-03 PROCEDURE — 94799 UNLISTED PULMONARY SVC/PX: CPT

## 2018-01-03 PROCEDURE — 82803 BLOOD GASES ANY COMBINATION: CPT

## 2018-01-03 PROCEDURE — 94660 CPAP INITIATION&MGMT: CPT

## 2018-01-03 PROCEDURE — 63710000001 INSULIN DETEMIR PER 5 UNITS: Performed by: NURSE PRACTITIONER

## 2018-01-03 PROCEDURE — 82962 GLUCOSE BLOOD TEST: CPT

## 2018-01-03 PROCEDURE — 85025 COMPLETE CBC W/AUTO DIFF WBC: CPT | Performed by: INTERNAL MEDICINE

## 2018-01-03 PROCEDURE — 36600 WITHDRAWAL OF ARTERIAL BLOOD: CPT

## 2018-01-03 PROCEDURE — 63710000001 INSULIN ASPART PER 5 UNITS: Performed by: HOSPITALIST

## 2018-01-03 PROCEDURE — 94760 N-INVAS EAR/PLS OXIMETRY 1: CPT

## 2018-01-03 PROCEDURE — 93010 ELECTROCARDIOGRAM REPORT: CPT | Performed by: INTERNAL MEDICINE

## 2018-01-03 PROCEDURE — 80048 BASIC METABOLIC PNL TOTAL CA: CPT | Performed by: INTERNAL MEDICINE

## 2018-01-03 PROCEDURE — 63710000001 PREDNISONE PER 1 MG: Performed by: INTERNAL MEDICINE

## 2018-01-03 PROCEDURE — 93005 ELECTROCARDIOGRAM TRACING: CPT | Performed by: INTERNAL MEDICINE

## 2018-01-03 PROCEDURE — 99232 SBSQ HOSP IP/OBS MODERATE 35: CPT | Performed by: NURSE PRACTITIONER

## 2018-01-03 RX ADMIN — IPRATROPIUM BROMIDE AND ALBUTEROL SULFATE 3 ML: .5; 3 SOLUTION RESPIRATORY (INHALATION) at 06:54

## 2018-01-03 RX ADMIN — AMIODARONE HYDROCHLORIDE 400 MG: 200 TABLET ORAL at 08:50

## 2018-01-03 RX ADMIN — HYDROCHLOROTHIAZIDE 25 MG: 25 TABLET ORAL at 08:50

## 2018-01-03 RX ADMIN — ROSUVASTATIN CALCIUM 40 MG: 5 TABLET, FILM COATED ORAL at 08:50

## 2018-01-03 RX ADMIN — METFORMIN HYDROCHLORIDE 500 MG: 500 TABLET ORAL at 09:00

## 2018-01-03 RX ADMIN — GUAIFENESIN 1200 MG: 600 TABLET, EXTENDED RELEASE ORAL at 08:51

## 2018-01-03 RX ADMIN — CLOPIDOGREL 75 MG: 75 TABLET, FILM COATED ORAL at 08:51

## 2018-01-03 RX ADMIN — ARFORMOTEROL TARTRATE 15 MCG: 15 SOLUTION RESPIRATORY (INHALATION) at 06:54

## 2018-01-03 RX ADMIN — IPRATROPIUM BROMIDE AND ALBUTEROL SULFATE 3 ML: .5; 3 SOLUTION RESPIRATORY (INHALATION) at 19:54

## 2018-01-03 RX ADMIN — RIVAROXABAN 20 MG: 20 TABLET, FILM COATED ORAL at 17:27

## 2018-01-03 RX ADMIN — INSULIN ASPART 12 UNITS: 100 INJECTION, SOLUTION INTRAVENOUS; SUBCUTANEOUS at 12:29

## 2018-01-03 RX ADMIN — INSULIN ASPART 4 UNITS: 100 INJECTION, SOLUTION INTRAVENOUS; SUBCUTANEOUS at 22:05

## 2018-01-03 RX ADMIN — IPRATROPIUM BROMIDE AND ALBUTEROL SULFATE 3 ML: .5; 3 SOLUTION RESPIRATORY (INHALATION) at 14:52

## 2018-01-03 RX ADMIN — LEVETIRACETAM 500 MG: 500 TABLET, FILM COATED ORAL at 08:51

## 2018-01-03 RX ADMIN — ACETAMINOPHEN 650 MG: 325 TABLET, FILM COATED ORAL at 08:51

## 2018-01-03 RX ADMIN — METFORMIN HYDROCHLORIDE 500 MG: 500 TABLET ORAL at 17:27

## 2018-01-03 RX ADMIN — IPRATROPIUM BROMIDE AND ALBUTEROL SULFATE 3 ML: .5; 3 SOLUTION RESPIRATORY (INHALATION) at 11:17

## 2018-01-03 RX ADMIN — GUAIFENESIN 1200 MG: 600 TABLET, EXTENDED RELEASE ORAL at 22:04

## 2018-01-03 RX ADMIN — LEVETIRACETAM 500 MG: 500 TABLET, FILM COATED ORAL at 22:04

## 2018-01-03 RX ADMIN — LEVOFLOXACIN 750 MG: 750 TABLET, FILM COATED ORAL at 12:35

## 2018-01-03 RX ADMIN — ACETYLCYSTEINE 4 ML: 200 SOLUTION ORAL; RESPIRATORY (INHALATION) at 06:54

## 2018-01-03 RX ADMIN — LISINOPRIL 40 MG: 20 TABLET ORAL at 08:53

## 2018-01-03 RX ADMIN — ACETYLCYSTEINE 4 ML: 200 SOLUTION ORAL; RESPIRATORY (INHALATION) at 14:52

## 2018-01-03 RX ADMIN — ACETYLCYSTEINE 4 ML: 200 SOLUTION ORAL; RESPIRATORY (INHALATION) at 19:54

## 2018-01-03 RX ADMIN — ARFORMOTEROL TARTRATE 15 MCG: 15 SOLUTION RESPIRATORY (INHALATION) at 20:02

## 2018-01-03 RX ADMIN — VERAPAMIL HYDROCHLORIDE 240 MG: 240 TABLET, FILM COATED, EXTENDED RELEASE ORAL at 08:58

## 2018-01-03 RX ADMIN — PREDNISONE 60 MG: 20 TABLET ORAL at 08:50

## 2018-01-03 RX ADMIN — INSULIN ASPART 4 UNITS: 100 INJECTION, SOLUTION INTRAVENOUS; SUBCUTANEOUS at 08:51

## 2018-01-03 RX ADMIN — VERAPAMIL HYDROCHLORIDE 240 MG: 240 TABLET, FILM COATED, EXTENDED RELEASE ORAL at 22:04

## 2018-01-03 RX ADMIN — INSULIN DETEMIR 25 UNITS: 100 INJECTION, SOLUTION SUBCUTANEOUS at 22:05

## 2018-01-03 RX ADMIN — FLUOXETINE 20 MG: 20 CAPSULE ORAL at 08:51

## 2018-01-03 RX ADMIN — INSULIN ASPART 8 UNITS: 100 INJECTION, SOLUTION INTRAVENOUS; SUBCUTANEOUS at 17:25

## 2018-01-03 RX ADMIN — ACETYLCYSTEINE 4 ML: 200 SOLUTION ORAL; RESPIRATORY (INHALATION) at 11:15

## 2018-01-03 NOTE — PROGRESS NOTES
LOS: 10 days   Patient Care Team:  Vianey Barrios PA-C as PCP - General (Family Medicine)  Bhanu Mata MD as Consulting Physician (Cardiology)  Mann Hernadez MD as Consulting Physician (Pulmonary Disease)    Chief Complaint: AF       Interval History:  Still with ;some nasal congestion. No CP      Objective   Vital Signs  Temp:  [97.4 °F (36.3 °C)-97.8 °F (36.6 °C)] 97.6 °F (36.4 °C)  Heart Rate:  [69-96] 69  Resp:  [16-22] 20  BP: (128-161)/(61-85) 149/72    Intake/Output Summary (Last 24 hours) at 01/03/18 0824  Last data filed at 01/03/18 0301   Gross per 24 hour   Intake              960 ml   Output             1900 ml   Net             -940 ml           Physical Exam   Constitutional: She is oriented to person, place, and time.   obese   HENT:   Head: Normocephalic.   Mouth/Throat: Oropharynx is clear and moist.   Eyes: Conjunctivae and EOM are normal. Pupils are equal, round, and reactive to light.   Neck: Normal range of motion.   Cannot assess JVD due to body habitus   Cardiovascular: Normal rate, regular rhythm, normal heart sounds and intact distal pulses.  Frequent extrasystoles are present.   Pulmonary/Chest: Effort normal. She has wheezes. She has rhonchi.   Abdominal: Soft.   Cannot feel organs or aorta   Musculoskeletal: Normal range of motion. She exhibits no edema.   Neurological: She is alert and oriented to person, place, and time. No cranial nerve deficit.   Skin: Skin is warm and dry. No erythema.   Psychiatric: She has a normal mood and affect. Her behavior is normal. Judgment and thought content normal.   Vitals reviewed.      Results Review:        Results from last 7 days  Lab Units 01/03/18  0614 01/02/18  0629 01/01/18  0517   SODIUM mmol/L 137 144 142   POTASSIUM mmol/L 4.1 3.6 3.8   CHLORIDE mmol/L 96* 101 100   CO2 mmol/L 33.4* 34.5* 35.2*   BUN mg/dL 21 21 21   CREATININE mg/dL 0.58 0.62 0.60   GLUCOSE mg/dL 202* 145* 280*   CALCIUM mg/dL 8.4* 8.1* 8.1*           Results from  last 7 days  Lab Units 01/03/18  0614 01/02/18  0629 01/01/18  0517   WBC 10*3/mm3 17.97* 17.47* 17.75*   HEMOGLOBIN g/dL 12.6 12.5 12.1   HEMATOCRIT % 39.7 38.6 38.1   PLATELETS 10*3/mm3 269 249 284                       I reviewed the patient's new clinical results.  I personally viewed and interpreted the patient's EKG/Telemetry data        Medication Review:     acetaminophen 650 mg Oral TID   acetylcysteine 4 mL Nebulization 4x Daily - RT   amiodarone 400 mg Oral Q24H   arformoterol 15 mcg Nebulization BID - RT   clopidogrel 75 mg Oral Daily   FLUoxetine 20 mg Oral Daily   guaiFENesin 1,200 mg Oral Q12H   hydrALAZINE 10 mg Intravenous Once   hydrochlorothiazide 25 mg Oral Daily   insulin aspart 0-24 Units Subcutaneous 4x Daily AC & at Bedtime   insulin detemir 25 Units Subcutaneous Nightly   ipratropium-albuterol 3 mL Nebulization 4x Daily - RT   levETIRAcetam 500 mg Oral Q12H   levoFLOXacin 750 mg Oral Q24H   lisinopril 40 mg Oral Daily   metFORMIN 500 mg Oral BID With Meals   predniSONE 60 mg Oral Daily With Breakfast   rivaroxaban 20 mg Oral Daily With Dinner   rosuvastatin 40 mg Oral Daily   verapamil  mg Oral Q12H            Assessment/Plan     1.  Paroxysmal atrial fibrillation.  CHADSVASc = 5.  Echo with hyperdynamic LV systolic function.  Currently in SR, will continue current medical therapy. Anticoagulated with NOAC.    2.  COPD with exacerbation / pneumonia -  per pulmonary/primary team    3.  DM2    4.  Morbid obesity    5.  Suspected RANDOLPH    6.  HTN - somewhat better control, follow          Angelo Simmons III, MD  01/03/18  8:24 AM

## 2018-01-03 NOTE — PLAN OF CARE
Problem: Patient Care Overview (Adult)  Goal: Plan of Care Review  Outcome: Ongoing (interventions implemented as appropriate)   01/03/18 1605   Coping/Psychosocial Response Interventions   Plan Of Care Reviewed With patient   Patient Care Overview   Progress progress toward functional goals is gradual   Outcome Evaluation   Outcome Summary/Follow up Plan pt has been up in the chair most of the day. See ABG. cont to be on her o2 Oxmyzer , currently at 5l, sats 91% at present time. Monitor SR, rate 80-90. Shortness of breath noted upon minimal exertion. accuchecks have been 160-270. no nosebleeds noted today, xaralto cont, no bipap today, In good spirits.        Problem: Fall Risk (Adult)  Goal: Absence of Falls  Outcome: Ongoing (interventions implemented as appropriate)      Problem: Pain, Acute (Adult)  Goal: Acceptable Pain Control/Comfort Level  Outcome: Ongoing (interventions implemented as appropriate)      Problem: COPD, Chronic Bronchitis/Emphysema (Adult)  Goal: Signs and Symptoms of Listed Potential Problems Will be Absent or Manageable (COPD, Chronic Bronchitis/Emphysema)  Outcome: Ongoing (interventions implemented as appropriate)      Problem: Respiratory Insufficiency (Adult)  Goal: Acid/Base Balance  Outcome: Ongoing (interventions implemented as appropriate)

## 2018-01-03 NOTE — PROGRESS NOTES
"  Daily Progress Note.   [facility] ICU  1/3/2018    Patient:  Name:  Mar Camilo  MRN:  [id number]  1950  67 y.o.  female       HPI:  67-year-old female who saw Dr. Hernadez in the office over a year ago.  She has been having cough with greenish phlegm for the past 1 week.  She has had a fever for the past 2 days.  She has been extremely short of breath with minimal exertion.  At baseline she can ambulatory around the house even without oxygen.  She is on 2 to a half liters of oxygen at home.  She is currently requiring much higher flows of oxygen.  She quit smoking in 2014.  He does have a smoker.  She denies any chest pain but reports chest tightness.     She denies any personal history of cancer or heart disease.  She reports a diagnosis of diabetes which worsens in the hospital because of IV steroids.  She had pneumonia 3 or 4 years ago.  She denies any leg swelling.  She's been getting nauseous with a duo nebs but is doing okay with the albuterol nebs.  She has underlying ulcerative colitis.  She reports daytime sleepiness but has never been checked for obstructive sleep apnea.     Interval History:  States she is feeling better   She is upright in chair smiling, no resp distress.  Felt like the \"smelly stuff\" helped her.    Physical Exam:  /65 (BP Location: Right arm, Patient Position: Lying)  Pulse 93  Temp 98.2 °F (36.8 °C) (Oral)   Resp 24  Ht 162.6 cm (64\")  Wt 114 kg (251 lb 1.6 oz)  LMP  (LMP Unknown)  SpO2 92%  BMI 43.1 kg/m2  Body mass index is 43.1 kg/(m^2).    Intake/Output Summary (Last 24 hours) at 01/03/18 1319  Last data filed at 01/03/18 0900   Gross per 24 hour   Intake              780 ml   Output             1900 ml   Net            -1120 ml     GENERAL:  On nc Aox3  HEENT:  EOMI, nonicteric sclera, no JVD  PULMONARY:    No conversational dyspnea, unlabored resp effort, no rhonchi no exp wheeze   CARDIAC:  tachy  ABD: truncal obesity BS+  EXT: no c/c/e, " pulses symmetric 2+ bilaterally  NEURO:  CNs II-XII intact, no focal deficits  SKIN: no lesions, no rash  PSYCH: appropriate mood    Data Review:    Notable Labs:    Results from last 7 days  Lab Units 01/03/18  0614 01/02/18  0629 01/01/18  0517 12/31/17  0524 12/30/17  0615 12/29/17  0650 12/28/17  0430   WBC 10*3/mm3 17.97* 17.47* 17.75* 16.52* 13.15* 14.67* 13.68*   HEMOGLOBIN g/dL 12.6 12.5 12.1 12.4 11.7* 11.6* 11.2*   PLATELETS 10*3/mm3 269 249 284 316 270 280 304       Results from last 7 days  Lab Units 01/03/18  0614 01/02/18  0629 01/01/18  0517 12/31/17  0524 12/30/17  0615 12/29/17  0650 12/28/17  0430   SODIUM mmol/L 137 144 142 141 139 135* 138   POTASSIUM mmol/L 4.1 3.6 3.8 3.8 3.9 4.4 4.4   CHLORIDE mmol/L 96* 101 100 98 98 98 101   CO2 mmol/L 33.4* 34.5* 35.2* 33.1* 32.5* 27.1 25.6   BUN mg/dL 21 21 21 20 20 31* 29*   CREATININE mg/dL 0.58 0.62 0.60 0.60 0.59 0.73 0.72   GLUCOSE mg/dL 202* 145* 280* 285* 252* 324* 335*   CALCIUM mg/dL 8.4* 8.1* 8.1* 8.2* 8.3* 8.5* 8.6*   Estimated Creatinine Clearance: 83.3 mL/min (by C-G formula based on Cr of 0.58).    Micro:Roseomonas gilardii (A)  +rvp  Imaging:  Ct chest 12.28.2017 reviewed - no pe, horrible bilateral emphysema, bilateral trace effusions, pneumonia (personally reviewed imaging)    Scheduled meds:      acetaminophen 650 mg Oral TID   acetylcysteine 4 mL Nebulization 4x Daily - RT   amiodarone 400 mg Oral Q24H   arformoterol 15 mcg Nebulization BID - RT   clopidogrel 75 mg Oral Daily   FLUoxetine 20 mg Oral Daily   guaiFENesin 1,200 mg Oral Q12H   hydrALAZINE 10 mg Intravenous Once   hydrochlorothiazide 25 mg Oral Daily   insulin aspart 0-24 Units Subcutaneous 4x Daily AC & at Bedtime   insulin detemir 25 Units Subcutaneous Nightly   ipratropium-albuterol 3 mL Nebulization 4x Daily - RT   levETIRAcetam 500 mg Oral Q12H   lisinopril 40 mg Oral Daily   metFORMIN 500 mg Oral BID With Meals   predniSONE 60 mg Oral Daily With Breakfast   rivaroxaban  20 mg Oral Daily With Dinner   rosuvastatin 40 mg Oral Daily   verapamil  mg Oral Q12H       ASSESSMENT  /  PLAN:  Acute on chronic hypoxic respiratory failure  Chronic hypoxia on home oxygen  Rhinovirus viral bronchitis  Bacterial Pneumonia vs colonization  COPD with exacerbation   Suspected obstructive sleep apnea  Morbid obesity   Hyperglycemia     Plan of Treatment  PCCM Problems  Acute hypoxic respiratory failure  Bilateral pleural effusion  Bacteria CAP  Chronic hypoxia on home oxygen  RSV bronchitis  COPD with exacerbation   Suspected obstructive sleep apnea  Morbid obesity  Relevant Medical Diagnoses  Pafib  DM2  HTN    Plan of Treatment  She has no wheezing and has decreasing oxygen requirements.  Continue same.  LVQ to complete course  She requires outpatient sleep study evaluation.  This has been ordered previously.  She will need follow-up in the sleep lab after.      Likely underlying sleep apnea.  Will need outpatient sleep study.    D/w pt and primary team.      Nirmal Ashley MD  [facility]  01/03/18  1:43 PM    EMR Dragon/Transcription disclaimer:   Much of this encounter note is an electronic transcription/translation of spoken language to printed text. The electronic translation of spoken language may permit erroneous, or at times, nonsensical words or phrases to be inadvertently transcribed; Although I have reviewed the note for such errors, some may still exist.

## 2018-01-03 NOTE — PLAN OF CARE
Problem: Patient Care Overview (Adult)  Goal: Plan of Care Review  Outcome: Ongoing (interventions implemented as appropriate)   01/03/18 0114   Coping/Psychosocial Response Interventions   Plan Of Care Reviewed With patient   Patient Care Overview   Progress improving   Outcome Evaluation   Outcome Summary/Follow up Plan continue nebs and bipap at night       Problem: COPD, Chronic Bronchitis/Emphysema (Adult)  Intervention: Optimize Oxygenation/Ventilation/Perfusion   01/03/18 0114   Respiratory Interventions   Airway/Ventilation Management airway patency maintained;pulmonary hygiene promoted   Promote Aggressive Pulmonary Hygiene/Secretion Management   Cough And Deep Breathing done independently per patient     Intervention: Reduce/Relieve Breathlessness   01/03/18 0114   Respiratory Interventions   Breathing Techniques/Airway Clearance pursed-lip breathing encouraged   Positioning   Head Of Bed (HOB) Position HOB elevated

## 2018-01-03 NOTE — PROGRESS NOTES
"SERVICE: [facility] HOSPITALIST    CONSULTANTS: Pulmonary/cardiology/nutrition    CHIEF COMPLAINT: f/u A-fib, AECOPD/respiratory failure/roseomonas pneumonia/rhinovirus bronchitis    SUBJECTIVE: The patient reports continued daily improvement in breathing and in exertional dyspnea. She hollars \"I'm going home, not to any rehab, you can just unhook this oxygen and I'll figure out how to deal with it, I'm going home. Dr. Ashely said I'll be home by Friday\".   She otherwise denies f/c/chest pain/n/v/d/abdominal pain or other new concerns.    On 7 liter oxymizer at time of my exam.    OBJECTIVE:    /65 (BP Location: Right arm, Patient Position: Lying)  Pulse 90  Temp 98.2 °F (36.8 °C) (Oral)   Resp 20  Ht 162.6 cm (64\")  Wt 114 kg (251 lb 1.6 oz)  LMP  (LMP Unknown)  SpO2 92%  BMI 43.1 kg/m2    MEDS/LABS REVIEWED AND ORDERED    acetaminophen 650 mg Oral TID   acetylcysteine 4 mL Nebulization 4x Daily - RT   amiodarone 400 mg Oral Q24H   arformoterol 15 mcg Nebulization BID - RT   clopidogrel 75 mg Oral Daily   FLUoxetine 20 mg Oral Daily   guaiFENesin 1,200 mg Oral Q12H   hydrALAZINE 10 mg Intravenous Once   hydrochlorothiazide 25 mg Oral Daily   insulin aspart 0-24 Units Subcutaneous 4x Daily AC & at Bedtime   insulin detemir 25 Units Subcutaneous Nightly   ipratropium-albuterol 3 mL Nebulization 4x Daily - RT   levETIRAcetam 500 mg Oral Q12H   lisinopril 40 mg Oral Daily   metFORMIN 500 mg Oral BID With Meals   predniSONE 60 mg Oral Daily With Breakfast   rivaroxaban 20 mg Oral Daily With Dinner   rosuvastatin 40 mg Oral Daily   verapamil  mg Oral Q12H     Physical Exam   Constitutional: She is oriented to person, place, and time. She appears well-developed and well-nourished.   obese   HENT:   Head: Normocephalic and atraumatic.   Eyes: Pupils are equal, round, and reactive to light.   Cardiovascular: Normal rate, regular rhythm and normal heart sounds.    Pulmonary/Chest: Effort " normal.   Diminished, scattered expiratory wheezes R>L   Abdominal: Soft. Bowel sounds are normal. She exhibits no distension. There is no tenderness.   obese   Musculoskeletal: She exhibits no edema.   Neurological: She is alert and oriented to person, place, and time.   Skin: Skin is warm and dry.   Psychiatric:   agitated   Vitals reviewed.    LAB/DIAGNOSTICS:    Lab Results (last 24 hours)     Procedure Component Value Units Date/Time    POC Glucose Once [[id number]]  (Abnormal) Collected:  01/02/18 1700    Specimen:  Blood Updated:  01/02/18 1707     Glucose 286 (H) mg/dL     Narrative:       Meter: [id number] : [id number] Vikas Dobbins RAÚLCECELIA Float    POC Glucose Once [[id number]]  (Abnormal) Collected:  01/02/18 2026    Specimen:  Blood Updated:  01/02/18 2033     Glucose 322 (H) mg/dL     Narrative:       Meter: [id number] : [id number] Bong Olivier NURSING ASSISTANT    CBC & Differential [[id number]] Collected:  01/03/18 0614    Specimen:  Blood Updated:  01/03/18 0623    Narrative:       The following orders were created for panel order CBC & Differential.  Procedure                               Abnormality         Status                     ---------                               -----------         ------                     CBC Auto Differential[[id number]]        Abnormal            Final result                 Please view results for these tests on the individual orders.    CBC Auto Differential [[id number]]  (Abnormal) Collected:  01/03/18 0614    Specimen:  Blood Updated:  01/03/18 0623     WBC 17.97 (H) 10*3/mm3      RBC 4.44 10*6/mm3      Hemoglobin 12.6 g/dL      Hematocrit 39.7 %      MCV 89.4 fL      MCH 28.4 pg      MCHC 31.7 g/dL      RDW 14.3 %      RDW-SD 46.8 fl      MPV 10.2 fL      Platelets 269 10*3/mm3      Neutrophil % 81.1 (H) %      Lymphocyte % 9.5 (L) %      Monocyte % 5.2 %      Eosinophil % 0.0 %      Basophil % 0.2 %      Immature Grans % 4.0 (H) %      Neutrophils, Absolute  14.58 (H) 10*3/mm3      Lymphocytes, Absolute 1.70 10*3/mm3      Monocytes, Absolute 0.93 10*3/mm3      Eosinophils, Absolute 0.00 (L) 10*3/mm3      Basophils, Absolute 0.04 10*3/mm3      Immature Grans, Absolute 0.72 (H) 10*3/mm3      nRBC 0.0 /100 WBC     Basic Metabolic Panel [[id number]]  (Abnormal) Collected:  01/03/18 0614    Specimen:  Blood Updated:  01/03/18 0638     Glucose 202 (H) mg/dL      BUN 21 mg/dL      Creatinine 0.58 mg/dL      Sodium 137 mmol/L      Potassium 4.1 mmol/L      Chloride 96 (L) mmol/L      CO2 33.4 (H) mmol/L      Calcium 8.4 (L) mg/dL      eGFR Non African Amer 103 mL/min/1.73      BUN/Creatinine Ratio 36.2 (H)     Anion Gap 7.6 mmol/L     Narrative:       GFR Normal >60  Chronic Kidney Disease <60  Kidney Failure <15    POC Glucose Once [[id number]]  (Abnormal) Collected:  01/03/18 0759    Specimen:  Blood Updated:  01/03/18 0815     Glucose 169 (H) mg/dL     Narrative:       Meter: [id number] : [id number] Simeon Daniels RN    Blood Gas, Arterial [[id number]]  (Abnormal) Collected:  01/03/18 0911    Specimen:  Arterial Blood Updated:  01/03/18 0913     Site Right Radial     Dylan's Test Positive     pH, Arterial 7.518 (H) pH units      pCO2, Arterial 43.2 mm Hg      pO2, Arterial 60.8 (L) mm Hg      HCO3, Arterial 35.1 (H) mmol/L      Base Excess, Arterial 10.9 (H) mmol/L      O2 Saturation, Arterial 92.2 (L) %      Hemoglobin, Blood Gas 13.6 g/dL      Temperature 37.0 C      pH, Temp Corrected 7.518 (H) pH Units      pCO2, Temperature Corrected 43.2 mm Hg      pO2, Temperature Corrected 60.8 (L) mm Hg      Barometric Pressure for Blood Gas 744 mmHg      Modality Oxymizer     Flow Rate 8.0 lpm      Ventilator Mode NA     Collected by [id number]    POC Glucose Once [[id number]]  (Abnormal) Collected:  01/03/18 1224    Specimen:  Blood Updated:  01/03/18 1249     Glucose 286 (H) mg/dL     Narrative:       Meter: [id number] : [id number] Simeon Daniels RN        ASSESSMENT/PLAN:  1. Acute on  chronic hypoxic respiratory failure: pulmonary following  2. Sepsis secondary to Acute Rhinovirus Bronchitis and bilateral lower lobe Roseomonas PNA: resolving   3. AECOPD: Pulmonary following  Continues on Levaquin orally per culture data    ABG with hypoxia, metabolic alkalosis on 8 liter oxymizer  Continues on 4-7 L Oxymizer, continue to titrate down as able-not much improvement since yesterday-will be unable to discharge at this oxygen requirement  Continue oral steroids and taper, continue duo nebs, Brovana, mucinex/acapella   Oral levaquin nearly completed  Monitor, continues to improve daily      4. PAF with RVR: cardiology following  Rate controlled on oral amio 400 mg daily/HCTZ 25 mg daily/lisinopril 40 mg daily/verapamil  mg daily/Xarelto 20 mg daily/plavix 75 mg daily  Echo showed grade II diastolic dysfunction/hyperdynamic LV with EF>70%  Daily EKGs      5. Leukocytosis: elevated secondary to steroids  Unchanged today at 17.97, procal low, afebrile      6. DM-2: A1c 7.5%  AM glucose at goal on levemir 25 units nightly (started this admit)  Monitor closely and alter as needed  Continue metformin home dose, high dose SSI/Accuchecks ac/hs      7. Hypertension:   Intermittent elevation, meds adjusted as per number 4, monitor      8. Probable RANDOLPH: Pulmonary following and will order an outpatient sleep study after discharge.     Ongoing diagnoses unchanged:       9. Depression: no acute issues on home prozac       10. Dyslipidemia: no acute issues on home dose crestor.       11. Morbid Obesity: Nutrition to  prior to discharge.   Body mass index is 43.43 kg/(m^2).      12. Chronic back pain: No acute issues here.       13. Crohn's disease: Patient follows with Dr. Umaña and receives Remicade as an outpatient. No acute issues currently. Will need next dose deferred due to this current infection (will need to let Dr. Umaña's office know).       14. H/O CVA: continues on ASA, plavix,  statin, no acute issues

## 2018-01-03 NOTE — PLAN OF CARE
Problem: Patient Care Overview (Adult)  Goal: Plan of Care Review  Outcome: Ongoing (interventions implemented as appropriate)   01/03/18 0337   Patient Care Overview   Progress improving   Outcome Evaluation   Outcome Summary/Follow up Plan rested on bipap overnight,now on po amiodarone- intermit. episodes of a-fib noted -uncontrolled rate unsustained.. b/p has improved with med changes      Goal: Adult Individualization and Mutuality  Outcome: Ongoing (interventions implemented as appropriate)    Goal: Discharge Needs Assessment  Outcome: Ongoing (interventions implemented as appropriate)      Problem: Fall Risk (Adult)  Goal: Absence of Falls  Outcome: Ongoing (interventions implemented as appropriate)   01/03/18 0337   Fall Risk (Adult)   Absence of Falls making progress toward outcome       Problem: Pain, Acute (Adult)  Goal: Acceptable Pain Control/Comfort Level  Outcome: Ongoing (interventions implemented as appropriate)   01/03/18 0337   Pain, Acute (Adult)   Acceptable Pain Control/Comfort Level making progress toward outcome       Problem: COPD, Chronic Bronchitis/Emphysema (Adult)  Goal: Signs and Symptoms of Listed Potential Problems Will be Absent or Manageable (COPD, Chronic Bronchitis/Emphysema)  Outcome: Ongoing (interventions implemented as appropriate)   01/03/18 0337   COPD, Chronic Bronchitis/Emphysema   Problems Assessed (COPD, Chronic Bronchitis/Emphysema) all   Problems Present (COPD, Chronic Bronchitis/Emphysema) dyspnea;hypoxia/hypoxemia;situational response       Problem: Respiratory Insufficiency (Adult)  Goal: Acid/Base Balance  Outcome: Ongoing (interventions implemented as appropriate)   01/03/18 0337   Respiratory Insufficiency (Adult)   Acid/Base Balance making progress toward outcome     Goal: Effective Ventilation  Outcome: Ongoing (interventions implemented as appropriate)   01/03/18 0337   Respiratory Insufficiency (Adult)   Effective Ventilation making progress toward outcome

## 2018-01-04 ENCOUNTER — APPOINTMENT (OUTPATIENT)
Dept: GENERAL RADIOLOGY | Facility: HOSPITAL | Age: 68
End: 2018-01-04

## 2018-01-04 ENCOUNTER — ANESTHESIA EVENT (OUTPATIENT)
Dept: PERIOP | Facility: HOSPITAL | Age: 68
End: 2018-01-04

## 2018-01-04 LAB
ANION GAP SERPL CALCULATED.3IONS-SCNC: 8.2 MMOL/L
BUN BLD-MCNC: 28 MG/DL (ref 8–23)
BUN/CREAT SERPL: 40.6 (ref 7–25)
CALCIUM SPEC-SCNC: 9 MG/DL (ref 8.8–10.5)
CHLORIDE SERPL-SCNC: 96 MMOL/L (ref 98–107)
CO2 SERPL-SCNC: 33.8 MMOL/L (ref 22–29)
CREAT BLD-MCNC: 0.69 MG/DL (ref 0.57–1)
DEPRECATED RDW RBC AUTO: 46.9 FL (ref 37–54)
ERYTHROCYTE [DISTWIDTH] IN BLOOD BY AUTOMATED COUNT: 14.6 % (ref 11.5–14.5)
GFR SERPL CREATININE-BSD FRML MDRD: 85 ML/MIN/1.73
GLUCOSE BLD-MCNC: 161 MG/DL (ref 65–99)
GLUCOSE BLDC GLUCOMTR-MCNC: 260 MG/DL (ref 70–130)
GLUCOSE BLDC GLUCOMTR-MCNC: 264 MG/DL (ref 70–130)
GLUCOSE BLDC GLUCOMTR-MCNC: 278 MG/DL (ref 70–130)
HCT VFR BLD AUTO: 37.6 % (ref 37–47)
HGB BLD-MCNC: 12.3 G/DL (ref 12–16)
LYMPHOCYTES # BLD MANUAL: 2.28 10*3/MM3 (ref 0.6–4.8)
LYMPHOCYTES NFR BLD MANUAL: 10 % (ref 20–45)
LYMPHOCYTES NFR BLD MANUAL: 5 % (ref 3–8)
MCH RBC QN AUTO: 29 PG (ref 27–31)
MCHC RBC AUTO-ENTMCNC: 32.7 G/DL (ref 31–37)
MCV RBC AUTO: 88.7 FL (ref 81–99)
MONOCYTES # BLD AUTO: 1.14 10*3/MM3 (ref 0–1)
NEUTROPHILS # BLD AUTO: 18.67 10*3/MM3 (ref 1.5–8.3)
NEUTROPHILS NFR BLD MANUAL: 82 % (ref 45–70)
PLATELET # BLD AUTO: 266 10*3/MM3 (ref 140–500)
PMV BLD AUTO: 10.9 FL (ref 7.4–10.4)
POTASSIUM BLD-SCNC: 3.9 MMOL/L (ref 3.5–5.2)
PROCALCITONIN SERPL-MCNC: 0.05 NG/ML (ref 0.1–0.25)
RBC # BLD AUTO: 4.24 10*6/MM3 (ref 4.2–5.4)
RBC MORPH BLD: NORMAL
SCAN SLIDE: NORMAL
SMALL PLATELETS BLD QL SMEAR: ADEQUATE
SODIUM BLD-SCNC: 138 MMOL/L (ref 136–145)
VARIANT LYMPHS NFR BLD MANUAL: 3 % (ref 0–5)
WBC MORPH BLD: NORMAL
WBC NRBC COR # BLD: 22.77 10*3/MM3 (ref 4.8–10.8)

## 2018-01-04 PROCEDURE — 94799 UNLISTED PULMONARY SVC/PX: CPT

## 2018-01-04 PROCEDURE — 85007 BL SMEAR W/DIFF WBC COUNT: CPT | Performed by: INTERNAL MEDICINE

## 2018-01-04 PROCEDURE — 85025 COMPLETE CBC W/AUTO DIFF WBC: CPT | Performed by: INTERNAL MEDICINE

## 2018-01-04 PROCEDURE — 71046 X-RAY EXAM CHEST 2 VIEWS: CPT

## 2018-01-04 PROCEDURE — 63710000001 INSULIN DETEMIR PER 5 UNITS: Performed by: NURSE PRACTITIONER

## 2018-01-04 PROCEDURE — 80048 BASIC METABOLIC PNL TOTAL CA: CPT | Performed by: INTERNAL MEDICINE

## 2018-01-04 PROCEDURE — 99232 SBSQ HOSP IP/OBS MODERATE 35: CPT | Performed by: INTERNAL MEDICINE

## 2018-01-04 PROCEDURE — 93010 ELECTROCARDIOGRAM REPORT: CPT | Performed by: INTERNAL MEDICINE

## 2018-01-04 PROCEDURE — 82962 GLUCOSE BLOOD TEST: CPT

## 2018-01-04 PROCEDURE — 94660 CPAP INITIATION&MGMT: CPT

## 2018-01-04 PROCEDURE — 93005 ELECTROCARDIOGRAM TRACING: CPT | Performed by: INTERNAL MEDICINE

## 2018-01-04 PROCEDURE — 99232 SBSQ HOSP IP/OBS MODERATE 35: CPT | Performed by: NURSE PRACTITIONER

## 2018-01-04 PROCEDURE — 63710000001 INSULIN ASPART PER 5 UNITS: Performed by: HOSPITALIST

## 2018-01-04 PROCEDURE — 84145 PROCALCITONIN (PCT): CPT | Performed by: NURSE PRACTITIONER

## 2018-01-04 PROCEDURE — 63710000001 PREDNISONE PER 1 MG: Performed by: INTERNAL MEDICINE

## 2018-01-04 RX ORDER — PREDNISONE 20 MG/1
40 TABLET ORAL
Status: DISCONTINUED | OUTPATIENT
Start: 2018-01-05 | End: 2018-01-06 | Stop reason: HOSPADM

## 2018-01-04 RX ADMIN — VERAPAMIL HYDROCHLORIDE 240 MG: 240 TABLET, FILM COATED, EXTENDED RELEASE ORAL at 22:33

## 2018-01-04 RX ADMIN — ARFORMOTEROL TARTRATE 15 MCG: 15 SOLUTION RESPIRATORY (INHALATION) at 19:06

## 2018-01-04 RX ADMIN — ROSUVASTATIN CALCIUM 40 MG: 5 TABLET, FILM COATED ORAL at 08:58

## 2018-01-04 RX ADMIN — PREDNISONE 60 MG: 20 TABLET ORAL at 08:57

## 2018-01-04 RX ADMIN — AMIODARONE HYDROCHLORIDE 400 MG: 200 TABLET ORAL at 08:58

## 2018-01-04 RX ADMIN — HYDROCODONE BITARTRATE AND ACETAMINOPHEN 1 TABLET: 5; 325 TABLET ORAL at 11:13

## 2018-01-04 RX ADMIN — IPRATROPIUM BROMIDE AND ALBUTEROL SULFATE 3 ML: .5; 3 SOLUTION RESPIRATORY (INHALATION) at 19:06

## 2018-01-04 RX ADMIN — INSULIN DETEMIR 25 UNITS: 100 INJECTION, SOLUTION SUBCUTANEOUS at 22:35

## 2018-01-04 RX ADMIN — GUAIFENESIN 1200 MG: 600 TABLET, EXTENDED RELEASE ORAL at 22:33

## 2018-01-04 RX ADMIN — INSULIN ASPART 12 UNITS: 100 INJECTION, SOLUTION INTRAVENOUS; SUBCUTANEOUS at 22:35

## 2018-01-04 RX ADMIN — ARFORMOTEROL TARTRATE 15 MCG: 15 SOLUTION RESPIRATORY (INHALATION) at 09:47

## 2018-01-04 RX ADMIN — ACETAMINOPHEN 650 MG: 325 TABLET, FILM COATED ORAL at 17:46

## 2018-01-04 RX ADMIN — IPRATROPIUM BROMIDE AND ALBUTEROL SULFATE 3 ML: .5; 3 SOLUTION RESPIRATORY (INHALATION) at 11:49

## 2018-01-04 RX ADMIN — IPRATROPIUM BROMIDE AND ALBUTEROL SULFATE 3 ML: .5; 3 SOLUTION RESPIRATORY (INHALATION) at 08:20

## 2018-01-04 RX ADMIN — FLUOXETINE 20 MG: 20 CAPSULE ORAL at 08:57

## 2018-01-04 RX ADMIN — INSULIN ASPART 12 UNITS: 100 INJECTION, SOLUTION INTRAVENOUS; SUBCUTANEOUS at 17:46

## 2018-01-04 RX ADMIN — ACETYLCYSTEINE 4 ML: 200 SOLUTION ORAL; RESPIRATORY (INHALATION) at 11:50

## 2018-01-04 RX ADMIN — LEVETIRACETAM 500 MG: 500 TABLET, FILM COATED ORAL at 22:34

## 2018-01-04 RX ADMIN — VERAPAMIL HYDROCHLORIDE 240 MG: 240 TABLET, FILM COATED, EXTENDED RELEASE ORAL at 08:57

## 2018-01-04 RX ADMIN — GUAIFENESIN 1200 MG: 600 TABLET, EXTENDED RELEASE ORAL at 08:57

## 2018-01-04 RX ADMIN — ACETYLCYSTEINE 4 ML: 200 SOLUTION ORAL; RESPIRATORY (INHALATION) at 15:49

## 2018-01-04 RX ADMIN — LISINOPRIL 40 MG: 20 TABLET ORAL at 08:57

## 2018-01-04 RX ADMIN — HYDROCHLOROTHIAZIDE 25 MG: 25 TABLET ORAL at 08:57

## 2018-01-04 RX ADMIN — ACETYLCYSTEINE 4 ML: 200 SOLUTION ORAL; RESPIRATORY (INHALATION) at 19:06

## 2018-01-04 RX ADMIN — METFORMIN HYDROCHLORIDE 500 MG: 500 TABLET ORAL at 08:58

## 2018-01-04 RX ADMIN — LEVETIRACETAM 500 MG: 500 TABLET, FILM COATED ORAL at 08:57

## 2018-01-04 RX ADMIN — METFORMIN HYDROCHLORIDE 500 MG: 500 TABLET ORAL at 17:46

## 2018-01-04 RX ADMIN — CLOPIDOGREL 75 MG: 75 TABLET, FILM COATED ORAL at 08:57

## 2018-01-04 RX ADMIN — ACETYLCYSTEINE 4 ML: 200 SOLUTION ORAL; RESPIRATORY (INHALATION) at 08:20

## 2018-01-04 RX ADMIN — IPRATROPIUM BROMIDE AND ALBUTEROL SULFATE 3 ML: .5; 3 SOLUTION RESPIRATORY (INHALATION) at 15:49

## 2018-01-04 RX ADMIN — INSULIN ASPART 12 UNITS: 100 INJECTION, SOLUTION INTRAVENOUS; SUBCUTANEOUS at 12:40

## 2018-01-04 NOTE — PROGRESS NOTES
[facility]  Phone: [phone]  Gurmeet Rosa MD    Subjective:  LOS: 11    She continues to require high flow oxygen.  She is very keen to go home (upset that she can't). Expectorating some with the mucomyst.    Objective   Vital Signs past 24hrs  BP range: BP: (112-156)/(56-71) 120/60  Pulse range: Heart Rate:  [75-93] 75  Resp rate range: Resp:  [20-24] 20  Temp range: Temp (24hrs), Av.5 °F (36.4 °C), Min:96.4 °F (35.8 °C), Max:98.4 °F (36.9 °C)    O2 Device: other (see comments)Flow (L/min):  [2-7] 4  Oxygen range:SpO2:  [90 %-96 %] 91 %   114 kg (251 lb 1.6 oz); Body mass index is 43.1 kg/(m^2).    Intake/Output Summary (Last 24 hours) at 18 0931  Last data filed at 18 0900   Gross per 24 hour   Intake              720 ml   Output              500 ml   Net              220 ml       Physical Exam   Constitutional: She appears well-developed.   Eyes: Conjunctivae are normal.   Neck: Normal range of motion. Neck supple.   Cardiovascular: Normal rate and regular rhythm.    No murmur heard.  Pulmonary/Chest: Effort normal. No respiratory distress. She has decreased breath sounds (nearly absent in right lung base). She has no wheezes. She has no rales.   Abdominal: Soft. Bowel sounds are normal. She exhibits no mass. There is no tenderness.   Musculoskeletal: She exhibits no edema.   Neurological: She is alert.   Skin: Skin is warm. No rash noted.     Results Review:    I have reviewed the laboratory and imaging data since the last note by [facility] physician.  My annotations are noted in assessment and plan.    Medication Review:  I have reviewed the current MAR.  My annotations are noted in assessment and plan.       Plan   PCCM Problems  Acute hypoxic respiratory failure  Bilateral pleural effusion  Bibasilar atelectasis vs pneumonia  Chronic hypoxia on home oxygen  RSV bronchitis  COPD with exacerbation   Suspected obstructive sleep apnea  Morbid obesity  Relevant Medical  Diagnoses  Pafib  DM2  HTN    Plan of Treatment  She is still requiring high flows of oxygen.  She has been on acetylcysteine.  In view of persistent high O2, bronch at 7:30AM tomorrow. Discussed with patient. Lucia salgado for same and discussed with cardiology.    Wheezing is improving. Will reduce dose of prednisone.    She has completed levaquin for pneumonia. Today nearly absent breath sounds in RLL. Check CxR. Plan bronch tomorrow as above. Eventually needs fu CT in 8-10 weeks.    She requires outpatient sleep study evaluation.  This has been ordered previously.  She will need follow-up in the sleep lab after.    Gurmeet Rosa MD  01/04/18  9:31 AM    Part of this note may be an electronic transcription/translation of spoken language to printed text using the Dragon Dictation System.

## 2018-01-04 NOTE — SIGNIFICANT NOTE
01/04/18 1034   Rehab Treatment   Discipline physical therapist   Rehab Evaluation   Evaluation Not Performed other (see comments)  (Spoke with pt at length. At baseline pt ambualtes approxiamtely 50 ft at a time. She has been observed by PT previously ambulating in room and reports she has been getting up to the chair/bathroom w/out device, concern, or assist througout hospital stay. Will hold PT evaluation until following bronch, planned for tomorrow morning.)

## 2018-01-04 NOTE — PLAN OF CARE
Problem: COPD, Chronic Bronchitis/Emphysema (Adult)  Intervention: Optimize Oxygenation/Ventilation/Perfusion   01/04/18 0327   Respiratory Interventions   Airway/Ventilation Management airway patency maintained;pulmonary hygiene promoted   Promote Aggressive Pulmonary Hygiene/Secretion Management   Cough And Deep Breathing done independently per patient     Intervention: Reduce/Relieve Breathlessness   01/04/18 0327   Respiratory Interventions   Breathing Techniques/Airway Clearance pursed-lip breathing encouraged   Positioning   Head Of Bed (HOB) Position HOB elevated

## 2018-01-04 NOTE — PROGRESS NOTES
"SERVICE: [facility] HOSPITALIST    CONSULTANTS: pulmonary/cardiology    CHIEF COMPLAINT: f/u A/C HHRF/sepsis/rhinovirus bronchitis/pneumonia/AECOPD    SUBJECTIVE: The patient report continued improvement, still soa with exertion. Down to 4 liter oximizer this afternoon. Awaiting bronch in am. Otherwise denies f/c/chest pain/n/v/d/abdominal pain or other new concerns.    OBJECTIVE:    /70 (BP Location: Right arm, Patient Position: Lying)  Pulse 85  Temp 97.8 °F (36.6 °C) (Axillary)   Resp 20  Ht 162.6 cm (64\")  Wt 114 kg (251 lb 1.6 oz)  LMP  (LMP Unknown)  SpO2 92%  BMI 43.1 kg/m2    MEDS/LABS REVIEWED AND ORDERED    acetaminophen 650 mg Oral TID   acetylcysteine 4 mL Nebulization 4x Daily - RT   amiodarone 400 mg Oral Q24H   arformoterol 15 mcg Nebulization BID - RT   clopidogrel 75 mg Oral Daily   FLUoxetine 20 mg Oral Daily   guaiFENesin 1,200 mg Oral Q12H   hydrALAZINE 10 mg Intravenous Once   hydrochlorothiazide 25 mg Oral Daily   insulin aspart 0-24 Units Subcutaneous 4x Daily AC & at Bedtime   insulin detemir 25 Units Subcutaneous Nightly   ipratropium-albuterol 3 mL Nebulization 4x Daily - RT   levETIRAcetam 500 mg Oral Q12H   lisinopril 40 mg Oral Daily   metFORMIN 500 mg Oral BID With Meals   [START ON 1/5/2018] predniSONE 40 mg Oral Daily With Breakfast   rosuvastatin 40 mg Oral Daily   verapamil  mg Oral Q12H     Physical Exam   Constitutional: She is oriented to person, place, and time. She appears well-developed and well-nourished.   obese   HENT:   Head: Normocephalic and atraumatic.   Eyes: EOM are normal. Pupils are equal, round, and reactive to light.   Cardiovascular: Normal rate and regular rhythm.    Pulmonary/Chest: Effort normal.   Diminished throughout, scattered expiratory wheezes   Abdominal: Soft.   Musculoskeletal: She exhibits no edema.   Neurological: She is alert and oriented to person, place, and time.   Skin: Skin is warm and dry. No erythema. "   Psychiatric: She has a normal mood and affect. Her behavior is normal.   Vitals reviewed.    LAB/DIAGNOSTICS:    Lab Results (last 24 hours)     Procedure Component Value Units Date/Time    POC Glucose Once [[id number]]  (Abnormal) Collected:  01/03/18 2107    Specimen:  Blood Updated:  01/03/18 2113     Glucose 183 (H) mg/dL     Narrative:       Meter: [id number] : [id number] Mickey Cunningham    Basic Metabolic Panel [[id number]]  (Abnormal) Collected:  01/04/18 0325    Specimen:  Blood Updated:  01/04/18 0449     Glucose 161 (H) mg/dL      BUN 28 (H) mg/dL      Creatinine 0.69 mg/dL      Sodium 138 mmol/L      Potassium 3.9 mmol/L      Chloride 96 (L) mmol/L      CO2 33.8 (H) mmol/L      Calcium 9.0 mg/dL      eGFR Non African Amer 85 mL/min/1.73      BUN/Creatinine Ratio 40.6 (H)     Anion Gap 8.2 mmol/L     Narrative:       GFR Normal >60  Chronic Kidney Disease <60  Kidney Failure <15    CBC Auto Differential [[id number]]  (Abnormal) Collected:  01/04/18 0325    Specimen:  Blood Updated:  01/04/18 0624     WBC 22.77 (H) 10*3/mm3      RBC 4.24 10*6/mm3      Hemoglobin 12.3 g/dL      Hematocrit 37.6 %      MCV 88.7 fL      MCH 29.0 pg      MCHC 32.7 g/dL      RDW 14.6 (H) %      RDW-SD 46.9 fl      MPV 10.9 (H) fL      Platelets 266 10*3/mm3     Scan Slide [[id number]] Collected:  01/04/18 0325    Specimen:  Blood Updated:  01/04/18 0624     Scan Slide --      See Manual Differential Results       CBC & Differential [[id number]] Collected:  01/04/18 0325    Specimen:  Blood Updated:  01/04/18 0624    Narrative:       The following orders were created for panel order CBC & Differential.  Procedure                               Abnormality         Status                     ---------                               -----------         ------                     Manual Differential[[id number]]          Abnormal            Final result               Scan Slide[[id number]]                                       Final  result               CBC Auto Differential[[id number]]        Abnormal            Final result                 Please view results for these tests on the individual orders.    Manual Differential [[id number]]  (Abnormal) Collected:  01/04/18 0325    Specimen:  Blood Updated:  01/04/18 0624     Neutrophil % 82.0 (H) %      Lymphocyte % 10.0 (L) %      Monocyte % 5.0 %      Atypical Lymphocyte % 3.0 %      Neutrophils Absolute 18.67 (H) 10*3/mm3      Lymphocytes Absolute 2.28 10*3/mm3      Monocytes Absolute 1.14 (H) 10*3/mm3      RBC Morphology Normal     WBC Morphology Normal     Platelet Estimate Adequate    POC Glucose Once [[id number]]  (Abnormal) Collected:  01/04/18 1202    Specimen:  Blood Updated:  01/04/18 1212     Glucose 278 (H) mg/dL     Narrative:       Meter: [id number] : [id number] Nisha Hassan RN    Procalcitonin [[id number]]  (Abnormal) Collected:  01/04/18 0325    Specimen:  Blood Updated:  01/04/18 1646     Procalcitonin 0.05 (L) ng/mL     Narrative:       As a Marker for Sepsis (Non-Neonates):   1. <0.5 ng/mL represents a low risk of severe sepsis and/or septic shock.  2. >2 ng/mL represents a high risk of severe sepsis and/or septic shock.    As a Marker for Lower Respiratory Tract Infections that require antibiotic therapy:    PCT on Admission     Antibiotic Therapy       6-12 Hrs later  > 0.5                Strongly Recommended             >0.25 - <0.5         Recommended  0.1 - 0.25           Discouraged              Remeasure/reassess PCT  <0.1                 Strongly Discouraged     Remeasure/reassess PCT                     PCT values of < 0.5 ng/mL do not exclude an infection, because localized infections (without systemic signs) may be associated with such low concentrations, or a systemic infection in its initial stages (< 6 hours). Furthermore, increased PCT can occur without infection. PCT concentrations between 0.5 and 2.0 ng/mL should be interpreted taking into account the  patient's history. It is recommended to retest PCT within 6-24 hours if any concentrations < 2 ng/mL are obtained.        ASSESSMENT/PLAN:  1. Acute on chronic hypoxic respiratory failure: pulmonary following  2. Sepsis secondary to Acute Rhinovirus Bronchitis and bilateral lower lobe Roseomonas PNA/bilateral pleural effusions/bibasilar atelectasis:    3. AECOPD: Pulmonary following  Completed oral levaquin per culture for roseomonas  WBCC increasing, remains on steroids  ABG with hypoxia, metabolic alkalosis on 8 liter oxymizer  Continues on 4-8 L Oxymizer, unable to titrate much in several days, bronch pending in am   Continuing mucomyst, some mucus production  Continue oral steroids and taper, continue duo nebs, Brovana, mucinex/acapella   Monitor       4. PAF with RVR: cardiology following  Rate controlled on oral amio 400 mg daily/HCTZ 25 mg daily/lisinopril 40 mg daily/verapamil  mg daily  Xarelto 20 mg held tonight per pulmonary for bronch in am, continues on plavix 75 mg daily  Echo showed grade II diastolic dysfunction/hyperdynamic LV with EF>70%  D/C daily EKGs, levaquin course complete, QTc stable at 469, EKGs unchanged      5. Leukocytosis: likely elevated secondary to steroids, however higher today at 22.77, afebrile  Recheck procal today continues low at 0.05, afebrile, clinically improved      6. DM-2: A1c 7.5%  AM glucose at goal on levemir 25 units nightly (started this admit)  Monitor closely and alter as needed  Continue metformin home dose, high dose SSI/Accuchecks ac/hs      7. Hypertension:   BP now overall goal after medications adjusted as per number 4, monitor, no change today      8. Probable RANDOLPH: Pulmonary following and will order an outpatient sleep study after discharge.      Ongoing diagnoses unchanged:       9. Depression: no acute issues on home prozac       10. Dyslipidemia: no acute issues on home dose crestor.       11. Morbid Obesity: Nutrition to  prior to discharge.    Body mass index is 43.43 kg/(m^2).      12. Chronic back pain: No acute issues here.       13. Crohn's disease: Patient follows with Dr. Umaña and receives Remicade as an outpatient. No acute issues currently. Will need next dose deferred due to this current infection (will need to let Dr. Umaña's office know).       14. H/O CVA: continues on ASA, plavix, statin, no acute issues

## 2018-01-05 ENCOUNTER — ANESTHESIA (OUTPATIENT)
Dept: PERIOP | Facility: HOSPITAL | Age: 68
End: 2018-01-05

## 2018-01-05 LAB
ANION GAP SERPL CALCULATED.3IONS-SCNC: 8 MMOL/L
APPEARANCE FLD: ABNORMAL
APPEARANCE FLD: ABNORMAL
B PERT DNA SPEC QL NAA+PROBE: NOT DETECTED
BASOPHILS # BLD AUTO: 0.05 10*3/MM3 (ref 0–0.2)
BASOPHILS NFR BLD AUTO: 0.2 % (ref 0–2)
BUN BLD-MCNC: 29 MG/DL (ref 8–23)
BUN/CREAT SERPL: 40.8 (ref 7–25)
C PNEUM DNA NPH QL NAA+NON-PROBE: NOT DETECTED
CALCIUM SPEC-SCNC: 9.4 MG/DL (ref 8.8–10.5)
CHLORIDE SERPL-SCNC: 99 MMOL/L (ref 98–107)
CO2 SERPL-SCNC: 34 MMOL/L (ref 22–29)
COLOR FLD: ABNORMAL
COLOR FLD: ABNORMAL
CREAT BLD-MCNC: 0.71 MG/DL (ref 0.57–1)
DEPRECATED RDW RBC AUTO: 48.2 FL (ref 37–54)
EOSINOPHIL # BLD AUTO: 0.01 10*3/MM3 (ref 0.1–0.3)
EOSINOPHIL NFR BLD AUTO: 0 % (ref 0–4)
ERYTHROCYTE [DISTWIDTH] IN BLOOD BY AUTOMATED COUNT: 14.6 % (ref 11.5–14.5)
FLUAV H1 2009 PAND RNA NPH QL NAA+PROBE: NOT DETECTED
FLUAV H1 HA GENE NPH QL NAA+PROBE: NOT DETECTED
FLUAV H3 RNA NPH QL NAA+PROBE: DETECTED
FLUAV SUBTYP SPEC NAA+PROBE: NOT DETECTED
FLUBV RNA ISLT QL NAA+PROBE: NOT DETECTED
GFR SERPL CREATININE-BSD FRML MDRD: 82 ML/MIN/1.73
GLUCOSE BLD-MCNC: 112 MG/DL (ref 65–99)
GLUCOSE BLDC GLUCOMTR-MCNC: 126 MG/DL (ref 70–130)
GLUCOSE BLDC GLUCOMTR-MCNC: 230 MG/DL (ref 70–130)
GLUCOSE BLDC GLUCOMTR-MCNC: 270 MG/DL (ref 70–130)
GLUCOSE BLDC GLUCOMTR-MCNC: 286 MG/DL (ref 70–130)
GLUCOSE BLDC GLUCOMTR-MCNC: 99 MG/DL (ref 70–130)
HADV DNA SPEC NAA+PROBE: NOT DETECTED
HCOV 229E RNA SPEC QL NAA+PROBE: NOT DETECTED
HCOV HKU1 RNA SPEC QL NAA+PROBE: NOT DETECTED
HCOV NL63 RNA SPEC QL NAA+PROBE: NOT DETECTED
HCOV OC43 RNA SPEC QL NAA+PROBE: NOT DETECTED
HCT VFR BLD AUTO: 37.9 % (ref 37–47)
HGB BLD-MCNC: 12 G/DL (ref 12–16)
HMPV RNA NPH QL NAA+NON-PROBE: NOT DETECTED
HPIV1 RNA SPEC QL NAA+PROBE: NOT DETECTED
HPIV2 RNA SPEC QL NAA+PROBE: NOT DETECTED
HPIV3 RNA NPH QL NAA+PROBE: NOT DETECTED
HPIV4 P GENE NPH QL NAA+PROBE: NOT DETECTED
IMM GRANULOCYTES # BLD: 0.8 10*3/MM3 (ref 0–0.03)
IMM GRANULOCYTES NFR BLD: 3.8 % (ref 0–0.5)
LYMPHOCYTES # BLD AUTO: 3.55 10*3/MM3 (ref 0.6–4.8)
LYMPHOCYTES NFR BLD AUTO: 17 % (ref 20–45)
LYMPHOCYTES NFR FLD MANUAL: 1 %
M PNEUMO IGG SER IA-ACNC: NOT DETECTED
MCH RBC QN AUTO: 28.4 PG (ref 27–31)
MCHC RBC AUTO-ENTMCNC: 31.7 G/DL (ref 31–37)
MCV RBC AUTO: 89.6 FL (ref 81–99)
MONOCYTES # BLD AUTO: 1.21 10*3/MM3 (ref 0–1)
MONOCYTES NFR BLD AUTO: 5.8 % (ref 3–8)
MONOCYTES NFR FLD: 1 %
MONOCYTES NFR FLD: 1 %
MONOS+MACROS NFR FLD: 1 %
MONOS+MACROS NFR FLD: 2 %
NEUTROPHILS # BLD AUTO: 15.24 10*3/MM3 (ref 1.5–8.3)
NEUTROPHILS NFR BLD AUTO: 73.2 % (ref 45–70)
NEUTROPHILS NFR FLD MANUAL: 97 %
NEUTROPHILS NFR FLD MANUAL: 97 %
NRBC BLD MANUAL-RTO: 0 /100 WBC (ref 0–0)
PLATELET # BLD AUTO: 230 10*3/MM3 (ref 140–500)
PMV BLD AUTO: 10.6 FL (ref 7.4–10.4)
POTASSIUM BLD-SCNC: 4.2 MMOL/L (ref 3.5–5.2)
RBC # BLD AUTO: 4.23 10*6/MM3 (ref 4.2–5.4)
RBC # FLD AUTO: 160 /MM3
RBC # FLD AUTO: 335 /MM3
RHINOVIRUS RNA SPEC NAA+PROBE: DETECTED
RSV RNA NPH QL NAA+NON-PROBE: NOT DETECTED
SODIUM BLD-SCNC: 141 MMOL/L (ref 136–145)
WBC # FLD: 9225 /MM3
WBC # FLD: ABNORMAL /MM3
WBC NRBC COR # BLD: 20.86 10*3/MM3 (ref 4.8–10.8)

## 2018-01-05 PROCEDURE — 94799 UNLISTED PULMONARY SVC/PX: CPT

## 2018-01-05 PROCEDURE — 87102 FUNGUS ISOLATION CULTURE: CPT | Performed by: INTERNAL MEDICINE

## 2018-01-05 PROCEDURE — 89051 BODY FLUID CELL COUNT: CPT | Performed by: INTERNAL MEDICINE

## 2018-01-05 PROCEDURE — 87205 SMEAR GRAM STAIN: CPT | Performed by: HOSPITALIST

## 2018-01-05 PROCEDURE — 63710000001 INSULIN DETEMIR PER 5 UNITS: Performed by: NURSE PRACTITIONER

## 2018-01-05 PROCEDURE — 87486 CHLMYD PNEUM DNA AMP PROBE: CPT | Performed by: INTERNAL MEDICINE

## 2018-01-05 PROCEDURE — 63710000001 INSULIN ASPART PER 5 UNITS: Performed by: HOSPITALIST

## 2018-01-05 PROCEDURE — 63710000001 PREDNISONE PER 1 MG: Performed by: INTERNAL MEDICINE

## 2018-01-05 PROCEDURE — 87206 SMEAR FLUORESCENT/ACID STAI: CPT | Performed by: INTERNAL MEDICINE

## 2018-01-05 PROCEDURE — 0BDM8ZX EXTRACTION OF BILATERAL LUNGS, VIA NATURAL OR ARTIFICIAL OPENING ENDOSCOPIC, DIAGNOSTIC: ICD-10-PCS | Performed by: INTERNAL MEDICINE

## 2018-01-05 PROCEDURE — 25010000002 METHYLPREDNISOLONE PER 125 MG: Performed by: INTERNAL MEDICINE

## 2018-01-05 PROCEDURE — 99232 SBSQ HOSP IP/OBS MODERATE 35: CPT | Performed by: NURSE PRACTITIONER

## 2018-01-05 PROCEDURE — 87071 CULTURE AEROBIC QUANT OTHER: CPT | Performed by: HOSPITALIST

## 2018-01-05 PROCEDURE — 80048 BASIC METABOLIC PNL TOTAL CA: CPT | Performed by: INTERNAL MEDICINE

## 2018-01-05 PROCEDURE — 25010000002 MIDAZOLAM PER 1 MG: Performed by: NURSE ANESTHETIST, CERTIFIED REGISTERED

## 2018-01-05 PROCEDURE — 87116 MYCOBACTERIA CULTURE: CPT | Performed by: INTERNAL MEDICINE

## 2018-01-05 PROCEDURE — 82962 GLUCOSE BLOOD TEST: CPT

## 2018-01-05 PROCEDURE — 25010000002 PROPOFOL 10 MG/ML EMULSION: Performed by: NURSE ANESTHETIST, CERTIFIED REGISTERED

## 2018-01-05 PROCEDURE — 87798 DETECT AGENT NOS DNA AMP: CPT | Performed by: INTERNAL MEDICINE

## 2018-01-05 PROCEDURE — 87633 RESP VIRUS 12-25 TARGETS: CPT | Performed by: INTERNAL MEDICINE

## 2018-01-05 PROCEDURE — 85025 COMPLETE CBC W/AUTO DIFF WBC: CPT | Performed by: INTERNAL MEDICINE

## 2018-01-05 PROCEDURE — 25010000002 ACETAZOLAMIDE PER 500 MG: Performed by: INTERNAL MEDICINE

## 2018-01-05 PROCEDURE — 97161 PT EVAL LOW COMPLEX 20 MIN: CPT

## 2018-01-05 PROCEDURE — 25010000002 ONDANSETRON PER 1 MG: Performed by: NURSE ANESTHETIST, CERTIFIED REGISTERED

## 2018-01-05 PROCEDURE — 87581 M.PNEUMON DNA AMP PROBE: CPT | Performed by: INTERNAL MEDICINE

## 2018-01-05 RX ORDER — LIDOCAINE HYDROCHLORIDE 20 MG/ML
INJECTION, SOLUTION INFILTRATION; PERINEURAL AS NEEDED
Status: DISCONTINUED | OUTPATIENT
Start: 2018-01-05 | End: 2018-01-05 | Stop reason: SURG

## 2018-01-05 RX ORDER — PROPOFOL 10 MG/ML
VIAL (ML) INTRAVENOUS AS NEEDED
Status: DISCONTINUED | OUTPATIENT
Start: 2018-01-05 | End: 2018-01-05 | Stop reason: SURG

## 2018-01-05 RX ORDER — ACETAZOLAMIDE 500 MG/5ML
500 INJECTION, POWDER, LYOPHILIZED, FOR SOLUTION INTRAVENOUS ONCE
Status: COMPLETED | OUTPATIENT
Start: 2018-01-05 | End: 2018-01-05

## 2018-01-05 RX ORDER — SODIUM CHLORIDE, SODIUM LACTATE, POTASSIUM CHLORIDE, CALCIUM CHLORIDE 600; 310; 30; 20 MG/100ML; MG/100ML; MG/100ML; MG/100ML
1000 INJECTION, SOLUTION INTRAVENOUS CONTINUOUS PRN
Status: DISCONTINUED | OUTPATIENT
Start: 2018-01-05 | End: 2018-01-05

## 2018-01-05 RX ORDER — FAMOTIDINE 10 MG/ML
20 INJECTION, SOLUTION INTRAVENOUS ONCE
Status: COMPLETED | OUTPATIENT
Start: 2018-01-05 | End: 2018-01-05

## 2018-01-05 RX ORDER — LIDOCAINE HYDROCHLORIDE 20 MG/ML
INJECTION, SOLUTION INFILTRATION; PERINEURAL AS NEEDED
Status: DISCONTINUED | OUTPATIENT
Start: 2018-01-05 | End: 2018-01-05 | Stop reason: HOSPADM

## 2018-01-05 RX ORDER — SODIUM CHLORIDE 0.9 % (FLUSH) 0.9 %
3 SYRINGE (ML) INJECTION AS NEEDED
Status: DISCONTINUED | OUTPATIENT
Start: 2018-01-05 | End: 2018-01-05 | Stop reason: HOSPADM

## 2018-01-05 RX ORDER — KETAMINE HYDROCHLORIDE 100 MG/ML
INJECTION INTRAMUSCULAR; INTRAVENOUS AS NEEDED
Status: DISCONTINUED | OUTPATIENT
Start: 2018-01-05 | End: 2018-01-05 | Stop reason: SURG

## 2018-01-05 RX ORDER — MIDAZOLAM HYDROCHLORIDE 1 MG/ML
INJECTION INTRAMUSCULAR; INTRAVENOUS AS NEEDED
Status: DISCONTINUED | OUTPATIENT
Start: 2018-01-05 | End: 2018-01-05 | Stop reason: SURG

## 2018-01-05 RX ORDER — LIDOCAINE HYDROCHLORIDE 10 MG/ML
0.5 INJECTION, SOLUTION INFILTRATION; PERINEURAL ONCE AS NEEDED
Status: DISCONTINUED | OUTPATIENT
Start: 2018-01-05 | End: 2018-01-05 | Stop reason: HOSPADM

## 2018-01-05 RX ORDER — ONDANSETRON 2 MG/ML
4 INJECTION INTRAMUSCULAR; INTRAVENOUS ONCE
Status: COMPLETED | OUTPATIENT
Start: 2018-01-05 | End: 2018-01-05

## 2018-01-05 RX ORDER — LIDOCAINE HYDROCHLORIDE 40 MG/ML
INJECTION, SOLUTION RETROBULBAR; TOPICAL AS NEEDED
Status: DISCONTINUED | OUTPATIENT
Start: 2018-01-05 | End: 2018-01-05 | Stop reason: SURG

## 2018-01-05 RX ORDER — METHYLPREDNISOLONE SODIUM SUCCINATE 125 MG/2ML
125 INJECTION, POWDER, LYOPHILIZED, FOR SOLUTION INTRAMUSCULAR; INTRAVENOUS ONCE
Status: COMPLETED | OUTPATIENT
Start: 2018-01-05 | End: 2018-01-05

## 2018-01-05 RX ORDER — LIDOCAINE HYDROCHLORIDE 10 MG/ML
INJECTION, SOLUTION INFILTRATION; PERINEURAL AS NEEDED
Status: DISCONTINUED | OUTPATIENT
Start: 2018-01-05 | End: 2018-01-05 | Stop reason: HOSPADM

## 2018-01-05 RX ADMIN — KETAMINE HYDROCHLORIDE 10 MG: 100 INJECTION INTRAMUSCULAR; INTRAVENOUS at 07:38

## 2018-01-05 RX ADMIN — KETAMINE HYDROCHLORIDE 10 MG: 100 INJECTION INTRAMUSCULAR; INTRAVENOUS at 07:48

## 2018-01-05 RX ADMIN — FLUOXETINE 20 MG: 20 CAPSULE ORAL at 09:26

## 2018-01-05 RX ADMIN — INSULIN DETEMIR 25 UNITS: 100 INJECTION, SOLUTION SUBCUTANEOUS at 21:18

## 2018-01-05 RX ADMIN — CLOPIDOGREL 75 MG: 75 TABLET, FILM COATED ORAL at 09:25

## 2018-01-05 RX ADMIN — ROSUVASTATIN CALCIUM 40 MG: 5 TABLET, FILM COATED ORAL at 09:31

## 2018-01-05 RX ADMIN — AMIODARONE HYDROCHLORIDE 400 MG: 200 TABLET ORAL at 09:19

## 2018-01-05 RX ADMIN — LEVETIRACETAM 500 MG: 500 TABLET, FILM COATED ORAL at 09:18

## 2018-01-05 RX ADMIN — TOPICAL ANESTHETIC 1 SPRAY: 200 SPRAY DENTAL; PERIODONTAL at 07:34

## 2018-01-05 RX ADMIN — GUAIFENESIN 1200 MG: 600 TABLET, EXTENDED RELEASE ORAL at 21:08

## 2018-01-05 RX ADMIN — LIDOCAINE HYDROCHLORIDE 5 ML: 40 INJECTION, SOLUTION RETROBULBAR; TOPICAL at 07:20

## 2018-01-05 RX ADMIN — LIDOCAINE HYDROCHLORIDE 40 MG: 20 INJECTION, SOLUTION INFILTRATION; PERINEURAL at 07:38

## 2018-01-05 RX ADMIN — IPRATROPIUM BROMIDE AND ALBUTEROL SULFATE 3 ML: .5; 3 SOLUTION RESPIRATORY (INHALATION) at 14:56

## 2018-01-05 RX ADMIN — ACETAZOLAMIDE 500 MG: 500 INJECTION, POWDER, LYOPHILIZED, FOR SOLUTION INTRAVENOUS at 11:10

## 2018-01-05 RX ADMIN — METHYLPREDNISOLONE SODIUM SUCCINATE 125 MG: 125 INJECTION, POWDER, FOR SOLUTION INTRAMUSCULAR; INTRAVENOUS at 09:54

## 2018-01-05 RX ADMIN — MIDAZOLAM HYDROCHLORIDE 1 MG: 1 INJECTION, SOLUTION INTRAMUSCULAR; INTRAVENOUS at 07:20

## 2018-01-05 RX ADMIN — HYDROCHLOROTHIAZIDE 25 MG: 25 TABLET ORAL at 09:27

## 2018-01-05 RX ADMIN — PREDNISONE 40 MG: 20 TABLET ORAL at 09:57

## 2018-01-05 RX ADMIN — ARFORMOTEROL TARTRATE 15 MCG: 15 SOLUTION RESPIRATORY (INHALATION) at 20:52

## 2018-01-05 RX ADMIN — INSULIN ASPART 12 UNITS: 100 INJECTION, SOLUTION INTRAVENOUS; SUBCUTANEOUS at 12:10

## 2018-01-05 RX ADMIN — PROPOFOL 50 MG: 10 INJECTION, EMULSION INTRAVENOUS at 07:38

## 2018-01-05 RX ADMIN — IPRATROPIUM BROMIDE AND ALBUTEROL SULFATE 3 ML: .5; 3 SOLUTION RESPIRATORY (INHALATION) at 10:40

## 2018-01-05 RX ADMIN — VERAPAMIL HYDROCHLORIDE 240 MG: 240 TABLET, FILM COATED, EXTENDED RELEASE ORAL at 09:18

## 2018-01-05 RX ADMIN — HYDROCODONE BITARTRATE AND ACETAMINOPHEN 1 TABLET: 5; 325 TABLET ORAL at 11:10

## 2018-01-05 RX ADMIN — METFORMIN HYDROCHLORIDE 500 MG: 500 TABLET ORAL at 09:29

## 2018-01-05 RX ADMIN — INSULIN ASPART 12 UNITS: 100 INJECTION, SOLUTION INTRAVENOUS; SUBCUTANEOUS at 21:16

## 2018-01-05 RX ADMIN — KETAMINE HYDROCHLORIDE 10 MG: 100 INJECTION INTRAMUSCULAR; INTRAVENOUS at 07:35

## 2018-01-05 RX ADMIN — IPRATROPIUM BROMIDE AND ALBUTEROL SULFATE 3 ML: .5; 3 SOLUTION RESPIRATORY (INHALATION) at 20:50

## 2018-01-05 RX ADMIN — VERAPAMIL HYDROCHLORIDE 240 MG: 240 TABLET, FILM COATED, EXTENDED RELEASE ORAL at 21:06

## 2018-01-05 RX ADMIN — ACETYLCYSTEINE 4 ML: 200 SOLUTION ORAL; RESPIRATORY (INHALATION) at 06:28

## 2018-01-05 RX ADMIN — IPRATROPIUM BROMIDE AND ALBUTEROL SULFATE 3 ML: .5; 3 SOLUTION RESPIRATORY (INHALATION) at 06:27

## 2018-01-05 RX ADMIN — INSULIN ASPART 8 UNITS: 100 INJECTION, SOLUTION INTRAVENOUS; SUBCUTANEOUS at 17:46

## 2018-01-05 RX ADMIN — ACETAMINOPHEN 650 MG: 325 TABLET, FILM COATED ORAL at 21:07

## 2018-01-05 RX ADMIN — LEVETIRACETAM 500 MG: 500 TABLET, FILM COATED ORAL at 21:06

## 2018-01-05 RX ADMIN — RIVAROXABAN 20 MG: 20 TABLET, FILM COATED ORAL at 18:37

## 2018-01-05 RX ADMIN — METFORMIN HYDROCHLORIDE 500 MG: 500 TABLET ORAL at 17:45

## 2018-01-05 RX ADMIN — SODIUM CHLORIDE, POTASSIUM CHLORIDE, SODIUM LACTATE AND CALCIUM CHLORIDE 1000 ML: 600; 310; 30; 20 INJECTION, SOLUTION INTRAVENOUS at 07:18

## 2018-01-05 RX ADMIN — LISINOPRIL 40 MG: 20 TABLET ORAL at 09:18

## 2018-01-05 RX ADMIN — GUAIFENESIN 1200 MG: 600 TABLET, EXTENDED RELEASE ORAL at 09:27

## 2018-01-05 RX ADMIN — FAMOTIDINE 20 MG: 10 INJECTION INTRAVENOUS at 07:14

## 2018-01-05 RX ADMIN — ONDANSETRON 4 MG: 2 INJECTION, SOLUTION INTRAMUSCULAR; INTRAVENOUS at 07:14

## 2018-01-05 NOTE — PLAN OF CARE
Problem: Patient Care Overview (Adult)  Goal: Plan of Care Review  Outcome: Ongoing (interventions implemented as appropriate)   01/05/18 0840   Coping/Psychosocial Response Interventions   Plan Of Care Reviewed With patient   Patient Care Overview   Progress improving   Outcome Evaluation   Outcome Summary/Follow up Plan VSS, NO C/O, CON'T NPO FOR NOW DUE TO LIDOCAINE BREATHING TREATMENT, SATS CON'T 88-92% ON 4L/OXIMIZER       Problem: Bronchoscopy (Adult)  Goal: Signs and Symptoms of Listed Potential Problems Will be Absent or Manageable (Bronchoscopy)  Outcome: Ongoing (interventions implemented as appropriate)

## 2018-01-05 NOTE — ANESTHESIA PREPROCEDURE EVALUATION
Anesthesia Evaluation     Patient summary reviewed and Nursing notes reviewed   no history of anesthetic complications:  NPO Solid Status: > 8 hours  NPO Liquid Status: > 8 hours     Airway   Mallampati: II  TM distance: >3 FB  Neck ROM: full  no difficulty expected  Dental    (+) edentulous, upper dentures and lower dentures    Pulmonary    (+) pneumonia , COPD (ON HOME O2 /5L) moderate, shortness of breath, sleep apnea (snores), decreased breath sounds,   Cardiovascular - normal exam    ECG reviewed  Rhythm: regular  Rate: normal    (+) hypertension well controlled, CAD, dysrhythmias (PAROXYSMAL) Atrial Fib, PVD, hyperlipidemia  Past MI: denies.      Neuro/Psych  (+) seizures (X1 TIME W CVA), CVA (1 YR AGO/ NO RESIDUAL OTHER THAN MEMORY), psychiatric history Depression,     GI/Hepatic/Renal/Endo    (+) obesity, morbid obesity, diabetes mellitus type 2 well controlled,     Musculoskeletal     (+) back pain, chronic pain,   Abdominal   (+) obese,    Substance History - negative use     OB/GYN negative ob/gyn ROS         Other   (+) arthritis                                             Anesthesia Plan    ASA 3     general   (W LMA / PT INFORMED OF POSS INTUBATION)  intravenous induction   Anesthetic plan and risks discussed with patient.  Use of blood products discussed with patient  Consented to blood products.

## 2018-01-05 NOTE — PROGRESS NOTES
[facility]  Phone: [phone]  Gurmeet Rosa MD    Subjective:  LOS: 12    Still on oximyzer. Bronch completed. No new symptoms.    Objective   Vital Signs past 24hrs  BP range: BP: ()/(34-71) 112/56  Pulse range: Heart Rate:  [65-87] 67  Resp rate range: Resp:  [16-22] 20  Temp range: Temp (24hrs), Av.8 °F (36.6 °C), Min:97.5 °F (36.4 °C), Max:98.6 °F (37 °C)    O2 Device: nasal cannula with reservoir (i.e. oximizer)Flow (L/min):  [2-4] 4  Oxygen range:SpO2:  [90 %-99 %] 91 %   114 kg (251 lb 1.6 oz); Body mass index is 43.1 kg/(m^2).    Intake/Output Summary (Last 24 hours) at 18 0912  Last data filed at 18 0800   Gross per 24 hour   Intake              400 ml   Output                0 ml   Net              400 ml       Physical Exam   Constitutional: She appears well-developed.   Eyes: Conjunctivae are normal.   Neck: Normal range of motion. Neck supple.   Cardiovascular: Normal rate and regular rhythm.    No murmur heard.  Pulmonary/Chest: Effort normal. No respiratory distress. She has decreased breath sounds (mild coarse breath sounds, perhaps some wheeze). She has no wheezes. She has no rales.   Abdominal: Soft. Bowel sounds are normal. She exhibits no mass. There is no tenderness.   Musculoskeletal: She exhibits no edema.   Neurological: She is alert.   Skin: Skin is warm. No rash noted.     Results Review:    I have reviewed the laboratory and imaging data since the last note by [facility] physician.  My annotations are noted in assessment and plan.    Medication Review:  I have reviewed the current MAR.  My annotations are noted in assessment and plan.      lactated ringers 1,000 mL Last Rate: 1,000 mL (18 0718)     Plan   PCCM Problems  Acute hypoxic respiratory failure  Bilateral pleural effusion  Bibasilar atelectasis vs pneumonia  Chronic hypoxia on home oxygen  RSV bronchitis  COPD with exacerbation   Suspected obstructive sleep apnea  Morbid obesity  Relevant  Medical Diagnoses  Pafib  DM2  HTN    Plan of Treatment  On oximizer. Bronch unimpressive. Will give her 1 dose of diamox and repeat ABG later today. Try to reduce her O2 flows to at least NC.    Wheezing some after bronch. Give medrol x 1.    She has completed levaquin for pneumonia. Today nearly absent breath sounds in RLL. Eventually needs fu CT in 8-10 weeks. CxR improved after diuresis.    She requires outpatient sleep study evaluation.  This has been ordered previously.  She will need follow-up in the sleep lab after.    Gurmeet Rosa MD  01/05/18  9:12 AM    Part of this note may be an electronic transcription/translation of spoken language to printed text using the Dragon Dictation System.

## 2018-01-05 NOTE — PLAN OF CARE
Problem: NPPV/CPAP (Adult)  Intervention: Assess/Manage Patient Tolerance of Noninvasive Ventilation   01/05/18 0325   Respiratory Interventions   Airway/Ventilation Management airway patency maintained;pulmonary hygiene promoted     Intervention: Prevent/Minimize Device Friction/Shearing and Pressure Points   01/05/18 0325   Respiratory Interventions   NPPV/CPAP Maintenance mask adjusted       Goal: Signs and Symptoms of Listed Potential Problems Will be Absent or Manageable (NPPV/CPAP)  Outcome: Ongoing (interventions implemented as appropriate)   01/05/18 0325   NPPV/CPAP   Problems Assessed (NPPV/CPAP) all   Problems Present (NPPV/CPAP) hypoxia/hypoxemia

## 2018-01-05 NOTE — PLAN OF CARE
Problem: Patient Care Overview (Adult)  Goal: Plan of Care Review   01/05/18 0707   Coping/Psychosocial Response Interventions   Plan Of Care Reviewed With patient   Patient Care Overview   Progress no change     Goal: Adult Individualization and Mutuality  Outcome: Ongoing (interventions implemented as appropriate)      Problem: Bronchoscopy (Adult)  Goal: Signs and Symptoms of Listed Potential Problems Will be Absent or Manageable (Bronchoscopy)  Outcome: Ongoing (interventions implemented as appropriate)

## 2018-01-05 NOTE — PLAN OF CARE
Problem: COPD, Chronic Bronchitis/Emphysema (Adult)  Intervention: Match Activity with Ability/Tolerance   01/05/18 0324   Activity   Activity Type activity adjusted per tolerance;activity encouraged     Intervention: Optimize Oxygenation/Ventilation/Perfusion   01/05/18 0324   Respiratory Interventions   Airway/Ventilation Management airway patency maintained;pulmonary hygiene promoted   Promote Aggressive Pulmonary Hygiene/Secretion Management   Cough And Deep Breathing done independently per patient       Goal: Signs and Symptoms of Listed Potential Problems Will be Absent or Manageable (COPD, Chronic Bronchitis/Emphysema)  Outcome: Ongoing (interventions implemented as appropriate)   01/05/18 0324   COPD, Chronic Bronchitis/Emphysema   Problems Assessed (COPD, Chronic Bronchitis/Emphysema) all   Problems Present (COPD, Chronic Bronchitis/Emphysema) dyspnea;hypoxia/hypoxemia       Problem: Respiratory Insufficiency (Adult)  Intervention: Provide Oxygenation/Ventilation/Perfusion Support   01/05/18 0324   Respiratory Interventions   Airway/Ventilation Management airway patency maintained;pulmonary hygiene promoted   Activity   Activity Type activity adjusted per tolerance;activity encouraged       Goal: Acid/Base Balance  Outcome: Ongoing (interventions implemented as appropriate)   01/05/18 0324   Respiratory Insufficiency (Adult)   Acid/Base Balance making progress toward outcome     Goal: Effective Ventilation  Outcome: Ongoing (interventions implemented as appropriate)   01/05/18 0324   Respiratory Insufficiency (Adult)   Effective Ventilation making progress toward outcome

## 2018-01-05 NOTE — ANESTHESIA PROCEDURE NOTES
Airway  Urgency: elective      General Information and Staff    Patient location during procedure: OR    Indications and Patient Condition  Indications for airway management: airway protection    Preoxygenated: yes  MILS maintained throughout  Mask difficulty assessment: 0 - not attempted    Final Airway Details  Final airway type: supraglottic airway      Successful airway: unique  Size 3    Number of attempts at approach: 1

## 2018-01-05 NOTE — PROGRESS NOTES
"SERVICE: [facility] HOSPITALIST    CONSULTANTS: cardiology/pulmonary    CHIEF COMPLAINT: f/u A/C HHRF/sepsis/rhinovirus bronchitis/pneumonia/AECOPD    SUBJECTIVE: Patient reports she is feeling \"great\" after bronchoscopy this am. She is coughing up loose/thin mucus, blood tinged but feels much less soa. She is up in room ambulating and is agreeable to  at discharge. She otherwise denies f/c/cough/soa/chest pain/n/v/d/abdominal pain or other new concerns.    OBJECTIVE:    /66 (BP Location: Right arm, Patient Position: Lying)  Pulse 73  Temp 97.5 °F (36.4 °C) (Oral)   Resp 18  Ht 162.6 cm (64\")  Wt 114 kg (251 lb 1.6 oz)  LMP  (LMP Unknown)  SpO2 93%  BMI 43.1 kg/m2    MEDS/LABS REVIEWED AND ORDERED    acetaminophen 650 mg Oral TID   amiodarone 400 mg Oral Q24H   arformoterol 15 mcg Nebulization BID - RT   clopidogrel 75 mg Oral Daily   FLUoxetine 20 mg Oral Daily   guaiFENesin 1,200 mg Oral Q12H   hydrALAZINE 10 mg Intravenous Once   hydrochlorothiazide 25 mg Oral Daily   insulin aspart 0-24 Units Subcutaneous 4x Daily AC & at Bedtime   insulin detemir 25 Units Subcutaneous Nightly   ipratropium-albuterol 3 mL Nebulization 4x Daily - RT   levETIRAcetam 500 mg Oral Q12H   lisinopril 40 mg Oral Daily   metFORMIN 500 mg Oral BID With Meals   predniSONE 40 mg Oral Daily With Breakfast   rivaroxaban 20 mg Oral Daily With Dinner   rosuvastatin 40 mg Oral Daily   verapamil  mg Oral Q12H     Physical Exam   Constitutional: She is oriented to person, place, and time. She appears well-developed and well-nourished.   Obese    HENT:   Head: Normocephalic and atraumatic.   Eyes: EOM are normal. Pupils are equal, round, and reactive to light.   Cardiovascular: Normal rate and regular rhythm.    Pulmonary/Chest: Effort normal.   Diminished bases, r>l  Scattered rhonchi, expiratory wheezes   Abdominal: Soft. She exhibits no distension. There is no tenderness.   obese   Musculoskeletal: She " exhibits no edema.   Neurological: She is oriented to person, place, and time.   Skin: Skin is warm and dry. No erythema.   Psychiatric: She has a normal mood and affect.   Vitals reviewed.    LAB/DIAGNOSTICS:    Lab Results (last 24 hours)     Procedure Component Value Units Date/Time    POC Glucose Once [[id number]]  (Abnormal) Collected:  01/04/18 2154    Specimen:  Blood Updated:  01/04/18 2203     Glucose 264 (H) mg/dL     Narrative:       Meter: [id number] : [id number] Cydney Lucas RN    CBC Auto Differential [[id number]]  (Abnormal) Collected:  01/05/18 0344    Specimen:  Blood Updated:  01/05/18 0440     WBC 20.86 (H) 10*3/mm3      RBC 4.23 10*6/mm3      Hemoglobin 12.0 g/dL      Hematocrit 37.9 %      MCV 89.6 fL      MCH 28.4 pg      MCHC 31.7 g/dL      RDW 14.6 (H) %      RDW-SD 48.2 fl      MPV 10.6 (H) fL      Platelets 230 10*3/mm3      Neutrophil % 73.2 (H) %      Lymphocyte % 17.0 (L) %      Monocyte % 5.8 %      Eosinophil % 0.0 %      Basophil % 0.2 %      Immature Grans % 3.8 (H) %      Neutrophils, Absolute 15.24 (H) 10*3/mm3      Lymphocytes, Absolute 3.55 10*3/mm3      Monocytes, Absolute 1.21 (H) 10*3/mm3      Eosinophils, Absolute 0.01 (L) 10*3/mm3      Basophils, Absolute 0.05 10*3/mm3      Immature Grans, Absolute 0.80 (H) 10*3/mm3      nRBC 0.0 /100 WBC     CBC & Differential [[id number]] Collected:  01/05/18 0344    Specimen:  Blood Updated:  01/05/18 0441    Narrative:       The following orders were created for panel order CBC & Differential.  Procedure                               Abnormality         Status                     ---------                               -----------         ------                     Scan Slide[[id number]]                                                                  CBC Auto Differential[[id number]]        Abnormal            Final result                 Please view results for these tests on the individual orders.    Basic Metabolic Panel [[id number]]   (Abnormal) Collected:  01/05/18 0344    Specimen:  Blood Updated:  01/05/18 0454     Glucose 112 (H) mg/dL      BUN 29 (H) mg/dL      Creatinine 0.71 mg/dL      Sodium 141 mmol/L      Potassium 4.2 mmol/L      Chloride 99 mmol/L      CO2 34.0 (H) mmol/L      Calcium 9.4 mg/dL      eGFR Non African Amer 82 mL/min/1.73      BUN/Creatinine Ratio 40.8 (H)     Anion Gap 8.0 mmol/L     Narrative:       GFR Normal >60  Chronic Kidney Disease <60  Kidney Failure <15    POC Glucose Once [[id number]]  (Normal) Collected:  01/05/18 0705    Specimen:  Blood Updated:  01/05/18 0719     Glucose 99 mg/dL     Narrative:       Meter: [id number] : [id number] Luico Freeman RN    POC Glucose Once [[id number]]  (Normal) Collected:  01/05/18 0900    Specimen:  Blood Updated:  01/05/18 0907     Glucose 126 mg/dL     Narrative:       Meter: [id number] : [id number] Sivan Orozco NURSING ASSISTANT    Pneumocystis and Fungus Reference - Wash, Lung, Left Lower Lobe [[id number]] Collected:  01/05/18 0802    Specimen:  Wash from Lung, Left Lower Lobe Updated:  01/05/18 0938    AFB Culture - Wash, Lung, Left Lower Lobe [[id number]] Collected:  01/05/18 0802    Specimen:  Wash from Lung, Left Lower Lobe Updated:  01/05/18 0938    Respiratory Panel, PCR - Wash, Lung, Left Lower Lobe [[id number]] Collected:  01/05/18 0802    Specimen:  Wash from Lung, Left Lower Lobe Updated:  01/05/18 0938    Fungus Culture - Wash, Lung, Right Middle Lobe [[id number]] Collected:  01/05/18 0751    Specimen:  Wash from Lung, Right Middle Lobe Updated:  01/05/18 0938    AFB Culture - Wash, Lung, Right Middle Lobe [[id number]] Collected:  01/05/18 0751    Specimen:  Wash from Lung, Right Middle Lobe Updated:  01/05/18 0938    Respiratory Panel, PCR - Wash, Lung, Right Middle Lobe [[id number]] Collected:  01/05/18 0751    Specimen:  Wash from Lung, Right Middle Lobe Updated:  01/05/18 0938    Non-gynecologic Cytology - Wash, Lung, Right Middle Lobe [[id number]]  Collected:  01/05/18 0751    Specimen:  Wash from Lung, Right Middle Lobe Updated:  01/05/18 0940    BAL Culture, Quantitative - Lavage, Lung, Left Lower Lobe [[id number]] Collected:  01/05/18 1012    Specimen:  Lavage from Lung, Left Lower Lobe Updated:  01/05/18 1012    BAL Culture, Quantitative - Lavage, Lung, Right Middle Lobe [[id number]] Collected:  01/05/18 1012    Specimen:  Lavage from Lung, Right Middle Lobe Updated:  01/05/18 1012    POC Glucose Once [[id number]]  (Abnormal) Collected:  01/05/18 1135    Specimen:  Blood Updated:  01/05/18 1142     Glucose 270 (H) mg/dL     Narrative:       Meter: [id number] : [id number] Sivan Orozco NURSING ASSISTANT    Body Fluid Cell Count With Differential - Wash, Lung, Left Lower Lobe [[id number]] Collected:  01/05/18 0802    Specimen:  Wash from Lung, Left Lower Lobe Updated:  01/05/18 1632    Narrative:       The following orders were created for panel order Body Fluid Cell Count With Differential - Wash, Lung, Left Lower Lobe.  Procedure                               Abnormality         Status                     ---------                               -----------         ------                     Body fluid cell count - ...[[id number]]  Abnormal            Final result               Body fluid differential ...[[id number]]                      Final result                 Please view results for these tests on the individual orders.    Body fluid cell count - Body Fluid, [[id number]]  (Abnormal) Collected:  01/05/18 0802    Specimen:  Wash from Lung, Left Lower Lobe Updated:  01/05/18 1632     Color, Fluid White     Appearance, Fluid Turbid (A)     WBC, Fluid 9225 /mm3       Estimated count due to clots present in specimen.         RBC, Fluid 160 /mm3       Estimated count due to clots present in specimen.        Body fluid differential - Body Fluid, [[id number]] Collected:  01/05/18 0802    Specimen:  Wash from Lung, Left Lower Lobe Updated:  01/05/18 1632      Neutrophils, Fluid 97 %      Lymphocytes, Fluid 1 %      Monocytes, Fluid 1 %      Mononuclear, Fluid 1 %     Body Fluid Cell Count With Differential - Wash, Lung, Right Middle Lobe [[id number]] Collected:  01/05/18 0751    Specimen:  Wash from Lung, Right Middle Lobe Updated:  01/05/18 1633    Narrative:       The following orders were created for panel order Body Fluid Cell Count With Differential - Wash, Lung, Right Middle Lobe.  Procedure                               Abnormality         Status                     ---------                               -----------         ------                     Body fluid cell count - ...[[id number]]  Abnormal            Final result               Body fluid differential ...[[id number]]                      Final result                 Please view results for these tests on the individual orders.    Body fluid cell count - Body Fluid, [[id number]]  (Abnormal) Collected:  01/05/18 0751    Specimen:  Wash from Lung, Right Middle Lobe Updated:  01/05/18 1633     Color, Fluid White     Appearance, Fluid Turbid (A)     WBC, Fluid 97275 /mm3       Estimated count due to clots present in specimen.         RBC, Fluid 335 /mm3       Estimated count due to clots present in specimen.        Body fluid differential - Body Fluid, [[id number]] Collected:  01/05/18 0751    Specimen:  Wash from Lung, Right Middle Lobe Updated:  01/05/18 1633     Neutrophils, Fluid 97 %      Monocytes, Fluid 1 %      Mononuclear, Fluid 2 %     POC Glucose Once [[id number]]  (Abnormal) Collected:  01/05/18 1648    Specimen:  Blood Updated:  01/05/18 1656     Glucose 230 (H) mg/dL     Narrative:       Meter: [id number] : [id number] Zeyad OLSENA-PRN        ASSESSMENT/PLAN:  1. Acute on chronic hypoxic respiratory failure: pulmonary following  2. Sepsis secondary to Acute Rhinovirus Bronchitis and bilateral lower lobe Roseomonas PNA/bilateral pleural effusions/bibasilar atelectasis:    3. AECOPD: Pulmonary  following  Completed oral levaquin per culture for roseomonas  WBCC increasing but clinically improved, likely secondary to steroids, afebrile  Down to 4 liter nasal cannula today after bronch  Continuing mucomyst/oral steroids and taper/duonebs/Brovana/mucinex/acapella   Plan walking oximetry possibly 1/7/18, continue to wean oxygen as tolerated  Await bronch cultures      4. PAF with RVR: cardiology following  Rate controlled on oral amio 400 mg daily/HCTZ 25 mg daily/lisinopril 40 mg daily/verapamil  mg daily  Xarelto 20 mg held last night per pulmonary for bronch today, resume, continues on plavix 75 mg daily  Echo showed grade II diastolic dysfunction/hyperdynamic LV with EF>70%  D/C daily EKGs, levaquin course complete, QTc stable at 469, EKGs unchanged      5. Leukocytosis: likely elevated secondary to steroids, as above, 20.86 today, monitor afebrile  Clinically improved, procal low      6. DM-2: A1c 7.5%  AM glucose at goal on levemir 25 units nightly (started this admit)  Monitor closely and alter as needed  Continue metformin home dose, high dose SSI/Accuchecks ac/hs      7. Hypertension:   BP now overall goal after medications adjusted as per number 4, monitor, no change today      8. Probable RANDOLPH: Pulmonary following and will order an outpatient sleep study after discharge.       Ongoing diagnoses unchanged:       9. Depression: no acute issues on home prozac       10. Dyslipidemia: no acute issues on home dose crestor.       11. Morbid Obesity: Nutrition to  prior to discharge.   Body mass index is 43.43 kg/(m^2).      12. Chronic back pain: No acute issues here.       13. Crohn's disease: Patient follows with Dr. Umaña and receives Remicade as an outpatient. No acute issues currently. Will need next dose deferred due to this current infection (will need to let Dr. Umaña's office know).       14. H/O CVA: continues on ASA, plavix, statin, no acute issues     Plan: hopefully home  Sunday, walking oximetry day of discharge, agreeable to HH

## 2018-01-05 NOTE — PLAN OF CARE
Problem: COPD, Chronic Bronchitis/Emphysema (Adult)  Intervention: Optimize Oxygenation/Ventilation/Perfusion   01/05/18 1657   Respiratory Interventions   Airway/Ventilation Management pulmonary hygiene promoted   Promote Aggressive Pulmonary Hygiene/Secretion Management   Cough And Deep Breathing done with encouragement     Intervention: Reduce/Relieve Breathlessness   01/05/18 1657   Respiratory Interventions   Breathing Techniques/Airway Clearance pursed-lip breathing encouraged       Goal: Signs and Symptoms of Listed Potential Problems Will be Absent or Manageable (COPD, Chronic Bronchitis/Emphysema)   01/05/18 1657   COPD, Chronic Bronchitis/Emphysema   Problems Assessed (COPD, Chronic Bronchitis/Emphysema) hypoxia/hypoxemia;dyspnea   Problems Present (COPD, Chronic Bronchitis/Emphysema) hypoxia/hypoxemia

## 2018-01-05 NOTE — OP NOTE
Procedure: Bronchoscopy under MAC anesthesia    Indication persistent hypoxia with atelectasis evident on CT chest    Informed consent obtained from the patient after explaining risks and potential benefits including but not limited to respiratory failure requiring prolonged ventilation, small risk of death, pneumothorax, leading.  Patient understands risks and wishes to proceed.  Alternatives were offered and discussed.    Patient was kept nothing by mouth and brought to the endoscopy unit.  All vital signs were monitored including pulse ox saturation heart rate and blood pressure.  Anesthesia was given by the anesthesiology team.  All vital signs remained stable throughout the procedure.  An LMA was used.  After the procedure recovery will be under the care of the anesthesiology team.    Bronchoscope inserted through the LMA. advanced into the lungs.  All subsegments of the lung were carefully examined.  A washing was done thoroughly after each lung separately.  Only thin secretions.  No significant mucus plugs.  There was no bleeding.  There were no endobronchial secretions.    After completion of procedure bronchoscope was removed.  There were no complications.  Patient will be recovered by anesthesiology.  Await results of washing cultures and cytology.

## 2018-01-05 NOTE — ANESTHESIA POSTPROCEDURE EVALUATION
Patient: Mar Camilo    Procedure Summary     Date Anesthesia Start Anesthesia Stop Room / Location    01/05/18 0731 0808 [facility] / [facility]       Procedure Diagnosis Surgeon Provider    BRONCHOSCOPY (N/A Bronchus) Pneumonia of right lower lobe due to infectious organism  (Pneumonia of right lower lobe due to infectious organism [J18.1]) MD Emily Joe, CHRISTOFER          Anesthesia Type: general  Last vitals  BP   109/54 (01/05/18 0820)   Temp   97.5 °F (36.4 °C) (01/05/18 0808)   Pulse   70 (01/05/18 0820)   Resp   20 (01/05/18 0820)     SpO2   92 % (01/05/18 0820)     Post Anesthesia Care and Evaluation    Patient location during evaluation: bedside  Patient participation: complete - patient participated  Level of consciousness: awake and alert  Pain management: adequate  Airway patency: patent  Anesthetic complications: No anesthetic complications  PONV Status: none  Cardiovascular status: acceptable and hemodynamically stable  Respiratory status: nasal cannula and spontaneous ventilation  Hydration status: acceptable

## 2018-01-05 NOTE — PLAN OF CARE
Problem: Patient Care Overview (Adult)  Goal: Plan of Care Review   01/05/18 1131   Coping/Psychosocial Response Interventions   Plan Of Care Reviewed With patient   Outcome Evaluation   Outcome Summary/Follow up Plan PT Eval Complete: Patient performs supine to/from sit transfers with conditional independence, sit to/from stand transfers with independence, and gait x 20 feet with supervision without an AD. Patients mobility limited secondary to respiratory status. At baseline patients gait distance limited to approximately 50 feet secondary to chronic back pain and respiratory status. Patient does not requrie an AD for safety. She has no concerns regarding mobility and return home. Recommend patient to continue to ambulate with nursing staff. No inpatient skilled PT services required at this time.

## 2018-01-05 NOTE — NURSING NOTE
Continued Stay Note  LIONEL Barton     Patient Name: Mar Camilo  MRN: [id number]  Today's Date: 1/5/2018    Admit Date: 12/24/2017          Discharge Plan       01/05/18 1513    Case Management/Social Work Plan    Plan plan home when stable    Patient/Family In Agreement With Plan yes    Additional Comments Spoke with patient at bedside. IMM updated. Patient is hoping to dc home this weekend, she denies need for HH but would like someone to help with house keeping. [facility] brochure provided. Patient's home 02 supplied per [employer]. Spoke with Shane who states patient needs a new qualifying 02 order, most recent order indicates she is on 2LNC continuous. Will continue to follow.              Discharge Codes     None            Villa Cross RN

## 2018-01-05 NOTE — THERAPY DISCHARGE NOTE
Acute Care - Physical Therapy Initial Eval/Discharge  LIONEL Barton     Patient Name: Mar Camilo  : 1950  MRN: [id number]  Today's Date: 2018   Onset of Illness/Injury or Date of Surgery Date: 17  Date of Referral to PT: 18  Referring Physician: Dr. Rosa      Admit Date: 2017    Visit Dx:    ICD-10-CM ICD-9-CM   1. Pneumonia of right lower lobe due to infectious organism J18.1 486   2. Sepsis, due to unspecified organism A41.9 038.9     995.91   3. COPD exacerbation J44.1 491.21   4. RANDOLPH (obstructive sleep apnea) G47.33 327.23     Patient Active Problem List   Diagnosis   • Obstructive pyelonephritis   • Chronic allergic conjunctivitis   • Chronic back pain   • Chronic bronchitis   • Non-specific colitis   • Chronic obstructive pulmonary disease with acute exacerbation   • Degeneration of intervertebral disc of lumbar region   • Depression   • Fatigue   • Heartburn   • Herpes labialis   • Hyperglycemia   • Mixed hyperlipidemia   • Essential hypertension   • Hypocalcemia   • Hypokalemia   • Sprain of back   • Spinal stenosis of lumbar region   • Morbid obesity   • Osteopenia   • Lumbar radiculopathy   • Shortness of breath   • Type 2 diabetes mellitus with complication, without long-term current use of insulin   • Increased frequency of urination   • Anemia   • Hypopotassemia   • Asymptomatic stenosis of right carotid artery   • Carotid stenosis, asymptomatic   • Chronic respiratory failure with hypoxia   • Carotid stenosis   • Hospital discharge follow-up   • Cellulitis of neck   • Low back pain   • Osteoarthritis of lumbar spine   • Need for immunization against influenza   • Well controlled type 2 diabetes mellitus   • Chronic right hip pain   • Chronic pain of both shoulders   • Fall   • Bursitis, subacromial, right   • Right ureteral stone   • Leukocytosis   • Seizure   • History of stroke   • Primary osteoarthritis, right shoulder   • Tendinopathy of rotator cuff   • Primary  osteoarthritis, left shoulder   • Subacromial bursitis, left   • Pneumonia of right lower lobe due to infectious organism   • PAF (paroxysmal atrial fibrillation)     Past Medical History:   Diagnosis Date   • Artery stenosis     left, states no bp/hr in Left arm d/t inaccuracy   • Back pain    • CAD (coronary artery disease)    • COPD (chronic obstructive pulmonary disease)    • Crohn's disease     Remicade on hold d/t antibiotics   • DDD (degenerative disc disease)    • Depression    • Diabetes mellitus     states not diabetic, sugars only high when sick   • Hyperlipidemia    • Hypertension    • Hypokalemia    • Kidney stone     sched scope, lithotripsy   • Morbid obesity    • Obstructive pyelonephritis    • On home oxygen therapy     2L UNLESS STRESSED THEN 2.5-3 L   • PVD (peripheral vascular disease)     RT CAROTID STENOSIS   • Radiculopathy     NECK PAIN     Past Surgical History:   Procedure Laterality Date   • APPENDECTOMY     • COLON RESECTION     • COLONOSCOPY      x2   • CYSTOSCOPY URETEROSCOPY LASER LITHOTRIPSY Right 4/17/2017    Procedure: CYSTOSCOPY with  right stent removal, right URETEROSCOPY LASER LITHOTRIPSY,  with STONE BASKET EXTRACTION;  Surgeon: Dipesh Bean MD;  Location:  [facility];  Service:    • CYSTOSCOPY W/ URETERAL STENT PLACEMENT Right 3/18/2017    Procedure: CYSTOSCOPY URETERAL CATHETER/STENT INSERTION;  Surgeon: Dipesh Bean MD;  Location:  [facility] OR;  Service:    • LUNG BIOPSY      NEGATIVE   • [place] THROMBOENDARTECTMY NECK,NECK INCIS Right 3/14/2016    Procedure: RT CAROTID ENDARTERECTOMY;  Surgeon: Federico Schuler MD;  Location: [facility] MAIN OR;  Service: Vascular   • WRIST ARTHROSCOPY Right     WITH RELEASE OF TRANSVERSE CARPAL LIGAMENT          PT ASSESSMENT (last 72 hours)      PT Evaluation       01/05/18 1045 01/05/18 0820    Rehab Evaluation    Document Type evaluation  -BP     Subjective Information agree to therapy;no complaints   no new complaints.   -BP      Patient Effort, Rehab Treatment adequate  -BP     Symptoms Noted During/After Treatment shortness of breath  -BP     Symptoms Noted Comment Patient on 4L O2/NC. Just switched to NC from oxymizer. RT present throughout. RT increased to 6L O2/NC for activity.  RT changed finger probe secondray to difficulty obtaining an accurate read. O2 sats varied from low 80-92% with activity. Verbal cues for pursed lip breathing.   -BP     General Information    Patient Profile Review yes  -BP     Onset of Illness/Injury or Date of Surgery Date 12/24/17  -BP     Referring Physician Dr. Rosa  -BP     General Observations Patient supine in bed with HOB elevated. With RT  -BP     Pertinent History Of Current Problem Patient presented to hospital due to worsening SOA. Diagnosed with pneumonia. Patient with complicated hospital stay including being transferred to ICU. Patient now medically stable and s/p a bronch this AM. Per patient and nursing report patient has been ambulating within room without assistance or device throghout hospital stay. Per patient report she only ambulates short distances, approximately 50 feet, at baseline. She states she is limited secondary to chronic back pain and respiratory status. She states she uses a w/c for community distances/ appointments. She independent with all mobility and ADL's at baseline. She reports no concerns for return home regarding mobility.   -BP     Precautions/Limitations oxygen therapy device and L/min   6L O2/NC  -BP     Prior Level of Function --   see pertinent history of current problem  -BP     Equipment Currently Used at Home oxygen  -BP     Plans/Goals Discussed With patient;agreed upon  -BP     Risks Reviewed patient:;LOB;increased discomfort;change in vital signs  -BP     Benefits Reviewed patient:;improve function;increase independence;increase strength  -BP     Barriers to Rehab medically complex  -BP     Living Environment    Lives With sibling(s)   lives with her  sister  -BP     Living Arrangements house  -BP     Home Accessibility bed and bath on same level;stairs to enter home  -BP     Number of Stairs to Enter Home 10  -BP     Stair Railings at Home outside, present at both sides  -BP     Clinical Impression    Date of Referral to PT 01/04/18  -BP     Criteria for Skilled Therapeutic Interventions Met current level of function same as previous level of function;no problems identified which require skilled intervention  -BP     Pain Assessment    Pain Assessment No/denies pain  -BP     Cognitive Assessment/Intervention    Current Cognitive/Communication Assessment functional  -BP     Orientation Status oriented x 4  -BP     Follows Commands/Answers Questions 100% of the time;able to follow single-step instructions  -BP     Personal Safety WNL/WFL  -BP     Personal Safety Interventions fall prevention program maintained;gait belt;nonskid shoes/slippers when out of bed  -BP     ROM (Range of Motion)    General ROM Detail B LE AROM functional  -BP     MMT (Manual Muscle Testing)    General MMT Assessment Detail B LE strength functional   -BP     Bed Mobility, Assessment/Treatment    Bed Mobility, Assistive Device head of bed elevated  -BP     Bed Mob, Supine to Sit, Bracken conditional independence  -BP     Bed Mob, Sit to Supine, Bracken conditional independence  -BP     Transfer Assessment/Treatment    Transfers, Sit-Stand Bracken independent  -BP     Transfers, Stand-Sit Bracken independent  -BP     Gait Assessment/Treatment    Gait, Bracken Level supervision required  -BP     Gait, Assistive Device other (see comments)   no device  -BP     Gait, Distance (Feet) 20  -BP     Gait, Gait Pattern Analysis swing-through gait  -BP     Gait, Gait Deviations kavita decreased  -BP     Gait, Comment Patient requires assist for lines only. No LOB. Navigates directional changes without assist safely. No assistive device required.   -BP     Positioning and  Restraints    Pre-Treatment Position in bed  -BP     Post Treatment Position bed  -BP     In Bed sitting EOB;call light within reach;encouraged to call for assist;with other staff   with RT  -BP       User Key  (r) = Recorded By, (t) = Taken By, (c) = Cosigned By    Initials Name Provider Type    LP Frances Avalos, RN Registered Nurse    JERRELL Barajas, RN Registered Nurse    LB Frances Hendrix, RN Registered Nurse    JH Fawn Ware, RN Registered Nurse    CHARLA Bailey, RAVEN Registered Nurse    BP Chauncey Santamaria, RUBA Physical Therapist    JASON Tapia RN Registered Nurse          Physical Therapy Education     Title: PT OT SLP Therapies (Resolved)     Topic: Physical Therapy (Resolved)     Point: Mobility training (Resolved)    Learning Progress Summary    Learner Readiness Method Response Comment Documented by Status   Patient Acceptance E VU  BP 01/05/18 1130 Done                      User Key     Initials Effective Dates Name Provider Type Discipline     12/01/15 -  Chauncey Santamaria, PT Physical Therapist PT                PT Recommendation and Plan  Anticipated Discharge Disposition: home with home health  PT Frequency: evaluation only  Plan of Care Review  Plan Of Care Reviewed With: patient  Outcome Summary/Follow up Plan: PT Eval Complete: Patient performs supine to/from sit transfers with conditional independence, sit to/from stand transfers with independence, and gait x 20 feet with supervision without an AD. Patients mobility limited secondary to respiratory status. At baseline patients gait distance limited to approximately 50 feet secondary to chronic back pain and respiratory status. Patient does not requrie an AD for safety. She has no concerns regarding mobility and return home. Recommend patient to continue to ambulate with nursing staff. No inpatient skilled PT services required at this time.               Outcome Measures       01/05/18 1045          How much help from another  person do you currently need...    Turning from your back to your side while in flat bed without using bedrails? 4  -BP      Moving from lying on back to sitting on the side of a flat bed without bedrails? 4  -BP      Moving to and from a bed to a chair (including a wheelchair)? 4  -BP      Standing up from a chair using your arms (e.g., wheelchair, bedside chair)? 4  -BP      Climbing 3-5 steps with a railing? 3  -BP      To walk in hospital room? 3  -BP      AM-PAC 6 Clicks Score 22  -BP      Functional Assessment    Outcome Measure Options AM-PAC 6 Clicks Basic Mobility (PT)  -BP        User Key  (r) = Recorded By, (t) = Taken By, (c) = Cosigned By    Initials Name Provider Type    BP Chauncey Santamaria PT Physical Therapist           Time Calculation:         PT Charges       01/05/18 1133          Time Calculation    Start Time 1045  -BP      PT Received On 01/05/18  -BP        User Key  (r) = Recorded By, (t) = Taken By, (c) = Cosigned By    Initials Name Provider Type    BP Chauncey Santamaria PT Physical Therapist          Therapy Charges for Today     Code Description Service Date Service Provider Modifiers Qty    [id number] HC PT EVAL LOW COMPLEXITY 2 1/5/2018 Chauncey Santamaria, PT GP 1          PT G-Codes  Outcome Measure Options: AM-PAC 6 Clicks Basic Mobility (PT)    PT Discharge Summary  Anticipated Discharge Disposition: home with home health  Reason for Discharge: At baseline function    Chauncey Santamaria PT  1/5/2018

## 2018-01-06 VITALS
BODY MASS INDEX: 42.55 KG/M2 | TEMPERATURE: 97.9 F | RESPIRATION RATE: 16 BRPM | OXYGEN SATURATION: 84 % | WEIGHT: 249.25 LBS | HEIGHT: 64 IN | DIASTOLIC BLOOD PRESSURE: 69 MMHG | SYSTOLIC BLOOD PRESSURE: 111 MMHG | HEART RATE: 63 BPM

## 2018-01-06 LAB
ANION GAP SERPL CALCULATED.3IONS-SCNC: 10 MMOL/L
ARTERIAL PATENCY WRIST A: POSITIVE
ATMOSPHERIC PRESS: 752 MMHG
B PERT DNA SPEC QL NAA+PROBE: NOT DETECTED
BASE EXCESS BLDA CALC-SCNC: 3.6 MMOL/L (ref 0–2)
BASOPHILS # BLD AUTO: 0.03 10*3/MM3 (ref 0–0.2)
BASOPHILS NFR BLD AUTO: 0.1 % (ref 0–2)
BDY SITE: ABNORMAL
BODY TEMPERATURE: 37 C
BUN BLD-MCNC: 50 MG/DL (ref 8–23)
BUN/CREAT SERPL: 40 (ref 7–25)
C PNEUM DNA NPH QL NAA+NON-PROBE: NOT DETECTED
CALCIUM SPEC-SCNC: 8.7 MG/DL (ref 8.8–10.5)
CHLORIDE SERPL-SCNC: 97 MMOL/L (ref 98–107)
CO2 SERPL-SCNC: 32 MMOL/L (ref 22–29)
CREAT BLD-MCNC: 1.25 MG/DL (ref 0.57–1)
DEPRECATED RDW RBC AUTO: 49.8 FL (ref 37–54)
EOSINOPHIL # BLD AUTO: 0 10*3/MM3 (ref 0.1–0.3)
EOSINOPHIL NFR BLD AUTO: 0 % (ref 0–4)
ERYTHROCYTE [DISTWIDTH] IN BLOOD BY AUTOMATED COUNT: 14.9 % (ref 11.5–14.5)
FLUAV H1 2009 PAND RNA NPH QL NAA+PROBE: NOT DETECTED
FLUAV H1 HA GENE NPH QL NAA+PROBE: NOT DETECTED
FLUAV H3 RNA NPH QL NAA+PROBE: DETECTED
FLUAV SUBTYP SPEC NAA+PROBE: NOT DETECTED
FLUBV RNA ISLT QL NAA+PROBE: NOT DETECTED
GAS FLOW AIRWAY: 4 LPM
GFR SERPL CREATININE-BSD FRML MDRD: 43 ML/MIN/1.73
GLUCOSE BLD-MCNC: 185 MG/DL (ref 65–99)
GLUCOSE BLDC GLUCOMTR-MCNC: 213 MG/DL (ref 70–130)
GLUCOSE BLDC GLUCOMTR-MCNC: 228 MG/DL (ref 70–130)
HADV DNA SPEC NAA+PROBE: NOT DETECTED
HCO3 BLDA-SCNC: 29 MMOL/L (ref 20–26)
HCOV 229E RNA SPEC QL NAA+PROBE: NOT DETECTED
HCOV HKU1 RNA SPEC QL NAA+PROBE: NOT DETECTED
HCOV NL63 RNA SPEC QL NAA+PROBE: NOT DETECTED
HCOV OC43 RNA SPEC QL NAA+PROBE: NOT DETECTED
HCT VFR BLD AUTO: 34.8 % (ref 37–47)
HGB BLD-MCNC: 10.8 G/DL (ref 12–16)
HGB BLDA-MCNC: 11.5 G/DL (ref 13.5–17.5)
HMPV RNA NPH QL NAA+NON-PROBE: NOT DETECTED
HPIV1 RNA SPEC QL NAA+PROBE: NOT DETECTED
HPIV2 RNA SPEC QL NAA+PROBE: NOT DETECTED
HPIV3 RNA NPH QL NAA+PROBE: NOT DETECTED
HPIV4 P GENE NPH QL NAA+PROBE: NOT DETECTED
IMM GRANULOCYTES # BLD: 0.64 10*3/MM3 (ref 0–0.03)
IMM GRANULOCYTES NFR BLD: 2.8 % (ref 0–0.5)
LYMPHOCYTES # BLD AUTO: 1.57 10*3/MM3 (ref 0.6–4.8)
LYMPHOCYTES NFR BLD AUTO: 6.8 % (ref 20–45)
Lab: ABNORMAL
M PNEUMO IGG SER IA-ACNC: NOT DETECTED
MCH RBC QN AUTO: 28.3 PG (ref 27–31)
MCHC RBC AUTO-ENTMCNC: 31 G/DL (ref 31–37)
MCV RBC AUTO: 91.3 FL (ref 81–99)
MODALITY: ABNORMAL
MONOCYTES # BLD AUTO: 0.6 10*3/MM3 (ref 0–1)
MONOCYTES NFR BLD AUTO: 2.6 % (ref 3–8)
NEUTROPHILS # BLD AUTO: 20.28 10*3/MM3 (ref 1.5–8.3)
NEUTROPHILS NFR BLD AUTO: 87.7 % (ref 45–70)
NRBC BLD MANUAL-RTO: 0 /100 WBC (ref 0–0)
PCO2 BLDA: 46.3 MM HG (ref 35–45)
PCO2 TEMP ADJ BLD: 46.3 MM HG (ref 35–45)
PH BLDA: 7.41 PH UNITS (ref 7.35–7.45)
PH, TEMP CORRECTED: 7.41 PH UNITS (ref 7.35–7.45)
PLATELET # BLD AUTO: 209 10*3/MM3 (ref 140–500)
PMV BLD AUTO: 10.7 FL (ref 7.4–10.4)
PO2 BLDA: 79.6 MM HG (ref 83–108)
PO2 TEMP ADJ BLD: 79.6 MM HG (ref 83–108)
POTASSIUM BLD-SCNC: 4.8 MMOL/L (ref 3.5–5.2)
RBC # BLD AUTO: 3.81 10*6/MM3 (ref 4.2–5.4)
RHINOVIRUS RNA SPEC NAA+PROBE: DETECTED
RSV RNA NPH QL NAA+NON-PROBE: NOT DETECTED
SAO2 % BLDCOA: 95.8 % (ref 94–99)
SODIUM BLD-SCNC: 139 MMOL/L (ref 136–145)
VENTILATOR MODE: ABNORMAL
WBC NRBC COR # BLD: 23.12 10*3/MM3 (ref 4.8–10.8)

## 2018-01-06 PROCEDURE — 85025 COMPLETE CBC W/AUTO DIFF WBC: CPT | Performed by: INTERNAL MEDICINE

## 2018-01-06 PROCEDURE — 36600 WITHDRAWAL OF ARTERIAL BLOOD: CPT

## 2018-01-06 PROCEDURE — 94799 UNLISTED PULMONARY SVC/PX: CPT

## 2018-01-06 PROCEDURE — 82803 BLOOD GASES ANY COMBINATION: CPT

## 2018-01-06 PROCEDURE — 99232 SBSQ HOSP IP/OBS MODERATE 35: CPT | Performed by: INTERNAL MEDICINE

## 2018-01-06 PROCEDURE — 80048 BASIC METABOLIC PNL TOTAL CA: CPT | Performed by: INTERNAL MEDICINE

## 2018-01-06 PROCEDURE — 63710000001 PREDNISONE PER 1 MG: Performed by: INTERNAL MEDICINE

## 2018-01-06 PROCEDURE — 82962 GLUCOSE BLOOD TEST: CPT

## 2018-01-06 PROCEDURE — 99239 HOSP IP/OBS DSCHRG MGMT >30: CPT | Performed by: HOSPITALIST

## 2018-01-06 PROCEDURE — 63710000001 INSULIN ASPART PER 5 UNITS: Performed by: HOSPITALIST

## 2018-01-06 RX ORDER — GUAIFENESIN 600 MG/1
1200 TABLET, EXTENDED RELEASE ORAL EVERY 12 HOURS SCHEDULED
Qty: 30 TABLET | Refills: 0 | Status: SHIPPED | OUTPATIENT
Start: 2018-01-06 | End: 2018-07-30

## 2018-01-06 RX ORDER — OSELTAMIVIR PHOSPHATE 75 MG/1
75 CAPSULE ORAL
Qty: 5 CAPSULE | Refills: 0 | Status: SHIPPED | OUTPATIENT
Start: 2018-01-06 | End: 2018-01-11

## 2018-01-06 RX ORDER — PREDNISONE 10 MG/1
10 TABLET ORAL
Qty: 44 TABLET | Refills: 0 | Status: SHIPPED | OUTPATIENT
Start: 2018-01-07 | End: 2018-02-28

## 2018-01-06 RX ORDER — OSELTAMIVIR PHOSPHATE 75 MG/1
75 CAPSULE ORAL
Status: DISCONTINUED | OUTPATIENT
Start: 2018-01-06 | End: 2018-01-06 | Stop reason: HOSPADM

## 2018-01-06 RX ORDER — VERAPAMIL HYDROCHLORIDE 240 MG/1
240 TABLET, FILM COATED, EXTENDED RELEASE ORAL EVERY 12 HOURS SCHEDULED
Qty: 60 TABLET | Refills: 0 | Status: SHIPPED | OUTPATIENT
Start: 2018-01-06 | End: 2018-02-28 | Stop reason: SDUPTHER

## 2018-01-06 RX ORDER — AMIODARONE HYDROCHLORIDE 200 MG/1
200 TABLET ORAL
Status: DISCONTINUED | OUTPATIENT
Start: 2018-01-06 | End: 2018-01-06 | Stop reason: HOSPADM

## 2018-01-06 RX ORDER — HYDROCHLOROTHIAZIDE 12.5 MG/1
12.5 TABLET ORAL DAILY
Status: DISCONTINUED | OUTPATIENT
Start: 2018-01-06 | End: 2018-01-06 | Stop reason: HOSPADM

## 2018-01-06 RX ORDER — AMIODARONE HYDROCHLORIDE 200 MG/1
200 TABLET ORAL
Qty: 30 TABLET | Refills: 0 | Status: SHIPPED | OUTPATIENT
Start: 2018-01-07 | End: 2018-07-02

## 2018-01-06 RX ADMIN — INSULIN ASPART 8 UNITS: 100 INJECTION, SOLUTION INTRAVENOUS; SUBCUTANEOUS at 12:14

## 2018-01-06 RX ADMIN — ROSUVASTATIN CALCIUM 40 MG: 5 TABLET, FILM COATED ORAL at 08:04

## 2018-01-06 RX ADMIN — GUAIFENESIN 1200 MG: 600 TABLET, EXTENDED RELEASE ORAL at 08:03

## 2018-01-06 RX ADMIN — IPRATROPIUM BROMIDE AND ALBUTEROL SULFATE 3 ML: .5; 3 SOLUTION RESPIRATORY (INHALATION) at 11:53

## 2018-01-06 RX ADMIN — ACETAMINOPHEN 650 MG: 325 TABLET, FILM COATED ORAL at 08:03

## 2018-01-06 RX ADMIN — VERAPAMIL HYDROCHLORIDE 240 MG: 240 TABLET, FILM COATED, EXTENDED RELEASE ORAL at 08:03

## 2018-01-06 RX ADMIN — CLOPIDOGREL 75 MG: 75 TABLET, FILM COATED ORAL at 08:03

## 2018-01-06 RX ADMIN — FLUOXETINE 20 MG: 20 CAPSULE ORAL at 08:03

## 2018-01-06 RX ADMIN — LEVETIRACETAM 500 MG: 500 TABLET, FILM COATED ORAL at 08:03

## 2018-01-06 RX ADMIN — METFORMIN HYDROCHLORIDE 500 MG: 500 TABLET ORAL at 08:03

## 2018-01-06 RX ADMIN — INSULIN ASPART 8 UNITS: 100 INJECTION, SOLUTION INTRAVENOUS; SUBCUTANEOUS at 08:04

## 2018-01-06 RX ADMIN — LISINOPRIL 40 MG: 20 TABLET ORAL at 08:03

## 2018-01-06 RX ADMIN — AMIODARONE HYDROCHLORIDE 200 MG: 200 TABLET ORAL at 08:03

## 2018-01-06 RX ADMIN — HYDROCHLOROTHIAZIDE 12.5 MG: 25 TABLET ORAL at 08:04

## 2018-01-06 RX ADMIN — ARFORMOTEROL TARTRATE 15 MCG: 15 SOLUTION RESPIRATORY (INHALATION) at 09:21

## 2018-01-06 RX ADMIN — PREDNISONE 40 MG: 20 TABLET ORAL at 08:03

## 2018-01-06 RX ADMIN — IPRATROPIUM BROMIDE AND ALBUTEROL SULFATE 3 ML: .5; 3 SOLUTION RESPIRATORY (INHALATION) at 08:21

## 2018-01-06 NOTE — PROGRESS NOTES
LOS: 13 days   Patient Care Team:  Vianey Barrios PA-C as PCP - General (Family Medicine)  Bhanu Mata MD as Consulting Physician (Cardiology)  Mann Hernadez MD as Consulting Physician (Pulmonary Disease)    Chief Complaint:   f/u paf    Interval History:   She has no chest pain, dizziness or nausea.  She has minimal cough.    Objective   Vital Signs  Temp:  [96.8 °F (36 °C)-98.1 °F (36.7 °C)] 96.8 °F (36 °C)  Heart Rate:  [63-76] 76  Resp:  [18-20] 20  BP: ()/(34-66) 109/60    Intake/Output Summary (Last 24 hours) at 01/06/18 0741  Last data filed at 01/05/18 0800   Gross per 24 hour   Intake              400 ml   Output                0 ml   Net              400 ml       Comfortable NAD  Neck supple, no JVD or thyromegaly appreciated  S1/S2 RRR, no m/r/g  Lungs Few scattered wheezes with decreased sounds right base, normal effort  Abdomen S/NT/ND (+) BS, no HSM appreciated  Extremities warm, no clubbing, cyanosis, or edema  No visible or palpable skin lesions  A/Ox4, mood and affect appropriate    Results Review:        Results from last 7 days  Lab Units 01/06/18  0321 01/05/18  0344 01/04/18  0325   SODIUM mmol/L 139 141 138   POTASSIUM mmol/L 4.8 4.2 3.9   CHLORIDE mmol/L 97* 99 96*   CO2 mmol/L 32.0* 34.0* 33.8*   BUN mg/dL 50* 29* 28*   CREATININE mg/dL 1.25* 0.71 0.69   GLUCOSE mg/dL 185* 112* 161*   CALCIUM mg/dL 8.7* 9.4 9.0           Results from last 7 days  Lab Units 01/06/18  0321 01/05/18  0344 01/04/18  0325   WBC 10*3/mm3 23.12* 20.86* 22.77*   HEMOGLOBIN g/dL 10.8* 12.0 12.3   HEMATOCRIT % 34.8* 37.9 37.6   PLATELETS 10*3/mm3 209 230 266                       I reviewed the patient's new clinical results.  I personally viewed and interpreted the patient's EKG/Telemetry data        Medication Review:     acetaminophen 650 mg Oral TID   amiodarone 400 mg Oral Q24H   arformoterol 15 mcg Nebulization BID - RT   clopidogrel 75 mg Oral Daily   FLUoxetine 20 mg Oral Daily   guaiFENesin  1,200 mg Oral Q12H   hydrALAZINE 10 mg Intravenous Once   hydrochlorothiazide 25 mg Oral Daily   insulin aspart 0-24 Units Subcutaneous 4x Daily AC & at Bedtime   insulin detemir 25 Units Subcutaneous Nightly   ipratropium-albuterol 3 mL Nebulization 4x Daily - RT   levETIRAcetam 500 mg Oral Q12H   lisinopril 40 mg Oral Daily   metFORMIN 500 mg Oral BID With Meals   predniSONE 40 mg Oral Daily With Breakfast   rivaroxaban 20 mg Oral Daily With Dinner   rosuvastatin 40 mg Oral Daily   verapamil  mg Oral Q12H            Assessment/Plan     Active Problems:    Chronic bronchitis    Essential hypertension    Morbid obesity    Pneumonia of right lower lobe due to infectious organism    PAF (paroxysmal atrial fibrillation)       1.  Paroxysmal atrial fibrillation.  CHADSVASc = 5.  Echo with hyperdynamic LV systolic function.  Currently in SR, will continue current medical therapy. Anticoagulated with xarelto.  Home on amiodarone 200 mg daily     2.  COPD with exacerbation / pneumonia -  per pulmonary/primary team     3.  DM2     4.  Morbid obesity     5.  Suspected RANDOLPH     6.  HTN - better control, creatinine is elevated.  Will decrease HCTZ to 12.5 mg daily    Will sign off.  F/u in office in 6 weeks.     Jasmin Kurtz MD  01/06/18  7:41 AM

## 2018-01-06 NOTE — PROGRESS NOTES
[facility]  Phone: [phone]  Gurmeet Rosa MD    Subjective:  LOS: 13    Now on NC. Eager to go home.    Objective   Vital Signs past 24hrs  BP range: BP: ()/(54-66) 109/60  Pulse range: Heart Rate:  [63-76] 65  Resp rate range: Resp:  [16-20] 16  Temp range: Temp (24hrs), Av.5 °F (36.4 °C), Min:96.8 °F (36 °C), Max:98.1 °F (36.7 °C)    O2 Device: nasal cannula with humidificationFlow (L/min):  [2-6] 3  Oxygen range:SpO2:  [89 %-95 %] 91 %   113 kg (249 lb 4 oz); Body mass index is 42.78 kg/(m^2).  No intake or output data in the 24 hours ending 18 1015    Physical Exam   Constitutional: She appears well-developed.   Eyes: Conjunctivae are normal.   Neck: Normal range of motion. Neck supple.   Cardiovascular: Normal rate and regular rhythm.    No murmur heard.  Pulmonary/Chest: Effort normal. No respiratory distress. She has decreased breath sounds. She has no wheezes. She has no rales.   Abdominal: Soft. Bowel sounds are normal. She exhibits no mass. There is no tenderness.   Musculoskeletal: She exhibits no edema.   Neurological: She is alert.   Skin: Skin is warm. No rash noted.     Results Review:    I have reviewed the laboratory and imaging data since the last note by [facility] physician.  My annotations are noted in assessment and plan.    Medication Review:  I have reviewed the current MAR.  My annotations are noted in assessment and plan.       Plan   PCCM Problems  New influenza infection  Acute hypoxic respiratory failure  Bilateral pleural effusion  Bibasilar atelectasis vs pneumonia  Chronic hypoxia on home oxygen  Rhinovirus bronchitis  COPD with exacerbation   Suspected obstructive sleep apnea  Morbid obesity  Relevant Medical Diagnoses  Pafib  DM2  HTN    Plan of Treatment  Bronchial reveals influenza infection which is new and in addition to the rhinovirus infection we were already dealing with.  We will go ahead and give her 5 days of Tamiflu.  This may have been partly  the cause of her respiratory deterioration and failure to improve quickly.  She does appear better now.    I gave her Acetazolamide yesterday.  Her oxygenation appears to have improved.  She probably had some metabolic alkalosis which is now better.  It has bumped her BUN and creatinine output this should equal liberated.  Any loop diuretics should be held for a couple of days.    She is on home oxygen at 3 L/m via nasal cannula.  She is now on her home flows of oxygen.    She is not wheezing today.  She can continue on by mouth prednisone with a slow taper over 2 weeks.    Her irritation and chest x-ray is improved and on exam.  She should still get a CT of the chest in 8-10 weeks to follow-up the bibasilar infiltrate seen on the initial exam.    She requires outpatient sleep study evaluation.  This has been ordered previously.  She will need follow-up in the sleep lab after.    Gurmeet Rosa MD  01/06/18  10:15 AM    Part of this note may be an electronic transcription/translation of spoken language to printed text using the Dragon Dictation System.

## 2018-01-06 NOTE — PLAN OF CARE
Problem: COPD, Chronic Bronchitis/Emphysema (Adult)  Intervention: Match Activity with Ability/Tolerance   01/06/18 0222   Activity   Activity Type activity adjusted per tolerance     Intervention: Optimize Oxygenation/Ventilation/Perfusion   01/06/18 0222   Respiratory Interventions   Airway/Ventilation Management airway patency maintained;pulmonary hygiene promoted   Promote Aggressive Pulmonary Hygiene/Secretion Management   Cough And Deep Breathing done with encouragement     Intervention: Support/Optimize Psychosocial Response to Chronic Pulmonary Disease   01/06/18 0222   Coping Strategies   Trust Relationship/Rapport emotional support provided   Supportive Measures active listening utilized     Intervention: Reduce/Relieve Breathlessness   01/06/18 0222   Respiratory Interventions   Breathing Techniques/Airway Clearance pursed-lip breathing encouraged;deep/controlled cough encouraged   Positioning   Head Of Bed (HOB) Position HOB at 30-45 degrees      01/06/18 0222   Respiratory Interventions   Breathing Techniques/Airway Clearance pursed-lip breathing encouraged;deep/controlled cough encouraged   Positioning   Head Of Bed (HOB) Position HOB at 30-45 degrees       Goal: Signs and Symptoms of Listed Potential Problems Will be Absent or Manageable (COPD, Chronic Bronchitis/Emphysema)  Outcome: Ongoing (interventions implemented as appropriate)   01/06/18 0222   COPD, Chronic Bronchitis/Emphysema   Problems Assessed (COPD, Chronic Bronchitis/Emphysema) all   Problems Present (COPD, Chronic Bronchitis/Emphysema) hypoxia/hypoxemia       Problem: Respiratory Insufficiency (Adult)  Intervention: Provide Oxygenation/Ventilation/Perfusion Support   01/06/18 0222   Respiratory Interventions   Airway/Ventilation Management airway patency maintained;pulmonary hygiene promoted   Positioning   Head Of Bed (HOB) Position HOB at 30-45 degrees   Activity   Activity Type activity adjusted per tolerance     Intervention:  Optimize/Manage Energy Expenditure   01/06/18 0222   Coping/Psychosocial Interventions   Environmental Support calm environment promoted       Goal: Acid/Base Balance   01/06/18 0222   Respiratory Insufficiency (Adult)   Acid/Base Balance making progress toward outcome     Goal: Effective Ventilation   01/06/18 0222   Respiratory Insufficiency (Adult)   Effective Ventilation making progress toward outcome

## 2018-01-06 NOTE — PROGRESS NOTES
Patient: Mar Camilo  Procedure(s) with comments:  BRONCHOSCOPY - RIGHT AND LEFT LUNG WASH  Anesthesia type: [unfilled]    Patient location: [facility]  Last vitals:   Vitals:    01/06/18 0610   BP: 109/60   Pulse: 76   Resp: 20   Temp: 96.8 °F (36 °C)   SpO2: 91%     Level of consciousness: awake, alert and oriented    Post-anesthesia pain: adequate analgesia  Airway patency: patent  Respiratory: spontaneous ventilation, nasal cannula  Cardiovascular: stable  Hydration: euvolemic    Anesthetic complications: no

## 2018-01-06 NOTE — PLAN OF CARE
Problem: Patient Care Overview (Adult)  Goal: Plan of Care Review   01/05/18 2227   Coping/Psychosocial Response Interventions   Plan Of Care Reviewed With patient   Patient Care Overview   Progress progress towards functional goals is fair       Problem: Fall Risk (Adult)  Goal: Absence of Falls  Outcome: Ongoing (interventions implemented as appropriate)   01/05/18 2227   Fall Risk (Adult)   Absence of Falls making progress toward outcome       Problem: Respiratory Insufficiency (Adult)  Goal: Effective Ventilation  Outcome: Ongoing (interventions implemented as appropriate)   01/05/18 2227   Respiratory Insufficiency (Adult)   Effective Ventilation making progress toward outcome

## 2018-01-06 NOTE — NURSING NOTE
Case Management Discharge Note    Final Note: discharged home with home O2.    Discharge Placement     No information found             Discharge Codes: 01  Discharge to home

## 2018-01-06 NOTE — DISCHARGE SUMMARY
Mar MARROQUIN Scriver  1950  [id number]        Hospitalists Discharge Summary    Date of Admission: 12/24/2017  Date of Discharge:  1/6/2018    Primary Discharge Diagnoses & Secondary Discharge Diagnoses:     1. Acute on chronic hypoxic respiratory failure: pulmonary following    2. Sepsis secondary to Acute Rhinovirus Bronchitis and bilateral lower lobe Roseomonas PNA/bilateral pleural effusions/bibasilar atelectasis and now Influenza A which we are RXing tamiflu.      3. AECOPD: Pulmonary following  Completed oral levaquin per culture for roseomonas  WBCC increasing but clinically improved, likely secondary to steroids, afebrile  Down to 4 liter nasal cannula today after bronch  Continuing mucomyst/oral steroids and taper/duonebs/Brovana/mucinex/acapella   Plan walking oximetry possibly 1/7/18, continue to wean oxygen as tolerated  Await bronch cultures      4. PAF with RVR: cardiology following  Rate controlled on oral amio 400 mg daily/HCTZ 25 mg daily/lisinopril 40 mg daily/verapamil  mg daily  Xarelto 20 mg held  per pulmonary for bronch yesterday, resumed, continues on plavix 75 mg daily  Echo showed grade II diastolic dysfunction/hyperdynamic LV with EF>70%  D/C daily EKGs, levaquin course complete, QTc stable at 469, EKGs unchanged     Per cardiology:  Paroxysmal atrial fibrillation.  CHADSVASc = 5.  Echo with hyperdynamic LV systolic function.  Currently in SR, will continue current medical therapy. Anticoagulated with xarelto.  Home on amiodarone 200 mg daily    5. Leukocytosis: likely elevated secondary to steroids, as above, 20.86 today, monitor afebrile  Clinically improved, procal low  Will discharge on steroids as recommended by pulmonary.      6. DM-2: A1c 7.5%  AM glucose at goal on levemir 25 units nightly (started this admit)  Monitor closely and PCP can alter as needed  Continue metformin home dose, high dose SSI/Accuchecks ac/hs      7. Hypertension:   BP now overall goal after  medications adjusted as per number 4, monitor, no change today      8. Probable RANDOLPH: Pulmonary following and will order an outpatient sleep study after discharge.       Plan per Pulmonary Dr. Rosa this am:    Bronchial reveals influenza infection which is new and in addition to the rhinovirus infection we were already dealing with.  We will go ahead and give her 5 days of Tamiflu.  This may have been partly the cause of her respiratory deterioration and failure to improve quickly.  She does appear better now.     I gave her Acetazolamide yesterday.  Her oxygenation appears to have improved.  She probably had some metabolic alkalosis which is now better.  It has bumped her BUN and creatinine output this should equal liberated.  Any loop diuretics should be held for a couple of days.     She is on home oxygen at 3 L/m via nasal cannula.  She is now on her home flows of oxygen.     She is not wheezing today.  She can continue on by mouth prednisone with a slow taper over 2 weeks.     Her irritation and chest x-ray is improved and on exam.  She should still get a CT of the chest in 8-10 weeks to follow-up the bibasilar infiltrate seen on the initial exam.     She requires outpatient sleep study evaluation.  This has been ordered previously.  She will need follow-up in the sleep lab after.           Ongoing diagnoses unchanged:       9. Depression: no acute issues on home prozac       10. Dyslipidemia: no acute issues on home dose crestor.       11. Morbid Obesity: Nutrition to  prior to discharge.   Body mass index is 43.43 kg/(m^2).      12. Chronic back pain: No acute issues here.       13. Crohn's disease: Patient follows with Dr. Umaña and receives Remicade as an outpatient. No acute issues currently. Will need next dose deferred due to this current infection (will need to let Dr. Umaña's office know).       14. H/O CVA: continues on ASA, plavix, statin, no acute issues            PCP  Patient Care  Team:  Vianey Barrios PA-C as PCP - General (Family Medicine)  Bhanu Mata MD as Consulting Physician (Cardiology)  Mann Hernadez MD as Consulting Physician (Pulmonary Disease)    Consults:   Consults     Date and Time Order Name Status Description    12/31/2017 1916 Inpatient Consult to Cardiology      12/26/2017 1704 Inpatient Consult to Pulmonology Completed           Operations and Procedures Performed:  Procedure(s):  BRONCHOSCOPY     Xr Chest 2 View    Result Date: 1/4/2018  Narrative: PA AND LATERAL CHEST  DATE:  01/04/2018.  HISTORY: Acute hypoxic respiratory failure. Bilateral pleural effusions. Bronchitis. COPD exacerbation. Former smoker. Cough. Controlled hypertension.  COMPARISON: AP portable chest 12/31/2017. CT angiography of the chest 12/28/2017.  FINDINGS: Significant interval improvement in interstitial and alveolar disease changes within both lungs, which may represent improving pulmonary edema. Mild bibasilar atelectasis remains. Left pleural effusion appears improved, as well, with trace pleural fluid remaining. Heart size is borderline enlarged and appears improved.  No new airspace disease or pneumothorax or acute osseous abnormality      Impression: 1. Marked interval improvement in interstitial and alveolar disease since 12/31/2017, thought to represent improving edema. Mild atelectasis or edema remains in the bases. 2. Trace left pleural effusion, significantly improved since 12/31/2017.  3. Improved appearance of cardiac silhouette since 12/31/2017.  This report was finalized on 1/4/2018 12:15 PM by Dr. Sia Sarabia MD.      Xr Chest 2 View    Result Date: 12/25/2017  Narrative: EXAM:CHEST RADIOGRAPH 12/04/2017  HISTORY: Short of air cough for one week, worsening tonight  TECHNIQUE: PA and lateral 2 views  COMPARISON:09/30/2015  FINDINGS: Left lung volume loss appears chronic. Mild interstitial prominence particularly at the left base without consolidation or effusion. No  pneumothorax or suspicious nodule. Heart size normal.      Impression: Chronic changes including left lung volume loss without acute abnormality  This report was finalized on 12/25/2017 6:49 AM by Dr. Federico Araya MD.      Ct Angiogram Chest With & Without Contrast    Result Date: 12/28/2017  Narrative: CTA chest with contrast  INDICATION: Shortness of air that started this morning. Increased O2 requirements. Patient has history of hypertension and COPD.  PROCEDURE: Contrast enhanced CTA of the chest with attention on opacification of the pulmonary arteries. Coronal 3-D MIP reformatted images and standard sagittal MPR reconstructions are reconstructed and submitted.  Radiation dose reduction techniques were utilized, including automated exposure control and exposure modulation based on body size.  COMPARISON: 07/04/2015.  FINDINGS: There is no pulmonary embolism or aortic dissection. There are small bilateral pleural effusions. No pericardial effusion. There is coronary artery disease. High-grade stenosis at the origin of the left subclavian artery is again seen. Pretracheal lymph node is unchanged and felt to be benign. Prominent left hilar node is stable to slightly smaller, also felt to be benign. There is severe emphysema. Dense alveolar infiltrates are noted in the lower lobes, presumably reflecting pneumonia. Upper abdomen shows atherosclerotic disease and hepatic steatosis.      Impression: 1. No pulmonary embolism or aortic dissection. 2. Bilateral lower lobe pneumonia. 3. Severe emphysema. 4. Small bilateral pleural effusions.  This report was finalized on 12/28/2017 1:06 PM by Dr. Tanner Hare MD.      Xr Chest 1 View    Result Date: 12/31/2017  Narrative: EXAM: AP portable chest  DATE: 12/31/2017.  HISTORY: Pneumonia, follow-up. Shortness of breath. COPD. Hypertension.  COMPARISON: CTA chest 12/28/2017, PA and lateral chest 12/27/2017.  FINDINGS: Dense left greater than right bibasilar airspace  disease consistent with the appearance of pneumonia, without significant change. Suspected small layering bilateral pleural effusions, not appreciated change. Stable mild cardiac enlargement. Background emphysematous changes, seen to better advantage on previous CT chest. No visible pneumothorax.      Impression: 1. Dense left greater than right bibasilar airspace disease in keeping with the appearance of pneumonia, with probable small layering bilateral pleural effusions. The findings do not appear appreciably changed compared to 12/27/2017. 2. Emphysema.  This report was finalized on 12/31/2017 6:54 AM by Dr. Sia Sarabia MD.      Xr Chest Pa & Lateral    Result Date: 12/27/2017  Narrative: EXAM: PA and lateral chest  DATE: 12/27/2017.  HISTORY: Shortness of breath and cough since 12/24/2017. On new breathing medications.  FINDINGS: Development of dense left lower lobe, and to a lesser degree right lower lobe, airspace disease with questionable trace left pleural effusion. Stable heart size, within normal limits. Background emphysematous changes are likely present.      Impression: 1.Development of left greater than right basilar airspace disease and probable trace left pleural effusion. FINDINGS are worrisome for developing pneumonia. 2. STAT copy of this report was sent to the ordering provider's office immediately following this dictation with telephone notification and documentation.  This report was finalized on 12/27/2017 2:31 PM by Dr. Sia Sarabia MD.        Allergies:  is allergic to contrast dye; tenoretic [atenolol-chlorthalidone]; and iodinated diagnostic agents.    Geoffrey  reviewed    Discharge Medications:   Mar Camilo   Home Medication Instructions MANDO:[id number]    Printed on:01/06/18 1251   Medication Information                      acetaminophen (TYLENOL) 325 MG tablet  Take 650 mg by mouth 3 (Three) Times a Day.             albuterol (PROVENTIL HFA;VENTOLIN HFA) 108 (90 BASE)  MCG/ACT inhaler  Inhale 2 puffs Every 4 (Four) Hours As Needed for Wheezing or Shortness of Air.             albuterol (PROVENTIL) (2.5 MG/3ML) 0.083% nebulizer solution  Take 2.5 mg by nebulization every 4 (four) hours as needed for wheezing.             amiodarone (PACERONE) 200 MG tablet  Take 1 tablet by mouth Daily.             BREO ELLIPTA 100-25 MCG/INH aerosol powder   INHALE ONE PUFF BY MOUTH ONCE DAILY             clopidogrel (PLAVIX) 75 MG tablet  Take 1 tablet by mouth Daily.             FLUoxetine (PROzac) 20 MG capsule  TAKE ONE CAPSULE BY MOUTH ONCE DAILY             guaiFENesin (MUCINEX) 600 MG 12 hr tablet  Take 2 tablets by mouth Every 12 (Twelve) Hours.             HYDROcodone-acetaminophen (NORCO) 5-325 MG per tablet  Take one tablet twice a day as needed for pain             insulin detemir (LEVEMIR) 100 UNIT/ML injection  Inject 25 Units under the skin Every Night.             levETIRAcetam (KEPPRA) 500 MG tablet  Take 500 mg by mouth every night at bedtime.             lisinopril (PRINIVIL,ZESTRIL) 40 MG tablet  Take 1 tablet by mouth Daily.             metFORMIN (GLUCOPHAGE) 500 MG tablet  Take 1 tablet by mouth 2 (Two) Times a Day With Meals.             Multiple Vitamins-Minerals (CENTRUM SILVER PO)  Take 1 tablet by mouth every morning.             Multiple Vitamins-Minerals (VISION FORMULA/LUTEIN PO)  Take 1 tablet by mouth Daily.             oseltamivir (TAMIFLU) 75 MG capsule  Take 1 capsule by mouth Daily for 5 doses. Indications: Flu             predniSONE (DELTASONE) 10 MG tablet  Take 1 tablet by mouth Daily With Breakfast. Take 6 day 1&2, 5 day 3&4, 4 day 5&6, 3 day 7&8, 2 day 9&10, 1 day 11,12,13,14 the discontinue             rivaroxaban (XARELTO) 20 MG tablet  Take 1 tablet by mouth Daily With Dinner.             rosuvastatin (CRESTOR) 40 MG tablet  TAKE ONE TABLET BY MOUTH ONCE DAILY             SPIRIVA HANDIHALER 18 MCG per inhalation capsule  INHALE ONE DOSE BY MOUTH ONCE  DAILY             verapamil SR (CALAN-SR) 240 MG CR tablet  Take 1 tablet by mouth Every 12 (Twelve) Hours.             zolpidem (AMBIEN) 5 MG tablet  Bring to sleep lab DO NOT use at home                 History of Present Illness:  This 67-year-old white female with a history of COPD, on home oxygen at 2-1/2-3 L/m, presents with shortness of breath.  Saturations were 88% on 2-1/2 L when she arrived.  The patient has had subjective fever and chills.  She's had a cough productive of yellowish sputum.  She complains of some chronic abdominal pain related to an abdominal hernia that is unchanged.  She has no other complaints.  She has not smoking.  She quit in 2015.        She is coughing and feels like there is thick mucous in lungs and throat that needs to come up.  This has been the symptoms prevalent of RSV infection.  Awaiting the panel.  Patient is negative for flu A and B/         Hospital Course   1. Acute on chronic hypoxic respiratory failure: pulmonary following    2. Sepsis secondary to Acute Rhinovirus Bronchitis and bilateral lower lobe Roseomonas PNA/bilateral pleural effusions/bibasilar atelectasis and now Influenza A which we are RXing tamiflu.      3. AECOPD: Pulmonary following  Completed oral levaquin per culture for roseomonas  WBCC increasing but clinically improved, likely secondary to steroids, afebrile  Down to 4 liter nasal cannula today after bronch  Continuing mucomyst/oral steroids and taper/duonebs/Brovana/mucinex/acapella   Plan walking oximetry possibly 1/7/18, continue to wean oxygen as tolerated  Await bronch cultures      4. PAF with RVR: cardiology following  Rate controlled on oral amio 400 mg daily/HCTZ 25 mg daily/lisinopril 40 mg daily/verapamil  mg daily  Xarelto 20 mg held  per pulmonary for bronch yesterday, resumed, continues on plavix 75 mg daily  Echo showed grade II diastolic dysfunction/hyperdynamic LV with EF>70%  D/C daily EKGs, levaquin course complete, QTc  stable at 469, EKGs unchanged     Per cardiology:  Paroxysmal atrial fibrillation.  CHADSVASc = 5.  Echo with hyperdynamic LV systolic function.  Currently in SR, will continue current medical therapy. Anticoagulated with xarelto.  Home on amiodarone 200 mg daily    5. Leukocytosis: likely elevated secondary to steroids, as above, 20.86 today, monitor afebrile  Clinically improved, procal low  Will discharge on steroids as recommended by pulmonary.      6. DM-2: A1c 7.5%  AM glucose at goal on levemir 25 units nightly (started this admit)  Monitor closely and PCP can alter as needed  Continue metformin home dose, high dose SSI/Accuchecks ac/hs      7. Hypertension:   BP now overall goal after medications adjusted as per number 4, monitor, no change today      8. Probable RANDOLPH: Pulmonary following and will order an outpatient sleep study after discharge.         Last Lab Results:   Lab Results (most recent)     Procedure Component Value Units Date/Time    CBC & Differential [[id number]] Collected:  12/24/17 2231    Specimen:  Blood Updated:  12/24/17 2241    Narrative:       The following orders were created for panel order CBC & Differential.  Procedure                               Abnormality         Status                     ---------                               -----------         ------                     CBC Auto Differential[[id number]]        Abnormal            Final result                 Please view results for these tests on the individual orders.    CBC Auto Differential [[id number]]  (Abnormal) Collected:  12/24/17 2231    Specimen:  Blood Updated:  12/24/17 2241     WBC 15.00 (H) 10*3/mm3      RBC 4.71 10*6/mm3      Hemoglobin 13.7 g/dL      Hematocrit 43.4 %      MCV 92.1 fL      MCH 29.1 pg      MCHC 31.6 g/dL      RDW 15.1 (H) %      RDW-SD 51.2 fl      MPV 9.3 fL      Platelets 377 10*3/mm3      Neutrophil % 70.2 (H) %      Lymphocyte % 19.7 (L) %      Monocyte % 8.7 (H) %      Eosinophil % 0.6 %       Basophil % 0.3 %      Immature Grans % 0.5 %      Neutrophils, Absolute 10.52 (H) 10*3/mm3      Lymphocytes, Absolute 2.96 10*3/mm3      Monocytes, Absolute 1.31 (H) 10*3/mm3      Eosinophils, Absolute 0.09 (L) 10*3/mm3      Basophils, Absolute 0.05 10*3/mm3      Immature Grans, Absolute 0.07 (H) 10*3/mm3      nRBC 0.0 /100 WBC     Influenza Antigen, Rapid - Swab, Nasopharynx [[id number]]  (Normal) Collected:  12/24/17 2244    Specimen:  Swab from Nasopharynx Updated:  12/24/17 2302     Influenza A Ag, EIA Negative     Influenza B Ag, EIA Negative    Lactic Acid, Plasma [[id number]]  (Abnormal) Collected:  12/24/17 2231    Specimen:  Blood Updated:  12/24/17 2303     Lactate 2.7 (C) mmol/L     Comprehensive Metabolic Panel [[id number]]  (Abnormal) Collected:  12/24/17 2231    Specimen:  Blood Updated:  12/24/17 2305     Glucose 191 (H) mg/dL      BUN 17 mg/dL      Creatinine 0.79 mg/dL      Sodium 139 mmol/L      Potassium 4.1 mmol/L      Chloride 99 mmol/L      CO2 24.6 mmol/L      Calcium 9.3 mg/dL      Total Protein 7.6 g/dL      Albumin 3.90 g/dL      ALT (SGPT) 43 (H) U/L      AST (SGOT) 36 (H) U/L      Alkaline Phosphatase 112 U/L      Total Bilirubin 0.2 mg/dL      eGFR Non African Amer 72 mL/min/1.73      Globulin 3.7 gm/dL      A/G Ratio 1.1 g/dL      BUN/Creatinine Ratio 21.5     Anion Gap 15.4 mmol/L     Troponin [[id number]]  (Normal) Collected:  12/24/17 2312    Specimen:  Blood Updated:  12/24/17 2326     Troponin T <0.010 ng/mL     Narrative:       Troponin T Reference Ranges:  Less than 0.03 ng/mL:    Negative for AMI  0.03 to 0.09 ng/mL:      Indeterminant for AMI  Greater than 0.09 ng/mL: Positive for AMI    Urinalysis With / Culture If Indicated - Urine, Clean Catch [[id number]]  (Abnormal) Collected:  12/24/17 2355    Specimen:  Urine from Urine, Clean Catch Updated:  12/25/17 0013     Color, UA Yellow     Appearance, UA Clear     pH, UA 5.5     Specific Gravity, UA 1.040 (H)      Result obtained  by Refractometer        Glucose, UA Negative     Ketones, UA Trace (A)     Bilirubin, UA Small (1+) (A)     Blood, UA Negative     Protein,  mg/dL (2+) (A)     Leuk Esterase, UA Negative     Nitrite, UA Negative     Urobilinogen, UA 1.0 E.U./dL    Urinalysis, Microscopic Only - Urine, Clean Catch [[id number]]  (Abnormal) Collected:  12/24/17 2355    Specimen:  Urine from Urine, Clean Catch Updated:  12/25/17 0033     RBC, UA 3-5 (A) /HPF      WBC, UA 3-5 (A) /HPF      Bacteria, UA 2+ (A) /HPF      Squamous Epithelial Cells, UA 0-2 /HPF      Hyaline Casts, UA None Seen /LPF      Mucus, UA Moderate/2+ (A) /HPF      Methodology Manual Light Microscopy    Lactic Acid, Reflex Timer [[id number]] Collected:  12/24/17 2231    Specimen:  Blood Updated:  12/25/17 0216     Extra Tube Hold for add-ons.      Auto resulted.       Lactic Acid, Plasma [[id number]]  (Abnormal) Collected:  12/25/17 0217    Specimen:  Blood Updated:  12/25/17 0242     Lactate 2.2 (C) mmol/L     Procalcitonin [[id number]]  (Abnormal) Collected:  12/25/17 0217    Specimen:  Blood Updated:  12/25/17 0303     Procalcitonin 0.09 (L) ng/mL     Narrative:       As a Marker for Sepsis (Non-Neonates):   1. <0.5 ng/mL represents a low risk of severe sepsis and/or septic shock.  2. >2 ng/mL represents a high risk of severe sepsis and/or septic shock.    As a Marker for Lower Respiratory Tract Infections that require antibiotic therapy:    PCT on Admission     Antibiotic Therapy       6-12 Hrs later  > 0.5                Strongly Recommended             >0.25 - <0.5         Recommended  0.1 - 0.25           Discouraged              Remeasure/reassess PCT  <0.1                 Strongly Discouraged     Remeasure/reassess PCT                     PCT values of < 0.5 ng/mL do not exclude an infection, because localized infections (without systemic signs) may be associated with such low concentrations, or a systemic infection in its initial stages (< 6 hours).  Furthermore, increased PCT can occur without infection. PCT concentrations between 0.5 and 2.0 ng/mL should be interpreted taking into account the patient's history. It is recommended to retest PCT within 6-24 hours if any concentrations < 2 ng/mL are obtained.    Hemoglobin A1c [[id number]]  (Abnormal) Collected:  12/25/17 0217    Specimen:  Blood Updated:  12/25/17 0333     Hemoglobin A1C 7.50 (H) %     Narrative:       Hemoglobin A1C Ranges:    Increased Risk for Diabetes  5.7% to 6.4%  Diabetes                     >= 6.5%  Diabetic Goal                < 7.0%    CBC & Differential [[id number]] Collected:  12/25/17 0505    Specimen:  Blood Updated:  12/25/17 0511    Narrative:       The following orders were created for panel order CBC & Differential.  Procedure                               Abnormality         Status                     ---------                               -----------         ------                     CBC Auto Differential[[id number]]        Abnormal            Final result                 Please view results for these tests on the individual orders.    CBC Auto Differential [[id number]]  (Abnormal) Collected:  12/25/17 0505    Specimen:  Blood Updated:  12/25/17 0511     WBC 11.73 (H) 10*3/mm3      RBC 4.59 10*6/mm3      Hemoglobin 13.1 g/dL      Hematocrit 42.6 %      MCV 92.8 fL      MCH 28.5 pg      MCHC 30.8 (L) g/dL      RDW 15.4 (H) %      RDW-SD 52.4 fl      MPV 9.3 fL      Platelets 323 10*3/mm3      Neutrophil % 89.9 (H) %      Lymphocyte % 9.0 (L) %      Monocyte % 0.5 (L) %      Eosinophil % 0.0 %      Basophil % 0.1 %      Immature Grans % 0.5 %      Neutrophils, Absolute 10.54 (H) 10*3/mm3      Lymphocytes, Absolute 1.06 10*3/mm3      Monocytes, Absolute 0.06 10*3/mm3      Eosinophils, Absolute 0.00 (L) 10*3/mm3      Basophils, Absolute 0.01 10*3/mm3      Immature Grans, Absolute 0.06 (H) 10*3/mm3      nRBC 0.0 /100 WBC     Lactic Acid, Reflex [[id number]]  (Normal) Collected:   12/25/17 0505    Specimen:  Blood Updated:  12/25/17 0524     Lactate 2.0 mmol/L     Basic Metabolic Panel [[id number]]  (Abnormal) Collected:  12/25/17 0505    Specimen:  Blood Updated:  12/25/17 0537     Glucose 311 (H) mg/dL      BUN 18 mg/dL      Creatinine 0.66 mg/dL      Sodium 137 mmol/L      Potassium 4.9 mmol/L      Chloride 101 mmol/L      CO2 21.4 (L) mmol/L      Calcium 9.1 mg/dL      eGFR Non African Amer 89 mL/min/1.73      BUN/Creatinine Ratio 27.3 (H)     Anion Gap 14.6 mmol/L     Narrative:       GFR Normal >60  Chronic Kidney Disease <60  Kidney Failure <15    Blood Gas, Arterial [[id number]]  (Abnormal) Collected:  12/25/17 0655    Specimen:  Arterial Blood Updated:  12/25/17 0700     Site Left Radial     Dylan's Test Positive     pH, Arterial 7.365 pH units      pCO2, Arterial 39.7 mm Hg      pO2, Arterial 66.6 (L) mm Hg      HCO3, Arterial 22.7 mmol/L      Base Excess, Arterial -2.5 (L) mmol/L      O2 Saturation, Arterial 92.4 (L) %      Hemoglobin, Blood Gas 13.1 (L) g/dL      Temperature 37.0 C      pH, Temp Corrected 7.365 pH Units      pCO2, Temperature Corrected 39.7 mm Hg      pO2, Temperature Corrected 66.6 (L) mm Hg      Barometric Pressure for Blood Gas 747 mmHg      Modality Nasal Cannula     Flow Rate 3.0 lpm      Ventilator Mode NA     Collected by [id number]    POC Glucose Once [[id number]]  (Abnormal) Collected:  12/25/17 0728    Specimen:  Blood Updated:  12/25/17 0742     Glucose 304 (H) mg/dL     Narrative:       Meter: [id number] : [id number] [employer] Ncikie NURSING ASSISTANT    POC Glucose Once [[id number]]  (Abnormal) Collected:  12/25/17 1114    Specimen:  Blood Updated:  12/25/17 1153     Glucose 386 (H) mg/dL     Narrative:       Meter: [id number] : [id number] [employer] Nickie NURSING ASSISTANT    POC Glucose Once [[id number]]  (Abnormal) Collected:  12/25/17 1627    Specimen:  Blood Updated:  12/25/17 1635     Glucose 388 (H) mg/dL     Narrative:       Meter: [id number] :  [id number] Lokesh Tran NURSING ASSISTANT    POC Glucose Once [[id number]]  (Abnormal) Collected:  12/25/17 2032    Specimen:  Blood Updated:  12/25/17 2038     Glucose 383 (H) mg/dL     Narrative:       Meter: [id number] : [id number] Mickey Cunningham    Respiratory Panel, PCR - Swab, Nasopharynx [[id number]]  (Abnormal) Collected:  12/25/17 1848    Specimen:  Swab from Nasopharynx Updated:  12/25/17 2327     ADENOVIRUS, PCR Not Detected     Coronavirus 229E Not Detected     Coronavirus HKU1 Not Detected     Coronavirus NL63 Not Detected     Coronavirus OC43 Not Detected     Human Metapneumovirus Not Detected     Human Rhinovirus/Enterovirus Detected (A)     Influenza B PCR Not Detected     Parainfluenza Virus 1 Not Detected     Parainfluenza Virus 2 Not Detected     Parainfluenza Virus 3 Not Detected     Parainfluenza Virus 4 Not Detected     Bordetella pertussis pcr Not Detected     Influenza A H1N1 2009 PCR Not Detected     Chlamydophila pneumoniae PCR Not Detected     Mycoplasma pneumo by PCR Not Detected     Influenza A PCR Not Detected     Influenza A H3 Not Detected     Influenza A H1 Not Detected     RSV, PCR Not Detected    POC Glucose Once [[id number]]  (Abnormal) Collected:  12/26/17 0321    Specimen:  Blood Updated:  12/26/17 0328     Glucose 318 (H) mg/dL     Narrative:       Meter: [id number] : [id number] Mickey Cunningham    CBC & Differential [[id number]] Collected:  12/26/17 0349    Specimen:  Blood Updated:  12/26/17 0446    Narrative:       The following orders were created for panel order CBC & Differential.  Procedure                               Abnormality         Status                     ---------                               -----------         ------                     CBC Auto Differential[[id number]]        Abnormal            Final result                 Please view results for these tests on the individual orders.    CBC Auto Differential [[id number]]  (Abnormal) Collected:  12/26/17  0349    Specimen:  Blood Updated:  12/26/17 0446     WBC 18.41 (H) 10*3/mm3      RBC 4.21 10*6/mm3      Hemoglobin 12.2 g/dL      Hematocrit 39.6 %      MCV 94.1 fL      MCH 29.0 pg      MCHC 30.8 (L) g/dL      RDW 15.5 (H) %      RDW-SD 54.0 fl      MPV 9.8 fL      Platelets 361 10*3/mm3      Neutrophil % 82.3 (H) %      Lymphocyte % 10.8 (L) %      Monocyte % 5.1 %      Eosinophil % 0.0 %      Basophil % 0.2 %      Immature Grans % 1.6 (H) %      Neutrophils, Absolute 15.17 (H) 10*3/mm3      Lymphocytes, Absolute 1.98 10*3/mm3      Monocytes, Absolute 0.93 10*3/mm3      Eosinophils, Absolute 0.00 (L) 10*3/mm3      Basophils, Absolute 0.03 10*3/mm3      Immature Grans, Absolute 0.30 (H) 10*3/mm3      nRBC 0.0 /100 WBC     Basic Metabolic Panel [[id number]]  (Abnormal) Collected:  12/26/17 0349    Specimen:  Blood Updated:  12/26/17 0507     Glucose 351 (H) mg/dL      BUN 24 (H) mg/dL      Creatinine 0.78 mg/dL      Sodium 139 mmol/L      Potassium 4.6 mmol/L      Chloride 102 mmol/L      CO2 20.5 (L) mmol/L      Calcium 8.9 mg/dL      eGFR Non African Amer 73 mL/min/1.73      BUN/Creatinine Ratio 30.8 (H)     Anion Gap 16.5 mmol/L     Narrative:       GFR Normal >60  Chronic Kidney Disease <60  Kidney Failure <15    Urine Culture - Urine, Urine, Clean Catch [[id number]]  (Normal) Collected:  12/24/17 2355    Specimen:  Urine from Urine, Clean Catch Updated:  12/26/17 0727     Urine Culture No growth    POC Glucose Once [[id number]]  (Abnormal) Collected:  12/26/17 0730    Specimen:  Blood Updated:  12/26/17 0743     Glucose 288 (H) mg/dL     Narrative:       Meter: [id number] : [id number] Tex Fu NURSING ASSISTANT    POC Glucose Once [[id number]]  (Abnormal) Collected:  12/26/17 1150    Specimen:  Blood Updated:  12/26/17 1211     Glucose 397 (H) mg/dL     Narrative:       Meter: [id number] : [id number] Tex Fu NURSING ASSISTANT    POC Glucose Once [[id number]]  (Abnormal) Collected:  12/26/17 2724     Specimen:  Blood Updated:  12/26/17 1653     Glucose 368 (H) mg/dL     Narrative:       Meter: [id number] : [id number] Tim Stover CNA    POC Glucose Once [[id number]]  (Abnormal) Collected:  12/26/17 2050    Specimen:  Blood Updated:  12/26/17 2057     Glucose 374 (H) mg/dL     Narrative:       Meter: [id number] : [id number] Bong Olivier NURSING ASSISTANT    CBC & Differential [[id number]] Collected:  12/27/17 0414    Specimen:  Blood Updated:  12/27/17 0418    Narrative:       The following orders were created for panel order CBC & Differential.  Procedure                               Abnormality         Status                     ---------                               -----------         ------                     CBC Auto Differential[[id number]]        Abnormal            Final result                 Please view results for these tests on the individual orders.    CBC Auto Differential [[id number]]  (Abnormal) Collected:  12/27/17 0414    Specimen:  Blood Updated:  12/27/17 0418     WBC 19.58 (H) 10*3/mm3      RBC 4.08 (L) 10*6/mm3      Hemoglobin 11.6 (L) g/dL      Hematocrit 38.4 %      MCV 94.1 fL      MCH 28.4 pg      MCHC 30.2 (L) g/dL      RDW 15.7 (H) %      RDW-SD 54.4 (H) fl      MPV 9.6 fL      Platelets 344 10*3/mm3      Neutrophil % 86.1 (H) %      Lymphocyte % 7.9 (L) %      Monocyte % 4.3 %      Eosinophil % 0.0 %      Basophil % 0.2 %      Immature Grans % 1.5 (H) %      Neutrophils, Absolute 16.85 (H) 10*3/mm3      Lymphocytes, Absolute 1.55 10*3/mm3      Monocytes, Absolute 0.85 10*3/mm3      Eosinophils, Absolute 0.00 (L) 10*3/mm3      Basophils, Absolute 0.03 10*3/mm3      Immature Grans, Absolute 0.30 (H) 10*3/mm3      nRBC 0.0 /100 WBC     Basic Metabolic Panel [[id number]]  (Abnormal) Collected:  12/27/17 0415    Specimen:  Blood Updated:  12/27/17 0441     Glucose 265 (H) mg/dL      BUN 34 (H) mg/dL      Creatinine 0.77 mg/dL      Sodium 140 mmol/L      Potassium 4.8 mmol/L       Chloride 105 mmol/L      CO2 22.7 mmol/L      Calcium 9.1 mg/dL      eGFR Non African Amer 75 mL/min/1.73      BUN/Creatinine Ratio 44.2 (H)     Anion Gap 12.3 mmol/L     Narrative:       GFR Normal >60  Chronic Kidney Disease <60  Kidney Failure <15    POC Glucose Once [[id number]]  (Abnormal) Collected:  12/27/17 0750    Specimen:  Blood Updated:  12/27/17 0804     Glucose 299 (H) mg/dL     Narrative:       Meter: [id number] : [id number] Ignacio Maile CNA-POOL    Respiratory Culture - Sputum, Cough [[id number]] Collected:  12/24/17 2232    Specimen:  Sputum from Cough Updated:  12/27/17 1030     Respiratory Culture --      Heavy growth (4+) Normal Respiratory Maral     Gram Stain Result Rare (1+) WBCs seen      Mixed bacterial morphotypes seen on Gram Stain      Predominance of gram positive cocci in pairs, chains and cluster       POC Glucose Once [[id number]]  (Abnormal) Collected:  12/27/17 1152    Specimen:  Blood Updated:  12/27/17 1202     Glucose 416 (H) mg/dL     Narrative:       Confirmed by Repeat Meter: [id number] : [id number] [facility]    POC Glucose Once [[id number]]  (Abnormal) Collected:  12/27/17 1154    Specimen:  Blood Updated:  12/27/17 1203     Glucose 405 (H) mg/dL     Narrative:       RN Notified R and V Meter: [id number] : [id number] [facility] ASS    POC Glucose Once [[id number]]  (Abnormal) Collected:  12/27/17 1627    Specimen:  Blood Updated:  12/27/17 1633     Glucose 301 (H) mg/dL     Narrative:       Meter: [id number] : [id number] Ignacio Maile CNA-POOL    Procalcitonin [[id number]]  (Abnormal) Collected:  12/27/17 0415    Specimen:  Blood Updated:  12/27/17 1739     Procalcitonin 0.05 (L) ng/mL     Narrative:       As a Marker for Sepsis (Non-Neonates):   1. <0.5 ng/mL represents a low risk of severe sepsis and/or septic shock.  2. >2 ng/mL represents a high risk of severe sepsis and/or septic shock.    As a Marker for Lower Respiratory Tract  Infections that require antibiotic therapy:    PCT on Admission     Antibiotic Therapy       6-12 Hrs later  > 0.5                Strongly Recommended             >0.25 - <0.5         Recommended  0.1 - 0.25           Discouraged              Remeasure/reassess PCT  <0.1                 Strongly Discouraged     Remeasure/reassess PCT                     PCT values of < 0.5 ng/mL do not exclude an infection, because localized infections (without systemic signs) may be associated with such low concentrations, or a systemic infection in its initial stages (< 6 hours). Furthermore, increased PCT can occur without infection. PCT concentrations between 0.5 and 2.0 ng/mL should be interpreted taking into account the patient's history. It is recommended to retest PCT within 6-24 hours if any concentrations < 2 ng/mL are obtained.    POC Glucose Once [[id number]]  (Abnormal) Collected:  12/27/17 1928    Specimen:  Blood Updated:  12/27/17 1935     Glucose 318 (H) mg/dL     Narrative:       Meter: [id number] : [id number] Candido Romero CNA    CBC & Differential [[id number]] Collected:  12/28/17 0430    Specimen:  Blood Updated:  12/28/17 0504    Narrative:       The following orders were created for panel order CBC & Differential.  Procedure                               Abnormality         Status                     ---------                               -----------         ------                     CBC Auto Differential[[id number]]        Abnormal            Final result                 Please view results for these tests on the individual orders.    CBC Auto Differential [[id number]]  (Abnormal) Collected:  12/28/17 0430    Specimen:  Blood Updated:  12/28/17 0504     WBC 13.68 (H) 10*3/mm3      RBC 3.88 (L) 10*6/mm3      Hemoglobin 11.2 (L) g/dL      Hematocrit 36.2 (L) %      MCV 93.3 fL      MCH 28.9 pg      MCHC 30.9 (L) g/dL      RDW 15.4 (H) %      RDW-SD 52.8 fl      MPV 10.1 fL      Platelets 304 10*3/mm3       Neutrophil % 83.2 (H) %      Lymphocyte % 8.9 (L) %      Monocyte % 5.4 %      Eosinophil % 0.0 %      Basophil % 0.1 %      Immature Grans % 2.4 (H) %      Neutrophils, Absolute 11.37 (H) 10*3/mm3      Lymphocytes, Absolute 1.22 10*3/mm3      Monocytes, Absolute 0.74 10*3/mm3      Eosinophils, Absolute 0.00 (L) 10*3/mm3      Basophils, Absolute 0.02 10*3/mm3      Immature Grans, Absolute 0.33 (H) 10*3/mm3      nRBC 0.0 /100 WBC     Basic Metabolic Panel [[id number]]  (Abnormal) Collected:  12/28/17 0430    Specimen:  Blood Updated:  12/28/17 0524     Glucose 335 (H) mg/dL      BUN 29 (H) mg/dL      Creatinine 0.72 mg/dL      Sodium 138 mmol/L      Potassium 4.4 mmol/L      Chloride 101 mmol/L      CO2 25.6 mmol/L      Calcium 8.6 (L) mg/dL      eGFR Non African Amer 81 mL/min/1.73      BUN/Creatinine Ratio 40.3 (H)     Anion Gap 11.4 mmol/L     Narrative:       GFR Normal >60  Chronic Kidney Disease <60  Kidney Failure <15    POC Glucose Once [[id number]]  (Abnormal) Collected:  12/28/17 0741    Specimen:  Blood Updated:  12/28/17 0814     Glucose 321 (H) mg/dL     Narrative:       Meter: [id number] : [id number] Nisreen Gr CNA-POOL    Blood Gas, Arterial [[id number]]  (Abnormal) Collected:  12/28/17 0845    Specimen:  Arterial Blood Updated:  12/28/17 0859     Site Right Radial     Dylan's Test Positive     pH, Arterial 7.330 (L) pH units      pCO2, Arterial 48.2 (H) mm Hg      pO2, Arterial 79.9 (L) mm Hg      HCO3, Arterial 25.4 mmol/L      Base Excess, Arterial -1.0 (L) mmol/L      O2 Saturation, Arterial 95.5 %      Hemoglobin, Blood Gas 12.4 (L) g/dL      Temperature 37.0 C      pH, Temp Corrected 7.330 (L) pH Units      pCO2, Temperature Corrected 48.2 (H) mm Hg      pO2, Temperature Corrected 79.9 (L) mm Hg      Barometric Pressure for Blood Gas 753 mmHg      Modality NRB     Flow Rate 15.0 lpm      Ventilator Mode NA     Collected by [id number]    POC Glucose Once [[id number]]  (Abnormal) Collected:   12/28/17 1224    Specimen:  Blood Updated:  12/28/17 1231     Glucose 318 (H) mg/dL     Narrative:       Meter: [id number] : [id number] Silvano Skylar JOSEPH    POC Glucose Once [[id number]]  (Abnormal) Collected:  12/28/17 1649    Specimen:  Blood Updated:  12/28/17 1700     Glucose 271 (H) mg/dL     Narrative:       Meter: [id number] : [id number] Jean Claude Montelongo NA CERT.    POC Glucose Once [[id number]]  (Abnormal) Collected:  12/28/17 2032    Specimen:  Blood Updated:  12/28/17 2038     Glucose 307 (H) mg/dL     Narrative:       Meter: [id number] : [id number] Mickey Cunningham    Blood Gas, Arterial [[id number]]  (Abnormal) Collected:  12/29/17 0535    Specimen:  Arterial Blood Updated:  12/29/17 0542     Site Right Radial     Dylan's Test Positive     pH, Arterial 7.396 pH units      pCO2, Arterial 46.6 (H) mm Hg      pO2, Arterial 68.9 (L) mm Hg      HCO3, Arterial 28.5 (H) mmol/L      Base Excess, Arterial 3.0 (H) mmol/L      O2 Saturation, Arterial 93.7 (L) %      Hemoglobin, Blood Gas 11.9 (L) g/dL      Temperature 37.0 C      pH, Temp Corrected 7.396 pH Units      pCO2, Temperature Corrected 46.6 (H) mm Hg      pO2, Temperature Corrected 68.9 (L) mm Hg      Barometric Pressure for Blood Gas 748 mmHg      Modality BiPap     FIO2 50 %      Ventilator Mode NIV     Set [facility] Resp Rate 10.0     IPAP 12     EPAP 6     Collected by [id number]    CBC Auto Differential [[id number]]  (Abnormal) Collected:  12/29/17 0650    Specimen:  Blood Updated:  12/29/17 0701     WBC 14.67 (H) 10*3/mm3      RBC 4.00 (L) 10*6/mm3      Hemoglobin 11.6 (L) g/dL      Hematocrit 37.0 %      MCV 92.5 fL      MCH 29.0 pg      MCHC 31.4 g/dL      RDW 15.0 (H) %      RDW-SD 50.7 fl      MPV 9.9 fL      Platelets 280 10*3/mm3      Neutrophil % 87.3 (H) %      Lymphocyte % 4.6 (L) %      Monocyte % 6.3 %      Eosinophil % 0.0 %      Basophil % 0.1 %      Immature Grans % 1.7 (H) %      Neutrophils, Absolute 12.80 (H) 10*3/mm3       Lymphocytes, Absolute 0.67 10*3/mm3      Monocytes, Absolute 0.93 10*3/mm3      Eosinophils, Absolute 0.00 (L) 10*3/mm3      Basophils, Absolute 0.02 10*3/mm3      Immature Grans, Absolute 0.25 (H) 10*3/mm3      nRBC 0.0 /100 WBC     CBC & Differential [[id number]] Collected:  12/29/17 0650    Specimen:  Blood Updated:  12/29/17 0701    Narrative:       The following orders were created for panel order CBC & Differential.  Procedure                               Abnormality         Status                     ---------                               -----------         ------                     CBC Auto Differential[[id number]]        Abnormal            Final result                 Please view results for these tests on the individual orders.    POC Glucose Once [[id number]]  (Abnormal) Collected:  12/29/17 0649    Specimen:  Blood Updated:  12/29/17 0725     Glucose 315 (H) mg/dL     Narrative:       Meter: [id number] : [id number] Jennifer Quintana OPAL    Basic Metabolic Panel [[id number]]  (Abnormal) Collected:  12/29/17 0650    Specimen:  Blood Updated:  12/29/17 0736     Glucose 324 (H) mg/dL      BUN 31 (H) mg/dL      Creatinine 0.73 mg/dL      Sodium 135 (L) mmol/L      Potassium 4.4 mmol/L      Chloride 98 mmol/L      CO2 27.1 mmol/L      Calcium 8.5 (L) mg/dL      eGFR Non African Amer 79 mL/min/1.73      BUN/Creatinine Ratio 42.5 (H)     Anion Gap 9.9 mmol/L     Narrative:       GFR Normal >60  Chronic Kidney Disease <60  Kidney Failure <15    Respiratory Culture - Sputum, Cough [[id number]]  (Abnormal)  (Susceptibility) Collected:  12/27/17 1000    Specimen:  Sputum from Cough Updated:  12/29/17 1044     Respiratory Culture --      Heavy growth (4+) Roseomonas gilardii (A)     Gram Stain Result Moderate (3+) WBCs seen      Moderate (3+) Epithelial cells per low power field      Mixed bacterial morphotypes seen on Gram Stain    Susceptibility      Roseomonas gilardii     VINEET     Cefepime <=1 ug/ml Susceptible      Ceftazidime 8 ug/ml Susceptible     Ciprofloxacin <=0.25 ug/ml Susceptible     Levofloxacin <=0.12 ug/ml Susceptible     Meropenem <=0.25 ug/ml Susceptible     Piperacillin + Tazobactam 64 ug/ml Susceptible     Tobramycin <=1 ug/ml Susceptible                    POC Glucose Once [[id number]]  (Abnormal) Collected:  12/29/17 1132    Specimen:  Blood Updated:  12/29/17 1147     Glucose 284 (H) mg/dL     Narrative:       Meter: [id number] : [id number] Lokesh Peoplesie NURSING ASSISTANT    POC Glucose Once [[id number]]  (Abnormal) Collected:  12/29/17 1636    Specimen:  Blood Updated:  12/29/17 1648     Glucose 285 (H) mg/dL     Narrative:       Meter: [id number] : [id number] Lokesh Peoplesie NURSING ASSISTANT    POC Glucose Once [[id number]]  (Abnormal) Collected:  12/29/17 2118    Specimen:  Blood Updated:  12/29/17 2124     Glucose 265 (H) mg/dL     Narrative:       Treated Patient Meter: [id number] : [id number] Meño Wilson RN VALIDATOR    Blood Culture - Blood, [[id number]]  (Normal) Collected:  12/24/17 2231    Specimen:  Blood from Arm, Right Updated:  12/29/17 2246     Blood Culture No growth at 5 days    Blood Culture - Blood, [[id number]]  (Normal) Collected:  12/24/17 2231    Specimen:  Blood from Arm, Right Updated:  12/29/17 2246     Blood Culture No growth at 5 days    CBC & Differential [[id number]] Collected:  12/30/17 0615    Specimen:  Blood Updated:  12/30/17 0635    Narrative:       The following orders were created for panel order CBC & Differential.  Procedure                               Abnormality         Status                     ---------                               -----------         ------                     CBC Auto Differential[[id number]]        Abnormal            Final result                 Please view results for these tests on the individual orders.    CBC Auto Differential [[id number]]  (Abnormal) Collected:  12/30/17 0615    Specimen:  Blood Updated:  12/30/17 0635     WBC  13.15 (H) 10*3/mm3      RBC 4.12 (L) 10*6/mm3      Hemoglobin 11.7 (L) g/dL      Hematocrit 37.6 %      MCV 91.3 fL      MCH 28.4 pg      MCHC 31.1 g/dL      RDW 15.2 (H) %      RDW-SD 51.4 fl      MPV 10.2 fL      Platelets 270 10*3/mm3      Neutrophil % 81.6 (H) %      Lymphocyte % 9.2 (L) %      Monocyte % 5.0 %      Eosinophil % 0.0 %      Basophil % 0.2 %      Immature Grans % 4.0 (H) %      Neutrophils, Absolute 10.72 (H) 10*3/mm3      Lymphocytes, Absolute 1.21 10*3/mm3      Monocytes, Absolute 0.66 10*3/mm3      Eosinophils, Absolute 0.00 (L) 10*3/mm3      Basophils, Absolute 0.03 10*3/mm3      Immature Grans, Absolute 0.53 (H) 10*3/mm3      nRBC 0.2 (H) /100 WBC     Basic Metabolic Panel [[id number]]  (Abnormal) Collected:  12/30/17 0615    Specimen:  Blood Updated:  12/30/17 0656     Glucose 252 (H) mg/dL      BUN 20 mg/dL      Creatinine 0.59 mg/dL      Sodium 139 mmol/L      Potassium 3.9 mmol/L      Chloride 98 mmol/L      CO2 32.5 (H) mmol/L      Calcium 8.3 (L) mg/dL      eGFR Non African Amer 101 mL/min/1.73      BUN/Creatinine Ratio 33.9 (H)     Anion Gap 8.5 mmol/L     Narrative:       GFR Normal >60  Chronic Kidney Disease <60  Kidney Failure <15    POC Glucose Once [[id number]]  (Abnormal) Collected:  12/30/17 0725    Specimen:  Blood Updated:  12/30/17 0734     Glucose 256 (H) mg/dL     Narrative:       Meter: [id number] : [id number] [employer] CNA    POC Glucose Once [[id number]]  (Abnormal) Collected:  12/30/17 1108    Specimen:  Blood Updated:  12/30/17 1119     Glucose 331 (H) mg/dL     Narrative:       Meter: [id number] : [id number] [employer] CNA    POC Glucose Once [[id number]]  (Abnormal) Collected:  12/30/17 1750    Specimen:  Blood Updated:  12/30/17 1757     Glucose 314 (H) mg/dL     Narrative:       Meter: [id number] : [id number] Singh German RN Validator    POC Glucose Once [[id number]]  (Abnormal) Collected:  12/30/17 2010    Specimen:  Blood Updated:  12/30/17 2019      Glucose 320 (H) mg/dL     Narrative:       Meter: [id number] : [id number] Jackie Forde CNA    CBC & Differential [[id number]] Collected:  12/31/17 0524    Specimen:  Blood Updated:  12/31/17 0558    Narrative:       The following orders were created for panel order CBC & Differential.  Procedure                               Abnormality         Status                     ---------                               -----------         ------                     CBC Auto Differential[[id number]]        Abnormal            Final result                 Please view results for these tests on the individual orders.    CBC Auto Differential [[id number]]  (Abnormal) Collected:  12/31/17 0524    Specimen:  Blood Updated:  12/31/17 0558     WBC 16.52 (H) 10*3/mm3      RBC 4.36 10*6/mm3      Hemoglobin 12.4 g/dL      Hematocrit 39.6 %      MCV 90.8 fL      MCH 28.4 pg      MCHC 31.3 g/dL      RDW 14.8 (H) %      RDW-SD 49.8 fl      MPV 10.2 fL      Platelets 316 10*3/mm3      Neutrophil % 83.7 (H) %      Lymphocyte % 9.0 (L) %      Monocyte % 4.4 %      Eosinophil % 0.0 %      Basophil % 0.2 %      Immature Grans % 2.7 (H) %      Neutrophils, Absolute 13.83 (H) 10*3/mm3      Lymphocytes, Absolute 1.49 10*3/mm3      Monocytes, Absolute 0.72 10*3/mm3      Eosinophils, Absolute 0.00 (L) 10*3/mm3      Basophils, Absolute 0.03 10*3/mm3      Immature Grans, Absolute 0.45 (H) 10*3/mm3      nRBC 0.1 (H) /100 WBC     Basic Metabolic Panel [[id number]]  (Abnormal) Collected:  12/31/17 0524    Specimen:  Blood Updated:  12/31/17 0613     Glucose 285 (H) mg/dL      BUN 20 mg/dL      Creatinine 0.60 mg/dL      Sodium 141 mmol/L      Potassium 3.8 mmol/L      Chloride 98 mmol/L      CO2 33.1 (H) mmol/L      Calcium 8.2 (L) mg/dL      eGFR Non African Amer 99 mL/min/1.73      BUN/Creatinine Ratio 33.3 (H)     Anion Gap 9.9 mmol/L     Narrative:       GFR Normal >60  Chronic Kidney Disease <60  Kidney Failure <15    POC Glucose Once [[id number]]   (Abnormal) Collected:  12/31/17 1124    Specimen:  Blood Updated:  12/31/17 1131     Glucose 330 (H) mg/dL     Narrative:       Meter: [id number] : [id number] Maryuri Alicea RN    Procalcitonin [[id number]]  (Abnormal) Collected:  12/31/17 1426    Specimen:  Blood Updated:  12/31/17 1453     Procalcitonin 0.05 (L) ng/mL     Narrative:       As a Marker for Sepsis (Non-Neonates):   1. <0.5 ng/mL represents a low risk of severe sepsis and/or septic shock.  2. >2 ng/mL represents a high risk of severe sepsis and/or septic shock.    As a Marker for Lower Respiratory Tract Infections that require antibiotic therapy:    PCT on Admission     Antibiotic Therapy       6-12 Hrs later  > 0.5                Strongly Recommended             >0.25 - <0.5         Recommended  0.1 - 0.25           Discouraged              Remeasure/reassess PCT  <0.1                 Strongly Discouraged     Remeasure/reassess PCT                     PCT values of < 0.5 ng/mL do not exclude an infection, because localized infections (without systemic signs) may be associated with such low concentrations, or a systemic infection in its initial stages (< 6 hours). Furthermore, increased PCT can occur without infection. PCT concentrations between 0.5 and 2.0 ng/mL should be interpreted taking into account the patient's history. It is recommended to retest PCT within 6-24 hours if any concentrations < 2 ng/mL are obtained.    POC Glucose Once [[id number]]  (Abnormal) Collected:  12/31/17 1630    Specimen:  Blood Updated:  12/31/17 1644     Glucose 375 (H) mg/dL     Narrative:       Meter: [id number] : [id number] Maryuri Alicea RN    POC Glucose Once [[id number]]  (Abnormal) Collected:  12/31/17 2000    Specimen:  Blood Updated:  12/31/17 2006     Glucose 332 (H) mg/dL     Narrative:       Meter: [id number] : [id number] Jackie Forde CNA    CBC & Differential [[id number]] Collected:  01/01/18 0517    Specimen:  Blood Updated:  01/01/18  0528    Narrative:       The following orders were created for panel order CBC & Differential.  Procedure                               Abnormality         Status                     ---------                               -----------         ------                     CBC Auto Differential[[id number]]        Abnormal            Final result                 Please view results for these tests on the individual orders.    CBC Auto Differential [[id number]]  (Abnormal) Collected:  01/01/18 0517    Specimen:  Blood Updated:  01/01/18 0528     WBC 17.75 (H) 10*3/mm3      RBC 4.19 (L) 10*6/mm3      Hemoglobin 12.1 g/dL      Hematocrit 38.1 %      MCV 90.9 fL      MCH 28.9 pg      MCHC 31.8 g/dL      RDW 14.6 (H) %      RDW-SD 48.5 fl      MPV 10.1 fL      Platelets 284 10*3/mm3      Neutrophil % 87.3 (H) %      Lymphocyte % 6.0 (L) %      Monocyte % 4.3 %      Eosinophil % 0.0 %      Basophil % 0.2 %      Immature Grans % 2.2 (H) %      Neutrophils, Absolute 15.50 (H) 10*3/mm3      Lymphocytes, Absolute 1.07 10*3/mm3      Monocytes, Absolute 0.76 10*3/mm3      Eosinophils, Absolute 0.00 (L) 10*3/mm3      Basophils, Absolute 0.03 10*3/mm3      Immature Grans, Absolute 0.39 (H) 10*3/mm3      nRBC 0.1 (H) /100 WBC     Basic Metabolic Panel [[id number]]  (Abnormal) Collected:  01/01/18 0517    Specimen:  Blood Updated:  01/01/18 0610     Glucose 280 (H) mg/dL      BUN 21 mg/dL      Creatinine 0.60 mg/dL      Sodium 142 mmol/L      Potassium 3.8 mmol/L      Chloride 100 mmol/L      CO2 35.2 (H) mmol/L      Calcium 8.1 (L) mg/dL      eGFR Non African Amer 99 mL/min/1.73      BUN/Creatinine Ratio 35.0 (H)     Anion Gap 6.8 mmol/L     Narrative:       GFR Normal >60  Chronic Kidney Disease <60  Kidney Failure <15    POC Glucose Once [[id number]]  (Abnormal) Collected:  01/01/18 0718    Specimen:  Blood Updated:  01/01/18 0725     Glucose 268 (H) mg/dL     Narrative:       Meter: [id number] : [id number] Devante RIVERA  ASSISTANT    POC Glucose Once [[id number]]  (Abnormal) Collected:  01/01/18 1108    Specimen:  Blood Updated:  01/01/18 1115     Glucose 372 (H) mg/dL     Narrative:       Meter: [id number] : [id number] [facility] NURSING ASSISTANT    POC Glucose Once [[id number]]  (Abnormal) Collected:  01/01/18 1641    Specimen:  Blood Updated:  01/01/18 1657     Glucose 240 (H) mg/dL     Narrative:       Meter: [id number] : [id number] [facility] NURSING ASSISTANT    POC Glucose Once [[id number]]  (Abnormal) Collected:  01/01/18 1923    Specimen:  Blood Updated:  01/01/18 1929     Glucose 282 (H) mg/dL     Narrative:       Meter: [id number] : [id number] Jackie Forde CNA    CBC & Differential [[id number]] Collected:  01/02/18 0629    Specimen:  Blood Updated:  01/02/18 0639    Narrative:       The following orders were created for panel order CBC & Differential.  Procedure                               Abnormality         Status                     ---------                               -----------         ------                     CBC Auto Differential[[id number]]        Abnormal            Final result                 Please view results for these tests on the individual orders.    CBC Auto Differential [[id number]]  (Abnormal) Collected:  01/02/18 0629    Specimen:  Blood Updated:  01/02/18 0639     WBC 17.47 (H) 10*3/mm3      RBC 4.28 10*6/mm3      Hemoglobin 12.5 g/dL      Hematocrit 38.6 %      MCV 90.2 fL      MCH 29.2 pg      MCHC 32.4 g/dL      RDW 14.4 %      RDW-SD 47.1 fl      MPV 10.0 fL      Platelets 249 10*3/mm3      Neutrophil % 77.5 (H) %      Lymphocyte % 12.1 (L) %      Monocyte % 7.7 %      Eosinophil % 0.0 %      Basophil % 0.2 %      Immature Grans % 2.5 (H) %      Neutrophils, Absolute 13.53 (H) 10*3/mm3      Lymphocytes, Absolute 2.11 10*3/mm3      Monocytes, Absolute 1.35 (H) 10*3/mm3      Eosinophils, Absolute 0.00 (L) 10*3/mm3      Basophils, Absolute 0.04 10*3/mm3      Immature Grans,  Absolute 0.44 (H) 10*3/mm3      nRBC 0.0 /100 WBC     Basic Metabolic Panel [[id number]]  (Abnormal) Collected:  01/02/18 0629    Specimen:  Blood Updated:  01/02/18 0655     Glucose 145 (H) mg/dL      BUN 21 mg/dL      Creatinine 0.62 mg/dL      Sodium 144 mmol/L      Potassium 3.6 mmol/L      Chloride 101 mmol/L      CO2 34.5 (H) mmol/L      Calcium 8.1 (L) mg/dL      eGFR Non African Amer 96 mL/min/1.73      BUN/Creatinine Ratio 33.9 (H)     Anion Gap 8.5 mmol/L     Narrative:       GFR Normal >60  Chronic Kidney Disease <60  Kidney Failure <15    POC Glucose Once [[id number]]  (Normal) Collected:  01/02/18 0716    Specimen:  Blood Updated:  01/02/18 0723     Glucose 129 mg/dL     Narrative:       Meter: [id number] : [id number] [email or web address] CNA Float    POC Glucose Once [[id number]]  (Abnormal) Collected:  01/02/18 1230    Specimen:  Blood Updated:  01/02/18 1238     Glucose 199 (H) mg/dL     Narrative:       Meter: [id number] : [id number] [email or web address] CNA Float    POC Glucose Once [[id number]]  (Abnormal) Collected:  01/02/18 1700    Specimen:  Blood Updated:  01/02/18 1707     Glucose 286 (H) mg/dL     Narrative:       Meter: [id number] : [id number] [email or web address] CNA Float    POC Glucose Once [[id number]]  (Abnormal) Collected:  01/02/18 2026    Specimen:  Blood Updated:  01/02/18 2033     Glucose 322 (H) mg/dL     Narrative:       Meter: [id number] : [id number] Bong Olivier NURSING ASSISTANT    CBC & Differential [[id number]] Collected:  01/03/18 0614    Specimen:  Blood Updated:  01/03/18 0623    Narrative:       The following orders were created for panel order CBC & Differential.  Procedure                               Abnormality         Status                     ---------                               -----------         ------                     CBC Auto Differential[[id number]]        Abnormal            Final result                 Please view results for these tests on the individual  orders.    CBC Auto Differential [[id number]]  (Abnormal) Collected:  01/03/18 0614    Specimen:  Blood Updated:  01/03/18 0623     WBC 17.97 (H) 10*3/mm3      RBC 4.44 10*6/mm3      Hemoglobin 12.6 g/dL      Hematocrit 39.7 %      MCV 89.4 fL      MCH 28.4 pg      MCHC 31.7 g/dL      RDW 14.3 %      RDW-SD 46.8 fl      MPV 10.2 fL      Platelets 269 10*3/mm3      Neutrophil % 81.1 (H) %      Lymphocyte % 9.5 (L) %      Monocyte % 5.2 %      Eosinophil % 0.0 %      Basophil % 0.2 %      Immature Grans % 4.0 (H) %      Neutrophils, Absolute 14.58 (H) 10*3/mm3      Lymphocytes, Absolute 1.70 10*3/mm3      Monocytes, Absolute 0.93 10*3/mm3      Eosinophils, Absolute 0.00 (L) 10*3/mm3      Basophils, Absolute 0.04 10*3/mm3      Immature Grans, Absolute 0.72 (H) 10*3/mm3      nRBC 0.0 /100 WBC     Basic Metabolic Panel [[id number]]  (Abnormal) Collected:  01/03/18 0614    Specimen:  Blood Updated:  01/03/18 0638     Glucose 202 (H) mg/dL      BUN 21 mg/dL      Creatinine 0.58 mg/dL      Sodium 137 mmol/L      Potassium 4.1 mmol/L      Chloride 96 (L) mmol/L      CO2 33.4 (H) mmol/L      Calcium 8.4 (L) mg/dL      eGFR Non African Amer 103 mL/min/1.73      BUN/Creatinine Ratio 36.2 (H)     Anion Gap 7.6 mmol/L     Narrative:       GFR Normal >60  Chronic Kidney Disease <60  Kidney Failure <15    POC Glucose Once [[id number]]  (Abnormal) Collected:  01/03/18 0759    Specimen:  Blood Updated:  01/03/18 0815     Glucose 169 (H) mg/dL     Narrative:       Meter: [id number] : [id number] Simeon Daniels RN    Blood Gas, Arterial [[id number]]  (Abnormal) Collected:  01/03/18 0911    Specimen:  Arterial Blood Updated:  01/03/18 0913     Site Right Radial     Dylan's Test Positive     pH, Arterial 7.518 (H) pH units      pCO2, Arterial 43.2 mm Hg      pO2, Arterial 60.8 (L) mm Hg      HCO3, Arterial 35.1 (H) mmol/L      Base Excess, Arterial 10.9 (H) mmol/L      O2 Saturation, Arterial 92.2 (L) %      Hemoglobin, Blood Gas 13.6 g/dL       Temperature 37.0 C      pH, Temp Corrected 7.518 (H) pH Units      pCO2, Temperature Corrected 43.2 mm Hg      pO2, Temperature Corrected 60.8 (L) mm Hg      Barometric Pressure for Blood Gas 744 mmHg      Modality Oxymizer     Flow Rate 8.0 lpm      Ventilator Mode NA     Collected by [id number]    POC Glucose Once [[id number]]  (Abnormal) Collected:  01/03/18 1224    Specimen:  Blood Updated:  01/03/18 1249     Glucose 286 (H) mg/dL     Narrative:       Meter: [id number] : [id number] Simeon Daniels RN    POC Glucose Once [[id number]]  (Abnormal) Collected:  01/03/18 1703    Specimen:  Blood Updated:  01/03/18 1711     Glucose 201 (H) mg/dL     Narrative:       Meter: [id number] : [id number] Simeon Daniels RN    POC Glucose Once [[id number]]  (Abnormal) Collected:  01/03/18 2107    Specimen:  Blood Updated:  01/03/18 2113     Glucose 183 (H) mg/dL     Narrative:       Meter: [id number] : [id number] Mickey Cunningham    Basic Metabolic Panel [[id number]]  (Abnormal) Collected:  01/04/18 0325    Specimen:  Blood Updated:  01/04/18 0449     Glucose 161 (H) mg/dL      BUN 28 (H) mg/dL      Creatinine 0.69 mg/dL      Sodium 138 mmol/L      Potassium 3.9 mmol/L      Chloride 96 (L) mmol/L      CO2 33.8 (H) mmol/L      Calcium 9.0 mg/dL      eGFR Non African Amer 85 mL/min/1.73      BUN/Creatinine Ratio 40.6 (H)     Anion Gap 8.2 mmol/L     Narrative:       GFR Normal >60  Chronic Kidney Disease <60  Kidney Failure <15    CBC Auto Differential [[id number]]  (Abnormal) Collected:  01/04/18 0325    Specimen:  Blood Updated:  01/04/18 0624     WBC 22.77 (H) 10*3/mm3      RBC 4.24 10*6/mm3      Hemoglobin 12.3 g/dL      Hematocrit 37.6 %      MCV 88.7 fL      MCH 29.0 pg      MCHC 32.7 g/dL      RDW 14.6 (H) %      RDW-SD 46.9 fl      MPV 10.9 (H) fL      Platelets 266 10*3/mm3     Scan Slide [[id number]] Collected:  01/04/18 0325    Specimen:  Blood Updated:  01/04/18 0624     Scan Slide --      See Manual Differential Results        CBC & Differential [[id number]] Collected:  01/04/18 0325    Specimen:  Blood Updated:  01/04/18 0624    Narrative:       The following orders were created for panel order CBC & Differential.  Procedure                               Abnormality         Status                     ---------                               -----------         ------                     Manual Differential[[id number]]          Abnormal            Final result               Scan Slide[[id number]]                                       Final result               CBC Auto Differential[[id number]]        Abnormal            Final result                 Please view results for these tests on the individual orders.    Manual Differential [[id number]]  (Abnormal) Collected:  01/04/18 0325    Specimen:  Blood Updated:  01/04/18 0624     Neutrophil % 82.0 (H) %      Lymphocyte % 10.0 (L) %      Monocyte % 5.0 %      Atypical Lymphocyte % 3.0 %      Neutrophils Absolute 18.67 (H) 10*3/mm3      Lymphocytes Absolute 2.28 10*3/mm3      Monocytes Absolute 1.14 (H) 10*3/mm3      RBC Morphology Normal     WBC Morphology Normal     Platelet Estimate Adequate    POC Glucose Once [[id number]]  (Abnormal) Collected:  01/04/18 1202    Specimen:  Blood Updated:  01/04/18 1212     Glucose 278 (H) mg/dL     Narrative:       Meter: [id number] : [id number] Nisha Hassan RN    Procalcitonin [[id number]]  (Abnormal) Collected:  01/04/18 0325    Specimen:  Blood Updated:  01/04/18 1646     Procalcitonin 0.05 (L) ng/mL     Narrative:       As a Marker for Sepsis (Non-Neonates):   1. <0.5 ng/mL represents a low risk of severe sepsis and/or septic shock.  2. >2 ng/mL represents a high risk of severe sepsis and/or septic shock.    As a Marker for Lower Respiratory Tract Infections that require antibiotic therapy:    PCT on Admission     Antibiotic Therapy       6-12 Hrs later  > 0.5                Strongly Recommended             >0.25 - <0.5         Recommended  0.1 -  0.25           Discouraged              Remeasure/reassess PCT  <0.1                 Strongly Discouraged     Remeasure/reassess PCT                     PCT values of < 0.5 ng/mL do not exclude an infection, because localized infections (without systemic signs) may be associated with such low concentrations, or a systemic infection in its initial stages (< 6 hours). Furthermore, increased PCT can occur without infection. PCT concentrations between 0.5 and 2.0 ng/mL should be interpreted taking into account the patient's history. It is recommended to retest PCT within 6-24 hours if any concentrations < 2 ng/mL are obtained.    POC Glucose Once [[id number]]  (Abnormal) Collected:  01/04/18 1714    Specimen:  Blood Updated:  01/04/18 1739     Glucose 260 (H) mg/dL     Narrative:       Meter: [id number] : [id number] Jean Claude HILL CERT.    POC Glucose Once [[id number]]  (Abnormal) Collected:  01/04/18 2154    Specimen:  Blood Updated:  01/04/18 2203     Glucose 264 (H) mg/dL     Narrative:       Meter: [id number] : [id number] Cydney Lucas RN    CBC Auto Differential [[id number]]  (Abnormal) Collected:  01/05/18 0344    Specimen:  Blood Updated:  01/05/18 0440     WBC 20.86 (H) 10*3/mm3      RBC 4.23 10*6/mm3      Hemoglobin 12.0 g/dL      Hematocrit 37.9 %      MCV 89.6 fL      MCH 28.4 pg      MCHC 31.7 g/dL      RDW 14.6 (H) %      RDW-SD 48.2 fl      MPV 10.6 (H) fL      Platelets 230 10*3/mm3      Neutrophil % 73.2 (H) %      Lymphocyte % 17.0 (L) %      Monocyte % 5.8 %      Eosinophil % 0.0 %      Basophil % 0.2 %      Immature Grans % 3.8 (H) %      Neutrophils, Absolute 15.24 (H) 10*3/mm3      Lymphocytes, Absolute 3.55 10*3/mm3      Monocytes, Absolute 1.21 (H) 10*3/mm3      Eosinophils, Absolute 0.01 (L) 10*3/mm3      Basophils, Absolute 0.05 10*3/mm3      Immature Grans, Absolute 0.80 (H) 10*3/mm3      nRBC 0.0 /100 WBC     CBC & Differential [[id number]] Collected:  01/05/18 0344    Specimen:   Blood Updated:  01/05/18 0441    Narrative:       The following orders were created for panel order CBC & Differential.  Procedure                               Abnormality         Status                     ---------                               -----------         ------                     Scan Slide[[id number]]                                                                  CBC Auto Differential[[id number]]        Abnormal            Final result                 Please view results for these tests on the individual orders.    Basic Metabolic Panel [[id number]]  (Abnormal) Collected:  01/05/18 0344    Specimen:  Blood Updated:  01/05/18 0454     Glucose 112 (H) mg/dL      BUN 29 (H) mg/dL      Creatinine 0.71 mg/dL      Sodium 141 mmol/L      Potassium 4.2 mmol/L      Chloride 99 mmol/L      CO2 34.0 (H) mmol/L      Calcium 9.4 mg/dL      eGFR Non African Amer 82 mL/min/1.73      BUN/Creatinine Ratio 40.8 (H)     Anion Gap 8.0 mmol/L     Narrative:       GFR Normal >60  Chronic Kidney Disease <60  Kidney Failure <15    POC Glucose Once [[id number]]  (Normal) Collected:  01/05/18 0705    Specimen:  Blood Updated:  01/05/18 0719     Glucose 99 mg/dL     Narrative:       Meter: [id number] : [id number] Lucio Freeman RN    POC Glucose Once [[id number]]  (Normal) Collected:  01/05/18 0900    Specimen:  Blood Updated:  01/05/18 0907     Glucose 126 mg/dL     Narrative:       Meter: [id number] : [id number] Sivan Orozco NURSING ASSISTANT    Pneumocystis and Fungus Reference - Wash, Lung, Left Lower Lobe [[id number]] Collected:  01/05/18 0802    Specimen:  Wash from Lung, Left Lower Lobe Updated:  01/05/18 0938    AFB Culture - Wash, Lung, Left Lower Lobe [[id number]] Collected:  01/05/18 0802    Specimen:  Wash from Lung, Left Lower Lobe Updated:  01/05/18 0938    Fungus Culture - Wash, Lung, Right Middle Lobe [[id number]] Collected:  01/05/18 0751    Specimen:  Wash from Lung, Right Middle Lobe Updated:  01/05/18  0938    AFB Culture - Wash, Lung, Right Middle Lobe [[id number]] Collected:  01/05/18 0751    Specimen:  Wash from Lung, Right Middle Lobe Updated:  01/05/18 0938    Non-gynecologic Cytology - Wash, Lung, Right Middle Lobe [[id number]] Collected:  01/05/18 0751    Specimen:  Wash from Lung, Right Middle Lobe Updated:  01/05/18 0940    BAL Culture, Quantitative - Lavage, Lung, Left Lower Lobe [[id number]] Collected:  01/05/18 1012    Specimen:  Lavage from Lung, Left Lower Lobe Updated:  01/05/18 1012    BAL Culture, Quantitative - Lavage, Lung, Right Middle Lobe [[id number]] Collected:  01/05/18 1012    Specimen:  Lavage from Lung, Right Middle Lobe Updated:  01/05/18 1012    POC Glucose Once [[id number]]  (Abnormal) Collected:  01/05/18 1135    Specimen:  Blood Updated:  01/05/18 1142     Glucose 270 (H) mg/dL     Narrative:       Meter: [id number] : [id number] Sivan Orozco NURSING ASSISTANT    Body Fluid Cell Count With Differential - Wash, Lung, Left Lower Lobe [[id number]] Collected:  01/05/18 0802    Specimen:  Wash from Lung, Left Lower Lobe Updated:  01/05/18 1632    Narrative:       The following orders were created for panel order Body Fluid Cell Count With Differential - Wash, Lung, Left Lower Lobe.  Procedure                               Abnormality         Status                     ---------                               -----------         ------                     Body fluid cell count - ...[[id number]]  Abnormal            Final result               Body fluid differential ...[[id number]]                      Final result                 Please view results for these tests on the individual orders.    Body fluid cell count - Body Fluid, [[id number]]  (Abnormal) Collected:  01/05/18 0802    Specimen:  Wash from Lung, Left Lower Lobe Updated:  01/05/18 1632     Color, Fluid White     Appearance, Fluid Turbid (A)     WBC, Fluid 9225 /mm3       Estimated count due to clots present in specimen.         RBC,  Fluid 160 /mm3       Estimated count due to clots present in specimen.        Body fluid differential - Body Fluid, [[id number]] Collected:  01/05/18 0802    Specimen:  Wash from Lung, Left Lower Lobe Updated:  01/05/18 1632     Neutrophils, Fluid 97 %      Lymphocytes, Fluid 1 %      Monocytes, Fluid 1 %      Mononuclear, Fluid 1 %     Body Fluid Cell Count With Differential - Wash, Lung, Right Middle Lobe [[id number]] Collected:  01/05/18 0751    Specimen:  Wash from Lung, Right Middle Lobe Updated:  01/05/18 1633    Narrative:       The following orders were created for panel order Body Fluid Cell Count With Differential - Wash, Lung, Right Middle Lobe.  Procedure                               Abnormality         Status                     ---------                               -----------         ------                     Body fluid cell count - ...[[id number]]  Abnormal            Final result               Body fluid differential ...[[id number]]                      Final result                 Please view results for these tests on the individual orders.    Body fluid cell count - Body Fluid, [[id number]]  (Abnormal) Collected:  01/05/18 0751    Specimen:  Wash from Lung, Right Middle Lobe Updated:  01/05/18 1633     Color, Fluid White     Appearance, Fluid Turbid (A)     WBC, Fluid 43538 /mm3       Estimated count due to clots present in specimen.         RBC, Fluid 335 /mm3       Estimated count due to clots present in specimen.        Body fluid differential - Body Fluid, [[id number]] Collected:  01/05/18 0751    Specimen:  Wash from Lung, Right Middle Lobe Updated:  01/05/18 1633     Neutrophils, Fluid 97 %      Monocytes, Fluid 1 %      Mononuclear, Fluid 2 %     POC Glucose Once [[id number]]  (Abnormal) Collected:  01/05/18 1648    Specimen:  Blood Updated:  01/05/18 1656     Glucose 230 (H) mg/dL     Narrative:       Meter: [id number] : [id number] Zeyad Marion CNA-PRN    Respiratory Panel, PCR - Wash,  Lung, Left Lower Lobe [[id number]]  (Abnormal) Collected:  01/05/18 0802    Specimen:  Wash from Lung, Left Lower Lobe Updated:  01/05/18 1838     ADENOVIRUS, PCR Not Detected     Coronavirus 229E Not Detected     Coronavirus HKU1 Not Detected     Coronavirus NL63 Not Detected     Coronavirus OC43 Not Detected     Human Metapneumovirus Not Detected     Human Rhinovirus/Enterovirus Detected (A)     Influenza B PCR Not Detected     Parainfluenza Virus 1 Not Detected     Parainfluenza Virus 2 Not Detected     Parainfluenza Virus 3 Not Detected     Parainfluenza Virus 4 Not Detected     Bordetella pertussis pcr Not Detected     Influenza A H1N1 2009 PCR Not Detected     Chlamydophila pneumoniae PCR Not Detected     Mycoplasma pneumo by PCR Not Detected     Influenza A PCR Not Detected     Influenza A H3 Detected (A)     Influenza A H1 Not Detected     RSV, PCR Not Detected    POC Glucose Once [[id number]]  (Abnormal) Collected:  01/05/18 2034    Specimen:  Blood Updated:  01/05/18 2040     Glucose 286 (H) mg/dL     Narrative:       Meter: [id number] : [id number] Bong Olivier NURSING ASSISTANT    CBC & Differential [[id number]] Collected:  01/06/18 0321    Specimen:  Blood Updated:  01/06/18 0333    Narrative:       The following orders were created for panel order CBC & Differential.  Procedure                               Abnormality         Status                     ---------                               -----------         ------                     CBC Auto Differential[[id number]]        Abnormal            Final result                 Please view results for these tests on the individual orders.    CBC Auto Differential [[id number]]  (Abnormal) Collected:  01/06/18 0321    Specimen:  Blood Updated:  01/06/18 0333     WBC 23.12 (H) 10*3/mm3      RBC 3.81 (L) 10*6/mm3      Hemoglobin 10.8 (L) g/dL      Hematocrit 34.8 (L) %      MCV 91.3 fL      MCH 28.3 pg      MCHC 31.0 g/dL      RDW 14.9 (H) %      RDW-SD  49.8 fl      MPV 10.7 (H) fL      Platelets 209 10*3/mm3      Neutrophil % 87.7 (H) %      Lymphocyte % 6.8 (L) %      Monocyte % 2.6 (L) %      Eosinophil % 0.0 %      Basophil % 0.1 %      Immature Grans % 2.8 (H) %      Neutrophils, Absolute 20.28 (H) 10*3/mm3      Lymphocytes, Absolute 1.57 10*3/mm3      Monocytes, Absolute 0.60 10*3/mm3      Eosinophils, Absolute 0.00 (L) 10*3/mm3      Basophils, Absolute 0.03 10*3/mm3      Immature Grans, Absolute 0.64 (H) 10*3/mm3      nRBC 0.0 /100 WBC     Basic Metabolic Panel [[id number]]  (Abnormal) Collected:  01/06/18 0321    Specimen:  Blood Updated:  01/06/18 0358     Glucose 185 (H) mg/dL      BUN 50 (H) mg/dL      Creatinine 1.25 (H) mg/dL      Sodium 139 mmol/L      Potassium 4.8 mmol/L      Chloride 97 (L) mmol/L      CO2 32.0 (H) mmol/L      Calcium 8.7 (L) mg/dL      eGFR Non African Amer 43 (L) mL/min/1.73      BUN/Creatinine Ratio 40.0 (H)     Anion Gap 10.0 mmol/L     Narrative:       GFR Normal >60  Chronic Kidney Disease <60  Kidney Failure <15    Blood Gas, Arterial [[id number]]  (Abnormal) Collected:  01/06/18 0550    Specimen:  Arterial Blood Updated:  01/06/18 0556     Site Right Radial     Dylan's Test Positive     pH, Arterial 7.405 pH units      pCO2, Arterial 46.3 (H) mm Hg      pO2, Arterial 79.6 (L) mm Hg      HCO3, Arterial 29.0 (H) mmol/L      Base Excess, Arterial 3.6 (H) mmol/L      O2 Saturation, Arterial 95.8 %      Hemoglobin, Blood Gas 11.5 (L) g/dL      Temperature 37.0 C      pH, Temp Corrected 7.405 pH Units      pCO2, Temperature Corrected 46.3 (H) mm Hg      pO2, Temperature Corrected 79.6 (L) mm Hg      Barometric Pressure for Blood Gas 752 mmHg      Modality Nasal Cannula     Flow Rate 4.0 lpm      Ventilator Mode NA     Collected by [id number]    POC Glucose Once [[id number]]  (Abnormal) Collected:  01/06/18 0731    Specimen:  Blood Updated:  01/06/18 0737     Glucose 213 (H) mg/dL     Narrative:       Meter: [id number] : [id number]  Vikas Benson RN    Respiratory Panel, PCR - Wash, Lung, Right Middle Lobe [[id number]]  (Abnormal) Collected:  01/05/18 0751    Specimen:  Wash from Lung, Right Middle Lobe Updated:  01/06/18 1115     ADENOVIRUS, PCR Not Detected     Coronavirus 229E Not Detected     Coronavirus HKU1 Not Detected     Coronavirus NL63 Not Detected     Coronavirus OC43 Not Detected     Human Metapneumovirus Not Detected     Human Rhinovirus/Enterovirus Detected (A)     Influenza B PCR Not Detected     Parainfluenza Virus 1 Not Detected     Parainfluenza Virus 2 Not Detected     Parainfluenza Virus 3 Not Detected     Parainfluenza Virus 4 Not Detected     Bordetella pertussis pcr Not Detected     Influenza A H1N1 2009 PCR Not Detected     Chlamydophila pneumoniae PCR Not Detected     Mycoplasma pneumo by PCR Not Detected     Influenza A PCR Not Detected     Influenza A H3 Detected (A)     Influenza A H1 Not Detected     RSV, PCR Not Detected        Imaging Results (most recent)     Procedure Component Value Units Date/Time    XR Chest 2 View [[id number]] Collected:  12/25/17 0647     Updated:  12/25/17 0651    Narrative:       EXAM:CHEST RADIOGRAPH 12/04/2017     HISTORY: Short of air cough for one week, worsening tonight     TECHNIQUE: PA and lateral 2 views      COMPARISON:09/30/2015     FINDINGS: Left lung volume loss appears chronic. Mild interstitial  prominence particularly at the left base without consolidation or  effusion. No pneumothorax or suspicious nodule. Heart size normal.       Impression:       Chronic changes including left lung volume loss without  acute abnormality      This report was finalized on 12/25/2017 6:49 AM by Dr. Federico Araya MD.       XR Chest PA & Lateral [[id number]] Collected:  12/27/17 1429     Updated:  12/27/17 1433    Narrative:       EXAM: PA and lateral chest     DATE: 12/27/2017.     HISTORY: Shortness of breath and cough since 12/24/2017. On new  breathing medications.     FINDINGS:  Development of dense left lower lobe, and to a lesser degree  right lower lobe, airspace disease with questionable trace left pleural  effusion. Stable heart size, within normal limits. Background  emphysematous changes are likely present.       Impression:       1.Development of left greater than right basilar airspace disease and  probable trace left pleural effusion. FINDINGS are worrisome for  developing pneumonia.  2. STAT copy of this report was sent to the ordering provider's office  immediately following this dictation with telephone notification and  documentation.     This report was finalized on 12/27/2017 2:31 PM by Dr. Sia Sarabia MD.       CT Angiogram Chest With & Without Contrast [[id number]] Collected:  12/28/17 1258     Updated:  12/28/17 1308    Narrative:       CTA chest with contrast     INDICATION: Shortness of air that started this morning. Increased O2  requirements. Patient has history of hypertension and COPD.     PROCEDURE: Contrast enhanced CTA of the chest with attention on  opacification of the pulmonary arteries. Coronal 3-D MIP reformatted  images and standard sagittal MPR reconstructions are reconstructed and  submitted.     Radiation dose reduction techniques were utilized, including automated  exposure control and exposure modulation based on body size.     COMPARISON: 07/04/2015.     FINDINGS:   There is no pulmonary embolism or aortic dissection. There are small  bilateral pleural effusions. No pericardial effusion. There is coronary  artery disease. High-grade stenosis at the origin of the left subclavian  artery is again seen. Pretracheal lymph node is unchanged and felt to be  benign. Prominent left hilar node is stable to slightly smaller, also  felt to be benign. There is severe emphysema. Dense alveolar infiltrates  are noted in the lower lobes, presumably reflecting pneumonia. Upper  abdomen shows atherosclerotic disease and hepatic steatosis.       Impression:       1.  No pulmonary embolism or aortic dissection.  2. Bilateral lower lobe pneumonia.  3. Severe emphysema.  4. Small bilateral pleural effusions.     This report was finalized on 12/28/2017 1:06 PM by Dr. Tanner Hare MD.       XR Chest 1 View [[id number]] Collected:  12/31/17 0652     Updated:  12/31/17 0656    Narrative:       EXAM: AP portable chest     DATE: 12/31/2017.     HISTORY: Pneumonia, follow-up. Shortness of breath. COPD. Hypertension.     COMPARISON: CTA chest 12/28/2017, PA and lateral chest 12/27/2017.     FINDINGS: Dense left greater than right bibasilar airspace disease  consistent with the appearance of pneumonia, without significant change.  Suspected small layering bilateral pleural effusions, not appreciated  change. Stable mild cardiac enlargement. Background emphysematous  changes, seen to better advantage on previous CT chest. No visible  pneumothorax.       Impression:       1. Dense left greater than right bibasilar airspace disease in keeping  with the appearance of pneumonia, with probable small layering bilateral  pleural effusions. The findings do not appear appreciably changed  compared to 12/27/2017.  2. Emphysema.     This report was finalized on 12/31/2017 6:54 AM by Dr. Sia Sarabia MD.       XR Chest 2 View [[id number]] Collected:  01/04/18 1121     Updated:  01/04/18 1217    Narrative:       PA AND LATERAL CHEST      DATE:  01/04/2018.     HISTORY: Acute hypoxic respiratory failure. Bilateral pleural effusions.  Bronchitis. COPD exacerbation. Former smoker. Cough. Controlled  hypertension.     COMPARISON: AP portable chest 12/31/2017. CT angiography of the chest  12/28/2017.     FINDINGS: Significant interval improvement in interstitial and alveolar  disease changes within both lungs, which may represent improving  pulmonary edema. Mild bibasilar atelectasis remains. Left pleural  effusion appears improved, as well, with trace pleural fluid remaining.  Heart size is borderline  enlarged and appears improved.     No new airspace disease or pneumothorax or acute osseous abnormality       Impression:       1. Marked interval improvement in interstitial and alveolar disease  since 12/31/2017, thought to represent improving edema. Mild atelectasis  or edema remains in the bases.   2. Trace left pleural effusion, significantly improved since 12/31/2017.     3. Improved appearance of cardiac silhouette since 12/31/2017.     This report was finalized on 1/4/2018 12:15 PM by Dr. Sia Sarabia MD.             PROCEDURES  Procedure(s):  BRONCHOSCOPY    Condition on Discharge:  Stable    Physical Exam at Discharge  Vital Signs  Temp:  [96.8 °F (36 °C)-98.1 °F (36.7 °C)] 97.9 °F (36.6 °C)  Heart Rate:  [63-76] 63  Resp:  [12-20] 16  BP: ()/(54-69) 111/69     Body mass index is 42.78 kg/(m^2).      Physical Exam:  Physical Exam   Constitutional: Patient appears well-developed and well-nourished and in no acute distress at this time   HEENT:   Head: Normocephalic and atraumatic.   Eyes:  Pupils are equal, round, and reactive to light. EOM are intact. Sclera are anicteric and non-injected.  Mouth and Throat: Patient has moist mucous membranes. Oropharynx is clear of any erythema or exudate.     Neck: Neck supple. No JVD present. No thyromegaly present. No lymphadenopathy present.  Cardiovascular: Regular rate, regular rhythm, S1 normal and S2 normal.  Exam reveals no gallop and no friction rub.  No murmur heard.  Pulmonary/Chest: Lungs are clear to auscultation bilaterally. No respiratory distress. No wheezes. No rhonchi. No rales.   Abdominal: Soft. Bowel sounds are normal. No distension and no mass. There is no hepatosplenomegaly. There is no tenderness.   Musculoskeletal: Normal Muscle tone  Extremities: No edema. Pulses are palpable in all 4 extremities.  Neurological: Patient is alert and oriented to person, place, and time. Cranial nerves II-XII are grossly intact with no focal  deficits.  Skin: Skin is warm. No rash noted. Nails show no clubbing.  No cyanosis or erythema.    Discharge Disposition  home    Visiting Nurse:    No     Home PT/OT:  No     Home Safety Evaluation:  No     DME  oxygne    Discharge Diet:           Dietary Orders            Start     Ordered    01/05/18 0947  Diet Regular; Cardiac, Consistent Carbohydrate  Diet Effective Now     Question Answer Comment   Diet Texture / Consistency Regular    Common Modifiers Cardiac    Common Modifiers Consistent Carbohydrate        01/05/18 0947          Activity at Discharge:  As tolerated    Pre-discharge education  Medication review as they have changed      Follow-up Appointments  Future Appointments  Date Time Provider Department Center   2/7/2018 8:30 PM [facility]     Additional Instructions for the Follow-ups that You Need to Schedule     Discharge Follow-up with PCP    As directed    Follow Up Details:  Vianey Barrios PA-C post hospital follow up.  respiratory failure rhinovirus/ flu/pneumonia roweomonas           Discharge Follow-up with Specified Provider: Dr. Hernadez; 1 Month    As directed    To:  Dr. Hernadez    Follow Up:  1 Month    Follow Up Details:  R[facility]OLPH workup and pneumonia with flu and rhinovirus/ post hospital follow up/ CT chest in 8 weeks per Dr. Rosa           Discharge Follow-up with Specified Provider: Dr. Patel; 1 Month    As directed    To:  Dr. Patel    Follow Up:  1 Month    Follow Up Details:  Post hospital follow up/ atrial fib/ xarelto/ Chads vasc 5           Discharge Follow-up with Specified Provider: Dr. Umaña; 1 Month    As directed    To:  Dr. Umaña    Follow Up:  1 Month    Follow Up Details:  On Remicade for Crohn's and will need next dose deferred due to this current infection.!                     Test Results Pending at Discharge   Order Current Status    AFB Culture - Wash, Lung, Left Lower Lobe In process    AFB Culture - Wash, Lung, Right Middle  Lobe In process    BAL Culture, Quantitative - Lavage, Lung, Left Lower Lobe In process    BAL Culture, Quantitative - Lavage, Lung, Right Middle Lobe In process    Fungus Culture - Wash, Lung, Right Middle Lobe In process    Non-gynecologic Cytology - Wash, Lung, Right Middle Lobe In process    Pneumocystis and Fungus Reference - Wash, Lung, Left Lower Lobe In process      To be followed up by pulmonary       Kayce Ariza DO  01/06/18  12:51 PM    Time: 45 min (if over 30 minutes give explanation as to why it took greater than 30 minutes)  Conversations with cardiology and pulmonary and issues with the med reconciliation and teaching of medication.

## 2018-01-06 NOTE — NURSING NOTE
Continued Stay Note  LIONEL Barton     Patient Name: Mar Camilo  MRN: [id number]  Today's Date: 1/6/2018    Admit Date: 12/24/2017          Discharge Plan       01/06/18 1309    Case Management/Social Work Plan    Plan home today with home O2.    Additional Comments received order for nebulizer-spoke with patient at bedside and she states she has a nebulizer at home already. information faxed to [facility] r/t needing 3L cont. per n/c. will continue to follow.                  Discharge Codes     None        Expected Discharge Date and Time     Expected Discharge Date Expected Discharge Time    Jan 6, 2018             Shilpi Gallo RN

## 2018-01-08 ENCOUNTER — TELEPHONE (OUTPATIENT)
Dept: SOCIAL WORK | Facility: HOSPITAL | Age: 68
End: 2018-01-08

## 2018-01-08 NOTE — TELEPHONE ENCOUNTER
Received call from Mrs Harvey (Mrs Camilo's sister) stating that the prescribed insulin (Levmir) was $390 which the patient could not afford.  Spoke with Dr Ariza yesterday and let her know that [employer] states patient drug plan does not cover Levmir insulin but it does cover Lantus.  Dr Ariza called megan prescription for the Lantus.  Called Mrs Harvey this am and let her know this - she will  med shortly.

## 2018-01-10 ENCOUNTER — TRANSCRIBE ORDERS (OUTPATIENT)
Dept: ADMINISTRATIVE | Facility: HOSPITAL | Age: 68
End: 2018-01-10

## 2018-01-10 DIAGNOSIS — R91.8 PULMONARY INFILTRATE: Primary | ICD-10-CM

## 2018-01-10 LAB
LAB AP CASE REPORT: NORMAL
LAB AP CLINICAL INFORMATION: NORMAL
Lab: NORMAL
PATH REPORT.FINAL DX SPEC: NORMAL

## 2018-01-11 LAB
BACTERIA SPEC AEROBE CULT: NORMAL
GRAM STN SPEC: NORMAL
REF LAB TEST METHOD: NORMAL

## 2018-01-15 ENCOUNTER — OFFICE (OUTPATIENT)
Dept: URBAN - METROPOLITAN AREA INFUSION 3 | Facility: INFUSION | Age: 68
End: 2018-01-15

## 2018-01-15 VITALS
HEART RATE: 81 BPM | HEART RATE: 75 BPM | HEIGHT: 64 IN | DIASTOLIC BLOOD PRESSURE: 68 MMHG | DIASTOLIC BLOOD PRESSURE: 73 MMHG | HEART RATE: 79 BPM | DIASTOLIC BLOOD PRESSURE: 67 MMHG | DIASTOLIC BLOOD PRESSURE: 65 MMHG | HEART RATE: 87 BPM | DIASTOLIC BLOOD PRESSURE: 70 MMHG | SYSTOLIC BLOOD PRESSURE: 166 MMHG | WEIGHT: 254 LBS | DIASTOLIC BLOOD PRESSURE: 62 MMHG | TEMPERATURE: 97.4 F | SYSTOLIC BLOOD PRESSURE: 153 MMHG | TEMPERATURE: 97 F | SYSTOLIC BLOOD PRESSURE: 160 MMHG | SYSTOLIC BLOOD PRESSURE: 161 MMHG | SYSTOLIC BLOOD PRESSURE: 150 MMHG | RESPIRATION RATE: 16 BRPM | SYSTOLIC BLOOD PRESSURE: 142 MMHG | RESPIRATION RATE: 18 BRPM | HEART RATE: 78 BPM | HEART RATE: 92 BPM | DIASTOLIC BLOOD PRESSURE: 61 MMHG | SYSTOLIC BLOOD PRESSURE: 138 MMHG | HEART RATE: 74 BPM

## 2018-01-15 DIAGNOSIS — K50.118 CROHN'S DISEASE OF LARGE INTESTINE WITH OTHER COMPLICATION: ICD-10-CM

## 2018-01-15 PROCEDURE — 96413 CHEMO IV INFUSION 1 HR: CPT

## 2018-01-15 PROCEDURE — 96415 CHEMO IV INFUSION ADDL HR: CPT

## 2018-01-15 RX ADMIN — Medication 25 MG: at 08:14

## 2018-01-22 ENCOUNTER — HOSPITAL ENCOUNTER (OUTPATIENT)
Dept: GENERAL RADIOLOGY | Facility: HOSPITAL | Age: 68
Discharge: HOME | End: 2018-01-22
Admitting: PHYSICIAN ASSISTANT

## 2018-01-22 ENCOUNTER — TELEPHONE (OUTPATIENT)
Dept: FAMILY MEDICINE CLINIC | Facility: CLINIC | Age: 68
End: 2018-01-22

## 2018-01-22 ENCOUNTER — OFFICE VISIT (OUTPATIENT)
Dept: FAMILY MEDICINE CLINIC | Facility: CLINIC | Age: 68
End: 2018-01-22

## 2018-01-22 VITALS
HEIGHT: 64 IN | RESPIRATION RATE: 16 BRPM | TEMPERATURE: 98.2 F | HEART RATE: 113 BPM | WEIGHT: 239 LBS | BODY MASS INDEX: 40.8 KG/M2 | DIASTOLIC BLOOD PRESSURE: 70 MMHG | SYSTOLIC BLOOD PRESSURE: 150 MMHG | OXYGEN SATURATION: 90 %

## 2018-01-22 DIAGNOSIS — J96.11 CHRONIC RESPIRATORY FAILURE WITH HYPOXIA (HCC): ICD-10-CM

## 2018-01-22 DIAGNOSIS — M25.511 ACUTE PAIN OF BOTH SHOULDERS: ICD-10-CM

## 2018-01-22 DIAGNOSIS — M25.511 ACUTE PAIN OF BOTH SHOULDERS: Primary | ICD-10-CM

## 2018-01-22 DIAGNOSIS — M25.511 CHRONIC PAIN OF BOTH SHOULDERS: ICD-10-CM

## 2018-01-22 DIAGNOSIS — G89.29 CHRONIC PAIN OF BOTH SHOULDERS: ICD-10-CM
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
## 2018-01-22 DIAGNOSIS — M25.512 ACUTE PAIN OF BOTH SHOULDERS: Primary | ICD-10-CM

## 2018-01-22 DIAGNOSIS — M25.512 CHRONIC PAIN OF BOTH SHOULDERS: ICD-10-CM

## 2018-01-22 DIAGNOSIS — M25.512 ACUTE PAIN OF BOTH SHOULDERS: ICD-10-CM

## 2018-01-22 DIAGNOSIS — Z09 HOSPITAL DISCHARGE FOLLOW-UP: ICD-10-CM

## 2018-01-22 DIAGNOSIS — E11.8 TYPE 2 DIABETES MELLITUS WITH COMPLICATION, WITHOUT LONG-TERM CURRENT USE OF INSULIN (HCC): ICD-10-CM

## 2018-01-22 PROCEDURE — 99214 OFFICE O/P EST MOD 30 MIN: CPT | Performed by: PHYSICIAN ASSISTANT

## 2018-01-22 PROCEDURE — 73030 X-RAY EXAM OF SHOULDER: CPT

## 2018-01-22 RX ORDER — BLOOD-GLUCOSE METER
1 EACH MISCELLANEOUS 2 TIMES DAILY
Qty: 1 KIT | Refills: 0 | Status: SHIPPED | OUTPATIENT
Start: 2018-01-22

## 2018-01-22 RX ORDER — LANCETS
EACH MISCELLANEOUS
Qty: 100 EACH | Refills: 12 | Status: SHIPPED | OUTPATIENT
Start: 2018-01-22

## 2018-01-22 NOTE — TELEPHONE ENCOUNTER
----- Message from Vianey Barrios PA-C sent at 1/22/2018 12:37 PM EST -----  Notify patient that her bilateral shoulder x-ray showed no acute findings.

## 2018-01-22 NOTE — PROGRESS NOTES
Subjective   Mar Camilo is a 67 y.o. female.   Chief Complaint   Patient presents with   • Pneumonia     ED followup       History of Present Illness       Mar is a 67 year old female who presents for follow up from Hospitalization at [facility].  She was admitted to hospital on December 24, 2017 breast discharge date of January 6, 2018.  She was diagnosed with acute on chronic hypoxic respiratory failure, sepsis secondary to acute rhinovirus bronchitis and bilateral lower lobe pneumonia.She states she has an appointment with her pulmonologist this afternoon.  She just finished her prednisone medication this morning.  She has been taking Levemir injections while on the steroid.  Overall she states she is feeling slightly better with her breathing and coughing. Still has some wheezing and shortness of air with coughing.  Using 3 liters of continuous Oxygen. While getting ready this morning at 10:30 AM she slipped and fell.  Mar landed on her left side and hit her shoulder.  She has been having bilateral shoulder pain since her fall.  She describes the pain as a constant dull ache.  She has a history of a right rotator cuff tear.  Denied any loss of consciousness or head trauma.  She denied any chest pain, swelling of ankles, wheezing, fevers, chills, dizziness or vision changes.  Her appetite has been increased due to the prednisone.  Sleep is improving.      The following portions of the patient's history were reviewed and updated as appropriate: allergies, current medications, past family history, past medical history, past social history and past surgical history.    Review of Systems   Constitutional: Negative.  Negative for chills and fever.   HENT: Negative.    Eyes: Negative.    Respiratory: Positive for shortness of breath (improving) and wheezing (occassional). Negative for cough.    Cardiovascular: Negative.  Negative for chest pain, palpitations and leg swelling.   Gastrointestinal:  "Negative.    Endocrine: Negative.    Genitourinary: Negative.    Musculoskeletal: Positive for arthralgias (B/L shoulder pain s/p fall).   Skin: Negative.    Allergic/Immunologic: Negative.    Neurological: Negative.    Hematological: Negative.    Psychiatric/Behavioral: Negative.    All other systems reviewed and are negative.    Vitals:    01/22/18 1100   BP: 150/70   BP Location: Right arm   Patient Position: Sitting   Cuff Size: Large Adult   Pulse: 113   Resp: 16   Temp: 98.2 °F (36.8 °C)   TempSrc: Oral   SpO2: 90%   Weight: 108 kg (239 lb)   Height: 162.6 cm (64\")     SpO2 Readings from Last 3 Encounters:   01/22/18 90%   01/06/18 (!) 84%   08/24/17 93%     Wt Readings from Last 3 Encounters:   01/22/18 108 kg (239 lb)   01/06/18 113 kg (249 lb 4 oz)   08/24/17 113 kg (249 lb)     Body mass index is 41.02 kg/(m^2).  Allergies   Allergen Reactions   • Contrast Dye Hives   • Tenoretic [Atenolol-Chlorthalidone] Anaphylaxis   • Iodinated Diagnostic Agents        Objective   Physical Exam   Constitutional: She is oriented to person, place, and time. Vital signs are normal. She appears well-developed and well-nourished.   Nasal canula with 3 L of oxygen   Neck: Trachea normal and phonation normal. Neck supple. No edema present.   Cardiovascular: Normal rate, regular rhythm, S1 normal, S2 normal, normal heart sounds and normal pulses.    Pulmonary/Chest: Effort normal and breath sounds normal.   Abdominal: Soft. Normal appearance and bowel sounds are normal. There is no hepatomegaly. There is no tenderness.   Musculoskeletal:        Right shoulder: She exhibits decreased range of motion, tenderness, pain and decreased strength. She exhibits no swelling, no effusion, no crepitus and normal pulse.        Left shoulder: She exhibits decreased range of motion, tenderness, pain and decreased strength. She exhibits no crepitus and normal pulse.   Neurological: She is alert and oriented to person, place, and time.   Skin: " Skin is warm, dry and intact.   Psychiatric: She has a normal mood and affect. Her speech is normal and behavior is normal. Judgment and thought content normal. Cognition and memory are normal.       Assessment/Plan   Mar was seen today for pneumonia.    Diagnoses and all orders for this visit:    Acute pain of both shoulders  -     XR shoulder 2+ vw bilateral; Future  -     HYDROcodone-acetaminophen (NORCO) 5-325 MG per tablet; Take 1 tablet by mouth Every 8 (Eight) Hours As Needed for Moderate Pain . Take one tablet twice a day as needed for pain    Fall, initial encounter  -     XR shoulder 2+ vw bilateral; Future  -     HYDROcodone-acetaminophen (NORCO) 5-325 MG per tablet; Take 1 tablet by mouth Every 8 (Eight) Hours As Needed for Moderate Pain . Take one tablet twice a day as needed for pain    Hospital discharge follow-up    Chronic respiratory failure with hypoxia    Type 2 diabetes mellitus with complication, without long-term current use of insulin  -     glucose blood (ONETOUCH VERIO) test strip; Use as instructed as to check blood sugars 2-3 times a day for type 2 diabetes  -     Blood Glucose Monitoring Suppl (ONETOUCH VERIO) w/Device kit; 1 kit 2 (Two) Times a Day.  -     Lancets (ONETOUCH ULTRASOFT) lancets; Used to check blood sugars twice a day as needed for type 2 diabetes monitoring    Chronic pain of both shoulders      1.  New hospital follow-up from [facility] due to acute on chronic respiratory failure with hypoxia/pneumonia: I have reviewed Mar's discharge summary labs and imaging studies with her at today's office visit.  She has an appointment with pulmonologist, Dr. Hernadez, this afternoon at 2 PM.  She recently finished her prednisone this morning.  I've asked her to keep her appointment this afternoon with her pulmonologist.  Overall she states her breathing has improved somewhat.  She will continue her current medications at home as directed.  I've asked her to schedule follow  up appointment with her cardiologist as well.  Bacitracin schedule follow-up appointment in one month for fasting blood work and office visit for further evaluation of her diabetes status.  She voiced understanding.                 2.  New bilateral shoulder pain status post fall on Monday, January 22, 2018 at 10:30 AM: Mar felt this morning getting ready to come to office approximately 10:30 AM.  She will have a bilateral shoulder x-ray at [facility] today to rule out dislocation/fracture.  Dr. Castanon has approved a short-term Norco pain medication prescription.  See below for Gasper information.  She will be notified of test results when completed.    As part of this patient's treatment plan I am prescribing controlled substances.  The patient has been made aware of appropriate use of such medications, including potential risk of somnolence. Limited ability to drive and/or work safely, and potential for dependence or overdose.  It has also been made clear that these medications are for use by this patient only, without concomitant use of alcohol or other substances unless prescribed.  GASPER report has been reviewed and scanned into the patient's chart.  Gasper number is [id number] dated January 20, 2018.        Dragon transcription disclaimer    Much of this encounter note is an electronic transcription/translation of spoken language to printed text.  The electronic translation of spoken language may permit erroneous, or at times, nonsensical words or phrases to be inadvertently transcribed.  Although I have reviewed the note for such errors, some may still exist.    Vianey Barrios PA-C  Family Practice

## 2018-01-23 RX ORDER — HYDROCODONE BITARTRATE AND ACETAMINOPHEN 5; 325 MG/1; MG/1
1 TABLET ORAL EVERY 8 HOURS PRN
Qty: 15 TABLET | Refills: 0
Start: 2018-01-23 | End: 2018-07-02 | Stop reason: SINTOL

## 2018-02-02 LAB — FUNGUS WND CULT: NORMAL

## 2018-02-12 DIAGNOSIS — D50.9 IRON DEFICIENCY ANEMIA, UNSPECIFIED IRON DEFICIENCY ANEMIA TYPE: ICD-10-CM

## 2018-02-12 DIAGNOSIS — E78.2 MIXED HYPERLIPIDEMIA: Primary | ICD-10-CM

## 2018-02-12 DIAGNOSIS — E11.8 TYPE 2 DIABETES MELLITUS WITH COMPLICATION, WITHOUT LONG-TERM CURRENT USE OF INSULIN (HCC): ICD-10-CM

## 2018-02-16 LAB
MYCOBACTERIUM SPEC CULT: NORMAL
MYCOBACTERIUM SPEC CULT: NORMAL
NIGHT BLUE STAIN TISS: NORMAL
NIGHT BLUE STAIN TISS: NORMAL

## 2018-02-28 ENCOUNTER — OFFICE VISIT (OUTPATIENT)
Dept: FAMILY MEDICINE CLINIC | Facility: CLINIC | Age: 68
End: 2018-02-28

## 2018-02-28 VITALS
BODY MASS INDEX: 36.54 KG/M2 | SYSTOLIC BLOOD PRESSURE: 178 MMHG | OXYGEN SATURATION: 94 % | WEIGHT: 214 LBS | DIASTOLIC BLOOD PRESSURE: 94 MMHG | RESPIRATION RATE: 16 BRPM | HEIGHT: 64 IN | HEART RATE: 114 BPM

## 2018-02-28 DIAGNOSIS — I10 ESSENTIAL HYPERTENSION: ICD-10-CM

## 2018-02-28 DIAGNOSIS — E11.8 TYPE 2 DIABETES MELLITUS WITH COMPLICATION, WITHOUT LONG-TERM CURRENT USE OF INSULIN (HCC): Primary | ICD-10-CM

## 2018-02-28 DIAGNOSIS — J42 CHRONIC BRONCHITIS, UNSPECIFIED CHRONIC BRONCHITIS TYPE (HCC): ICD-10-CM

## 2018-02-28 DIAGNOSIS — J44.1 CHRONIC OBSTRUCTIVE PULMONARY DISEASE WITH ACUTE EXACERBATION (HCC): ICD-10-CM

## 2018-02-28 DIAGNOSIS — Z11.59 NEED FOR HEPATITIS C SCREENING TEST: ICD-10-CM

## 2018-02-28 DIAGNOSIS — E78.2 MIXED HYPERLIPIDEMIA: ICD-10-CM

## 2018-02-28 DIAGNOSIS — Z23 NEED FOR PNEUMOCOCCAL VACCINE: ICD-10-CM

## 2018-02-28 PROCEDURE — 90732 PPSV23 VACC 2 YRS+ SUBQ/IM: CPT | Performed by: PHYSICIAN ASSISTANT

## 2018-02-28 PROCEDURE — 99213 OFFICE O/P EST LOW 20 MIN: CPT | Performed by: PHYSICIAN ASSISTANT

## 2018-02-28 PROCEDURE — G0009 ADMIN PNEUMOCOCCAL VACCINE: HCPCS | Performed by: PHYSICIAN ASSISTANT

## 2018-02-28 RX ORDER — ALBUTEROL SULFATE 90 UG/1
2 AEROSOL, METERED RESPIRATORY (INHALATION) EVERY 4 HOURS PRN
Qty: 18 G | Refills: 6 | Status: SHIPPED | OUTPATIENT
Start: 2018-02-28 | End: 2019-03-11 | Stop reason: SDUPTHER

## 2018-02-28 RX ORDER — VERAPAMIL HYDROCHLORIDE 240 MG/1
240 TABLET, FILM COATED, EXTENDED RELEASE ORAL EVERY 12 HOURS SCHEDULED
Qty: 60 TABLET | Refills: 3 | Status: SHIPPED | OUTPATIENT
Start: 2018-02-28 | End: 2018-09-28 | Stop reason: SDUPTHER

## 2018-02-28 RX ORDER — ALBUTEROL SULFATE 2.5 MG/3ML
2.5 SOLUTION RESPIRATORY (INHALATION) EVERY 4 HOURS PRN
Qty: 25 VIAL | Refills: 12 | Status: SHIPPED | OUTPATIENT
Start: 2018-02-28 | End: 2018-07-30

## 2018-03-01 ENCOUNTER — TELEPHONE (OUTPATIENT)
Dept: FAMILY MEDICINE CLINIC | Facility: CLINIC | Age: 68
End: 2018-03-01

## 2018-03-01 PROBLEM — Z23 NEED FOR PNEUMOCOCCAL VACCINE: Status: ACTIVE | Noted: 2018-03-01

## 2018-03-01 PROBLEM — Z11.59 NEED FOR HEPATITIS C SCREENING TEST: Status: ACTIVE | Noted: 2018-03-01

## 2018-03-01 LAB
ALBUMIN SERPL-MCNC: 3.9 G/DL (ref 3.5–5.2)
ALBUMIN/GLOB SERPL: 1.3 G/DL
ALP SERPL-CCNC: 93 U/L (ref 40–129)
ALT SERPL-CCNC: 19 U/L (ref 5–33)
AST SERPL-CCNC: 21 U/L (ref 5–32)
BASOPHILS # BLD AUTO: 0.07 10*3/MM3 (ref 0–0.2)
BASOPHILS NFR BLD AUTO: 0.6 % (ref 0–2)
BILIRUB SERPL-MCNC: 0.2 MG/DL (ref 0.2–1.2)
BUN SERPL-MCNC: 17 MG/DL (ref 8–23)
BUN/CREAT SERPL: 20.5 (ref 7–25)
CALCIUM SERPL-MCNC: 9.8 MG/DL (ref 8.8–10.5)
CHLORIDE SERPL-SCNC: 100 MMOL/L (ref 98–107)
CHOLEST SERPL-MCNC: 114 MG/DL (ref 0–200)
CO2 SERPL-SCNC: 27.7 MMOL/L (ref 22–29)
CREAT SERPL-MCNC: 0.83 MG/DL (ref 0.57–1)
EOSINOPHIL # BLD AUTO: 0.21 10*3/MM3 (ref 0.1–0.3)
EOSINOPHIL NFR BLD AUTO: 1.9 % (ref 0–4)
ERYTHROCYTE [DISTWIDTH] IN BLOOD BY AUTOMATED COUNT: 16.4 % (ref 11.5–14.5)
GFR SERPLBLD CREATININE-BSD FMLA CKD-EPI: 68 ML/MIN/1.73
GFR SERPLBLD CREATININE-BSD FMLA CKD-EPI: 83 ML/MIN/1.73
GLOBULIN SER CALC-MCNC: 2.9 GM/DL
GLUCOSE SERPL-MCNC: 181 MG/DL (ref 65–99)
HBA1C MFR BLD: 6.7 % (ref 4.8–5.6)
HCT VFR BLD AUTO: 39.7 % (ref 37–47)
HCV AB S/CO SERPL IA: <0.1 S/CO RATIO (ref 0–0.9)
HDLC SERPL-MCNC: 43 MG/DL (ref 40–60)
HGB BLD-MCNC: 11.7 G/DL (ref 12–16)
IMM GRANULOCYTES # BLD: 0.09 10*3/MM3 (ref 0–0.03)
IMM GRANULOCYTES NFR BLD: 0.8 % (ref 0–0.5)
LDLC SERPL CALC-MCNC: 29 MG/DL (ref 0–100)
LDLC/HDLC SERPL: 0.68 {RATIO}
LYMPHOCYTES # BLD AUTO: 2.4 10*3/MM3 (ref 0.6–4.8)
LYMPHOCYTES NFR BLD AUTO: 21.5 % (ref 20–45)
MCH RBC QN AUTO: 27.7 PG (ref 27–31)
MCHC RBC AUTO-ENTMCNC: 29.5 G/DL (ref 31–37)
MCV RBC AUTO: 94.1 FL (ref 81–99)
MONOCYTES # BLD AUTO: 1.14 10*3/MM3 (ref 0–1)
MONOCYTES NFR BLD AUTO: 10.2 % (ref 3–8)
NEUTROPHILS # BLD AUTO: 7.25 10*3/MM3 (ref 1.5–8.3)
NEUTROPHILS NFR BLD AUTO: 65 % (ref 45–70)
NRBC BLD AUTO-RTO: 0 /100 WBC (ref 0–0)
PLATELET # BLD AUTO: 410 10*3/MM3 (ref 140–500)
POTASSIUM SERPL-SCNC: 5.3 MMOL/L (ref 3.5–5.2)
PROT SERPL-MCNC: 6.8 G/DL (ref 6–8.5)
RBC # BLD AUTO: 4.22 10*6/MM3 (ref 4.2–5.4)
SODIUM SERPL-SCNC: 141 MMOL/L (ref 136–145)
TRIGL SERPL-MCNC: 208 MG/DL (ref 0–150)
VLDLC SERPL CALC-MCNC: 41.6 MG/DL (ref 7–27)
WBC # BLD AUTO: 11.16 10*3/MM3 (ref 4.8–10.8)

## 2018-03-01 NOTE — TELEPHONE ENCOUNTER
----- Message from Vianey Barrios PA-C sent at 3/1/2018  7:42 AM EST -----  1.  Notify patient that her white count has decreased from 1 month ago.  Please keep follow-up appointments with pulmonologist.  Hemoglobin has improved to 11.7.  2.  Fasting blood sugar was 181 with A1C  6.7.  Blood sugar was elevated at A1c has improved from 2 months ago.  Please continue current medication at home.  Repeat fasting blood work in 3 months.  3.  Cholesterol was routine, triglycerides 208, HDL 43 and LDL was 29.  These values have improved from 6 months ago.  Please decrease carbohydrates in diet.  Plan to repeat fasting blood work in 3 months.  4.  Hepatitis C was negative.  
Patient notified of labs and to repeat in 3 months  
Passed

## 2018-03-12 ENCOUNTER — HOSPITAL ENCOUNTER (OUTPATIENT)
Dept: CT IMAGING | Facility: HOSPITAL | Age: 68
Discharge: HOME | End: 2018-03-12
Attending: INTERNAL MEDICINE | Admitting: INTERNAL MEDICINE

## 2018-03-12 ENCOUNTER — OFFICE (OUTPATIENT)
Dept: URBAN - METROPOLITAN AREA INFUSION 3 | Facility: INFUSION | Age: 68
End: 2018-03-12

## 2018-03-12 VITALS
SYSTOLIC BLOOD PRESSURE: 144 MMHG | HEART RATE: 66 BPM | DIASTOLIC BLOOD PRESSURE: 71 MMHG | SYSTOLIC BLOOD PRESSURE: 142 MMHG | TEMPERATURE: 97 F | DIASTOLIC BLOOD PRESSURE: 64 MMHG | WEIGHT: 254 LBS | DIASTOLIC BLOOD PRESSURE: 70 MMHG | RESPIRATION RATE: 18 BRPM | HEART RATE: 90 BPM | HEART RATE: 99 BPM | SYSTOLIC BLOOD PRESSURE: 140 MMHG | SYSTOLIC BLOOD PRESSURE: 153 MMHG | RESPIRATION RATE: 20 BRPM | SYSTOLIC BLOOD PRESSURE: 135 MMHG | HEIGHT: 64 IN | DIASTOLIC BLOOD PRESSURE: 59 MMHG | HEART RATE: 92 BPM | SYSTOLIC BLOOD PRESSURE: 137 MMHG | HEART RATE: 98 BPM | HEART RATE: 89 BPM | SYSTOLIC BLOOD PRESSURE: 143 MMHG | TEMPERATURE: 96.9 F | DIASTOLIC BLOOD PRESSURE: 61 MMHG | DIASTOLIC BLOOD PRESSURE: 69 MMHG | SYSTOLIC BLOOD PRESSURE: 130 MMHG

## 2018-03-12 DIAGNOSIS — K50.118 CROHN'S DISEASE OF LARGE INTESTINE WITH OTHER COMPLICATION: ICD-10-CM

## 2018-03-12 DIAGNOSIS — R91.8 PULMONARY INFILTRATE: ICD-10-CM

## 2018-03-12 PROCEDURE — 71250 CT THORAX DX C-: CPT

## 2018-03-12 PROCEDURE — 96415 CHEMO IV INFUSION ADDL HR: CPT

## 2018-03-12 PROCEDURE — 96413 CHEMO IV INFUSION 1 HR: CPT

## 2018-03-12 RX ADMIN — Medication 25 MG: at 08:37

## 2018-03-26 ENCOUNTER — TELEPHONE (OUTPATIENT)
Dept: FAMILY MEDICINE CLINIC | Facility: CLINIC | Age: 68
End: 2018-03-26

## 2018-03-26 DIAGNOSIS — M25.511 ACUTE PAIN OF BOTH SHOULDERS: Primary | ICD-10-CM

## 2018-03-26 DIAGNOSIS — M25.512 ACUTE PAIN OF BOTH SHOULDERS: Primary | ICD-10-CM

## 2018-03-26 RX ORDER — LIDOCAINE 50 MG/G
1 PATCH TOPICAL EVERY 24 HOURS
Qty: 30 PATCH | Refills: 0 | Status: SHIPPED | OUTPATIENT
Start: 2018-03-26 | End: 2018-07-02

## 2018-03-26 NOTE — TELEPHONE ENCOUNTER
Notify Patient I have sent a Lidoderm prescription to pharmacy.  I have also placed a referral to orthopedist

## 2018-03-27 DIAGNOSIS — I10 ESSENTIAL HYPERTENSION: ICD-10-CM

## 2018-03-27 DIAGNOSIS — E78.1 HYPERTRIGLYCERIDEMIA: ICD-10-CM

## 2018-03-27 DIAGNOSIS — E78.2 MIXED HYPERLIPIDEMIA: ICD-10-CM

## 2018-03-27 RX ORDER — ROSUVASTATIN CALCIUM 40 MG/1
40 TABLET, COATED ORAL DAILY
Qty: 30 TABLET | Refills: 3 | Status: SHIPPED | OUTPATIENT
Start: 2018-03-27 | End: 2018-12-06 | Stop reason: SDUPTHER

## 2018-03-27 RX ORDER — LISINOPRIL 40 MG/1
40 TABLET ORAL DAILY
Qty: 90 TABLET | Refills: 1 | Status: SHIPPED | OUTPATIENT
Start: 2018-03-27 | End: 2018-10-17 | Stop reason: SDUPTHER

## 2018-04-06 DIAGNOSIS — E78.1 HYPERTRIGLYCERIDEMIA: ICD-10-CM

## 2018-04-06 DIAGNOSIS — E11.8 TYPE 2 DIABETES MELLITUS WITH COMPLICATION, WITHOUT LONG-TERM CURRENT USE OF INSULIN (HCC): ICD-10-CM

## 2018-04-06 DIAGNOSIS — E78.2 MIXED HYPERLIPIDEMIA: ICD-10-CM

## 2018-04-24 ENCOUNTER — OFFICE VISIT (OUTPATIENT)
Dept: ORTHOPEDIC SURGERY | Facility: CLINIC | Age: 68
End: 2018-04-24

## 2018-04-24 VITALS — HEIGHT: 64 IN | BODY MASS INDEX: 40.8 KG/M2 | WEIGHT: 239 LBS

## 2018-04-24 DIAGNOSIS — M19.012 PRIMARY OSTEOARTHRITIS, LEFT SHOULDER: ICD-10-CM

## 2018-04-24 DIAGNOSIS — M19.011 PRIMARY OSTEOARTHRITIS, RIGHT SHOULDER: Primary | ICD-10-CM

## 2018-04-24 PROCEDURE — 99214 OFFICE O/P EST MOD 30 MIN: CPT | Performed by: ORTHOPAEDIC SURGERY

## 2018-04-24 RX ORDER — LAMOTRIGINE 150 MG/1
150 TABLET ORAL
COMMUNITY
Start: 2018-03-05 | End: 2018-04-24 | Stop reason: SDUPTHER

## 2018-04-24 RX ORDER — DILTIAZEM HYDROCHLORIDE 60 MG/1
2 TABLET, FILM COATED ORAL
COMMUNITY
Start: 2018-01-22 | End: 2019-01-25 | Stop reason: SDUPTHER

## 2018-04-24 RX ORDER — MELOXICAM 7.5 MG/1
7.5 TABLET ORAL DAILY
Qty: 30 TABLET | Refills: 0 | Status: SHIPPED | OUTPATIENT
Start: 2018-04-24 | End: 2018-07-02

## 2018-04-24 RX ORDER — LAMOTRIGINE 150 MG/1
150 TABLET ORAL DAILY
COMMUNITY
Start: 2018-03-05

## 2018-06-14 DIAGNOSIS — K50.10 CROHN'S DISEASE OF COLON WITHOUT COMPLICATION (HCC): Primary | ICD-10-CM

## 2018-06-14 PROBLEM — K50.119 CROHN'S DISEASE OF COLON WITH COMPLICATION (HCC): Status: ACTIVE | Noted: 2018-06-14

## 2018-06-14 RX ORDER — DIPHENHYDRAMINE HCL 25 MG
25 CAPSULE ORAL ONCE
Status: CANCELLED | OUTPATIENT
Start: 2018-06-15

## 2018-06-14 RX ORDER — SODIUM CHLORIDE 9 MG/ML
250 INJECTION, SOLUTION INTRAVENOUS ONCE
Status: CANCELLED | OUTPATIENT
Start: 2018-06-15

## 2018-06-14 RX ORDER — ACETAMINOPHEN 325 MG/1
650 TABLET ORAL ONCE
Status: CANCELLED | OUTPATIENT
Start: 2018-06-15

## 2018-06-28 ENCOUNTER — OFFICE VISIT (OUTPATIENT)
Dept: ORTHOPEDIC SURGERY | Facility: CLINIC | Age: 68
End: 2018-06-28

## 2018-06-28 DIAGNOSIS — M19.011 PRIMARY OSTEOARTHRITIS, RIGHT SHOULDER: Primary | ICD-10-CM

## 2018-06-28 DIAGNOSIS — M19.012 PRIMARY OSTEOARTHRITIS, LEFT SHOULDER: ICD-10-CM

## 2018-06-28 PROCEDURE — 99213 OFFICE O/P EST LOW 20 MIN: CPT | Performed by: ORTHOPAEDIC SURGERY

## 2018-06-28 RX ORDER — TRAMADOL HYDROCHLORIDE 50 MG/1
50-100 TABLET ORAL EVERY 6 HOURS PRN
COMMUNITY
Start: 2018-05-23 | End: 2018-10-17 | Stop reason: SDUPTHER

## 2018-06-28 NOTE — PROGRESS NOTES
Subjective:     Patient ID: Mar Camilo is a 68 y.o. female.    Chief Complaint:  Follow-up bilateral shoulder pain, DJD  History of Present Illness  Mar Camilo returns to clinic today for evaluation of bilateral shoulders, states that she had minimal to no relief with use of meloxicam, still rates pain as a 8-9 out of 10 in aching in nature to bilateral shoulders, does note associated crepitus on range of motion, pain does moderately wax and wane.  It has been more exacerbated recently as she did have a tibial plateau fracture on her left leg per her report, currently being treated by Dr. Sergei Cárdenas.  Denies any numbness or tingling bilateral upper extremities, mild radiation of pain down to the mid-level of bilateral arms, minimal improvement with rest.  Exacerbated with overhead reaching and lifting and pushing     Social History     Occupational History   • Not on file.     Social History Main Topics   • Smoking status: Former Smoker     Packs/day: 2.00     Years: 40.00     Types: Cigarettes     Quit date: 04/2014   • Smokeless tobacco: Never Used   • Alcohol use No      Comment: history of heavy drinker    • Drug use: No   • Sexual activity: Defer      Past Medical History:   Diagnosis Date   • Artery stenosis     left, states no bp/hr in Left arm d/t inaccuracy   • Back pain    • CAD (coronary artery disease)    • COPD (chronic obstructive pulmonary disease)    • Crohn's disease     Remicade on hold d/t antibiotics   • DDD (degenerative disc disease)    • Depression    • Diabetes mellitus     states not diabetic, sugars only high when sick   • Hyperlipidemia    • Hypertension    • Hypokalemia    • Kidney stone     sched scope, lithotripsy   • Morbid obesity    • Obstructive pyelonephritis    • On home oxygen therapy     2L UNLESS STRESSED THEN 2.5-3 L   • PVD (peripheral vascular disease)     RT CAROTID STENOSIS   • Radiculopathy     NECK PAIN     Past Surgical History:   Procedure Laterality Date   •  APPENDECTOMY     • BRONCHOSCOPY N/A 1/5/2018    Procedure: BRONCHOSCOPY;  Surgeon: Gustavo Muniz MD;  Location: [facility] OR;  Service:    • COLON RESECTION     • COLONOSCOPY      x2   • CYSTOSCOPY URETEROSCOPY LASER LITHOTRIPSY Right 4/17/2017    Procedure: CYSTOSCOPY with  right stent removal, right URETEROSCOPY LASER LITHOTRIPSY,  with STONE BASKET EXTRACTION;  Surgeon: Dipesh Bean MD;  Location: [facility] OR;  Service:    • CYSTOSCOPY W/ URETERAL STENT PLACEMENT Right 3/18/2017    Procedure: CYSTOSCOPY URETERAL CATHETER/STENT INSERTION;  Surgeon: Dipesh Bean MD;  Location: [facility] OR;  Service:    • LUNG BIOPSY      NEGATIVE   • FL THROMBOENDARTECTMY NECK,NECK INCIS Right 3/14/2016    Procedure: RT CAROTID ENDARTERECTOMY;  Surgeon: Federico Schuler MD;  Location: [facility] OR;  Service: Vascular   • WRIST ARTHROSCOPY Right     WITH RELEASE OF TRANSVERSE CARPAL LIGAMENT       Family History   Problem Relation Age of Onset   • Diabetes Mother    • Stroke Mother    • Other Father         RESPIRATORY FAILURE   • Cancer Other          Review of Systems   Constitutional: Negative for chills, diaphoresis, fever and unexpected weight change.   HENT: Negative for hearing loss, nosebleeds, sore throat and tinnitus.    Eyes: Negative for pain and visual disturbance.   Respiratory: Negative for cough, shortness of breath and wheezing.    Cardiovascular: Negative for chest pain and palpitations.   Gastrointestinal: Negative for abdominal pain, diarrhea, nausea and vomiting.   Endocrine: Negative for cold intolerance, heat intolerance and polydipsia.   Genitourinary: Negative for difficulty urinating, dysuria and hematuria.   Musculoskeletal: Positive for arthralgias. Negative for joint swelling and myalgias.   Skin: Negative for rash and wound.   Allergic/Immunologic: Negative for environmental allergies.   Neurological: Negative for dizziness, syncope and numbness.   Hematological: Does not bruise/bleed  easily.   Psychiatric/Behavioral: Negative for dysphoric mood and sleep disturbance. The patient is not nervous/anxious.            Objective:  There were no vitals filed for this visit.  There were no vitals filed for this visit.  There is no height or weight on file to calculate BMI.  General: No acute distress.  Resp: normal respiratory effort  Skin: no rashes or wounds; normal turgor  Psych: mood and affect appropriate; recent and remote memory intact         Ortho Exam    Bilateral shoulders-active forward flexion 95°, external rotation 20°, internal rotation to her side.  4 minus out of 5 strength in all planes of motion.  Moderate crepitus along anterior posterior joint line bilaterally, right greater than left.  Moderate tenderness over acromioclavicular joint bilaterally.  Brisk cap refill all digits, 2+ radial pulse bilateral wrist.    Imaging:    Assessment:       1. Primary osteoarthritis, right shoulder    2. Primary osteoarthritis, left shoulder          Plan:          Discussed treatment options at length with patient at today's visit. Reviewed options at length with patient, she does not wear proceed with office injections today she has had these previously with no improvement.  We did discuss option for ultrasound-guided injection and she was interested in this.  We also reviewed option for total shoulder arthroplasty given her current limitations with weightbearing from her leg fracture I advised to hold off on this option and she is not interested in surgery at this point time anyway.    Mar Camilo was in agreement with plan and had all questions answered.     Orders:  Orders Placed This Encounter   Procedures   • Ambulatory Referral to Sports Medicine       Medications:  No orders of the defined types were placed in this encounter.      Followup:  Return in about 3 months (around 9/28/2018).    Mar was seen today for follow-up, pain, follow-up and pain.    Diagnoses and all orders for this  visit:    Primary osteoarthritis, right shoulder  -     Ambulatory Referral to Sports Medicine    Primary osteoarthritis, left shoulder  -     Ambulatory Referral to Sports Medicine          Dictated utilizing Dragon dictation

## 2018-07-02 ENCOUNTER — HOSPITAL ENCOUNTER (OUTPATIENT)
Dept: INFUSION THERAPY | Facility: HOSPITAL | Age: 68
Discharge: HOME | End: 2018-07-02
Attending: INTERNAL MEDICINE | Admitting: INTERNAL MEDICINE

## 2018-07-02 VITALS
DIASTOLIC BLOOD PRESSURE: 63 MMHG | HEART RATE: 96 BPM | HEIGHT: 64 IN | SYSTOLIC BLOOD PRESSURE: 135 MMHG | RESPIRATION RATE: 16 BRPM | TEMPERATURE: 98.1 F | BODY MASS INDEX: 39.95 KG/M2 | OXYGEN SATURATION: 96 % | WEIGHT: 234 LBS

## 2018-07-02 DIAGNOSIS — K50.119 CROHN'S DISEASE OF COLON WITH COMPLICATION (HCC): ICD-10-CM

## 2018-07-02 DIAGNOSIS — K50.10 CROHN'S DISEASE OF LARGE INTESTINE WITHOUT COMPLICATION (HCC): Primary | ICD-10-CM

## 2018-07-02 PROCEDURE — A9270 NON-COVERED ITEM OR SERVICE: HCPCS

## 2018-07-02 PROCEDURE — 63710000001 ACETAMINOPHEN 325 MG TABLET

## 2018-07-02 PROCEDURE — 96365 THER/PROPH/DIAG IV INF INIT: CPT

## 2018-07-02 PROCEDURE — 96415 CHEMO IV INFUSION ADDL HR: CPT

## 2018-07-02 PROCEDURE — 63710000001 DIPHENHYDRAMINE PER 50 MG

## 2018-07-02 PROCEDURE — 25010000002 INFLIXIMAB PER 10 MG: Performed by: INTERNAL MEDICINE

## 2018-07-02 PROCEDURE — 96413 CHEMO IV INFUSION 1 HR: CPT

## 2018-07-02 RX ORDER — DIPHENHYDRAMINE HCL 25 MG
25 CAPSULE ORAL ONCE
Status: CANCELLED | OUTPATIENT
Start: 2018-07-02

## 2018-07-02 RX ORDER — ACETAMINOPHEN 325 MG/1
TABLET ORAL
Status: COMPLETED
Start: 2018-07-02 | End: 2018-07-02

## 2018-07-02 RX ORDER — ACETAMINOPHEN 325 MG/1
650 TABLET ORAL ONCE
Status: COMPLETED | OUTPATIENT
Start: 2018-07-02 | End: 2018-07-02

## 2018-07-02 RX ORDER — DIPHENHYDRAMINE HCL 25 MG
25 CAPSULE ORAL ONCE
Status: COMPLETED | OUTPATIENT
Start: 2018-07-02 | End: 2018-07-02

## 2018-07-02 RX ORDER — ACETAMINOPHEN 325 MG/1
650 TABLET ORAL ONCE
Status: CANCELLED | OUTPATIENT
Start: 2018-07-02

## 2018-07-02 RX ORDER — DIPHENHYDRAMINE HCL 25 MG
CAPSULE ORAL
Status: COMPLETED
Start: 2018-07-02 | End: 2018-07-02

## 2018-07-02 RX ORDER — SODIUM CHLORIDE 9 MG/ML
250 INJECTION, SOLUTION INTRAVENOUS ONCE
Status: CANCELLED | OUTPATIENT
Start: 2018-07-02

## 2018-07-02 RX ADMIN — DIPHENHYDRAMINE HYDROCHLORIDE 25 MG: 25 CAPSULE ORAL at 11:36

## 2018-07-02 RX ADMIN — ACETAMINOPHEN 650 MG: 325 TABLET, FILM COATED ORAL at 11:34

## 2018-07-02 RX ADMIN — ACETAMINOPHEN 650 MG: 325 TABLET ORAL at 11:34

## 2018-07-02 RX ADMIN — INFLIXIMAB 530 MG: 100 INJECTION, POWDER, LYOPHILIZED, FOR SOLUTION INTRAVENOUS at 12:00

## 2018-07-02 RX ADMIN — Medication 25 MG: at 11:36

## 2018-07-02 NOTE — NURSING NOTE
1120 NURSING PROGRESS NOTE      1120 Pt to ACC per w/c with portable o2 @ 3 liters .Pt ID verified by (2) identifiers. Allergies, medications and medical history reviewed and verified. Pre- meds given for infusion. Condition Satisfactory .  Carmen Montanez RN

## 2018-07-02 NOTE — PATIENT INSTRUCTIONS
"  Call Dr. Roberto Carlos Umaña, Gastroenterology at [phone] if you have any problems or concerns.    We know you have a Choice in healthcare and appreciate you using [facility].  Our purpose is to provide you \"Excellent Care\".  We hope that you will always choose us in the future and continue to recommend us to your family and friends.            Infliximab infusion  What is this medicine?  INFLIXIMAB (in FLIX i mab) is used to treat Crohn's disease and ulcerative colitis. It is also used to treat ankylosing spondylitis, plaque psoriasis, and some forms of arthritis.  This medicine may be used for other purposes; ask your health care provider or pharmacist if you have questions.  COMMON BRAND NAME(S): INFLECTRA, Remicade, RENFLEXIS  What should I tell my health care provider before I take this medicine?  They need to know if you have any of these conditions:  -cancer  -current or past resident of [place]  -diabetes  -exposure to tuberculosis  -Guillain-[place] syndrome  -heart failure  -hepatitis or liver disease  -immune system problems  -infection  -lung or breathing disease, like COPD  -multiple sclerosis  -receiving phototherapy for the skin  -seizure disorder  -an unusual or allergic reaction to infliximab, mouse proteins, other medicines, foods, dyes, or preservatives  -pregnant or trying to get pregnant  -breast-feeding  How should I use this medicine?  This medicine is for injection into a vein. It is usually given by a health care professional in a hospital or clinic setting.  A special MedGuide will be given to you by the pharmacist with each prescription and refill. Be sure to read this information carefully each time.  Talk to your pediatrician regarding the use of this medicine in children. While this drug may be prescribed for children as young as 6 years of age for selected conditions, precautions do apply.  Overdosage: If you think you have taken too much of " this medicine contact a poison control center or emergency room at once.  NOTE: This medicine is only for you. Do not share this medicine with others.  What if I miss a dose?  It is important not to miss your dose. Call your doctor or health care professional if you are unable to keep an appointment.  What may interact with this medicine?  Do not take this medicine with any of the following medications:  -biologic medicines such as abatacept, adalimumab, anakinra, certolizumab, etanercept, golimumab, rituximab, secukinumab, tocilizumab, tofactinib, ustekinumab  -live vaccines  This list may not describe all possible interactions. Give your health care provider a list of all the medicines, herbs, non-prescription drugs, or dietary supplements you use. Also tell them if you smoke, drink alcohol, or use illegal drugs. Some items may interact with your medicine.  What should I watch for while using this medicine?  Your condition will be monitored carefully while you are receiving this medicine. Visit your doctor or health care professional for regular checks on your progress. You may need blood work done while you are taking this medicine. Before beginning therapy, your doctor may do a test to see if you have been exposed to tuberculosis.  Call your doctor or health care professional for advice if you get a fever, chills or sore throat, or other symptoms of a cold or flu. Do not treat yourself. This drug decreases your body's ability to fight infections. Try to avoid being around people who are sick.  This medicine may make the symptoms of heart failure worse in some patients. If you notice symptoms such as increased shortness of breath or swelling of the ankles or legs, contact your health care provider right away.  If you are going to have surgery or dental work, tell your health care professional or dentist that you have received this medicine.  If you take this medicine for plaque psoriasis, stay out of the sun. If  you cannot avoid being in the sun, wear protective clothing and use sunscreen. Do not use sun lamps or tanning beds/booths.  Talk to your doctor about your risk of cancer. You may be more at risk for certain types of cancers if you take this medicine.  What side effects may I notice from receiving this medicine?  Side effects that you should report to your doctor or health care professional as soon as possible:  -allergic reactions like skin rash, itching or hives, swelling of the face, lips, or tongue  -breathing problems  -changes in vision  -chest pain  -fever or chills, usually related to the infusion  -joint pain  -pain, tingling, numbness in the hands or feet  -redness, blistering, peeling or loosening of the skin, including inside the mouth  -seizures  -signs of infection - fever or chills, cough, sore throat, flu-like symptoms, pain or difficulty passing urine  -signs and symptoms of liver injury like dark yellow or brown urine; general ill feeling; light-colored stools; loss of appetite; nausea; right upper belly pain; unusually weak or tired; yellowing of the eyes or skin  -signs and symptoms of a stroke like changes in vision; confusion; trouble speaking or understanding; severe headaches; sudden numbness or weakness of the face, arm or leg; trouble walking; dizziness; loss of balance or coordination  -swelling of the ankles, feet, or hands  -swollen lymph nodes in the neck, underarm, or groin areas  -unusual bleeding or bruising  -unusually weak or tired  Side effects that usually do not require medical attention (report to your doctor or health care professional if they continue or are bothersome):  -headache  -nausea  -stomach pain  -upset stomach  This list may not describe all possible side effects. Call your doctor for medical advice about side effects. You may report side effects to FDA at 0-240-FDA-8946.  Where should I keep my medicine?  This drug is given in a hospital or clinic and will not be  stored at home.  NOTE: This sheet is a summary. It may not cover all possible information. If you have questions about this medicine, talk to your doctor, pharmacist, or health care provider.  © 2018 Elsevier/Gold Standard (2018-01-17 13:45:32)

## 2018-07-02 NOTE — NURSING NOTE
NURSING PROGRESS NOTE       Infusion completed. Tolerated prescribed treatment without incident. Discharged to home per w/c with 02 @ 3 liters. Home with sister. AVS reviewed with patient.Copy given.  Condition Satisfactory. Carmen Montanez RN

## 2018-07-09 ENCOUNTER — OFFICE VISIT (OUTPATIENT)
Dept: SPORTS MEDICINE | Facility: CLINIC | Age: 68
End: 2018-07-09

## 2018-07-09 VITALS — SYSTOLIC BLOOD PRESSURE: 138 MMHG | DIASTOLIC BLOOD PRESSURE: 54 MMHG | HEIGHT: 64 IN

## 2018-07-09 DIAGNOSIS — M19.012 PRIMARY OSTEOARTHRITIS OF BOTH SHOULDERS: Primary | ICD-10-CM

## 2018-07-09 DIAGNOSIS — M25.512 CHRONIC PAIN OF BOTH SHOULDERS: ICD-10-CM

## 2018-07-09 DIAGNOSIS — M19.011 OSTEOARTHRITIS OF BILATERAL GLENOHUMERAL JOINTS: Primary | ICD-10-CM

## 2018-07-09 DIAGNOSIS — G89.29 CHRONIC PAIN OF BOTH SHOULDERS: ICD-10-CM

## 2018-07-09 DIAGNOSIS — M19.011 PRIMARY OSTEOARTHRITIS OF BOTH SHOULDERS: Primary | ICD-10-CM

## 2018-07-09 DIAGNOSIS — M67.90 TENDINOPATHY: ICD-10-CM

## 2018-07-09 DIAGNOSIS — M19.012 OSTEOARTHRITIS OF BILATERAL GLENOHUMERAL JOINTS: Primary | ICD-10-CM

## 2018-07-09 DIAGNOSIS — M25.511 CHRONIC PAIN OF BOTH SHOULDERS: ICD-10-CM

## 2018-07-09 PROCEDURE — 20611 DRAIN/INJ JOINT/BURSA W/US: CPT | Performed by: FAMILY MEDICINE

## 2018-07-09 PROCEDURE — 99204 OFFICE O/P NEW MOD 45 MIN: CPT | Performed by: FAMILY MEDICINE

## 2018-07-09 RX ORDER — TRIAMCINOLONE ACETONIDE 40 MG/ML
80 INJECTION, SUSPENSION INTRA-ARTICULAR; INTRAMUSCULAR ONCE
Status: DISCONTINUED | OUTPATIENT
Start: 2018-07-09 | End: 2018-07-30

## 2018-07-09 NOTE — PROGRESS NOTES
"Mar is a 68 y.o. year old female    Chief Complaint   Patient presents with   • Right Shoulder - Pain   • Left Shoulder - Pain     Referred here by Dr. Reyes.    History of Present Illness  HPI     B/l GH OA: NKT. R>L. Has had several CSI in subacromial bursa performed. MR R shoulder performed and reviewed independently. Has pain, decr ROM and weakness in both shoulders. Currently in subacute rehab for her leg and ambulating w/walker. Here to discuss GH inj.    I have reviewed the patient's medical, family, and social history in detail and updated the computerized patient record.    Review of Systems   Constitutional: Negative for fever.   Musculoskeletal:        Per HPI   Skin: Negative for rash.   Neurological: Negative for weakness and numbness.   Psychiatric/Behavioral: Negative for sleep disturbance.   All other systems reviewed and are negative.      /54 (BP Location: Right arm, Patient Position: Sitting, Cuff Size: Large Adult)   Ht 162.6 cm (64\")   LMP  (LMP Unknown)      Physical Exam    Vital signs reviewed.   General: No acute distress.  Eyes: conjunctiva clear; pupils equally round and reactive  ENT: external ears and nose atraumatic; oropharynx clear  CV: no peripheral edema, 2+ distal pulses  Resp: normal respiratory effort, no use of accessory muscles  Skin: no rashes or wounds; normal turgor  Psych: mood and affect appropriate; recent and remote memory intact  Neuro: sensation to light touch intact    MSK Exam:  Ortho Exam    B/l shoulders: no gross abnmlty; full ROM; neg drop arm    Ultrasound-Guided Shoulder Injection Procedure Note    Bilateral shoulder injection was discussed with the patient in detail, including indication, risks, benefits, and alternatives. Verbal consent was given for the procedure. Injection was performed by physician.  Injection site was identified by ultrasound examination, then cleaned with Betadine and alcohol swabs. Prior to needle insertion, ethyl chloride " "spray was used for surface anesthesia. Sterile technique was used. Ultrasound guidance was indicated for injection accuracy.  A 22-gauge, 3.5\" spinal needle was guided to the glenohumeral joint under continuous direct ultrasound visualization. Injectate was seen filing the space and passed without difficulty. The needle was removed and a simple bandage was applied. The procedure was tolerated well without difficulty.    Injection mixture:  1% lidocaine without epinephrine: 2 mL  0.5% bupivacaine without epinephrine: 2 mL  40 mg/mL triamcinolone acetonide: 2 mL  Sterile saline 4 mL      Diagnoses and all orders for this visit:    Osteoarthritis of bilateral glenohumeral joints    Chronic pain of both shoulders      Tolerated inj well as documented above. Postprocedural inst given. Discussed appropriate interval for f/up inj if needed.    EMR Dragon/Transcription disclaimer:    Much of this encounter note is an electronic transcription/translation of spoken language to printed text.  The electronic translation of spoken language may permit erroneous, or at times, nonsensical words or phrases to be inadvertently transcribed.  Although I have reviewed the note for such errors some may still exist.    "

## 2018-07-30 ENCOUNTER — OFFICE VISIT (OUTPATIENT)
Dept: FAMILY MEDICINE CLINIC | Facility: CLINIC | Age: 68
End: 2018-07-30

## 2018-07-30 VITALS
WEIGHT: 217 LBS | SYSTOLIC BLOOD PRESSURE: 138 MMHG | DIASTOLIC BLOOD PRESSURE: 68 MMHG | HEART RATE: 92 BPM | HEIGHT: 64 IN | OXYGEN SATURATION: 95 % | BODY MASS INDEX: 37.05 KG/M2 | TEMPERATURE: 98.7 F | RESPIRATION RATE: 16 BRPM

## 2018-07-30 DIAGNOSIS — K43.9 VENTRAL HERNIA WITHOUT OBSTRUCTION OR GANGRENE: ICD-10-CM

## 2018-07-30 DIAGNOSIS — R10.9 LEFT SIDED ABDOMINAL PAIN: ICD-10-CM

## 2018-07-30 DIAGNOSIS — S82.142D CLOSED FRACTURE OF LEFT TIBIAL PLATEAU WITH ROUTINE HEALING, SUBSEQUENT ENCOUNTER: Primary | ICD-10-CM

## 2018-07-30 PROBLEM — S82.142A CLOSED FRACTURE OF LEFT TIBIAL PLATEAU: Status: ACTIVE | Noted: 2018-07-30

## 2018-07-30 PROBLEM — S82.209A TIBIAL FRACTURE: Status: ACTIVE | Noted: 2018-05-07

## 2018-07-30 PROCEDURE — 99213 OFFICE O/P EST LOW 20 MIN: CPT | Performed by: PHYSICIAN ASSISTANT

## 2018-07-30 NOTE — PROGRESS NOTES
"Subjective   Mar Camilo is a 68 y.o. female.   Chief Complaint   Patient presents with   • Leg Injury       History of Present Illness     Krys is a 68-year-old female who presents for follow-up from [facility] and [facility] [place] rehabilitation.  She was admitted to [facility] on May 7, 2018 and discharged on May 11, 2018.  She had slipped and fell down steps and fractured her left area.  She had surgery.  She was released home but started having symptoms 2 days later.  She was readmitted to the hospital and discharged 48 hours later and to rehabilitation at [facility].  Mar has lost 21 pounds since April 24, 2018.  States her diet has been healthy.  Appetite has been slightly decreased since her surgery.  Sleep has been normal for her.  States her leg is healing nicely.  She has a follow-up appointment with orthopedist, Dr. Cárdenas, on August 20, 2018.  9 any leg pain or redness.  She is finished with physical therapy.  Mar has had ongoing abdominal discomfort on left side for the past year.  She saw Dr. Roberto Carlos Umaña, GI specialist, in September 2017.  She had an abdominal CT scan that showed A ventral abdominal hernia containing non obstructed small bowel loops and containing omental fat.  The hernia sac measured nearly 11.6 x 5.0 cm.  Since states the area has enlarged since her CAT scan.  Her bowel movements have been normal.  She has an occasional abdominal pain that feels like \"my belly is moving\".    The following portions of the patient's history were reviewed and updated as appropriate: allergies, current medications, past family history, past medical history, past social history and past surgical history.    Review of Systems   Constitutional: Negative.    HENT: Negative.    Eyes: Negative.    Respiratory: Negative.    Cardiovascular: Negative.    Gastrointestinal: Positive for abdominal distention.   Endocrine: Negative.    Genitourinary: Negative.  " "  Musculoskeletal: Negative.    Skin: Negative.    Allergic/Immunologic: Negative.    Neurological: Negative.    Hematological: Negative.    Psychiatric/Behavioral: Negative.    All other systems reviewed and are negative.      Social History   Substance Use Topics   • Smoking status: Former Smoker     Packs/day: 2.00     Years: 40.00     Types: Cigarettes     Quit date: 04/2014   • Smokeless tobacco: Never Used   • Alcohol use No      Comment: history of heavy drinker      Vitals:    07/30/18 1513   BP: 138/68   BP Location: Right arm   Patient Position: Sitting   Cuff Size: Adult   Pulse: 92   Resp: 16   Temp: 98.7 °F (37.1 °C)   TempSrc: Oral   SpO2: 95%   Weight: 98.4 kg (217 lb)   Height: 162.6 cm (64\")       Wt Readings from Last 3 Encounters:   07/30/18 98.4 kg (217 lb)   07/02/18 106 kg (234 lb)   04/24/18 108 kg (239 lb)       BP Readings from Last 3 Encounters:   07/30/18 138/68   07/09/18 138/54   07/02/18 135/63     Body mass index is 37.25 kg/m².  Allergies   Allergen Reactions   • Contrast Dye Hives   • Norco [Hydrocodone-Acetaminophen] Hallucinations   • Tenoretic [Atenolol-Chlorthalidone] Anaphylaxis   • Iodinated Diagnostic Agents        Objective   Physical Exam   Constitutional: She is oriented to person, place, and time. Vital signs are normal. She appears well-developed and well-nourished.   Sitting in chair with 3 L of oxygen using nasal cannula   Neck: Trachea normal, normal range of motion and phonation normal. Neck supple.   Cardiovascular: Normal rate, regular rhythm, S1 normal, S2 normal, normal heart sounds and normal pulses.    Pulmonary/Chest: Effort normal and breath sounds normal.   Abdominal: Soft. Normal appearance and bowel sounds are normal. She exhibits mass. There is no hepatomegaly. There is tenderness in the left upper quadrant.       Musculoskeletal:        Left lower leg: She exhibits no tenderness, no bony tenderness and no swelling.        Legs:  Neurological: She is alert " and oriented to person, place, and time.   Skin: Skin is warm, dry and intact. Capillary refill takes less than 2 seconds.   Psychiatric: She has a normal mood and affect. Her speech is normal and behavior is normal. Judgment and thought content normal. Cognition and memory are normal.       Assessment/Plan   Mar was seen today for leg injury.    Diagnoses and all orders for this visit:    Closed fracture of left tibial plateau with routine healing, subsequent encounter    Ventral hernia without obstruction or gangrene  -     CT Abdomen Pelvis Without Contrast  -     Ambulatory Referral to General Surgery    Left sided abdominal pain  -     CT Abdomen Pelvis Without Contrast  -     Ambulatory Referral to General Surgery      1.  Improving close fracture of left tibia: I have reviewed Mar's hospital discharge summary with her at office visit today.  Her leg is healing nicely.  She will keep her follow-up appointments with her orthopedist, Dr. Cárdenas, at [facility].  2.  Chronic and worsening left-sided abdominal pain/ventral hernia: I have reviewed her CAT scan from September 13, 2017 with her at office visit today.  The area has enlarged from last September.  We'll schedule CT of abdomen and pelvis for further evaluation.  I will schedule a referral to Dr. Haji, general surgeon, as well.

## 2018-07-31 NOTE — PROGRESS NOTES
I have reviewed the notes, assessments, and/or procedures performed by Vianey MARTINEZ, I concur with her/his documentation of Mar Camilo.

## 2018-08-13 ENCOUNTER — HOSPITAL ENCOUNTER (OUTPATIENT)
Dept: CT IMAGING | Facility: HOSPITAL | Age: 68
Discharge: HOME | End: 2018-08-13
Admitting: PHYSICIAN ASSISTANT

## 2018-08-13 DIAGNOSIS — J44.1 CHRONIC OBSTRUCTIVE PULMONARY DISEASE WITH ACUTE EXACERBATION (HCC): ICD-10-CM

## 2018-08-13 PROCEDURE — 74176 CT ABD & PELVIS W/O CONTRAST: CPT

## 2018-08-13 RX ORDER — TIOTROPIUM BROMIDE 18 UG/1
CAPSULE ORAL; RESPIRATORY (INHALATION)
Qty: 30 CAPSULE | Refills: 6 | Status: SHIPPED | OUTPATIENT
Start: 2018-08-13 | End: 2019-07-05 | Stop reason: SDUPTHER

## 2018-08-20 ENCOUNTER — OFFICE VISIT (OUTPATIENT)
Dept: SURGERY | Facility: CLINIC | Age: 68
End: 2018-08-20

## 2018-08-20 VITALS
SYSTOLIC BLOOD PRESSURE: 184 MMHG | TEMPERATURE: 97.6 F | OXYGEN SATURATION: 96 % | BODY MASS INDEX: 38.68 KG/M2 | HEIGHT: 64 IN | DIASTOLIC BLOOD PRESSURE: 66 MMHG | WEIGHT: 226.6 LBS | HEART RATE: 102 BPM

## 2018-08-20 DIAGNOSIS — K43.2 INCISIONAL HERNIA, WITHOUT OBSTRUCTION OR GANGRENE: Primary | ICD-10-CM

## 2018-08-20 DIAGNOSIS — R79.1 ABNORMAL COAGULATION PROFILE: ICD-10-CM

## 2018-08-20 DIAGNOSIS — K43.9 VENTRAL HERNIA WITHOUT OBSTRUCTION OR GANGRENE: ICD-10-CM

## 2018-08-20 PROCEDURE — 99203 OFFICE O/P NEW LOW 30 MIN: CPT | Performed by: SURGERY

## 2018-08-20 NOTE — PROGRESS NOTES
PATIENT INFORMATION  Mar Camilo   - 1950    CHIEF COMPLAINT  Chief Complaint   Patient presents with   • Hernia     abd hernia - ct scan done   Referral from Vianey MARTINEZ    HISTORY OF PRESENT ILLNESS  HPI  Patient is a very pleasant 68-year-old female with multiple medical problems who has had a long-standing incisional abdominal wall hernia.  She has a history of Crohn's disease status post laparoscopic assisted transverse colectomy with primary anastomosis in  by Dr. Hetal Richardson at [facility] she is currently on Remicade and is in remission.  She follows with Dr. Umaña as far as Crohn's disease is concerned.  She had a CT scan in September for left lower quadrant abdominal pain as ordered by Dr. Umaña that showed a left sided/left lower quadrant abdominal wall hernia.  She reports at that time it was discussed and felt that the hernia repair should be deferred.  She reports that over the last few months she has noticed an increase in size of the bulge.  It is more prominent every time she coughs.  She's had multiple episodes of pneumonia in the recent one year and has had recently also been admitted for pneumonia earlier on in the year and underwent further pulmonary workup including bronchoscopy here at [facility].  She has had a history of paroxysmal atrial fibrillation was seen by cardiology.  Is currently not on any anti-thrombotic/anticoagulant.  She is on verapamil.  She has COPD and is dependent on oxygen, uses 3 L 24/ 7.  She is established with Dr. Hernadez.  She also has multiple other medical problems including carotid stenosis, has had carotid endarterectomy by Dr. Umang Griffin, paroxysmal atrial fibrillation, diabetes, osteoarthritis, multiple kidney stones and recent tibial fracture which she sees orthopedics at [facility].  As far as the hernia is concerned she reports that it is definitely increased in size and is the cause of discomfort it  is still reducible but clearly there has been some loss of abdominal wall domain.  She denies any nausea, vomiting, alternating bowel movements, melena, hematochezia.    Her old operative notes, imaging studies including multiple CT scans were reviewed personally and with the patient and her sister.  She is a former smoker, quit at least 10 years ago    REVIEW OF SYSTEMS  Review of Systems   Constitutional: Negative.    Eyes: Negative.    Respiratory: Positive for wheezing.    Cardiovascular: Negative.    Gastrointestinal:        Soft, moderately obese, nondistended, nontender, large reducible incisional hernia slightly to the left of the midline. She has a large vertical midline scar.   Genitourinary: Negative.    Musculoskeletal: Negative.    Skin: Negative.          ACTIVE PROBLEMS  Patient Active Problem List    Diagnosis   • Closed fracture of left tibial plateau [S82.142A]   • Ventral hernia without obstruction or gangrene [K43.9]   • Left sided abdominal pain [R10.9]   • Crohn's disease of colon with complication (CMS/[facility]) [K50.119]   • Tibial fracture [S82.209A]   • Need for hepatitis C screening test [Z11.59]   • Need for pneumococcal vaccine [Z23]   • Acute pain of both shoulders [M25.511, M25.512]   • PAF (paroxysmal atrial fibrillation) (CMS/[facility]) [I48.0]   • Pneumonia of right lower lobe due to infectious organism (CMS/[facility]) [J18.1]   • Primary osteoarthritis, right shoulder [M19.011]   • Tendinopathy of rotator cuff [M67.919]   • Primary osteoarthritis, left shoulder [M19.012]   • Subacromial bursitis, left [M75.50]   • History of stroke [Z86.73]   • Leukocytosis [D72.829]   • Seizure (CMS/[facility]) [R56.9]   • Right ureteral stone [N20.1]   • Bursitis, subacromial, right [M75.50]   • Chronic right hip pain [M25.551, G89.29]   • Chronic pain of both shoulders [M25.511, G89.29, M25.512]   • Fall [W19.XXXA]   • Need for immunization against influenza [Z23]   • Well controlled type 2 diabetes mellitus (CMS/[facility])  [E11.9]   • Cellulitis of neck [L03.221]   • Carotid stenosis [I65.29]   • Hospital discharge follow-up [Z09]   • Chronic respiratory failure with hypoxia (CMS/[facility]) [J96.11]   • Asymptomatic stenosis of right carotid artery [I65.21]   • Carotid stenosis, asymptomatic [I65.29]   • Anemia [D64.9]   • Hypopotassemia [E87.6]   • Obstructive pyelonephritis [N11.1]   • Chronic allergic conjunctivitis [H10.45]   • Chronic back pain [M54.9, G89.29]   • Chronic bronchitis (CMS/[facility]) [J42]   • Non-specific colitis [K52.9]   • Chronic obstructive pulmonary disease with acute exacerbation (CMS/[facility]) [J44.1]   • Degeneration of intervertebral disc of lumbar region [M51.36]   • Depression [F32.9]   • Fatigue [R53.83]   • Heartburn [R12]   • Herpes labialis [B00.1]   • Hyperglycemia [R73.9]   • Mixed hyperlipidemia [E78.2]   • Essential hypertension [I10]   • Hypocalcemia [E83.51]   • Hypokalemia [E87.6]   • Sprain of back [CAU7230]   • Spinal stenosis of lumbar region [M48.061]   • Morbid obesity (CMS/[facility]) [E66.01]   • Osteopenia [M85.80]   • Lumbar radiculopathy [M54.16]   • Shortness of breath [R06.02]   • Type 2 diabetes mellitus with complication, without long-term current use of insulin (CMS/[facility]) [E11.8]   • Increased frequency of urination [R35.0]   • Low back pain [M54.5]   • Osteoarthritis of lumbar spine [M47.816]         PAST MEDICAL HISTORY  Past Medical History:   Diagnosis Date   • Artery stenosis (CMS/[facility])     left, states no bp/hr in Left arm d/t inaccuracy   • Back pain    • CAD (coronary artery disease)    • COPD (chronic obstructive pulmonary disease) (CMS/[facility])    • Crohn's disease (CMS/[facility])     Remicade on hold d/t antibiotics   • DDD (degenerative disc disease)    • Depression    • Diabetes mellitus (CMS/[facility])     states not diabetic, sugars only high when sick   • Hyperlipidemia    • Hypertension    • Hypokalemia    • Kidney stone     sched scope, lithotripsy   • Morbid obesity (CMS/[facility])    • Obstructive  pyelonephritis    • On home oxygen therapy     2L UNLESS STRESSED THEN 2.5-3 L   • PVD (peripheral vascular disease) (CMS/H[facility])     RT CAROTID STENOSIS   • Radiculopathy     NECK PAIN         SURGICAL HISTORY  Past Surgical History:   Procedure Laterality Date   • APPENDECTOMY     • BRONCHOSCOPY N/A 1/5/2018    Procedure: BRONCHOSCOPY;  Surgeon: Gustavo Muniz MD;  Location: [facility];  Service:    • COLON RESECTION     • COLONOSCOPY      x2   • CYSTOSCOPY URETEROSCOPY LASER LITHOTRIPSY Right 4/17/2017    Procedure: CYSTOSCOPY with  right stent removal, right URETEROSCOPY LASER LITHOTRIPSY,  with STONE BASKET EXTRACTION;  Surgeon: Dipesh Bean MD;  Location: [facility] OR;  Service:    • CYSTOSCOPY W/ URETERAL STENT PLACEMENT Right 3/18/2017    Procedure: CYSTOSCOPY URETERAL CATHETER/STENT INSERTION;  Surgeon: Dipesh Bean MD;  Location: [facility];  Service:    • LUNG BIOPSY      NEGATIVE   • MS THROMBOENDARTECTMY NECK,NECK INCIS Right 3/14/2016    Procedure: RT CAROTID ENDARTERECTOMY;  Surgeon: Federico Schuler MD;  Location: [facility] OR;  Service: Vascular   • WRIST ARTHROSCOPY Right     WITH RELEASE OF TRANSVERSE CARPAL LIGAMENT         FAMILY HISTORY  Family History   Problem Relation Age of Onset   • Diabetes Mother    • Stroke Mother    • Other Father         RESPIRATORY FAILURE   • Cancer Other         lung cancer   • Crohn's disease Brother    • Crohn's disease Maternal Aunt          SOCIAL HISTORY  Social History     Occupational History   • Not on file.     Social History Main Topics   • Smoking status: Former Smoker     Packs/day: 2.00     Years: 40.00     Types: Cigarettes     Quit date: 04/2014   • Smokeless tobacco: Never Used   • Alcohol use No      Comment: history of heavy drinker    • Drug use: No   • Sexual activity: Defer         CURRENT MEDICATIONS    Current Outpatient Prescriptions:   •  acetaminophen (TYLENOL) 325 MG tablet, Take 650 mg by mouth 3 (Three) Times a Day.,  Disp: , Rfl:   •  albuterol (PROVENTIL HFA;VENTOLIN HFA) 108 (90 Base) MCG/ACT inhaler, Inhale 2 puffs Every 4 (Four) Hours As Needed for Wheezing or Shortness of Air., Disp: 18 g, Rfl: 6  •  Blood Glucose Monitoring Suppl (ONETOUCH VERIO) w/Device kit, 1 kit 2 (Two) Times a Day., Disp: 1 kit, Rfl: 0  •  FLUoxetine (PROzac) 20 MG capsule, TAKE ONE CAPSULE BY MOUTH ONCE DAILY, Disp: 90 capsule, Rfl: 3  •  glucose blood (ONETOUCH VERIO) test strip, Use as instructed as to check blood sugars 2-3 times a day for type 2 diabetes, Disp: 100 each, Rfl: 12  •  lamoTRIgine (LaMICtal) 150 MG tablet, , Disp: , Rfl:   •  Lancets (ONETOUCH ULTRASOFT) lancets, Used to check blood sugars twice a day as needed for type 2 diabetes monitoring, Disp: 100 each, Rfl: 12  •  lisinopril (PRINIVIL,ZESTRIL) 40 MG tablet, Take 1 tablet by mouth Daily., Disp: 90 tablet, Rfl: 1  •  metFORMIN (GLUCOPHAGE) 500 MG tablet, TAKE ONE TABLET BY MOUTH TWICE DAILY WITH FOOD, Disp: 60 tablet, Rfl: 3  •  Multiple Vitamins-Minerals (CENTRUM SILVER PO), Take 1 tablet by mouth every morning., Disp: , Rfl:   •  Multiple Vitamins-Minerals (VISION FORMULA/LUTEIN PO), Take 1 tablet by mouth Daily., Disp: , Rfl:   •  rosuvastatin (CRESTOR) 40 MG tablet, Take 1 tablet by mouth Daily., Disp: 30 tablet, Rfl: 3  •  SPIRIVA HANDIHALER 18 MCG per inhalation capsule, INHALE ONE DOSE BY MOUTH ONCE DAILY, Disp: 30 capsule, Rfl: 6  •  SYMBICORT 80-4.5 MCG/ACT inhaler, , Disp: , Rfl:   •  traMADol (ULTRAM) 50 MG tablet, Take  mg by mouth., Disp: , Rfl:   •  verapamil SR (CALAN-SR) 240 MG CR tablet, Take 1 tablet by mouth Every 12 (Twelve) Hours., Disp: 60 tablet, Rfl: 3    ALLERGIES  Contrast dye; Norco [hydrocodone-acetaminophen]; Tenoretic [atenolol-chlorthalidone]; and Iodinated diagnostic agents    VITALS  Vitals:    08/20/18 0914   BP: (!) 184/66   BP Location: Left arm   Patient Position: Sitting   Cuff Size: Adult   Pulse: 102   Temp: 97.6 °F (36.4 °C)  "  TempSrc: Oral   SpO2: 96%   Weight: 103 kg (226 lb 9.6 oz)   Height: 162.6 cm (64.02\")       LAST RESULTS   Orders Only on 02/12/2018   Component Date Value Ref Range Status   • WBC 02/28/2018 11.16* 4.80 - 10.80 10*3/mm3 Final   • RBC 02/28/2018 4.22  4.20 - 5.40 10*6/mm3 Final   • Hemoglobin 02/28/2018 11.7* 12.0 - 16.0 g/dL Final   • Hematocrit 02/28/2018 39.7  37.0 - 47.0 % Final   • MCV 02/28/2018 94.1  81.0 - 99.0 fL Final   • MCH 02/28/2018 27.7  27.0 - 31.0 pg Final   • MCHC 02/28/2018 29.5* 31.0 - 37.0 g/dL Final   • RDW 02/28/2018 16.4* 11.5 - 14.5 % Final   • Platelets 02/28/2018 410  140 - 500 10*3/mm3 Final   • Neutrophil Rel % 02/28/2018 65.0  45.0 - 70.0 % Final   • Lymphocyte Rel % 02/28/2018 21.5  20.0 - 45.0 % Final   • Monocyte Rel % 02/28/2018 10.2* 3.0 - 8.0 % Final   • Eosinophil Rel % 02/28/2018 1.9  0.0 - 4.0 % Final   • Basophil Rel % 02/28/2018 0.6  0.0 - 2.0 % Final   • Neutrophils Absolute 02/28/2018 7.25  1.50 - 8.30 10*3/mm3 Final   • Lymphocytes Absolute 02/28/2018 2.40  0.60 - 4.80 10*3/mm3 Final   • Monocytes Absolute 02/28/2018 1.14* 0.00 - 1.00 10*3/mm3 Final   • Eosinophils Absolute 02/28/2018 0.21  0.10 - 0.30 10*3/mm3 Final   • Basophils Absolute 02/28/2018 0.07  0.00 - 0.20 10*3/mm3 Final   • Immature Granulocyte Rel % 02/28/2018 0.8* 0.0 - 0.5 % Final   • Immature Grans Absolute 02/28/2018 0.09* 0.00 - 0.03 10*3/mm3 Final   • nRBC 02/28/2018 0.0  0.0 - 0.0 /100 WBC Final   • Glucose 02/28/2018 181* 65 - 99 mg/dL Final   • BUN 02/28/2018 17  8 - 23 mg/dL Final   • Creatinine 02/28/2018 0.83  0.57 - 1.00 mg/dL Final   • eGFR Non  Am 02/28/2018 68  >60 mL/min/1.73 Final   • eGFR African Am 02/28/2018 83  >60 mL/min/1.73 Final   • BUN/Creatinine Ratio 02/28/2018 20.5  7.0 - 25.0 Final   • Sodium 02/28/2018 141  136 - 145 mmol/L Final   • Potassium 02/28/2018 5.3* 3.5 - 5.2 mmol/L Final   • Chloride 02/28/2018 100  98 - 107 mmol/L Final   • Total CO2 02/28/2018 27.7  22.0 " - 29.0 mmol/L Final   • Calcium 02/28/2018 9.8  8.8 - 10.5 mg/dL Final   • Total Protein 02/28/2018 6.8  6.0 - 8.5 g/dL Final   • Albumin 02/28/2018 3.90  3.50 - 5.20 g/dL Final   • Globulin 02/28/2018 2.9  gm/dL Final   • A/G Ratio 02/28/2018 1.3  g/dL Final   • Total Bilirubin 02/28/2018 0.2  0.2 - 1.2 mg/dL Final   • Alkaline Phosphatase 02/28/2018 93  40 - 129 U/L Final   • AST (SGOT) 02/28/2018 21  5 - 32 U/L Final   • ALT (SGPT) 02/28/2018 19  5 - 33 U/L Final   • Total Cholesterol 02/28/2018 114  0 - 200 mg/dL Final   • Triglycerides 02/28/2018 208* 0 - 150 mg/dL Final   • HDL Cholesterol 02/28/2018 43  40 - 60 mg/dL Final   • VLDL Cholesterol 02/28/2018 41.6* 7 - 27 mg/dL Final   • LDL Cholesterol  02/28/2018 29  0 - 100 mg/dL Final   • LDL/HDL Ratio 02/28/2018 0.68   Final   • Hemoglobin A1C 02/28/2018 6.70* 4.80 - 5.60 % Final   • Hep C Virus Ab 02/28/2018 <0.1  0.0 - 0.9 s/co ratio Final    Comment:                                   Negative:     < 0.8                               Indeterminate: 0.8 - 0.9                                    Positive:     > 0.9   The CDC recommends that a positive HCV antibody result   be followed up with a HCV Nucleic Acid Amplification   test ([id number]).       Ct Abdomen Pelvis Without Contrast    Result Date: 8/13/2018  Narrative: CT ABDOMEN AND PELVIS, NONCONTRAST, 8/13/2018  HISTORY: 68-year-old female with a large left lower quadrant abdominal wall hernia. Associated pain has increased since December 2017. She reports prior history of bowel resection surgeries due to Crohn's disease.  TECHNIQUE: CT examination of the abdomen and pelvis without oral or IV contrast. Radiation dose reduction techniques included automated exposure control or exposure modulation based on body size. Radiation audit for CT and nuclear cardiology exams in the last 12 months: 3.  COMPARISON: *  CT abdomen/pelvis, 9/13/2017.  ABDOMEN FINDINGS: There is a very large left lower quadrant abdominal  wall hernia that has increased in size since the prior exam. The hernia orifice has increased from 4.9 cm to 8.5 cm. The hernia sac has increased from 11.6 cm to 19.2 cm. The hernia is filled with multiple segments of unobstructed small bowel and colon in a large quantity of mesenteric fat. There is no evidence of bowel obstruction. No evidence of active inflammatory disease involving small bowel or colon within the abdomen, pelvis or hernia sac.  Liver, pancreas, spleen and kidneys are within normal limits. Densely calcified abdominal aorta is normal in caliber. The appendix is surgically absent by history.  PELVIS FINDINGS: The uterus is small. Adnexal regions, urinary bladder and rectum are unremarkable. No inguinal hernia.  Limited lower chest images show evidence of pulmonary emphysema along with chronic fibrosis and pulmonary scarring at the left lung base.      Impression: 1. Very large left lower quadrant abdominal wall hernia has increased significantly in size since 9/13/2017 as detailed above. 2. Previous bowel resection surgeries. No evidence of active inflammatory bowel disease.  This report was finalized on 8/13/2018 6:23 PM by Dr. Sarmad Mishra MD.        PHYSICAL EXAM  Physical Exam   Constitutional: She is oriented to person, place, and time. She appears well-developed and well-nourished.   HENT:   Head: Normocephalic and atraumatic.   Eyes: No scleral icterus.   Neck: Normal range of motion. Neck supple.   Cardiovascular: Normal rate, regular rhythm and normal heart sounds.    Pulmonary/Chest: Breath sounds normal.   Audible expiratory wheeze.   Abdominal:   Soft, moderately obese, nondistended, nontender, large reducible incisional/ventral hernia that is coming off of the upper aspect of her midline incision it is slightly to the left of the midline.  Large midline scar.   Musculoskeletal: She exhibits no edema.   Neurological: She is alert and oriented to person, place, and time.   Skin: Skin  is warm and dry.   Psychiatric: She has a normal mood and affect. Her behavior is normal.   Nursing note and vitals reviewed.      ASSESSMENT  Incisional hernia-symptomatic/reducible  It is large however it has increased in size over the last one year as per review of imaging studies  Multiple medical problems including paroxysmal atrial fibrillation, carotid stenosis, COPD on oxygen  Crohn's disease on Remicade currently in remission  Multiple abdominal surgeries    I had a lengthy discussion with the patient regarding surgical versus nonsurgical options-pros and cons/ risks and benefits of the discussed.  We have also discussed the different types of repairs i.e. laparoscopic versus open/component separation and use of mesh placement.  I spent at least 30 minutes time discussing the various repairs - the pros and cons/ risks benefits, anticipated postoperative course including postoperative hospitalization, complications including but not limited to risk of bleeding, hematoma or seroma formation, wound infection, mesh infection, mesh migration, intra-abdominal adhesions, bowel obstruction, injury to intra-abdominal organs requiring additional procedures, risk of recurrence and her overall cardiopulmonary complications.    Patient and her sister are contemplating surgery at [facility].  I have informed them that since I do not go to that facility and I will not be with [facility] as off 9/27/18 I can certainly make referral arrangements for her to see a general surgeon at that facility.  They would like for me to discuss with her primary care physician.  I did call Vianey Ronquillo's office.  Vianey is off today however a message was sent to her and she will be contacting me tomorrow-8/21/18    PLAN  Will discuss with patient's primary care physician and make further recommendations.  Weightlifting restrictions discussed with patient.  Warning signs and symptoms of incarceration and strangulation discussed.   She is to call my office or go to the nearest emergency room should she experience those.  Patient was advised call the office sooner should she worsening symptoms or go to the nearest emergency room.        Addendum: 8/21/18 3:10 PM     Spoke with patient, her sister and PCP Vianey Delgado this afternoon, patient is now agreeable to having surgery at [facility].  I have discussed with her an open  incisional hernia repair with likely on lay mesh placement.  Procedure, risks, benefits, complications including but not limited to risk of bleeding, hematoma, seroma formation, mesh infection, mesh migration, risk of intra-abdominal adhesions, injury to intra-abdominal organs including bowel requiring additional procedures, risk of recurrent as well as cardiopulmonary complications in light of her comorbidities.  I have discussed anticipated postoperative course and hospitalization. She understands and gives verbal informed consent.  We will schedule her surgery at [facility].  She will need a bowel prep prior to that.  She will undergo preadmission testing.  We will need pulmonary, medical and cardiac clearance prior to surgery.     One of my associates Dr. Be  will also be available to take over her postoperative care after 9/27/18.    All of this has been discussed with the patient and her sister and they are agreeable.

## 2018-08-21 ENCOUNTER — TELEPHONE (OUTPATIENT)
Dept: SURGERY | Facility: CLINIC | Age: 68
End: 2018-08-21

## 2018-08-21 NOTE — TELEPHONE ENCOUNTER
Spoke with patient, her sister and PCP Vianey Delgado this afternoon, patient is now agreeable to having surgery at [facility].  I have discussed with her an open incisional hernia repair with likely on lay mesh placement.  Procedure, risks, benefits, complications including but not limited to risk of bleeding, hematoma, seroma formation, mesh infection, mesh migration, risk of intra-abdominal adhesions, injury to intra-abdominal organs including bowel requiring additional procedures, risk of recurrent as well as cardiopulmonary complications in light of her comorbidities.  I have discussed anticipated postoperative course and hospitalization.  She understands and gives verbal informed consent.  We will schedule her surgery at [facility].  She will need a bowel prep prior to that.  She will undergo preadmission testing.  We will need pulmonary, medical and cardiac clearance prior to surgery.    All of this has been discussed with the patient and her sister and they are agreeable.

## 2018-08-21 NOTE — TELEPHONE ENCOUNTER
Case request is in.  Patient will need pulmonary, medical and cardiac clearance  She will need bowel prep prior to surgery  She is coming to the hospital ([facility]) on 8/27/18 for her infusion please coordinate with her    thx

## 2018-08-21 NOTE — H&P
PATIENT INFORMATION  Mar Camilo   - 1950    CHIEF COMPLAINT  Chief Complaint   Patient presents with   • Hernia     abd hernia - ct scan done   Referral from Vianey MARTINEZ    HISTORY OF PRESENT ILLNESS  HPI  Patient is a very pleasant 68-year-old female with multiple medical problems who has had a long-standing incisional abdominal wall hernia.  She has a history of Crohn's disease status post laparoscopic assisted transverse colectomy with primary anastomosis in  by Dr. Hetal Richardson at [facility] she is currently on Remicade and is in remission.  She follows with Dr. Umaña as far as Crohn's disease is concerned.  She had a CT scan in September for left lower quadrant abdominal pain as ordered by Dr. Umaña that showed a left sided/left lower quadrant abdominal wall hernia.  She reports at that time it was discussed and felt that the hernia repair should be deferred.  She reports that over the last few months she has noticed an increase in size of the bulge.  It is more prominent every time she coughs.  She's had multiple episodes of pneumonia in the recent one year and has had recently also been admitted for pneumonia earlier on in the year and underwent further pulmonary workup including bronchoscopy here at [facility].  She has had a history of paroxysmal atrial fibrillation was seen by cardiology.  Is currently not on any anti-thrombotic/anticoagulant.  She is on verapamil.  She has COPD and is dependent on oxygen, uses 3 L 24/ 7.  She is established with Dr. Hernadez.  She also has multiple other medical problems including carotid stenosis, has had carotid endarterectomy by Dr. Umang Griffin, paroxysmal atrial fibrillation, diabetes, osteoarthritis, multiple kidney stones and recent tibial fracture which she sees orthopedics at [facility].  As far as the hernia is concerned she reports that it is definitely increased in size and is the cause of discomfort it  is still reducible but clearly there has been some loss of abdominal wall domain.  She denies any nausea, vomiting, alternating bowel movements, melena, hematochezia.    Her old operative notes, imaging studies including multiple CT scans were reviewed personally and with the patient and her sister.  She is a former smoker, quit at least 10 years ago    REVIEW OF SYSTEMS  Review of Systems   Constitutional: Negative.    Eyes: Negative.    Respiratory: Positive for wheezing.    Cardiovascular: Negative.    Gastrointestinal:        Soft, moderately obese, nondistended, nontender, large reducible incisional hernia slightly to the left of the midline. She has a large vertical midline scar.   Genitourinary: Negative.    Musculoskeletal: Negative.    Skin: Negative.          ACTIVE PROBLEMS  Patient Active Problem List    Diagnosis   • Closed fracture of left tibial plateau [S82.142A]   • Ventral hernia without obstruction or gangrene [K43.9]   • Left sided abdominal pain [R10.9]   • Crohn's disease of colon with complication (CMS/[facility]) [K50.119]   • Tibial fracture [S82.209A]   • Need for hepatitis C screening test [Z11.59]   • Need for pneumococcal vaccine [Z23]   • Acute pain of both shoulders [M25.511, M25.512]   • PAF (paroxysmal atrial fibrillation) (CMS/[facility]) [I48.0]   • Pneumonia of right lower lobe due to infectious organism (CMS/[facility]) [J18.1]   • Primary osteoarthritis, right shoulder [M19.011]   • Tendinopathy of rotator cuff [M67.919]   • Primary osteoarthritis, left shoulder [M19.012]   • Subacromial bursitis, left [M75.50]   • History of stroke [Z86.73]   • Leukocytosis [D72.829]   • Seizure (CMS/[facility]) [R56.9]   • Right ureteral stone [N20.1]   • Bursitis, subacromial, right [M75.50]   • Chronic right hip pain [M25.551, G89.29]   • Chronic pain of both shoulders [M25.511, G89.29, M25.512]   • Fall [W19.XXXA]   • Need for immunization against influenza [Z23]   • Well controlled type 2 diabetes mellitus (CMS/[facility])  [E11.9]   • Cellulitis of neck [L03.221]   • Carotid stenosis [I65.29]   • Hospital discharge follow-up [Z09]   • Chronic respiratory failure with hypoxia (CMS/[facility]) [J96.11]   • Asymptomatic stenosis of right carotid artery [I65.21]   • Carotid stenosis, asymptomatic [I65.29]   • Anemia [D64.9]   • Hypopotassemia [E87.6]   • Obstructive pyelonephritis [N11.1]   • Chronic allergic conjunctivitis [H10.45]   • Chronic back pain [M54.9, G89.29]   • Chronic bronchitis (CMS/[facility]) [J42]   • Non-specific colitis [K52.9]   • Chronic obstructive pulmonary disease with acute exacerbation (CMS/[facility]) [J44.1]   • Degeneration of intervertebral disc of lumbar region [M51.36]   • Depression [F32.9]   • Fatigue [R53.83]   • Heartburn [R12]   • Herpes labialis [B00.1]   • Hyperglycemia [R73.9]   • Mixed hyperlipidemia [E78.2]   • Essential hypertension [I10]   • Hypocalcemia [E83.51]   • Hypokalemia [E87.6]   • Sprain of back [DAR0486]   • Spinal stenosis of lumbar region [M48.061]   • Morbid obesity (CMS/[facility]) [E66.01]   • Osteopenia [M85.80]   • Lumbar radiculopathy [M54.16]   • Shortness of breath [R06.02]   • Type 2 diabetes mellitus with complication, without long-term current use of insulin (CMS/[facility]) [E11.8]   • Increased frequency of urination [R35.0]   • Low back pain [M54.5]   • Osteoarthritis of lumbar spine [M47.816]         PAST MEDICAL HISTORY  Past Medical History:   Diagnosis Date   • Artery stenosis (CMS/[facility])     left, states no bp/hr in Left arm d/t inaccuracy   • Back pain    • CAD (coronary artery disease)    • COPD (chronic obstructive pulmonary disease) (CMS/[facility])    • Crohn's disease (CMS/[facility])     Remicade on hold d/t antibiotics   • DDD (degenerative disc disease)    • Depression    • Diabetes mellitus (CMS/[facility])     states not diabetic, sugars only high when sick   • Hyperlipidemia    • Hypertension    • Hypokalemia    • Kidney stone     sched scope, lithotripsy   • Morbid obesity (CMS/H[facility])    • Obstructive  pyelonephritis    • On home oxygen therapy     2L UNLESS STRESSED THEN 2.5-3 L   • PVD (peripheral vascular disease) (CMS/HCC)     RT CAROTID STENOSIS   • Radiculopathy     NECK PAIN         SURGICAL HISTORY  Past Surgical History:   Procedure Laterality Date   • APPENDECTOMY     • BRONCHOSCOPY N/A 1/5/2018    Procedure: BRONCHOSCOPY;  Surgeon: Gustavo Muniz MD;  Location: [facility];  Service:    • COLON RESECTION     • COLONOSCOPY      x2   • CYSTOSCOPY URETEROSCOPY LASER LITHOTRIPSY Right 4/17/2017    Procedure: CYSTOSCOPY with  right stent removal, right URETEROSCOPY LASER LITHOTRIPSY,  with STONE BASKET EXTRACTION;  Surgeon: Dipesh Bean MD;  Location: [facility] OR;  Service:    • CYSTOSCOPY W/ URETERAL STENT PLACEMENT Right 3/18/2017    Procedure: CYSTOSCOPY URETERAL CATHETER/STENT INSERTION;  Surgeon: Dipesh Bean MD;  Location: [facility];  Service:    • LUNG BIOPSY      NEGATIVE   • MI THROMBOENDARTECTMY NECK,NECK INCIS Right 3/14/2016    Procedure: RT CAROTID ENDARTERECTOMY;  Surgeon: Federico Schuler MD;  Location: [facility] OR;  Service: Vascular   • WRIST ARTHROSCOPY Right     WITH RELEASE OF TRANSVERSE CARPAL LIGAMENT         FAMILY HISTORY  Family History   Problem Relation Age of Onset   • Diabetes Mother    • Stroke Mother    • Other Father         RESPIRATORY FAILURE   • Cancer Other         lung cancer   • Crohn's disease Brother    • Crohn's disease Maternal Aunt          SOCIAL HISTORY  Social History     Occupational History   • Not on file.     Social History Main Topics   • Smoking status: Former Smoker     Packs/day: 2.00     Years: 40.00     Types: Cigarettes     Quit date: 04/2014   • Smokeless tobacco: Never Used   • Alcohol use No      Comment: history of heavy drinker    • Drug use: No   • Sexual activity: Defer         CURRENT MEDICATIONS    Current Outpatient Prescriptions:   •  acetaminophen (TYLENOL) 325 MG tablet, Take 650 mg by mouth 3 (Three) Times a Day.,  Disp: , Rfl:   •  albuterol (PROVENTIL HFA;VENTOLIN HFA) 108 (90 Base) MCG/ACT inhaler, Inhale 2 puffs Every 4 (Four) Hours As Needed for Wheezing or Shortness of Air., Disp: 18 g, Rfl: 6  •  Blood Glucose Monitoring Suppl (ONETOUCH VERIO) w/Device kit, 1 kit 2 (Two) Times a Day., Disp: 1 kit, Rfl: 0  •  FLUoxetine (PROzac) 20 MG capsule, TAKE ONE CAPSULE BY MOUTH ONCE DAILY, Disp: 90 capsule, Rfl: 3  •  glucose blood (ONETOUCH VERIO) test strip, Use as instructed as to check blood sugars 2-3 times a day for type 2 diabetes, Disp: 100 each, Rfl: 12  •  lamoTRIgine (LaMICtal) 150 MG tablet, , Disp: , Rfl:   •  Lancets (ONETOUCH ULTRASOFT) lancets, Used to check blood sugars twice a day as needed for type 2 diabetes monitoring, Disp: 100 each, Rfl: 12  •  lisinopril (PRINIVIL,ZESTRIL) 40 MG tablet, Take 1 tablet by mouth Daily., Disp: 90 tablet, Rfl: 1  •  metFORMIN (GLUCOPHAGE) 500 MG tablet, TAKE ONE TABLET BY MOUTH TWICE DAILY WITH FOOD, Disp: 60 tablet, Rfl: 3  •  Multiple Vitamins-Minerals (CENTRUM SILVER PO), Take 1 tablet by mouth every morning., Disp: , Rfl:   •  Multiple Vitamins-Minerals (VISION FORMULA/LUTEIN PO), Take 1 tablet by mouth Daily., Disp: , Rfl:   •  rosuvastatin (CRESTOR) 40 MG tablet, Take 1 tablet by mouth Daily., Disp: 30 tablet, Rfl: 3  •  SPIRIVA HANDIHALER 18 MCG per inhalation capsule, INHALE ONE DOSE BY MOUTH ONCE DAILY, Disp: 30 capsule, Rfl: 6  •  SYMBICORT 80-4.5 MCG/ACT inhaler, , Disp: , Rfl:   •  traMADol (ULTRAM) 50 MG tablet, Take  mg by mouth., Disp: , Rfl:   •  verapamil SR (CALAN-SR) 240 MG CR tablet, Take 1 tablet by mouth Every 12 (Twelve) Hours., Disp: 60 tablet, Rfl: 3    ALLERGIES  Contrast dye; Norco [hydrocodone-acetaminophen]; Tenoretic [atenolol-chlorthalidone]; and Iodinated diagnostic agents    VITALS  Vitals:    08/20/18 0914   BP: (!) 184/66   BP Location: Left arm   Patient Position: Sitting   Cuff Size: Adult   Pulse: 102   Temp: 97.6 °F (36.4 °C)  "  TempSrc: Oral   SpO2: 96%   Weight: 103 kg (226 lb 9.6 oz)   Height: 162.6 cm (64.02\")       LAST RESULTS   Orders Only on 02/12/2018   Component Date Value Ref Range Status   • WBC 02/28/2018 11.16* 4.80 - 10.80 10*3/mm3 Final   • RBC 02/28/2018 4.22  4.20 - 5.40 10*6/mm3 Final   • Hemoglobin 02/28/2018 11.7* 12.0 - 16.0 g/dL Final   • Hematocrit 02/28/2018 39.7  37.0 - 47.0 % Final   • MCV 02/28/2018 94.1  81.0 - 99.0 fL Final   • MCH 02/28/2018 27.7  27.0 - 31.0 pg Final   • MCHC 02/28/2018 29.5* 31.0 - 37.0 g/dL Final   • RDW 02/28/2018 16.4* 11.5 - 14.5 % Final   • Platelets 02/28/2018 410  140 - 500 10*3/mm3 Final   • Neutrophil Rel % 02/28/2018 65.0  45.0 - 70.0 % Final   • Lymphocyte Rel % 02/28/2018 21.5  20.0 - 45.0 % Final   • Monocyte Rel % 02/28/2018 10.2* 3.0 - 8.0 % Final   • Eosinophil Rel % 02/28/2018 1.9  0.0 - 4.0 % Final   • Basophil Rel % 02/28/2018 0.6  0.0 - 2.0 % Final   • Neutrophils Absolute 02/28/2018 7.25  1.50 - 8.30 10*3/mm3 Final   • Lymphocytes Absolute 02/28/2018 2.40  0.60 - 4.80 10*3/mm3 Final   • Monocytes Absolute 02/28/2018 1.14* 0.00 - 1.00 10*3/mm3 Final   • Eosinophils Absolute 02/28/2018 0.21  0.10 - 0.30 10*3/mm3 Final   • Basophils Absolute 02/28/2018 0.07  0.00 - 0.20 10*3/mm3 Final   • Immature Granulocyte Rel % 02/28/2018 0.8* 0.0 - 0.5 % Final   • Immature Grans Absolute 02/28/2018 0.09* 0.00 - 0.03 10*3/mm3 Final   • nRBC 02/28/2018 0.0  0.0 - 0.0 /100 WBC Final   • Glucose 02/28/2018 181* 65 - 99 mg/dL Final   • BUN 02/28/2018 17  8 - 23 mg/dL Final   • Creatinine 02/28/2018 0.83  0.57 - 1.00 mg/dL Final   • eGFR Non  Am 02/28/2018 68  >60 mL/min/1.73 Final   • eGFR African Am 02/28/2018 83  >60 mL/min/1.73 Final   • BUN/Creatinine Ratio 02/28/2018 20.5  7.0 - 25.0 Final   • Sodium 02/28/2018 141  136 - 145 mmol/L Final   • Potassium 02/28/2018 5.3* 3.5 - 5.2 mmol/L Final   • Chloride 02/28/2018 100  98 - 107 mmol/L Final   • Total CO2 02/28/2018 27.7  22.0 " - 29.0 mmol/L Final   • Calcium 02/28/2018 9.8  8.8 - 10.5 mg/dL Final   • Total Protein 02/28/2018 6.8  6.0 - 8.5 g/dL Final   • Albumin 02/28/2018 3.90  3.50 - 5.20 g/dL Final   • Globulin 02/28/2018 2.9  gm/dL Final   • A/G Ratio 02/28/2018 1.3  g/dL Final   • Total Bilirubin 02/28/2018 0.2  0.2 - 1.2 mg/dL Final   • Alkaline Phosphatase 02/28/2018 93  40 - 129 U/L Final   • AST (SGOT) 02/28/2018 21  5 - 32 U/L Final   • ALT (SGPT) 02/28/2018 19  5 - 33 U/L Final   • Total Cholesterol 02/28/2018 114  0 - 200 mg/dL Final   • Triglycerides 02/28/2018 208* 0 - 150 mg/dL Final   • HDL Cholesterol 02/28/2018 43  40 - 60 mg/dL Final   • VLDL Cholesterol 02/28/2018 41.6* 7 - 27 mg/dL Final   • LDL Cholesterol  02/28/2018 29  0 - 100 mg/dL Final   • LDL/HDL Ratio 02/28/2018 0.68   Final   • Hemoglobin A1C 02/28/2018 6.70* 4.80 - 5.60 % Final   • Hep C Virus Ab 02/28/2018 <0.1  0.0 - 0.9 s/co ratio Final    Comment:                                   Negative:     < 0.8                               Indeterminate: 0.8 - 0.9                                    Positive:     > 0.9   The CDC recommends that a positive HCV antibody result   be followed up with a HCV Nucleic Acid Amplification   test ([id number]).       Ct Abdomen Pelvis Without Contrast    Result Date: 8/13/2018  Narrative: CT ABDOMEN AND PELVIS, NONCONTRAST, 8/13/2018  HISTORY: 68-year-old female with a large left lower quadrant abdominal wall hernia. Associated pain has increased since December 2017. She reports prior history of bowel resection surgeries due to Crohn's disease.  TECHNIQUE: CT examination of the abdomen and pelvis without oral or IV contrast. Radiation dose reduction techniques included automated exposure control or exposure modulation based on body size. Radiation audit for CT and nuclear cardiology exams in the last 12 months: 3.  COMPARISON: *  CT abdomen/pelvis, 9/13/2017.  ABDOMEN FINDINGS: There is a very large left lower quadrant abdominal  wall hernia that has increased in size since the prior exam. The hernia orifice has increased from 4.9 cm to 8.5 cm. The hernia sac has increased from 11.6 cm to 19.2 cm. The hernia is filled with multiple segments of unobstructed small bowel and colon in a large quantity of mesenteric fat. There is no evidence of bowel obstruction. No evidence of active inflammatory disease involving small bowel or colon within the abdomen, pelvis or hernia sac.  Liver, pancreas, spleen and kidneys are within normal limits. Densely calcified abdominal aorta is normal in caliber. The appendix is surgically absent by history.  PELVIS FINDINGS: The uterus is small. Adnexal regions, urinary bladder and rectum are unremarkable. No inguinal hernia.  Limited lower chest images show evidence of pulmonary emphysema along with chronic fibrosis and pulmonary scarring at the left lung base.      Impression: 1. Very large left lower quadrant abdominal wall hernia has increased significantly in size since 9/13/2017 as detailed above. 2. Previous bowel resection surgeries. No evidence of active inflammatory bowel disease.  This report was finalized on 8/13/2018 6:23 PM by Dr. Sarmad Mishra MD.        PHYSICAL EXAM  Physical Exam   Constitutional: She is oriented to person, place, and time. She appears well-developed and well-nourished.   HENT:   Head: Normocephalic and atraumatic.   Eyes: No scleral icterus.   Neck: Normal range of motion. Neck supple.   Cardiovascular: Normal rate, regular rhythm and normal heart sounds.    Pulmonary/Chest: Breath sounds normal.   Audible expiratory wheeze.   Abdominal:   Soft, moderately obese, nondistended, nontender, large reducible incisional/ventral hernia that is coming off of the upper aspect of her midline incision it is slightly to the left of the midline.  Large midline scar.   Musculoskeletal: She exhibits no edema.   Neurological: She is alert and oriented to person, place, and time.   Skin: Skin  is warm and dry.   Psychiatric: She has a normal mood and affect. Her behavior is normal.   Nursing note and vitals reviewed.      ASSESSMENT  Incisional hernia-symptomatic/reducible  It is large however it has increased in size over the last one year as per review of imaging studies  Multiple medical problems including paroxysmal atrial fibrillation, carotid stenosis, COPD on oxygen  Crohn's disease on Remicade currently in remission  Multiple abdominal surgeries    I had a lengthy discussion with the patient regarding surgical versus nonsurgical options-pros and cons/ risks and benefits of the discussed.  We have also discussed the different types of repairs i.e. laparoscopic versus open/component separation and use of mesh placement.  I spent at least 30 minutes time discussing the various repairs - the pros and cons/ risks benefits, anticipated postoperative course including postoperative hospitalization, complications including but not limited to risk of bleeding, hematoma or seroma formation, wound infection, mesh infection, mesh migration, intra-abdominal adhesions, bowel obstruction, injury to intra-abdominal organs requiring additional procedures, risk of recurrence and her overall cardiopulmonary complications.    Patient and her sister are contemplating surgery at [facility].  I have informed them that since I do not go to that facility and I will not be with [facility] as off 9/27/18 I can certainly make referral arrangements for her to see a general surgeon at that facility.  They would like for me to discuss with her primary care physician.  I did call Vianey Ronquillo's office.  Vianey is off today however a message was sent to her and she will be contacting me tomorrow-8/21/18    PLAN  Will discuss with patient's primary care physician and make further recommendations.  Weightlifting restrictions discussed with patient.  Warning signs and symptoms of incarceration and strangulation discussed.   She is to call my office or go to the nearest emergency room should she experience those.  Patient was advised call the office sooner should she worsening symptoms or go to the nearest emergency room.        Addendum: 8/21/18 3:10 PM     Spoke with patient, her sister and PCP Vianey Delgado this afternoon, patient is now agreeable to having surgery at [facility].  I have discussed with her an open  incisional hernia repair with likely on lay mesh placement.  Procedure, risks, benefits, complications including but not limited to risk of bleeding, hematoma, seroma formation, mesh infection, mesh migration, risk of intra-abdominal adhesions, injury to intra-abdominal organs including bowel requiring additional procedures, risk of recurrent as well as cardiopulmonary complications in light of her comorbidities.  I have discussed anticipated postoperative course and hospitalization. She understands and gives verbal informed consent.  We will schedule her surgery at [facility].  She will need a bowel prep prior to that.  She will undergo preadmission testing.  We will need pulmonary, medical and cardiac clearance prior to surgery.     One of my associates Dr. Be  will also be available to take over her postoperative care after 9/27/18.    All of this has been discussed with the patient and her sister and they are agreeable.

## 2018-08-24 ENCOUNTER — TELEPHONE (OUTPATIENT)
Dept: FAMILY MEDICINE CLINIC | Facility: CLINIC | Age: 68
End: 2018-08-24

## 2018-08-24 DIAGNOSIS — F32.0 MILD SINGLE CURRENT EPISODE OF MAJOR DEPRESSIVE DISORDER (HCC): Primary | ICD-10-CM

## 2018-08-24 RX ORDER — FLUOXETINE HYDROCHLORIDE 40 MG/1
40 CAPSULE ORAL DAILY
Qty: 30 CAPSULE | Refills: 3 | Status: SHIPPED | OUTPATIENT
Start: 2018-08-24 | End: 2019-02-17 | Stop reason: SDUPTHER

## 2018-08-24 NOTE — TELEPHONE ENCOUNTER
Notify patient that I have sent a prescription on fluoxetine 40 mg to her pharmacy.  Have her schedule follow-up appointment in the next 4-6 weeks for reevaluation.

## 2018-08-27 ENCOUNTER — HOSPITAL ENCOUNTER (OUTPATIENT)
Dept: INFUSION THERAPY | Facility: HOSPITAL | Age: 68
Discharge: HOME | End: 2018-08-27
Attending: INTERNAL MEDICINE | Admitting: INTERNAL MEDICINE

## 2018-08-27 VITALS
RESPIRATION RATE: 16 BRPM | WEIGHT: 227.07 LBS | DIASTOLIC BLOOD PRESSURE: 42 MMHG | HEIGHT: 64 IN | BODY MASS INDEX: 38.77 KG/M2 | SYSTOLIC BLOOD PRESSURE: 116 MMHG | TEMPERATURE: 97.6 F | HEART RATE: 76 BPM | OXYGEN SATURATION: 92 %

## 2018-08-27 DIAGNOSIS — K50.10 CROHN'S DISEASE OF LARGE INTESTINE WITHOUT COMPLICATION (HCC): Primary | ICD-10-CM

## 2018-08-27 DIAGNOSIS — K50.119 CROHN'S DISEASE OF COLON WITH COMPLICATION (HCC): ICD-10-CM

## 2018-08-27 PROCEDURE — 96365 THER/PROPH/DIAG IV INF INIT: CPT

## 2018-08-27 PROCEDURE — 96366 THER/PROPH/DIAG IV INF ADDON: CPT

## 2018-08-27 PROCEDURE — 63710000001 DIPHENHYDRAMINE PER 50 MG: Performed by: INTERNAL MEDICINE

## 2018-08-27 PROCEDURE — 96415 CHEMO IV INFUSION ADDL HR: CPT

## 2018-08-27 PROCEDURE — 63710000001 ACETAMINOPHEN 325 MG TABLET: Performed by: INTERNAL MEDICINE

## 2018-08-27 PROCEDURE — A9270 NON-COVERED ITEM OR SERVICE: HCPCS | Performed by: INTERNAL MEDICINE

## 2018-08-27 PROCEDURE — 25010000002 INFLIXIMAB PER 10 MG: Performed by: INTERNAL MEDICINE

## 2018-08-27 PROCEDURE — 96413 CHEMO IV INFUSION 1 HR: CPT

## 2018-08-27 RX ORDER — SODIUM CHLORIDE 9 MG/ML
250 INJECTION, SOLUTION INTRAVENOUS ONCE
Status: DISCONTINUED | OUTPATIENT
Start: 2018-08-27 | End: 2018-08-29 | Stop reason: HOSPADM

## 2018-08-27 RX ORDER — DIPHENHYDRAMINE HCL 25 MG
25 CAPSULE ORAL ONCE
Status: COMPLETED | OUTPATIENT
Start: 2018-08-27 | End: 2018-08-27

## 2018-08-27 RX ORDER — SODIUM CHLORIDE 9 MG/ML
250 INJECTION, SOLUTION INTRAVENOUS ONCE
Status: CANCELLED | OUTPATIENT
Start: 2018-08-27

## 2018-08-27 RX ORDER — ACETAMINOPHEN 325 MG/1
650 TABLET ORAL ONCE
Status: CANCELLED | OUTPATIENT
Start: 2018-08-27

## 2018-08-27 RX ORDER — ACETAMINOPHEN 325 MG/1
650 TABLET ORAL ONCE
Status: COMPLETED | OUTPATIENT
Start: 2018-08-27 | End: 2018-08-27

## 2018-08-27 RX ORDER — DIPHENHYDRAMINE HCL 25 MG
25 CAPSULE ORAL ONCE
Status: CANCELLED | OUTPATIENT
Start: 2018-08-27

## 2018-08-27 RX ADMIN — INFLIXIMAB 515 MG: 100 INJECTION, POWDER, LYOPHILIZED, FOR SOLUTION INTRAVENOUS at 11:22

## 2018-08-27 RX ADMIN — DIPHENHYDRAMINE HYDROCHLORIDE 25 MG: 25 CAPSULE ORAL at 10:58

## 2018-08-27 RX ADMIN — ACETAMINOPHEN 650 MG: 325 TABLET, FILM COATED ORAL at 10:58

## 2018-08-28 ENCOUNTER — TELEPHONE (OUTPATIENT)
Dept: CARDIOLOGY | Facility: CLINIC | Age: 68
End: 2018-08-28

## 2018-08-28 NOTE — TELEPHONE ENCOUNTER
I called pt to further discuss and to offer appt for 09/25/18 with Dr. Mata in [place].  Pt was not happy and would like to discuss with Dr. Boudreaux, pt was attempting to have the surgery by the first week in Sept.  If pt can come to the [place] office.

## 2018-08-28 NOTE — TELEPHONE ENCOUNTER
I did receive your vm regarding pt.     Pt will need to have hernia repair, with Dr. Boudreaux. Pt has not been seen in the office since 01/14/2016.  She has also been in the hospital since then.  I will need to contact pt to make an appt.

## 2018-08-28 NOTE — TELEPHONE ENCOUNTER
Called pt to attempt to schedule an appt.  Pt reports that she just had surgery ([place]) and is on O2 100%, she is having a hard time with ambulating.  Pt has requested that she be admitted prior to the surgery to get cardiac risk assessment.   I did explain to patient I would research to find out if this is appropriate.      I discussed pt with Dr. Mata, she stated that if Dr. Boudreaux feels it is appropriate for pt to be admitted to get surgically assessed then he would have to admit pt and request cardiology consult.  Otherwise pt would need to be seen in the office.  I contacted Dr. Boudreaux's assistant directly to discuss.

## 2018-08-29 NOTE — TELEPHONE ENCOUNTER
I spoke with patient and her sister.  They are both willing to go to [place] to see the first available cardiologist or Dr. Mata.Would it be possible for Dr. Mata or any of the other cardiologists to see her. If so please arrange.    ( I am copying my  dax and patient's PCP also)    Thanks

## 2018-08-30 NOTE — TELEPHONE ENCOUNTER
I called pt she accepted appt for the [place] office on Wed, 09/12/18 at 1:15 with Dr. Mata.   I called and informed pt's sister who will be driving her to the appt.

## 2018-09-12 ENCOUNTER — TELEPHONE (OUTPATIENT)
Dept: SURGERY | Facility: CLINIC | Age: 68
End: 2018-09-12

## 2018-09-12 DIAGNOSIS — K43.2 INCISIONAL HERNIA, WITHOUT OBSTRUCTION OR GANGRENE: Primary | ICD-10-CM

## 2018-09-12 DIAGNOSIS — R10.32 LEFT LOWER QUADRANT PAIN: ICD-10-CM

## 2018-09-12 NOTE — TELEPHONE ENCOUNTER
Patient's appointment with cardiology for preoperative cardiac assessment / clearance for today was canceled. She is not sure when the appointment will be rescheduled.    She is aware that I am leaving [facility] 9/27/18, we have discussed and she has requested transferring her general surgery care to general surgeon at [facility].  I have put in orders for referral to Dr. Do.    Please arrange/schedule.    (I have informed pt's PCP also)

## 2018-09-14 NOTE — PROGRESS NOTES
Date of Office Visit: 2018  Encounter Provider: Bhanu Mata MD  Place of Service: [facility] CARDIOLOGY  Patient Name: Mar Camilo  :1950    Chief Complaint   Patient presents with   • Surgery clearance   :     HPI: Mar Camilo is a 68 y.o. female who presents today to be evaluated for perioperative risk assessment.  She has a ventral hernia and has been referred to Dr. Do.    She was seen by us in  for preoperative risk assessment as well.  A Cardiolite stress test was normal.  She was noted to have a borderline QT prolongation.  She underwent right carotid endarterectomy and did well.  She has known severe disease of the left subclavian but is treated medically as she is asymptomatic.      In , she underwent partial colectomy and had postoperative atrial fibrillation.  She was on amiodarone for a while.     In 2017 she had a left parietal stroke and was treated at [facility]; she was placed on aspirin and clopidogrel.    She has oxygen dependent COPD.  In 2017, she presented with an acute exacerbation in the setting of Rhinovirus.  She was in atrial fibrillation and converted to NSR on amiodarone. An echo showed an LVEF of 70-75% with grade II diastolic dysfunction.  She was discharged on amiodarone, verapamil, rivaroxaban, and clopidogrel (aspirin was stopped).  She went to rehab, and all of her blood thinners were stopped due to severe epistaxis.      She is sedentary.  She has chronic dyspnea.  She denies orthopnea, PND, edema, chest pain, palpitations, or syncope.  She states that her hernia makes her miserable and that she desperately wants it repaired.      She has sleep apnea but does not tolerate PAP therapy.     Past Medical History:   Diagnosis Date   • Carotid arterial disease (CMS/HCC)     US 2015 with 80-99% right and 16-49% left, but CTA with 60-70% right, not a surgical candidate   • Chronic back pain    • Closed fracture  of left tibial plateau 7/30/2018   • COPD (chronic obstructive pulmonary disease) (CMS/[facility])    • Crohn's disease (CMS/[facility])     Remicade on hold d/t antibiotics   • Crohn's disease of colon with complication (CMS/[facility]) 6/14/2018   • DDD (degenerative disc disease)    • Depression    • Diabetes mellitus (CMS/[facility])     states not diabetic, sugars only high when sick   • Heartburn 2/21/2016   • History of stroke 05/03/2017    left parietal   • Hyperlipidemia    • Hypertension    • Hypokalemia    • Morbid obesity (CMS/[facility])    • Obesity (BMI 30-39.9)    • Obstructive pyelonephritis    • On home oxygen therapy     2L UNLESS STRESSED THEN 2.5-3 L   • Osteopenia 2/21/2016   • PAF (paroxysmal atrial fibrillation) (CMS/[facility])    • PVD (peripheral vascular disease) (CMS/[facility])     RT CAROTID STENOSIS   • Radiculopathy     NECK PAIN   • Subclavian artery stenosis (CMS/[facility])     and axillary artery stenosis, on the left       Past Surgical History:   Procedure Laterality Date   • APPENDECTOMY     • BRONCHOSCOPY N/A 1/5/2018    Procedure: BRONCHOSCOPY;  Surgeon: Gustavo Muniz MD;  Location: [facility] OR;  Service:    • COLON RESECTION     • COLONOSCOPY      x2   • CYSTOSCOPY URETEROSCOPY LASER LITHOTRIPSY Right 4/17/2017    Procedure: CYSTOSCOPY with  right stent removal, right URETEROSCOPY LASER LITHOTRIPSY,  with STONE BASKET EXTRACTION;  Surgeon: Dipesh Bean MD;  Location: [facility] OR;  Service:    • CYSTOSCOPY W/ URETERAL STENT PLACEMENT Right 3/18/2017    Procedure: CYSTOSCOPY URETERAL CATHETER/STENT INSERTION;  Surgeon: Dipesh Bean MD;  Location: [facility] OR;  Service:    • LUNG BIOPSY      NEGATIVE   • MT THROMBOENDARTECTMY NECK,NECK INCIS Right 3/14/2016    Procedure: RT CAROTID ENDARTERECTOMY;  Surgeon: Federico Schuler MD;  Location: [facility] OR;  Service: Vascular   • WRIST ARTHROSCOPY Right     WITH RELEASE OF TRANSVERSE CARPAL LIGAMENT       Social History     Social History   • Marital status:       Spouse name: N/A   • Number of children: N/A   • Years of education: N/A     Occupational History   • Not on file.     Social History Main Topics   • Smoking status: Former Smoker     Packs/day: 2.00     Years: 40.00     Types: Cigarettes     Quit date: 04/2014   • Smokeless tobacco: Never Used      Comment: Caffeine use   • Alcohol use No      Comment: history of heavy drinker    • Drug use: No   • Sexual activity: Defer     Other Topics Concern   • Not on file     Social History Narrative   • No narrative on file       Family History   Problem Relation Age of Onset   • Diabetes Mother    • Stroke Mother    • Other Father         RESPIRATORY FAILURE   • Cancer Other         lung cancer   • Crohn's disease Brother    • Crohn's disease Maternal Aunt        Review of Systems   Constitution: Positive for malaise/fatigue.   Respiratory: Positive for cough, shortness of breath and wheezing.    Skin: Positive for itching.   Musculoskeletal: Positive for back pain and joint pain.   Gastrointestinal: Positive for abdominal pain.   Neurological: Positive for excessive daytime sleepiness.   Psychiatric/Behavioral: Positive for depression.   All other systems reviewed and are negative.      Allergies   Allergen Reactions   • Contrast Dye Hives   • Norco [Hydrocodone-Acetaminophen] Hallucinations   • Tenoretic [Atenolol-Chlorthalidone] Anaphylaxis   • Iodinated Diagnostic Agents          Current Outpatient Prescriptions:   •  acetaminophen (TYLENOL) 325 MG tablet, Take 650 mg by mouth 3 (Three) Times a Day., Disp: , Rfl:   •  albuterol (PROVENTIL HFA;VENTOLIN HFA) 108 (90 Base) MCG/ACT inhaler, Inhale 2 puffs Every 4 (Four) Hours As Needed for Wheezing or Shortness of Air., Disp: 18 g, Rfl: 6  •  Blood Glucose Monitoring Suppl (ONETOUCH VERIO) w/Device kit, 1 kit 2 (Two) Times a Day., Disp: 1 kit, Rfl: 0  •  FLUoxetine (PROZAC) 40 MG capsule, Take 1 capsule by mouth Daily., Disp: 30 capsule, Rfl: 3  •  glucose blood (ONETOUCH  "VERIO) test strip, Use as instructed as to check blood sugars 2-3 times a day for type 2 diabetes, Disp: 100 each, Rfl: 12  •  lamoTRIgine (LaMICtal) 150 MG tablet, , Disp: , Rfl:   •  Lancets (ONETOUCH ULTRASOFT) lancets, Used to check blood sugars twice a day as needed for type 2 diabetes monitoring, Disp: 100 each, Rfl: 12  •  lisinopril (PRINIVIL,ZESTRIL) 40 MG tablet, Take 1 tablet by mouth Daily., Disp: 90 tablet, Rfl: 1  •  metFORMIN (GLUCOPHAGE) 500 MG tablet, TAKE ONE TABLET BY MOUTH TWICE DAILY WITH FOOD, Disp: 60 tablet, Rfl: 3  •  Multiple Vitamins-Minerals (CENTRUM SILVER PO), Take 1 tablet by mouth every morning., Disp: , Rfl:   •  Multiple Vitamins-Minerals (VISION FORMULA/LUTEIN PO), Take 1 tablet by mouth Daily., Disp: , Rfl:   •  rosuvastatin (CRESTOR) 40 MG tablet, Take 1 tablet by mouth Daily., Disp: 30 tablet, Rfl: 3  •  SPIRIVA HANDIHALER 18 MCG per inhalation capsule, INHALE ONE DOSE BY MOUTH ONCE DAILY, Disp: 30 capsule, Rfl: 6  •  SYMBICORT 80-4.5 MCG/ACT inhaler, , Disp: , Rfl:   •  traMADol (ULTRAM) 50 MG tablet, Take  mg by mouth., Disp: , Rfl:   •  verapamil SR (CALAN-SR) 240 MG CR tablet, Take 1 tablet by mouth Every 12 (Twelve) Hours., Disp: 60 tablet, Rfl: 3      Objective:     Vitals:    09/17/18 1443   BP: 120/60   BP Location: Right arm   Pulse: 102   Weight: 101 kg (222 lb 3.2 oz)   Height: 162.6 cm (64\")     Body mass index is 38.14 kg/m².    Physical Exam   Constitutional: She is oriented to person, place, and time.   Obese   HENT:   Head: Normocephalic.   Nose: Nose normal.   Mouth/Throat: Oropharynx is clear and moist.   Eyes: Pupils are equal, round, and reactive to light. Conjunctivae and EOM are normal.   Neck: Normal range of motion.   Cannot assess for JVD due to habitus   Cardiovascular: Normal rate, regular rhythm and intact distal pulses.  Exam reveals distant heart sounds.    No murmur heard.  Pulmonary/Chest: Effort normal. She has decreased breath sounds. "   Abdominal: Soft.   Obesity limits abdominal exam   Musculoskeletal: Normal range of motion. She exhibits no edema.   Neurological: She is alert and oriented to person, place, and time. No cranial nerve deficit.   Skin: Skin is warm and dry. No rash noted.   Psychiatric: She has a normal mood and affect. Her behavior is normal. Judgment and thought content normal.   Vitals reviewed.        ECG 12 Lead  Date/Time: 9/17/2018 3:08 PM  Performed by: GENI CALABRESE  Authorized by: GENI CALABRESE   Comparison: compared with previous ECG   Similar to previous ECG  Rhythm: sinus tachycardia  Conduction: conduction normal  ST Segments: ST segments normal  T Waves: T waves normal  Other: no other findings  Clinical impression: abnormal ECG  Comments:               Assessment:       Diagnosis Plan   1. PAF (paroxysmal atrial fibrillation) (CMS/[facility])  ECG 12 Lead   2. History of stroke     3. PAD (peripheral artery disease) (CMS/[facility])     4. Prolonged Q-T interval on ECG     5. Essential hypertension     6. Chronic respiratory failure with hypoxia (CMS/[facility])     7. Preop cardiovascular exam            Plan:       1.  Atrial Fibrillation and Atrial Flutter  Assessment  • The patient has paroxysmal atrial fibrillation  • This is non-valvular in etiology  • The patient's CHADS2-VASc score is 7  • A [id number] score of 2 or more is considered a high risk for a thromboembolic event    Plan  • Attempt to maintain sinus rhythm  • Continue verapamil for rhythm control  • She declines anticoagulation.      2.  It's unclear if her stroke was due to PAF or PAD, although her carotid disease was on the right and her stroke was on the left.  She was previously on DAPT, then clopidogrel plus rivaroxaban, but she declines any antiplatelets/antithrombotics due to nosebleeds.  Her risk is very high of a recurrent event.    3.  She was previously followed by Dr. Schuler.  She has had a right CEA and she has known left subclavian disease,  but has no symptoms of claudication/steal.    4.  Her QT is always borderline prolonged.  She has been on amiodarone in the pats but I would avoid this.    5.  Her BP is within goal.    6.  She has oxygen dependent COPD, and has mild sinus tachycardia as a result.    7.  Her cardiac risk is really not her greatest perioperative risk.  Her overall status (obesity, PAD, PAF, diabetes, hypertension, COPD on oxygen, decreased functional status) places her at least at moderate risk of major adverse cardiovascular events with herniorraphy.    Sincerely,       Bhanu Mata MD

## 2018-09-17 ENCOUNTER — OFFICE VISIT (OUTPATIENT)
Dept: CARDIOLOGY | Facility: CLINIC | Age: 68
End: 2018-09-17

## 2018-09-17 VITALS
BODY MASS INDEX: 37.94 KG/M2 | HEIGHT: 64 IN | DIASTOLIC BLOOD PRESSURE: 60 MMHG | HEART RATE: 102 BPM | SYSTOLIC BLOOD PRESSURE: 120 MMHG | WEIGHT: 222.2 LBS

## 2018-09-17 DIAGNOSIS — I73.9 PAD (PERIPHERAL ARTERY DISEASE) (HCC): ICD-10-CM

## 2018-09-17 DIAGNOSIS — Z01.810 PREOP CARDIOVASCULAR EXAM: ICD-10-CM

## 2018-09-17 DIAGNOSIS — Z86.73 HISTORY OF STROKE: ICD-10-CM

## 2018-09-17 DIAGNOSIS — I48.0 PAF (PAROXYSMAL ATRIAL FIBRILLATION) (HCC): Primary | ICD-10-CM

## 2018-09-17 DIAGNOSIS — R94.31 PROLONGED Q-T INTERVAL ON ECG: ICD-10-CM

## 2018-09-17 DIAGNOSIS — I10 ESSENTIAL HYPERTENSION: ICD-10-CM

## 2018-09-17 DIAGNOSIS — J96.11 CHRONIC RESPIRATORY FAILURE WITH HYPOXIA (HCC): ICD-10-CM

## 2018-09-17 PROBLEM — D72.829 LEUKOCYTOSIS: Status: RESOLVED | Noted: 2017-04-23 | Resolved: 2018-09-17
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
## 2018-09-17 PROBLEM — M75.50 SUBACROMIAL BURSITIS: Status: RESOLVED | Noted: 2017-05-08 | Resolved: 2018-09-17

## 2018-09-17 PROBLEM — R10.9 LEFT SIDED ABDOMINAL PAIN: Status: RESOLVED | Noted: 2018-07-30 | Resolved: 2018-09-17

## 2018-09-17 PROBLEM — J18.9 PNEUMONIA OF RIGHT LOWER LOBE DUE TO INFECTIOUS ORGANISM: Status: RESOLVED | Noted: 2017-12-24 | Resolved: 2018-09-17

## 2018-09-17 PROBLEM — M25.511 ACUTE PAIN OF BOTH SHOULDERS: Status: RESOLVED | Noted: 2018-01-22 | Resolved: 2018-09-17

## 2018-09-17 PROBLEM — M25.512 ACUTE PAIN OF BOTH SHOULDERS: Status: RESOLVED | Noted: 2018-01-22 | Resolved: 2018-09-17

## 2018-09-17 PROBLEM — Z11.59 NEED FOR HEPATITIS C SCREENING TEST: Status: RESOLVED | Noted: 2018-03-01 | Resolved: 2018-09-17

## 2018-09-17 PROBLEM — M75.50 BURSITIS, SUBACROMIAL: Status: RESOLVED | Noted: 2017-01-30 | Resolved: 2018-09-17

## 2018-09-17 PROBLEM — S82.142A CLOSED FRACTURE OF LEFT TIBIAL PLATEAU: Status: RESOLVED | Noted: 2018-07-30 | Resolved: 2018-09-17

## 2018-09-17 PROCEDURE — 99214 OFFICE O/P EST MOD 30 MIN: CPT | Performed by: INTERNAL MEDICINE

## 2018-09-17 PROCEDURE — 93000 ELECTROCARDIOGRAM COMPLETE: CPT | Performed by: INTERNAL MEDICINE

## 2018-09-28 ENCOUNTER — TELEPHONE (OUTPATIENT)
Dept: FAMILY MEDICINE CLINIC | Facility: CLINIC | Age: 68
End: 2018-09-28

## 2018-09-28 DIAGNOSIS — R30.0 DYSURIA: Primary | ICD-10-CM

## 2018-09-28 DIAGNOSIS — I10 ESSENTIAL HYPERTENSION: ICD-10-CM

## 2018-09-28 RX ORDER — NITROFURANTOIN 25; 75 MG/1; MG/1
100 CAPSULE ORAL 2 TIMES DAILY
Qty: 14 CAPSULE | Refills: 0 | Status: SHIPPED | OUTPATIENT
Start: 2018-09-28 | End: 2018-09-28 | Stop reason: RX

## 2018-09-28 RX ORDER — SULFAMETHOXAZOLE AND TRIMETHOPRIM 400; 80 MG/1; MG/1
1 TABLET ORAL 2 TIMES DAILY
Qty: 14 TABLET | Refills: 0 | Status: SHIPPED | OUTPATIENT
Start: 2018-09-28 | End: 2018-10-17

## 2018-09-28 RX ORDER — VERAPAMIL HYDROCHLORIDE 240 MG/1
TABLET, FILM COATED, EXTENDED RELEASE ORAL
Qty: 180 TABLET | Refills: 1 | Status: SHIPPED | OUTPATIENT
Start: 2018-09-28 | End: 2019-05-28 | Stop reason: SDUPTHER

## 2018-09-28 NOTE — TELEPHONE ENCOUNTER
Notify patient that I have sent an Antibiotic to Her Pharmacy.  She Has No Improvement She Will Need to Be Seen.

## 2018-10-01 ENCOUNTER — OFFICE VISIT (OUTPATIENT)
Dept: SURGERY | Facility: CLINIC | Age: 68
End: 2018-10-01

## 2018-10-01 VITALS — HEART RATE: 108 BPM | WEIGHT: 222.2 LBS | BODY MASS INDEX: 40.89 KG/M2 | OXYGEN SATURATION: 87 % | HEIGHT: 62 IN

## 2018-10-01 DIAGNOSIS — K43.2 INCISIONAL HERNIA, WITHOUT OBSTRUCTION OR GANGRENE: Primary | ICD-10-CM

## 2018-10-01 PROCEDURE — 99213 OFFICE O/P EST LOW 20 MIN: CPT | Performed by: SURGERY

## 2018-10-01 RX ORDER — NITROFURANTOIN 25; 75 MG/1; MG/1
100 CAPSULE ORAL EVERY 12 HOURS SCHEDULED
COMMUNITY
Start: 2018-09-29 | End: 2018-10-01 | Stop reason: ALTCHOICE

## 2018-10-01 RX ORDER — CLOPIDOGREL BISULFATE 75 MG/1
75 TABLET ORAL DAILY
COMMUNITY
End: 2019-02-11

## 2018-10-01 RX ORDER — MECLIZINE HCL 12.5 MG/1
12.5 TABLET ORAL 3 TIMES DAILY PRN
COMMUNITY
End: 2019-05-15

## 2018-10-01 RX ORDER — HYDROCHLOROTHIAZIDE 25 MG/1
25 TABLET ORAL DAILY
COMMUNITY
End: 2019-09-23

## 2018-10-02 NOTE — PROGRESS NOTES
SUMMARY (A/P):    68-year-old lady with large incisional hernia which is mostly symptomatic to her in terms of appearance but otherwise is not particularly bothersome.  Due to her multiple medical comorbidities including morbid obesity, I have strongly advised against surgical intervention.  I think her risks of surgical intervention markedly outweigh any potential benefit and with the size of the fascial defect the risk of strangulation is relatively low.  She knows warning signs to look for.      CC:    Hernia    HPI:    68-year-old lady with 2 year history of increasingly large bulge in the mid abdomen with mild pain that is worse with activity.    PSH:    Appendectomy  Laparoscopic transverse colectomy 1/25/2016, Dr. Richardson, consistent with Crohn's disease)  Right carotid endarterectomy    PMH:    Hypertension  Cerebral vascular accident  COPD on 3 L/m oxygen  Significant arthritis  Diabetes  Crohn's disease diagnosed 2 years ago    FAMILY HISTORY:    Negative for colorectal cancer    SOCIAL HISTORY:   Denies tobacco use  Denies alcohol use    ALLERGIES: reviewed, in Epic    MEDICATIONS: reviewed, in Epic    ROS:  No chest pain.  Chronic shortness of air and dyspnea on exertion.  All other systems reviewed and negative other than presenting complaints.    RADIOLOGY/ENDOSCOPY:    CT abdomen pelvis demonstrated very large left lower quadrant abdominal wall hernia.  On my review the images I agree with the report and there is fairly significant fascial defect and no evidence of complicating features from the hernia    PHYSICAL EXAM:   Constitutional: Well-developed well-nourished, no acute distress  Vital signs: Heart rate 108, weight 222 pounds, height 62 inches, BMI 40.6  Eyes: Conjunctiva normal, sclera nonicteric  ENMT: Hearing grossly normal, oral mucosa moist  Neck: Supple, no palpable mass, normal thyroid, trachea midline  Respiratory: Clear to auscultation, normal inspiratory effort  Cardiovascular: Regular  rate, no murmur, no carotid bruit, no peripheral edema, no jugular venous distention  Gastrointestinal: Soft, nontender, no palpable mass, no hepatosplenomegaly, large incisional hernia in the periumbilical and left of umbilical regions, bowel sounds normal  Lymphatics (palpable nodes):  cervical-negative, axillary-negative  Skin:  Warm, dry, no rash on visualized skin surfaces  Musculoskeletal: Symmetric strength, walks with difficulty using Walker  Psychiatric: Alert and oriented ×3, normal affect     RADHA STEVEN M.D.

## 2018-10-05 DIAGNOSIS — E78.2 MIXED HYPERLIPIDEMIA: ICD-10-CM

## 2018-10-05 DIAGNOSIS — E78.1 HYPERTRIGLYCERIDEMIA: ICD-10-CM

## 2018-10-05 DIAGNOSIS — E11.8 TYPE 2 DIABETES MELLITUS WITH COMPLICATION, WITHOUT LONG-TERM CURRENT USE OF INSULIN (HCC): ICD-10-CM

## 2018-10-17 ENCOUNTER — OFFICE VISIT (OUTPATIENT)
Dept: FAMILY MEDICINE CLINIC | Facility: CLINIC | Age: 68
End: 2018-10-17

## 2018-10-17 VITALS
DIASTOLIC BLOOD PRESSURE: 58 MMHG | TEMPERATURE: 98.3 F | HEART RATE: 114 BPM | BODY MASS INDEX: 41.41 KG/M2 | SYSTOLIC BLOOD PRESSURE: 140 MMHG | HEIGHT: 62 IN | WEIGHT: 225 LBS | OXYGEN SATURATION: 93 % | RESPIRATION RATE: 16 BRPM

## 2018-10-17 DIAGNOSIS — K43.9 VENTRAL HERNIA WITHOUT OBSTRUCTION OR GANGRENE: Primary | ICD-10-CM

## 2018-10-17 DIAGNOSIS — E66.01 MORBID OBESITY (HCC): ICD-10-CM

## 2018-10-17 DIAGNOSIS — I10 ESSENTIAL HYPERTENSION: ICD-10-CM

## 2018-10-17 DIAGNOSIS — E11.8 TYPE 2 DIABETES MELLITUS WITH COMPLICATION, WITHOUT LONG-TERM CURRENT USE OF INSULIN (HCC): ICD-10-CM

## 2018-10-17 DIAGNOSIS — J06.9 ACUTE URI: ICD-10-CM

## 2018-10-17 DIAGNOSIS — R26.81 UNSTEADY GAIT: ICD-10-CM

## 2018-10-17 DIAGNOSIS — E78.2 MIXED HYPERLIPIDEMIA: ICD-10-CM

## 2018-10-17 DIAGNOSIS — Z79.899 HIGH RISK MEDICATION USE: ICD-10-CM

## 2018-10-17 DIAGNOSIS — G89.4 CHRONIC PAIN SYNDROME: ICD-10-CM

## 2018-10-17 PROCEDURE — 99214 OFFICE O/P EST MOD 30 MIN: CPT | Performed by: PHYSICIAN ASSISTANT

## 2018-10-17 RX ORDER — AMOXICILLIN 875 MG/1
875 TABLET, COATED ORAL 2 TIMES DAILY
Qty: 20 TABLET | Refills: 0 | Status: SHIPPED | OUTPATIENT
Start: 2018-10-17 | End: 2018-10-27

## 2018-10-17 RX ORDER — LISINOPRIL 40 MG/1
40 TABLET ORAL DAILY
Qty: 90 TABLET | Refills: 3 | Status: SHIPPED | OUTPATIENT
Start: 2018-10-17 | End: 2019-11-10 | Stop reason: SDUPTHER

## 2018-10-17 RX ORDER — TRAMADOL HYDROCHLORIDE 50 MG/1
TABLET ORAL
Qty: 60 TABLET | Refills: 3
Start: 2018-10-17 | End: 2019-05-06

## 2018-10-17 NOTE — PROGRESS NOTES
Subjective   Mar Camilo is a 68 y.o. female.   Chief Complaint   Patient presents with   • Pain       History of Present Illness     Krys is a 68-year-old female who presents with chronic pain with her back and stomach.  She saw Dr. Do, general surgeon, on October 10, 2018 for possible surgery intervention of her extremely large incisional hernia.  She was not a candidate due to her multiple comorbid conditions.  I have reviewed the office note with her and sister at office visit today.  She states she has chronic pain in her abdomen and joints.  She has taken tramadol in past for her shoulder and leg pain.  This was prescribed by her orthopedist.  She is no longer able to get refills from orthopedist.  States she had no side effects to the medication.  She may take once to twice a day only as needed for pain.  Denied any fevers,chills,nausea, vomiting, diarrhea or dark black tarry stools.      Krys has been having head congestion,clear rhinorrhea,grey production in back of throat,sinus pressure in forehead,ar pressure/itching,scratchy throat and dry cough for the past 2 days ago.  She took one dose of Tylenol cold and Flu which helped slightly.     She has been feeling unsteady off and on.  She slipped and fell last week after picking up her dog's water dish.  Denied any LOC or head injury.  Has not fallen since this time.  She has not been using her walker.  The following portions of the patient's history were reviewed and updated as appropriate: allergies, current medications, past family history, past medical history, past social history and past surgical history.    Review of Systems   Constitutional: Negative.    HENT: Positive for congestion, ear pain, postnasal drip, rhinorrhea, sinus pressure and sore throat. Negative for sinus pain.    Eyes: Negative.    Respiratory: Positive for cough. Negative for wheezing.    Cardiovascular: Negative.  Negative for chest pain, palpitations and leg swelling.  "  Gastrointestinal: Positive for abdominal pain. Negative for anal bleeding, blood in stool, constipation, diarrhea, nausea, rectal pain and vomiting.   Endocrine: Negative.    Genitourinary: Negative.    Musculoskeletal: Negative.         Chronic pain in abdomen,shoulder,back and legs with unsteady gait at times.   Skin: Negative.    Allergic/Immunologic: Negative.    Neurological: Negative.  Negative for dizziness, light-headedness and headaches.   Hematological: Negative.    Psychiatric/Behavioral: Negative.  Negative for sleep disturbance and suicidal ideas.   All other systems reviewed and are negative.    Social History   Substance Use Topics   • Smoking status: Former Smoker     Packs/day: 2.00     Years: 40.00     Types: Cigarettes     Quit date: 04/2014   • Smokeless tobacco: Never Used      Comment: Caffeine use   • Alcohol use No      Comment: history of heavy drinker      Vitals:    10/17/18 0910   BP: 140/58   BP Location: Right arm   Patient Position: Sitting   Cuff Size: Adult   Pulse: 114   Resp: 16   Temp: 98.3 °F (36.8 °C)   TempSrc: Oral   SpO2: 93%   Weight: 102 kg (225 lb)   Height: 157.5 cm (62\")     Wt Readings from Last 3 Encounters:   10/17/18 102 kg (225 lb)   10/01/18 101 kg (222 lb 3.2 oz)   09/17/18 101 kg (222 lb 3.2 oz)       BP Readings from Last 3 Encounters:   10/17/18 140/58   09/17/18 120/60   08/27/18 116/42     Body mass index is 41.15 kg/m².  Allergies   Allergen Reactions   • Contrast Dye Hives   • Iodinated Diagnostic Agents Hives   • Norco [Hydrocodone-Acetaminophen] Hallucinations   • Tenoretic [Atenolol-Chlorthalidone] Anaphylaxis       Objective   Physical Exam   Constitutional: She is oriented to person, place, and time. Vital signs are normal. She appears well-developed and well-nourished.   Morbid obese female with nasal cannula and 3 litters of Oxygen   HENT:   Head: Normocephalic and atraumatic.   Right Ear: Hearing, tympanic membrane, external ear and ear canal " normal.   Left Ear: Hearing, tympanic membrane, external ear and ear canal normal.   Nose: Nose normal.   Mouth/Throat: Uvula is midline and mucous membranes are normal.   1+ clear drainage   Eyes: Conjunctivae and lids are normal.   Neck: Trachea normal and phonation normal. Neck supple. Carotid bruit is not present. No edema present.   Cardiovascular: Normal rate, regular rhythm, S1 normal, S2 normal, normal heart sounds and normal pulses.    Pulmonary/Chest: Effort normal and breath sounds normal.   Abdominal: Soft. Normal appearance and bowel sounds are normal. There is no hepatomegaly. There is tenderness. A hernia is present. Hernia confirmed positive in the ventral area.       Lymphadenopathy:     She has no cervical adenopathy.   Neurological: She is alert and oriented to person, place, and time.   Skin: Skin is warm, dry and intact. Capillary refill takes less than 2 seconds.   Psychiatric: She has a normal mood and affect. Her speech is normal and behavior is normal. Judgment and thought content normal. Cognition and memory are normal.       Assessment/Plan   Mar was seen today for pain.    Diagnoses and all orders for this visit:    Ventral hernia without obstruction or gangrene  -     Compliance Drug Analysis, Ur - Urine, Clean Catch  -     traMADol (ULTRAM) 50 MG tablet; Take 1 pill twice a day for pain    Chronic pain syndrome  -     Compliance Drug Analysis, Ur - Urine, Clean Catch  -     traMADol (ULTRAM) 50 MG tablet; Take 1 pill twice a day for pain    Essential hypertension  -     lisinopril (PRINIVIL,ZESTRIL) 40 MG tablet; Take 1 tablet by mouth Daily.    High risk medication use  -     Compliance Drug Analysis, Ur - Urine, Clean Catch    Mixed hyperlipidemia  -     CBC & Differential  -     Lipid Panel With LDL / HDL Ratio  -     Comprehensive Metabolic Panel    Type 2 diabetes mellitus with complication, without long-term current use of insulin (CMS/[facility])  -     CBC & Differential  -      Hemoglobin A1c  -     Lipid Panel With LDL / HDL Ratio  -     Comprehensive Metabolic Panel  -     MicroAlbumin, Urine, Random - Urine, Clean Catch    Acute URI  -     amoxicillin (AMOXIL) 875 MG tablet; Take 1 tablet by mouth 2 (Two) Times a Day for 10 days.    Morbid obesity (CMS/HCC)    Unsteady gait    1.  Chronic and stable ventral hernia without obstruction and chronic pain syndrome: I have reviewed her general surgeon, Dr. Do's, office note with her at office visit today.  She is not a surgical candidate due to her multiple comorbid conditions.  She was instructed by Dr. Do on signs and symptoms to watch for possible obstruction of her hernia.  Mar has been on tramadol in past for by her orthopedist.  States the medication does help with her hernia pain as well as her chronic pain in her joints.  She has signed the appropriate agreement and consent forms for tramadol.  Dr. Jay Jay Villanueva has approved a refill on tramadol.  She will have a urine drug screen collected for baseline testing at office visit today.  See below for Geoffrey information.  Mar will return to office in 4 months for reevaluation.  2.  Chronic hypertension: I have rechecked blood pressure at office visit today in got 138/60 in left arm.  She will continue her current medications at home as instructed.  I have refilled lisinopril to pharmacy.  3.  Chronic type 2 diabetes with hyperlipidemia: She is fasting at office visit today.  She will have a CBC, CMP, A1c and a lipid profile collected at office visit today.  She will collect a urine microalbumin as well.  Mar will continue her current diabetic medications at home as instructed.  She'll be notified of test results when completed.  Encouraged to decrease carbohydrates and sugar in diet.  We'll reevaluate at office visit in 4 months.  4.  New acute upper respiratory infection: I have prescribed amoxicillin antibiotic to pharmacy.  She will continue using the Tylenol cold  and flu at home.  She'll return to office if symptoms do not improve.  5.  Chronic and stable morbid obesity: We have discussed weight loss.  She'll make dietary changes to help lose weight.  6.  Chronic unsteady gait: Mar states she has an unsteady gait from time to time.  She fell last week while getting down below.  She had no head injury or trauma at that time.  We have talked about using her walker when she is ambulating.  She voiced understanding.  I'll hold imaging study for now.      As part of this patient's treatment plan I am prescribing controlled substances.  The patient has been made aware of appropriate use of such medications, including potential risk of somnolence. Limited ability to drive and/or work safely, and potential for dependence or overdose.  It has also been made clear that these medications are for use by this patient only, without concomitant use of alcohol or other substances unless prescribed.  [facility] report has been reviewed and scanned into the patient's chart.  [facility] number [id number] dated October 15, 2018.

## 2018-10-18 LAB
ALBUMIN SERPL-MCNC: 4 G/DL (ref 3.5–5.2)
ALBUMIN/GLOB SERPL: 1.4 G/DL
ALP SERPL-CCNC: 86 U/L (ref 40–129)
ALT SERPL-CCNC: 18 U/L (ref 5–33)
AST SERPL-CCNC: 15 U/L (ref 5–32)
BASOPHILS # BLD AUTO: 0.03 10*3/MM3 (ref 0–0.2)
BASOPHILS NFR BLD AUTO: 0.2 % (ref 0–2)
BILIRUB SERPL-MCNC: 0.2 MG/DL (ref 0.2–1.2)
BUN SERPL-MCNC: 15 MG/DL (ref 8–23)
BUN/CREAT SERPL: 20.8 (ref 7–25)
CALCIUM SERPL-MCNC: 9.2 MG/DL (ref 8.8–10.5)
CHLORIDE SERPL-SCNC: 102 MMOL/L (ref 98–107)
CHOLEST SERPL-MCNC: 106 MG/DL (ref 0–200)
CO2 SERPL-SCNC: 23.5 MMOL/L (ref 22–29)
CREAT SERPL-MCNC: 0.72 MG/DL (ref 0.57–1)
EOSINOPHIL # BLD AUTO: 0.13 10*3/MM3 (ref 0.1–0.3)
EOSINOPHIL NFR BLD AUTO: 1 % (ref 0–4)
ERYTHROCYTE [DISTWIDTH] IN BLOOD BY AUTOMATED COUNT: 17.9 % (ref 11.5–14.5)
GLOBULIN SER CALC-MCNC: 2.8 GM/DL
GLUCOSE SERPL-MCNC: 140 MG/DL (ref 65–99)
HBA1C MFR BLD: 6.2 % (ref 4.8–5.6)
HCT VFR BLD AUTO: 38.3 % (ref 37–47)
HDLC SERPL-MCNC: 52 MG/DL (ref 40–60)
HGB BLD-MCNC: 11.2 G/DL (ref 12–16)
IMM GRANULOCYTES # BLD: 0.13 10*3/MM3 (ref 0–0.03)
IMM GRANULOCYTES NFR BLD: 1 % (ref 0–0.5)
LDLC SERPL CALC-MCNC: 24 MG/DL (ref 0–100)
LDLC/HDLC SERPL: 0.46 {RATIO}
LYMPHOCYTES # BLD AUTO: 1.11 10*3/MM3 (ref 0.6–4.8)
LYMPHOCYTES NFR BLD AUTO: 8.3 % (ref 20–45)
MCH RBC QN AUTO: 25.8 PG (ref 27–31)
MCHC RBC AUTO-ENTMCNC: 29.2 G/DL (ref 31–37)
MCV RBC AUTO: 88.2 FL (ref 81–99)
MICROALBUMIN UR-MCNC: 69.6 UG/ML
MONOCYTES # BLD AUTO: 0.98 10*3/MM3 (ref 0–1)
MONOCYTES NFR BLD AUTO: 7.3 % (ref 3–8)
NEUTROPHILS # BLD AUTO: 11.07 10*3/MM3 (ref 1.5–8.3)
NEUTROPHILS NFR BLD AUTO: 82.2 % (ref 45–70)
NRBC BLD AUTO-RTO: 0 /100 WBC (ref 0–0)
PLATELET # BLD AUTO: 388 10*3/MM3 (ref 140–500)
POTASSIUM SERPL-SCNC: 4.5 MMOL/L (ref 3.5–5.2)
PROT SERPL-MCNC: 6.8 G/DL (ref 6–8.5)
RBC # BLD AUTO: 4.34 10*6/MM3 (ref 4.2–5.4)
SODIUM SERPL-SCNC: 138 MMOL/L (ref 136–145)
TRIGL SERPL-MCNC: 150 MG/DL (ref 0–150)
VLDLC SERPL CALC-MCNC: 30 MG/DL (ref 7–27)
WBC # BLD AUTO: 13.45 10*3/MM3 (ref 4.8–10.8)

## 2018-10-22 ENCOUNTER — HOSPITAL ENCOUNTER (OUTPATIENT)
Dept: INFUSION THERAPY | Facility: HOSPITAL | Age: 68
Discharge: HOME | End: 2018-10-22
Attending: INTERNAL MEDICINE | Admitting: INTERNAL MEDICINE

## 2018-10-22 VITALS
DIASTOLIC BLOOD PRESSURE: 57 MMHG | BODY MASS INDEX: 41.15 KG/M2 | HEART RATE: 78 BPM | SYSTOLIC BLOOD PRESSURE: 128 MMHG | WEIGHT: 225 LBS | RESPIRATION RATE: 18 BRPM | OXYGEN SATURATION: 90 % | TEMPERATURE: 98.1 F

## 2018-10-22 DIAGNOSIS — K50.119 CROHN'S DISEASE OF COLON WITH COMPLICATION (HCC): ICD-10-CM

## 2018-10-22 PROCEDURE — 96415 CHEMO IV INFUSION ADDL HR: CPT

## 2018-10-22 PROCEDURE — 96366 THER/PROPH/DIAG IV INF ADDON: CPT

## 2018-10-22 PROCEDURE — 96365 THER/PROPH/DIAG IV INF INIT: CPT

## 2018-10-22 PROCEDURE — A9270 NON-COVERED ITEM OR SERVICE: HCPCS

## 2018-10-22 PROCEDURE — 96413 CHEMO IV INFUSION 1 HR: CPT

## 2018-10-22 PROCEDURE — 63710000001 DIPHENHYDRAMINE PER 50 MG

## 2018-10-22 PROCEDURE — 25010000002 INFLIXIMAB PER 10 MG: Performed by: INTERNAL MEDICINE

## 2018-10-22 PROCEDURE — 63710000001 ACETAMINOPHEN 325 MG TABLET

## 2018-10-22 RX ORDER — ACETAMINOPHEN 325 MG/1
TABLET ORAL
Status: COMPLETED
Start: 2018-10-22 | End: 2018-10-22

## 2018-10-22 RX ORDER — DIPHENHYDRAMINE HCL 25 MG
25 CAPSULE ORAL ONCE
Status: CANCELLED | OUTPATIENT
Start: 2018-10-22

## 2018-10-22 RX ORDER — ACETAMINOPHEN 325 MG/1
650 TABLET ORAL ONCE
Status: CANCELLED | OUTPATIENT
Start: 2018-10-22

## 2018-10-22 RX ORDER — ACETAMINOPHEN 325 MG/1
650 TABLET ORAL ONCE
Status: COMPLETED | OUTPATIENT
Start: 2018-10-22 | End: 2018-10-22

## 2018-10-22 RX ORDER — DIPHENHYDRAMINE HCL 25 MG
25 CAPSULE ORAL ONCE
Status: COMPLETED | OUTPATIENT
Start: 2018-10-22 | End: 2018-10-22

## 2018-10-22 RX ORDER — DIPHENHYDRAMINE HCL 25 MG
CAPSULE ORAL
Status: COMPLETED
Start: 2018-10-22 | End: 2018-10-22

## 2018-10-22 RX ORDER — SODIUM CHLORIDE 9 MG/ML
250 INJECTION, SOLUTION INTRAVENOUS ONCE
Status: CANCELLED | OUTPATIENT
Start: 2018-10-22

## 2018-10-22 RX ADMIN — INFLIXIMAB 510 MG: 100 INJECTION, POWDER, LYOPHILIZED, FOR SOLUTION INTRAVENOUS at 11:33

## 2018-10-22 RX ADMIN — Medication 25 MG: at 11:15

## 2018-10-22 RX ADMIN — DIPHENHYDRAMINE HYDROCHLORIDE 25 MG: 25 CAPSULE ORAL at 11:15

## 2018-10-22 RX ADMIN — ACETAMINOPHEN 650 MG: 325 TABLET ORAL at 11:15

## 2018-10-22 NOTE — NURSING NOTE
Pt arrived by wheelchair at 1050, scheduled infusion transfused without difficulty. IV removed and left by wheelchair with sister.

## 2018-10-22 NOTE — PATIENT INSTRUCTIONS
"  Call Dr. Roberto Carlos Umaña, Gastroenterology at [phone] if you have any problems or concerns.    We know you have a Choice in healthcare and appreciate you using [facility].  Our purpose is to provide you \"Excellent Care\".  We hope that you will always choose us in the future and continue to recommend us to your family and friends.            Infliximab injection  What is this medicine?  INFLIXIMAB (in FLIX i mab) is used to treat Crohn's disease and ulcerative colitis. It is also used to treat ankylosing spondylitis, plaque psoriasis, and some forms of arthritis.  This medicine may be used for other purposes; ask your health care provider or pharmacist if you have questions.  COMMON BRAND NAME(S): INFLECTRA, Remicade, RENFLEXIS  What should I tell my health care provider before I take this medicine?  They need to know if you have any of these conditions:  -cancer  -current or past resident of [place]  -diabetes  -exposure to tuberculosis  -Guillain-Marcus syndrome  -heart failure  -hepatitis or liver disease  -immune system problems  -infection  -lung or breathing disease, like COPD  -multiple sclerosis  -receiving phototherapy for the skin  -seizure disorder  -an unusual or allergic reaction to infliximab, mouse proteins, other medicines, foods, dyes, or preservatives  -pregnant or trying to get pregnant  -breast-feeding  How should I use this medicine?  This medicine is for injection into a vein. It is usually given by a health care professional in a hospital or clinic setting.  A special MedGuide will be given to you by the pharmacist with each prescription and refill. Be sure to read this information carefully each time.  Talk to your pediatrician regarding the use of this medicine in children. While this drug may be prescribed for children as young as 6 years of age for selected conditions, precautions do apply.  Overdosage: If you think you have taken too much " of this medicine contact a poison control center or emergency room at once.  NOTE: This medicine is only for you. Do not share this medicine with others.  What if I miss a dose?  It is important not to miss your dose. Call your doctor or health care professional if you are unable to keep an appointment.  What may interact with this medicine?  Do not take this medicine with any of the following medications:  -biologic medicines such as abatacept, adalimumab, anakinra, certolizumab, etanercept, golimumab, rituximab, secukinumab, tocilizumab, tofactinib, ustekinumab  -live vaccines  This list may not describe all possible interactions. Give your health care provider a list of all the medicines, herbs, non-prescription drugs, or dietary supplements you use. Also tell them if you smoke, drink alcohol, or use illegal drugs. Some items may interact with your medicine.  What should I watch for while using this medicine?  Your condition will be monitored carefully while you are receiving this medicine. Visit your doctor or health care professional for regular checks on your progress. You may need blood work done while you are taking this medicine. Before beginning therapy, your doctor may do a test to see if you have been exposed to tuberculosis.  Call your doctor or health care professional for advice if you get a fever, chills or sore throat, or other symptoms of a cold or flu. Do not treat yourself. This drug decreases your body's ability to fight infections. Try to avoid being around people who are sick.  This medicine may make the symptoms of heart failure worse in some patients. If you notice symptoms such as increased shortness of breath or swelling of the ankles or legs, contact your health care provider right away.  If you are going to have surgery or dental work, tell your health care professional or dentist that you have received this medicine.  If you take this medicine for plaque psoriasis, stay out of the sun.  If you cannot avoid being in the sun, wear protective clothing and use sunscreen. Do not use sun lamps or tanning beds/booths.  Talk to your doctor about your risk of cancer. You may be more at risk for certain types of cancers if you take this medicine.  What side effects may I notice from receiving this medicine?  Side effects that you should report to your doctor or health care professional as soon as possible:  -allergic reactions like skin rash, itching or hives, swelling of the face, lips, or tongue  -breathing problems  -changes in vision  -chest pain  -fever or chills, usually related to the infusion  -joint pain  -pain, tingling, numbness in the hands or feet  -redness, blistering, peeling or loosening of the skin, including inside the mouth  -seizures  -signs of infection - fever or chills, cough, sore throat, flu-like symptoms, pain or difficulty passing urine  -signs and symptoms of liver injury like dark yellow or brown urine; general ill feeling; light-colored stools; loss of appetite; nausea; right upper belly pain; unusually weak or tired; yellowing of the eyes or skin  -signs and symptoms of a stroke like changes in vision; confusion; trouble speaking or understanding; severe headaches; sudden numbness or weakness of the face, arm or leg; trouble walking; dizziness; loss of balance or coordination  -swelling of the ankles, feet, or hands  -swollen lymph nodes in the neck, underarm, or groin areas  -unusual bleeding or bruising  -unusually weak or tired  Side effects that usually do not require medical attention (report to your doctor or health care professional if they continue or are bothersome):  -headache  -nausea  -stomach pain  -upset stomach  This list may not describe all possible side effects. Call your doctor for medical advice about side effects. You may report side effects to FDA at 0-122-FDA-6954.  Where should I keep my medicine?  This drug is given in a hospital or clinic and will not  be stored at home.  NOTE: This sheet is a summary. It may not cover all possible information. If you have questions about this medicine, talk to your doctor, pharmacist, or health care provider.  © 2018 Elsevier/Gold Standard (2018-01-17 13:45:32)

## 2018-10-24 LAB — DRUGS UR: NORMAL

## 2018-11-09 ENCOUNTER — TELEPHONE (OUTPATIENT)
Dept: FAMILY MEDICINE CLINIC | Facility: CLINIC | Age: 68
End: 2018-11-09

## 2018-11-09 NOTE — TELEPHONE ENCOUNTER
Pharmacy requesting a refill on her HCTZ 25mg 1 daily that was originally given to her by a hospitalst.  Is it ok to refill?  It is on her med list but as a historical prescriber.

## 2018-11-09 NOTE — TELEPHONE ENCOUNTER
She was seen by Dr. Mata,cardiologist in September and was not on the HCTZ at that time.  Would like to hold for now since she has not been on sin before then.  What is her BP reading at home?

## 2018-11-09 NOTE — TELEPHONE ENCOUNTER
Pt said she didn't request it.  Her blood pressures have been fine, they have actually been better then when she is here.

## 2018-11-13 ENCOUNTER — TRANSCRIBE ORDERS (OUTPATIENT)
Dept: ADMINISTRATIVE | Facility: HOSPITAL | Age: 68
End: 2018-11-13

## 2018-11-13 DIAGNOSIS — R06.00 DYSPNEA, UNSPECIFIED TYPE: Primary | ICD-10-CM

## 2018-12-06 DIAGNOSIS — E78.2 MIXED HYPERLIPIDEMIA: ICD-10-CM

## 2018-12-06 DIAGNOSIS — E78.1 HYPERTRIGLYCERIDEMIA: ICD-10-CM

## 2018-12-07 RX ORDER — ROSUVASTATIN CALCIUM 40 MG/1
TABLET, COATED ORAL
Qty: 30 TABLET | Refills: 3 | Status: SHIPPED | OUTPATIENT
Start: 2018-12-07 | End: 2019-03-18 | Stop reason: SDUPTHER

## 2018-12-17 ENCOUNTER — HOSPITAL ENCOUNTER (OUTPATIENT)
Dept: INFUSION THERAPY | Facility: HOSPITAL | Age: 68
Discharge: HOME | End: 2018-12-17
Attending: INTERNAL MEDICINE | Admitting: INTERNAL MEDICINE

## 2018-12-17 VITALS
HEART RATE: 61 BPM | BODY MASS INDEX: 41.15 KG/M2 | DIASTOLIC BLOOD PRESSURE: 53 MMHG | OXYGEN SATURATION: 95 % | RESPIRATION RATE: 20 BRPM | TEMPERATURE: 98.1 F | WEIGHT: 225 LBS | SYSTOLIC BLOOD PRESSURE: 116 MMHG

## 2018-12-17 DIAGNOSIS — K50.10 CROHN'S DISEASE OF LARGE INTESTINE WITHOUT COMPLICATION (HCC): ICD-10-CM

## 2018-12-17 DIAGNOSIS — K50.119 CROHN'S DISEASE OF COLON WITH COMPLICATION (HCC): Primary | ICD-10-CM

## 2018-12-17 PROCEDURE — 96415 CHEMO IV INFUSION ADDL HR: CPT

## 2018-12-17 PROCEDURE — 63710000001 ACETAMINOPHEN 325 MG TABLET: Performed by: INTERNAL MEDICINE

## 2018-12-17 PROCEDURE — 96413 CHEMO IV INFUSION 1 HR: CPT

## 2018-12-17 PROCEDURE — 25010000002 INFLIXIMAB PER 10 MG: Performed by: INTERNAL MEDICINE

## 2018-12-17 PROCEDURE — A9270 NON-COVERED ITEM OR SERVICE: HCPCS | Performed by: INTERNAL MEDICINE

## 2018-12-17 PROCEDURE — 63710000001 DIPHENHYDRAMINE PER 50 MG: Performed by: INTERNAL MEDICINE

## 2018-12-17 RX ORDER — ACETAMINOPHEN 325 MG/1
650 TABLET ORAL ONCE
Status: CANCELLED | OUTPATIENT
Start: 2018-12-17

## 2018-12-17 RX ORDER — DIPHENHYDRAMINE HCL 25 MG
25 CAPSULE ORAL ONCE
Status: CANCELLED | OUTPATIENT
Start: 2018-12-17

## 2018-12-17 RX ORDER — SODIUM CHLORIDE 9 MG/ML
250 INJECTION, SOLUTION INTRAVENOUS ONCE
Status: CANCELLED | OUTPATIENT
Start: 2018-12-17

## 2018-12-17 RX ORDER — DIPHENHYDRAMINE HCL 25 MG
25 CAPSULE ORAL ONCE
Status: COMPLETED | OUTPATIENT
Start: 2018-12-17 | End: 2018-12-17

## 2018-12-17 RX ORDER — SODIUM CHLORIDE 9 MG/ML
250 INJECTION, SOLUTION INTRAVENOUS ONCE
Status: DISCONTINUED | OUTPATIENT
Start: 2018-12-17 | End: 2018-12-19 | Stop reason: HOSPADM

## 2018-12-17 RX ORDER — ACETAMINOPHEN 325 MG/1
650 TABLET ORAL ONCE
Status: COMPLETED | OUTPATIENT
Start: 2018-12-17 | End: 2018-12-17

## 2018-12-17 RX ADMIN — ACETAMINOPHEN 650 MG: 325 TABLET, FILM COATED ORAL at 11:14

## 2018-12-17 RX ADMIN — DIPHENHYDRAMINE HYDROCHLORIDE 25 MG: 25 CAPSULE ORAL at 11:15

## 2018-12-17 RX ADMIN — INFLIXIMAB 510 MG: 100 INJECTION, POWDER, LYOPHILIZED, FOR SOLUTION INTRAVENOUS at 11:48

## 2018-12-17 NOTE — PATIENT INSTRUCTIONS
"  Call Dr. Roberto Carlos Umaña, Gastroenterology at [phone] if you have any problems or concerns.    We know you have a Choice in healthcare and appreciate you using [facility].  Our purpose is to provide you \"Excellent Care\".  We hope that you will always choose us in the future and continue to recommend us to your family and friends.            Infliximab Infusion  What is this medicine?  INFLIXIMAB (in FLIX i mab) is used to treat Crohn's disease and ulcerative colitis. It is also used to treat ankylosing spondylitis, plaque psoriasis, and some forms of arthritis.  This medicine may be used for other purposes; ask your health care provider or pharmacist if you have questions.  COMMON BRAND NAME(S): INFLECTRA, Remicade, RENFLEXIS  What should I tell my health care provider before I take this medicine?  They need to know if you have any of these conditions:  -cancer  -current or past resident of [place]  -diabetes  -exposure to tuberculosis  -Guillain-[place] syndrome  -heart failure  -hepatitis or liver disease  -immune system problems  -infection  -lung or breathing disease, like COPD  -multiple sclerosis  -receiving phototherapy for the skin  -seizure disorder  -an unusual or allergic reaction to infliximab, mouse proteins, other medicines, foods, dyes, or preservatives  -pregnant or trying to get pregnant  -breast-feeding  How should I use this medicine?  This medicine is for injection into a vein. It is usually given by a health care professional in a hospital or clinic setting.  A special MedGuide will be given to you by the pharmacist with each prescription and refill. Be sure to read this information carefully each time.  Talk to your pediatrician regarding the use of this medicine in children. While this drug may be prescribed for children as young as 6 years of age for selected conditions, precautions do apply.  Overdosage: If you think you have taken too much of " this medicine contact a poison control center or emergency room at once.  NOTE: This medicine is only for you. Do not share this medicine with others.  What if I miss a dose?  It is important not to miss your dose. Call your doctor or health care professional if you are unable to keep an appointment.  What may interact with this medicine?  Do not take this medicine with any of the following medications:  -biologic medicines such as abatacept, adalimumab, anakinra, certolizumab, etanercept, golimumab, rituximab, secukinumab, tocilizumab, tofactinib, ustekinumab  -live vaccines  This list may not describe all possible interactions. Give your health care provider a list of all the medicines, herbs, non-prescription drugs, or dietary supplements you use. Also tell them if you smoke, drink alcohol, or use illegal drugs. Some items may interact with your medicine.  What should I watch for while using this medicine?  Your condition will be monitored carefully while you are receiving this medicine. Visit your doctor or health care professional for regular checks on your progress. You may need blood work done while you are taking this medicine. Before beginning therapy, your doctor may do a test to see if you have been exposed to tuberculosis.  Call your doctor or health care professional for advice if you get a fever, chills or sore throat, or other symptoms of a cold or flu. Do not treat yourself. This drug decreases your body's ability to fight infections. Try to avoid being around people who are sick.  This medicine may make the symptoms of heart failure worse in some patients. If you notice symptoms such as increased shortness of breath or swelling of the ankles or legs, contact your health care provider right away.  If you are going to have surgery or dental work, tell your health care professional or dentist that you have received this medicine.  If you take this medicine for plaque psoriasis, stay out of the sun. If  you cannot avoid being in the sun, wear protective clothing and use sunscreen. Do not use sun lamps or tanning beds/booths.  Talk to your doctor about your risk of cancer. You may be more at risk for certain types of cancers if you take this medicine.  What side effects may I notice from receiving this medicine?  Side effects that you should report to your doctor or health care professional as soon as possible:  -allergic reactions like skin rash, itching or hives, swelling of the face, lips, or tongue  -breathing problems  -changes in vision  -chest pain  -fever or chills, usually related to the infusion  -joint pain  -pain, tingling, numbness in the hands or feet  -redness, blistering, peeling or loosening of the skin, including inside the mouth  -seizures  -signs of infection - fever or chills, cough, sore throat, flu-like symptoms, pain or difficulty passing urine  -signs and symptoms of liver injury like dark yellow or brown urine; general ill feeling; light-colored stools; loss of appetite; nausea; right upper belly pain; unusually weak or tired; yellowing of the eyes or skin  -signs and symptoms of a stroke like changes in vision; confusion; trouble speaking or understanding; severe headaches; sudden numbness or weakness of the face, arm or leg; trouble walking; dizziness; loss of balance or coordination  -swelling of the ankles, feet, or hands  -swollen lymph nodes in the neck, underarm, or groin areas  -unusual bleeding or bruising  -unusually weak or tired  Side effects that usually do not require medical attention (report to your doctor or health care professional if they continue or are bothersome):  -headache  -nausea  -stomach pain  -upset stomach  This list may not describe all possible side effects. Call your doctor for medical advice about side effects. You may report side effects to FDA at 1-480-FDA-1321.  Where should I keep my medicine?  This drug is given in a hospital or clinic and will not be  stored at home.  NOTE: This sheet is a summary. It may not cover all possible information. If you have questions about this medicine, talk to your doctor, pharmacist, or health care provider.  © 2018 Elsevier/Gold Standard (2018-01-17 13:45:32)

## 2018-12-17 NOTE — NURSING NOTE
Pt arrived by wheelchair at 1102 without O2. Once in the room pt stated she normally wears 3L O2 but was out of portable oxygen tanks. O2 sat upon arrival 87% placed 3L NC sat increased to 93%  Scheduled remicade given, pt tolerated well

## 2019-01-25 RX ORDER — DILTIAZEM HYDROCHLORIDE 60 MG/1
2 TABLET, FILM COATED ORAL
Qty: 10.2 G | Refills: 6 | Status: SHIPPED | OUTPATIENT
Start: 2019-01-25 | End: 2019-05-06 | Stop reason: DRUGHIGH

## 2019-02-11 ENCOUNTER — HOSPITAL ENCOUNTER (OUTPATIENT)
Dept: INFUSION THERAPY | Facility: HOSPITAL | Age: 69
Discharge: HOME | End: 2019-02-11
Attending: INTERNAL MEDICINE | Admitting: INTERNAL MEDICINE

## 2019-02-11 VITALS
TEMPERATURE: 97 F | RESPIRATION RATE: 16 BRPM | HEIGHT: 62 IN | OXYGEN SATURATION: 92 % | SYSTOLIC BLOOD PRESSURE: 144 MMHG | DIASTOLIC BLOOD PRESSURE: 79 MMHG | HEART RATE: 92 BPM | BODY MASS INDEX: 41.7 KG/M2 | WEIGHT: 226.6 LBS

## 2019-02-11 DIAGNOSIS — K50.10 CROHN'S DISEASE OF LARGE INTESTINE WITHOUT COMPLICATION (HCC): Primary | ICD-10-CM

## 2019-02-11 DIAGNOSIS — K50.119 CROHN'S DISEASE OF COLON WITH COMPLICATION (HCC): ICD-10-CM

## 2019-02-11 PROCEDURE — 96415 CHEMO IV INFUSION ADDL HR: CPT

## 2019-02-11 PROCEDURE — 63710000001 DIPHENHYDRAMINE PER 50 MG

## 2019-02-11 PROCEDURE — 25010000002 INFLIXIMAB PER 10 MG: Performed by: INTERNAL MEDICINE

## 2019-02-11 PROCEDURE — A9270 NON-COVERED ITEM OR SERVICE: HCPCS

## 2019-02-11 PROCEDURE — 63710000001 ACETAMINOPHEN 325 MG TABLET

## 2019-02-11 PROCEDURE — 96413 CHEMO IV INFUSION 1 HR: CPT

## 2019-02-11 RX ORDER — SODIUM CHLORIDE 9 MG/ML
250 INJECTION, SOLUTION INTRAVENOUS ONCE
Status: CANCELLED | OUTPATIENT
Start: 2019-02-11

## 2019-02-11 RX ORDER — DIPHENHYDRAMINE HCL 25 MG
25 CAPSULE ORAL ONCE
Status: COMPLETED | OUTPATIENT
Start: 2019-02-11 | End: 2019-02-11

## 2019-02-11 RX ORDER — DIPHENHYDRAMINE HCL 25 MG
25 CAPSULE ORAL ONCE
Status: CANCELLED | OUTPATIENT
Start: 2019-02-11

## 2019-02-11 RX ORDER — ACETAMINOPHEN 325 MG/1
TABLET ORAL
Status: COMPLETED
Start: 2019-02-11 | End: 2019-02-11

## 2019-02-11 RX ORDER — ACETAMINOPHEN 325 MG/1
650 TABLET ORAL ONCE
Status: CANCELLED | OUTPATIENT
Start: 2019-02-11

## 2019-02-11 RX ORDER — DIPHENHYDRAMINE HCL 25 MG
CAPSULE ORAL
Status: COMPLETED
Start: 2019-02-11 | End: 2019-02-11

## 2019-02-11 RX ORDER — ACETAMINOPHEN 325 MG/1
650 TABLET ORAL ONCE
Status: COMPLETED | OUTPATIENT
Start: 2019-02-11 | End: 2019-02-11

## 2019-02-11 RX ADMIN — INFLIXIMAB 515 MG: 100 INJECTION, POWDER, LYOPHILIZED, FOR SOLUTION INTRAVENOUS at 11:56

## 2019-02-11 RX ADMIN — ACETAMINOPHEN 650 MG: 325 TABLET, FILM COATED ORAL at 10:50

## 2019-02-11 RX ADMIN — Medication 25 MG: at 10:50

## 2019-02-11 RX ADMIN — ACETAMINOPHEN 650 MG: 325 TABLET ORAL at 10:50

## 2019-02-11 RX ADMIN — DIPHENHYDRAMINE HYDROCHLORIDE 25 MG: 25 CAPSULE ORAL at 10:50

## 2019-02-11 NOTE — NURSING NOTE
NURSING PROGRESS NOTE       Pt to [facility] per scheduled appointment .Pt ID verified by (2) identifiers. Allergies, medications and medical history reviewed and verified. Tolerated prescribed treatment without incident. Discharged to home per w/c with sister.  AVS reviewed with patient. Condition Satisfactory. Carmen Montanez RN

## 2019-02-17 DIAGNOSIS — F32.0 MILD SINGLE CURRENT EPISODE OF MAJOR DEPRESSIVE DISORDER (HCC): ICD-10-CM

## 2019-02-18 RX ORDER — FLUOXETINE HYDROCHLORIDE 40 MG/1
CAPSULE ORAL
Qty: 30 CAPSULE | Refills: 3 | Status: SHIPPED | OUTPATIENT
Start: 2019-02-18 | End: 2019-07-05 | Stop reason: SDUPTHER

## 2019-03-11 DIAGNOSIS — J44.1 CHRONIC OBSTRUCTIVE PULMONARY DISEASE WITH ACUTE EXACERBATION (HCC): ICD-10-CM

## 2019-03-12 RX ORDER — ALBUTEROL SULFATE 90 UG/1
AEROSOL, METERED RESPIRATORY (INHALATION)
Qty: 1 INHALER | Refills: 6 | Status: SHIPPED | OUTPATIENT
Start: 2019-03-12 | End: 2020-08-10

## 2019-03-18 DIAGNOSIS — E78.1 HYPERTRIGLYCERIDEMIA: ICD-10-CM

## 2019-03-18 DIAGNOSIS — E78.2 MIXED HYPERLIPIDEMIA: ICD-10-CM

## 2019-03-18 RX ORDER — ROSUVASTATIN CALCIUM 40 MG/1
40 TABLET, COATED ORAL DAILY
Qty: 30 TABLET | Refills: 3 | Status: SHIPPED | OUTPATIENT
Start: 2019-03-18 | End: 2019-12-23 | Stop reason: SDUPTHER

## 2019-04-08 ENCOUNTER — HOSPITAL ENCOUNTER (OUTPATIENT)
Dept: INFUSION THERAPY | Facility: HOSPITAL | Age: 69
Discharge: HOME | End: 2019-04-08
Admitting: INTERNAL MEDICINE

## 2019-04-08 VITALS
BODY MASS INDEX: 41.44 KG/M2 | TEMPERATURE: 98.6 F | OXYGEN SATURATION: 93 % | RESPIRATION RATE: 16 BRPM | WEIGHT: 225.2 LBS | HEART RATE: 81 BPM | HEIGHT: 62 IN | DIASTOLIC BLOOD PRESSURE: 52 MMHG | SYSTOLIC BLOOD PRESSURE: 117 MMHG

## 2019-04-08 DIAGNOSIS — K50.10 CROHN'S DISEASE OF LARGE INTESTINE WITHOUT COMPLICATION (HCC): Primary | ICD-10-CM

## 2019-04-08 DIAGNOSIS — K50.119 CROHN'S DISEASE OF COLON WITH COMPLICATION (HCC): ICD-10-CM

## 2019-04-08 PROCEDURE — 96415 CHEMO IV INFUSION ADDL HR: CPT

## 2019-04-08 PROCEDURE — 63710000001 ACETAMINOPHEN 325 MG TABLET: Performed by: INTERNAL MEDICINE

## 2019-04-08 PROCEDURE — 25010000002 INFLIXIMAB PER 10 MG: Performed by: INTERNAL MEDICINE

## 2019-04-08 PROCEDURE — 96413 CHEMO IV INFUSION 1 HR: CPT

## 2019-04-08 PROCEDURE — A9270 NON-COVERED ITEM OR SERVICE: HCPCS | Performed by: INTERNAL MEDICINE

## 2019-04-08 PROCEDURE — 63710000001 DIPHENHYDRAMINE PER 50 MG: Performed by: INTERNAL MEDICINE

## 2019-04-08 RX ORDER — ACETAMINOPHEN 325 MG/1
650 TABLET ORAL ONCE
Status: COMPLETED | OUTPATIENT
Start: 2019-04-08 | End: 2019-04-08

## 2019-04-08 RX ORDER — DIPHENHYDRAMINE HCL 25 MG
25 CAPSULE ORAL ONCE
Status: CANCELLED | OUTPATIENT
Start: 2019-04-08

## 2019-04-08 RX ORDER — DIPHENHYDRAMINE HCL 25 MG
25 CAPSULE ORAL ONCE
Status: COMPLETED | OUTPATIENT
Start: 2019-04-08 | End: 2019-04-08

## 2019-04-08 RX ORDER — ACETAMINOPHEN 325 MG/1
650 TABLET ORAL ONCE
Status: CANCELLED | OUTPATIENT
Start: 2019-04-08

## 2019-04-08 RX ORDER — SODIUM CHLORIDE 9 MG/ML
250 INJECTION, SOLUTION INTRAVENOUS ONCE
Status: CANCELLED | OUTPATIENT
Start: 2019-04-08

## 2019-04-08 RX ADMIN — ACETAMINOPHEN 650 MG: 325 TABLET, FILM COATED ORAL at 10:55

## 2019-04-08 RX ADMIN — DIPHENHYDRAMINE HYDROCHLORIDE 25 MG: 25 CAPSULE ORAL at 10:55

## 2019-04-08 RX ADMIN — INFLIXIMAB 510 MG: 100 INJECTION, POWDER, LYOPHILIZED, FOR SOLUTION INTRAVENOUS at 11:34

## 2019-04-08 NOTE — NURSING NOTE
NURSING PROGRESS NOTE      Started Remicade at 1112 , completed 1356. Tolerated prescribed treatment without incident. Discharged to home per w/c with sister. . AVS reviewed with patient.Copy given. Condition Satisfactory. Carmen Montanez RN

## 2019-04-08 NOTE — NURSING NOTE
NURSING PROGRESS NOTE      1050 Pt to ACC per w/c with sister .Pt ID verified by (2) identifiers. Allergies, medications and medical history reviewed and verified.Pre-meds given. Carmen Montanez RN

## 2019-04-08 NOTE — PATIENT INSTRUCTIONS
"  Call Dr. Roberto Carlos Umaña, Gastroenterology at [phone] if you have any problems or concerns.    We know you have a Choice in healthcare and appreciate you using [facility].  Our purpose is to provide you \"Excellent Care\".  We hope that you will always choose us in the future and continue to recommend us to your family and friends.              "

## 2019-05-06 ENCOUNTER — OFFICE VISIT (OUTPATIENT)
Dept: FAMILY MEDICINE CLINIC | Facility: CLINIC | Age: 69
End: 2019-05-06

## 2019-05-06 VITALS
DIASTOLIC BLOOD PRESSURE: 58 MMHG | SYSTOLIC BLOOD PRESSURE: 120 MMHG | TEMPERATURE: 98.3 F | OXYGEN SATURATION: 91 % | BODY MASS INDEX: 42.14 KG/M2 | HEIGHT: 62 IN | HEART RATE: 111 BPM | WEIGHT: 229 LBS | RESPIRATION RATE: 28 BRPM

## 2019-05-06 DIAGNOSIS — E78.2 MIXED HYPERLIPIDEMIA: ICD-10-CM

## 2019-05-06 DIAGNOSIS — E66.01 MORBID OBESITY (HCC): ICD-10-CM

## 2019-05-06 DIAGNOSIS — G89.29 CHRONIC PAIN OF BOTH SHOULDERS: ICD-10-CM

## 2019-05-06 DIAGNOSIS — R56.9 SEIZURE (HCC): ICD-10-CM

## 2019-05-06 DIAGNOSIS — M25.511 CHRONIC PAIN OF BOTH SHOULDERS: ICD-10-CM

## 2019-05-06 DIAGNOSIS — M25.512 CHRONIC PAIN OF BOTH SHOULDERS: ICD-10-CM

## 2019-05-06 DIAGNOSIS — I10 ESSENTIAL HYPERTENSION: ICD-10-CM

## 2019-05-06 DIAGNOSIS — E11.8 TYPE 2 DIABETES MELLITUS WITH COMPLICATION, WITHOUT LONG-TERM CURRENT USE OF INSULIN (HCC): Primary | ICD-10-CM

## 2019-05-06 PROBLEM — I63.9 CEREBROVASCULAR ACCIDENT (CVA) (HCC): Status: ACTIVE | Noted: 2019-03-18

## 2019-05-06 PROBLEM — Z86.79 H/O CAROTID STENOSIS: Status: ACTIVE | Noted: 2019-03-18

## 2019-05-06 PROCEDURE — 99214 OFFICE O/P EST MOD 30 MIN: CPT | Performed by: PHYSICIAN ASSISTANT

## 2019-05-06 RX ORDER — BUDESONIDE AND FORMOTEROL FUMARATE DIHYDRATE 160; 4.5 UG/1; UG/1
2 AEROSOL RESPIRATORY (INHALATION)
COMMUNITY
End: 2019-09-23

## 2019-05-06 NOTE — PROGRESS NOTES
Subjective   Mar Camilo is a 68 y.o. female presents for   Chief Complaint   Patient presents with   • Med Refill     chronic pain   • Diabetes       History of Present Illness   Mar is a 68 year old female who presents with her sister for chronic pain management and type 2 diabetes.  She currently sees neurologist, Dr. Carranza, for seizures.  She was told she needed to come off the tramadol medication.  Krys states she has started to wean off the tramadol.  She only takes this very rarely now.  She has been taking over-the-counter Tylenol at least twice a day.  States she cannot take hydrocodone products due to hallucinations.  States she has chronic right shoulder pain.  States the only thing they can do was surgery which the orthopedist does not want to do at this time.  She has tried injections in shoulder which helped very little.  States the Tylenol does better than the shots.  Denied any recent injury or trauma to shoulders.  She has used some Lidoderm pads in past which helped very little.  Krys's diet has been slightly healthy.  Sleep has been normal to slightly restless.  She currently having 3 L of oxygen continuously for her lung disorder.  She sees Dr. Hernadez for that.  She sees Dr. Bhanu Mata, cardiologist,for her cardiac needs.  She has an upcoming appointment. She denied any pain, shortness of air, dizziness, vision changes, or swelling of ankles.  She is due to see the ophthalmologist.      The following portions of the patient's history were reviewed and updated as appropriate: allergies, current medications, past family history, past medical history, past social history, past surgical history and problem list.    Review of Systems   Constitutional: Negative.    HENT: Negative.    Eyes: Negative.    Respiratory: Negative.  Negative for cough, shortness of breath and wheezing.    Cardiovascular: Negative.  Negative for chest pain, palpitations and leg swelling.   Gastrointestinal: Negative.   "  Endocrine: Negative.    Genitourinary: Negative.    Musculoskeletal: Positive for arthralgias (shoulder pain).   Skin: Negative.    Allergic/Immunologic: Negative.    Neurological: Negative.  Negative for dizziness, light-headedness and headaches.   Hematological: Negative.    Psychiatric/Behavioral: Negative.  Negative for suicidal ideas.   All other systems reviewed and are negative.        Vitals:    19 1413 19 1445   BP: 146/68 120/58   BP Location:  Right arm   Patient Position:  Sitting   Cuff Size:  Adult   Pulse: 111    Resp: 28    Temp: 98.3 °F (36.8 °C)    SpO2: 91%    Weight: 104 kg (229 lb)    Height: 157.5 cm (62\")      Wt Readings from Last 3 Encounters:   19 104 kg (229 lb)   19 102 kg (225 lb 3.2 oz)   19 103 kg (226 lb 9.6 oz)     BP Readings from Last 3 Encounters:   19 120/58   19 117/52   19 144/79     Social History     Socioeconomic History   • Marital status:      Spouse name: Not on file   • Number of children: Not on file   • Years of education: Not on file   • Highest education level: Not on file   Tobacco Use   • Smoking status: Former Smoker     Packs/day: 2.00     Years: 40.00     Pack years: 80.00     Types: Cigarettes     Last attempt to quit: 2014     Years since quittin.0   • Smokeless tobacco: Never Used   • Tobacco comment: Caffeine use   Substance and Sexual Activity   • Alcohol use: No     Comment: history of heavy drinker    • Drug use: No   • Sexual activity: Defer       Allergies   Allergen Reactions   • Contrast Dye Hives   • Iodinated Diagnostic Agents Hives   • Norco [Hydrocodone-Acetaminophen] Hallucinations   • Tenoretic [Atenolol-Chlorthalidone] Anaphylaxis       Body mass index is 41.88 kg/m².    Objective   Physical Exam   Constitutional: She is oriented to person, place, and time. Vital signs are normal. She appears well-developed and well-nourished.   Morbid obese female   Neck: Trachea normal and " phonation normal. Neck supple. Carotid bruit is not present. No edema present.   Cardiovascular: Normal rate, regular rhythm, S1 normal, S2 normal, normal heart sounds and normal pulses.   Pulmonary/Chest: Effort normal and breath sounds normal.   Abdominal: Soft. Normal appearance, normal aorta and bowel sounds are normal. There is no hepatomegaly. There is no tenderness.    Mar had a diabetic foot exam performed today.   During the foot exam she had a monofilament test performed.    Neurological Sensory Findings -  Unaltered sharp/dull right ankle/foot discrimination and unaltered sharp/dull left ankle/foot discrimination.  Vascular Status -  Her right foot exhibits normal foot vasculature  and no edema. Her left foot exhibits normal foot vasculature  and no edema.  Skin Integrity  -  Her right foot skin is intact.Her left foot skin is intact..  Neurological: She is alert and oriented to person, place, and time.   Skin: Skin is warm, dry and intact. Capillary refill takes less than 2 seconds.   Psychiatric: She has a normal mood and affect. Her speech is normal and behavior is normal. Judgment and thought content normal. Cognition and memory are normal.     Physical Exam     Feet: Diabetic foot exam performed during this visit.    Monofilament test performed  Right foot - number of sites tested - 10  Right foot - number of sites sensed - 10  Left foot number of sites tested 10  Left foot - number of sites sensed 10.  Right foot first MTP joint ROM is normal.  Left foot first MTP joint ROM is normal.  Vascular Status: right foot vasculature normal.  Left foot vasculature normal.  Right foot skin intact. Left foot skin intact.      Assessment/Plan   Mar was seen today for med refill and diabetes.    Diagnoses and all orders for this visit:    Type 2 diabetes mellitus with complication, without long-term current use of insulin (CMS/[facility])  -     CBC & Differential  -     Comprehensive Metabolic Panel  -      Hemoglobin A1c  -     Lipid Panel With LDL / HDL Ratio    Mixed hyperlipidemia  -     Comprehensive Metabolic Panel  -     Lipid Panel With LDL / HDL Ratio    Essential hypertension    Morbid obesity (CMS/HCC)    Seizure (CMS/HCC)    Chronic pain of both shoulders  -     diclofenac (VOLTAREN) 1 % gel gel; Apply 4 g topically to the appropriate area as directed 4 (Four) Times a Day.    1. Chronic and Stable type 2 diabetes and hyperlipidemia: She is fasting will have a CBC, CMP, lipid profile and a hemoglobin A1c.  Mar will be notified of test results and any medication changes.  I advised her to see the eye doctor as well.  2.  Chronic and stable hypertension: I have rechecked her blood pressure at office visit today and got 120/58 her right arm.  She will continue her current medications at home.  I have asked her to keep her follow-up appointments with cardiology.  3.  Chronic morbid obesity: I have encouraged her to lose weight by dieting.  She is unable to exercise regularly due to her shoulder pain and oxygen.  Will reevaluate weight at next office visit.  4.  Chronic and stable seizure disorder: Currently seeing Dr. Carranza, neurology for this.  She will keep her follow-up appointments.  5.  Chronic pain in shoulders: Unable to take Tylenol 3 due to the codeine.  She has hallucinations with hydrocodone/codeine product.  She has weaned off of the tramadol medication.  I have sent a prescription for diclofenac gel to use topically to pharmacy.  She refuses pain management treatment at this time.      MARGARITA Narvaez [facility]  [address] [place] [id number]  Dept: [phone]  Dept Fax: [phone]  Loc: [phone]  Loc Fax: [phone]

## 2019-05-07 LAB
ALBUMIN SERPL-MCNC: 4.4 G/DL (ref 3.5–5.2)
ALBUMIN/GLOB SERPL: 1.3 G/DL
ALP SERPL-CCNC: 87 U/L (ref 39–117)
ALT SERPL-CCNC: 17 U/L (ref 1–33)
AST SERPL-CCNC: 12 U/L (ref 1–32)
BASOPHILS # BLD AUTO: 0.04 10*3/MM3 (ref 0–0.2)
BASOPHILS NFR BLD AUTO: 0.2 % (ref 0–1.5)
BILIRUB SERPL-MCNC: 0.2 MG/DL (ref 0.2–1.2)
BUN SERPL-MCNC: 22 MG/DL (ref 8–23)
BUN/CREAT SERPL: 21.8 (ref 7–25)
CALCIUM SERPL-MCNC: 10.4 MG/DL (ref 8.6–10.5)
CHLORIDE SERPL-SCNC: 100 MMOL/L (ref 98–107)
CHOLEST SERPL-MCNC: 110 MG/DL (ref 0–200)
CO2 SERPL-SCNC: 22.3 MMOL/L (ref 22–29)
CREAT SERPL-MCNC: 1.01 MG/DL (ref 0.57–1)
EOSINOPHIL # BLD AUTO: 0.09 10*3/MM3 (ref 0–0.4)
EOSINOPHIL NFR BLD AUTO: 0.5 % (ref 0.3–6.2)
ERYTHROCYTE [DISTWIDTH] IN BLOOD BY AUTOMATED COUNT: 15.5 % (ref 12.3–15.4)
GLOBULIN SER CALC-MCNC: 3.3 GM/DL
GLUCOSE SERPL-MCNC: 152 MG/DL (ref 65–99)
HBA1C MFR BLD: 6.8 % (ref 4.8–5.6)
HCT VFR BLD AUTO: 42.1 % (ref 34–46.6)
HDLC SERPL-MCNC: 40 MG/DL (ref 40–60)
HGB BLD-MCNC: 12.6 G/DL (ref 12–15.9)
IMM GRANULOCYTES # BLD AUTO: 0.14 10*3/MM3 (ref 0–0.05)
IMM GRANULOCYTES NFR BLD AUTO: 0.7 % (ref 0–0.5)
LDLC SERPL CALC-MCNC: 9 MG/DL (ref 0–100)
LDLC/HDLC SERPL: 0.22 {RATIO}
LYMPHOCYTES # BLD AUTO: 2.49 10*3/MM3 (ref 0.7–3.1)
LYMPHOCYTES NFR BLD AUTO: 13.3 % (ref 19.6–45.3)
MCH RBC QN AUTO: 27.4 PG (ref 26.6–33)
MCHC RBC AUTO-ENTMCNC: 29.9 G/DL (ref 31.5–35.7)
MCV RBC AUTO: 91.5 FL (ref 79–97)
MONOCYTES # BLD AUTO: 0.95 10*3/MM3 (ref 0.1–0.9)
MONOCYTES NFR BLD AUTO: 5.1 % (ref 5–12)
NEUTROPHILS # BLD AUTO: 14.99 10*3/MM3 (ref 1.7–7)
NEUTROPHILS NFR BLD AUTO: 80.2 % (ref 42.7–76)
NRBC BLD AUTO-RTO: 0 /100 WBC (ref 0–0.2)
PLATELET # BLD AUTO: 449 10*3/MM3 (ref 140–450)
POTASSIUM SERPL-SCNC: 5.4 MMOL/L (ref 3.5–5.2)
PROT SERPL-MCNC: 7.7 G/DL (ref 6–8.5)
RBC # BLD AUTO: 4.6 10*6/MM3 (ref 3.77–5.28)
SODIUM SERPL-SCNC: 138 MMOL/L (ref 136–145)
TRIGL SERPL-MCNC: 306 MG/DL (ref 0–150)
VLDLC SERPL CALC-MCNC: 61.2 MG/DL
WBC # BLD AUTO: 18.7 10*3/MM3 (ref 3.4–10.8)

## 2019-05-08 DIAGNOSIS — E78.2 MIXED HYPERLIPIDEMIA: Primary | ICD-10-CM

## 2019-05-08 RX ORDER — FENOFIBRATE 145 MG/1
145 TABLET, COATED ORAL DAILY
Qty: 30 TABLET | Refills: 3 | Status: SHIPPED | OUTPATIENT
Start: 2019-05-08 | End: 2019-09-17 | Stop reason: SDUPTHER

## 2019-05-14 ENCOUNTER — TELEPHONE (OUTPATIENT)
Dept: FAMILY MEDICINE CLINIC | Facility: CLINIC | Age: 69
End: 2019-05-14

## 2019-05-14 DIAGNOSIS — D72.829 LEUKOCYTOSIS, UNSPECIFIED TYPE: Primary | ICD-10-CM

## 2019-05-14 RX ORDER — AMOXICILLIN 875 MG/1
875 TABLET, COATED ORAL 2 TIMES DAILY
Qty: 20 TABLET | Refills: 0 | Status: SHIPPED | OUTPATIENT
Start: 2019-05-14 | End: 2019-05-24

## 2019-05-14 NOTE — TELEPHONE ENCOUNTER
pts diclofenac gel was non formulary.   Is there something else we can call in? Or would you like for me to attempt a PA?    Also, I had notified Krys that we were calling in Amoxicillin for her yesterday, however the pharmacy does not have it.

## 2019-05-15 ENCOUNTER — HOSPITAL ENCOUNTER (OUTPATIENT)
Dept: PULMONOLOGY | Facility: HOSPITAL | Age: 69
Discharge: HOME | End: 2019-05-15
Attending: INTERNAL MEDICINE | Admitting: INTERNAL MEDICINE

## 2019-05-15 VITALS — OXYGEN SATURATION: 97 % | HEART RATE: 100 BPM | RESPIRATION RATE: 20 BRPM

## 2019-05-15 DIAGNOSIS — R06.00 DYSPNEA, UNSPECIFIED TYPE: ICD-10-CM

## 2019-05-15 PROCEDURE — 94726 PLETHYSMOGRAPHY LUNG VOLUMES: CPT

## 2019-05-15 PROCEDURE — 94729 DIFFUSING CAPACITY: CPT

## 2019-05-15 PROCEDURE — 94060 EVALUATION OF WHEEZING: CPT

## 2019-05-15 RX ORDER — ALBUTEROL SULFATE 2.5 MG/3ML
2.5 SOLUTION RESPIRATORY (INHALATION) EVERY 6 HOURS PRN
Status: DISCONTINUED | OUTPATIENT
Start: 2019-05-15 | End: 2019-05-16 | Stop reason: HOSPADM

## 2019-05-15 RX ADMIN — ALBUTEROL SULFATE 2.5 MG: 2.5 SOLUTION RESPIRATORY (INHALATION) at 09:49

## 2019-05-28 DIAGNOSIS — I10 ESSENTIAL HYPERTENSION: ICD-10-CM

## 2019-05-28 RX ORDER — VERAPAMIL HYDROCHLORIDE 240 MG/1
TABLET, FILM COATED, EXTENDED RELEASE ORAL
Qty: 180 TABLET | Refills: 1 | Status: SHIPPED | OUTPATIENT
Start: 2019-05-28 | End: 2020-01-13

## 2019-06-03 ENCOUNTER — HOSPITAL ENCOUNTER (OUTPATIENT)
Dept: INFUSION THERAPY | Facility: HOSPITAL | Age: 69
Discharge: HOME | End: 2019-06-03
Admitting: INTERNAL MEDICINE

## 2019-06-03 VITALS
WEIGHT: 226 LBS | BODY MASS INDEX: 38.58 KG/M2 | DIASTOLIC BLOOD PRESSURE: 57 MMHG | RESPIRATION RATE: 20 BRPM | TEMPERATURE: 97.6 F | HEIGHT: 64 IN | OXYGEN SATURATION: 92 % | SYSTOLIC BLOOD PRESSURE: 147 MMHG | HEART RATE: 104 BPM

## 2019-06-03 DIAGNOSIS — K50.119 CROHN'S DISEASE OF COLON WITH COMPLICATION (HCC): ICD-10-CM

## 2019-06-03 DIAGNOSIS — K50.10 CROHN'S DISEASE OF LARGE INTESTINE WITHOUT COMPLICATION (HCC): Primary | ICD-10-CM

## 2019-06-03 PROCEDURE — A9270 NON-COVERED ITEM OR SERVICE: HCPCS | Performed by: INTERNAL MEDICINE

## 2019-06-03 PROCEDURE — 96413 CHEMO IV INFUSION 1 HR: CPT

## 2019-06-03 PROCEDURE — 25010000002 INFLIXIMAB PER 10 MG: Performed by: INTERNAL MEDICINE

## 2019-06-03 PROCEDURE — 63710000001 DIPHENHYDRAMINE PER 50 MG: Performed by: INTERNAL MEDICINE

## 2019-06-03 PROCEDURE — 96415 CHEMO IV INFUSION ADDL HR: CPT

## 2019-06-03 PROCEDURE — 63710000001 ACETAMINOPHEN 325 MG TABLET: Performed by: INTERNAL MEDICINE

## 2019-06-03 RX ORDER — ACETAMINOPHEN 325 MG/1
650 TABLET ORAL ONCE
Status: COMPLETED | OUTPATIENT
Start: 2019-06-03 | End: 2019-06-03

## 2019-06-03 RX ORDER — SODIUM CHLORIDE 9 MG/ML
250 INJECTION, SOLUTION INTRAVENOUS ONCE
Status: CANCELLED | OUTPATIENT
Start: 2019-06-03

## 2019-06-03 RX ORDER — DIPHENHYDRAMINE HCL 25 MG
25 CAPSULE ORAL ONCE
Status: CANCELLED | OUTPATIENT
Start: 2019-06-03

## 2019-06-03 RX ORDER — DIPHENHYDRAMINE HCL 25 MG
25 CAPSULE ORAL ONCE
Status: COMPLETED | OUTPATIENT
Start: 2019-06-03 | End: 2019-06-03

## 2019-06-03 RX ORDER — ACETAMINOPHEN 325 MG/1
650 TABLET ORAL ONCE
Status: CANCELLED | OUTPATIENT
Start: 2019-06-03

## 2019-06-03 RX ADMIN — INFLIXIMAB 515 MG: 100 INJECTION, POWDER, LYOPHILIZED, FOR SOLUTION INTRAVENOUS at 10:27

## 2019-06-03 RX ADMIN — ACETAMINOPHEN 650 MG: 325 TABLET, FILM COATED ORAL at 10:14

## 2019-06-03 RX ADMIN — DIPHENHYDRAMINE HYDROCHLORIDE 25 MG: 25 CAPSULE ORAL at 10:15

## 2019-06-03 NOTE — PATIENT INSTRUCTIONS
"  Call Dr. Roberto Carlos Umaña, Gastroenterology at [phone] if you have any problems or concerns.    We know you have a Choice in healthcare and appreciate you using [facility].  Our purpose is to provide you \"Excellent Care\".  We hope that you will always choose us in the future and continue to recommend us to your family and friends.            Infliximab injection  What is this medicine?  INFLIXIMAB (in FLIX i mab) is used to treat Crohn's disease and ulcerative colitis. It is also used to treat ankylosing spondylitis, plaque psoriasis, and some forms of arthritis.  This medicine may be used for other purposes; ask your health care provider or pharmacist if you have questions.  COMMON BRAND NAME(S): INFLECTRA, Remicade, RENFLEXIS  What should I tell my health care provider before I take this medicine?  They need to know if you have any of these conditions:  -cancer  -current or past resident of [place]  -diabetes  -exposure to tuberculosis  -Guillain-[place] syndrome  -heart failure  -hepatitis or liver disease  -immune system problems  -infection  -lung or breathing disease, like COPD  -multiple sclerosis  -receiving phototherapy for the skin  -seizure disorder  -an unusual or allergic reaction to infliximab, mouse proteins, other medicines, foods, dyes, or preservatives  -pregnant or trying to get pregnant  -breast-feeding  How should I use this medicine?  This medicine is for injection into a vein. It is usually given by a health care professional in a hospital or clinic setting.  A special MedGuide will be given to you by the pharmacist with each prescription and refill. Be sure to read this information carefully each time.  Talk to your pediatrician regarding the use of this medicine in children. While this drug may be prescribed for children as young as 6 years of age for selected conditions, precautions do apply.  Overdosage: If you think you have taken too much " of this medicine contact a poison control center or emergency room at once.  NOTE: This medicine is only for you. Do not share this medicine with others.  What if I miss a dose?  It is important not to miss your dose. Call your doctor or health care professional if you are unable to keep an appointment.  What may interact with this medicine?  Do not take this medicine with any of the following medications:  -biologic medicines such as abatacept, adalimumab, anakinra, certolizumab, etanercept, golimumab, rituximab, secukinumab, tocilizumab, tofactinib, ustekinumab  -live vaccines  This list may not describe all possible interactions. Give your health care provider a list of all the medicines, herbs, non-prescription drugs, or dietary supplements you use. Also tell them if you smoke, drink alcohol, or use illegal drugs. Some items may interact with your medicine.  What should I watch for while using this medicine?  Your condition will be monitored carefully while you are receiving this medicine. Visit your doctor or health care professional for regular checks on your progress. You may need blood work done while you are taking this medicine. Before beginning therapy, your doctor may do a test to see if you have been exposed to tuberculosis.  Call your doctor or health care professional for advice if you get a fever, chills or sore throat, or other symptoms of a cold or flu. Do not treat yourself. This drug decreases your body's ability to fight infections. Try to avoid being around people who are sick.  This medicine may make the symptoms of heart failure worse in some patients. If you notice symptoms such as increased shortness of breath or swelling of the ankles or legs, contact your health care provider right away.  If you are going to have surgery or dental work, tell your health care professional or dentist that you have received this medicine.  If you take this medicine for plaque psoriasis, stay out of the sun.  If you cannot avoid being in the sun, wear protective clothing and use sunscreen. Do not use sun lamps or tanning beds/booths.  Talk to your doctor about your risk of cancer. You may be more at risk for certain types of cancers if you take this medicine.  What side effects may I notice from receiving this medicine?  Side effects that you should report to your doctor or health care professional as soon as possible:  -allergic reactions like skin rash, itching or hives, swelling of the face, lips, or tongue  -breathing problems  -changes in vision  -chest pain  -fever or chills, usually related to the infusion  -joint pain  -pain, tingling, numbness in the hands or feet  -redness, blistering, peeling or loosening of the skin, including inside the mouth  -seizures  -signs of infection - fever or chills, cough, sore throat, flu-like symptoms, pain or difficulty passing urine  -signs and symptoms of liver injury like dark yellow or brown urine; general ill feeling; light-colored stools; loss of appetite; nausea; right upper belly pain; unusually weak or tired; yellowing of the eyes or skin  -signs and symptoms of a stroke like changes in vision; confusion; trouble speaking or understanding; severe headaches; sudden numbness or weakness of the face, arm or leg; trouble walking; dizziness; loss of balance or coordination  -swelling of the ankles, feet, or hands  -swollen lymph nodes in the neck, underarm, or groin areas  -unusual bleeding or bruising  -unusually weak or tired  Side effects that usually do not require medical attention (report to your doctor or health care professional if they continue or are bothersome):  -headache  -nausea  -stomach pain  -upset stomach  This list may not describe all possible side effects. Call your doctor for medical advice about side effects. You may report side effects to FDA at 7-523-FDA-2850.  Where should I keep my medicine?  This drug is given in a hospital or clinic and will not  be stored at home.  NOTE: This sheet is a summary. It may not cover all possible information. If you have questions about this medicine, talk to your doctor, pharmacist, or health care provider.  © 2019 [employer]vier/Gold Standard (2018-01-17 13:45:32)

## 2019-06-03 NOTE — NURSING NOTE
Patient arrived to [facility] via wheelchair  with sister at 1010.  Medication administered as ordered.  Patient tolerated without incident.  AVS discussed and copy of paperwork given to patient.  Patient discharged via wheelchair to home with sister at 1240.

## 2019-07-05 DIAGNOSIS — F32.0 MILD SINGLE CURRENT EPISODE OF MAJOR DEPRESSIVE DISORDER (HCC): ICD-10-CM

## 2019-07-05 DIAGNOSIS — J44.1 CHRONIC OBSTRUCTIVE PULMONARY DISEASE WITH ACUTE EXACERBATION (HCC): ICD-10-CM

## 2019-07-05 DIAGNOSIS — E78.1 HYPERTRIGLYCERIDEMIA: ICD-10-CM

## 2019-07-05 DIAGNOSIS — E78.2 MIXED HYPERLIPIDEMIA: ICD-10-CM

## 2019-07-05 RX ORDER — ROSUVASTATIN CALCIUM 40 MG/1
TABLET, COATED ORAL
Qty: 90 TABLET | Refills: 0 | Status: SHIPPED | OUTPATIENT
Start: 2019-07-05 | End: 2019-09-17 | Stop reason: SDUPTHER

## 2019-07-05 RX ORDER — TIOTROPIUM BROMIDE 18 UG/1
CAPSULE ORAL; RESPIRATORY (INHALATION)
Qty: 30 CAPSULE | Refills: 6 | Status: SHIPPED | OUTPATIENT
Start: 2019-07-05 | End: 2020-06-10

## 2019-07-05 RX ORDER — FLUOXETINE HYDROCHLORIDE 40 MG/1
CAPSULE ORAL
Qty: 30 CAPSULE | Refills: 3 | Status: SHIPPED | OUTPATIENT
Start: 2019-07-05 | End: 2019-11-10 | Stop reason: SDUPTHER

## 2019-07-29 ENCOUNTER — HOSPITAL ENCOUNTER (OUTPATIENT)
Dept: INFUSION THERAPY | Facility: HOSPITAL | Age: 69
Discharge: HOME | End: 2019-07-29
Admitting: INTERNAL MEDICINE

## 2019-07-29 ENCOUNTER — TELEPHONE (OUTPATIENT)
Dept: GASTROENTEROLOGY | Facility: CLINIC | Age: 69
End: 2019-07-29

## 2019-07-29 VITALS
OXYGEN SATURATION: 92 % | WEIGHT: 232 LBS | DIASTOLIC BLOOD PRESSURE: 50 MMHG | HEIGHT: 64 IN | SYSTOLIC BLOOD PRESSURE: 133 MMHG | TEMPERATURE: 98 F | HEART RATE: 86 BPM | RESPIRATION RATE: 16 BRPM | BODY MASS INDEX: 39.61 KG/M2

## 2019-07-29 DIAGNOSIS — K50.10 CROHN'S DISEASE OF LARGE INTESTINE WITHOUT COMPLICATION (HCC): Primary | ICD-10-CM

## 2019-07-29 PROCEDURE — 63710000001 ACETAMINOPHEN 325 MG TABLET

## 2019-07-29 PROCEDURE — A9270 NON-COVERED ITEM OR SERVICE: HCPCS

## 2019-07-29 PROCEDURE — 25010000002 INFLIXIMAB PER 10 MG: Performed by: INTERNAL MEDICINE

## 2019-07-29 PROCEDURE — 96415 CHEMO IV INFUSION ADDL HR: CPT

## 2019-07-29 PROCEDURE — 63710000001 DIPHENHYDRAMINE PER 50 MG

## 2019-07-29 PROCEDURE — 96413 CHEMO IV INFUSION 1 HR: CPT

## 2019-07-29 RX ORDER — ACETAMINOPHEN 325 MG/1
TABLET ORAL
Status: COMPLETED
Start: 2019-07-29 | End: 2019-07-29

## 2019-07-29 RX ORDER — DIPHENHYDRAMINE HCL 25 MG
25 CAPSULE ORAL ONCE
Status: COMPLETED | OUTPATIENT
Start: 2019-07-29 | End: 2019-07-29

## 2019-07-29 RX ORDER — ACETAMINOPHEN 325 MG/1
650 TABLET ORAL ONCE
Status: COMPLETED | OUTPATIENT
Start: 2019-07-29 | End: 2019-07-29

## 2019-07-29 RX ORDER — DIPHENHYDRAMINE HCL 25 MG
CAPSULE ORAL
Status: COMPLETED
Start: 2019-07-29 | End: 2019-07-29

## 2019-07-29 RX ADMIN — ACETAMINOPHEN 325 MG: 325 TABLET ORAL at 10:53

## 2019-07-29 RX ADMIN — INFLIXIMAB 525 MG: 100 INJECTION, POWDER, LYOPHILIZED, FOR SOLUTION INTRAVENOUS at 10:54

## 2019-07-29 RX ADMIN — ACETAMINOPHEN 325 MG: 325 TABLET, FILM COATED ORAL at 10:53

## 2019-07-29 RX ADMIN — Medication 25 MG: at 10:15

## 2019-07-29 RX ADMIN — ACETAMINOPHEN 325 MG: 325 TABLET ORAL at 10:52

## 2019-07-29 RX ADMIN — ACETAMINOPHEN 325 MG: 325 TABLET, FILM COATED ORAL at 10:52

## 2019-07-29 RX ADMIN — DIPHENHYDRAMINE HYDROCHLORIDE 25 MG: 25 CAPSULE ORAL at 10:15

## 2019-09-06 ENCOUNTER — OFFICE VISIT (OUTPATIENT)
Dept: FAMILY MEDICINE CLINIC | Facility: CLINIC | Age: 69
End: 2019-09-06

## 2019-09-06 VITALS
BODY MASS INDEX: 40.12 KG/M2 | OXYGEN SATURATION: 91 % | WEIGHT: 235 LBS | DIASTOLIC BLOOD PRESSURE: 84 MMHG | SYSTOLIC BLOOD PRESSURE: 122 MMHG | RESPIRATION RATE: 24 BRPM | HEART RATE: 97 BPM | TEMPERATURE: 97.7 F | HEIGHT: 64 IN

## 2019-09-06 DIAGNOSIS — E11.8 TYPE 2 DIABETES MELLITUS WITH COMPLICATION, WITHOUT LONG-TERM CURRENT USE OF INSULIN (HCC): ICD-10-CM

## 2019-09-06 DIAGNOSIS — M79.672 CHRONIC PAIN IN LEFT FOOT: ICD-10-CM

## 2019-09-06 DIAGNOSIS — M79.605 CHRONIC PAIN OF LEFT LOWER EXTREMITY: ICD-10-CM

## 2019-09-06 DIAGNOSIS — K43.9 VENTRAL HERNIA WITHOUT OBSTRUCTION OR GANGRENE: ICD-10-CM

## 2019-09-06 DIAGNOSIS — M25.512 CHRONIC PAIN OF BOTH SHOULDERS: ICD-10-CM

## 2019-09-06 DIAGNOSIS — G62.9 NEUROPATHY: Primary | ICD-10-CM

## 2019-09-06 DIAGNOSIS — G89.29 CHRONIC PAIN OF LEFT LOWER EXTREMITY: ICD-10-CM

## 2019-09-06 DIAGNOSIS — G89.29 CHRONIC PAIN IN LEFT FOOT: ICD-10-CM

## 2019-09-06 DIAGNOSIS — G89.29 CHRONIC PAIN OF BOTH SHOULDERS: ICD-10-CM

## 2019-09-06 DIAGNOSIS — M25.511 CHRONIC PAIN OF BOTH SHOULDERS: ICD-10-CM

## 2019-09-06 PROCEDURE — 99214 OFFICE O/P EST MOD 30 MIN: CPT | Performed by: PHYSICIAN ASSISTANT

## 2019-09-06 RX ORDER — GABAPENTIN 300 MG/1
300 CAPSULE ORAL 3 TIMES DAILY
Qty: 90 CAPSULE | Refills: 0 | Status: CANCELLED
Start: 2019-09-06

## 2019-09-06 NOTE — PROGRESS NOTES
Subjective   Mar Camilo is a 69 y.o. female presents for   Chief Complaint   Patient presents with   • Abdominal Pain   • Shoulder Pain     bilat shoulder pain   • Foot Injury     pain and discoloration       History of Present Illness     Mar is a 69 year old female who presents with multiple complaints with her sister at office visit today.  1.  Mar has had abdominal pain off and on for the past 2 years.  She was diagnosed with a left-sided ventral hernia.  She has had CT work-ups with general surgery referrals.  She was told she is not a surgical candidate due to her chronic comorbid health conditions.  They would only do surgery for emergency purposes.  States she thinks the hernia has gotten bigger.  She is still having regular bowel movements and the pain has not worsened.  2.  Mar has been having bilateral shoulder pain off and on for several months.  She has seen Dr. Reyes, orthopedist, last year for this.  She did have some injections in her shoulders which helped.  Describes the pain as a constant throbbing sensation.  She has pain with any range of motion or sitting/laying still.  She has tried heat which helps slightly.  There is no radiation of pain down the arms.  Denied any recent injury or trauma to her shoulders.  Unable to take pain medication due to causing hallucination.  Cannot take tramadol medication due to her possible seizure disorder.  She has been taking acetaminophen 650 mg daily for this.    3.  Chronic left foot pain and left lower leg pain since her surgery from a fracture tibia and fibula last year.  She had surgery with screws and plate insertion by Dr. Sergei Cárdenas, at [facility] last year.  States she has been having pain in the area since this time.  Her left foot has been swollen for the past few weeks to a month.  She has not been applying any ice.  Has been using acetaminophen for pain.  She has noticed that she has had some tingling sensation in her left foot.   "She has not been checking her blood sugars at home.  She has been compliant with her diabetic medication though.  Denied any recent injury or trauma to her foot.      The following portions of the patient's history were reviewed and updated as appropriate: allergies, current medications, past family history, past medical history, past social history, past surgical history and problem list.    Review of Systems   Constitutional: Negative.    HENT: Negative.    Eyes: Negative.    Respiratory: Negative.  Negative for cough and wheezing.    Cardiovascular: Negative.  Negative for chest pain, palpitations and leg swelling.   Gastrointestinal: Positive for abdominal distention and abdominal pain. Negative for anal bleeding, blood in stool, constipation, diarrhea, nausea, rectal pain and vomiting.   Endocrine: Negative.    Genitourinary: Negative.    Musculoskeletal: Negative.         B/L shoulder  Left lower leg to left foot pain   Skin: Negative.    Allergic/Immunologic: Negative.    Neurological: Positive for numbness. Negative for dizziness, light-headedness and headaches.   Hematological: Negative.    Psychiatric/Behavioral: Negative.  Negative for sleep disturbance.   All other systems reviewed and are negative.        Vitals:    09/06/19 1342   BP: 122/84   BP Location: Right leg   Patient Position: Sitting   Cuff Size: Adult   Pulse: 97   Resp: 24   Temp: 97.7 °F (36.5 °C)   SpO2: 91%   Weight: 107 kg (235 lb)   Height: 162.6 cm (64\")     Wt Readings from Last 3 Encounters:   09/06/19 107 kg (235 lb)   07/29/19 105 kg (232 lb)   06/03/19 103 kg (226 lb)     BP Readings from Last 3 Encounters:   09/06/19 122/84   07/29/19 133/50   06/03/19 147/57     Social History     Socioeconomic History   • Marital status:      Spouse name: Not on file   • Number of children: Not on file   • Years of education: Not on file   • Highest education level: Not on file   Tobacco Use   • Smoking status: Former Smoker     " Packs/day: 2.00     Years: 40.00     Pack years: 80.00     Types: Cigarettes     Last attempt to quit: 2014     Years since quittin.4   • Smokeless tobacco: Never Used   • Tobacco comment: Caffeine use   Substance and Sexual Activity   • Alcohol use: No     Comment: history of heavy drinker    • Drug use: No   • Sexual activity: Defer       Allergies   Allergen Reactions   • Contrast Dye Hives   • Iodinated Diagnostic Agents Hives   • Norco [Hydrocodone-Acetaminophen] Hallucinations   • Tenoretic [Atenolol-Chlorthalidone] Anaphylaxis       Body mass index is 40.34 kg/m².    Objective   Physical Exam   Constitutional: She is oriented to person, place, and time. Vital signs are normal. She appears well-developed and well-nourished.   On 3 liters of oxygen using nasal canula   Neck: Trachea normal and phonation normal. Neck supple. No tracheal tenderness present. No tracheal deviation present.   Cardiovascular: Normal rate, regular rhythm, S1 normal, S2 normal, normal heart sounds and normal pulses.   No murmur heard.  Pulmonary/Chest: Effort normal and breath sounds normal.   Abdominal: Soft. Normal aorta and bowel sounds are normal. There is no hepatomegaly. There is tenderness in the left upper quadrant and left lower quadrant. A hernia is present. Hernia confirmed positive in the ventral area.       Musculoskeletal:        Right shoulder: She exhibits decreased range of motion, tenderness, bony tenderness and pain. She exhibits no swelling, no spasm, normal pulse and normal strength.        Left shoulder: She exhibits decreased range of motion, tenderness, bony tenderness and pain. She exhibits no swelling, normal pulse and normal strength.        Arms:       Left lower leg: She exhibits no tenderness, no bony tenderness, no swelling, no edema, no deformity and no laceration.        Left foot: There is tenderness and swelling. There is normal range of motion, no bony tenderness, normal capillary refill and  no crepitus.   Left calf: Negative Homans sign or palpable cord noted    Mar had a diabetic foot exam performed today.   During the foot exam she had a monofilament test performed.    Neurological Sensory Findings -  Altered sharp/dull left ankle/foot discrimination (No sensation noted in all fields of microfilament examination). Unaltered sharp/dull right ankle/foot discrimination.  Vascular Status -  Her right foot exhibits normal foot vasculature  and no edema. Her left foot exhibits abnormal foot edema (slight nonpitting edema noted with 2+ pulses). Her left foot exhibits normal foot vasculature .  Skin Integrity  -  Her right foot skin is intact.Her left foot skin is intact..     Neurological: She is alert and oriented to person, place, and time. No sensory deficit.   Skin: Skin is warm, dry and intact. Capillary refill takes less than 2 seconds.   Psychiatric: She has a normal mood and affect. Her speech is normal and behavior is normal. Judgment and thought content normal. Cognition and memory are normal.       Assessment/Plan   Mar was seen today for abdominal pain, shoulder pain and foot injury.    Diagnoses and all orders for this visit:    Neuropathy  -     Vitamin B12  -     Folate  -     gabapentin (NEURONTIN) 300 MG capsule; Take 1 capsule by mouth 3 (Three) Times a Day.    Type 2 diabetes mellitus with complication, without long-term current use of insulin (CMS/[facility])  -     Hemoglobin A1c  -     Comprehensive Metabolic Panel    Chronic pain of both shoulders  -     Ambulatory Referral to Orthopedic Surgery  -     gabapentin (NEURONTIN) 300 MG capsule; Take 1 capsule by mouth 3 (Three) Times a Day.    Chronic pain in left foot  -     Ambulatory Referral to Orthopedic Surgery  -     gabapentin (NEURONTIN) 300 MG capsule; Take 1 capsule by mouth 3 (Three) Times a Day.    Ventral hernia without obstruction or gangrene  -     CT Abdomen Pelvis Without Contrast    Chronic pain of left lower extremity  -      Ambulatory Referral to Orthopedic Surgery  -     gabapentin (NEURONTIN) 300 MG capsule; Take 1 capsule by mouth 3 (Three) Times a Day.    Other orders  -     Cancel: gabapentin (NEURONTIN) 300 MG capsule; Take 1 capsule by mouth 3 (Three) Times a Day.      1.  New neuropathy of left foot with chronic left foot/left lower leg pain: We will check vitamin B12 with folate at office visit today.  This may be neuropathy from her left tibia with fibula surgery last year versus diabetic neuropathy.  We will also consider referral to Dr. Sergei Cárdenas, orthopedist, for evaluation of possible removal of hardware if appropriate.  Mar will be notified of test results when completed.  Will start gabapentin 300 mg 3 times a day.  This prescription was approved by Dr. Jay Jay Villanuvea.  See below for Geoffrey information.  2.  Chronic type 2 diabetes: We will check a A1c and CMP at office visit today.  She did eat lunch a few hours ago.  She will be notified of test results and any medication changes.  3.  Chronic bilateral shoulder pain: We will refer her back to her orthopedist, Dr. Reyes.  We will try gabapentin 300 mg 3 times a day as needed for pain.  She will continue the acetaminophen for now.  4.  Chronic ventral hernia: We will check a CT of abdomen and pelvis without contrast for reevaluation of her hernia.  Physical exam appears to show enlarging of the hernia.  Will consider referral back to general surgeon if appropriate.  She was instructed if pain worsens or she does not have any regular bowel movements, she will go to the ER.  Voiced understanding.    As part of this patient's treatment plan I am prescribing controlled substances.  The patient has been made aware of appropriate use of such medications, including potential risk of somnolence. Limited ability to drive and/or work safely, and potential for dependence or overdose.  It has also been made clear that these medications are for use by this patient only,  without concomitant use of alcohol or other substances unless prescribed.  [facility] report has been reviewed and scanned into the patient's chart.  [facility] number [id number] dated September 4, 2019    MARGARITA Narvaez [facility]  [address] [place] [id number]  Dept: [phone]  Dept Fax: [phone]  Loc: [phone]  Loc Fax: [phone]

## 2019-09-07 LAB
ALBUMIN SERPL-MCNC: 4.3 G/DL (ref 3.5–5.2)
ALBUMIN/GLOB SERPL: 1.4 G/DL
ALP SERPL-CCNC: 69 U/L (ref 39–117)
ALT SERPL-CCNC: 15 U/L (ref 1–33)
AST SERPL-CCNC: 15 U/L (ref 1–32)
BILIRUB SERPL-MCNC: <0.2 MG/DL (ref 0.2–1.2)
BUN SERPL-MCNC: 24 MG/DL (ref 8–23)
BUN/CREAT SERPL: 23.5 (ref 7–25)
CALCIUM SERPL-MCNC: 9.7 MG/DL (ref 8.6–10.5)
CHLORIDE SERPL-SCNC: 101 MMOL/L (ref 98–107)
CO2 SERPL-SCNC: 22.5 MMOL/L (ref 22–29)
CREAT SERPL-MCNC: 1.02 MG/DL (ref 0.57–1)
FOLATE SERPL-MCNC: >20 NG/ML (ref 4.78–24.2)
GLOBULIN SER CALC-MCNC: 3.1 GM/DL
GLUCOSE SERPL-MCNC: 150 MG/DL (ref 65–99)
HBA1C MFR BLD: 6.8 % (ref 4.8–5.6)
POTASSIUM SERPL-SCNC: 5.3 MMOL/L (ref 3.5–5.2)
PROT SERPL-MCNC: 7.4 G/DL (ref 6–8.5)
SODIUM SERPL-SCNC: 140 MMOL/L (ref 136–145)
VIT B12 SERPL-MCNC: 996 PG/ML (ref 211–946)

## 2019-09-08 RX ORDER — GABAPENTIN 300 MG/1
300 CAPSULE ORAL 3 TIMES DAILY
Qty: 90 CAPSULE | Refills: 0
Start: 2019-09-08 | End: 2019-10-21 | Stop reason: SDUPTHER

## 2019-09-12 ENCOUNTER — TELEPHONE (OUTPATIENT)
Dept: FAMILY MEDICINE CLINIC | Facility: CLINIC | Age: 69
End: 2019-09-12

## 2019-09-12 ENCOUNTER — HOSPITAL ENCOUNTER (OUTPATIENT)
Dept: CT IMAGING | Facility: HOSPITAL | Age: 69
Discharge: HOME | End: 2019-09-12
Admitting: PHYSICIAN ASSISTANT

## 2019-09-12 PROCEDURE — 74176 CT ABD & PELVIS W/O CONTRAST: CPT

## 2019-09-12 NOTE — TELEPHONE ENCOUNTER
"Pt calling, states her left foot is swollen there are \"white rings\" around her toes.  Pt would like to know what to do? Please advise.  "

## 2019-09-12 NOTE — TELEPHONE ENCOUNTER
Spoke with pt.  She stated the swelling has gone down, and she will not go unless the swelling returns.

## 2019-09-17 ENCOUNTER — OFFICE VISIT (OUTPATIENT)
Dept: CARDIOLOGY | Facility: CLINIC | Age: 69
End: 2019-09-17

## 2019-09-17 VITALS
HEART RATE: 103 BPM | BODY MASS INDEX: 40.8 KG/M2 | HEIGHT: 64 IN | DIASTOLIC BLOOD PRESSURE: 80 MMHG | WEIGHT: 239 LBS | SYSTOLIC BLOOD PRESSURE: 120 MMHG

## 2019-09-17 DIAGNOSIS — I10 ESSENTIAL HYPERTENSION: ICD-10-CM

## 2019-09-17 DIAGNOSIS — Z86.73 HISTORY OF STROKE: ICD-10-CM

## 2019-09-17 DIAGNOSIS — J42 CHRONIC BRONCHITIS, UNSPECIFIED CHRONIC BRONCHITIS TYPE (HCC): ICD-10-CM

## 2019-09-17 DIAGNOSIS — I48.0 PAF (PAROXYSMAL ATRIAL FIBRILLATION) (HCC): Primary | ICD-10-CM

## 2019-09-17 DIAGNOSIS — I73.9 PAD (PERIPHERAL ARTERY DISEASE) (HCC): ICD-10-CM

## 2019-09-17 DIAGNOSIS — E78.2 MIXED HYPERLIPIDEMIA: ICD-10-CM

## 2019-09-17 DIAGNOSIS — J96.11 CHRONIC RESPIRATORY FAILURE WITH HYPOXIA (HCC): ICD-10-CM

## 2019-09-17 PROCEDURE — 99213 OFFICE O/P EST LOW 20 MIN: CPT | Performed by: INTERNAL MEDICINE

## 2019-09-17 PROCEDURE — 93000 ELECTROCARDIOGRAM COMPLETE: CPT | Performed by: INTERNAL MEDICINE

## 2019-09-17 NOTE — PROGRESS NOTES
Date of Office Visit: 2019  Encounter Provider: Bhanu Mata MD  Place of Service: [facility] CARDIOLOGY  Patient Name: Mar Camilo  :1950    Chief Complaint   Patient presents with   • Atrial Fibrillation   :     HPI: Mar Camilo is a 69 y.o. female who presents today in follow up.     She was seen by us in  for preoperative risk assessment.  A Cardiolite stress test was normal.  She was noted to have a borderline QT prolongation.  She underwent right carotid endarterectomy and did well.  She has known severe disease of the left subclavian but is treated medically as she is asymptomatic.      In , she underwent partial colectomy and had postoperative atrial fibrillation.  She was on amiodarone for a while.     In 2017 she had a left parietal stroke and was treated at [facility]; she was placed on aspirin and clopidogrel.    She has oxygen dependent COPD.  In 2017, she presented with an acute exacerbation in the setting of Rhinovirus.  She was in atrial fibrillation and converted to NSR on amiodarone. An echo showed an LVEF of 70-75% with grade II diastolic dysfunction.  She was discharged on amiodarone, verapamil, rivaroxaban, and clopidogrel (aspirin was stopped).  She went to rehab, and all of her blood thinners were stopped due to severe epistaxis.      I saw her in 2018 for preoperative risk assessment (ventral hernia repair).  I did not recommend further cardiac workup; she ultimately ended up not having surgery as the overall risk was felt to be too high.     She is sedentary.  She has chronic dyspnea.  She denies orthopnea, PND, edema, chest pain, palpitations, or syncope.  She has sleep apnea but does not tolerate PAP therapy.     She still reports extreme displeasure regarding her hernia, and has terrible shoulder pain and would like surgery for it.    Past Medical History:   Diagnosis Date   • Arthritis    • Carotid arterial  disease (CMS/HCC)     US 2015 with 80-99% right and 16-49% left, but CTA with 60-70% right, not a surgical candidate   • Chronic back pain    • Closed fracture of left tibial plateau 7/30/2018   • COPD (chronic obstructive pulmonary disease) (CMS/HCC)    • Crohn's disease (CMS/HCC)     Remicade on hold d/t antibiotics   • DDD (degenerative disc disease)    • Depression    • Diabetes mellitus (CMS/HCC)     Type II   • Heartburn 2/21/2016   • History of stroke 05/03/2017    left parietal   • Hyperlipidemia    • Hypertension    • Hypokalemia    • Morbid obesity (CMS/HCC)    • Obstructive pyelonephritis    • On home oxygen therapy     2L UNLESS STRESSED THEN 2.5-3 L   • Osteopenia 2/21/2016   • PAF (paroxysmal atrial fibrillation) (CMS/HCC)    • PVD (peripheral vascular disease) (CMS/HCC)     RT CAROTID STENOSIS   • Radiculopathy     NECK PAIN   • Seizure (CMS/HCC)    • Stroke (CMS/HCC)    • Subclavian artery stenosis (CMS/HCC)     and axillary artery stenosis, on the left       Past Surgical History:   Procedure Laterality Date   • APPENDECTOMY N/A    • BRONCHOSCOPY N/A 1/5/2018    Procedure: BRONCHOSCOPY;  Surgeon: Gustavo Muniz MD;  Location: [facility] OR;  Service:    • COLON SURGERY     • CYSTOSCOPY URETEROSCOPY LASER LITHOTRIPSY Right 4/17/2017    Procedure: CYSTOSCOPY with  right stent removal, right URETEROSCOPY LASER LITHOTRIPSY,  with STONE BASKET EXTRACTION;  Surgeon: Dipesh Bean MD;  Location: [facility] OR;  Service:    • CYSTOSCOPY W/ URETERAL STENT PLACEMENT Right 3/18/2017    Procedure: CYSTOSCOPY URETERAL CATHETER/STENT INSERTION;  Surgeon: Dipesh Bean MD;  Location: [facility] OR;  Service:    • FLEXIBLE SIGMOIDOSCOPY N/A 01/25/2016    Dr. Luis Rosas   • LAPAROSCOPIC COLON RESECTION N/A 01/25/2016    Laparoscopic mobilization of splenic flexure, transverse colectomy with primary anastomosis-Dr. Christopher Richardson   • LUNG BIOPSY      NEGATIVE   • PA THROMBOENDARTECTMY NECK,NECK INCIS Right  3/14/2016    Procedure: RT CAROTID ENDARTERECTOMY;  Surgeon: Federico Schuler MD;  Location: [facility] OR;  Service: Vascular   • TIBIAL PLATEAU OPEN REDUCTION INTERNAL FIXATION Left 2018    Open reduction internal fixation of left tibial plateau fracture-Dr. Ayden Cárdenas   • WRIST ARTHROSCOPY Right     WITH RELEASE OF TRANSVERSE CARPAL LIGAMENT       Social History     Socioeconomic History   • Marital status:      Spouse name: Not on file   • Number of children: Not on file   • Years of education: Not on file   • Highest education level: Not on file   Tobacco Use   • Smoking status: Former Smoker     Packs/day: 2.00     Years: 40.00     Pack years: 80.00     Types: Cigarettes     Last attempt to quit: 2014     Years since quittin.4   • Smokeless tobacco: Never Used   • Tobacco comment: Caffeine use: 3 CUPS DAILY.    Substance and Sexual Activity   • Alcohol use: No     Comment: history of heavy drinker    • Drug use: No   • Sexual activity: Defer       Family History   Problem Relation Age of Onset   • Diabetes Mother    • Stroke Mother    • Other Father         RESPIRATORY FAILURE   • Cancer Other         lung cancer   • Crohn's disease Brother    • Crohn's disease Maternal Aunt        Review of Systems   Constitution: Positive for malaise/fatigue.   Respiratory: Positive for cough, shortness of breath and wheezing.    Skin: Positive for itching.   Musculoskeletal: Positive for back pain and joint pain.   Gastrointestinal: Positive for abdominal pain.   Neurological: Positive for excessive daytime sleepiness.   Psychiatric/Behavioral: Positive for depression.   All other systems reviewed and are negative.      Allergies   Allergen Reactions   • Contrast Dye Hives   • Iodinated Diagnostic Agents Hives   • Norco [Hydrocodone-Acetaminophen] Hallucinations   • Tenoretic [Atenolol-Chlorthalidone] Anaphylaxis         Current Outpatient Medications:   •  acetaminophen (TYLENOL) 325 MG tablet,  Take 650 mg by mouth 3 (Three) Times a Day., Disp: , Rfl:   •  ALBUTEROL SULFATE  (90 Base) MCG/ACT inhaler, INHALE 2 PUFFS BY MOUTH EVERY 4 HOURS AS NEEDED, Disp: 1 inhaler, Rfl: 6  •  Blood Glucose Monitoring Suppl (ONETOUCH VERIO) w/Device kit, 1 kit 2 (Two) Times a Day., Disp: 1 kit, Rfl: 0  •  budesonide-formoterol (SYMBICORT) 160-4.5 MCG/ACT inhaler, Inhale 2 puffs., Disp: , Rfl:   •  diclofenac (VOLTAREN) 1 % gel gel, Apply 4 g topically to the appropriate area as directed 4 (Four) Times a Day., Disp: 100 g, Rfl: 0  •  fenofibrate (TRICOR) 145 MG tablet, Take 1 tablet by mouth Daily., Disp: 30 tablet, Rfl: 3  •  FLUoxetine (PROzac) 40 MG capsule, TAKE 1 CAPSULE BY MOUTH ONCE DAILY, Disp: 30 capsule, Rfl: 3  •  Fluticasone Furoate-Vilanterol 100-25 MCG/INH aerosol powder , Inhale., Disp: , Rfl:   •  gabapentin (NEURONTIN) 300 MG capsule, Take 1 capsule by mouth 3 (Three) Times a Day., Disp: 90 capsule, Rfl: 0  •  glucose blood (ONETOUCH VERIO) test strip, Use as instructed as to check blood sugars 2-3 times a day for type 2 diabetes, Disp: 100 each, Rfl: 12  •  lamoTRIgine (LaMICtal) 150 MG tablet, Take 150 mg by mouth Daily., Disp: , Rfl:   •  Lancets (ONETOUCH ULTRASOFT) lancets, Used to check blood sugars twice a day as needed for type 2 diabetes monitoring, Disp: 100 each, Rfl: 12  •  lisinopril (PRINIVIL,ZESTRIL) 40 MG tablet, Take 1 tablet by mouth Daily., Disp: 90 tablet, Rfl: 3  •  metFORMIN (GLUCOPHAGE) 500 MG tablet, Take 1 tablet by mouth 2 (Two) Times a Day With Meals., Disp: 180 tablet, Rfl: 3  •  Multiple Vitamins-Minerals (CENTRUM SILVER PO), Take 1 tablet by mouth every morning., Disp: , Rfl:   •  Multiple Vitamins-Minerals (VISION FORMULA/LUTEIN PO), Take 1 tablet by mouth Daily., Disp: , Rfl:   •  O2 (OXYGEN), Inhale 3 L/min Continuous., Disp: , Rfl:   •  rosuvastatin (CRESTOR) 40 MG tablet, Take 1 tablet by mouth Daily., Disp: 30 tablet, Rfl: 3  •  SPIRIVA HANDIHALER 18 MCG per  "inhalation capsule, INHALE 1 PUFF BY MOUTH ONCE DAILY, Disp: 30 capsule, Rfl: 6  •  verapamil SR (CALAN-SR) 240 MG CR tablet, TAKE 1 TABLET BY MOUTH EVERY 12 HOURS, Disp: 180 tablet, Rfl: 1  •  hydrochlorothiazide (HYDRODIURIL) 25 MG tablet, Take 25 mg by mouth Daily., Disp: , Rfl:       Objective:     Vitals:    09/17/19 1101 09/17/19 1132   BP: 150/52 120/80   BP Location: Right arm    Pulse: 103    Weight: 108 kg (239 lb)    Height: 162.6 cm (64\")      Body mass index is 41.02 kg/m².    Physical Exam   Constitutional: She is oriented to person, place, and time.   Obese   HENT:   Head: Normocephalic.   Nose: Nose normal.   Mouth/Throat: Oropharynx is clear and moist.   Eyes: Conjunctivae and EOM are normal. Pupils are equal, round, and reactive to light.   Neck: Normal range of motion.   Cannot assess for JVD due to habitus   Cardiovascular: Normal rate, regular rhythm and intact distal pulses. Exam reveals distant heart sounds.   No murmur heard.  Pulmonary/Chest: Effort normal. She has decreased breath sounds.   Abdominal: Soft. There is no tenderness. A hernia is present.   Obesity limits abdominal exam   Musculoskeletal: Normal range of motion. She exhibits no edema.   Neurological: She is alert and oriented to person, place, and time. No cranial nerve deficit.   Skin: Skin is warm and dry. No rash noted.   Psychiatric: She has a normal mood and affect. Her behavior is normal. Judgment and thought content normal.   Vitals reviewed.        ECG 12 Lead  Date/Time: 9/17/2019 11:18 AM  Performed by: Bhanu Mata MD  Authorized by: Bhanu Mata MD   Comparison: compared with previous ECG   Similar to previous ECG  Rhythm: sinus tachycardia  Ectopy: unifocal PVCs  Conduction: conduction normal  ST Segments: ST segments normal  Other findings: non-specific ST-T wave changes    Clinical impression: abnormal EKG              Assessment:       Diagnosis Plan   1. PAF (paroxysmal atrial fibrillation) (CMS/[facility])  ECG " 12 Lead   2. History of stroke     3. PAD (peripheral artery disease) (CMS/HCC)     4. Essential hypertension     5. Chronic respiratory failure with hypoxia (CMS/HCC)     6. Chronic bronchitis, unspecified chronic bronchitis type (CMS/HCC)            Plan:       1.  Atrial Fibrillation and Atrial Flutter  Assessment  • The patient has paroxysmal atrial fibrillation  • This is non-valvular in etiology  • The patient's CHADS2-VASc score is 7  • A [id number] score of 2 or more is considered a high risk for a thromboembolic event    Plan  • Attempt to maintain sinus rhythm  • Continue verapamil for rhythm control  • She declines anticoagulation.  Her average HR is in the 90s, likely due to her underlying lung disease.     2.  It's unclear if her stroke was due to PAF or PAD, although her carotid disease was on the right and her stroke was on the left.  She was previously on DAPT, then clopidogrel plus rivaroxaban, but she declines any antiplatelets/antithrombotics due to nosebleeds.  Her risk is very high of a recurrent event.    3.  She was previously followed by Dr. Schuler.  She has had a right CEA and she has known left subclavian disease, but has no symptoms of claudication/steal.    4.  Her BP was a little high today but was 120/80 upon recheck.     5/6.  She has oxygen dependent COPD, and has mild sinus tachycardia as a result.    Again, if she does need some type of surgery, her cardiac risk is not high.  Her overall risk (due to deconditioning, obesity, lung disease) puts her at high risk of major adverse events.     Sincerely,       Bhanu Mata MD

## 2019-09-18 RX ORDER — FENOFIBRATE 145 MG/1
TABLET, COATED ORAL
Qty: 30 TABLET | Refills: 3 | Status: SHIPPED | OUTPATIENT
Start: 2019-09-18 | End: 2020-03-04

## 2019-09-20 NOTE — PATIENT INSTRUCTIONS
Call Dr. Roberto Carlos Umaña, Gastroenterology at [phone] Infliximab injection  What is this medicine?  INFLIXIMAB (in FLIX i mab) is used to treat Crohn's disease and ulcerative colitis. It is also used to treat ankylosing spondylitis, plaque psoriasis, and some forms of arthritis.  This medicine may be used for other purposes; ask your health care provider or pharmacist if you have questions.  COMMON BRAND NAME(S): INFLECTRA, Remicade, RENFLEXIS  What should I tell my health care provider before I take this medicine?  They need to know if you have any of these conditions:  -cancer  -current or past resident of [place]  -diabetes  -exposure to tuberculosis  -Guillain-[place] syndrome  -heart failure  -hepatitis or liver disease  -immune system problems  -infection  -lung or breathing disease, like COPD  -multiple sclerosis  -receiving phototherapy for the skin  -seizure disorder  -an unusual or allergic reaction to infliximab, mouse proteins, other medicines, foods, dyes, or preservatives  -pregnant or trying to get pregnant  -breast-feeding  How should I use this medicine?  This medicine is for injection into a vein. It is usually given by a health care professional in a hospital or clinic setting.  A special MedGuide will be given to you by the pharmacist with each prescription and refill. Be sure to read this information carefully each time.  Talk to your pediatrician regarding the use of this medicine in children. While this drug may be prescribed for children as young as 6 years of age for selected conditions, precautions do apply.  Overdosage: If you think you have taken too much of this medicine contact a poison control center or emergency room at once.  NOTE: This medicine is only for you. Do not share this medicine with others.  What if I miss a dose?  It is important not to miss your dose. Call your doctor or health care professional if you are unable to keep an  appointment.  What may interact with this medicine?  Do not take this medicine with any of the following medications:  -biologic medicines such as abatacept, adalimumab, anakinra, certolizumab, etanercept, golimumab, rituximab, secukinumab, tocilizumab, tofactinib, ustekinumab  -live vaccines  This list may not describe all possible interactions. Give your health care provider a list of all the medicines, herbs, non-prescription drugs, or dietary supplements you use. Also tell them if you smoke, drink alcohol, or use illegal drugs. Some items may interact with your medicine.  What should I watch for while using this medicine?  Your condition will be monitored carefully while you are receiving this medicine. Visit your doctor or health care professional for regular checks on your progress. You may need blood work done while you are taking this medicine. Before beginning therapy, your doctor may do a test to see if you have been exposed to tuberculosis.  Call your doctor or health care professional for advice if you get a fever, chills or sore throat, or other symptoms of a cold or flu. Do not treat yourself. This drug decreases your body's ability to fight infections. Try to avoid being around people who are sick.  This medicine may make the symptoms of heart failure worse in some patients. If you notice symptoms such as increased shortness of breath or swelling of the ankles or legs, contact your health care provider right away.  If you are going to have surgery or dental work, tell your health care professional or dentist that you have received this medicine.  If you take this medicine for plaque psoriasis, stay out of the sun. If you cannot avoid being in the sun, wear protective clothing and use sunscreen. Do not use sun lamps or tanning beds/booths.  Talk to your doctor about your risk of cancer. You may be more at risk for certain types of cancers if you take this medicine.  What side effects may I notice from  receiving this medicine?  Side effects that you should report to your doctor or health care professional as soon as possible:  -allergic reactions like skin rash, itching or hives, swelling of the face, lips, or tongue  -breathing problems  -changes in vision  -chest pain  -fever or chills, usually related to the infusion  -joint pain  -pain, tingling, numbness in the hands or feet  -redness, blistering, peeling or loosening of the skin, including inside the mouth  -seizures  -signs of infection - fever or chills, cough, sore throat, flu-like symptoms, pain or difficulty passing urine  -signs and symptoms of liver injury like dark yellow or brown urine; general ill feeling; light-colored stools; loss of appetite; nausea; right upper belly pain; unusually weak or tired; yellowing of the eyes or skin  -signs and symptoms of a stroke like changes in vision; confusion; trouble speaking or understanding; severe headaches; sudden numbness or weakness of the face, arm or leg; trouble walking; dizziness; loss of balance or coordination  -swelling of the ankles, feet, or hands  -swollen lymph nodes in the neck, underarm, or groin areas  -unusual bleeding or bruising  -unusually weak or tired  Side effects that usually do not require medical attention (report to your doctor or health care professional if they continue or are bothersome):  -headache  -nausea  -stomach pain  -upset stomach  This list may not describe all possible side effects. Call your doctor for medical advice about side effects. You may report side effects to FDA at 8-099-FDA-5840.  Where should I keep my medicine?  This drug is given in a hospital or clinic and will not be stored at home.  NOTE: This sheet is a summary. It may not cover all possible information. If you have questions about this medicine, talk to your doctor, pharmacist, or health care provider.  © 2019 [employer]/Gold Standard (2018-01-17 13:45:32)   if you have any problems or  "concerns.    We know you have a Choice in healthcare and appreciate you using [facility].  Our purpose is to provide you \"Excellent Care\".  We hope that you will always choose us in the future and continue to recommend us to your family and friends.              "

## 2019-09-23 ENCOUNTER — HOSPITAL ENCOUNTER (OUTPATIENT)
Dept: INFUSION THERAPY | Facility: HOSPITAL | Age: 69
Discharge: HOME | End: 2019-09-23
Admitting: INTERNAL MEDICINE

## 2019-09-23 VITALS
HEART RATE: 76 BPM | SYSTOLIC BLOOD PRESSURE: 126 MMHG | OXYGEN SATURATION: 92 % | BODY MASS INDEX: 40.8 KG/M2 | RESPIRATION RATE: 16 BRPM | WEIGHT: 239 LBS | HEIGHT: 64 IN | TEMPERATURE: 98.3 F | DIASTOLIC BLOOD PRESSURE: 43 MMHG

## 2019-09-23 DIAGNOSIS — K50.10 CROHN'S DISEASE OF LARGE INTESTINE WITHOUT COMPLICATION (HCC): Primary | ICD-10-CM

## 2019-09-23 DIAGNOSIS — K50.119 CROHN'S DISEASE OF COLON WITH COMPLICATION (HCC): ICD-10-CM

## 2019-09-23 PROCEDURE — A9270 NON-COVERED ITEM OR SERVICE: HCPCS | Performed by: INTERNAL MEDICINE

## 2019-09-23 PROCEDURE — 63710000001 ACETAMINOPHEN 325 MG TABLET: Performed by: INTERNAL MEDICINE

## 2019-09-23 PROCEDURE — 96413 CHEMO IV INFUSION 1 HR: CPT

## 2019-09-23 PROCEDURE — 63710000001 DIPHENHYDRAMINE PER 50 MG: Performed by: INTERNAL MEDICINE

## 2019-09-23 PROCEDURE — 96415 CHEMO IV INFUSION ADDL HR: CPT

## 2019-09-23 PROCEDURE — 25010000002 INFLIXIMAB PER 10 MG: Performed by: INTERNAL MEDICINE

## 2019-09-23 RX ORDER — DIPHENHYDRAMINE HCL 25 MG
25 CAPSULE ORAL ONCE
Status: COMPLETED | OUTPATIENT
Start: 2019-09-23 | End: 2019-09-23

## 2019-09-23 RX ORDER — ACETAMINOPHEN 325 MG/1
650 TABLET ORAL ONCE
Status: COMPLETED | OUTPATIENT
Start: 2019-09-23 | End: 2019-09-23

## 2019-09-23 RX ORDER — ACETAMINOPHEN 325 MG/1
TABLET ORAL
Status: DISCONTINUED
Start: 2019-09-23 | End: 2022-01-01

## 2019-09-23 RX ORDER — DIPHENHYDRAMINE HCL 25 MG
25 CAPSULE ORAL ONCE
Status: CANCELLED | OUTPATIENT
Start: 2019-09-23

## 2019-09-23 RX ORDER — ACETAMINOPHEN 325 MG/1
650 TABLET ORAL ONCE
Status: CANCELLED | OUTPATIENT
Start: 2019-09-23

## 2019-09-23 RX ORDER — SODIUM CHLORIDE 9 MG/ML
250 INJECTION, SOLUTION INTRAVENOUS ONCE
Status: CANCELLED | OUTPATIENT
Start: 2019-09-23

## 2019-09-23 RX ADMIN — INFLIXIMAB 540 MG: 100 INJECTION, POWDER, LYOPHILIZED, FOR SOLUTION INTRAVENOUS at 09:17

## 2019-09-23 RX ADMIN — DIPHENHYDRAMINE HYDROCHLORIDE 25 MG: 25 CAPSULE ORAL at 09:17

## 2019-09-23 RX ADMIN — ACETAMINOPHEN 650 MG: 325 TABLET, FILM COATED ORAL at 09:17

## 2019-09-23 NOTE — NURSING NOTE
NURSING PROGRESS NOTE      1125  Tolerated prescribed treatment without incident. Patient received AVS, Pt verbalized understanding.  Discharged to home, transported via w/c @ 1150.  Condition Satisfactory .  Carmen Montanez RN

## 2019-09-23 NOTE — NURSING NOTE
NURSING PROGRESS NOTE      0830  Pt to [facility] per w/c with friend .Pt ID verified by (2) identifiers. Allergies, medications and medical history reviewed and verified. Pre-meds given . Carmen Montanez RN

## 2019-10-01 ENCOUNTER — OFFICE VISIT (OUTPATIENT)
Dept: ORTHOPEDIC SURGERY | Facility: CLINIC | Age: 69
End: 2019-10-01

## 2019-10-01 VITALS
DIASTOLIC BLOOD PRESSURE: 70 MMHG | HEART RATE: 94 BPM | BODY MASS INDEX: 40.8 KG/M2 | HEIGHT: 64 IN | SYSTOLIC BLOOD PRESSURE: 111 MMHG | WEIGHT: 239 LBS

## 2019-10-01 DIAGNOSIS — R52 PAIN: Primary | ICD-10-CM

## 2019-10-01 DIAGNOSIS — M19.012 PRIMARY OSTEOARTHRITIS, LEFT SHOULDER: ICD-10-CM

## 2019-10-01 DIAGNOSIS — M19.011 PRIMARY OSTEOARTHRITIS, RIGHT SHOULDER: ICD-10-CM

## 2019-10-01 PROCEDURE — 73030 X-RAY EXAM OF SHOULDER: CPT | Performed by: ORTHOPAEDIC SURGERY

## 2019-10-01 PROCEDURE — 99214 OFFICE O/P EST MOD 30 MIN: CPT | Performed by: ORTHOPAEDIC SURGERY

## 2019-10-01 NOTE — PROGRESS NOTES
"Subjective:     Patient ID: Mar Camilo is a 69 y.o. female.    Chief Complaint: Bilateral shoulder pain, DJD    History of Present Illness  Mar Camilo returns to clinic today for evaluation of bilateral shoulder pain, right worse than left. She notes the pain has been ongoing for the past 2 months, but she denies any prior injury to either shoulder. She rates the pain as a \"15/10\", which she describes as aching in nature. Localizes pain to anterior aspect of the shoulders bilaterally, with some radiation into the bilateral arms. She has taken Gabapentin for her pain, with no relief. Symptoms are relieved with rest. Symptoms are exacerbated with palpation of the shoulder or range of motion of the shoulders. Denies associated numbness or tingling. The patient is currently on oxygen due to her history of COPD.     Social History     Occupational History   • Not on file   Tobacco Use   • Smoking status: Former Smoker     Packs/day: 2.00     Years: 40.00     Pack years: 80.00     Types: Cigarettes     Last attempt to quit: 2014     Years since quittin.5   • Smokeless tobacco: Never Used   • Tobacco comment: Caffeine use: 3 CUPS DAILY.    Substance and Sexual Activity   • Alcohol use: No     Comment: history of heavy drinker    • Drug use: No   • Sexual activity: Defer      Past Medical History:   Diagnosis Date   • Arthritis    • Carotid arterial disease (CMS/[facility])     US 2015 with 80-99% right and 16-49% left, but CTA with 60-70% right, not a surgical candidate   • Chronic back pain    • Closed fracture of left tibial plateau 2018   • COPD (chronic obstructive pulmonary disease) (CMS/[facility])    • Crohn's disease (CMS/[facility])     Remicade on hold d/t antibiotics   • DDD (degenerative disc disease)    • Depression    • Diabetes mellitus (CMS/[facility])     Type II   • Heartburn 2016   • History of stroke 2017    left parietal   • Hyperlipidemia    • Hypertension    • Hypokalemia    • Morbid obesity " (CMS/HCC)    • Obstructive pyelonephritis    • On home oxygen therapy     2L UNLESS STRESSED THEN 2.5-3 L   • Osteopenia 2/21/2016   • PAF (paroxysmal atrial fibrillation) (CMS/HCC)    • PVD (peripheral vascular disease) (CMS/HCC)     RT CAROTID STENOSIS   • Radiculopathy     NECK PAIN   • Seizure (CMS/HCC)    • Stroke (CMS/HCC)    • Subclavian artery stenosis (CMS/HCC)     and axillary artery stenosis, on the left     Past Surgical History:   Procedure Laterality Date   • APPENDECTOMY N/A    • BRONCHOSCOPY N/A 1/5/2018    Procedure: BRONCHOSCOPY;  Surgeon: Gustavo Muniz MD;  Location: [facility] OR;  Service:    • COLON SURGERY     • CYSTOSCOPY URETEROSCOPY LASER LITHOTRIPSY Right 4/17/2017    Procedure: CYSTOSCOPY with  right stent removal, right URETEROSCOPY LASER LITHOTRIPSY,  with STONE BASKET EXTRACTION;  Surgeon: Dipesh Bean MD;  Location: [facility] OR;  Service:    • CYSTOSCOPY W/ URETERAL STENT PLACEMENT Right 3/18/2017    Procedure: CYSTOSCOPY URETERAL CATHETER/STENT INSERTION;  Surgeon: Dipesh Bean MD;  Location: [facility] OR;  Service:    • FLEXIBLE SIGMOIDOSCOPY N/A 01/25/2016    Dr. Luis Rosas   • LAPAROSCOPIC COLON RESECTION N/A 01/25/2016    Laparoscopic mobilization of splenic flexure, transverse colectomy with primary anastomosis-Dr. Christopher Richardson   • LUNG BIOPSY      NEGATIVE   • DC THROMBOENDARTECTMY NECK,NECK INCIS Right 3/14/2016    Procedure: RT CAROTID ENDARTERECTOMY;  Surgeon: Federico Schuler MD;  Location: [facility] OR;  Service: Vascular   • TIBIAL PLATEAU OPEN REDUCTION INTERNAL FIXATION Left 05/09/2018    Open reduction internal fixation of left tibial plateau fracture-Dr. Ayden Cárdenas   • WRIST ARTHROSCOPY Right     WITH RELEASE OF TRANSVERSE CARPAL LIGAMENT       Family History   Problem Relation Age of Onset   • Diabetes Mother    • Stroke Mother    • Other Father         RESPIRATORY FAILURE   • Cancer Other         lung cancer   • Crohn's disease Brother    •  "Crohn's disease Maternal Aunt          Review of Systems   Constitutional: Negative for chills, diaphoresis, fever and unexpected weight change.   HENT: Negative for hearing loss, nosebleeds, sore throat and tinnitus.    Eyes: Negative for pain and visual disturbance.   Respiratory: Negative for cough, shortness of breath and wheezing.    Cardiovascular: Negative for chest pain and palpitations.   Gastrointestinal: Negative for abdominal pain, diarrhea, nausea and vomiting.   Endocrine: Negative for cold intolerance, heat intolerance and polydipsia.   Genitourinary: Negative for difficulty urinating, dysuria and hematuria.   Musculoskeletal: Positive for arthralgias and myalgias. Negative for joint swelling.   Skin: Negative for rash and wound.   Allergic/Immunologic: Negative for environmental allergies.   Neurological: Negative for dizziness, syncope and numbness.   Hematological: Does not bruise/bleed easily.   Psychiatric/Behavioral: Negative for dysphoric mood and sleep disturbance. The patient is not nervous/anxious.            Objective:  Vitals:    10/01/19 0945   BP: 111/70   BP Location: Right arm   Patient Position: Sitting   Cuff Size: Large Adult   Pulse: 94   Weight: 108 kg (239 lb)   Height: 162.6 cm (64\")         10/01/19  0945   Weight: 108 kg (239 lb)     Body mass index is 41.02 kg/m².    Physical Exam    Vital signs reviewed.   General: No acute distress, alert and oriented  Eyes: conjunctiva clear; pupils equally round and reactive  ENT: external ears and nose atraumatic; oropharynx clear  CV: no peripheral edema  Resp: normal respiratory effort  Skin: no rashes or wounds; normal turgor  Psych: mood and affect appropriate; recent and remote memory intact         Ortho Exam     Bilateral shoulders-  Maximal tenderness located anterior, posterior joint lines with crepitus  Moderate tenderness AC joint  FF-   Active- 80    Strength- 4/5  ER-      Active- neutral   Strength- 4/5  IR        To her " side    Strength- 4+/5 on belly press test    Cross arm test- positive    Brisk cap refill to all digits, palpable radial pulse    Positive sensation to light touch palmar, dorsal aspects of small and index fingers and anatomic snuffbox right and left hand       Imaging:  Bilateral Shoulder X-Ray  Indication: Pain  AP, scapular Y  views    Findings:  No fracture  No bony lesion  Normal soft tissues  Advanced degenerative change bilateral glenohumeral joint with osteophyte along proximal medial calcar of proximal humerus and significant joint space narrowing  Compared to prior office x-rays    Assessment:        1. Pain    2. Primary osteoarthritis, right shoulder    3. Primary osteoarthritis, left shoulder           Plan:          1. Discussed treatment options at length with patient at today's visit.  Patient wishes to proceed with reverse shoulder arthroplasty at this point in time.  I discussed with with the patient the specific indication of a reverse shoulder arthroplasty particularly for shoulder pain as well as for pseudoparalysis, and reviewed anatomy of the shoulder as well as a model of the implant.  I reviewed details of the procedure as well as risks, benefits, and alternatives with the risks including but not limited to neurovascular damage, bleeding, infection, periprosthetic fracture, chronic pain, instability, loosening of implant, failure of implant, loss of motion, weakness, stiffness, DVT, pulmonary embolus, death, stroke, complex regional pain syndrome, myocardial infarction, need for additional procedures.  Patient understood all these had all questions answered today.  We will have patient medically optimized by primary care physician and plan on proceeding with surgery at next available date.  Patient understood all this had all questions answered.  No guarantees were given in regards to results of the surgery.   She has history of atrial fibrillation, but denies any other cardiac history. She  notes a prior blood clot, but she is not anticoagulated at this time.  She also has significant pulmonary history and currently wears oxygen.  We will order a CT of the right shoulder prior to surgery.      Mar Camilo was in agreement with plan and had all questions answered.     Orders:  Orders Placed This Encounter   Procedures   • MRSA Screen Culture - Swab, Nares   • XR Shoulder 2+ View Bilateral   • CT shoulder right wo contrast   • Basic metabolic panel   • Protime-INR   • APTT   • Urinalysis With Culture If Indicated -   • Hemoglobin A1c   • Consult Primary Care Physician for Medical Clearance   • NPO After Midnight   • Follow Anesthesia Guidelines / Standing Orders   • Provide instructions to patient regarding NPO status   • Care Order / Instruction for all Female Patients   • ECG 12 Lead   • Type and screen   • CBC and Differential       Medications:  No orders of the defined types were placed in this encounter.      Followup:  No Follow-up on file.    Mar was seen today for pain and pain.    Diagnoses and all orders for this visit:    Pain  -     XR Shoulder 2+ View Bilateral    Primary osteoarthritis, right shoulder  -     CT shoulder right wo contrast; Future  -     Case Request; Standing  -     Consult Primary Care Physician for Medical Clearance  -     CBC and Differential; Future  -     Basic metabolic panel; Future  -     Protime-INR; Future  -     APTT; Future  -     MRSA Screen Culture - Swab, Nares; Future  -     Urinalysis With Culture If Indicated -; Future  -     ECG 12 Lead; Future  -     Type and screen; Future  -     vancomycin (VANCOCIN) 1,500 mg in sodium chloride 0.9 % 250 mL IVPB  -     acetaminophen (TYLENOL) tablet 975 mg  -     meloxicam (MOBIC) tablet 15 mg  -     pregabalin (LYRICA) capsule 150 mg  -     Hemoglobin A1c; Future    Primary osteoarthritis, left shoulder    Other orders  -     Inpatient Admission; Standing  -     Follow Anesthesia Guidelines / Standing Orders;  Future  -     Provide instructions to patient regarding NPO status  -     Care Order / Instruction for all Female Patients  -     Follow Anesthesia Guidelines / Standing Orders; Standing  -     Verify NPO Status; Standing  -     Clip operative site; Standing  -     Obtain informed consent (if not collected inpatient or PAT); Standing  -     NPO After Midnight  -     Inpatient Consult to Hospitalist; Standing      By signing my name here, I Christopher Conrad, attest that all documentation on 10/14/19 at 5:20 PM has been prepared under the direction and in the presence of Dr. Altaf Reyes.    I, Dr. Altaf Reyes, personally performed the services described in this documentation, as scribed by Christopher Conrad, in my presence, and it is both accurate and complete.     Dictated utilizing Dragon dictation

## 2019-10-07 ENCOUNTER — OFFICE VISIT (OUTPATIENT)
Dept: FAMILY MEDICINE CLINIC | Facility: CLINIC | Age: 69
End: 2019-10-07

## 2019-10-07 VITALS
TEMPERATURE: 97.7 F | RESPIRATION RATE: 24 BRPM | WEIGHT: 236 LBS | SYSTOLIC BLOOD PRESSURE: 160 MMHG | BODY MASS INDEX: 40.29 KG/M2 | OXYGEN SATURATION: 91 % | DIASTOLIC BLOOD PRESSURE: 60 MMHG | HEIGHT: 64 IN | HEART RATE: 86 BPM

## 2019-10-07 DIAGNOSIS — J06.9 ACUTE URI: Primary | ICD-10-CM

## 2019-10-07 PROCEDURE — 99213 OFFICE O/P EST LOW 20 MIN: CPT | Performed by: NURSE PRACTITIONER

## 2019-10-07 RX ORDER — AMOXICILLIN 875 MG/1
875 TABLET, COATED ORAL 2 TIMES DAILY
Qty: 20 TABLET | Refills: 0 | Status: SHIPPED | OUTPATIENT
Start: 2019-10-07 | End: 2019-10-17

## 2019-10-07 NOTE — PROGRESS NOTES
"Chief Complaint   Patient presents with   • Cough   • sneezing   • Nasal Congestion       Upper Respiratory Infection: Patient complains of symptoms of a URI. Symptoms include congestion and cough. Onset of symptoms was 1 week ago, gradually worsening since that time. She also c/o achiness, congestion, productive cough with  gray colored sputum, sinus pressure and sneezing for the past 1 week .  She is drinking moderate amounts of fluids. Evaluation to date: none. Treatment to date: Tylenol Cold with some relief.  Helped with sleep.  .  Ill contacts at home or school or work discussed.    The following portions of the patient's history were reviewed and updated as appropriate: allergies, current medications, past family history, past medical history, past social history, past surgical history and problem list.    A comprehensive review of systems was negative except for: Constitutional: positive for fatigue  Ears, nose, mouth, throat, and face: positive for nasal congestion  Respiratory: positive for cough, dyspnea on exertion and sputum    Vitals:    10/07/19 1100   BP: 160/60   BP Location: Right arm   Patient Position: Sitting   Cuff Size: Large Adult   Pulse: 86   Resp: 24   Temp: 97.7 °F (36.5 °C)   SpO2: 91%   Weight: 107 kg (236 lb)   Height: 162.6 cm (64\")     Gen: Mildly ill appearing, alert  Ears: TM's bulging without redness  Nose:  Congestion  Throat:  Red without exudate, some drainage  Neck: No LAD  Lung: Wheezing left lung,  regular RR,m O2 sats 91 % on O2 at 3L/M per N/C  Heart: RR without murmur  Skin: No rash      Assessment/Plan   Mar was seen today for cough, sneezing and nasal congestion.    Diagnoses and all orders for this visit:    Acute URI    Other orders  -     amoxicillin (AMOXIL) 875 MG tablet; Take 1 tablet by mouth 2 (Two) Times a Day for 10 days.             There are no Patient Instructions on file for this visit.    Tylenol or Advil as needed for pain, fever, muscle aches  Plenty " of fluids  Hand washing discussed  Off work or school note given if needed.  Warm tea for throat.  Pros and cons of antibiotic use discussed  Notify us if no improvement.    Luzma Head, APRN  Family Practice  [facility]

## 2019-10-14 RX ORDER — MELOXICAM 7.5 MG/1
15 TABLET ORAL ONCE
Status: CANCELLED | OUTPATIENT
Start: 2019-10-14 | End: 2019-10-14

## 2019-10-14 RX ORDER — PREGABALIN 150 MG/1
150 CAPSULE ORAL ONCE
Status: CANCELLED | OUTPATIENT
Start: 2019-10-14 | End: 2019-10-14

## 2019-10-14 RX ORDER — ACETAMINOPHEN 325 MG/1
1000 TABLET ORAL ONCE
Status: CANCELLED | OUTPATIENT
Start: 2019-10-14 | End: 2019-10-14

## 2019-10-21 DIAGNOSIS — M79.672 CHRONIC PAIN IN LEFT FOOT: ICD-10-CM

## 2019-10-21 DIAGNOSIS — G89.29 CHRONIC PAIN OF BOTH SHOULDERS: ICD-10-CM

## 2019-10-21 DIAGNOSIS — G89.29 CHRONIC PAIN IN LEFT FOOT: ICD-10-CM

## 2019-10-21 DIAGNOSIS — M25.511 CHRONIC PAIN OF BOTH SHOULDERS: ICD-10-CM

## 2019-10-21 DIAGNOSIS — G62.9 NEUROPATHY: ICD-10-CM

## 2019-10-21 DIAGNOSIS — M25.512 CHRONIC PAIN OF BOTH SHOULDERS: ICD-10-CM

## 2019-10-21 DIAGNOSIS — M79.605 CHRONIC PAIN OF LEFT LOWER EXTREMITY: ICD-10-CM

## 2019-10-21 DIAGNOSIS — G89.29 CHRONIC PAIN OF LEFT LOWER EXTREMITY: ICD-10-CM

## 2019-10-21 RX ORDER — GABAPENTIN 300 MG/1
CAPSULE ORAL
Qty: 90 CAPSULE | Refills: 0 | Status: SHIPPED | OUTPATIENT
Start: 2019-10-21 | End: 2019-12-16 | Stop reason: SDUPTHER

## 2019-10-22 ENCOUNTER — TELEPHONE (OUTPATIENT)
Dept: CARDIOLOGY | Facility: CLINIC | Age: 69
End: 2019-10-22

## 2019-10-22 NOTE — TELEPHONE ENCOUNTER
My last office note contains her operative risk assessment at the very end.  She does not need to be seen prior to that.

## 2019-10-22 NOTE — TELEPHONE ENCOUNTER
10/22/19@ 10:50 am   Romi with Dr Tompkins office called for surgery clearance on right total shoulder on 12/4/19.   Pt was last seen in office on 9/17/19 they also wanted to know if she needs seen before clearing her?   She is currently not on and refusing anticoagulates.   Please advise   Romi call back # [phone]  Thanks Pierre bro

## 2019-10-23 ENCOUNTER — HOSPITAL ENCOUNTER (OUTPATIENT)
Dept: CT IMAGING | Facility: HOSPITAL | Age: 69
Discharge: HOME | End: 2019-10-23
Admitting: ORTHOPAEDIC SURGERY

## 2019-10-23 ENCOUNTER — OFFICE VISIT (OUTPATIENT)
Dept: FAMILY MEDICINE CLINIC | Facility: CLINIC | Age: 69
End: 2019-10-23

## 2019-10-23 VITALS
DIASTOLIC BLOOD PRESSURE: 68 MMHG | OXYGEN SATURATION: 91 % | HEART RATE: 110 BPM | TEMPERATURE: 98 F | HEIGHT: 64 IN | WEIGHT: 238 LBS | RESPIRATION RATE: 22 BRPM | SYSTOLIC BLOOD PRESSURE: 134 MMHG | BODY MASS INDEX: 40.63 KG/M2

## 2019-10-23 DIAGNOSIS — E11.8 TYPE 2 DIABETES MELLITUS WITH COMPLICATION, WITHOUT LONG-TERM CURRENT USE OF INSULIN (HCC): Primary | ICD-10-CM

## 2019-10-23 DIAGNOSIS — M19.011 PRIMARY OSTEOARTHRITIS, RIGHT SHOULDER: ICD-10-CM

## 2019-10-23 DIAGNOSIS — E78.1 HYPERTRIGLYCERIDEMIA: ICD-10-CM

## 2019-10-23 DIAGNOSIS — Z23 NEED FOR INFLUENZA VACCINATION: ICD-10-CM

## 2019-10-23 PROBLEM — G62.9 NEUROPATHY: Status: ACTIVE | Noted: 2019-09-19

## 2019-10-23 PROBLEM — M79.89 FOOT SWELLING: Status: ACTIVE | Noted: 2019-09-19

## 2019-10-23 PROCEDURE — G0008 ADMIN INFLUENZA VIRUS VAC: HCPCS | Performed by: PHYSICIAN ASSISTANT

## 2019-10-23 PROCEDURE — 99213 OFFICE O/P EST LOW 20 MIN: CPT | Performed by: PHYSICIAN ASSISTANT

## 2019-10-23 PROCEDURE — 90653 IIV ADJUVANT VACCINE IM: CPT | Performed by: PHYSICIAN ASSISTANT

## 2019-10-23 PROCEDURE — 73200 CT UPPER EXTREMITY W/O DYE: CPT

## 2019-10-23 NOTE — PROGRESS NOTES
Subjective   Mar Camilo is a 69 y.o. female presents for   Chief Complaint   Patient presents with   • Diabetes       History of Present Illness     Mar is a 69 year old female who presents with sister, Clau, at office visit today.  She was seen in office on October 7, 2019 by nurse practitioner, Luzma Lezama.  She was diagnosed with an upper respiratory infection and placed on antibiotic.  Krys states she is feeling well at office visit today.  She is currently using 3 L of oxygen daily.  Has upcoming appointments with pulmonology.  Using her nebulizer as directed.  Denied any fevers, chills, cough, chest pain, vision changes, abdominal pain or swelling of ankles.  Krys states she is seeing Dr. Reyes, orthopedist, for her right shoulder pain.  She had a CT scan of right shoulder performed at [facility] today by Dr. Reyes.  States she is looking at possible right shoulder replacement on December 4, 2019 at [facility].  She was not been checking her blood sugars at home.  States she does not want to prick her finger.  Her diet has been slightly healthy.  She has been eating sandwiches at least once daily.  Sleep has been normal for her.  Does have been daily.  She has not had her eye exam at this time.  Her sister states she is trying to schedule an appointment.  She refuses any DEXA scan or mammograms at this time.    The following portions of the patient's history were reviewed and updated as appropriate: allergies, current medications, past family history, past medical history, past social history, past surgical history and problem list.    Review of Systems   Constitutional: Negative.  Negative for chills, fatigue and fever.   HENT: Negative.    Eyes: Negative.    Respiratory: Negative.  Negative for cough, chest tightness and wheezing.    Cardiovascular: Negative.  Negative for chest pain, palpitations and leg swelling.   Gastrointestinal: Negative.  Negative for abdominal pain, anal bleeding,  "blood in stool, constipation, diarrhea, nausea, rectal pain and vomiting.   Endocrine: Negative.    Genitourinary: Negative.    Musculoskeletal: Negative.    Skin: Negative.    Allergic/Immunologic: Negative.    Neurological: Negative.  Negative for dizziness, light-headedness and headaches.   Hematological: Negative.    Psychiatric/Behavioral: Negative.    All other systems reviewed and are negative.        Vitals:    10/23/19 1525   BP: 134/68   Pulse: 110   Resp: 22   Temp: 98 °F (36.7 °C)   SpO2: 91%   Weight: 108 kg (238 lb)   Height: 162.6 cm (64\")     Wt Readings from Last 3 Encounters:   10/23/19 108 kg (238 lb)   10/07/19 107 kg (236 lb)   10/01/19 108 kg (239 lb)     BP Readings from Last 3 Encounters:   10/23/19 134/68   10/07/19 160/60   10/01/19 111/70     Social History     Socioeconomic History   • Marital status:      Spouse name: Not on file   • Number of children: Not on file   • Years of education: Not on file   • Highest education level: Not on file   Tobacco Use   • Smoking status: Former Smoker     Packs/day: 2.00     Years: 40.00     Pack years: 80.00     Types: Cigarettes     Last attempt to quit: 2014     Years since quittin.5   • Smokeless tobacco: Never Used   • Tobacco comment: Caffeine use: 3 CUPS DAILY.    Substance and Sexual Activity   • Alcohol use: No     Comment: history of heavy drinker    • Drug use: No   • Sexual activity: Defer       Allergies   Allergen Reactions   • Contrast Dye Hives   • Iodinated Diagnostic Agents Hives   • Norco [Hydrocodone-Acetaminophen] Hallucinations   • Tenoretic [Atenolol-Chlorthalidone] Anaphylaxis       Body mass index is 40.85 kg/m².    Objective   Physical Exam   Constitutional: She is oriented to person, place, and time. Vital signs are normal. She appears well-developed and well-nourished.   Sitting in wheelchair on 3 L of oxygen   HENT:   Head: Normocephalic and atraumatic.   Right Ear: Hearing, tympanic membrane, external ear " and ear canal normal.   Left Ear: Hearing, tympanic membrane, external ear and ear canal normal.   Nose: Nose normal. Right sinus exhibits no maxillary sinus tenderness and no frontal sinus tenderness. Left sinus exhibits no maxillary sinus tenderness and no frontal sinus tenderness.   Mouth/Throat: Uvula is midline, oropharynx is clear and moist and mucous membranes are normal.   Eyes: Conjunctivae, EOM and lids are normal. Pupils are equal, round, and reactive to light.   Neck: Trachea normal and phonation normal. Neck supple. Normal carotid pulses, no hepatojugular reflux and no JVD present. No tracheal tenderness present. Carotid bruit is not present. No tracheal deviation and no edema present. No thyromegaly present.   Cardiovascular: Normal rate, regular rhythm, S1 normal, S2 normal, normal heart sounds and normal pulses.   No murmur heard.  Pulmonary/Chest: Effort normal and breath sounds normal.   Abdominal: Soft. Normal appearance, normal aorta and bowel sounds are normal. There is no hepatomegaly. There is no tenderness.   Lymphadenopathy:     She has no cervical adenopathy.   Neurological: She is alert and oriented to person, place, and time.   Skin: Skin is warm, dry and intact. Capillary refill takes less than 2 seconds.   Psychiatric: She has a normal mood and affect. Her speech is normal and behavior is normal. Judgment and thought content normal. Cognition and memory are normal.       Assessment/Plan   Mar was seen today for diabetes.    Diagnoses and all orders for this visit:    Type 2 diabetes mellitus with complication, without long-term current use of insulin (CMS/[facility])  -     Ambulatory Referral for Diabetic Eye Exam-Ophthalmology  -     CBC & Differential  -     Comprehensive Metabolic Panel  -     Hemoglobin A1c  -     Lipid Panel With LDL / HDL Ratio    Hypertriglyceridemia  -     Comprehensive Metabolic Panel  -     Lipid Panel With LDL / HDL Ratio    Need for influenza vaccination  -      Valerio Edwin >65 years      1.  Chronic and stable type 2 diabetes with hypertriglyceridemia: She was fasting at office visit today.  She will have a CBC, CMP, lipid profile and hemoglobin A1c.  She refuses to collect a urine for urine microalbumin.  She will be notified of test results and any medication changes.  She refuses to check blood sugars at home.  I have given them an eye form to take to the ophthalmologist at [facility] in [place].  2.  Need for influenza vaccination: She was given written consent to receive high-dose flu shot at office visit today.    [facility]AR[facility]  [address] [id number]  Dept: [phone]  Dept Fax: [phone]  Loc: [phone]  Loc Fax: [phone]

## 2019-10-24 LAB
ALBUMIN SERPL-MCNC: 4.7 G/DL (ref 3.5–4.8)
ALBUMIN/GLOB SERPL: 1.5 {RATIO} (ref 1.2–2.2)
ALP SERPL-CCNC: 83 IU/L (ref 39–117)
ALT SERPL-CCNC: 28 IU/L (ref 0–32)
AST SERPL-CCNC: 21 IU/L (ref 0–40)
BASOPHILS # BLD AUTO: 0 X10E3/UL (ref 0–0.2)
BASOPHILS NFR BLD AUTO: 0 %
BILIRUB SERPL-MCNC: 0.2 MG/DL (ref 0–1.2)
BUN SERPL-MCNC: 24 MG/DL (ref 8–27)
BUN/CREAT SERPL: 23 (ref 12–28)
CALCIUM SERPL-MCNC: 10.8 MG/DL (ref 8.7–10.3)
CHLORIDE SERPL-SCNC: 100 MMOL/L (ref 96–106)
CHOLEST SERPL-MCNC: 226 MG/DL (ref 100–199)
CO2 SERPL-SCNC: 21 MMOL/L (ref 20–29)
CREAT SERPL-MCNC: 1.05 MG/DL (ref 0.57–1)
EOSINOPHIL # BLD AUTO: 0.2 X10E3/UL (ref 0–0.4)
EOSINOPHIL NFR BLD AUTO: 1 %
ERYTHROCYTE [DISTWIDTH] IN BLOOD BY AUTOMATED COUNT: 14.9 % (ref 12.3–15.4)
GLOBULIN SER CALC-MCNC: 3.2 G/DL (ref 1.5–4.5)
GLUCOSE SERPL-MCNC: 124 MG/DL (ref 65–99)
HBA1C MFR BLD: 6.5 % (ref 4.8–5.6)
HCT VFR BLD AUTO: 41.6 % (ref 34–46.6)
HDLC SERPL-MCNC: 46 MG/DL
HGB BLD-MCNC: 13.8 G/DL (ref 11.1–15.9)
IMM GRANULOCYTES # BLD AUTO: 0.1 X10E3/UL (ref 0–0.1)
IMM GRANULOCYTES NFR BLD AUTO: 1 %
LDLC SERPL CALC-MCNC: ABNORMAL MG/DL (ref 0–99)
LDLC/HDLC SERPL: ABNORMAL RATIO
LYMPHOCYTES # BLD AUTO: 3.2 X10E3/UL (ref 0.7–3.1)
LYMPHOCYTES NFR BLD AUTO: 16 %
MCH RBC QN AUTO: 29.5 PG (ref 26.6–33)
MCHC RBC AUTO-ENTMCNC: 33.2 G/DL (ref 31.5–35.7)
MCV RBC AUTO: 89 FL (ref 79–97)
MONOCYTES # BLD AUTO: 1.2 X10E3/UL (ref 0.1–0.9)
MONOCYTES NFR BLD AUTO: 6 %
NEUTROPHILS # BLD AUTO: 15.2 X10E3/UL (ref 1.4–7)
NEUTROPHILS NFR BLD AUTO: 76 %
PLATELET # BLD AUTO: 477 X10E3/UL (ref 150–450)
POTASSIUM SERPL-SCNC: 5.4 MMOL/L (ref 3.5–5.2)
PROT SERPL-MCNC: 7.9 G/DL (ref 6–8.5)
RBC # BLD AUTO: 4.68 X10E6/UL (ref 3.77–5.28)
SODIUM SERPL-SCNC: 139 MMOL/L (ref 134–144)
TRIGL SERPL-MCNC: 420 MG/DL (ref 0–149)
VLDLC SERPL CALC-MCNC: ABNORMAL MG/DL (ref 5–40)
WBC # BLD AUTO: 19.8 X10E3/UL (ref 3.4–10.8)

## 2019-11-10 DIAGNOSIS — I10 ESSENTIAL HYPERTENSION: ICD-10-CM

## 2019-11-10 DIAGNOSIS — F32.0 MILD SINGLE CURRENT EPISODE OF MAJOR DEPRESSIVE DISORDER (HCC): ICD-10-CM

## 2019-11-11 RX ORDER — FLUOXETINE HYDROCHLORIDE 40 MG/1
CAPSULE ORAL
Qty: 30 CAPSULE | Refills: 3 | Status: SHIPPED | OUTPATIENT
Start: 2019-11-11 | End: 2020-03-15

## 2019-11-11 RX ORDER — LISINOPRIL 40 MG/1
TABLET ORAL
Qty: 90 TABLET | Refills: 3 | Status: ON HOLD | OUTPATIENT
Start: 2019-11-11 | End: 2020-01-01 | Stop reason: SDUPTHER

## 2019-11-14 ENCOUNTER — TRANSCRIBE ORDERS (OUTPATIENT)
Dept: ADMINISTRATIVE | Facility: HOSPITAL | Age: 69
End: 2019-11-14

## 2019-11-14 DIAGNOSIS — J44.9 CHRONIC OBSTRUCTIVE PULMONARY DISEASE, UNSPECIFIED COPD TYPE (HCC): Primary | ICD-10-CM

## 2019-11-18 ENCOUNTER — HOSPITAL ENCOUNTER (OUTPATIENT)
Dept: INFUSION THERAPY | Facility: HOSPITAL | Age: 69
Discharge: HOME | End: 2019-11-18
Admitting: INTERNAL MEDICINE

## 2019-11-18 VITALS
OXYGEN SATURATION: 90 % | TEMPERATURE: 98 F | HEIGHT: 64 IN | SYSTOLIC BLOOD PRESSURE: 131 MMHG | DIASTOLIC BLOOD PRESSURE: 58 MMHG | BODY MASS INDEX: 40.46 KG/M2 | RESPIRATION RATE: 18 BRPM | HEART RATE: 80 BPM | WEIGHT: 237 LBS

## 2019-11-18 DIAGNOSIS — K50.119 CROHN'S DISEASE OF COLON WITH COMPLICATION (HCC): ICD-10-CM

## 2019-11-18 DIAGNOSIS — K50.10 CROHN'S DISEASE OF LARGE INTESTINE WITHOUT COMPLICATION (HCC): Primary | ICD-10-CM

## 2019-11-18 PROCEDURE — 25010000002 INFLIXIMAB PER 10 MG: Performed by: INTERNAL MEDICINE

## 2019-11-18 PROCEDURE — A9270 NON-COVERED ITEM OR SERVICE: HCPCS | Performed by: INTERNAL MEDICINE

## 2019-11-18 PROCEDURE — 63710000001 DIPHENHYDRAMINE PER 50 MG: Performed by: INTERNAL MEDICINE

## 2019-11-18 PROCEDURE — 63710000001 ACETAMINOPHEN 325 MG TABLET: Performed by: INTERNAL MEDICINE

## 2019-11-18 PROCEDURE — 96415 CHEMO IV INFUSION ADDL HR: CPT

## 2019-11-18 PROCEDURE — 96413 CHEMO IV INFUSION 1 HR: CPT

## 2019-11-18 PROCEDURE — 96366 THER/PROPH/DIAG IV INF ADDON: CPT

## 2019-11-18 PROCEDURE — 96365 THER/PROPH/DIAG IV INF INIT: CPT

## 2019-11-18 RX ORDER — DIPHENHYDRAMINE HCL 25 MG
25 CAPSULE ORAL ONCE
Status: COMPLETED | OUTPATIENT
Start: 2019-11-18 | End: 2019-11-18

## 2019-11-18 RX ORDER — ACETAMINOPHEN 325 MG/1
650 TABLET ORAL ONCE
Status: CANCELLED | OUTPATIENT
Start: 2019-11-18

## 2019-11-18 RX ORDER — DIPHENHYDRAMINE HCL 25 MG
25 CAPSULE ORAL ONCE
Status: CANCELLED | OUTPATIENT
Start: 2019-11-18

## 2019-11-18 RX ORDER — ACETAMINOPHEN 325 MG/1
650 TABLET ORAL ONCE
Status: COMPLETED | OUTPATIENT
Start: 2019-11-18 | End: 2019-11-18

## 2019-11-18 RX ORDER — SODIUM CHLORIDE 9 MG/ML
250 INJECTION, SOLUTION INTRAVENOUS ONCE
Status: CANCELLED | OUTPATIENT
Start: 2019-11-18

## 2019-11-18 RX ADMIN — DIPHENHYDRAMINE HYDROCHLORIDE 25 MG: 25 CAPSULE ORAL at 08:59

## 2019-11-18 RX ADMIN — ACETAMINOPHEN 650 MG: 325 TABLET, FILM COATED ORAL at 08:59

## 2019-11-18 RX ADMIN — INFLIXIMAB 540 MG: 100 INJECTION, POWDER, LYOPHILIZED, FOR SOLUTION INTRAVENOUS at 09:27

## 2019-11-18 NOTE — PATIENT INSTRUCTIONS
"  Call Dr. Roberto Carlos Umaña, Gastroenterology at [phone] if you have any problems or concerns.    We know you have a Choice in healthcare and appreciate you using [facility].  Our purpose is to provide you \"Excellent Care\".  We hope that you will always choose us in the future and continue to recommend us to your family and friends.            Infliximab injection  What is this medicine?  INFLIXIMAB (in FLIX i mab) is used to treat Crohn's disease and ulcerative colitis. It is also used to treat ankylosing spondylitis, plaque psoriasis, and some forms of arthritis.  This medicine may be used for other purposes; ask your health care provider or pharmacist if you have questions.  COMMON BRAND NAME(S): INFLECTRA, Remicade, RENFLEXIS  What should I tell my health care provider before I take this medicine?  They need to know if you have any of these conditions:  -cancer  -current or past resident of [place]  -diabetes  -exposure to tuberculosis  -Guillain-[place] syndrome  -heart failure  -hepatitis or liver disease  -immune system problems  -infection  -lung or breathing disease, like COPD  -multiple sclerosis  -receiving phototherapy for the skin  -seizure disorder  -an unusual or allergic reaction to infliximab, mouse proteins, other medicines, foods, dyes, or preservatives  -pregnant or trying to get pregnant  -breast-feeding  How should I use this medicine?  This medicine is for injection into a vein. It is usually given by a health care professional in a hospital or clinic setting.  A special MedGuide will be given to you by the pharmacist with each prescription and refill. Be sure to read this information carefully each time.  Talk to your pediatrician regarding the use of this medicine in children. While this drug may be prescribed for children as young as 6 years of age for selected conditions, precautions do apply.  Overdosage: If you think you have taken too much " of this medicine contact a poison control center or emergency room at once.  NOTE: This medicine is only for you. Do not share this medicine with others.  What if I miss a dose?  It is important not to miss your dose. Call your doctor or health care professional if you are unable to keep an appointment.  What may interact with this medicine?  Do not take this medicine with any of the following medications:  -biologic medicines such as abatacept, adalimumab, anakinra, certolizumab, etanercept, golimumab, rituximab, secukinumab, tocilizumab, tofactinib, ustekinumab  -live vaccines  This list may not describe all possible interactions. Give your health care provider a list of all the medicines, herbs, non-prescription drugs, or dietary supplements you use. Also tell them if you smoke, drink alcohol, or use illegal drugs. Some items may interact with your medicine.  What should I watch for while using this medicine?  Your condition will be monitored carefully while you are receiving this medicine. Visit your doctor or health care professional for regular checks on your progress. You may need blood work done while you are taking this medicine. Before beginning therapy, your doctor may do a test to see if you have been exposed to tuberculosis.  Call your doctor or health care professional for advice if you get a fever, chills or sore throat, or other symptoms of a cold or flu. Do not treat yourself. This drug decreases your body's ability to fight infections. Try to avoid being around people who are sick.  This medicine may make the symptoms of heart failure worse in some patients. If you notice symptoms such as increased shortness of breath or swelling of the ankles or legs, contact your health care provider right away.  If you are going to have surgery or dental work, tell your health care professional or dentist that you have received this medicine.  If you take this medicine for plaque psoriasis, stay out of the sun.  If you cannot avoid being in the sun, wear protective clothing and use sunscreen. Do not use sun lamps or tanning beds/booths.  Talk to your doctor about your risk of cancer. You may be more at risk for certain types of cancers if you take this medicine.  What side effects may I notice from receiving this medicine?  Side effects that you should report to your doctor or health care professional as soon as possible:  -allergic reactions like skin rash, itching or hives, swelling of the face, lips, or tongue  -breathing problems  -changes in vision  -chest pain  -fever or chills, usually related to the infusion  -joint pain  -pain, tingling, numbness in the hands or feet  -redness, blistering, peeling or loosening of the skin, including inside the mouth  -seizures  -signs of infection - fever or chills, cough, sore throat, flu-like symptoms, pain or difficulty passing urine  -signs and symptoms of liver injury like dark yellow or brown urine; general ill feeling; light-colored stools; loss of appetite; nausea; right upper belly pain; unusually weak or tired; yellowing of the eyes or skin  -signs and symptoms of a stroke like changes in vision; confusion; trouble speaking or understanding; severe headaches; sudden numbness or weakness of the face, arm or leg; trouble walking; dizziness; loss of balance or coordination  -swelling of the ankles, feet, or hands  -swollen lymph nodes in the neck, underarm, or groin areas  -unusual bleeding or bruising  -unusually weak or tired  Side effects that usually do not require medical attention (report to your doctor or health care professional if they continue or are bothersome):  -headache  -nausea  -stomach pain  -upset stomach  This list may not describe all possible side effects. Call your doctor for medical advice about side effects. You may report side effects to FDA at 8-205-FDA-2921.  Where should I keep my medicine?  This drug is given in a hospital or clinic and will not  be stored at home.  NOTE: This sheet is a summary. It may not cover all possible information. If you have questions about this medicine, talk to your doctor, pharmacist, or health care provider.  © 2019 [employer]/Gold Standard (2018-01-17 13:45:32)

## 2019-11-20 ENCOUNTER — PREP FOR SURGERY (OUTPATIENT)
Dept: OTHER | Facility: HOSPITAL | Age: 69
End: 2019-11-20

## 2019-11-20 DIAGNOSIS — M19.011 PRIMARY OSTEOARTHRITIS, RIGHT SHOULDER: Primary | ICD-10-CM

## 2019-11-22 ENCOUNTER — OFFICE VISIT (OUTPATIENT)
Dept: ORTHOPEDIC SURGERY | Facility: CLINIC | Age: 69
End: 2019-11-22

## 2019-11-22 VITALS — HEIGHT: 64 IN | BODY MASS INDEX: 40.46 KG/M2 | WEIGHT: 237 LBS

## 2019-11-22 DIAGNOSIS — E78.2 MIXED HYPERLIPIDEMIA: ICD-10-CM

## 2019-11-22 DIAGNOSIS — E78.1 HYPERTRIGLYCERIDEMIA: ICD-10-CM

## 2019-11-22 DIAGNOSIS — M19.011 PRIMARY OSTEOARTHRITIS, RIGHT SHOULDER: Primary | ICD-10-CM

## 2019-11-22 PROCEDURE — S0260 H&P FOR SURGERY: HCPCS | Performed by: ORTHOPAEDIC SURGERY

## 2019-11-22 RX ORDER — ROSUVASTATIN CALCIUM 40 MG/1
TABLET, COATED ORAL
Qty: 90 TABLET | Refills: 1 | Status: SHIPPED | OUTPATIENT
Start: 2019-11-22 | End: 2020-08-10

## 2019-11-22 RX ORDER — DILTIAZEM HYDROCHLORIDE 60 MG/1
2 TABLET, FILM COATED ORAL
COMMUNITY
Start: 2019-11-20

## 2019-11-22 NOTE — PROGRESS NOTES
CC: f/u right shoulder pain, DJD     Mar Camilo presented to clinic today for preoperative history and physical visit in anticipation of upcoming scheduled right reverse total shoulder arthroplasty.     Discussed treatment options at length with patient today in regards to right shoulder. She has failed conservative treatment at this point in time and would like to proceed with shoulder arthroplasty. I discussed with the patient the specific indication of a reverse shoulder arthroplasty particularly for shoulder pain as well as for pseudoparalysis, and reviewed anatomy of the shoulder as well as a model of the implant. I reviewed details of the procedure as well as risks, benefits, and alternatives with the risks including but not limited to neurovascular damage, bleeding, infection, periprosthetic fracture, chronic pain, instability, loosening of implant, failure of implant, loss of motion, weakness, stiffness, DVT, pulmonary embolus, death, stroke, complex regional pain syndrome, myocardial infarction, need for additional procedures. Patient understood all these and had all questions answered today. No guarantees given in regards to results from the surgery.     Patient has been medically optimized.     Postoperative DVT prophylaxis will be with 81 mg aspirin.  Patient does not have a history of DVT or pulmonary embolus.     All remaining questions answered today.

## 2019-11-22 NOTE — H&P
History & Physical       Patient: Mar Camilo    YOB: 1950    Medical Record Number: [id number]    Surgeon:  Dr. Altaf Reyes    Chief Complaints:   Chief Complaint   Patient presents with   • Right Shoulder - Follow-up, Pain         History of Present Illness: 69 y.o. female presents with   Chief Complaint   Patient presents with   • Right Shoulder - Follow-up, Pain   . Onset of symptoms was years ago and has been progressively worsening despite more conservative treatment measures.  Symptoms are associated with ability to move, lift, push, pull, and perform activities of daily living with affected extremity. Symptoms are aggravated by overhead motion, lifting, and pushing as well as ROM necessary for activities of daily living.   Symptoms improve with rest, ice and elevation only minimally.      Allergies:   Allergies   Allergen Reactions   • Contrast Dye Hives   • Iodinated Diagnostic Agents Hives   • Norco [Hydrocodone-Acetaminophen] Hallucinations   • Tenoretic [Atenolol-Chlorthalidone] Anaphylaxis       Medications:   Home Medications:  Current Outpatient Medications on File Prior to Visit   Medication Sig   • acetaminophen (TYLENOL) 325 MG tablet Take 650 mg by mouth 3 (Three) Times a Day.   • ALBUTEROL SULFATE  (90 Base) MCG/ACT inhaler INHALE 2 PUFFS BY MOUTH EVERY 4 HOURS AS NEEDED   • Blood Glucose Monitoring Suppl (ONETOUCH VERIO) w/Device kit 1 kit 2 (Two) Times a Day.   • diclofenac (VOLTAREN) 1 % gel gel Apply 4 g topically to the appropriate area as directed 4 (Four) Times a Day.   • fenofibrate (TRICOR) 145 MG tablet TAKE 1 TABLET BY MOUTH ONCE DAILY   • FLUoxetine (PROzac) 40 MG capsule TAKE 1 CAPSULE BY MOUTH ONCE DAILY   • Fluticasone Furoate-Vilanterol 100-25 MCG/INH aerosol powder  Inhale.   • gabapentin (NEURONTIN) 300 MG capsule TAKE 1 CAPSULE BY MOUTH THREE TIMES DAILY FOR NEUROPATHY PAIN   • glucose blood (ONETOUCH VERIO) test strip Use as instructed as to  check blood sugars 2-3 times a day for type 2 diabetes   • lamoTRIgine (LaMICtal) 150 MG tablet Take 150 mg by mouth Daily.   • Lancets (ONETOUCH ULTRASOFT) lancets Used to check blood sugars twice a day as needed for type 2 diabetes monitoring   • lisinopril (PRINIVIL,ZESTRIL) 40 MG tablet TAKE 1 TABLET BY MOUTH ONCE DAILY   • metFORMIN (GLUCOPHAGE) 500 MG tablet Take 1 tablet by mouth 2 (Two) Times a Day With Meals.   • Multiple Vitamins-Minerals (CENTRUM SILVER PO) Take 1 tablet by mouth every morning.   • Multiple Vitamins-Minerals (VISION FORMULA/LUTEIN PO) Take 1 tablet by mouth Daily.   • O2 (OXYGEN) Inhale 3 L/min Continuous.   • rosuvastatin (CRESTOR) 40 MG tablet Take 1 tablet by mouth Daily.   • SPIRIVA HANDIHALER 18 MCG per inhalation capsule INHALE 1 PUFF BY MOUTH ONCE DAILY   • SYMBICORT 80-4.5 MCG/ACT inhaler    • verapamil SR (CALAN-SR) 240 MG CR tablet TAKE 1 TABLET BY MOUTH EVERY 12 HOURS     Current Facility-Administered Medications on File Prior to Visit   Medication   • acetaminophen (TYLENOL) 325 MG tablet  - ADS Override Pull     Current Medications:  Scheduled Meds:  Continuous Infusions:  No current facility-administered medications for this visit.   PRN Meds:.    I have reviewed the patient's medical history in detail and updated the computerized patient record.  Review and summarization of old records include:    Past Medical History:   Diagnosis Date   • Arthritis    • Carotid arterial disease (CMS/HCC)     US 2015 with 80-99% right and 16-49% left, but CTA with 60-70% right, not a surgical candidate   • Chronic back pain    • Closed fracture of left tibial plateau 7/30/2018   • COPD (chronic obstructive pulmonary disease) (CMS/HCC)    • Crohn's disease (CMS/HCC)     Remicade on hold d/t antibiotics   • DDD (degenerative disc disease)    • Depression    • Diabetes mellitus (CMS/HCC)     Type II   • Heartburn 2/21/2016   • History of stroke 05/03/2017    left parietal   • Hyperlipidemia     • Hypertension    • Hypokalemia    • Morbid obesity (CMS/HCC)    • Obstructive pyelonephritis    • On home oxygen therapy     2L UNLESS STRESSED THEN 2.5-3 L   • Osteopenia 2/21/2016   • PAF (paroxysmal atrial fibrillation) (CMS/HCC)    • PVD (peripheral vascular disease) (CMS/HCC)     RT CAROTID STENOSIS   • Radiculopathy     NECK PAIN   • Seizure (CMS/HCC)    • Stroke (CMS/HCC)    • Subclavian artery stenosis (CMS/HCC)     and axillary artery stenosis, on the left        Past Surgical History:   Procedure Laterality Date   • APPENDECTOMY N/A    • BRONCHOSCOPY N/A 1/5/2018    Procedure: BRONCHOSCOPY;  Surgeon: Gustavo Muniz MD;  Location: [facility] OR;  Service:    • COLON SURGERY     • CYSTOSCOPY URETEROSCOPY LASER LITHOTRIPSY Right 4/17/2017    Procedure: CYSTOSCOPY with  right stent removal, right URETEROSCOPY LASER LITHOTRIPSY,  with STONE BASKET EXTRACTION;  Surgeon: Dipesh Bean MD;  Location: [facility] OR;  Service:    • CYSTOSCOPY W/ URETERAL STENT PLACEMENT Right 3/18/2017    Procedure: CYSTOSCOPY URETERAL CATHETER/STENT INSERTION;  Surgeon: Dipesh Bean MD;  Location: [facility] OR;  Service:    • FLEXIBLE SIGMOIDOSCOPY N/A 01/25/2016    Dr. Luis Rosas   • LAPAROSCOPIC COLON RESECTION N/A 01/25/2016    Laparoscopic mobilization of splenic flexure, transverse colectomy with primary anastomosis-Dr. Christopher Richardson   • LUNG BIOPSY      NEGATIVE   • MI THROMBOENDARTECTMY NECK,NECK INCIS Right 3/14/2016    Procedure: RT CAROTID ENDARTERECTOMY;  Surgeon: Federico Schuler MD;  Location: [facility] OR;  Service: Vascular   • TIBIAL PLATEAU OPEN REDUCTION INTERNAL FIXATION Left 05/09/2018    Open reduction internal fixation of left tibial plateau fracture-Dr. Ayden Cárdenas   • WRIST ARTHROSCOPY Right     WITH RELEASE OF TRANSVERSE CARPAL LIGAMENT        Social History     Occupational History   • Not on file   Tobacco Use   • Smoking status: Former Smoker     Packs/day: 2.00     Years: 40.00      Pack years: 80.00     Types: Cigarettes     Last attempt to quit: 2014     Years since quittin.6   • Smokeless tobacco: Never Used   • Tobacco comment: Caffeine use: 3 CUPS DAILY.    Substance and Sexual Activity   • Alcohol use: No     Comment: history of heavy drinker    • Drug use: No   • Sexual activity: Defer    Social History     Social History Narrative   • Not on file        Family History   Problem Relation Age of Onset   • Diabetes Mother    • Stroke Mother    • Other Father         RESPIRATORY FAILURE   • Cancer Other         lung cancer   • Crohn's disease Brother    • Crohn's disease Maternal Aunt        ROS: 14 point review of systems was performed and was negative except for documented findings in HPI and today's encounter.     Allergies:   Allergies   Allergen Reactions   • Contrast Dye Hives   • Iodinated Diagnostic Agents Hives   • Norco [Hydrocodone-Acetaminophen] Hallucinations   • Tenoretic [Atenolol-Chlorthalidone] Anaphylaxis     Constitutional:  Denies fever, shaking or chills   Eyes:  Denies change in visual acuity   HENT:  Denies nasal congestion or sore throat   Respiratory:  Denies cough or shortness of breath   Cardiovascular:  Denies chest pain or severe LE edema   GI:  Denies abdominal pain, nausea, vomiting, bloody stools or diarrhea   Musculoskeletal:  Denies numbness tingling or loss of motor function except as outlined above in history of present illness.  : Denies painful urination or hematuria  Integument:  Denies rash, lesion or ulceration   Neurologic:  Denies headache or focal weakness  Endocrine:  Denies lymphadenopathy  Psych:  Denies confusion or change in mental status   Hem:  Denies active bleeding    Physical Exam: 69 y.o. female  Body mass index is 40.68 kg/m².  There were no vitals filed for this visit.  Vital signs reviewed.   General Appearance:    Alert, cooperative, in no acute distress                  Eyes: conjunctiva clear  ENT: external ears and nose  atraumatic  CV: no peripheral edema  Resp: normal respiratory effort  Skin: no rashes or wounds; normal turgor  Psych: mood and affect appropriate  Lymph: no nodes appreciated  Neuro: gross sensation intact  Vascular:  Palpable peripheral pulse in noted extremity  Musculoskeletal Extremities: Bilateral shoulders-  Maximal tenderness located anterior, posterior joint lines with crepitus  Moderate tenderness AC joint  FF-       Active- 80               Strength- 4/5  ER-      Active- neutral              Strength- 4/5  IR        To her side               Strength- 4+/5 on belly press test     Cross arm test- positive     Brisk cap refill to all digits, palpable radial pulse     Positive sensation to light touch palmar, dorsal aspects of small and index fingers and anatomic snuffbox right and left hand    Debilities/Disabilities Identified: None      Diagnostic Tests:  No visits with results within 2 Week(s) from this visit.   Latest known visit with results is:   Office Visit on 10/23/2019   Component Date Value Ref Range Status   • WBC 10/23/2019 19.8* 3.4 - 10.8 x10E3/uL Final   • RBC 10/23/2019 4.68  3.77 - 5.28 x10E6/uL Final   • Hemoglobin 10/23/2019 13.8  11.1 - 15.9 g/dL Final   • Hematocrit 10/23/2019 41.6  34.0 - 46.6 % Final   • MCV 10/23/2019 89  79 - 97 fL Final   • MCH 10/23/2019 29.5  26.6 - 33.0 pg Final   • MCHC 10/23/2019 33.2  31.5 - 35.7 g/dL Final   • RDW 10/23/2019 14.9  12.3 - 15.4 % Final   • Platelets 10/23/2019 477* 150 - 450 x10E3/uL Final   • Neutrophil Rel % 10/23/2019 76  Not Estab. % Final   • Lymphocyte Rel % 10/23/2019 16  Not Estab. % Final   • Monocyte Rel % 10/23/2019 6  Not Estab. % Final   • Eosinophil Rel % 10/23/2019 1  Not Estab. % Final   • Basophil Rel % 10/23/2019 0  Not Estab. % Final   • Neutrophils Absolute 10/23/2019 15.2* 1.4 - 7.0 x10E3/uL Final   • Lymphocytes Absolute 10/23/2019 3.2* 0.7 - 3.1 x10E3/uL Final   • Monocytes Absolute 10/23/2019 1.2* 0.1 - 0.9 x10E3/uL  Final   • Eosinophils Absolute 10/23/2019 0.2  0.0 - 0.4 x10E3/uL Final   • Basophils Absolute 10/23/2019 0.0  0.0 - 0.2 x10E3/uL Final   • Immature Granulocyte Rel % 10/23/2019 1  Not Estab. % Final   • Immature Grans Absolute 10/23/2019 0.1  0.0 - 0.1 x10E3/uL Final   • Glucose 10/23/2019 124* 65 - 99 mg/dL Final   • BUN 10/23/2019 24  8 - 27 mg/dL Final   • Creatinine 10/23/2019 1.05* 0.57 - 1.00 mg/dL Final   • eGFR Non  Am 10/23/2019 54* >59 mL/min/1.73 Final   • eGFR African Am 10/23/2019 62  >59 mL/min/1.73 Final   • BUN/Creatinine Ratio 10/23/2019 23  12 - 28 Final   • Sodium 10/23/2019 139  134 - 144 mmol/L Final   • Potassium 10/23/2019 5.4* 3.5 - 5.2 mmol/L Final   • Chloride 10/23/2019 100  96 - 106 mmol/L Final   • Total CO2 10/23/2019 21  20 - 29 mmol/L Final   • Calcium 10/23/2019 10.8* 8.7 - 10.3 mg/dL Final   • Total Protein 10/23/2019 7.9  6.0 - 8.5 g/dL Final   • Albumin 10/23/2019 4.7  3.5 - 4.8 g/dL Final   • Globulin 10/23/2019 3.2  1.5 - 4.5 g/dL Final   • A/G Ratio 10/23/2019 1.5  1.2 - 2.2 Final   • Total Bilirubin 10/23/2019 0.2  0.0 - 1.2 mg/dL Final   • Alkaline Phosphatase 10/23/2019 83  39 - 117 IU/L Final   • AST (SGOT) 10/23/2019 21  0 - 40 IU/L Final   • ALT (SGPT) 10/23/2019 28  0 - 32 IU/L Final   • Hemoglobin A1C 10/23/2019 6.5* 4.8 - 5.6 % Final    Comment:          Prediabetes: 5.7 - 6.4           Diabetes: >6.4           Glycemic control for adults with diabetes: <7.0     • Total Cholesterol 10/23/2019 226* 100 - 199 mg/dL Final   • Triglycerides 10/23/2019 420* 0 - 149 mg/dL Final   • HDL Cholesterol 10/23/2019 46  >39 mg/dL Final   • VLDL Cholesterol 10/23/2019 Comment  5 - 40 mg/dL Final    Comment: The calculation for the VLDL cholesterol is not valid when  triglyceride level is >400 mg/dL.     • LDL Cholesterol  10/23/2019 Comment  0 - 99 mg/dL Final    Comment: Triglyceride result indicated is too high for an accurate LDL  cholesterol estimation.     • LDL/HDL  Ratio 10/23/2019 CANCELED  ratio Final-Edited    Comment: Unable to calculate result since non-numeric result obtained for  component test.                                      LDL/HDL Ratio                                              Men  Women                                1/2 Avg.Risk  1.0    1.5                                    Avg.Risk  3.6    3.2                                 2X Avg.Risk  6.2    5.0                                 3X Avg.Risk  8.0    6.1    Result canceled by the ancillary.       No results found.    Assessment:  Patient Active Problem List   Diagnosis   • Obstructive pyelonephritis   • Chronic allergic conjunctivitis   • Chronic back pain   • Chronic bronchitis (CMS/[facility])   • Non-specific colitis   • COPD (chronic obstructive pulmonary disease) (CMS/[facility])   • Degeneration of intervertebral disc of lumbar region   • Depression   • Heartburn   • Herpes labialis   • Mixed hyperlipidemia   • Essential hypertension   • Spinal stenosis of lumbar region   • Osteopenia   • Lumbar radiculopathy   • Type 2 diabetes mellitus with complication, without long-term current use of insulin (CMS/[facility])   • Anemia   • Asymptomatic stenosis of right carotid artery   • Chronic respiratory failure with hypoxia (CMS/[facility])   • Osteoarthritis of lumbar spine   • Need for immunization against influenza   • Chronic right hip pain   • Chronic pain of both shoulders   • Seizure (CMS/[facility])   • History of stroke   • Primary osteoarthritis, right shoulder   • Tendinopathy of rotator cuff   • Primary osteoarthritis, left shoulder   • Need for pneumococcal vaccine   • Crohn's disease of colon with complication (CMS/[facility])   • Ventral hernia without obstruction or gangrene   • PAF (paroxysmal atrial fibrillation) (CMS/[facility])   • PAD (peripheral artery disease) (CMS/[facility])   • Morbid obesity (CMS/[facility])   • Chronic pain syndrome   • Unsteady gait   • High risk medication use   • H/O carotid stenosis   • Cerebrovascular accident (CVA)  (CMS/[facility])   • Neuropathy   • Foot swelling       Plan: Patient wishes to proceed with reverse shoulder arthroplasty at this point in time.  I discussed with with the patient the specific indication of a reverse shoulder arthroplasty particularly for shoulder pain as well as for pseudoparalysis, and reviewed anatomy of the shoulder as well as a model of the implant.  I reviewed details of the procedure as well as risks, benefits, and alternatives with the risks including but not limited to neurovascular damage, bleeding, infection, periprosthetic fracture, chronic pain, instability, loosening of implant, failure of implant, loss of motion, weakness, stiffness, DVT, pulmonary embolus, death, stroke, complex regional pain syndrome, myocardial infarction, need for additional procedures.  Patient understood all these had all questions answered today prior to consenting to proceed with surgery.  Patient has been medically optimized by primary care physician. No guarantees were given in regards to results of the surgery.      Mar Camilo was given the opportunity to ask and have all questions answered today.  The patient voiced understanding of the risks, benefits, and alternative forms of treatment that were discussed and the patient consents to proceed with surgery.     Patient's blood clot history is negative.    Plan for DVT prophylaxis is baby aspirin    Patient's MRSA infection history is negative.    Patient's skin infection history is negative.    Discharge Plan: POD 1-2 to home    Date: 11/22/2019  Altaf Reyes MD      Dictated utilizing Dragon dictation

## 2019-12-02 ENCOUNTER — OFFICE VISIT (OUTPATIENT)
Dept: FAMILY MEDICINE CLINIC | Facility: CLINIC | Age: 69
End: 2019-12-02

## 2019-12-02 VITALS
HEIGHT: 64 IN | HEART RATE: 114 BPM | SYSTOLIC BLOOD PRESSURE: 132 MMHG | OXYGEN SATURATION: 90 % | RESPIRATION RATE: 16 BRPM | WEIGHT: 237 LBS | BODY MASS INDEX: 40.46 KG/M2 | DIASTOLIC BLOOD PRESSURE: 68 MMHG | TEMPERATURE: 98.2 F

## 2019-12-02 DIAGNOSIS — Z01.818 PRE-OPERATIVE CLEARANCE: Primary | ICD-10-CM

## 2019-12-02 PROCEDURE — 99213 OFFICE O/P EST LOW 20 MIN: CPT | Performed by: PHYSICIAN ASSISTANT

## 2019-12-02 NOTE — PROGRESS NOTES
"Subjective   Mar Camilo is a 69 y.o. female presents for   Chief Complaint   Patient presents with   • Surgical Clearance     rt shoulder       History of Present Illness   Mar is a 69 year old female who presents for right shoulder surgery by Dr. Altaf Reyes on Wednesday, December 4, 2019 at [facility].  She states she has been cleared by cardiology,Dr. Mata and pulmonology.Dr. Caraballo  Krys denied any current chest pain, fevers, chills, upper respiratory symptoms, wheezing, abdominal pain, urinary symptoms or current swelling of ankles.  Appetite and sleep have been normal.  Bowel movements are daily without dark black tarry stools.  Currently on 3 L of oxygen.    The following portions of the patient's history were reviewed and updated as appropriate: allergies, current medications, past family history, past medical history, past social history, past surgical history and problem list.    Review of Systems   Constitutional: Negative.  Negative for chills, fatigue and fever.   HENT: Negative.    Eyes: Negative.    Respiratory: Negative for cough and wheezing.    Cardiovascular: Negative.    Gastrointestinal: Negative.  Negative for abdominal pain, constipation, diarrhea, nausea and vomiting.   Endocrine: Negative.    Genitourinary: Negative.    Musculoskeletal: Negative.    Skin: Negative.    Allergic/Immunologic: Negative.    Neurological: Negative.  Negative for dizziness, light-headedness and headaches.   Hematological: Negative.    Psychiatric/Behavioral: Negative.  Negative for sleep disturbance.   All other systems reviewed and are negative.        Vitals:    12/02/19 1319 12/02/19 1332   BP: 132/68    Pulse: 114    Resp: 16    Temp: 98.2 °F (36.8 °C)    SpO2: (!) 79% 90%   Weight: 108 kg (237 lb)    Height: 162.6 cm (64\")      Wt Readings from Last 3 Encounters:   12/02/19 108 kg (237 lb)   11/22/19 108 kg (237 lb)   11/18/19 108 kg (237 lb)     BP Readings from Last 3 Encounters:   12/02/19 " 132/68   19 131/58   10/23/19 134/68     Social History     Socioeconomic History   • Marital status:      Spouse name: Not on file   • Number of children: Not on file   • Years of education: Not on file   • Highest education level: Not on file   Tobacco Use   • Smoking status: Former Smoker     Packs/day: 2.00     Years: 40.00     Pack years: 80.00     Types: Cigarettes     Last attempt to quit: 2014     Years since quittin.6   • Smokeless tobacco: Never Used   • Tobacco comment: Caffeine use: 3 CUPS DAILY.    Substance and Sexual Activity   • Alcohol use: No     Comment: history of heavy drinker    • Drug use: No   • Sexual activity: Defer       Allergies   Allergen Reactions   • Contrast Dye Hives   • Iodinated Diagnostic Agents Hives   • Norco [Hydrocodone-Acetaminophen] Hallucinations   • Tenoretic [Atenolol-Chlorthalidone] Anaphylaxis       Body mass index is 40.68 kg/m².    Objective   Physical Exam   Constitutional: She is oriented to person, place, and time. Vital signs are normal. She appears well-developed and well-nourished.   On 3 L of oxygen with nasal cannula with portable oxygen tank   Neck: Trachea normal and phonation normal. Neck supple. No tracheal tenderness present. No tracheal deviation and no edema present.   Cardiovascular: Normal rate, regular rhythm, S1 normal, S2 normal, normal heart sounds and normal pulses.   No murmur heard.  Pulmonary/Chest: Effort normal and breath sounds normal.   Abdominal: Soft. Normal appearance, normal aorta and bowel sounds are normal. There is no hepatomegaly. There is no tenderness.       Neurological: She is alert and oriented to person, place, and time.   Skin: Skin is warm, dry and intact. Capillary refill takes less than 2 seconds.   Psychiatric: She has a normal mood and affect. Her speech is normal and behavior is normal. Judgment and thought content normal. Cognition and memory are normal.       Assessment/Plan   Mar was seen  today for surgical clearance.    Diagnoses and all orders for this visit:    Pre-operative clearance      Ms. Mar Camilo was seen in office today for preoperative surgical clearance on the right shoulder.  This will be performed at [facility] on Wednesday, December 4, 2019 by Dr. Altaf Reyes.  I have reviewed her pulmonary surgical clearance notes with her at office visit today.  I have also reviewed cardiologist, Dr. Bhanu Mata, surgical clearance notes with her as well.  She will be having presurgical lab work and EKG at [facility] tomorrow, December 3, 2019.  At office visit today she is medically cleared for upcoming surgery pending presurgical lab work and EKG.  She is at higher risk due to her COPD, type 2 diabetes, history of CVA and cardiac issues.  Will send note to Dr. Reyes. She has a large hernia that has been evaluated by general surgery.    MARGARITA Narvaez [facility]  [address] [address]  Dept: [phone]  Dept Fax: [phone]  Loc: [phone]  Loc Fax: [phone]           No visits with results within 2 Week(s) from this visit.   Latest known visit with results is:   Office Visit on 10/23/2019   Component Date Value Ref Range Status   • WBC 10/23/2019 19.8* 3.4 - 10.8 x10E3/uL Final   • RBC 10/23/2019 4.68  3.77 - 5.28 x10E6/uL Final   • Hemoglobin 10/23/2019 13.8  11.1 - 15.9 g/dL Final   • Hematocrit 10/23/2019 41.6  34.0 - 46.6 % Final   • MCV 10/23/2019 89  79 - 97 fL Final   • MCH 10/23/2019 29.5  26.6 - 33.0 pg Final   • MCHC 10/23/2019 33.2  31.5 - 35.7 g/dL Final   • RDW 10/23/2019 14.9  12.3 - 15.4 % Final   • Platelets 10/23/2019 477* 150 - 450 x10E3/uL Final   • Neutrophil Rel % 10/23/2019 76  Not Estab. % Final   • Lymphocyte Rel % 10/23/2019 16  Not Estab. % Final   • Monocyte Rel % 10/23/2019 6  Not Estab. % Final   • Eosinophil Rel % 10/23/2019 1  Not Estab. % Final    • Basophil Rel % 10/23/2019 0  Not Estab. % Final   • Neutrophils Absolute 10/23/2019 15.2* 1.4 - 7.0 x10E3/uL Final   • Lymphocytes Absolute 10/23/2019 3.2* 0.7 - 3.1 x10E3/uL Final   • Monocytes Absolute 10/23/2019 1.2* 0.1 - 0.9 x10E3/uL Final   • Eosinophils Absolute 10/23/2019 0.2  0.0 - 0.4 x10E3/uL Final   • Basophils Absolute 10/23/2019 0.0  0.0 - 0.2 x10E3/uL Final   • Immature Granulocyte Rel % 10/23/2019 1  Not Estab. % Final   • Immature Grans Absolute 10/23/2019 0.1  0.0 - 0.1 x10E3/uL Final   • Glucose 10/23/2019 124* 65 - 99 mg/dL Final   • BUN 10/23/2019 24  8 - 27 mg/dL Final   • Creatinine 10/23/2019 1.05* 0.57 - 1.00 mg/dL Final   • eGFR Non  Am 10/23/2019 54* >59 mL/min/1.73 Final   • eGFR African Am 10/23/2019 62  >59 mL/min/1.73 Final   • BUN/Creatinine Ratio 10/23/2019 23  12 - 28 Final   • Sodium 10/23/2019 139  134 - 144 mmol/L Final   • Potassium 10/23/2019 5.4* 3.5 - 5.2 mmol/L Final   • Chloride 10/23/2019 100  96 - 106 mmol/L Final   • Total CO2 10/23/2019 21  20 - 29 mmol/L Final   • Calcium 10/23/2019 10.8* 8.7 - 10.3 mg/dL Final   • Total Protein 10/23/2019 7.9  6.0 - 8.5 g/dL Final   • Albumin 10/23/2019 4.7  3.5 - 4.8 g/dL Final   • Globulin 10/23/2019 3.2  1.5 - 4.5 g/dL Final   • A/G Ratio 10/23/2019 1.5  1.2 - 2.2 Final   • Total Bilirubin 10/23/2019 0.2  0.0 - 1.2 mg/dL Final   • Alkaline Phosphatase 10/23/2019 83  39 - 117 IU/L Final   • AST (SGOT) 10/23/2019 21  0 - 40 IU/L Final   • ALT (SGPT) 10/23/2019 28  0 - 32 IU/L Final   • Hemoglobin A1C 10/23/2019 6.5* 4.8 - 5.6 % Final    Comment:          Prediabetes: 5.7 - 6.4           Diabetes: >6.4           Glycemic control for adults with diabetes: <7.0     • Total Cholesterol 10/23/2019 226* 100 - 199 mg/dL Final   • Triglycerides 10/23/2019 420* 0 - 149 mg/dL Final   • HDL Cholesterol 10/23/2019 46  >39 mg/dL Final   • VLDL Cholesterol 10/23/2019 Comment  5 - 40 mg/dL Final    Comment: The calculation for the VLDL  cholesterol is not valid when  triglyceride level is >400 mg/dL.     • LDL Cholesterol  10/23/2019 Comment  0 - 99 mg/dL Final    Comment: Triglyceride result indicated is too high for an accurate LDL  cholesterol estimation.     • LDL/HDL Ratio 10/23/2019 CANCELED  ratio Final-Edited    Comment: Unable to calculate result since non-numeric result obtained for  component test.                                      LDL/HDL Ratio                                              Men  Women                                1/2 Avg.Risk  1.0    1.5                                    Avg.Risk  3.6    3.2                                 2X Avg.Risk  6.2    5.0                                 3X Avg.Risk  8.0    6.1    Result canceled by the ancillary.

## 2019-12-03 ENCOUNTER — TELEPHONE (OUTPATIENT)
Dept: FAMILY MEDICINE CLINIC | Facility: CLINIC | Age: 69
End: 2019-12-03

## 2019-12-03 ENCOUNTER — APPOINTMENT (OUTPATIENT)
Dept: PREADMISSION TESTING | Facility: HOSPITAL | Age: 69
End: 2019-12-03

## 2019-12-03 DIAGNOSIS — M19.011 PRIMARY OSTEOARTHRITIS, RIGHT SHOULDER: ICD-10-CM

## 2019-12-03 LAB
ABO GROUP BLD: NORMAL
ANION GAP SERPL CALCULATED.3IONS-SCNC: 14.6 MMOL/L (ref 5–15)
APTT PPP: 28.5 SECONDS (ref 24.3–38.1)
BACTERIA UR QL AUTO: ABNORMAL /HPF
BASOPHILS # BLD AUTO: 0.05 10*3/MM3 (ref 0–0.2)
BASOPHILS NFR BLD AUTO: 0.3 % (ref 0–1.5)
BILIRUB UR QL STRIP: NEGATIVE
BLD GP AB SCN SERPL QL: NEGATIVE
BUN BLD-MCNC: 36 MG/DL (ref 8–23)
BUN/CREAT SERPL: 31 (ref 7–25)
CALCIUM SPEC-SCNC: 9.6 MG/DL (ref 8.6–10.5)
CHLORIDE SERPL-SCNC: 109 MMOL/L (ref 98–107)
CLARITY UR: CLEAR
CO2 SERPL-SCNC: 19.4 MMOL/L (ref 22–29)
COLOR UR: YELLOW
CREAT BLD-MCNC: 1.16 MG/DL (ref 0.57–1)
DEPRECATED RDW RBC AUTO: 54.9 FL (ref 37–54)
EOSINOPHIL # BLD AUTO: 0.15 10*3/MM3 (ref 0–0.4)
EOSINOPHIL NFR BLD AUTO: 1 % (ref 0.3–6.2)
ERYTHROCYTE [DISTWIDTH] IN BLOOD BY AUTOMATED COUNT: 15 % (ref 12.3–15.4)
GFR SERPL CREATININE-BSD FRML MDRD: 46 ML/MIN/1.73
GLUCOSE BLD-MCNC: 137 MG/DL (ref 65–99)
GLUCOSE UR STRIP-MCNC: NEGATIVE MG/DL
HBA1C MFR BLD: 6.1 % (ref 4.8–5.6)
HCT VFR BLD AUTO: 41.6 % (ref 34–46.6)
HGB BLD-MCNC: 12.1 G/DL (ref 12–15.9)
HGB UR QL STRIP.AUTO: ABNORMAL
HYALINE CASTS UR QL AUTO: ABNORMAL /LPF
IMM GRANULOCYTES # BLD AUTO: 0.07 10*3/MM3 (ref 0–0.05)
IMM GRANULOCYTES NFR BLD AUTO: 0.5 % (ref 0–0.5)
INR PPP: 0.94 (ref 0.9–1.1)
KETONES UR QL STRIP: NEGATIVE
LEUKOCYTE ESTERASE UR QL STRIP.AUTO: ABNORMAL
LYMPHOCYTES # BLD AUTO: 2.1 10*3/MM3 (ref 0.7–3.1)
LYMPHOCYTES NFR BLD AUTO: 13.9 % (ref 19.6–45.3)
MCH RBC QN AUTO: 28.7 PG (ref 26.6–33)
MCHC RBC AUTO-ENTMCNC: 29.1 G/DL (ref 31.5–35.7)
MCV RBC AUTO: 98.8 FL (ref 79–97)
MONOCYTES # BLD AUTO: 0.95 10*3/MM3 (ref 0.1–0.9)
MONOCYTES NFR BLD AUTO: 6.3 % (ref 5–12)
NEUTROPHILS # BLD AUTO: 11.74 10*3/MM3 (ref 1.7–7)
NEUTROPHILS NFR BLD AUTO: 78 % (ref 42.7–76)
NITRITE UR QL STRIP: NEGATIVE
PH UR STRIP.AUTO: 5.5 [PH] (ref 4.5–8)
PLATELET # BLD AUTO: 379 10*3/MM3 (ref 140–450)
PMV BLD AUTO: 9.1 FL (ref 6–12)
POTASSIUM BLD-SCNC: 5.6 MMOL/L (ref 3.5–5.2)
PROT UR QL STRIP: NEGATIVE
PROTHROMBIN TIME: 12.3 SECONDS (ref 12.1–15)
RBC # BLD AUTO: 4.21 10*6/MM3 (ref 3.77–5.28)
RBC # UR: ABNORMAL /HPF
REF LAB TEST METHOD: ABNORMAL
RH BLD: POSITIVE
SODIUM BLD-SCNC: 143 MMOL/L (ref 136–145)
SP GR UR STRIP: 1.02 (ref 1–1.03)
SQUAMOUS #/AREA URNS HPF: ABNORMAL /HPF
T&S EXPIRATION DATE: NORMAL
UROBILINOGEN UR QL STRIP: ABNORMAL
WBC NRBC COR # BLD: 15.06 10*3/MM3 (ref 3.4–10.8)
WBC UR QL AUTO: ABNORMAL /HPF

## 2019-12-03 PROCEDURE — 36415 COLL VENOUS BLD VENIPUNCTURE: CPT

## 2019-12-03 PROCEDURE — 85025 COMPLETE CBC W/AUTO DIFF WBC: CPT | Performed by: ORTHOPAEDIC SURGERY

## 2019-12-03 PROCEDURE — 86901 BLOOD TYPING SEROLOGIC RH(D): CPT | Performed by: ORTHOPAEDIC SURGERY

## 2019-12-03 PROCEDURE — 86900 BLOOD TYPING SEROLOGIC ABO: CPT | Performed by: ORTHOPAEDIC SURGERY

## 2019-12-03 PROCEDURE — 81001 URINALYSIS AUTO W/SCOPE: CPT | Performed by: ORTHOPAEDIC SURGERY

## 2019-12-03 PROCEDURE — 86850 RBC ANTIBODY SCREEN: CPT | Performed by: ORTHOPAEDIC SURGERY

## 2019-12-03 PROCEDURE — 80048 BASIC METABOLIC PNL TOTAL CA: CPT | Performed by: ORTHOPAEDIC SURGERY

## 2019-12-03 PROCEDURE — 87077 CULTURE AEROBIC IDENTIFY: CPT | Performed by: ORTHOPAEDIC SURGERY

## 2019-12-03 PROCEDURE — 87186 SC STD MICRODIL/AGAR DIL: CPT | Performed by: ORTHOPAEDIC SURGERY

## 2019-12-03 PROCEDURE — 85610 PROTHROMBIN TIME: CPT | Performed by: ORTHOPAEDIC SURGERY

## 2019-12-03 PROCEDURE — 85730 THROMBOPLASTIN TIME PARTIAL: CPT | Performed by: ORTHOPAEDIC SURGERY

## 2019-12-03 PROCEDURE — 87081 CULTURE SCREEN ONLY: CPT | Performed by: ORTHOPAEDIC SURGERY

## 2019-12-03 PROCEDURE — 87086 URINE CULTURE/COLONY COUNT: CPT | Performed by: ORTHOPAEDIC SURGERY

## 2019-12-03 PROCEDURE — 83036 HEMOGLOBIN GLYCOSYLATED A1C: CPT | Performed by: ORTHOPAEDIC SURGERY

## 2019-12-03 RX ORDER — VERAPAMIL HYDROCHLORIDE 240 MG/1
240 TABLET, FILM COATED, EXTENDED RELEASE ORAL 2 TIMES DAILY
COMMUNITY
End: 2019-12-23 | Stop reason: SDUPTHER

## 2019-12-03 RX ORDER — ALBUTEROL SULFATE 90 UG/1
2 AEROSOL, METERED RESPIRATORY (INHALATION) EVERY 4 HOURS PRN
COMMUNITY
End: 2019-12-23 | Stop reason: SDUPTHER

## 2019-12-03 RX ORDER — LISINOPRIL 40 MG/1
40 TABLET ORAL EVERY MORNING
COMMUNITY
End: 2019-12-23 | Stop reason: SDUPTHER

## 2019-12-03 RX ORDER — FLUOXETINE HYDROCHLORIDE 40 MG/1
40 CAPSULE ORAL EVERY MORNING
COMMUNITY
End: 2019-12-23 | Stop reason: SDUPTHER

## 2019-12-03 RX ORDER — GUAIFENESIN 600 MG/1
1200 TABLET, EXTENDED RELEASE ORAL EVERY MORNING
COMMUNITY

## 2019-12-03 RX ORDER — FENOFIBRATE 145 MG/1
145 TABLET, COATED ORAL EVERY MORNING
COMMUNITY
End: 2019-12-23 | Stop reason: SDUPTHER

## 2019-12-03 RX ORDER — ASPIRIN 81 MG/1
81 TABLET ORAL 2 TIMES DAILY
COMMUNITY

## 2019-12-03 RX ORDER — GABAPENTIN 300 MG/1
300 CAPSULE ORAL 3 TIMES DAILY
COMMUNITY
End: 2019-12-23 | Stop reason: SDUPTHER

## 2019-12-03 NOTE — TELEPHONE ENCOUNTER
Unfortunately, patient is not cleared for surgery due to decreased renal function along with elevated potassium.  Will have patient RTO Thursday or Friday this week to recheck renal function tests.  Need to contact Dr. Reyes's office and contact patient about abnormal labs and need to postpone surgery.

## 2019-12-03 NOTE — TELEPHONE ENCOUNTER
Romi with Dr. Granados's office would like you to look at the labs that are back (done at the hospital) and let them know if she is cleared for surgery.

## 2019-12-04 ENCOUNTER — TELEPHONE (OUTPATIENT)
Dept: FAMILY MEDICINE CLINIC | Facility: CLINIC | Age: 69
End: 2019-12-04

## 2019-12-04 LAB — MRSA SPEC QL CULT: NORMAL

## 2019-12-04 NOTE — TELEPHONE ENCOUNTER
Pt calling, seeking info as to why her surgery was cancelled?  Pt states surgeon states we cancelled it, as something was found in her urine? Please advise.

## 2019-12-04 NOTE — TELEPHONE ENCOUNTER
Notify patient that her creatinine level had increased.  Needs to get this lower before surgery.  She is due to come back on Friday for repeat blood work.  Please have her do BMP on Friday.

## 2019-12-05 LAB — BACTERIA SPEC AEROBE CULT: ABNORMAL

## 2019-12-06 DIAGNOSIS — N28.9 KIDNEY FUNCTION ABNORMAL: Primary | ICD-10-CM

## 2019-12-09 DIAGNOSIS — N39.0 URINARY TRACT INFECTION WITHOUT HEMATURIA, SITE UNSPECIFIED: Primary | ICD-10-CM

## 2019-12-09 RX ORDER — CIPROFLOXACIN 500 MG/1
500 TABLET, FILM COATED ORAL 2 TIMES DAILY
Qty: 14 TABLET | Refills: 0 | Status: SHIPPED | OUTPATIENT
Start: 2019-12-09 | End: 2019-12-23

## 2019-12-10 LAB
BUN SERPL-MCNC: 19 MG/DL (ref 8–23)
BUN/CREAT SERPL: 16.7 (ref 7–25)
CALCIUM SERPL-MCNC: 9.2 MG/DL (ref 8.6–10.5)
CHLORIDE SERPL-SCNC: 104 MMOL/L (ref 98–107)
CO2 SERPL-SCNC: 20.4 MMOL/L (ref 22–29)
CREAT SERPL-MCNC: 1.14 MG/DL (ref 0.57–1)
GLUCOSE SERPL-MCNC: 142 MG/DL (ref 65–99)
POTASSIUM SERPL-SCNC: 4.9 MMOL/L (ref 3.5–5.2)
SODIUM SERPL-SCNC: 143 MMOL/L (ref 136–145)

## 2019-12-16 DIAGNOSIS — R31.9 URINARY TRACT INFECTION WITH HEMATURIA, SITE UNSPECIFIED: ICD-10-CM

## 2019-12-16 DIAGNOSIS — G62.9 NEUROPATHY: ICD-10-CM

## 2019-12-16 DIAGNOSIS — R79.89 ELEVATED SERUM CREATININE: Primary | ICD-10-CM

## 2019-12-16 DIAGNOSIS — M25.511 CHRONIC PAIN OF BOTH SHOULDERS: ICD-10-CM

## 2019-12-16 DIAGNOSIS — G89.29 CHRONIC PAIN IN LEFT FOOT: ICD-10-CM

## 2019-12-16 DIAGNOSIS — M79.672 CHRONIC PAIN IN LEFT FOOT: ICD-10-CM

## 2019-12-16 DIAGNOSIS — M25.512 CHRONIC PAIN OF BOTH SHOULDERS: ICD-10-CM

## 2019-12-16 DIAGNOSIS — G89.29 CHRONIC PAIN OF LEFT LOWER EXTREMITY: ICD-10-CM

## 2019-12-16 DIAGNOSIS — M79.605 CHRONIC PAIN OF LEFT LOWER EXTREMITY: ICD-10-CM

## 2019-12-16 DIAGNOSIS — G89.29 CHRONIC PAIN OF BOTH SHOULDERS: ICD-10-CM

## 2019-12-16 DIAGNOSIS — N39.0 URINARY TRACT INFECTION WITH HEMATURIA, SITE UNSPECIFIED: ICD-10-CM

## 2019-12-16 RX ORDER — GABAPENTIN 300 MG/1
CAPSULE ORAL
Qty: 90 CAPSULE | Refills: 5 | Status: SHIPPED | OUTPATIENT
Start: 2019-12-16 | End: 2020-07-27 | Stop reason: SDUPTHER

## 2019-12-18 LAB
BACTERIA UR CULT: NORMAL
BACTERIA UR CULT: NORMAL
BUN SERPL-MCNC: 23 MG/DL (ref 8–23)
BUN/CREAT SERPL: 24 (ref 7–25)
CALCIUM SERPL-MCNC: 9.8 MG/DL (ref 8.6–10.5)
CHLORIDE SERPL-SCNC: 100 MMOL/L (ref 98–107)
CO2 SERPL-SCNC: 26.1 MMOL/L (ref 22–29)
CREAT SERPL-MCNC: 0.96 MG/DL (ref 0.57–1)
GLUCOSE SERPL-MCNC: 124 MG/DL (ref 65–99)
POTASSIUM SERPL-SCNC: 5.9 MMOL/L (ref 3.5–5.2)
SODIUM SERPL-SCNC: 140 MMOL/L (ref 136–145)

## 2019-12-19 ENCOUNTER — TELEPHONE (OUTPATIENT)
Dept: ORTHOPEDIC SURGERY | Facility: CLINIC | Age: 69
End: 2019-12-19

## 2019-12-19 NOTE — TELEPHONE ENCOUNTER
----- Message from Altaf Reyes MD sent at 12/18/2019 12:48 PM EST -----  Can we see about adding her back on for 12/30 if patient is agreeable?   Thanks  ----- Message -----  From: Vianey Barrios PA-C  Sent: 12/18/2019  11:48 AM EST  To: Altaf Reyes MD, Jolanta Zafar MA    1.  Notify patient that her BUN and creatinine level have returned to normal.  2.  Her urinary tract infection has resolved.  She may proceed with surgery with Dr. Reyes at his convenience.

## 2019-12-27 ENCOUNTER — ANESTHESIA EVENT (OUTPATIENT)
Dept: PERIOP | Facility: HOSPITAL | Age: 69
End: 2019-12-27

## 2019-12-30 ENCOUNTER — APPOINTMENT (OUTPATIENT)
Dept: GENERAL RADIOLOGY | Facility: HOSPITAL | Age: 69
End: 2019-12-30

## 2019-12-30 ENCOUNTER — ANESTHESIA (OUTPATIENT)
Dept: PERIOP | Facility: HOSPITAL | Age: 69
End: 2019-12-30

## 2019-12-30 ENCOUNTER — HOSPITAL ENCOUNTER (INPATIENT)
Facility: HOSPITAL | Age: 69
LOS: 2 days | Discharge: HOME | End: 2020-01-01
Attending: ORTHOPAEDIC SURGERY | Admitting: ORTHOPAEDIC SURGERY

## 2019-12-30 DIAGNOSIS — I10 ESSENTIAL HYPERTENSION: ICD-10-CM

## 2019-12-30 DIAGNOSIS — M19.011 PRIMARY OSTEOARTHRITIS, RIGHT SHOULDER: ICD-10-CM

## 2019-12-30 DIAGNOSIS — Z96.611 S/P REVERSE TOTAL SHOULDER ARTHROPLASTY, RIGHT: Primary | ICD-10-CM

## 2019-12-30 PROBLEM — R52 PAIN: Status: ACTIVE | Noted: 2019-12-30

## 2019-12-30 LAB
ALBUMIN SERPL-MCNC: 3.6 G/DL (ref 3.5–5.2)
ALBUMIN/GLOB SERPL: 1.2 G/DL
ALP SERPL-CCNC: 55 U/L (ref 39–117)
ALT SERPL W P-5'-P-CCNC: 17 U/L (ref 1–33)
ANION GAP SERPL CALCULATED.3IONS-SCNC: 11.7 MMOL/L (ref 5–15)
AST SERPL-CCNC: 22 U/L (ref 1–32)
BASOPHILS # BLD AUTO: 0.06 10*3/MM3 (ref 0–0.2)
BASOPHILS NFR BLD AUTO: 0.3 % (ref 0–1.5)
BILIRUB SERPL-MCNC: 0.2 MG/DL (ref 0.2–1.2)
BUN BLD-MCNC: 33 MG/DL (ref 8–23)
BUN/CREAT SERPL: 29.5 (ref 7–25)
CALCIUM SPEC-SCNC: 8.2 MG/DL (ref 8.6–10.5)
CHLORIDE SERPL-SCNC: 105 MMOL/L (ref 98–107)
CO2 SERPL-SCNC: 22.3 MMOL/L (ref 22–29)
CREAT BLD-MCNC: 1.12 MG/DL (ref 0.57–1)
DEPRECATED RDW RBC AUTO: 53.3 FL (ref 37–54)
EOSINOPHIL # BLD AUTO: 0.02 10*3/MM3 (ref 0–0.4)
EOSINOPHIL NFR BLD AUTO: 0.1 % (ref 0.3–6.2)
ERYTHROCYTE [DISTWIDTH] IN BLOOD BY AUTOMATED COUNT: 14.4 % (ref 12.3–15.4)
GFR SERPL CREATININE-BSD FRML MDRD: 48 ML/MIN/1.73
GLOBULIN UR ELPH-MCNC: 3 GM/DL
GLUCOSE BLD-MCNC: 96 MG/DL (ref 65–99)
GLUCOSE BLDC GLUCOMTR-MCNC: 174 MG/DL (ref 70–130)
HCT VFR BLD AUTO: 34.2 % (ref 34–46.6)
HCT VFR BLD AUTO: 34.4 % (ref 34–46.6)
HGB BLD-MCNC: 10.1 G/DL (ref 12–15.9)
HGB BLD-MCNC: 10.2 G/DL (ref 12–15.9)
IMM GRANULOCYTES # BLD AUTO: 0.15 10*3/MM3 (ref 0–0.05)
IMM GRANULOCYTES NFR BLD AUTO: 0.8 % (ref 0–0.5)
LYMPHOCYTES # BLD AUTO: 1.8 10*3/MM3 (ref 0.7–3.1)
LYMPHOCYTES NFR BLD AUTO: 9.8 % (ref 19.6–45.3)
MCH RBC QN AUTO: 29.8 PG (ref 26.6–33)
MCHC RBC AUTO-ENTMCNC: 29.7 G/DL (ref 31.5–35.7)
MCV RBC AUTO: 100.6 FL (ref 79–97)
MONOCYTES # BLD AUTO: 0.67 10*3/MM3 (ref 0.1–0.9)
MONOCYTES NFR BLD AUTO: 3.6 % (ref 5–12)
NEUTROPHILS # BLD AUTO: 15.71 10*3/MM3 (ref 1.7–7)
NEUTROPHILS NFR BLD AUTO: 85.4 % (ref 42.7–76)
NRBC BLD AUTO-RTO: 0 /100 WBC (ref 0–0.2)
PLATELET # BLD AUTO: 356 10*3/MM3 (ref 140–450)
PMV BLD AUTO: 10 FL (ref 6–12)
POTASSIUM BLD-SCNC: 5.4 MMOL/L (ref 3.5–5.2)
PROT SERPL-MCNC: 6.6 G/DL (ref 6–8.5)
RBC # BLD AUTO: 3.42 10*6/MM3 (ref 3.77–5.28)
SODIUM BLD-SCNC: 139 MMOL/L (ref 136–145)
WBC NRBC COR # BLD: 18.41 10*3/MM3 (ref 3.4–10.8)

## 2019-12-30 PROCEDURE — 23430 REPAIR BICEPS TENDON: CPT | Performed by: ORTHOPAEDIC SURGERY

## 2019-12-30 PROCEDURE — 25010000002 VANCOMYCIN PER 500 MG: Performed by: ORTHOPAEDIC SURGERY

## 2019-12-30 PROCEDURE — 71046 X-RAY EXAM CHEST 2 VIEWS: CPT

## 2019-12-30 PROCEDURE — 25010000002 EPINEPHRINE PF 1 MG/10ML SOLUTION PREFILLED SYRINGE: Performed by: NURSE ANESTHETIST, CERTIFIED REGISTERED

## 2019-12-30 PROCEDURE — 25010000002 NEOSTIGMINE 10 MG/10ML SOLUTION: Performed by: NURSE ANESTHETIST, CERTIFIED REGISTERED

## 2019-12-30 PROCEDURE — C1776 JOINT DEVICE (IMPLANTABLE): HCPCS | Performed by: ORTHOPAEDIC SURGERY

## 2019-12-30 PROCEDURE — 25010000002 VANCOMYCIN 1 G RECONSTITUTED SOLUTION: Performed by: ORTHOPAEDIC SURGERY

## 2019-12-30 PROCEDURE — 25010000002 PHENYLEPHRINE PER 1 ML: Performed by: NURSE ANESTHETIST, CERTIFIED REGISTERED

## 2019-12-30 PROCEDURE — 85014 HEMATOCRIT: CPT | Performed by: NURSE ANESTHETIST, CERTIFIED REGISTERED

## 2019-12-30 PROCEDURE — 25010000002 VANCOMYCIN 1 G RECONSTITUTED SOLUTION 1 EACH VIAL: Performed by: ORTHOPAEDIC SURGERY

## 2019-12-30 PROCEDURE — 25010000002 MIDAZOLAM PER 1MG: Performed by: NURSE ANESTHETIST, CERTIFIED REGISTERED

## 2019-12-30 PROCEDURE — C1751 CATH, INF, PER/CENT/MIDLINE: HCPCS | Performed by: NURSE ANESTHETIST, CERTIFIED REGISTERED

## 2019-12-30 PROCEDURE — 25010000002 PHENYLEPHRINE 10 MG/ML SOLUTION 5 ML VIAL: Performed by: NURSE ANESTHETIST, CERTIFIED REGISTERED

## 2019-12-30 PROCEDURE — 73020 X-RAY EXAM OF SHOULDER: CPT

## 2019-12-30 PROCEDURE — 94640 AIRWAY INHALATION TREATMENT: CPT

## 2019-12-30 PROCEDURE — P9041 ALBUMIN (HUMAN),5%, 50ML: HCPCS | Performed by: NURSE ANESTHETIST, CERTIFIED REGISTERED

## 2019-12-30 PROCEDURE — 0RRJ00Z REPLACEMENT OF RIGHT SHOULDER JOINT WITH REVERSE BALL AND SOCKET SYNTHETIC SUBSTITUTE, OPEN APPROACH: ICD-10-PCS | Performed by: ORTHOPAEDIC SURGERY

## 2019-12-30 PROCEDURE — 25010000002 ONDANSETRON PER 1 MG: Performed by: NURSE ANESTHETIST, CERTIFIED REGISTERED

## 2019-12-30 PROCEDURE — 85025 COMPLETE CBC W/AUTO DIFF WBC: CPT | Performed by: HOSPITALIST

## 2019-12-30 PROCEDURE — C1713 ANCHOR/SCREW BN/BN,TIS/BN: HCPCS | Performed by: ORTHOPAEDIC SURGERY

## 2019-12-30 PROCEDURE — 94799 UNLISTED PULMONARY SVC/PX: CPT

## 2019-12-30 PROCEDURE — C9290 INJ, BUPIVACAINE LIPOSOME: HCPCS | Performed by: ORTHOPAEDIC SURGERY

## 2019-12-30 PROCEDURE — 85018 HEMOGLOBIN: CPT | Performed by: NURSE ANESTHETIST, CERTIFIED REGISTERED

## 2019-12-30 PROCEDURE — 82962 GLUCOSE BLOOD TEST: CPT

## 2019-12-30 PROCEDURE — 25010000002 MORPHINE PER 10 MG: Performed by: ORTHOPAEDIC SURGERY

## 2019-12-30 PROCEDURE — 25010000002 FENTANYL CITRATE (PF) 100 MCG/2ML SOLUTION: Performed by: NURSE ANESTHETIST, CERTIFIED REGISTERED

## 2019-12-30 PROCEDURE — 25010000003 BUPIVACAINE LIPOSOME 1.3 % SUSPENSION: Performed by: ORTHOPAEDIC SURGERY

## 2019-12-30 PROCEDURE — 23472 RECONSTRUCT SHOULDER JOINT: CPT | Performed by: ORTHOPAEDIC SURGERY

## 2019-12-30 PROCEDURE — 25010000002 SUCCINYLCHOLINE PER 20 MG: Performed by: NURSE ANESTHETIST, CERTIFIED REGISTERED

## 2019-12-30 PROCEDURE — 25010000002 PROPOFOL 10 MG/ML EMULSION: Performed by: NURSE ANESTHETIST, CERTIFIED REGISTERED

## 2019-12-30 PROCEDURE — 80053 COMPREHEN METABOLIC PANEL: CPT | Performed by: HOSPITALIST

## 2019-12-30 PROCEDURE — 99222 1ST HOSP IP/OBS MODERATE 55: CPT | Performed by: HOSPITALIST

## 2019-12-30 PROCEDURE — 25010000002 ALBUMIN HUMAN 5% PER 50 ML: Performed by: NURSE ANESTHETIST, CERTIFIED REGISTERED

## 2019-12-30 DEVICE — LINER HUM/SHLDR TRABECULARMETAL REV POLY 60DEG 36MM PLS3: Type: IMPLANTABLE DEVICE | Site: SHOULDER | Status: FUNCTIONAL

## 2019-12-30 DEVICE — KT SHLDR DRL PIN SPG: Type: IMPLANTABLE DEVICE | Site: SHOULDER | Status: FUNCTIONAL

## 2019-12-30 DEVICE — IMPLANTABLE DEVICE: Type: IMPLANTABLE DEVICE | Site: SHOULDER | Status: FUNCTIONAL

## 2019-12-30 DEVICE — GLENOSPHERE VERSA DIAL FXD OFFST36 PLS3: Type: IMPLANTABLE DEVICE | Site: SHOULDER | Status: FUNCTIONAL

## 2019-12-30 DEVICE — SUT FW #2 W/TPR NDL 1/2 CIR 38IN 97CM 26.5MM BLU: Type: IMPLANTABLE DEVICE | Site: SHOULDER | Status: FUNCTIONAL

## 2019-12-30 DEVICE — BASEPLT GLEN COMPR MINI W TPR ADAPTR 25: Type: IMPLANTABLE DEVICE | Site: SHOULDER | Status: FUNCTIONAL

## 2019-12-30 DEVICE — SCRW COMPRNSV CNTRL 6.5X25MM REUS: Type: IMPLANTABLE DEVICE | Site: SHOULDER | Status: FUNCTIONAL

## 2019-12-30 DEVICE — SCRW FIX LK HEX 4.75X20MM: Type: IMPLANTABLE DEVICE | Site: SHOULDER | Status: FUNCTIONAL

## 2019-12-30 DEVICE — STEM HUM NONPOR REV 15X130MM: Type: IMPLANTABLE DEVICE | Site: SHOULDER | Status: FUNCTIONAL

## 2019-12-30 DEVICE — SCRW FIX LK HEX 4.75X15MM: Type: IMPLANTABLE DEVICE | Site: SHOULDER | Status: FUNCTIONAL

## 2019-12-30 RX ORDER — NALOXONE HCL 0.4 MG/ML
0.4 VIAL (ML) INJECTION
Status: DISCONTINUED | OUTPATIENT
Start: 2019-12-30 | End: 2020-01-01 | Stop reason: HOSPADM

## 2019-12-30 RX ORDER — LIDOCAINE HYDROCHLORIDE 10 MG/ML
0.5 INJECTION, SOLUTION EPIDURAL; INFILTRATION; INTRACAUDAL; PERINEURAL ONCE AS NEEDED
Status: COMPLETED | OUTPATIENT
Start: 2019-12-30 | End: 2019-12-30

## 2019-12-30 RX ORDER — ACETAMINOPHEN 500 MG
1000 TABLET ORAL ONCE
Status: COMPLETED | OUTPATIENT
Start: 2019-12-30 | End: 2019-12-30

## 2019-12-30 RX ORDER — NEOSTIGMINE METHYLSULFATE 1 MG/ML
INJECTION, SOLUTION INTRAVENOUS AS NEEDED
Status: DISCONTINUED | OUTPATIENT
Start: 2019-12-30 | End: 2019-12-30 | Stop reason: SURG

## 2019-12-30 RX ORDER — MAGNESIUM HYDROXIDE 1200 MG/15ML
LIQUID ORAL AS NEEDED
Status: DISCONTINUED | OUTPATIENT
Start: 2019-12-30 | End: 2019-12-30 | Stop reason: HOSPADM

## 2019-12-30 RX ORDER — GABAPENTIN 300 MG/1
300 CAPSULE ORAL 3 TIMES DAILY
Status: DISCONTINUED | OUTPATIENT
Start: 2019-12-30 | End: 2020-01-01 | Stop reason: HOSPADM

## 2019-12-30 RX ORDER — MIDAZOLAM HYDROCHLORIDE 1 MG/ML
2 INJECTION INTRAMUSCULAR; INTRAVENOUS
Status: DISCONTINUED | OUTPATIENT
Start: 2019-12-30 | End: 2019-12-30 | Stop reason: HOSPADM

## 2019-12-30 RX ORDER — OXYCODONE HYDROCHLORIDE 5 MG/1
5 TABLET ORAL EVERY 4 HOURS PRN
Status: DISCONTINUED | OUTPATIENT
Start: 2019-12-30 | End: 2020-01-01 | Stop reason: HOSPADM

## 2019-12-30 RX ORDER — BUDESONIDE AND FORMOTEROL FUMARATE DIHYDRATE 80; 4.5 UG/1; UG/1
2 AEROSOL RESPIRATORY (INHALATION)
Status: DISCONTINUED | OUTPATIENT
Start: 2019-12-30 | End: 2020-01-01 | Stop reason: HOSPADM

## 2019-12-30 RX ORDER — SODIUM CHLORIDE 0.9 % (FLUSH) 0.9 %
3 SYRINGE (ML) INJECTION EVERY 12 HOURS SCHEDULED
Status: DISCONTINUED | OUTPATIENT
Start: 2019-12-30 | End: 2019-12-30 | Stop reason: HOSPADM

## 2019-12-30 RX ORDER — SODIUM CHLORIDE, SODIUM LACTATE, POTASSIUM CHLORIDE, CALCIUM CHLORIDE 600; 310; 30; 20 MG/100ML; MG/100ML; MG/100ML; MG/100ML
100 INJECTION, SOLUTION INTRAVENOUS CONTINUOUS
Status: DISCONTINUED | OUTPATIENT
Start: 2019-12-30 | End: 2019-12-30

## 2019-12-30 RX ORDER — HYDROMORPHONE HYDROCHLORIDE 1 MG/ML
0.25 INJECTION, SOLUTION INTRAMUSCULAR; INTRAVENOUS; SUBCUTANEOUS AS NEEDED
Status: DISCONTINUED | OUTPATIENT
Start: 2019-12-30 | End: 2019-12-30 | Stop reason: HOSPADM

## 2019-12-30 RX ORDER — MELOXICAM 7.5 MG/1
15 TABLET ORAL ONCE
Status: COMPLETED | OUTPATIENT
Start: 2019-12-30 | End: 2019-12-30

## 2019-12-30 RX ORDER — DEXTROSE MONOHYDRATE 25 G/50ML
25 INJECTION, SOLUTION INTRAVENOUS
Status: DISCONTINUED | OUTPATIENT
Start: 2019-12-30 | End: 2020-01-01 | Stop reason: HOSPADM

## 2019-12-30 RX ORDER — SODIUM CHLORIDE 9 MG/ML
75 INJECTION, SOLUTION INTRAVENOUS CONTINUOUS
Status: DISCONTINUED | OUTPATIENT
Start: 2019-12-30 | End: 2019-12-30

## 2019-12-30 RX ORDER — ROCURONIUM BROMIDE 10 MG/ML
INJECTION, SOLUTION INTRAVENOUS AS NEEDED
Status: DISCONTINUED | OUTPATIENT
Start: 2019-12-30 | End: 2019-12-30 | Stop reason: SURG

## 2019-12-30 RX ORDER — SODIUM CHLORIDE, SODIUM LACTATE, POTASSIUM CHLORIDE, CALCIUM CHLORIDE 600; 310; 30; 20 MG/100ML; MG/100ML; MG/100ML; MG/100ML
9 INJECTION, SOLUTION INTRAVENOUS CONTINUOUS
Status: DISCONTINUED | OUTPATIENT
Start: 2019-12-30 | End: 2019-12-30

## 2019-12-30 RX ORDER — SODIUM CHLORIDE 9 MG/ML
40 INJECTION, SOLUTION INTRAVENOUS AS NEEDED
Status: DISCONTINUED | OUTPATIENT
Start: 2019-12-30 | End: 2019-12-30 | Stop reason: HOSPADM

## 2019-12-30 RX ORDER — NICOTINE POLACRILEX 4 MG
15 LOZENGE BUCCAL
Status: DISCONTINUED | OUTPATIENT
Start: 2019-12-30 | End: 2020-01-01 | Stop reason: HOSPADM

## 2019-12-30 RX ORDER — SUCCINYLCHOLINE CHLORIDE 20 MG/ML
INJECTION INTRAMUSCULAR; INTRAVENOUS AS NEEDED
Status: DISCONTINUED | OUTPATIENT
Start: 2019-12-30 | End: 2019-12-30 | Stop reason: SURG

## 2019-12-30 RX ORDER — ASPIRIN 81 MG/1
81 TABLET, CHEWABLE ORAL DAILY
Status: DISCONTINUED | OUTPATIENT
Start: 2019-12-31 | End: 2020-01-01 | Stop reason: HOSPADM

## 2019-12-30 RX ORDER — OXYCODONE HYDROCHLORIDE 5 MG/1
10 TABLET ORAL EVERY 4 HOURS PRN
Status: DISCONTINUED | OUTPATIENT
Start: 2019-12-30 | End: 2020-01-01 | Stop reason: HOSPADM

## 2019-12-30 RX ORDER — FENOFIBRATE 145 MG/1
145 TABLET, COATED ORAL DAILY
Status: DISCONTINUED | OUTPATIENT
Start: 2019-12-31 | End: 2020-01-01 | Stop reason: HOSPADM

## 2019-12-30 RX ORDER — MIDAZOLAM HYDROCHLORIDE 1 MG/ML
0.5 INJECTION INTRAMUSCULAR; INTRAVENOUS
Status: DISCONTINUED | OUTPATIENT
Start: 2019-12-30 | End: 2019-12-30 | Stop reason: HOSPADM

## 2019-12-30 RX ORDER — GLYCOPYRROLATE 0.2 MG/ML
INJECTION INTRAMUSCULAR; INTRAVENOUS AS NEEDED
Status: DISCONTINUED | OUTPATIENT
Start: 2019-12-30 | End: 2019-12-30 | Stop reason: SURG

## 2019-12-30 RX ORDER — VANCOMYCIN HYDROCHLORIDE 1 G/20ML
INJECTION, POWDER, LYOPHILIZED, FOR SOLUTION INTRAVENOUS AS NEEDED
Status: DISCONTINUED | OUTPATIENT
Start: 2019-12-30 | End: 2019-12-30 | Stop reason: HOSPADM

## 2019-12-30 RX ORDER — MORPHINE SULFATE 10 MG/ML
6 INJECTION INTRAMUSCULAR; INTRAVENOUS; SUBCUTANEOUS
Status: DISCONTINUED | OUTPATIENT
Start: 2019-12-30 | End: 2020-01-01 | Stop reason: HOSPADM

## 2019-12-30 RX ORDER — ONDANSETRON 2 MG/ML
4 INJECTION INTRAMUSCULAR; INTRAVENOUS ONCE AS NEEDED
Status: COMPLETED | OUTPATIENT
Start: 2019-12-30 | End: 2019-12-30

## 2019-12-30 RX ORDER — IPRATROPIUM BROMIDE AND ALBUTEROL SULFATE 2.5; .5 MG/3ML; MG/3ML
3 SOLUTION RESPIRATORY (INHALATION) EVERY 4 HOURS PRN
Status: DISCONTINUED | OUTPATIENT
Start: 2019-12-30 | End: 2020-01-01 | Stop reason: HOSPADM

## 2019-12-30 RX ORDER — FAMOTIDINE 20 MG/1
20 TABLET, FILM COATED ORAL
Status: COMPLETED | OUTPATIENT
Start: 2019-12-30 | End: 2019-12-30

## 2019-12-30 RX ORDER — VERAPAMIL HYDROCHLORIDE 240 MG/1
240 TABLET, FILM COATED, EXTENDED RELEASE ORAL EVERY 12 HOURS
Status: DISCONTINUED | OUTPATIENT
Start: 2019-12-30 | End: 2019-12-30

## 2019-12-30 RX ORDER — SODIUM CHLORIDE 9 MG/ML
40 INJECTION, SOLUTION INTRAVENOUS AS NEEDED
Status: DISCONTINUED | OUTPATIENT
Start: 2019-12-30 | End: 2019-12-30

## 2019-12-30 RX ORDER — SODIUM CHLORIDE 0.9 % (FLUSH) 0.9 %
10 SYRINGE (ML) INJECTION EVERY 12 HOURS SCHEDULED
Status: DISCONTINUED | OUTPATIENT
Start: 2019-12-30 | End: 2019-12-30

## 2019-12-30 RX ORDER — IPRATROPIUM BROMIDE AND ALBUTEROL SULFATE 2.5; .5 MG/3ML; MG/3ML
3 SOLUTION RESPIRATORY (INHALATION) ONCE
Status: COMPLETED | OUTPATIENT
Start: 2019-12-30 | End: 2019-12-30

## 2019-12-30 RX ORDER — PROPOFOL 10 MG/ML
VIAL (ML) INTRAVENOUS AS NEEDED
Status: DISCONTINUED | OUTPATIENT
Start: 2019-12-30 | End: 2019-12-30 | Stop reason: SURG

## 2019-12-30 RX ORDER — BUDESONIDE AND FORMOTEROL FUMARATE DIHYDRATE 80; 4.5 UG/1; UG/1
2 AEROSOL RESPIRATORY (INHALATION)
Status: DISCONTINUED | OUTPATIENT
Start: 2019-12-30 | End: 2019-12-30 | Stop reason: SDUPTHER

## 2019-12-30 RX ORDER — LIDOCAINE HYDROCHLORIDE 10 MG/ML
0.5 INJECTION, SOLUTION EPIDURAL; INFILTRATION; INTRACAUDAL; PERINEURAL ONCE AS NEEDED
Status: DISCONTINUED | OUTPATIENT
Start: 2019-12-30 | End: 2019-12-30

## 2019-12-30 RX ORDER — SODIUM CHLORIDE, SODIUM LACTATE, POTASSIUM CHLORIDE, CALCIUM CHLORIDE 600; 310; 30; 20 MG/100ML; MG/100ML; MG/100ML; MG/100ML
9 INJECTION, SOLUTION INTRAVENOUS CONTINUOUS PRN
Status: DISCONTINUED | OUTPATIENT
Start: 2019-12-30 | End: 2019-12-30 | Stop reason: HOSPADM

## 2019-12-30 RX ORDER — ONDANSETRON 2 MG/ML
4 INJECTION INTRAMUSCULAR; INTRAVENOUS EVERY 6 HOURS PRN
Status: DISCONTINUED | OUTPATIENT
Start: 2019-12-30 | End: 2020-01-01 | Stop reason: HOSPADM

## 2019-12-30 RX ORDER — KETAMINE HYDROCHLORIDE 10 MG/ML
INJECTION INTRAMUSCULAR; INTRAVENOUS AS NEEDED
Status: DISCONTINUED | OUTPATIENT
Start: 2019-12-30 | End: 2019-12-30 | Stop reason: SURG

## 2019-12-30 RX ORDER — ALBUTEROL SULFATE 2.5 MG/3ML
2.5 SOLUTION RESPIRATORY (INHALATION) EVERY 4 HOURS PRN
Status: DISCONTINUED | OUTPATIENT
Start: 2019-12-30 | End: 2020-01-01 | Stop reason: HOSPADM

## 2019-12-30 RX ORDER — SODIUM CHLORIDE 9 MG/ML
INJECTION, SOLUTION INTRAVENOUS AS NEEDED
Status: DISCONTINUED | OUTPATIENT
Start: 2019-12-30 | End: 2019-12-30 | Stop reason: HOSPADM

## 2019-12-30 RX ORDER — ALBUMIN, HUMAN INJ 5% 5 %
SOLUTION INTRAVENOUS CONTINUOUS PRN
Status: DISCONTINUED | OUTPATIENT
Start: 2019-12-30 | End: 2019-12-30 | Stop reason: SURG

## 2019-12-30 RX ORDER — ONDANSETRON 2 MG/ML
4 INJECTION INTRAMUSCULAR; INTRAVENOUS ONCE AS NEEDED
Status: DISCONTINUED | OUTPATIENT
Start: 2019-12-30 | End: 2019-12-30 | Stop reason: HOSPADM

## 2019-12-30 RX ORDER — SODIUM CHLORIDE 0.9 % (FLUSH) 0.9 %
1-10 SYRINGE (ML) INJECTION AS NEEDED
Status: DISCONTINUED | OUTPATIENT
Start: 2019-12-30 | End: 2019-12-30 | Stop reason: HOSPADM

## 2019-12-30 RX ORDER — MEPERIDINE HYDROCHLORIDE 25 MG/ML
12.5 INJECTION INTRAMUSCULAR; INTRAVENOUS; SUBCUTANEOUS
Status: DISCONTINUED | OUTPATIENT
Start: 2019-12-30 | End: 2019-12-30 | Stop reason: HOSPADM

## 2019-12-30 RX ORDER — ONDANSETRON 4 MG/1
4 TABLET, FILM COATED ORAL EVERY 6 HOURS PRN
Status: DISCONTINUED | OUTPATIENT
Start: 2019-12-30 | End: 2020-01-01 | Stop reason: HOSPADM

## 2019-12-30 RX ORDER — LIDOCAINE HYDROCHLORIDE 20 MG/ML
INJECTION, SOLUTION INFILTRATION; PERINEURAL AS NEEDED
Status: DISCONTINUED | OUTPATIENT
Start: 2019-12-30 | End: 2019-12-30 | Stop reason: SURG

## 2019-12-30 RX ORDER — FLUOXETINE HYDROCHLORIDE 20 MG/1
40 CAPSULE ORAL DAILY
Status: DISCONTINUED | OUTPATIENT
Start: 2019-12-30 | End: 2020-01-01 | Stop reason: HOSPADM

## 2019-12-30 RX ORDER — VASOPRESSIN 20 U/ML
INJECTION PARENTERAL AS NEEDED
Status: DISCONTINUED | OUTPATIENT
Start: 2019-12-30 | End: 2019-12-30 | Stop reason: SURG

## 2019-12-30 RX ORDER — FENTANYL CITRATE 50 UG/ML
INJECTION, SOLUTION INTRAMUSCULAR; INTRAVENOUS AS NEEDED
Status: DISCONTINUED | OUTPATIENT
Start: 2019-12-30 | End: 2019-12-30 | Stop reason: SURG

## 2019-12-30 RX ORDER — ACETAMINOPHEN 500 MG
1000 TABLET ORAL 3 TIMES DAILY
Status: DISCONTINUED | OUTPATIENT
Start: 2019-12-30 | End: 2020-01-01 | Stop reason: HOSPADM

## 2019-12-30 RX ORDER — SODIUM CHLORIDE 0.9 % (FLUSH) 0.9 %
10 SYRINGE (ML) INJECTION AS NEEDED
Status: DISCONTINUED | OUTPATIENT
Start: 2019-12-30 | End: 2019-12-30

## 2019-12-30 RX ORDER — PREGABALIN 75 MG/1
150 CAPSULE ORAL ONCE
Status: COMPLETED | OUTPATIENT
Start: 2019-12-30 | End: 2019-12-30

## 2019-12-30 RX ORDER — EPHEDRINE SULFATE 50 MG/ML
INJECTION, SOLUTION INTRAVENOUS AS NEEDED
Status: DISCONTINUED | OUTPATIENT
Start: 2019-12-30 | End: 2019-12-30 | Stop reason: SURG

## 2019-12-30 RX ORDER — FENTANYL CITRATE 50 UG/ML
25 INJECTION, SOLUTION INTRAMUSCULAR; INTRAVENOUS AS NEEDED
Status: DISCONTINUED | OUTPATIENT
Start: 2019-12-30 | End: 2019-12-30 | Stop reason: HOSPADM

## 2019-12-30 RX ORDER — ROSUVASTATIN CALCIUM 5 MG/1
40 TABLET, COATED ORAL DAILY
Status: DISCONTINUED | OUTPATIENT
Start: 2019-12-31 | End: 2020-01-01 | Stop reason: HOSPADM

## 2019-12-30 RX ORDER — FAMOTIDINE 20 MG/1
40 TABLET, FILM COATED ORAL DAILY
Status: DISCONTINUED | OUTPATIENT
Start: 2019-12-30 | End: 2020-01-01 | Stop reason: HOSPADM

## 2019-12-30 RX ORDER — ALBUTEROL SULFATE 90 UG/1
2 AEROSOL, METERED RESPIRATORY (INHALATION) EVERY 4 HOURS PRN
Status: DISCONTINUED | OUTPATIENT
Start: 2019-12-30 | End: 2019-12-30 | Stop reason: CLARIF

## 2019-12-30 RX ADMIN — PHENYLEPHRINE HYDROCHLORIDE 100 MCG: 10 INJECTION INTRAVENOUS at 13:15

## 2019-12-30 RX ADMIN — EPINEPHRINE 0.01 MG: 0.1 INJECTION, SOLUTION ENDOTRACHEAL; INTRACARDIAC; INTRAVENOUS at 13:41

## 2019-12-30 RX ADMIN — ROCURONIUM BROMIDE 30 MG: 10 INJECTION INTRAVENOUS at 13:43

## 2019-12-30 RX ADMIN — FAMOTIDINE 40 MG: 20 TABLET, FILM COATED ORAL at 19:02

## 2019-12-30 RX ADMIN — SUCCINYLCHOLINE CHLORIDE 100 MG: 20 INJECTION, SOLUTION INTRAMUSCULAR; INTRAVENOUS at 12:53

## 2019-12-30 RX ADMIN — GABAPENTIN 300 MG: 300 CAPSULE ORAL at 20:13

## 2019-12-30 RX ADMIN — PHENYLEPHRINE HYDROCHLORIDE 200 MCG: 10 INJECTION INTRAVENOUS at 13:40

## 2019-12-30 RX ADMIN — LAMOTRIGINE 150 MG: 25 TABLET ORAL at 20:19

## 2019-12-30 RX ADMIN — PHENYLEPHRINE HYDROCHLORIDE 200 MCG: 10 INJECTION INTRAVENOUS at 13:54

## 2019-12-30 RX ADMIN — PHENYLEPHRINE HYDROCHLORIDE 0.8 MCG/KG/MIN: 10 INJECTION INTRAVENOUS at 13:52

## 2019-12-30 RX ADMIN — PHENYLEPHRINE HYDROCHLORIDE 200 MCG: 10 INJECTION INTRAVENOUS at 13:17

## 2019-12-30 RX ADMIN — FAMOTIDINE 20 MG: 10 INJECTION INTRAVENOUS at 11:22

## 2019-12-30 RX ADMIN — EPINEPHRINE 0.02 MG: 0.1 INJECTION, SOLUTION ENDOTRACHEAL; INTRACARDIAC; INTRAVENOUS at 14:01

## 2019-12-30 RX ADMIN — IPRATROPIUM BROMIDE AND ALBUTEROL SULFATE 3 ML: .5; 3 SOLUTION RESPIRATORY (INHALATION) at 11:17

## 2019-12-30 RX ADMIN — FENTANYL CITRATE 25 MCG: 50 INJECTION, SOLUTION INTRAMUSCULAR; INTRAVENOUS at 16:00

## 2019-12-30 RX ADMIN — PHENYLEPHRINE HYDROCHLORIDE 200 MCG: 10 INJECTION INTRAVENOUS at 14:46

## 2019-12-30 RX ADMIN — DEXMEDETOMIDINE HYDROCHLORIDE 10 MCG: 100 INJECTION, SOLUTION, CONCENTRATE INTRAVENOUS at 12:53

## 2019-12-30 RX ADMIN — VASOPRESSIN 2 UNITS: 20 INJECTION INTRAVENOUS at 13:23

## 2019-12-30 RX ADMIN — FLUOXETINE 40 MG: 20 CAPSULE ORAL at 19:02

## 2019-12-30 RX ADMIN — MORPHINE SULFATE 4 MG: 4 INJECTION INTRAVENOUS at 18:28

## 2019-12-30 RX ADMIN — VASOPRESSIN 3 UNITS: 20 INJECTION INTRAVENOUS at 13:54

## 2019-12-30 RX ADMIN — SODIUM CHLORIDE, POTASSIUM CHLORIDE, SODIUM LACTATE AND CALCIUM CHLORIDE 9 ML/HR: 600; 310; 30; 20 INJECTION, SOLUTION INTRAVENOUS at 10:30

## 2019-12-30 RX ADMIN — NEOSTIGMINE METHYLSULFATE 3 MG: 1 INJECTION INTRAVENOUS at 14:51

## 2019-12-30 RX ADMIN — GLYCOPYRROLATE 0.4 MG: 0.2 INJECTION INTRAMUSCULAR; INTRAVENOUS at 14:51

## 2019-12-30 RX ADMIN — VERAPAMIL HYDROCHLORIDE 240 MG: 240 TABLET, FILM COATED, EXTENDED RELEASE ORAL at 19:02

## 2019-12-30 RX ADMIN — EPINEPHRINE 0.01 MG: 0.1 INJECTION, SOLUTION ENDOTRACHEAL; INTRACARDIAC; INTRAVENOUS at 13:38

## 2019-12-30 RX ADMIN — PHENYLEPHRINE HYDROCHLORIDE 100 MCG: 10 INJECTION INTRAVENOUS at 13:16

## 2019-12-30 RX ADMIN — PREGABALIN 150 MG: 75 CAPSULE ORAL at 10:30

## 2019-12-30 RX ADMIN — DEXMEDETOMIDINE HYDROCHLORIDE 10 MCG: 100 INJECTION, SOLUTION, CONCENTRATE INTRAVENOUS at 13:07

## 2019-12-30 RX ADMIN — SODIUM CHLORIDE, POTASSIUM CHLORIDE, SODIUM LACTATE AND CALCIUM CHLORIDE: 600; 310; 30; 20 INJECTION, SOLUTION INTRAVENOUS at 15:00

## 2019-12-30 RX ADMIN — LIDOCAINE HYDROCHLORIDE 0.1 ML: 10 INJECTION, SOLUTION EPIDURAL; INFILTRATION; INTRACAUDAL; PERINEURAL at 10:32

## 2019-12-30 RX ADMIN — ALBUMIN (HUMAN): 12.5 INJECTION, SOLUTION INTRAVENOUS at 14:06

## 2019-12-30 RX ADMIN — EPINEPHRINE 0.02 MG: 0.1 INJECTION, SOLUTION ENDOTRACHEAL; INTRACARDIAC; INTRAVENOUS at 14:06

## 2019-12-30 RX ADMIN — MELOXICAM 15 MG: 7.5 TABLET ORAL at 10:30

## 2019-12-30 RX ADMIN — NOREPINEPHRINE BITARTRATE 0.05 MCG/KG/MIN: 1 INJECTION, SOLUTION, CONCENTRATE INTRAVENOUS at 15:45

## 2019-12-30 RX ADMIN — LIDOCAINE HYDROCHLORIDE 100 MG: 20 INJECTION, SOLUTION INFILTRATION; PERINEURAL at 12:53

## 2019-12-30 RX ADMIN — MIDAZOLAM HYDROCHLORIDE 0.1 MG: 1 INJECTION, SOLUTION INTRAMUSCULAR; INTRAVENOUS at 11:22

## 2019-12-30 RX ADMIN — VASOPRESSIN 2 UNITS: 20 INJECTION INTRAVENOUS at 13:25

## 2019-12-30 RX ADMIN — VANCOMYCIN HYDROCHLORIDE 1500 MG: 500 INJECTION, POWDER, LYOPHILIZED, FOR SOLUTION INTRAVENOUS at 11:21

## 2019-12-30 RX ADMIN — KETAMINE HYDROCHLORIDE 40 MG: 10 INJECTION INTRAMUSCULAR; INTRAVENOUS at 12:53

## 2019-12-30 RX ADMIN — PHENYLEPHRINE HYDROCHLORIDE 200 MCG: 10 INJECTION INTRAVENOUS at 14:26

## 2019-12-30 RX ADMIN — PROPOFOL 120 MG: 10 INJECTION, EMULSION INTRAVENOUS at 12:53

## 2019-12-30 RX ADMIN — VASOPRESSIN 5 UNITS: 20 INJECTION INTRAVENOUS at 13:59

## 2019-12-30 RX ADMIN — EPHEDRINE SULFATE 10 MG: 50 INJECTION, SOLUTION INTRAVENOUS at 13:10

## 2019-12-30 RX ADMIN — ONDANSETRON 4 MG: 2 INJECTION, SOLUTION INTRAMUSCULAR; INTRAVENOUS at 11:22

## 2019-12-30 RX ADMIN — EPINEPHRINE 0.01 MG: 0.1 INJECTION, SOLUTION ENDOTRACHEAL; INTRACARDIAC; INTRAVENOUS at 13:35

## 2019-12-30 RX ADMIN — ACETAMINOPHEN 1000 MG: 500 TABLET, FILM COATED ORAL at 10:30

## 2019-12-30 RX ADMIN — BUDESONIDE AND FORMOTEROL FUMARATE DIHYDRATE 2 PUFF: 80; 4.5 AEROSOL RESPIRATORY (INHALATION) at 19:52

## 2019-12-30 RX ADMIN — PHENYLEPHRINE HYDROCHLORIDE 200 MCG: 10 INJECTION INTRAVENOUS at 13:35

## 2019-12-30 RX ADMIN — SODIUM CHLORIDE, PRESERVATIVE FREE 3 ML: 5 INJECTION INTRAVENOUS at 18:28

## 2019-12-30 RX ADMIN — PHENYLEPHRINE HYDROCHLORIDE 200 MCG: 10 INJECTION INTRAVENOUS at 13:25

## 2019-12-30 RX ADMIN — ACETAMINOPHEN 1000 MG: 500 TABLET, FILM COATED ORAL at 20:18

## 2019-12-30 NOTE — ANESTHESIA PROCEDURE NOTES
Central Line      Start time: 12/30/2019 4:30 PM  Stop Time:12/30/2019 4:40 PM  Staff  CRNA: Aristides Miles CRNA  Preanesthetic Checklist  Completed: patient identified, site marked, surgical consent, pre-op evaluation, timeout performed, IV checked, risks and benefits discussed and monitors and equipment checked  Central Line Prep  Sterile Tech:gloves, cap, gown, mask and sterile barriers  Prep: chloraprep  Patient monitoring: blood pressure monitoring, continuous pulse oximetry and EKG  Central Line Procedure  Laterality:right  Location:internal jugular  Catheter Type:triple lumen  Catheter Size:7 Fr  Guidance:ultrasound guided  PROCEDURE NOTE/ULTRASOUND INTERPRETATION.  Using ultrasound guidance the potential vascular sites for insertion of the catheter were visualized to determine the patency of the vessel to be used for vascular access.  After selecting the appropriate site for insertion, the needle was visualized under ultrasound being inserted into the internal jugular vein, followed by ultrasound confirmation of wire and catheter placement. There were no abnormalities seen on ultrasound; an image was taken; and the patient tolerated the procedure with no complications. Images: still images obtained, printed/placed on chart  Assessment  Post procedure:biopatch applied, line sutured and occlusive dressing applied  Assessement:blood return through all ports, free fluid flow, chest x-ray ordered, no pneumothorax on x-ray and placement verified by x-ray  Complications:no  Patient Tolerance:patient tolerated the procedure well with no apparent complications

## 2019-12-30 NOTE — ANESTHESIA PREPROCEDURE EVALUATION
Anesthesia Evaluation     Patient summary reviewed and Nursing notes reviewed   no history of anesthetic complications:  NPO Solid Status: > 8 hours  NPO Liquid Status: > 8 hours           Airway   Mallampati: II  TM distance: >3 FB  Neck ROM: full  No difficulty expected  Dental    (+) upper dentures and lower dentures    Pulmonary    (+) a smoker (quit 2014, 80 pk yr hx) Former, COPD moderate, home oxygen (3 L, 24x7), shortness of breath, decreased breath sounds,     ROS comment: Chronic respiratory failure with hypoxia   O2 SAT's 88-91 on 3L O2  Cardiovascular - normal exam    Rhythm: regular  Rate: normal    (+) hypertension well controlled 2 medications or greater, dysrhythmias Atrial Fib, YADAV, PVD, hyperlipidemia,  carotid artery disease (R Carotid endarterectomy) carotid bilateral      Neuro/Psych  (+) seizures (> 2yrs) well controlled, CVA (memory issues) residual symptoms, numbness, psychiatric history Anxiety and Depression,     GI/Hepatic/Renal/Endo    (+) obesity,   renal disease CRI, diabetes mellitus (accu check 174, A1c 6.1) type 2 poorly controlled,     Musculoskeletal     (+) back pain, joint swelling,   Abdominal   (+) obese,    Substance History      OB/GYN          Other   arthritis,                      Anesthesia Plan    ASA 4     general   (Risks of anesthesia discussed including cardio resp support, hospital admission and possible ICU stay as well as post op vent)  intravenous induction   Postoperative Plan: Expected vent after surgery  Anesthetic plan, all risks, benefits, and alternatives have been provided, discussed and informed consent has been obtained with: patient.  Use of blood products discussed with patient  Consented to blood products.

## 2019-12-30 NOTE — ANESTHESIA POSTPROCEDURE EVALUATION
Patient: Mar Camilo    Procedure Summary     Date:  12/30/19 Room / Location:   [facility] 2 /  [facility]    Anesthesia Start:  1247 Anesthesia Stop:  1545    Procedure:  TOTAL SHOULDER REVERSE ARTHROPLASTY, open  biceps tenodesis (Right Shoulder) Diagnosis:       Primary osteoarthritis, right shoulder      (Primary osteoarthritis, right shoulder [M19.011])    Surgeon:  Altaf Reyes MD Provider:  Aristides Miles CRNA    Anesthesia Type:  general ASA Status:  4          Anesthesia Type: general    Vitals  Vitals Value Taken Time   /61 12/30/2019  5:06 PM   Temp 96.8 °F (36 °C) 12/30/2019  3:36 PM   Pulse 89 12/30/2019  3:50 PM   Resp 24 12/30/2019  3:50 PM   SpO2 88 % 12/30/2019  5:10 PM   Vitals shown include unvalidated device data.        Post Anesthesia Care and Evaluation    Patient location during evaluation: PACU  Patient participation: complete - patient participated  Level of consciousness: awake and alert  Pain score: 2  Pain management: adequate  Airway patency: patent  Anesthetic complications: No anesthetic complications  PONV Status: none  Cardiovascular status: acceptable  Respiratory status: acceptable  Hydration status: acceptable    Comments: Report given via phone to Dr. Patel. Patient going to ICU with Levophed drip. Awake and alert.

## 2019-12-30 NOTE — ADDENDUM NOTE
Addendum  created 12/30/19 1759 by Aristides Miles CRNA    Child order released for a procedure order, Intraprocedure Blocks edited, Sign clinical note

## 2019-12-31 LAB
ANION GAP SERPL CALCULATED.3IONS-SCNC: 11.3 MMOL/L (ref 5–15)
BASOPHILS # BLD AUTO: 0.04 10*3/MM3 (ref 0–0.2)
BASOPHILS NFR BLD AUTO: 0.4 % (ref 0–1.5)
BUN BLD-MCNC: 24 MG/DL (ref 8–23)
BUN/CREAT SERPL: 26.4 (ref 7–25)
CALCIUM SPEC-SCNC: 8 MG/DL (ref 8.6–10.5)
CHLORIDE SERPL-SCNC: 102 MMOL/L (ref 98–107)
CO2 SERPL-SCNC: 21.7 MMOL/L (ref 22–29)
CREAT BLD-MCNC: 0.91 MG/DL (ref 0.57–1)
DEPRECATED RDW RBC AUTO: 53.1 FL (ref 37–54)
EOSINOPHIL # BLD AUTO: 0.03 10*3/MM3 (ref 0–0.4)
EOSINOPHIL NFR BLD AUTO: 0.3 % (ref 0.3–6.2)
ERYTHROCYTE [DISTWIDTH] IN BLOOD BY AUTOMATED COUNT: 14.4 % (ref 12.3–15.4)
GFR SERPL CREATININE-BSD FRML MDRD: 61 ML/MIN/1.73
GLUCOSE BLD-MCNC: 166 MG/DL (ref 65–99)
GLUCOSE BLDC GLUCOMTR-MCNC: 143 MG/DL (ref 70–130)
GLUCOSE BLDC GLUCOMTR-MCNC: 143 MG/DL (ref 70–130)
GLUCOSE BLDC GLUCOMTR-MCNC: 146 MG/DL (ref 70–130)
HCT VFR BLD AUTO: 32.4 % (ref 34–46.6)
HGB BLD-MCNC: 9.5 G/DL (ref 12–15.9)
IMM GRANULOCYTES # BLD AUTO: 0.06 10*3/MM3 (ref 0–0.05)
IMM GRANULOCYTES NFR BLD AUTO: 0.6 % (ref 0–0.5)
LYMPHOCYTES # BLD AUTO: 1.88 10*3/MM3 (ref 0.7–3.1)
LYMPHOCYTES NFR BLD AUTO: 19.4 % (ref 19.6–45.3)
MCH RBC QN AUTO: 29.1 PG (ref 26.6–33)
MCHC RBC AUTO-ENTMCNC: 29.3 G/DL (ref 31.5–35.7)
MCV RBC AUTO: 99.1 FL (ref 79–97)
MONOCYTES # BLD AUTO: 0.89 10*3/MM3 (ref 0.1–0.9)
MONOCYTES NFR BLD AUTO: 9.2 % (ref 5–12)
NEUTROPHILS # BLD AUTO: 6.81 10*3/MM3 (ref 1.7–7)
NEUTROPHILS NFR BLD AUTO: 70.1 % (ref 42.7–76)
NRBC BLD AUTO-RTO: 0 /100 WBC (ref 0–0.2)
PLATELET # BLD AUTO: 247 10*3/MM3 (ref 140–450)
PMV BLD AUTO: 9 FL (ref 6–12)
POTASSIUM BLD-SCNC: 4.6 MMOL/L (ref 3.5–5.2)
RBC # BLD AUTO: 3.27 10*6/MM3 (ref 3.77–5.28)
SODIUM BLD-SCNC: 135 MMOL/L (ref 136–145)
WBC NRBC COR # BLD: 9.71 10*3/MM3 (ref 3.4–10.8)

## 2019-12-31 PROCEDURE — 97161 PT EVAL LOW COMPLEX 20 MIN: CPT

## 2019-12-31 PROCEDURE — 94799 UNLISTED PULMONARY SVC/PX: CPT

## 2019-12-31 PROCEDURE — 82962 GLUCOSE BLOOD TEST: CPT

## 2019-12-31 PROCEDURE — 80048 BASIC METABOLIC PNL TOTAL CA: CPT | Performed by: ORTHOPAEDIC SURGERY

## 2019-12-31 PROCEDURE — 85025 COMPLETE CBC W/AUTO DIFF WBC: CPT | Performed by: ORTHOPAEDIC SURGERY

## 2019-12-31 PROCEDURE — 94640 AIRWAY INHALATION TREATMENT: CPT

## 2019-12-31 PROCEDURE — 25010000002 VANCOMYCIN PER 500 MG: Performed by: ORTHOPAEDIC SURGERY

## 2019-12-31 PROCEDURE — 99232 SBSQ HOSP IP/OBS MODERATE 35: CPT | Performed by: HOSPITALIST

## 2019-12-31 PROCEDURE — 25010000002 VANCOMYCIN 1 G RECONSTITUTED SOLUTION 1 EACH VIAL: Performed by: ORTHOPAEDIC SURGERY

## 2019-12-31 RX ORDER — OXYCODONE HYDROCHLORIDE AND ACETAMINOPHEN 5; 325 MG/1; MG/1
1 TABLET ORAL EVERY 4 HOURS PRN
Qty: 42 TABLET | Refills: 0 | Status: ON HOLD | OUTPATIENT
Start: 2019-12-31 | End: 2020-01-29

## 2019-12-31 RX ADMIN — ACETAMINOPHEN 1000 MG: 500 TABLET, FILM COATED ORAL at 16:19

## 2019-12-31 RX ADMIN — FLUOXETINE 40 MG: 20 CAPSULE ORAL at 08:01

## 2019-12-31 RX ADMIN — LAMOTRIGINE 150 MG: 25 TABLET ORAL at 08:01

## 2019-12-31 RX ADMIN — GABAPENTIN 300 MG: 300 CAPSULE ORAL at 08:01

## 2019-12-31 RX ADMIN — OXYCODONE HYDROCHLORIDE 5 MG: 5 TABLET ORAL at 08:06

## 2019-12-31 RX ADMIN — ASPIRIN 81 MG 81 MG: 81 TABLET ORAL at 08:01

## 2019-12-31 RX ADMIN — TIOTROPIUM BROMIDE INHALATION SPRAY 2 PUFF: 3.12 SPRAY, METERED RESPIRATORY (INHALATION) at 09:35

## 2019-12-31 RX ADMIN — ROSUVASTATIN CALCIUM 40 MG: 5 TABLET, FILM COATED ORAL at 08:01

## 2019-12-31 RX ADMIN — BUDESONIDE AND FORMOTEROL FUMARATE DIHYDRATE 2 PUFF: 80; 4.5 AEROSOL RESPIRATORY (INHALATION) at 09:33

## 2019-12-31 RX ADMIN — FENOFIBRATE 145 MG: 145 TABLET ORAL at 08:01

## 2019-12-31 RX ADMIN — ACETAMINOPHEN 1000 MG: 500 TABLET, FILM COATED ORAL at 08:02

## 2019-12-31 RX ADMIN — OXYCODONE HYDROCHLORIDE 5 MG: 5 TABLET ORAL at 12:52

## 2019-12-31 RX ADMIN — FAMOTIDINE 40 MG: 20 TABLET, FILM COATED ORAL at 08:01

## 2019-12-31 RX ADMIN — LAMOTRIGINE 150 MG: 25 TABLET ORAL at 20:43

## 2019-12-31 RX ADMIN — GABAPENTIN 300 MG: 300 CAPSULE ORAL at 20:27

## 2019-12-31 RX ADMIN — OXYCODONE HYDROCHLORIDE 5 MG: 5 TABLET ORAL at 13:03

## 2019-12-31 RX ADMIN — ACETAMINOPHEN 1000 MG: 500 TABLET, FILM COATED ORAL at 20:26

## 2019-12-31 RX ADMIN — GABAPENTIN 300 MG: 300 CAPSULE ORAL at 16:19

## 2019-12-31 RX ADMIN — BUDESONIDE AND FORMOTEROL FUMARATE DIHYDRATE 2 PUFF: 80; 4.5 AEROSOL RESPIRATORY (INHALATION) at 20:48

## 2019-12-31 RX ADMIN — OXYCODONE HYDROCHLORIDE 5 MG: 5 TABLET ORAL at 01:14

## 2019-12-31 RX ADMIN — VANCOMYCIN HYDROCHLORIDE 1500 MG: 500 INJECTION, POWDER, LYOPHILIZED, FOR SOLUTION INTRAVENOUS at 00:17

## 2020-01-01 VITALS
RESPIRATION RATE: 20 BRPM | WEIGHT: 237.8 LBS | OXYGEN SATURATION: 90 % | SYSTOLIC BLOOD PRESSURE: 143 MMHG | DIASTOLIC BLOOD PRESSURE: 59 MMHG | HEIGHT: 64 IN | TEMPERATURE: 97.7 F | BODY MASS INDEX: 40.6 KG/M2 | HEART RATE: 103 BPM

## 2020-01-01 PROBLEM — M19.011 PRIMARY OSTEOARTHRITIS, RIGHT SHOULDER: Status: RESOLVED | Noted: 2017-05-08 | Resolved: 2020-01-01

## 2020-01-01 PROBLEM — R52 PAIN: Status: RESOLVED | Noted: 2019-12-30 | Resolved: 2020-01-01

## 2020-01-01 LAB
ALBUMIN SERPL-MCNC: 3.2 G/DL (ref 3.5–5.2)
ALBUMIN/GLOB SERPL: 1 G/DL
ALP SERPL-CCNC: 50 U/L (ref 39–117)
ALT SERPL W P-5'-P-CCNC: 13 U/L (ref 1–33)
ANION GAP SERPL CALCULATED.3IONS-SCNC: 9.2 MMOL/L (ref 5–15)
AST SERPL-CCNC: 18 U/L (ref 1–32)
BASOPHILS # BLD AUTO: 0.02 10*3/MM3 (ref 0–0.2)
BASOPHILS NFR BLD AUTO: 0.2 % (ref 0–1.5)
BILIRUB SERPL-MCNC: 0.3 MG/DL (ref 0.2–1.2)
BUN BLD-MCNC: 12 MG/DL (ref 8–23)
BUN/CREAT SERPL: 16.4 (ref 7–25)
CALCIUM SPEC-SCNC: 8.2 MG/DL (ref 8.6–10.5)
CHLORIDE SERPL-SCNC: 100 MMOL/L (ref 98–107)
CO2 SERPL-SCNC: 24.8 MMOL/L (ref 22–29)
CREAT BLD-MCNC: 0.73 MG/DL (ref 0.57–1)
DEPRECATED RDW RBC AUTO: 51.9 FL (ref 37–54)
EOSINOPHIL # BLD AUTO: 0.09 10*3/MM3 (ref 0–0.4)
EOSINOPHIL NFR BLD AUTO: 1.1 % (ref 0.3–6.2)
ERYTHROCYTE [DISTWIDTH] IN BLOOD BY AUTOMATED COUNT: 14.3 % (ref 12.3–15.4)
GFR SERPL CREATININE-BSD FRML MDRD: 79 ML/MIN/1.73
GLOBULIN UR ELPH-MCNC: 3.1 GM/DL
GLUCOSE BLD-MCNC: 124 MG/DL (ref 65–99)
GLUCOSE BLDC GLUCOMTR-MCNC: 145 MG/DL (ref 70–130)
HCT VFR BLD AUTO: 31.1 % (ref 34–46.6)
HGB BLD-MCNC: 9.1 G/DL (ref 12–15.9)
IMM GRANULOCYTES # BLD AUTO: 0.03 10*3/MM3 (ref 0–0.05)
IMM GRANULOCYTES NFR BLD AUTO: 0.4 % (ref 0–0.5)
LYMPHOCYTES # BLD AUTO: 1.76 10*3/MM3 (ref 0.7–3.1)
LYMPHOCYTES NFR BLD AUTO: 21.1 % (ref 19.6–45.3)
MCH RBC QN AUTO: 28.9 PG (ref 26.6–33)
MCHC RBC AUTO-ENTMCNC: 29.3 G/DL (ref 31.5–35.7)
MCV RBC AUTO: 98.7 FL (ref 79–97)
MONOCYTES # BLD AUTO: 1.05 10*3/MM3 (ref 0.1–0.9)
MONOCYTES NFR BLD AUTO: 12.6 % (ref 5–12)
NEUTROPHILS # BLD AUTO: 5.38 10*3/MM3 (ref 1.7–7)
NEUTROPHILS NFR BLD AUTO: 64.6 % (ref 42.7–76)
NRBC BLD AUTO-RTO: 0 /100 WBC (ref 0–0.2)
PLATELET # BLD AUTO: 219 10*3/MM3 (ref 140–450)
PMV BLD AUTO: 9.3 FL (ref 6–12)
POTASSIUM BLD-SCNC: 4.6 MMOL/L (ref 3.5–5.2)
PROT SERPL-MCNC: 6.3 G/DL (ref 6–8.5)
RBC # BLD AUTO: 3.15 10*6/MM3 (ref 3.77–5.28)
SODIUM BLD-SCNC: 134 MMOL/L (ref 136–145)
WBC NRBC COR # BLD: 8.33 10*3/MM3 (ref 3.4–10.8)

## 2020-01-01 PROCEDURE — 85025 COMPLETE CBC W/AUTO DIFF WBC: CPT | Performed by: HOSPITALIST

## 2020-01-01 PROCEDURE — 99232 SBSQ HOSP IP/OBS MODERATE 35: CPT | Performed by: HOSPITALIST

## 2020-01-01 PROCEDURE — 99024 POSTOP FOLLOW-UP VISIT: CPT | Performed by: ORTHOPAEDIC SURGERY

## 2020-01-01 PROCEDURE — 94799 UNLISTED PULMONARY SVC/PX: CPT

## 2020-01-01 PROCEDURE — 80053 COMPREHEN METABOLIC PANEL: CPT | Performed by: HOSPITALIST

## 2020-01-01 PROCEDURE — 82962 GLUCOSE BLOOD TEST: CPT

## 2020-01-01 RX ORDER — LISINOPRIL 40 MG/1
20 TABLET ORAL DAILY
Qty: 90 TABLET | Refills: 3
Start: 2020-01-01 | End: 2020-11-16

## 2020-01-01 RX ADMIN — ACETAMINOPHEN 1000 MG: 500 TABLET, FILM COATED ORAL at 08:54

## 2020-01-01 RX ADMIN — GABAPENTIN 300 MG: 300 CAPSULE ORAL at 08:55

## 2020-01-01 RX ADMIN — ROSUVASTATIN CALCIUM 40 MG: 5 TABLET, FILM COATED ORAL at 08:54

## 2020-01-01 RX ADMIN — OXYCODONE HYDROCHLORIDE 5 MG: 5 TABLET ORAL at 06:14

## 2020-01-01 RX ADMIN — BUDESONIDE AND FORMOTEROL FUMARATE DIHYDRATE 2 PUFF: 80; 4.5 AEROSOL RESPIRATORY (INHALATION) at 09:14

## 2020-01-01 RX ADMIN — LAMOTRIGINE 150 MG: 25 TABLET ORAL at 08:54

## 2020-01-01 RX ADMIN — FAMOTIDINE 40 MG: 20 TABLET, FILM COATED ORAL at 08:55

## 2020-01-01 RX ADMIN — ASPIRIN 81 MG 81 MG: 81 TABLET ORAL at 08:55

## 2020-01-01 RX ADMIN — OXYCODONE HYDROCHLORIDE 5 MG: 5 TABLET ORAL at 03:57

## 2020-01-01 RX ADMIN — FLUOXETINE 40 MG: 20 CAPSULE ORAL at 08:55

## 2020-01-01 RX ADMIN — FENOFIBRATE 145 MG: 145 TABLET ORAL at 08:55

## 2020-01-01 NOTE — PLAN OF CARE
Problem: Patient Care Overview  Goal: Plan of Care Review  Outcome: Ongoing (interventions implemented as appropriate)  Flowsheets (Taken 12/31/2019 2216)  Progress: improving  Plan of Care Reviewed With: patient  Outcome Summary: still on O2 4L NC     Problem: Patient Care Overview  Goal: Individualization and Mutuality  Outcome: Ongoing (interventions implemented as appropriate)     Problem: Breathing Pattern Ineffective (Adult)  Intervention: Optimize Oxygenation/Ventilation/Perfusion  Flowsheets (Taken 12/31/2019 2216)  Head of Bed (HOB): other (see comments)  Airway/Ventilation Management: airway patency maintained; pulmonary hygiene promoted  Breathing Techniques/Airway Clearance: deep/controlled cough encouraged  Note:   Up in chair

## 2020-01-01 NOTE — DISCHARGE SUMMARY
Discharge Summary    Patient name: Mar Camilo    Medical record number: [id number]    Admission date: 12/30/2019  Discharge date:  1/1/2020    Attending physician:  Eric  Primary care physician: Vianey Barrios PA-C    Referring physician:     Consulting physician(s):  Condition on discharge: Stable      Primary Diagnoses: Osteoarthritis right shoulder    Secondary Diagnoses:     Operative Procedure: Right total reverse shoulder    Hospital Course: The patient is a very pleasant 69 y.o. female that was admitted to the hospital with osteoarthritis of the right shoulder that you remain refractory to nonoperative management.  After proper evaluation she was taken to the operating room for a right total reverse shoulder.  Please see op note for details.  At time of discharge the patient is afebrile hemodynamically stable pain controlled oral medication.    Discharge medications:      Discharge Medications      New Medications      Instructions Start Date   oxyCODONE-acetaminophen 5-325 MG per tablet  Commonly known as:  PERCOCET   1 tablet, Oral, Every 4 Hours PRN         Changes to Medications      Instructions Start Date   albuterol sulfate  (90 Base) MCG/ACT inhaler  Commonly known as:  PROVENTIL HFA;VENTOLIN HFA;PROAIR HFA  What changed:  reasons to take this   INHALE 2 PUFFS BY MOUTH EVERY 4 HOURS AS NEEDED      SPIRIVA HANDIHALER 18 MCG per inhalation capsule  Generic drug:  tiotropium  What changed:    · how much to take  · when to take this   INHALE 1 PUFF BY MOUTH ONCE DAILY         Continue These Medications      Instructions Start Date   aspirin 81 MG EC tablet   81 mg, Oral, Every Morning      CENTRUM SILVER PO   1 tablet, Oral, Every Morning      VISION FORMULA/LUTEIN PO   1 tablet, Oral, Daily      diclofenac 1 % gel gel  Commonly known as:  VOLTAREN   4 g, Topical, 4 Times Daily      fenofibrate 145 MG tablet  Commonly known as:  TRICOR   TAKE 1 TABLET BY MOUTH ONCE DAILY      FLUoxetine  40 MG capsule  Commonly known as:  PROzac   TAKE 1 CAPSULE BY MOUTH ONCE DAILY      Fluticasone Furoate-Vilanterol 100-25 MCG/INH inhaler  Commonly known as:  BREO ELLIPTA   Inhalation      gabapentin 300 MG capsule  Commonly known as:  NEURONTIN   TAKE 1 CAPSULE BY MOUTH THREE TIMES DAILY      glucose blood test strip  Commonly known as:  ONETOUCH VERIO   Use as instructed as to check blood sugars 2-3 times a day for type 2 diabetes      guaiFENesin 600 MG 12 hr tablet  Commonly known as:  MUCINEX   1,200 mg, Oral, Every Morning      lamoTRIgine 150 MG tablet  Commonly known as:  LaMICtal   150 mg, Oral, 2 Times Daily      lisinopril 40 MG tablet  Commonly known as:  PRINIVIL,ZESTRIL   TAKE 1 TABLET BY MOUTH ONCE DAILY      metFORMIN 500 MG tablet  Commonly known as:  GLUCOPHAGE   500 mg, Oral, 2 Times Daily With Meals      O2  Commonly known as:  OXYGEN   3 L/min, Inhalation, Continuous      onetouch ultrasoft lancets   Used to check blood sugars twice a day as needed for type 2 diabetes monitoring      ONETOUCH VERIO w/Device kit   1 kit, Does not apply, 2 Times Daily      rosuvastatin 40 MG tablet  Commonly known as:  CRESTOR   TAKE 1 TABLET BY MOUTH ONCE DAILY      SYMBICORT 80-4.5 MCG/ACT inhaler  Generic drug:  budesonide-formoterol   2 puffs, Inhalation, 2 Times Daily - RT      verapamil  MG CR tablet  Commonly known as:  CALAN-SR   TAKE 1 TABLET BY MOUTH EVERY 12 HOURS         Stop These Medications    acetaminophen 325 MG tablet  Commonly known as:  TYLENOL            Discharge instructions: Patient discharged home today with pain medication as prescribed per Dr. Reyes.  Activity instructions discussed with patient.  She is to wear the sling except for bathing and dressing.  Answered all questions      Follow-up appointment: Follow-up appointment with Dr. Reyes has been arranged.

## 2020-01-01 NOTE — PROGRESS NOTES
"Hospitalist Team      Patient Care Team:  Vianey Barrios PA-C as PCP - General (Family Medicine)  Bhanu Mata MD as Consulting Physician (Cardiology)  Mann Hernadez MD as Consulting Physician (Pulmonary Disease)  Becky Haji MD as Surgeon (General Surgery)        Chief Complaint:  F/U Hypotension and Chronic respiratory failure    Subjective    Interval History and ROS:     Patient states she is feeling well today.  Her blood pressure is slightly on the high side this morning and she notes her pain is fairly well controlled.  She is eating and drinking normally and is dressed and sitting up in the chair awaiting discharge home.  She denies any shortness of breath or cough.  Patient was noted this morning to be on 5-1/2 L of oxygen per nasal cannula when her home requirement is usually 3 L.  Heart rate is intermittently elevated in the 100s but the patient denies any chest pain or heart palpitations.      Objective    Vital Signs  Temp:  [97.7 °F (36.5 °C)-98.4 °F (36.9 °C)] 97.7 °F (36.5 °C)  Heart Rate:  [] 104  Resp:  [18-22] 20  BP: (110-146)/(51-66) 143/59  Oxygen Therapy  SpO2: 89 %  Pulse Oximetry Type: Continuous  Device (Oxygen Therapy): nasal cannula  Flow (L/min): 4L  Body mass index is 40.8 kg/m².      Flowsheet Rows      First Filed Value   Admission Height  162.6 cm (64.02\") Documented at 12/30/2019 1014   Admission Weight  105 kg (232 lb 3.2 oz) Documented at 12/30/2019 1014            Physical Exam:    Physical Exam   Constitutional: She is oriented to person, place, and time. She appears well-developed. No distress.   Morbidly obese   HENT:   Head: Normocephalic and atraumatic.   Mouth/Throat: Oropharynx is clear and moist.   Eyes: Conjunctivae and EOM are normal. No scleral icterus.   Cardiovascular: Regular rhythm and normal heart sounds. Exam reveals no gallop and no friction rub.   No murmur heard.  Mildly tachycardic   Pulmonary/Chest: Effort normal. No respiratory " distress. She has no wheezes. She has no rales.   Diminished breath sounds throughout   Abdominal: Soft. Bowel sounds are normal. There is no tenderness.   Musculoskeletal: She exhibits no edema.   Neurological: She is alert and oriented to person, place, and time.   Psychiatric: Her behavior is normal.   Vitals reviewed.      Results Review:     I reviewed the patient's new clinical results.    Lab Results (last 24 hours)     Procedure Component Value Units Date/Time    POC Glucose Once [[id number]]  (Abnormal) Collected:  01/01/20 0714    Specimen:  Blood Updated:  01/01/20 0744     Glucose 145 mg/dL     Comprehensive Metabolic Panel [[id number]]  (Abnormal) Collected:  01/01/20 0341    Specimen:  Blood Updated:  01/01/20 0529     Glucose 124 mg/dL      BUN 12 mg/dL      Creatinine 0.73 mg/dL      Sodium 134 mmol/L      Potassium 4.6 mmol/L      Chloride 100 mmol/L      CO2 24.8 mmol/L      Calcium 8.2 mg/dL      Total Protein 6.3 g/dL      Albumin 3.20 g/dL      ALT (SGPT) 13 U/L      AST (SGOT) 18 U/L      Alkaline Phosphatase 50 U/L      Total Bilirubin 0.3 mg/dL      eGFR Non African Amer 79 mL/min/1.73      Globulin 3.1 gm/dL      A/G Ratio 1.0 g/dL      BUN/Creatinine Ratio 16.4     Anion Gap 9.2 mmol/L     Narrative:       GFR Normal >60  Chronic Kidney Disease <60  Kidney Failure <15      CBC & Differential [[id number]] Collected:  01/01/20 0341    Specimen:  Blood Updated:  01/01/20 0448    Narrative:       The following orders were created for panel order CBC & Differential.  Procedure                               Abnormality         Status                     ---------                               -----------         ------                     CBC Auto Differential[[id number]]        Abnormal            Final result                 Please view results for these tests on the individual orders.    CBC Auto Differential [[id number]]  (Abnormal) Collected:  01/01/20 0341    Specimen:  Blood Updated:  01/01/20  0448     WBC 8.33 10*3/mm3      RBC 3.15 10*6/mm3      Hemoglobin 9.1 g/dL      Hematocrit 31.1 %      MCV 98.7 fL      MCH 28.9 pg      MCHC 29.3 g/dL      RDW 14.3 %      RDW-SD 51.9 fl      MPV 9.3 fL      Platelets 219 10*3/mm3      Neutrophil % 64.6 %      Lymphocyte % 21.1 %      Monocyte % 12.6 %      Eosinophil % 1.1 %      Basophil % 0.2 %      Immature Grans % 0.4 %      Neutrophils, Absolute 5.38 10*3/mm3      Lymphocytes, Absolute 1.76 10*3/mm3      Monocytes, Absolute 1.05 10*3/mm3      Eosinophils, Absolute 0.09 10*3/mm3      Basophils, Absolute 0.02 10*3/mm3      Immature Grans, Absolute 0.03 10*3/mm3      nRBC 0.0 /100 WBC     POC Glucose Once [[id number]]  (Abnormal) Collected:  12/31/19 2223    Specimen:  Blood Updated:  12/31/19 2230     Glucose 143 mg/dL     POC Glucose Once [[id number]]  (Abnormal) Collected:  12/31/19 1618    Specimen:  Blood Updated:  12/31/19 1626     Glucose 146 mg/dL     POC Glucose Once [[id number]]  (Abnormal) Collected:  12/31/19 1148    Specimen:  Blood Updated:  12/31/19 1209     Glucose 143 mg/dL           Imaging Results (Last 24 Hours)     ** No results found for the last 24 hours. **        ECG/EMG Results (most recent)     None          Medication Review:   I have reviewed the patient's current medication list    Current Facility-Administered Medications:   •  acetaminophen (TYLENOL) tablet 1,000 mg, 1,000 mg, Oral, TID, Altaf Reyes MD, 1,000 mg at 01/01/20 0854  •  albuterol (PROVENTIL) nebulizer solution 0.083% 2.5 mg/3mL, 2.5 mg, Nebulization, Q4H PRN, Altaf Reyes MD  •  aspirin chewable tablet 81 mg, 81 mg, Oral, Daily, Altaf Reyes MD, 81 mg at 01/01/20 0855  •  budesonide-formoterol (SYMBICORT) 80-4.5 MCG/ACT inhaler 2 puff, 2 puff, Inhalation, BID - RT, Altaf Reyes MD, 2 puff at 01/01/20 0914  •  dextrose (D50W) 25 g/ 50mL Intravenous Solution 25 g, 25 g, Intravenous, Q15 Min PRN, Juan R Patel MD  •  dextrose (GLUTOSE) oral  gel 15 g, 15 g, Oral, Q15 Min PRN, Juan R Patel MD  •  famotidine (PEPCID) tablet 40 mg, 40 mg, Oral, Daily, Altaf Reyes MD, 40 mg at 01/01/20 0855  •  fenofibrate (TRICOR) tablet 145 mg, 145 mg, Oral, Daily, Altaf Reyes MD, 145 mg at 01/01/20 0855  •  FLUoxetine (PROzac) capsule 40 mg, 40 mg, Oral, Daily, Altaf Reyes MD, 40 mg at 01/01/20 0855  •  gabapentin (NEURONTIN) capsule 300 mg, 300 mg, Oral, TID, Altaf Reyes MD, 300 mg at 01/01/20 0855  •  glucagon (GLUCAGEN) injection 1 mg, 1 mg, Subcutaneous, Q15 Min PRN, Juan R Patel MD  •  ipratropium-albuterol (DUO-NEB) nebulizer solution 3 mL, 3 mL, Nebulization, Q4H PRN, Juan R Patel MD  •  lamoTRIgine (LaMICtal) tablet 150 mg, 150 mg, Oral, BID, Altaf Reyes MD, 150 mg at 01/01/20 0854  •  morphine injection 4 mg, 4 mg, Intravenous, Q2H PRN, 4 mg at 12/30/19 1828 **AND** naloxone (NARCAN) injection 0.4 mg, 0.4 mg, Intravenous, Q5 Min PRN, Altaf Reyes MD  •  Morphine injection 6 mg, 6 mg, Intravenous, Q2H PRN **AND** naloxone (NARCAN) injection 0.4 mg, 0.4 mg, Intravenous, Q5 Min PRN, Altaf Reyes MD  •  ondansetron (ZOFRAN) tablet 4 mg, 4 mg, Oral, Q6H PRN **OR** ondansetron (ZOFRAN) injection 4 mg, 4 mg, Intravenous, Q6H PRN, Altaf Reyes MD  •  oxyCODONE (ROXICODONE) immediate release tablet 10 mg, 10 mg, Oral, Q4H PRN, Altaf Reyes MD, 5 mg at 12/31/19 1303  •  oxyCODONE (ROXICODONE) immediate release tablet 5 mg, 5 mg, Oral, Q4H PRN, Altaf Reyes MD, 5 mg at 01/01/20 0614  •  rosuvastatin (CRESTOR) tablet 40 mg, 40 mg, Oral, Daily, Altaf Reyes MD, 40 mg at 01/01/20 0854  •  sodium chloride 500 mL with povidone-iodine 17.5 mL irrigation, , , PRN, Altaf Reyes MD, 517.5 mL at 12/30/19 1400  •  tiotropium (SPIRIVA RESPIMAT) 2.5 mcg/act aerosol solution inhaler, 2 puff, Inhalation, Daily - RT, Altaf Reyes MD, 2 puff at 01/01/20 0914    Facility-Administered  Medications Ordered in Other Encounters:   •  acetaminophen (TYLENOL) 325 MG tablet  - ADS Override Pull, , , ,       Assessment/Plan     -Hypotension: Occurred Intraoperatively. Was initially on Levophed, unclear etiology but resolved. See below.      -HTN:   Blood pressure is slightly elevated today off of her home verapamil and lisinopril.  Given her issues with hypotension postoperatively I will resume her home verapamil and half of her previous home dose of lisinopril.  The patient will need to follow-up with her primary care provider in 1 week for blood pressure check and her medications can be readjusted at that time if need be.    -Acute hypoxia on chronic hypoxic respiratory failure:   -Bibasilar atelectasis postoperatively:   -COPD without exacerbation:  The patient has been weaned down to 4 L nasal cannula this morning.  She is normally chronically on 3 L nasal cannula at home.  She will likely need a small increase in her baseline oxygen requirement temporarily due to atelectasis related to pain in her upper extremity and narcotic use.  X-ray here showed low lung volumes and bibasilar atelectasis.  I have recommended that the patient continue her incentive spirometry and OPEP at home.  She will go home on 4 L nasal cannula for now and a pulse oximeter has been given to her at discharge.  I have instructed the patient that she can intermittently try to turn herself back down to 3 L as long as her oxygen saturations stays 88% or above on 3 L otherwise she needs to turn it back up to 4 L.  Patient will need to follow-up with Dr. Hernadez her pulmonologist in 2 weeks for reassessment.  Patient is to continue her home Symbicort, Spiriva, and albuterol PRN upon discharge.     -Diabetes Mellitus, Type 2: A1c was 6.1% this month.  Bedsides and AM at goal.  No change to home regimen.     -Crohn's Disease:  No acute issues here.       -PAF:  Will resume home dose verapamil as patient's blood pressure now will  tolerate.  Rate at goal and NSR on tele.  Could not tolerate any anticoagulation in the past due to bleeding.  Patient back on home aspirin.     -Dyslipidemia: No acute issues.  Continue TriCor and Crestor.     -Hx/O Seizure: No acute issues.  Continue home dose of Lamictal.    (Note: Discharge orders placed for increase in oxygen requirement, decrease in lisinopril dose, O2 monitoring, IS and OPEP, and follow-up with PCP and pulmonary outpatient.)    Plan for disposition: Home today per Ortho    Estefany Martinez MD  01/01/20  10:51 AM

## 2020-01-01 NOTE — PROGRESS NOTES
Orthopedic Progress Note   Chief Complaint: Status post right total shoulder    Subjective     Interval History: Patient is postop day 2 afebrile hemodynamically stable pain well controlled on medication.          Objective     Vital Signs  Temp:  [97.7 °F (36.5 °C)-98.4 °F (36.9 °C)] 97.7 °F (36.5 °C)  Heart Rate:  [] 100  Resp:  [18-22] 22  BP: (110-146)/(51-66) 143/59  Body mass index is 40.8 kg/m².    Intake/Output Summary (Last 24 hours) at 1/1/2020 0908  Last data filed at 1/1/2020 0500  Gross per 24 hour   Intake 1200 ml   Output 3150 ml   Net -1950 ml     No intake/output data recorded.       Physical Exam:   General: patient awake, alert and cooperative   Cardiovascular: regular rhythm and rate   Pulm: clear to auscultation bilaterally   Abdomen: Benign.  Soft bowel sounds   Extremities: Right upper extremities good distal pulses no motor or sensory deficit good capillary refill.  Wound is benign dressing is dry   Neurologic: Normal mood and behavior     Results Review:     I reviewed the patient's new clinical results.      WBC No results found for: WBCS   HGB Hemoglobin   Date Value Ref Range Status   01/01/2020 9.1 (L) 12.0 - 15.9 g/dL Final   12/31/2019 9.5 (L) 12.0 - 15.9 g/dL Final   12/30/2019 10.1 (L) 12.0 - 15.9 g/dL Final   12/30/2019 10.2 (L) 12.0 - 15.9 g/dL Final      HCT Hematocrit   Date Value Ref Range Status   01/01/2020 31.1 (L) 34.0 - 46.6 % Final   12/31/2019 32.4 (L) 34.0 - 46.6 % Final   12/30/2019 34.2 34.0 - 46.6 % Final   12/30/2019 34.4 34.0 - 46.6 % Final      Platlets No results found for: LABPLAT     PT/INR:  No results found for: PROTIME/No results found for: INR    Sodium Sodium   Date Value Ref Range Status   01/01/2020 134 (L) 136 - 145 mmol/L Final   12/31/2019 135 (L) 136 - 145 mmol/L Final   12/30/2019 139 136 - 145 mmol/L Final      Potassium Potassium   Date Value Ref Range Status   01/01/2020 4.6 3.5 - 5.2 mmol/L Final   12/31/2019 4.6 3.5 - 5.2 mmol/L Final    12/30/2019 5.4 (H) 3.5 - 5.2 mmol/L Final      Chloride Chloride   Date Value Ref Range Status   01/01/2020 100 98 - 107 mmol/L Final   12/31/2019 102 98 - 107 mmol/L Final   12/30/2019 105 98 - 107 mmol/L Final      Bicarbonate No results found for: PLASMABICARB   BUN BUN   Date Value Ref Range Status   01/01/2020 12 8 - 23 mg/dL Final   12/31/2019 24 (H) 8 - 23 mg/dL Final   12/30/2019 33 (H) 8 - 23 mg/dL Final      Creatinine Creatinine   Date Value Ref Range Status   01/01/2020 0.73 0.57 - 1.00 mg/dL Final   12/31/2019 0.91 0.57 - 1.00 mg/dL Final   12/30/2019 1.12 (H) 0.57 - 1.00 mg/dL Final      Calcium Calcium   Date Value Ref Range Status   01/01/2020 8.2 (L) 8.6 - 10.5 mg/dL Final   12/31/2019 8.0 (L) 8.6 - 10.5 mg/dL Final   12/30/2019 8.2 (L) 8.6 - 10.5 mg/dL Final      Magnesium  AST  ALT  Bilirubin, Total  AlkPhos  Albumin    Amylase  Lipase    Radiology: No results found for: MG  No components found for: AST.*  No components found for: ALT.*  No components found for: BILIRUBIN, TOTAL.*    No components found for: ALKPHOS.*  No components found for: ALBUMIN.*      No components found for: AMYLASE.*  No components found for: LIPASE.*            Imaging Results (Most Recent)     Procedure Component Value Units Date/Time    XR Shoulder 1 View Right [[id number]] Collected:  12/30/19 1838     Updated:  12/30/19 1840    Narrative:       CR Shoulder 1 Vw RT 12/30/2019    INDICATION:   Primary osteoarthritis right shoulder, postop right shoulder arthroplasty today.    COMPARISON:   None available.    FINDINGS:   1 views of the right shoulder.  Right shoulder arthroplasty appears well seated. No evidence of fracture.  The acromioclavicular and glenohumeral articulations are within normal limits.  No foreign body.      Impression:       Normal postoperative appearance right shoulder arthroplasty. No evidence of fracture.    Signer Name: Erich Caballero MD   Signed: 12/30/2019 6:38 PM   Workstation Name: [id number]     [facility]    XR Chest 2 View [[id number]] Collected:  12/30/19 1707     Updated:  12/30/19 1709    Narrative:       CR Chest 2 Vws 12/30/2019    INDICATION:    Post invasive procedure, central line placement today.    COMPARISON:    None.    FINDINGS:   PA and lateral views of the chest.  Exam is limited by patient rotation. There is mild cardiac enlargement. Right internal jugular central line is been inserted with the tip in the superior vena cava. There is no pneumothorax. Poor inspiratory result  with bibasilar infiltrates and/or atelectasis. Right shoulder arthroplasty. Underlying emphysema.      Impression:         1. Right internal jugular central line tip SVC. No pneumothorax.  2. Poor inspiratory result with bibasilar infiltrates or atelectasis.  3. COPD.  4. Cardiac enlargement.    Signer Name: Erich Caballero MD   Signed: 12/30/2019 5:07 PM   Workstation Name: [id number]    [facility]                    Assessment/Plan     Patient Active Problem List   Diagnosis Code   • Obstructive pyelonephritis N11.1   • Chronic allergic conjunctivitis H10.45   • Chronic back pain M54.9, G89.29   • Chronic bronchitis (CMS/[facility]) J42   • Non-specific colitis K52.9   • COPD (chronic obstructive pulmonary disease) (CMS/HCC) J44.9   • Degeneration of intervertebral disc of lumbar region M51.36   • Depression F32.9   • Heartburn R12   • Herpes labialis B00.1   • Mixed hyperlipidemia E78.2   • Essential hypertension I10   • Spinal stenosis of lumbar region M48.061   • Osteopenia M85.80   • Lumbar radiculopathy M54.16   • Type 2 diabetes mellitus with complication, without long-term current use of insulin (CMS/[facility]) E11.8   • Anemia D64.9   • Asymptomatic stenosis of right carotid artery I65.21   • Chronic respiratory failure with hypoxia (CMS/HCC) J96.11   • Osteoarthritis of lumbar spine M47.816   • Need for immunization against influenza Z23   • Chronic right hip pain  M25.551, G89.29   • Chronic pain of both shoulders M25.511, G89.29, M25.512   • Seizure (CMS/[facility]) R56.9   • History of stroke Z86.73   • Primary osteoarthritis, right shoulder M19.011   • Tendinopathy of rotator cuff M67.919   • Primary osteoarthritis, left shoulder M19.012   • Need for pneumococcal vaccine Z23   • Crohn's disease of colon with complication (CMS/[facility]) K50.119   • Ventral hernia without obstruction or gangrene K43.9   • PAF (paroxysmal atrial fibrillation) (CMS/[facility]) I48.0   • PAD (peripheral artery disease) (CMS/[facility]) I73.9   • Morbid obesity (CMS/[facility]) E66.01   • Chronic pain syndrome G89.4   • Unsteady gait R26.81   • High risk medication use Z79.899   • H/O carotid stenosis Z86.79   • Cerebrovascular accident (CVA) (CMS/[facility]) I63.9   • Neuropathy G62.9   • Foot swelling M79.89   • Pain R52       Patient discharge home today.  Follow-up appointment with Dr. Reyes has been arranged.  Activity instructions discussed with patient.  Answered all questions.      Reynaldo Kaufman MD  01/01/20  9:08 AM          Dictated utilizing Dragon dictation

## 2020-01-01 NOTE — NURSING NOTE
Continued Stay Note  LIONEL Barton     Patient Name: Mar Camilo  MRN: [id number]  Today's Date: 1/1/2020    Admit Date: 12/30/2019    Discharge Plan     Row Name 01/01/20 1543       Plan    Plan Comments  Patient provided a portable finger probe to check oxygen sats at home per MD request. CM demonstrated on usage, patient verbalized understanding.        Discharge Codes    No documentation.       Expected Discharge Date and Time     Expected Discharge Date Expected Discharge Time    Jan 1, 2020             Alina Duque RN

## 2020-01-01 NOTE — NURSING NOTE
Case Management Discharge Note      Final Note: Discharged home.    Provided post acute provider list?: Refused    Destination      No service has been selected for the patient.      Durable Medical Equipment      No service has been selected for the patient.      Dialysis/Infusion      No service has been selected for the patient.      Home Medical Care      No service has been selected for the patient.      Therapy      No service has been selected for the patient.      Community Resources      No service has been selected for the patient.             Final Discharge Disposition Code: 01 - home or self-care

## 2020-01-02 ENCOUNTER — READMISSION MANAGEMENT (OUTPATIENT)
Dept: CALL CENTER | Facility: HOSPITAL | Age: 70
End: 2020-01-02

## 2020-01-02 NOTE — OUTREACH NOTE
Prep Survey      Responses   Facility patient discharged from?  [facility]   Is LACE score < 7 ?  No   Is patient eligible?  Yes   Discharge diagnosis  Primary osteoarthritis, right shoulder   Does the patient have one of the following disease processes/diagnoses(primary or secondary)?  Total Joint Replacement   Does the patient have Home health ordered?  No   Is there a DME ordered?  No   Prep survey completed?  Yes          Minnie Barajas RN

## 2020-01-06 ENCOUNTER — READMISSION MANAGEMENT (OUTPATIENT)
Dept: CALL CENTER | Facility: HOSPITAL | Age: 70
End: 2020-01-06

## 2020-01-06 NOTE — OUTREACH NOTE
Total Joint Week 1 Survey      Responses   Facility patient discharged from?  [facility]   Does the patient have one of the following disease processes/diagnoses(primary or secondary)?  Total Joint Replacement   Is there a successful TCM telephone encounter documented?  No   Joint surgery performed?  Shoulder   Week 1 attempt successful?  Yes   Call start time  1020   Revoke  Decline to participate   Call end time  1022          Carolyn Perez RN

## 2020-01-13 ENCOUNTER — HOSPITAL ENCOUNTER (OUTPATIENT)
Dept: INFUSION THERAPY | Facility: HOSPITAL | Age: 70
Discharge: HOME | End: 2020-01-13
Admitting: INTERNAL MEDICINE

## 2020-01-13 VITALS
WEIGHT: 232 LBS | BODY MASS INDEX: 39.61 KG/M2 | TEMPERATURE: 97.6 F | OXYGEN SATURATION: 99 % | DIASTOLIC BLOOD PRESSURE: 68 MMHG | HEART RATE: 74 BPM | RESPIRATION RATE: 16 BRPM | HEIGHT: 64 IN | SYSTOLIC BLOOD PRESSURE: 142 MMHG

## 2020-01-13 DIAGNOSIS — K50.119 CROHN'S DISEASE OF COLON WITH COMPLICATION (HCC): ICD-10-CM

## 2020-01-13 DIAGNOSIS — E11.8 TYPE 2 DIABETES MELLITUS WITH COMPLICATION, WITHOUT LONG-TERM CURRENT USE OF INSULIN (HCC): ICD-10-CM

## 2020-01-13 DIAGNOSIS — I10 ESSENTIAL HYPERTENSION: ICD-10-CM

## 2020-01-13 DIAGNOSIS — E78.2 MIXED HYPERLIPIDEMIA: ICD-10-CM

## 2020-01-13 DIAGNOSIS — K50.10 CROHN'S DISEASE OF LARGE INTESTINE WITHOUT COMPLICATION (HCC): Primary | ICD-10-CM

## 2020-01-13 DIAGNOSIS — E78.1 HYPERTRIGLYCERIDEMIA: ICD-10-CM

## 2020-01-13 PROCEDURE — A9270 NON-COVERED ITEM OR SERVICE: HCPCS | Performed by: INTERNAL MEDICINE

## 2020-01-13 PROCEDURE — 63710000001 DIPHENHYDRAMINE PER 50 MG: Performed by: INTERNAL MEDICINE

## 2020-01-13 PROCEDURE — 96413 CHEMO IV INFUSION 1 HR: CPT

## 2020-01-13 PROCEDURE — 96415 CHEMO IV INFUSION ADDL HR: CPT

## 2020-01-13 PROCEDURE — 25010000002 INFLIXIMAB PER 10 MG: Performed by: INTERNAL MEDICINE

## 2020-01-13 RX ORDER — DIPHENHYDRAMINE HCL 25 MG
25 CAPSULE ORAL ONCE
Status: COMPLETED | OUTPATIENT
Start: 2020-01-13 | End: 2020-01-13

## 2020-01-13 RX ORDER — VERAPAMIL HYDROCHLORIDE 240 MG/1
240 TABLET, FILM COATED, EXTENDED RELEASE ORAL EVERY 12 HOURS
Qty: 60 TABLET | Refills: 3 | Status: SHIPPED | OUTPATIENT
Start: 2020-01-13 | End: 2020-07-27

## 2020-01-13 RX ORDER — DIPHENHYDRAMINE HCL 25 MG
25 CAPSULE ORAL ONCE
Status: CANCELLED | OUTPATIENT
Start: 2020-03-09

## 2020-01-13 RX ORDER — SODIUM CHLORIDE 9 MG/ML
250 INJECTION, SOLUTION INTRAVENOUS ONCE
Status: DISCONTINUED | OUTPATIENT
Start: 2020-01-13 | End: 2020-01-15 | Stop reason: HOSPADM

## 2020-01-13 RX ORDER — ACETAMINOPHEN 325 MG/1
650 TABLET ORAL ONCE
Status: DISCONTINUED | OUTPATIENT
Start: 2020-01-13 | End: 2020-01-15 | Stop reason: HOSPADM

## 2020-01-13 RX ORDER — ACETAMINOPHEN 325 MG/1
650 TABLET ORAL ONCE
Status: CANCELLED | OUTPATIENT
Start: 2020-03-09

## 2020-01-13 RX ORDER — SODIUM CHLORIDE 9 MG/ML
250 INJECTION, SOLUTION INTRAVENOUS ONCE
Status: CANCELLED | OUTPATIENT
Start: 2020-03-09

## 2020-01-13 RX ADMIN — INFLIXIMAB 540 MG: 100 INJECTION, POWDER, LYOPHILIZED, FOR SOLUTION INTRAVENOUS at 09:33

## 2020-01-13 RX ADMIN — DIPHENHYDRAMINE HYDROCHLORIDE 25 MG: 25 CAPSULE ORAL at 09:21

## 2020-01-13 NOTE — PATIENT INSTRUCTIONS
"      Call Dr. Roberto Carlos Umaña, Gastroenterology at [phone] if you have any problems or concerns.    We know you have a Choice in healthcare and appreciate you using [facility].  Our purpose is to provide you \"Excellent Care\".  We hope that you will always choose us in the future and continue to recommend us to your family and friends.              Infliximab infusion  What is this medicine?  INFLIXIMAB (in FLIX i mab) is used to treat Crohn's disease and ulcerative colitis. It is also used to treat ankylosing spondylitis, plaque psoriasis, and some forms of arthritis.  This medicine may be used for other purposes; ask your health care provider or pharmacist if you have questions.  COMMON BRAND NAME(S): INFLECTRA, Remicade, RENFLEXIS  What should I tell my health care provider before I take this medicine?  They need to know if you have any of these conditions:  -cancer  -current or past resident of [place]  -diabetes  -exposure to tuberculosis  -Guillain-Amistad syndrome  -heart failure  -hepatitis or liver disease  -immune system problems  -infection  -lung or breathing disease, like COPD  -multiple sclerosis  -receiving phototherapy for the skin  -seizure disorder  -an unusual or allergic reaction to infliximab, mouse proteins, other medicines, foods, dyes, or preservatives  -pregnant or trying to get pregnant  -breast-feeding  How should I use this medicine?  This medicine is for injection into a vein. It is usually given by a health care professional in a hospital or clinic setting.  A special MedGuide will be given to you by the pharmacist with each prescription and refill. Be sure to read this information carefully each time.  Talk to your pediatrician regarding the use of this medicine in children. While this drug may be prescribed for children as young as 6 years of age for selected conditions, precautions do apply.  Overdosage: If you think you have taken too " much of this medicine contact a poison control center or emergency room at once.  NOTE: This medicine is only for you. Do not share this medicine with others.  What if I miss a dose?  It is important not to miss your dose. Call your doctor or health care professional if you are unable to keep an appointment.  What may interact with this medicine?  Do not take this medicine with any of the following medications:  -biologic medicines such as abatacept, adalimumab, anakinra, certolizumab, etanercept, golimumab, rituximab, secukinumab, tocilizumab, tofactinib, ustekinumab  -live vaccines  This list may not describe all possible interactions. Give your health care provider a list of all the medicines, herbs, non-prescription drugs, or dietary supplements you use. Also tell them if you smoke, drink alcohol, or use illegal drugs. Some items may interact with your medicine.  What should I watch for while using this medicine?  Your condition will be monitored carefully while you are receiving this medicine. Visit your doctor or health care professional for regular checks on your progress. You may need blood work done while you are taking this medicine. Before beginning therapy, your doctor may do a test to see if you have been exposed to tuberculosis.  Call your doctor or health care professional for advice if you get a fever, chills or sore throat, or other symptoms of a cold or flu. Do not treat yourself. This drug decreases your body's ability to fight infections. Try to avoid being around people who are sick.  This medicine may make the symptoms of heart failure worse in some patients. If you notice symptoms such as increased shortness of breath or swelling of the ankles or legs, contact your health care provider right away.  If you are going to have surgery or dental work, tell your health care professional or dentist that you have received this medicine.  If you take this medicine for plaque psoriasis, stay out of the  sun. If you cannot avoid being in the sun, wear protective clothing and use sunscreen. Do not use sun lamps or tanning beds/booths.  Talk to your doctor about your risk of cancer. You may be more at risk for certain types of cancers if you take this medicine.  What side effects may I notice from receiving this medicine?  Side effects that you should report to your doctor or health care professional as soon as possible:  -allergic reactions like skin rash, itching or hives, swelling of the face, lips, or tongue  -breathing problems  -changes in vision  -chest pain  -fever or chills, usually related to the infusion  -joint pain  -pain, tingling, numbness in the hands or feet  -redness, blistering, peeling or loosening of the skin, including inside the mouth  -seizures  -signs of infection - fever or chills, cough, sore throat, flu-like symptoms, pain or difficulty passing urine  -signs and symptoms of liver injury like dark yellow or brown urine; general ill feeling; light-colored stools; loss of appetite; nausea; right upper belly pain; unusually weak or tired; yellowing of the eyes or skin  -signs and symptoms of a stroke like changes in vision; confusion; trouble speaking or understanding; severe headaches; sudden numbness or weakness of the face, arm or leg; trouble walking; dizziness; loss of balance or coordination  -swelling of the ankles, feet, or hands  -swollen lymph nodes in the neck, underarm, or groin areas  -unusual bleeding or bruising  -unusually weak or tired  Side effects that usually do not require medical attention (report to your doctor or health care professional if they continue or are bothersome):  -headache  -nausea  -stomach pain  -upset stomach  This list may not describe all possible side effects. Call your doctor for medical advice about side effects. You may report side effects to FDA at 0-406-FDA-6001.  Where should I keep my medicine?  This drug is given in a hospital or clinic and will  not be stored at home.  NOTE: This sheet is a summary. It may not cover all possible information. If you have questions about this medicine, talk to your doctor, pharmacist, or health care provider.  © 2019 Elsevier/Gold Standard (2018-01-17 13:45:32)

## 2020-01-13 NOTE — NURSING NOTE
NURSING PROGRESS NOTE      0854  Pt to [facility] per w/c with friend.Pt ID verified by (2) identifiers. Allergies, medications and medical history reviewed and verified. Tolerated prescribed treatment without incident. Patient received AVS, Pt verbalized understanding.  Discharged to home per w/c  @ 1215. Condition Satisfactory .  Carmen Montanez RN

## 2020-01-14 ENCOUNTER — OFFICE VISIT (OUTPATIENT)
Dept: ORTHOPEDIC SURGERY | Facility: CLINIC | Age: 70
End: 2020-01-14

## 2020-01-14 DIAGNOSIS — Z96.611 STATUS POST REVERSE TOTAL ARTHROPLASTY OF RIGHT SHOULDER: Primary | ICD-10-CM

## 2020-01-14 PROCEDURE — 99024 POSTOP FOLLOW-UP VISIT: CPT | Performed by: NURSE PRACTITIONER

## 2020-01-14 NOTE — PROGRESS NOTES
Name: Mar Camilo  MRN: [id number]  Diagnosis: s/p right reverse TSA     Arthroplasty    Interval History: Mar Camilo returns for her 2 week postoperative visit.  She is doing well. Pain is controlled with pain medication and is  improving. She denies any wound problem, fevers, or chills.    Physical Examination: Her right shoulder  was examined out of her dressing. Incision is clean, dry and intact.  There is no active drainage, erythema, or evidence of infection. Distal  motor and sensory exam is grossly intact. Hand is well perfused.      Assessment/Plan:  Mar Camilo is recovering from surgery as expected.  We will send to therapy for work on ROM per protocol. Discontinue sling at 4 weeks postop. She is to follow up in clinic in 4 weeks with xrays. Patient verbalized understanding of all information and agrees with plan of care. Denies all other concerns present at this time.     DALE Razo

## 2020-01-24 ENCOUNTER — TRANSCRIBE ORDERS (OUTPATIENT)
Dept: ADMINISTRATIVE | Facility: HOSPITAL | Age: 70
End: 2020-01-24

## 2020-01-24 ENCOUNTER — OFFICE VISIT (OUTPATIENT)
Dept: FAMILY MEDICINE CLINIC | Facility: CLINIC | Age: 70
End: 2020-01-24

## 2020-01-24 ENCOUNTER — HOSPITAL ENCOUNTER (OUTPATIENT)
Dept: GENERAL RADIOLOGY | Facility: HOSPITAL | Age: 70
Discharge: HOME | End: 2020-01-24

## 2020-01-24 ENCOUNTER — HOSPITAL ENCOUNTER (OUTPATIENT)
Dept: NUCLEAR MEDICINE | Facility: HOSPITAL | Age: 70
Discharge: HOME | End: 2020-01-24

## 2020-01-24 ENCOUNTER — HOSPITAL ENCOUNTER (OUTPATIENT)
Dept: ULTRASOUND IMAGING | Facility: HOSPITAL | Age: 70
Discharge: HOME | End: 2020-01-24
Admitting: PHYSICIAN ASSISTANT

## 2020-01-24 VITALS
HEIGHT: 64 IN | OXYGEN SATURATION: 92 % | RESPIRATION RATE: 20 BRPM | WEIGHT: 225 LBS | HEART RATE: 110 BPM | TEMPERATURE: 98 F | DIASTOLIC BLOOD PRESSURE: 64 MMHG | SYSTOLIC BLOOD PRESSURE: 138 MMHG | BODY MASS INDEX: 38.41 KG/M2

## 2020-01-24 DIAGNOSIS — M79.604 PAIN IN BOTH LOWER EXTREMITIES: ICD-10-CM

## 2020-01-24 DIAGNOSIS — Z98.890 RECENT MAJOR SURGERY: ICD-10-CM

## 2020-01-24 DIAGNOSIS — M79.605 PAIN IN BOTH LOWER EXTREMITIES: ICD-10-CM

## 2020-01-24 DIAGNOSIS — R06.09 DYSPNEA ON EXERTION: Primary | ICD-10-CM

## 2020-01-24 DIAGNOSIS — J18.9 PNEUMONIA OF LEFT LOWER LOBE DUE TO INFECTIOUS ORGANISM: Primary | ICD-10-CM

## 2020-01-24 DIAGNOSIS — R06.09 DYSPNEA ON EXERTION: ICD-10-CM

## 2020-01-24 PROCEDURE — 71046 X-RAY EXAM CHEST 2 VIEWS: CPT

## 2020-01-24 PROCEDURE — 78582 LUNG VENTILAT&PERFUS IMAGING: CPT

## 2020-01-24 PROCEDURE — 99214 OFFICE O/P EST MOD 30 MIN: CPT | Performed by: PHYSICIAN ASSISTANT

## 2020-01-24 PROCEDURE — 0 TECHNETIUM TC 99M PENTETATE KIT: Performed by: PHYSICIAN ASSISTANT

## 2020-01-24 PROCEDURE — A9540 TC99M MAA: HCPCS | Performed by: PHYSICIAN ASSISTANT

## 2020-01-24 PROCEDURE — 93970 EXTREMITY STUDY: CPT

## 2020-01-24 PROCEDURE — 0 TECHNETIUM ALBUMIN AGGREGATED: Performed by: PHYSICIAN ASSISTANT

## 2020-01-24 PROCEDURE — A9539 TC99M PENTETATE: HCPCS | Performed by: PHYSICIAN ASSISTANT

## 2020-01-24 RX ORDER — AMOXICILLIN 500 MG/1
1000 CAPSULE ORAL 3 TIMES DAILY
Qty: 30 CAPSULE | Refills: 0 | Status: SHIPPED | OUTPATIENT
Start: 2020-01-24 | End: 2020-01-31 | Stop reason: HOSPADM

## 2020-01-24 RX ADMIN — KIT FOR THE PREPARATION OF TECHNETIUM TC 99M PENTETATE 1 DOSE: 20 INJECTION, POWDER, LYOPHILIZED, FOR SOLUTION INTRAVENOUS; RESPIRATORY (INHALATION) at 14:34

## 2020-01-24 RX ADMIN — Medication 1 DOSE: at 14:34

## 2020-01-24 NOTE — PROGRESS NOTES
Subjective   Mar Camilo is a 69 y.o. female presents for   Chief Complaint   Patient presents with   • Diarrhea   • Nausea       History of Present Illness     Patient is a 69-year-old female who presents with sister,Clau, at office visit today. She has been having nausea,diarrhea,cough without production,slight headaches,dyspnea on exertion and having chills for the past 3 days. Krys has been having diarrhea after she eats.   Denied vomiting,wheezing,coffee ground emesis,recent antibiotic usage.  Appetite and sleep are normal.  Currently using 3 L of oxygen in office today on her portable oxygen tank.  At home she uses 4 L of oxygen continuously.  Her pulmonologist is, Dr. Hernadez.  She has not had a follow-up appointment with him since last year.  Mar recently had right shoulder surgery on December 30, 2019 by Dr. Reyes at [facility].    The following portions of the patient's history were reviewed and updated as appropriate: allergies, current medications, past family history, past medical history, past social history, past surgical history and problem list.    Review of Systems   Constitutional: Positive for chills. Negative for fatigue and fever.   HENT: Negative.  Negative for congestion, ear discharge, ear pain, postnasal drip, rhinorrhea, sinus pressure, sinus pain, sneezing and sore throat.    Eyes: Negative.    Respiratory: Positive for cough and shortness of breath. Negative for chest tightness.    Cardiovascular: Negative.  Negative for chest pain, palpitations and leg swelling.   Gastrointestinal: Positive for diarrhea. Negative for abdominal pain, anal bleeding, blood in stool, constipation, nausea, rectal pain and vomiting.   Endocrine: Negative.    Genitourinary: Negative.    Musculoskeletal: Negative.    Skin: Negative.    Allergic/Immunologic: Negative.    Neurological: Negative.  Negative for dizziness, light-headedness and headaches.   Hematological: Negative.   "  Psychiatric/Behavioral: Negative.  Negative for sleep disturbance.   All other systems reviewed and are negative.        Vitals:    20 0747   BP: 138/64   Pulse: 110   Resp: 20   Temp: 98 °F (36.7 °C)   SpO2: 92%   Weight: 102 kg (225 lb)   Height: 162.6 cm (64\")     Wt Readings from Last 3 Encounters:   20 102 kg (225 lb)   20 105 kg (232 lb)   19 108 kg (237 lb 12.8 oz)     BP Readings from Last 3 Encounters:   20 138/64   20 142/68   20 143/59     Social History     Socioeconomic History   • Marital status:      Spouse name: Not on file   • Number of children: Not on file   • Years of education: Not on file   • Highest education level: Not on file   Tobacco Use   • Smoking status: Former Smoker     Packs/day: 2.00     Years: 40.00     Pack years: 80.00     Types: Cigarettes     Last attempt to quit: 2014     Years since quittin.8   • Smokeless tobacco: Never Used   • Tobacco comment: Caffeine use: 3 CUPS DAILY.    Substance and Sexual Activity   • Alcohol use: No     Comment: history of heavy drinker    • Drug use: No   • Sexual activity: Defer       Allergies   Allergen Reactions   • Contrast Dye Hives   • Iodinated Diagnostic Agents Hives   • Norco [Hydrocodone-Acetaminophen] Hallucinations   • Tenoretic [Atenolol-Chlorthalidone] Anaphylaxis       Body mass index is 38.62 kg/m².    Objective   Physical Exam   Constitutional: She is oriented to person, place, and time. Vital signs are normal. She appears well-developed and well-nourished.   On 3 L of oxygen on portable tank in office today   HENT:   Head: Normocephalic and atraumatic.   Right Ear: Hearing, tympanic membrane, external ear and ear canal normal.   Left Ear: Hearing, tympanic membrane, external ear and ear canal normal.   Nose: Nose normal. Right sinus exhibits no maxillary sinus tenderness and no frontal sinus tenderness. Left sinus exhibits no maxillary sinus tenderness and no frontal sinus " tenderness.   Mouth/Throat: Uvula is midline, oropharynx is clear and moist and mucous membranes are normal.   Eyes: Pupils are equal, round, and reactive to light. Conjunctivae, EOM and lids are normal.   Neck: Trachea normal and phonation normal. Neck supple. Normal carotid pulses, no hepatojugular reflux and no JVD present. No tracheal tenderness present. Carotid bruit is not present. No tracheal deviation and no edema present.   Cardiovascular: Normal rate, regular rhythm, S1 normal, S2 normal, normal heart sounds and normal pulses.   No murmur heard.  Pulmonary/Chest: Effort normal and breath sounds normal.   Abdominal: Soft. Normal appearance, normal aorta and bowel sounds are normal. There is no hepatomegaly. There is no tenderness.       Musculoskeletal:        Right lower leg: She exhibits tenderness. She exhibits no bony tenderness, no swelling, no edema, no deformity and no laceration.        Left lower leg: She exhibits tenderness. She exhibits no bony tenderness, no swelling, no edema, no deformity and no laceration.   Bilateral lower extremity: Negative Homans sign or palpable cord noted.   Lymphadenopathy:     She has no cervical adenopathy.   Neurological: She is alert and oriented to person, place, and time.   Skin: Skin is warm, dry and intact. Capillary refill takes less than 2 seconds.   Psychiatric: She has a normal mood and affect. Her speech is normal and behavior is normal. Judgment and thought content normal. Cognition and memory are normal.       Assessment/Plan   Mar was seen today for diarrhea and nausea.    Diagnoses and all orders for this visit:    Dyspnea on exertion  -     Cancel: CT angiogram chest w contrast; Future  -     US venous doppler lower extremity bilateral (duplex); Future  -     NM Lung Ventilation Perfusion; Future    Pain in both lower extremities  -     US venous doppler lower extremity bilateral (duplex); Future  -     NM Lung Ventilation Perfusion;  Future    Recent major surgery  -     Cancel: CT angiogram chest w contrast; Future  -     US venous doppler lower extremity bilateral (duplex); Future  -     NM Lung Ventilation Perfusion; Future    MsAmador Camilo was seen in office with her sister today.  Mar recently had right shoulder surgery on December 30, 2019.  She is having bilateral leg pain and dyspnea on exertion.  I am concerned she may have a DVT versus a PE.  Will have a stat VQ scan and venous Doppler of bilateral lower extremities for further evaluation.  Unable to do CT angiogram due to contrast allergies.  She will be notified of test results when completed.  I suspect the diarrhea may be viral in nature.  She will do a brat diet for now.    MARGARITA Narvaez [address] [address]  Dept: [phone]  Dept Fax: [phone]  Loc: [phone]  Loc Fax: [phone]           No visits with results within 2 Week(s) from this visit.   Latest known visit with results is:   Admission on 12/30/2019, Discharged on 01/01/2020   Component Date Value Ref Range Status   • Glucose 12/30/2019 174* 70 - 130 mg/dL Final   • Hemoglobin 12/30/2019 10.1* 12.0 - 15.9 g/dL Final   • Hematocrit 12/30/2019 34.2  34.0 - 46.6 % Final   • Glucose 12/30/2019 96  65 - 99 mg/dL Final   • BUN 12/30/2019 33* 8 - 23 mg/dL Final   • Creatinine 12/30/2019 1.12* 0.57 - 1.00 mg/dL Final   • Sodium 12/30/2019 139  136 - 145 mmol/L Final   • Potassium 12/30/2019 5.4* 3.5 - 5.2 mmol/L Final   • Chloride 12/30/2019 105  98 - 107 mmol/L Final   • CO2 12/30/2019 22.3  22.0 - 29.0 mmol/L Final   • Calcium 12/30/2019 8.2* 8.6 - 10.5 mg/dL Final   • Total Protein 12/30/2019 6.6  6.0 - 8.5 g/dL Final   • Albumin 12/30/2019 3.60  3.50 - 5.20 g/dL Final   • ALT (SGPT) 12/30/2019 17  1 - 33 U/L Final   • AST (SGOT) 12/30/2019 22  1 - 32 U/L Final   • Alkaline Phosphatase 12/30/2019 55  39  - 117 U/L Final   • Total Bilirubin 12/30/2019 0.2  0.2 - 1.2 mg/dL Final   • eGFR Non African Amer 12/30/2019 48* >60 mL/min/1.73 Final   • Globulin 12/30/2019 3.0  gm/dL Final   • A/G Ratio 12/30/2019 1.2  g/dL Final   • BUN/Creatinine Ratio 12/30/2019 29.5* 7.0 - 25.0 Final   • Anion Gap 12/30/2019 11.7  5.0 - 15.0 mmol/L Final   • WBC 12/30/2019 18.41* 3.40 - 10.80 10*3/mm3 Final   • RBC 12/30/2019 3.42* 3.77 - 5.28 10*6/mm3 Final   • Hemoglobin 12/30/2019 10.2* 12.0 - 15.9 g/dL Final   • Hematocrit 12/30/2019 34.4  34.0 - 46.6 % Final   • MCV 12/30/2019 100.6* 79.0 - 97.0 fL Final   • MCH 12/30/2019 29.8  26.6 - 33.0 pg Final   • MCHC 12/30/2019 29.7* 31.5 - 35.7 g/dL Final   • RDW 12/30/2019 14.4  12.3 - 15.4 % Final   • RDW-SD 12/30/2019 53.3  37.0 - 54.0 fl Final   • MPV 12/30/2019 10.0  6.0 - 12.0 fL Final   • Platelets 12/30/2019 356  140 - 450 10*3/mm3 Final   • Neutrophil % 12/30/2019 85.4* 42.7 - 76.0 % Final   • Lymphocyte % 12/30/2019 9.8* 19.6 - 45.3 % Final   • Monocyte % 12/30/2019 3.6* 5.0 - 12.0 % Final   • Eosinophil % 12/30/2019 0.1* 0.3 - 6.2 % Final   • Basophil % 12/30/2019 0.3  0.0 - 1.5 % Final   • Immature Grans % 12/30/2019 0.8* 0.0 - 0.5 % Final   • Neutrophils, Absolute 12/30/2019 15.71* 1.70 - 7.00 10*3/mm3 Final   • Lymphocytes, Absolute 12/30/2019 1.80  0.70 - 3.10 10*3/mm3 Final   • Monocytes, Absolute 12/30/2019 0.67  0.10 - 0.90 10*3/mm3 Final   • Eosinophils, Absolute 12/30/2019 0.02  0.00 - 0.40 10*3/mm3 Final   • Basophils, Absolute 12/30/2019 0.06  0.00 - 0.20 10*3/mm3 Final   • Immature Grans, Absolute 12/30/2019 0.15* 0.00 - 0.05 10*3/mm3 Final   • nRBC 12/30/2019 0.0  0.0 - 0.2 /100 WBC Final   • Glucose 12/31/2019 166* 65 - 99 mg/dL Final   • BUN 12/31/2019 24* 8 - 23 mg/dL Final   • Creatinine 12/31/2019 0.91  0.57 - 1.00 mg/dL Final   • Sodium 12/31/2019 135* 136 - 145 mmol/L Final   • Potassium 12/31/2019 4.6  3.5 - 5.2 mmol/L Final   • Chloride 12/31/2019 102  98 -  107 mmol/L Final   • CO2 12/31/2019 21.7* 22.0 - 29.0 mmol/L Final   • Calcium 12/31/2019 8.0* 8.6 - 10.5 mg/dL Final   • eGFR Non African Amer 12/31/2019 61  >60 mL/min/1.73 Final   • BUN/Creatinine Ratio 12/31/2019 26.4* 7.0 - 25.0 Final   • Anion Gap 12/31/2019 11.3  5.0 - 15.0 mmol/L Final   • WBC 12/31/2019 9.71  3.40 - 10.80 10*3/mm3 Final   • RBC 12/31/2019 3.27* 3.77 - 5.28 10*6/mm3 Final   • Hemoglobin 12/31/2019 9.5* 12.0 - 15.9 g/dL Final   • Hematocrit 12/31/2019 32.4* 34.0 - 46.6 % Final   • MCV 12/31/2019 99.1* 79.0 - 97.0 fL Final   • MCH 12/31/2019 29.1  26.6 - 33.0 pg Final   • MCHC 12/31/2019 29.3* 31.5 - 35.7 g/dL Final   • RDW 12/31/2019 14.4  12.3 - 15.4 % Final   • RDW-SD 12/31/2019 53.1  37.0 - 54.0 fl Final   • MPV 12/31/2019 9.0  6.0 - 12.0 fL Final   • Platelets 12/31/2019 247  140 - 450 10*3/mm3 Final   • Neutrophil % 12/31/2019 70.1  42.7 - 76.0 % Final   • Lymphocyte % 12/31/2019 19.4* 19.6 - 45.3 % Final   • Monocyte % 12/31/2019 9.2  5.0 - 12.0 % Final   • Eosinophil % 12/31/2019 0.3  0.3 - 6.2 % Final   • Basophil % 12/31/2019 0.4  0.0 - 1.5 % Final   • Immature Grans % 12/31/2019 0.6* 0.0 - 0.5 % Final   • Neutrophils, Absolute 12/31/2019 6.81  1.70 - 7.00 10*3/mm3 Final   • Lymphocytes, Absolute 12/31/2019 1.88  0.70 - 3.10 10*3/mm3 Final   • Monocytes, Absolute 12/31/2019 0.89  0.10 - 0.90 10*3/mm3 Final   • Eosinophils, Absolute 12/31/2019 0.03  0.00 - 0.40 10*3/mm3 Final   • Basophils, Absolute 12/31/2019 0.04  0.00 - 0.20 10*3/mm3 Final   • Immature Grans, Absolute 12/31/2019 0.06* 0.00 - 0.05 10*3/mm3 Final   • nRBC 12/31/2019 0.0  0.0 - 0.2 /100 WBC Final   • Glucose 12/31/2019 143* 70 - 130 mg/dL Final   • Glucose 12/31/2019 146* 70 - 130 mg/dL Final   • Glucose 12/31/2019 143* 70 - 130 mg/dL Final   • Glucose 01/01/2020 124* 65 - 99 mg/dL Final   • BUN 01/01/2020 12  8 - 23 mg/dL Final   • Creatinine 01/01/2020 0.73  0.57 - 1.00 mg/dL Final   • Sodium 01/01/2020 134* 136 -  145 mmol/L Final   • Potassium 01/01/2020 4.6  3.5 - 5.2 mmol/L Final   • Chloride 01/01/2020 100  98 - 107 mmol/L Final   • CO2 01/01/2020 24.8  22.0 - 29.0 mmol/L Final   • Calcium 01/01/2020 8.2* 8.6 - 10.5 mg/dL Final   • Total Protein 01/01/2020 6.3  6.0 - 8.5 g/dL Final   • Albumin 01/01/2020 3.20* 3.50 - 5.20 g/dL Final   • ALT (SGPT) 01/01/2020 13  1 - 33 U/L Final   • AST (SGOT) 01/01/2020 18  1 - 32 U/L Final   • Alkaline Phosphatase 01/01/2020 50  39 - 117 U/L Final   • Total Bilirubin 01/01/2020 0.3  0.2 - 1.2 mg/dL Final   • eGFR Non African Amer 01/01/2020 79  >60 mL/min/1.73 Final   • Globulin 01/01/2020 3.1  gm/dL Final   • A/G Ratio 01/01/2020 1.0  g/dL Final   • BUN/Creatinine Ratio 01/01/2020 16.4  7.0 - 25.0 Final   • Anion Gap 01/01/2020 9.2  5.0 - 15.0 mmol/L Final   • WBC 01/01/2020 8.33  3.40 - 10.80 10*3/mm3 Final   • RBC 01/01/2020 3.15* 3.77 - 5.28 10*6/mm3 Final   • Hemoglobin 01/01/2020 9.1* 12.0 - 15.9 g/dL Final   • Hematocrit 01/01/2020 31.1* 34.0 - 46.6 % Final   • MCV 01/01/2020 98.7* 79.0 - 97.0 fL Final   • MCH 01/01/2020 28.9  26.6 - 33.0 pg Final   • MCHC 01/01/2020 29.3* 31.5 - 35.7 g/dL Final   • RDW 01/01/2020 14.3  12.3 - 15.4 % Final   • RDW-SD 01/01/2020 51.9  37.0 - 54.0 fl Final   • MPV 01/01/2020 9.3  6.0 - 12.0 fL Final   • Platelets 01/01/2020 219  140 - 450 10*3/mm3 Final   • Neutrophil % 01/01/2020 64.6  42.7 - 76.0 % Final   • Lymphocyte % 01/01/2020 21.1  19.6 - 45.3 % Final   • Monocyte % 01/01/2020 12.6* 5.0 - 12.0 % Final   • Eosinophil % 01/01/2020 1.1  0.3 - 6.2 % Final   • Basophil % 01/01/2020 0.2  0.0 - 1.5 % Final   • Immature Grans % 01/01/2020 0.4  0.0 - 0.5 % Final   • Neutrophils, Absolute 01/01/2020 5.38  1.70 - 7.00 10*3/mm3 Final   • Lymphocytes, Absolute 01/01/2020 1.76  0.70 - 3.10 10*3/mm3 Final   • Monocytes, Absolute 01/01/2020 1.05* 0.10 - 0.90 10*3/mm3 Final   • Eosinophils, Absolute 01/01/2020 0.09  0.00 - 0.40 10*3/mm3 Final   • Basophils,  Absolute 01/01/2020 0.02  0.00 - 0.20 10*3/mm3 Final   • Immature Grans, Absolute 01/01/2020 0.03  0.00 - 0.05 10*3/mm3 Final   • nRBC 01/01/2020 0.0  0.0 - 0.2 /100 WBC Final   • Glucose 01/01/2020 145* 70 - 130 mg/dL Final

## 2020-01-25 PROBLEM — Z98.890 RECENT MAJOR SURGERY: Status: ACTIVE | Noted: 2020-01-25

## 2020-01-25 PROBLEM — M79.604 PAIN IN BOTH LOWER EXTREMITIES: Status: ACTIVE | Noted: 2020-01-25

## 2020-01-25 PROBLEM — R06.09 DYSPNEA ON EXERTION: Status: ACTIVE | Noted: 2020-01-25

## 2020-01-25 PROBLEM — M79.605 PAIN IN BOTH LOWER EXTREMITIES: Status: ACTIVE | Noted: 2020-01-25

## 2020-01-26 PROCEDURE — 96374 THER/PROPH/DIAG INJ IV PUSH: CPT

## 2020-01-29 ENCOUNTER — TELEPHONE (OUTPATIENT)
Dept: FAMILY MEDICINE CLINIC | Facility: CLINIC | Age: 70
End: 2020-01-29

## 2020-01-29 ENCOUNTER — APPOINTMENT (OUTPATIENT)
Dept: GENERAL RADIOLOGY | Facility: HOSPITAL | Age: 70
End: 2020-01-29

## 2020-01-29 ENCOUNTER — HOSPITAL ENCOUNTER (OUTPATIENT)
Facility: HOSPITAL | Age: 70
Setting detail: OBSERVATION
Discharge: HOME | End: 2020-01-31
Attending: EMERGENCY MEDICINE | Admitting: HOSPITALIST

## 2020-01-29 DIAGNOSIS — J44.1 COPD EXACERBATION (HCC): Primary | ICD-10-CM

## 2020-01-29 DIAGNOSIS — J96.21 ACUTE ON CHRONIC RESPIRATORY FAILURE WITH HYPOXIA AND HYPERCAPNIA (HCC): ICD-10-CM

## 2020-01-29 DIAGNOSIS — J96.22 ACUTE ON CHRONIC RESPIRATORY FAILURE WITH HYPOXIA AND HYPERCAPNIA (HCC): ICD-10-CM

## 2020-01-29 LAB
ALBUMIN SERPL-MCNC: 3.9 G/DL (ref 3.5–5.2)
ALBUMIN/GLOB SERPL: 1 G/DL
ALP SERPL-CCNC: 97 U/L (ref 39–117)
ALT SERPL W P-5'-P-CCNC: 12 U/L (ref 1–33)
ANION GAP SERPL CALCULATED.3IONS-SCNC: 15.2 MMOL/L (ref 5–15)
APTT PPP: 29.7 SECONDS (ref 24.3–38.1)
AST SERPL-CCNC: 18 U/L (ref 1–32)
B PARAPERT DNA SPEC QL NAA+PROBE: NOT DETECTED
B PERT DNA SPEC QL NAA+PROBE: NOT DETECTED
BASOPHILS # BLD AUTO: 0.05 10*3/MM3 (ref 0–0.2)
BASOPHILS NFR BLD AUTO: 0.4 % (ref 0–1.5)
BILIRUB SERPL-MCNC: 0.2 MG/DL (ref 0.2–1.2)
BUN BLD-MCNC: 19 MG/DL (ref 8–23)
BUN/CREAT SERPL: 19.8 (ref 7–25)
C PNEUM DNA NPH QL NAA+NON-PROBE: NOT DETECTED
CALCIUM SPEC-SCNC: 9.5 MG/DL (ref 8.6–10.5)
CHLORIDE SERPL-SCNC: 101 MMOL/L (ref 98–107)
CO2 SERPL-SCNC: 19.8 MMOL/L (ref 22–29)
CREAT BLD-MCNC: 0.96 MG/DL (ref 0.57–1)
DEPRECATED RDW RBC AUTO: 52.2 FL (ref 37–54)
EOSINOPHIL # BLD AUTO: 0.17 10*3/MM3 (ref 0–0.4)
EOSINOPHIL NFR BLD AUTO: 1.4 % (ref 0.3–6.2)
ERYTHROCYTE [DISTWIDTH] IN BLOOD BY AUTOMATED COUNT: 14.5 % (ref 12.3–15.4)
FLUAV H1 2009 PAND RNA NPH QL NAA+PROBE: NOT DETECTED
FLUAV H1 HA GENE NPH QL NAA+PROBE: NOT DETECTED
FLUAV H3 RNA NPH QL NAA+PROBE: NOT DETECTED
FLUAV SUBTYP SPEC NAA+PROBE: NOT DETECTED
FLUBV RNA ISLT QL NAA+PROBE: NOT DETECTED
GFR SERPL CREATININE-BSD FRML MDRD: 57 ML/MIN/1.73
GLOBULIN UR ELPH-MCNC: 4.1 GM/DL
GLUCOSE BLD-MCNC: 125 MG/DL (ref 65–99)
GLUCOSE BLDC GLUCOMTR-MCNC: 125 MG/DL (ref 70–130)
GLUCOSE BLDC GLUCOMTR-MCNC: 235 MG/DL (ref 70–130)
HADV DNA SPEC NAA+PROBE: NOT DETECTED
HBA1C MFR BLD: 6.1 % (ref 4.8–5.6)
HCOV 229E RNA SPEC QL NAA+PROBE: NOT DETECTED
HCOV HKU1 RNA SPEC QL NAA+PROBE: NOT DETECTED
HCOV NL63 RNA SPEC QL NAA+PROBE: NOT DETECTED
HCOV OC43 RNA SPEC QL NAA+PROBE: NOT DETECTED
HCT VFR BLD AUTO: 43.6 % (ref 34–46.6)
HGB BLD-MCNC: 12.5 G/DL (ref 12–15.9)
HMPV RNA NPH QL NAA+NON-PROBE: NOT DETECTED
HPIV1 RNA SPEC QL NAA+PROBE: NOT DETECTED
HPIV2 RNA SPEC QL NAA+PROBE: NOT DETECTED
HPIV3 RNA NPH QL NAA+PROBE: NOT DETECTED
HPIV4 P GENE NPH QL NAA+PROBE: NOT DETECTED
IMM GRANULOCYTES # BLD AUTO: 0.07 10*3/MM3 (ref 0–0.05)
IMM GRANULOCYTES NFR BLD AUTO: 0.6 % (ref 0–0.5)
INR PPP: 1 (ref 0.9–1.1)
LYMPHOCYTES # BLD AUTO: 3.16 10*3/MM3 (ref 0.7–3.1)
LYMPHOCYTES NFR BLD AUTO: 25.7 % (ref 19.6–45.3)
M PNEUMO IGG SER IA-ACNC: NOT DETECTED
MCH RBC QN AUTO: 27.5 PG (ref 26.6–33)
MCHC RBC AUTO-ENTMCNC: 28.7 G/DL (ref 31.5–35.7)
MCV RBC AUTO: 96 FL (ref 79–97)
MONOCYTES # BLD AUTO: 0.82 10*3/MM3 (ref 0.1–0.9)
MONOCYTES NFR BLD AUTO: 6.7 % (ref 5–12)
NEUTROPHILS # BLD AUTO: 8.02 10*3/MM3 (ref 1.7–7)
NEUTROPHILS NFR BLD AUTO: 65.2 % (ref 42.7–76)
NT-PROBNP SERPL-MCNC: 177.8 PG/ML (ref 5–900)
PLATELET # BLD AUTO: 446 10*3/MM3 (ref 140–450)
PMV BLD AUTO: 9.3 FL (ref 6–12)
POTASSIUM BLD-SCNC: 4.8 MMOL/L (ref 3.5–5.2)
PROCALCITONIN SERPL-MCNC: 0.09 NG/ML (ref 0.1–0.25)
PROCALCITONIN SERPL-MCNC: 0.1 NG/ML (ref 0.1–0.25)
PROT SERPL-MCNC: 8 G/DL (ref 6–8.5)
PROTHROMBIN TIME: 12.9 SECONDS (ref 12.1–15)
RBC # BLD AUTO: 4.54 10*6/MM3 (ref 3.77–5.28)
RHINOVIRUS RNA SPEC NAA+PROBE: NOT DETECTED
RSV RNA NPH QL NAA+NON-PROBE: NOT DETECTED
SODIUM BLD-SCNC: 136 MMOL/L (ref 136–145)
TROPONIN T SERPL-MCNC: <0.01 NG/ML (ref 0–0.03)
TSH SERPL DL<=0.05 MIU/L-ACNC: 1.49 UIU/ML (ref 0.27–4.2)
WBC NRBC COR # BLD: 12.29 10*3/MM3 (ref 3.4–10.8)

## 2020-01-29 PROCEDURE — 84145 PROCALCITONIN (PCT): CPT | Performed by: HOSPITALIST

## 2020-01-29 PROCEDURE — 83036 HEMOGLOBIN GLYCOSYLATED A1C: CPT | Performed by: INTERNAL MEDICINE

## 2020-01-29 PROCEDURE — 83880 ASSAY OF NATRIURETIC PEPTIDE: CPT | Performed by: PHYSICIAN ASSISTANT

## 2020-01-29 PROCEDURE — 71045 X-RAY EXAM CHEST 1 VIEW: CPT

## 2020-01-29 PROCEDURE — 99284 EMERGENCY DEPT VISIT MOD MDM: CPT | Performed by: PHYSICIAN ASSISTANT

## 2020-01-29 PROCEDURE — 94640 AIRWAY INHALATION TREATMENT: CPT

## 2020-01-29 PROCEDURE — 0100U HC BIOFIRE FILMARRAY RESP PANEL 2: CPT | Performed by: PHYSICIAN ASSISTANT

## 2020-01-29 PROCEDURE — 94799 UNLISTED PULMONARY SVC/PX: CPT

## 2020-01-29 PROCEDURE — G0378 HOSPITAL OBSERVATION PER HR: HCPCS

## 2020-01-29 PROCEDURE — 82962 GLUCOSE BLOOD TEST: CPT

## 2020-01-29 PROCEDURE — 96374 THER/PROPH/DIAG INJ IV PUSH: CPT

## 2020-01-29 PROCEDURE — 84484 ASSAY OF TROPONIN QUANT: CPT | Performed by: PHYSICIAN ASSISTANT

## 2020-01-29 PROCEDURE — 99284 EMERGENCY DEPT VISIT MOD MDM: CPT

## 2020-01-29 PROCEDURE — 25010000002 METHYLPREDNISOLONE PER 125 MG: Performed by: PHYSICIAN ASSISTANT

## 2020-01-29 PROCEDURE — 93005 ELECTROCARDIOGRAM TRACING: CPT | Performed by: PHYSICIAN ASSISTANT

## 2020-01-29 PROCEDURE — 85610 PROTHROMBIN TIME: CPT | Performed by: PHYSICIAN ASSISTANT

## 2020-01-29 PROCEDURE — 84443 ASSAY THYROID STIM HORMONE: CPT | Performed by: INTERNAL MEDICINE

## 2020-01-29 PROCEDURE — 85025 COMPLETE CBC W/AUTO DIFF WBC: CPT | Performed by: PHYSICIAN ASSISTANT

## 2020-01-29 PROCEDURE — 99219 PR INITIAL OBSERVATION CARE/DAY 50 MINUTES: CPT | Performed by: HOSPITALIST

## 2020-01-29 PROCEDURE — 80053 COMPREHEN METABOLIC PANEL: CPT | Performed by: PHYSICIAN ASSISTANT

## 2020-01-29 PROCEDURE — 84145 PROCALCITONIN (PCT): CPT | Performed by: PHYSICIAN ASSISTANT

## 2020-01-29 PROCEDURE — 93010 ELECTROCARDIOGRAM REPORT: CPT | Performed by: INTERNAL MEDICINE

## 2020-01-29 PROCEDURE — 85730 THROMBOPLASTIN TIME PARTIAL: CPT | Performed by: PHYSICIAN ASSISTANT

## 2020-01-29 RX ORDER — ACETAMINOPHEN 160 MG/5ML
650 SOLUTION ORAL EVERY 4 HOURS PRN
Status: DISCONTINUED | OUTPATIENT
Start: 2020-01-29 | End: 2020-01-31 | Stop reason: HOSPADM

## 2020-01-29 RX ORDER — ALBUTEROL SULFATE 2.5 MG/3ML
2.5 SOLUTION RESPIRATORY (INHALATION) EVERY 4 HOURS PRN
Status: DISCONTINUED | OUTPATIENT
Start: 2020-01-29 | End: 2020-01-31 | Stop reason: HOSPADM

## 2020-01-29 RX ORDER — FLUOXETINE HYDROCHLORIDE 20 MG/1
40 CAPSULE ORAL DAILY
Status: DISCONTINUED | OUTPATIENT
Start: 2020-01-29 | End: 2020-01-31 | Stop reason: HOSPADM

## 2020-01-29 RX ORDER — SODIUM CHLORIDE 0.9 % (FLUSH) 0.9 %
10 SYRINGE (ML) INJECTION EVERY 12 HOURS SCHEDULED
Status: DISCONTINUED | OUTPATIENT
Start: 2020-01-29 | End: 2020-01-31 | Stop reason: HOSPADM

## 2020-01-29 RX ORDER — VERAPAMIL HYDROCHLORIDE 240 MG/1
240 TABLET, FILM COATED, EXTENDED RELEASE ORAL EVERY 12 HOURS
Status: DISCONTINUED | OUTPATIENT
Start: 2020-01-29 | End: 2020-01-31 | Stop reason: HOSPADM

## 2020-01-29 RX ORDER — SODIUM CHLORIDE 0.9 % (FLUSH) 0.9 %
10 SYRINGE (ML) INJECTION AS NEEDED
Status: DISCONTINUED | OUTPATIENT
Start: 2020-01-29 | End: 2020-01-31 | Stop reason: HOSPADM

## 2020-01-29 RX ORDER — GABAPENTIN 300 MG/1
300 CAPSULE ORAL 2 TIMES DAILY
Status: DISCONTINUED | OUTPATIENT
Start: 2020-01-29 | End: 2020-01-31 | Stop reason: HOSPADM

## 2020-01-29 RX ORDER — IPRATROPIUM BROMIDE AND ALBUTEROL SULFATE 2.5; .5 MG/3ML; MG/3ML
3 SOLUTION RESPIRATORY (INHALATION) EVERY 4 HOURS PRN
Status: DISCONTINUED | OUTPATIENT
Start: 2020-01-29 | End: 2020-01-31 | Stop reason: HOSPADM

## 2020-01-29 RX ORDER — METHYLPREDNISOLONE SODIUM SUCCINATE 125 MG/2ML
125 INJECTION, POWDER, LYOPHILIZED, FOR SOLUTION INTRAMUSCULAR; INTRAVENOUS ONCE
Status: COMPLETED | OUTPATIENT
Start: 2020-01-29 | End: 2020-01-29

## 2020-01-29 RX ORDER — ASPIRIN 81 MG/1
81 TABLET ORAL DAILY
Status: DISCONTINUED | OUTPATIENT
Start: 2020-01-30 | End: 2020-01-31 | Stop reason: HOSPADM

## 2020-01-29 RX ORDER — ACETAMINOPHEN 325 MG/1
650 TABLET ORAL EVERY 4 HOURS PRN
Status: DISCONTINUED | OUTPATIENT
Start: 2020-01-29 | End: 2020-01-31 | Stop reason: HOSPADM

## 2020-01-29 RX ORDER — NICOTINE POLACRILEX 4 MG
15 LOZENGE BUCCAL
Status: DISCONTINUED | OUTPATIENT
Start: 2020-01-29 | End: 2020-01-31 | Stop reason: HOSPADM

## 2020-01-29 RX ORDER — AMOXICILLIN 250 MG
2 CAPSULE ORAL NIGHTLY PRN
Status: DISCONTINUED | OUTPATIENT
Start: 2020-01-29 | End: 2020-01-31 | Stop reason: HOSPADM

## 2020-01-29 RX ORDER — BUDESONIDE AND FORMOTEROL FUMARATE DIHYDRATE 80; 4.5 UG/1; UG/1
2 AEROSOL RESPIRATORY (INHALATION)
Status: DISCONTINUED | OUTPATIENT
Start: 2020-01-29 | End: 2020-01-31 | Stop reason: HOSPADM

## 2020-01-29 RX ORDER — IPRATROPIUM BROMIDE AND ALBUTEROL SULFATE 2.5; .5 MG/3ML; MG/3ML
3 SOLUTION RESPIRATORY (INHALATION)
Status: DISCONTINUED | OUTPATIENT
Start: 2020-01-29 | End: 2020-01-30

## 2020-01-29 RX ORDER — LISINOPRIL 20 MG/1
20 TABLET ORAL DAILY
Status: DISCONTINUED | OUTPATIENT
Start: 2020-01-29 | End: 2020-01-31 | Stop reason: HOSPADM

## 2020-01-29 RX ORDER — LAMOTRIGINE 100 MG/1
150 TABLET ORAL 2 TIMES DAILY
Status: DISCONTINUED | OUTPATIENT
Start: 2020-01-29 | End: 2020-01-31 | Stop reason: HOSPADM

## 2020-01-29 RX ORDER — ACETAMINOPHEN 650 MG/1
650 SUPPOSITORY RECTAL EVERY 4 HOURS PRN
Status: DISCONTINUED | OUTPATIENT
Start: 2020-01-29 | End: 2020-01-31 | Stop reason: HOSPADM

## 2020-01-29 RX ORDER — DEXTROSE MONOHYDRATE 25 G/50ML
25 INJECTION, SOLUTION INTRAVENOUS
Status: DISCONTINUED | OUTPATIENT
Start: 2020-01-29 | End: 2020-01-31 | Stop reason: HOSPADM

## 2020-01-29 RX ORDER — GUAIFENESIN 600 MG/1
1200 TABLET, EXTENDED RELEASE ORAL EVERY 12 HOURS SCHEDULED
Status: DISCONTINUED | OUTPATIENT
Start: 2020-01-29 | End: 2020-01-31 | Stop reason: HOSPADM

## 2020-01-29 RX ORDER — ROSUVASTATIN CALCIUM 20 MG/1
40 TABLET, COATED ORAL DAILY
Status: DISCONTINUED | OUTPATIENT
Start: 2020-01-29 | End: 2020-01-31 | Stop reason: HOSPADM

## 2020-01-29 RX ORDER — METHYLPREDNISOLONE SODIUM SUCCINATE 125 MG/2ML
60 INJECTION, POWDER, LYOPHILIZED, FOR SOLUTION INTRAMUSCULAR; INTRAVENOUS EVERY 12 HOURS
Status: DISCONTINUED | OUTPATIENT
Start: 2020-01-29 | End: 2020-01-31 | Stop reason: HOSPADM

## 2020-01-29 RX ORDER — CALCIUM CARBONATE 200(500)MG
1 TABLET,CHEWABLE ORAL 2 TIMES DAILY PRN
Status: DISCONTINUED | OUTPATIENT
Start: 2020-01-29 | End: 2020-01-31 | Stop reason: HOSPADM

## 2020-01-29 RX ORDER — CHOLECALCIFEROL (VITAMIN D3) 125 MCG
5 CAPSULE ORAL NIGHTLY PRN
Status: DISCONTINUED | OUTPATIENT
Start: 2020-01-29 | End: 2020-01-31 | Stop reason: HOSPADM

## 2020-01-29 RX ORDER — ONDANSETRON 2 MG/ML
4 INJECTION INTRAMUSCULAR; INTRAVENOUS EVERY 6 HOURS PRN
Status: DISCONTINUED | OUTPATIENT
Start: 2020-01-29 | End: 2020-01-31 | Stop reason: HOSPADM

## 2020-01-29 RX ORDER — SODIUM CHLORIDE 9 MG/ML
40 INJECTION, SOLUTION INTRAVENOUS AS NEEDED
Status: DISCONTINUED | OUTPATIENT
Start: 2020-01-29 | End: 2020-01-31 | Stop reason: HOSPADM

## 2020-01-29 RX ORDER — IPRATROPIUM BROMIDE AND ALBUTEROL SULFATE 2.5; .5 MG/3ML; MG/3ML
3 SOLUTION RESPIRATORY (INHALATION) ONCE
Status: COMPLETED | OUTPATIENT
Start: 2020-01-29 | End: 2020-01-29

## 2020-01-29 RX ORDER — BUDESONIDE AND FORMOTEROL FUMARATE DIHYDRATE 80; 4.5 UG/1; UG/1
2 AEROSOL RESPIRATORY (INHALATION)
Status: DISCONTINUED | OUTPATIENT
Start: 2020-01-29 | End: 2020-01-29 | Stop reason: SDUPTHER

## 2020-01-29 RX ORDER — ONDANSETRON 4 MG/1
4 TABLET, FILM COATED ORAL EVERY 6 HOURS PRN
Status: DISCONTINUED | OUTPATIENT
Start: 2020-01-29 | End: 2020-01-31 | Stop reason: HOSPADM

## 2020-01-29 RX ADMIN — BUDESONIDE AND FORMOTEROL FUMARATE DIHYDRATE 2 PUFF: 80; 4.5 AEROSOL RESPIRATORY (INHALATION) at 21:00

## 2020-01-29 RX ADMIN — GABAPENTIN 300 MG: 300 CAPSULE ORAL at 20:35

## 2020-01-29 RX ADMIN — SODIUM CHLORIDE, PRESERVATIVE FREE 10 ML: 5 INJECTION INTRAVENOUS at 20:11

## 2020-01-29 RX ADMIN — LAMOTRIGINE 150 MG: 100 TABLET ORAL at 20:36

## 2020-01-29 RX ADMIN — IPRATROPIUM BROMIDE AND ALBUTEROL SULFATE 3 ML: .5; 3 SOLUTION RESPIRATORY (INHALATION) at 21:00

## 2020-01-29 RX ADMIN — METHYLPREDNISOLONE SODIUM SUCCINATE 125 MG: 125 INJECTION, POWDER, FOR SOLUTION INTRAMUSCULAR; INTRAVENOUS at 13:56

## 2020-01-29 RX ADMIN — ACETAMINOPHEN 650 MG: 325 TABLET, FILM COATED ORAL at 20:53

## 2020-01-29 RX ADMIN — IPRATROPIUM BROMIDE AND ALBUTEROL SULFATE 3 ML: .5; 3 SOLUTION RESPIRATORY (INHALATION) at 13:37

## 2020-01-29 RX ADMIN — VERAPAMIL HYDROCHLORIDE 240 MG: 240 TABLET, FILM COATED, EXTENDED RELEASE ORAL at 20:35

## 2020-01-29 NOTE — TELEPHONE ENCOUNTER
"Sister calling, states medication given for \"bug\" has helped, but mar is still struggling with breathing.    States she has to use her inhailer 2-3 times just to go to bathroom.   02 is 85%    Clau stated Mar has requested to go to hospital  "

## 2020-01-30 LAB
ANION GAP SERPL CALCULATED.3IONS-SCNC: 12.6 MMOL/L (ref 5–15)
BASOPHILS # BLD AUTO: 0.01 10*3/MM3 (ref 0–0.2)
BASOPHILS NFR BLD AUTO: 0.1 % (ref 0–1.5)
BUN BLD-MCNC: 26 MG/DL (ref 8–23)
BUN/CREAT SERPL: 31.7 (ref 7–25)
CALCIUM SPEC-SCNC: 9 MG/DL (ref 8.6–10.5)
CHLORIDE SERPL-SCNC: 102 MMOL/L (ref 98–107)
CO2 SERPL-SCNC: 19.4 MMOL/L (ref 22–29)
CREAT BLD-MCNC: 0.82 MG/DL (ref 0.57–1)
D DIMER PPP FEU-MCNC: 1.06 MCGFEU/ML (ref 0–0.46)
DEPRECATED RDW RBC AUTO: 49.9 FL (ref 37–54)
EOSINOPHIL # BLD AUTO: 0 10*3/MM3 (ref 0–0.4)
EOSINOPHIL NFR BLD AUTO: 0 % (ref 0.3–6.2)
ERYTHROCYTE [DISTWIDTH] IN BLOOD BY AUTOMATED COUNT: 14.4 % (ref 12.3–15.4)
GFR SERPL CREATININE-BSD FRML MDRD: 69 ML/MIN/1.73
GLUCOSE BLD-MCNC: 236 MG/DL (ref 65–99)
GLUCOSE BLDC GLUCOMTR-MCNC: 208 MG/DL (ref 70–130)
GLUCOSE BLDC GLUCOMTR-MCNC: 305 MG/DL (ref 70–130)
GLUCOSE BLDC GLUCOMTR-MCNC: 315 MG/DL (ref 70–130)
GLUCOSE BLDC GLUCOMTR-MCNC: 323 MG/DL (ref 70–130)
HCT VFR BLD AUTO: 38.8 % (ref 34–46.6)
HGB BLD-MCNC: 11.6 G/DL (ref 12–15.9)
IMM GRANULOCYTES # BLD AUTO: 0.05 10*3/MM3 (ref 0–0.05)
IMM GRANULOCYTES NFR BLD AUTO: 0.5 % (ref 0–0.5)
LYMPHOCYTES # BLD AUTO: 1.05 10*3/MM3 (ref 0.7–3.1)
LYMPHOCYTES NFR BLD AUTO: 10.3 % (ref 19.6–45.3)
MCH RBC QN AUTO: 28.2 PG (ref 26.6–33)
MCHC RBC AUTO-ENTMCNC: 29.9 G/DL (ref 31.5–35.7)
MCV RBC AUTO: 94.4 FL (ref 79–97)
MONOCYTES # BLD AUTO: 0.13 10*3/MM3 (ref 0.1–0.9)
MONOCYTES NFR BLD AUTO: 1.3 % (ref 5–12)
NEUTROPHILS # BLD AUTO: 8.93 10*3/MM3 (ref 1.7–7)
NEUTROPHILS NFR BLD AUTO: 87.8 % (ref 42.7–76)
NRBC BLD AUTO-RTO: 0 /100 WBC (ref 0–0.2)
PLATELET # BLD AUTO: 339 10*3/MM3 (ref 140–450)
PMV BLD AUTO: 9.2 FL (ref 6–12)
POTASSIUM BLD-SCNC: 5.2 MMOL/L (ref 3.5–5.2)
RBC # BLD AUTO: 4.11 10*6/MM3 (ref 3.77–5.28)
SODIUM BLD-SCNC: 134 MMOL/L (ref 136–145)
TROPONIN T SERPL-MCNC: <0.01 NG/ML (ref 0–0.03)
WBC NRBC COR # BLD: 10.17 10*3/MM3 (ref 3.4–10.8)

## 2020-01-30 PROCEDURE — 94799 UNLISTED PULMONARY SVC/PX: CPT

## 2020-01-30 PROCEDURE — 80048 BASIC METABOLIC PNL TOTAL CA: CPT | Performed by: INTERNAL MEDICINE

## 2020-01-30 PROCEDURE — 85025 COMPLETE CBC W/AUTO DIFF WBC: CPT | Performed by: INTERNAL MEDICINE

## 2020-01-30 PROCEDURE — 25010000002 ENOXAPARIN PER 10 MG: Performed by: INTERNAL MEDICINE

## 2020-01-30 PROCEDURE — 85379 FIBRIN DEGRADATION QUANT: CPT | Performed by: NURSE PRACTITIONER

## 2020-01-30 PROCEDURE — 84484 ASSAY OF TROPONIN QUANT: CPT | Performed by: HOSPITALIST

## 2020-01-30 PROCEDURE — 94762 N-INVAS EAR/PLS OXIMTRY CONT: CPT

## 2020-01-30 PROCEDURE — 96376 TX/PRO/DX INJ SAME DRUG ADON: CPT

## 2020-01-30 PROCEDURE — 99225 PR SBSQ OBSERVATION CARE/DAY 25 MINUTES: CPT | Performed by: NURSE PRACTITIONER

## 2020-01-30 PROCEDURE — 82962 GLUCOSE BLOOD TEST: CPT

## 2020-01-30 PROCEDURE — 25010000002 METHYLPREDNISOLONE PER 125 MG: Performed by: INTERNAL MEDICINE

## 2020-01-30 PROCEDURE — 96372 THER/PROPH/DIAG INJ SC/IM: CPT

## 2020-01-30 PROCEDURE — G0378 HOSPITAL OBSERVATION PER HR: HCPCS

## 2020-01-30 RX ORDER — IPRATROPIUM BROMIDE AND ALBUTEROL SULFATE 2.5; .5 MG/3ML; MG/3ML
3 SOLUTION RESPIRATORY (INHALATION)
Status: DISCONTINUED | OUTPATIENT
Start: 2020-01-30 | End: 2020-01-31 | Stop reason: HOSPADM

## 2020-01-30 RX ADMIN — ENOXAPARIN SODIUM 40 MG: 40 INJECTION SUBCUTANEOUS at 13:53

## 2020-01-30 RX ADMIN — GUAIFENESIN 1200 MG: 600 TABLET, EXTENDED RELEASE ORAL at 09:03

## 2020-01-30 RX ADMIN — FLUOXETINE 40 MG: 20 CAPSULE ORAL at 09:03

## 2020-01-30 RX ADMIN — VERAPAMIL HYDROCHLORIDE 240 MG: 240 TABLET, FILM COATED, EXTENDED RELEASE ORAL at 17:33

## 2020-01-30 RX ADMIN — GABAPENTIN 300 MG: 300 CAPSULE ORAL at 09:03

## 2020-01-30 RX ADMIN — VERAPAMIL HYDROCHLORIDE 240 MG: 240 TABLET, FILM COATED, EXTENDED RELEASE ORAL at 06:22

## 2020-01-30 RX ADMIN — BUDESONIDE AND FORMOTEROL FUMARATE DIHYDRATE 2 PUFF: 80; 4.5 AEROSOL RESPIRATORY (INHALATION) at 07:25

## 2020-01-30 RX ADMIN — LAMOTRIGINE 150 MG: 100 TABLET ORAL at 09:13

## 2020-01-30 RX ADMIN — METHYLPREDNISOLONE SODIUM SUCCINATE 60 MG: 125 INJECTION, POWDER, FOR SOLUTION INTRAMUSCULAR; INTRAVENOUS at 17:33

## 2020-01-30 RX ADMIN — BUDESONIDE AND FORMOTEROL FUMARATE DIHYDRATE 2 PUFF: 80; 4.5 AEROSOL RESPIRATORY (INHALATION) at 20:34

## 2020-01-30 RX ADMIN — ACETAMINOPHEN 650 MG: 325 TABLET, FILM COATED ORAL at 20:51

## 2020-01-30 RX ADMIN — GUAIFENESIN 1200 MG: 600 TABLET, EXTENDED RELEASE ORAL at 20:44

## 2020-01-30 RX ADMIN — IPRATROPIUM BROMIDE AND ALBUTEROL SULFATE 3 ML: .5; 3 SOLUTION RESPIRATORY (INHALATION) at 11:35

## 2020-01-30 RX ADMIN — IPRATROPIUM BROMIDE AND ALBUTEROL SULFATE 3 ML: .5; 3 SOLUTION RESPIRATORY (INHALATION) at 20:35

## 2020-01-30 RX ADMIN — IPRATROPIUM BROMIDE AND ALBUTEROL SULFATE 3 ML: .5; 3 SOLUTION RESPIRATORY (INHALATION) at 00:01

## 2020-01-30 RX ADMIN — GABAPENTIN 300 MG: 300 CAPSULE ORAL at 20:44

## 2020-01-30 RX ADMIN — ROSUVASTATIN 40 MG: 20 TABLET, FILM COATED ORAL at 09:13

## 2020-01-30 RX ADMIN — SODIUM CHLORIDE, PRESERVATIVE FREE 10 ML: 5 INJECTION INTRAVENOUS at 20:44

## 2020-01-30 RX ADMIN — LAMOTRIGINE 150 MG: 100 TABLET ORAL at 20:52

## 2020-01-30 RX ADMIN — IPRATROPIUM BROMIDE AND ALBUTEROL SULFATE 3 ML: .5; 3 SOLUTION RESPIRATORY (INHALATION) at 15:41

## 2020-01-30 RX ADMIN — METHYLPREDNISOLONE SODIUM SUCCINATE 60 MG: 125 INJECTION, POWDER, FOR SOLUTION INTRAMUSCULAR; INTRAVENOUS at 06:20

## 2020-01-30 RX ADMIN — LISINOPRIL 20 MG: 20 TABLET ORAL at 09:13

## 2020-01-30 RX ADMIN — ASPIRIN 81 MG: 81 TABLET, COATED ORAL at 09:13

## 2020-01-30 RX ADMIN — IPRATROPIUM BROMIDE AND ALBUTEROL SULFATE 3 ML: .5; 3 SOLUTION RESPIRATORY (INHALATION) at 07:24

## 2020-01-30 RX ADMIN — ACETAMINOPHEN 650 MG: 325 TABLET, FILM COATED ORAL at 13:05

## 2020-01-30 RX ADMIN — ACETAMINOPHEN 650 MG: 325 TABLET, FILM COATED ORAL at 06:22

## 2020-01-30 RX ADMIN — ACETAMINOPHEN 650 MG: 325 TABLET, FILM COATED ORAL at 02:27

## 2020-01-30 RX ADMIN — METFORMIN HYDROCHLORIDE 500 MG: 500 TABLET ORAL at 17:33

## 2020-01-31 VITALS
WEIGHT: 235.2 LBS | TEMPERATURE: 97 F | RESPIRATION RATE: 22 BRPM | HEART RATE: 102 BPM | SYSTOLIC BLOOD PRESSURE: 154 MMHG | HEIGHT: 64 IN | DIASTOLIC BLOOD PRESSURE: 65 MMHG | BODY MASS INDEX: 40.15 KG/M2 | OXYGEN SATURATION: 96 %

## 2020-01-31 LAB
ANION GAP SERPL CALCULATED.3IONS-SCNC: 8.6 MMOL/L (ref 5–15)
BASOPHILS # BLD AUTO: 0.02 10*3/MM3 (ref 0–0.2)
BASOPHILS NFR BLD AUTO: 0.1 % (ref 0–1.5)
BUN BLD-MCNC: 28 MG/DL (ref 8–23)
BUN/CREAT SERPL: 40.6 (ref 7–25)
CALCIUM SPEC-SCNC: 8.7 MG/DL (ref 8.6–10.5)
CHLORIDE SERPL-SCNC: 104 MMOL/L (ref 98–107)
CO2 SERPL-SCNC: 24.4 MMOL/L (ref 22–29)
CREAT BLD-MCNC: 0.69 MG/DL (ref 0.57–1)
DEPRECATED RDW RBC AUTO: 49.6 FL (ref 37–54)
EOSINOPHIL # BLD AUTO: 0 10*3/MM3 (ref 0–0.4)
EOSINOPHIL NFR BLD AUTO: 0 % (ref 0.3–6.2)
ERYTHROCYTE [DISTWIDTH] IN BLOOD BY AUTOMATED COUNT: 14.4 % (ref 12.3–15.4)
GFR SERPL CREATININE-BSD FRML MDRD: 84 ML/MIN/1.73
GLUCOSE BLD-MCNC: 262 MG/DL (ref 65–99)
HCT VFR BLD AUTO: 37.4 % (ref 34–46.6)
HGB BLD-MCNC: 11 G/DL (ref 12–15.9)
IMM GRANULOCYTES # BLD AUTO: 0.12 10*3/MM3 (ref 0–0.05)
IMM GRANULOCYTES NFR BLD AUTO: 0.7 % (ref 0–0.5)
LYMPHOCYTES # BLD AUTO: 1.18 10*3/MM3 (ref 0.7–3.1)
LYMPHOCYTES NFR BLD AUTO: 7.3 % (ref 19.6–45.3)
MCH RBC QN AUTO: 27.7 PG (ref 26.6–33)
MCHC RBC AUTO-ENTMCNC: 29.4 G/DL (ref 31.5–35.7)
MCV RBC AUTO: 94.2 FL (ref 79–97)
MONOCYTES # BLD AUTO: 0.73 10*3/MM3 (ref 0.1–0.9)
MONOCYTES NFR BLD AUTO: 4.5 % (ref 5–12)
NEUTROPHILS # BLD AUTO: 14.08 10*3/MM3 (ref 1.7–7)
NEUTROPHILS NFR BLD AUTO: 87.4 % (ref 42.7–76)
NRBC BLD AUTO-RTO: 0 /100 WBC (ref 0–0.2)
PLATELET # BLD AUTO: 336 10*3/MM3 (ref 140–450)
PMV BLD AUTO: 9.6 FL (ref 6–12)
POTASSIUM BLD-SCNC: 5.1 MMOL/L (ref 3.5–5.2)
RBC # BLD AUTO: 3.97 10*6/MM3 (ref 3.77–5.28)
SODIUM BLD-SCNC: 137 MMOL/L (ref 136–145)
WBC NRBC COR # BLD: 16.13 10*3/MM3 (ref 3.4–10.8)

## 2020-01-31 PROCEDURE — G0378 HOSPITAL OBSERVATION PER HR: HCPCS

## 2020-01-31 PROCEDURE — 94799 UNLISTED PULMONARY SVC/PX: CPT

## 2020-01-31 PROCEDURE — 94618 PULMONARY STRESS TESTING: CPT

## 2020-01-31 PROCEDURE — 80048 BASIC METABOLIC PNL TOTAL CA: CPT | Performed by: INTERNAL MEDICINE

## 2020-01-31 PROCEDURE — 99217 PR OBSERVATION CARE DISCHARGE MANAGEMENT: CPT | Performed by: NURSE PRACTITIONER

## 2020-01-31 PROCEDURE — 25010000002 METHYLPREDNISOLONE PER 125 MG: Performed by: INTERNAL MEDICINE

## 2020-01-31 PROCEDURE — 25010000002 METHYLPREDNISOLONE PER 40 MG

## 2020-01-31 PROCEDURE — 96376 TX/PRO/DX INJ SAME DRUG ADON: CPT

## 2020-01-31 PROCEDURE — 85025 COMPLETE CBC W/AUTO DIFF WBC: CPT | Performed by: INTERNAL MEDICINE

## 2020-01-31 RX ORDER — METHYLPREDNISOLONE SODIUM SUCCINATE 40 MG/ML
INJECTION, POWDER, LYOPHILIZED, FOR SOLUTION INTRAMUSCULAR; INTRAVENOUS
Status: COMPLETED
Start: 2020-01-31 | End: 2020-01-31

## 2020-01-31 RX ORDER — IPRATROPIUM BROMIDE AND ALBUTEROL SULFATE 2.5; .5 MG/3ML; MG/3ML
3 SOLUTION RESPIRATORY (INHALATION) EVERY 4 HOURS PRN
Qty: 360 ML | Refills: 1 | Status: ON HOLD | OUTPATIENT
Start: 2020-01-31 | End: 2021-01-01

## 2020-01-31 RX ORDER — PREDNISONE 10 MG/1
TABLET ORAL
Qty: 26 TABLET | Refills: 0 | Status: SHIPPED | OUTPATIENT
Start: 2020-01-31 | End: 2020-09-04 | Stop reason: HOSPADM

## 2020-01-31 RX ADMIN — ACETAMINOPHEN 650 MG: 325 TABLET, FILM COATED ORAL at 11:57

## 2020-01-31 RX ADMIN — GABAPENTIN 300 MG: 300 CAPSULE ORAL at 09:51

## 2020-01-31 RX ADMIN — LISINOPRIL 20 MG: 20 TABLET ORAL at 09:51

## 2020-01-31 RX ADMIN — SODIUM CHLORIDE, PRESERVATIVE FREE 10 ML: 5 INJECTION INTRAVENOUS at 09:55

## 2020-01-31 RX ADMIN — LAMOTRIGINE 150 MG: 100 TABLET ORAL at 09:52

## 2020-01-31 RX ADMIN — GUAIFENESIN 1200 MG: 600 TABLET, EXTENDED RELEASE ORAL at 09:51

## 2020-01-31 RX ADMIN — ASPIRIN 81 MG: 81 TABLET, COATED ORAL at 09:51

## 2020-01-31 RX ADMIN — ROSUVASTATIN 40 MG: 20 TABLET, FILM COATED ORAL at 09:57

## 2020-01-31 RX ADMIN — METHYLPREDNISOLONE SODIUM SUCCINATE 60 MG: 40 INJECTION, POWDER, FOR SOLUTION INTRAMUSCULAR; INTRAVENOUS at 06:50

## 2020-01-31 RX ADMIN — VERAPAMIL HYDROCHLORIDE 240 MG: 240 TABLET, FILM COATED, EXTENDED RELEASE ORAL at 06:50

## 2020-01-31 RX ADMIN — IPRATROPIUM BROMIDE AND ALBUTEROL SULFATE 3 ML: .5; 3 SOLUTION RESPIRATORY (INHALATION) at 11:03

## 2020-01-31 RX ADMIN — FLUOXETINE 40 MG: 20 CAPSULE ORAL at 09:51

## 2020-01-31 RX ADMIN — IPRATROPIUM BROMIDE AND ALBUTEROL SULFATE 3 ML: .5; 3 SOLUTION RESPIRATORY (INHALATION) at 07:21

## 2020-01-31 RX ADMIN — METHYLPREDNISOLONE SODIUM SUCCINATE 60 MG: 125 INJECTION, POWDER, FOR SOLUTION INTRAMUSCULAR; INTRAVENOUS at 06:55

## 2020-01-31 RX ADMIN — BUDESONIDE AND FORMOTEROL FUMARATE DIHYDRATE 2 PUFF: 80; 4.5 AEROSOL RESPIRATORY (INHALATION) at 07:21

## 2020-01-31 RX ADMIN — METFORMIN HYDROCHLORIDE 500 MG: 500 TABLET ORAL at 09:51

## 2020-02-01 ENCOUNTER — READMISSION MANAGEMENT (OUTPATIENT)
Dept: CALL CENTER | Facility: HOSPITAL | Age: 70
End: 2020-02-01

## 2020-02-01 NOTE — OUTREACH NOTE
Prep Survey      Responses   Facility patient discharged from?  [facility]   Is LACE score < 7 ?  Yes   Is patient eligible?  Yes   Does the patient have one of the following disease processes/diagnoses(primary or secondary)?  Other   Does the patient have Home health ordered?  No   Is there a DME ordered?  No   Prep survey completed?  Yes          Brigitte Kellogg RN

## 2020-02-03 ENCOUNTER — READMISSION MANAGEMENT (OUTPATIENT)
Dept: CALL CENTER | Facility: HOSPITAL | Age: 70
End: 2020-02-03

## 2020-02-03 NOTE — OUTREACH NOTE
LAG < 7 Survey      Responses   Facility patient discharged from?  [facility]   Does the patient have one of the following disease processes/diagnoses(primary or secondary)?  Other   Is there a successful TCM telephone encounter documented?  No   BHLAG <7 Attempt successful?  No   Unsuccessful attempts  Attempt 1          Nina Clements RN

## 2020-02-04 ENCOUNTER — READMISSION MANAGEMENT (OUTPATIENT)
Dept: CALL CENTER | Facility: HOSPITAL | Age: 70
End: 2020-02-04

## 2020-02-04 NOTE — OUTREACH NOTE
LAG < 7 Survey      Responses   Facility patient discharged from?  [facility]   Does the patient have one of the following disease processes/diagnoses(primary or secondary)?  Other   Is there a successful TCM telephone encounter documented?  No   BHLAG <7 Attempt successful?  No   Unsuccessful attempts  Attempt 2          Shayy Bailey, RN

## 2020-02-05 ENCOUNTER — OFFICE VISIT (OUTPATIENT)
Dept: FAMILY MEDICINE CLINIC | Facility: CLINIC | Age: 70
End: 2020-02-05

## 2020-02-05 VITALS
TEMPERATURE: 97.9 F | RESPIRATION RATE: 20 BRPM | HEIGHT: 64 IN | OXYGEN SATURATION: 96 % | DIASTOLIC BLOOD PRESSURE: 62 MMHG | HEART RATE: 93 BPM | SYSTOLIC BLOOD PRESSURE: 118 MMHG | BODY MASS INDEX: 40.37 KG/M2

## 2020-02-05 DIAGNOSIS — J18.9 PNEUMONIA OF LEFT LOWER LOBE DUE TO INFECTIOUS ORGANISM: ICD-10-CM

## 2020-02-05 DIAGNOSIS — Z09 HOSPITAL DISCHARGE FOLLOW-UP: Primary | ICD-10-CM

## 2020-02-05 PROCEDURE — 99213 OFFICE O/P EST LOW 20 MIN: CPT | Performed by: PHYSICIAN ASSISTANT

## 2020-02-05 NOTE — PROGRESS NOTES
Subjective   Mar Camilo is a 69 y.o. female presents for   Chief Complaint   Patient presents with   • Follow-up     from hospital       History of Present Illness     Krys is a 69-year-old female who presents with sister at office visit today for hospital discharge from [facility].  She was admitted to the hospital on January 29, 2020 with discharge date of January 31, 2020.  She was seen for acute/chronic hypoxia, left lower lobe infiltrate and leukocytosis.  I reviewed her ER report, discharge paperwork, imaging studies, and lab results with him at office visit today.  Currently on 6 L of oxygen 24/7.  States she is feeling better.  She has a slight dry cough that started after her hospital stay.  She is not taking any antibiotic.  Has upcoming pulmonology appointment in March 2020.  Denied any fevers, chills, wheezing, and, or swelling of ankles.  She has her normal dyspnea when she walks.  Currently sleeping in a recliner.  Her appetite and sleep have been normal.    The following portions of the patient's history were reviewed and updated as appropriate: allergies, current medications, past family history, past medical history, past social history, past surgical history and problem list.    Review of Systems   Constitutional: Negative.  Negative for chills, fatigue and fever.   HENT: Negative.    Eyes: Negative.    Respiratory: Positive for cough and shortness of breath. Negative for chest tightness and wheezing.    Cardiovascular: Negative.  Negative for chest pain, palpitations and leg swelling.   Gastrointestinal: Negative.  Negative for abdominal pain, diarrhea, nausea and vomiting.   Endocrine: Negative.    Genitourinary: Negative.    Musculoskeletal: Negative.    Skin: Negative.    Allergic/Immunologic: Negative.    Neurological: Negative.  Negative for dizziness, light-headedness and headaches.   Hematological: Negative.    Psychiatric/Behavioral: Negative.  Negative for sleep  "disturbance.   All other systems reviewed and are negative.        Vitals:    20 1307   BP: 118/62   Pulse: 93   Resp: 20   Temp: 97.9 °F (36.6 °C)   SpO2: 96%   Weight: Comment: pt refused   Height: 162.6 cm (64\")     Wt Readings from Last 3 Encounters:   20 107 kg (235 lb 3.2 oz)   20 102 kg (225 lb)   20 105 kg (232 lb)     BP Readings from Last 3 Encounters:   20 118/62   20 154/65   20 138/64     Social History     Socioeconomic History   • Marital status:      Spouse name: Not on file   • Number of children: Not on file   • Years of education: Not on file   • Highest education level: Not on file   Tobacco Use   • Smoking status: Former Smoker     Packs/day: 2.00     Years: 40.00     Pack years: 80.00     Types: Cigarettes     Last attempt to quit: 2013     Years since quittin.8   • Smokeless tobacco: Never Used   • Tobacco comment: 1 cup coffee daily   Substance and Sexual Activity   • Alcohol use: No     Comment: history of heavy drinker    • Drug use: No   • Sexual activity: Defer       Allergies   Allergen Reactions   • Contrast Dye Hives   • Iodinated Diagnostic Agents Hives   • Norco [Hydrocodone-Acetaminophen] Hallucinations   • Tenoretic [Atenolol-Chlorthalidone] Anaphylaxis   Current outpatient and discharge medications have been reconciled for the patient.  Reviewed by: Vianey Barrios PA-C       Body mass index is 40.37 kg/m².    Objective   Physical Exam   Constitutional: She is oriented to person, place, and time. Vital signs are normal. She appears well-developed and well-nourished.   Using nasal cannula with 6 L of oxygen   HENT:   Head: Normocephalic and atraumatic.   Right Ear: Hearing, tympanic membrane, external ear and ear canal normal.   Left Ear: Hearing, tympanic membrane, external ear and ear canal normal.   Nose: Nose normal. Right sinus exhibits no maxillary sinus tenderness and no frontal sinus tenderness. Left sinus exhibits " "no maxillary sinus tenderness and no frontal sinus tenderness.   Mouth/Throat: Uvula is midline, oropharynx is clear and moist and mucous membranes are normal.   Eyes: Conjunctivae, EOM and lids are normal.   Neck: Trachea normal and phonation normal. Neck supple. No tracheal tenderness present. No tracheal deviation and no edema present.   Cardiovascular: Normal rate, regular rhythm, S1 normal, S2 normal, normal heart sounds and normal pulses.   No murmur heard.  Pulmonary/Chest: Effort normal and breath sounds normal.   Abdominal: Soft. Normal appearance, normal aorta and bowel sounds are normal. There is no hepatomegaly. There is no tenderness.   Lymphadenopathy:     She has no cervical adenopathy.   Neurological: She is alert and oriented to person, place, and time.   Skin: Skin is warm, dry and intact. Capillary refill takes less than 2 seconds.   Psychiatric: She has a normal mood and affect. Her speech is normal and behavior is normal. Judgment and thought content normal. Cognition and memory are normal.       Assessment/Plan   Mar was seen today for follow-up.    Diagnoses and all orders for this visit:    Hospital discharge follow-up    Pneumonia of left lower lobe due to infectious organism (CMS/[facility])  -     XR Chest PA & Lateral; Future      Ms. Manuel,\"Krys\",Scriver was seen in office today for hospital discharge follow-up with left lower lobe pneumonia with her sister,Clau.  I have reviewed her discharge paperwork, laboratory results and imaging results with them at office visit today.  Plan to follow-up with chest x-ray in 2 weeks for reevaluation of her pneumonia.  She will continue using her 6 L of oxygen continuously at home.  Keep her follow-up appointments with her pulmonologist in March 2020.    MARGARITA Narvaez [facility]  [address] [address]  Dept: [phone]  Dept Fax: [phone]  Loc: " [phone]  [facility] Fax: [phone]           Admission on 01/29/2020, Discharged on 01/31/2020   Component Date Value Ref Range Status   • Glucose 01/29/2020 125* 65 - 99 mg/dL Final   • BUN 01/29/2020 19  8 - 23 mg/dL Final   • Creatinine 01/29/2020 0.96  0.57 - 1.00 mg/dL Final   • Sodium 01/29/2020 136  136 - 145 mmol/L Final   • Potassium 01/29/2020 4.8  3.5 - 5.2 mmol/L Final   • Chloride 01/29/2020 101  98 - 107 mmol/L Final   • CO2 01/29/2020 19.8* 22.0 - 29.0 mmol/L Final   • Calcium 01/29/2020 9.5  8.6 - 10.5 mg/dL Final   • Total Protein 01/29/2020 8.0  6.0 - 8.5 g/dL Final   • Albumin 01/29/2020 3.90  3.50 - 5.20 g/dL Final   • ALT (SGPT) 01/29/2020 12  1 - 33 U/L Final   • AST (SGOT) 01/29/2020 18  1 - 32 U/L Final   • Alkaline Phosphatase 01/29/2020 97  39 - 117 U/L Final   • Total Bilirubin 01/29/2020 0.2  0.2 - 1.2 mg/dL Final   • eGFR Non African Amer 01/29/2020 57* >60 mL/min/1.73 Final   • Globulin 01/29/2020 4.1  gm/dL Final   • A/G Ratio 01/29/2020 1.0  g/dL Final   • BUN/Creatinine Ratio 01/29/2020 19.8  7.0 - 25.0 Final   • Anion Gap 01/29/2020 15.2* 5.0 - 15.0 mmol/L Final   • proBNP 01/29/2020 177.8  5.0 - 900.0 pg/mL Final   • Troponin T 01/29/2020 <0.010  0.000 - 0.030 ng/mL Final   • Procalcitonin 01/29/2020 0.10  0.10 - 0.25 ng/mL Final   • Protime 01/29/2020 12.9  12.1 - 15.0 Seconds Final   • INR 01/29/2020 1.00  0.90 - 1.10 Final   • PTT 01/29/2020 29.7  24.3 - 38.1 seconds Final   • WBC 01/29/2020 12.29* 3.40 - 10.80 10*3/mm3 Final   • RBC 01/29/2020 4.54  3.77 - 5.28 10*6/mm3 Final   • Hemoglobin 01/29/2020 12.5  12.0 - 15.9 g/dL Final   • Hematocrit 01/29/2020 43.6  34.0 - 46.6 % Final   • MCV 01/29/2020 96.0  79.0 - 97.0 fL Final   • MCH 01/29/2020 27.5  26.6 - 33.0 pg Final   • MCHC 01/29/2020 28.7* 31.5 - 35.7 g/dL Final   • RDW 01/29/2020 14.5  12.3 - 15.4 % Final   • RDW-SD 01/29/2020 52.2  37.0 - 54.0 fl Final   • MPV 01/29/2020 9.3  6.0 - 12.0 fL Final   • Platelets  01/29/2020 446  140 - 450 10*3/mm3 Final   • Neutrophil % 01/29/2020 65.2  42.7 - 76.0 % Final   • Lymphocyte % 01/29/2020 25.7  19.6 - 45.3 % Final   • Monocyte % 01/29/2020 6.7  5.0 - 12.0 % Final   • Eosinophil % 01/29/2020 1.4  0.3 - 6.2 % Final   • Basophil % 01/29/2020 0.4  0.0 - 1.5 % Final   • Immature Grans % 01/29/2020 0.6* 0.0 - 0.5 % Final   • Neutrophils, Absolute 01/29/2020 8.02* 1.70 - 7.00 10*3/mm3 Final   • Lymphocytes, Absolute 01/29/2020 3.16* 0.70 - 3.10 10*3/mm3 Final   • Monocytes, Absolute 01/29/2020 0.82  0.10 - 0.90 10*3/mm3 Final   • Eosinophils, Absolute 01/29/2020 0.17  0.00 - 0.40 10*3/mm3 Final   • Basophils, Absolute 01/29/2020 0.05  0.00 - 0.20 10*3/mm3 Final   • Immature Grans, Absolute 01/29/2020 0.07* 0.00 - 0.05 10*3/mm3 Final   • Glucose 01/29/2020 125  70 - 130 mg/dL Final   • ADENOVIRUS, PCR 01/29/2020 Not Detected  Not Detected Final   • Coronavirus 229E 01/29/2020 Not Detected  Not Detected Final   • Coronavirus HKU1 01/29/2020 Not Detected  Not Detected Final   • Coronavirus NL63 01/29/2020 Not Detected  Not Detected Final   • Coronavirus OC43 01/29/2020 Not Detected  Not Detected Final   • Human Metapneumovirus 01/29/2020 Not Detected  Not Detected Final   • Human Rhinovirus/Enterovirus 01/29/2020 Not Detected  Not Detected Final   • Influenza B PCR 01/29/2020 Not Detected  Not Detected Final   • Parainfluenza Virus 1 01/29/2020 Not Detected  Not Detected Final   • Parainfluenza Virus 2 01/29/2020 Not Detected  Not Detected Final   • Parainfluenza Virus 3 01/29/2020 Not Detected  Not Detected Final   • Parainfluenza Virus 4 01/29/2020 Not Detected  Not Detected Final   • Bordetella pertussis pcr 01/29/2020 Not Detected  Not Detected Final   • Influenza A H1 2009 PCR 01/29/2020 Not Detected  Not Detected Final   • Chlamydophila pneumoniae PCR 01/29/2020 Not Detected  Not Detected Final   • Mycoplasma pneumo by PCR 01/29/2020 Not Detected  Not Detected Final   • Influenza A  PCR 01/29/2020 Not Detected  Not Detected Final   • Influenza A H3 01/29/2020 Not Detected  Not Detected Final   • Influenza A H1 01/29/2020 Not Detected  Not Detected Final   • RSV, PCR 01/29/2020 Not Detected  Not Detected Final   • Bordetella parapertussis PCR 01/29/2020 Not Detected  Not Detected Final   • Hemoglobin A1C 01/29/2020 6.10* 4.80 - 5.60 % Final   • TSH 01/29/2020 1.490  0.270 - 4.200 uIU/mL Final   • Procalcitonin 01/29/2020 0.09* 0.10 - 0.25 ng/mL Final   • Glucose 01/29/2020 235* 70 - 130 mg/dL Final   • Glucose 01/30/2020 236* 65 - 99 mg/dL Final   • BUN 01/30/2020 26* 8 - 23 mg/dL Final   • Creatinine 01/30/2020 0.82  0.57 - 1.00 mg/dL Final   • Sodium 01/30/2020 134* 136 - 145 mmol/L Final   • Potassium 01/30/2020 5.2  3.5 - 5.2 mmol/L Final   • Chloride 01/30/2020 102  98 - 107 mmol/L Final   • CO2 01/30/2020 19.4* 22.0 - 29.0 mmol/L Final   • Calcium 01/30/2020 9.0  8.6 - 10.5 mg/dL Final   • eGFR Non African Amer 01/30/2020 69  >60 mL/min/1.73 Final   • BUN/Creatinine Ratio 01/30/2020 31.7* 7.0 - 25.0 Final   • Anion Gap 01/30/2020 12.6  5.0 - 15.0 mmol/L Final   • WBC 01/30/2020 10.17  3.40 - 10.80 10*3/mm3 Final   • RBC 01/30/2020 4.11  3.77 - 5.28 10*6/mm3 Final   • Hemoglobin 01/30/2020 11.6* 12.0 - 15.9 g/dL Final   • Hematocrit 01/30/2020 38.8  34.0 - 46.6 % Final   • MCV 01/30/2020 94.4  79.0 - 97.0 fL Final   • MCH 01/30/2020 28.2  26.6 - 33.0 pg Final   • MCHC 01/30/2020 29.9* 31.5 - 35.7 g/dL Final   • RDW 01/30/2020 14.4  12.3 - 15.4 % Final   • RDW-SD 01/30/2020 49.9  37.0 - 54.0 fl Final   • MPV 01/30/2020 9.2  6.0 - 12.0 fL Final   • Platelets 01/30/2020 339  140 - 450 10*3/mm3 Final   • Neutrophil % 01/30/2020 87.8* 42.7 - 76.0 % Final   • Lymphocyte % 01/30/2020 10.3* 19.6 - 45.3 % Final   • Monocyte % 01/30/2020 1.3* 5.0 - 12.0 % Final   • Eosinophil % 01/30/2020 0.0* 0.3 - 6.2 % Final   • Basophil % 01/30/2020 0.1  0.0 - 1.5 % Final   • Immature Grans % 01/30/2020 0.5  0.0  - 0.5 % Final   • Neutrophils, Absolute 01/30/2020 8.93* 1.70 - 7.00 10*3/mm3 Final   • Lymphocytes, Absolute 01/30/2020 1.05  0.70 - 3.10 10*3/mm3 Final   • Monocytes, Absolute 01/30/2020 0.13  0.10 - 0.90 10*3/mm3 Final   • Eosinophils, Absolute 01/30/2020 0.00  0.00 - 0.40 10*3/mm3 Final   • Basophils, Absolute 01/30/2020 0.01  0.00 - 0.20 10*3/mm3 Final   • Immature Grans, Absolute 01/30/2020 0.05  0.00 - 0.05 10*3/mm3 Final   • nRBC 01/30/2020 0.0  0.0 - 0.2 /100 WBC Final   • Troponin T 01/30/2020 <0.010  0.000 - 0.030 ng/mL Final   • Glucose 01/30/2020 208* 70 - 130 mg/dL Final   • D-Dimer, Quantitative 01/30/2020 1.06* 0.00 - 0.46 MCGFEU/mL Final   • Glucose 01/30/2020 315* 70 - 130 mg/dL Final   • Glucose 01/30/2020 305* 70 - 130 mg/dL Final   • Glucose 01/30/2020 323* 70 - 130 mg/dL Final   • Glucose 01/31/2020 262* 65 - 99 mg/dL Final   • BUN 01/31/2020 28* 8 - 23 mg/dL Final   • Creatinine 01/31/2020 0.69  0.57 - 1.00 mg/dL Final   • Sodium 01/31/2020 137  136 - 145 mmol/L Final   • Potassium 01/31/2020 5.1  3.5 - 5.2 mmol/L Final   • Chloride 01/31/2020 104  98 - 107 mmol/L Final   • CO2 01/31/2020 24.4  22.0 - 29.0 mmol/L Final   • Calcium 01/31/2020 8.7  8.6 - 10.5 mg/dL Final   • eGFR Non African Amer 01/31/2020 84  >60 mL/min/1.73 Final   • BUN/Creatinine Ratio 01/31/2020 40.6* 7.0 - 25.0 Final   • Anion Gap 01/31/2020 8.6  5.0 - 15.0 mmol/L Final   • WBC 01/31/2020 16.13* 3.40 - 10.80 10*3/mm3 Final   • RBC 01/31/2020 3.97  3.77 - 5.28 10*6/mm3 Final   • Hemoglobin 01/31/2020 11.0* 12.0 - 15.9 g/dL Final   • Hematocrit 01/31/2020 37.4  34.0 - 46.6 % Final   • MCV 01/31/2020 94.2  79.0 - 97.0 fL Final   • MCH 01/31/2020 27.7  26.6 - 33.0 pg Final   • MCHC 01/31/2020 29.4* 31.5 - 35.7 g/dL Final   • RDW 01/31/2020 14.4  12.3 - 15.4 % Final   • RDW-SD 01/31/2020 49.6  37.0 - 54.0 fl Final   • MPV 01/31/2020 9.6  6.0 - 12.0 fL Final   • Platelets 01/31/2020 336  140 - 450 10*3/mm3 Final   • Neutrophil  % 01/31/2020 87.4* 42.7 - 76.0 % Final   • Lymphocyte % 01/31/2020 7.3* 19.6 - 45.3 % Final   • Monocyte % 01/31/2020 4.5* 5.0 - 12.0 % Final   • Eosinophil % 01/31/2020 0.0* 0.3 - 6.2 % Final   • Basophil % 01/31/2020 0.1  0.0 - 1.5 % Final   • Immature Grans % 01/31/2020 0.7* 0.0 - 0.5 % Final   • Neutrophils, Absolute 01/31/2020 14.08* 1.70 - 7.00 10*3/mm3 Final   • Lymphocytes, Absolute 01/31/2020 1.18  0.70 - 3.10 10*3/mm3 Final   • Monocytes, Absolute 01/31/2020 0.73  0.10 - 0.90 10*3/mm3 Final   • Eosinophils, Absolute 01/31/2020 0.00  0.00 - 0.40 10*3/mm3 Final   • Basophils, Absolute 01/31/2020 0.02  0.00 - 0.20 10*3/mm3 Final   • Immature Grans, Absolute 01/31/2020 0.12* 0.00 - 0.05 10*3/mm3 Final   • nRBC 01/31/2020 0.0  0.0 - 0.2 /100 WBC Final

## 2020-02-06 ENCOUNTER — READMISSION MANAGEMENT (OUTPATIENT)
Dept: CALL CENTER | Facility: HOSPITAL | Age: 70
End: 2020-02-06

## 2020-02-06 NOTE — OUTREACH NOTE
LAG < 7 Survey      Responses   Facility patient discharged from?  [facility]   Does the patient have one of the following disease processes/diagnoses(primary or secondary)?  Other   Is there a successful TCM telephone encounter documented?  No   BHLAG <7 Attempt successful?  No   Unsuccessful attempts  Attempt 3          Nina Clements RN

## 2020-02-10 ENCOUNTER — READMISSION MANAGEMENT (OUTPATIENT)
Dept: CALL CENTER | Facility: HOSPITAL | Age: 70
End: 2020-02-10

## 2020-02-10 ENCOUNTER — OFFICE VISIT (OUTPATIENT)
Dept: ORTHOPEDIC SURGERY | Facility: CLINIC | Age: 70
End: 2020-02-10

## 2020-02-10 DIAGNOSIS — Z96.611 STATUS POST REVERSE TOTAL ARTHROPLASTY OF RIGHT SHOULDER: Primary | ICD-10-CM

## 2020-02-10 PROCEDURE — 99024 POSTOP FOLLOW-UP VISIT: CPT | Performed by: ORTHOPAEDIC SURGERY

## 2020-02-10 PROCEDURE — 73030 X-RAY EXAM OF SHOULDER: CPT | Performed by: ORTHOPAEDIC SURGERY

## 2020-02-10 NOTE — PROGRESS NOTES
Name: Mar Camilo  MRN: [id number]  Diagnosis: s/p right reverse TSA Arthroplasty with open biceps tenodesis, 12/30/2019    Interval History: Mar Camilo returns for her 6 week postoperative visit.  She is doing well. Pain is controlled with pain medication and is  improving. She denies any wound problem, fevers, or chills.    Physical Examination: Her right shoulder was examined. Incision is well-healed.  There is no evidence of infection.   Active forward flexion  Passive forward flexion  Active external rotation  Passive external rotation  Active abduction  Distal motor and sensory exam is grossly intact.  Hand is well perfused.    Radiographic review:   Right Shoulder X-Ray  Indication: Status post shoulder arthroplasty  AP, scapular Y views- poor visualization due to technique and body habitus    Findings:  Shoulder arthroplasty components appear to be stable in position, well-seated, overall acceptable alignment.  No evidence of ostial lysis, loosening, periprosthetic fracture, or reactive bone formation.    Compared to prior postoperative x-rays from hospital      Assessment/Plan:  Mar Camilo is recovering from surgery as expected.  Continue work with physical therapy for range of motion and strength  Sling will be discontinued at this time  Follow-up in 6 weeks with reevaluation of range of motion, no x-rays needed    Altaf Reyes MD

## 2020-02-10 NOTE — OUTREACH NOTE
Medical Week 2 Survey      Responses   Facility patient discharged from?  [facility]   Does the patient have one of the following disease processes/diagnoses(primary or secondary)?  Other   Week 2 attempt successful?  Yes   Call start time  1632   Discharge diagnosis  Primary osteoarthritis, right shoulder   Call end time  1637   Person spoke with today (if not patient) and relationship  Clau-sister   Meds reviewed with patient/caregiver?  Yes   Is the patient having any side effects they believe may be caused by any medication additions or changes?  No   Does the patient have all medications ordered at discharge?  Yes   Is the patient taking all medications as directed (includes completed medication regime)?  Yes   Comments regarding appointments  Appt with orthopedics 2/10/20   Does the patient have a primary care provider?   Yes   Does the patient have an appointment with their PCP within 7 days of discharge?  Yes   Comments regarding PCP  Appt with PCP 2/5/20   Has the patient kept scheduled appointments due by today?  Yes   Psychosocial issues?  No   Comments  Pt wears O2 all the time. She has it on 5 LMP when she goes out and 6 LMP while at home   Did the patient receive a copy of their discharge instructions?  Yes   Nursing interventions  Reviewed instructions with patient   What is the patient's perception of their health status since discharge?  Same [Pt still has a hard time breathing. Sister believes some of issue is anxiety. Her O2 has been in the 90's since D/C.]   Is the patient/caregiver able to teach back signs and symptoms related to disease process for when to call PCP?  Yes   Is the patient/caregiver able to teach back signs and symptoms related to disease process for when to call 911?  Yes   Is the patient/caregiver able to teach back the hierarchy of who to call/visit for symptoms/problems? PCP, Specialist, Home health nurse, Urgent Care, ED, 911  Yes   If the patient is a current smoker, are  they able to teach back resources for cessation?  -- [Pt is a nonsmoker. She quit about 7 years ago.]   Week 2 Call Completed?  Yes          Minnie Carrion RN

## 2020-02-17 ENCOUNTER — READMISSION MANAGEMENT (OUTPATIENT)
Dept: CALL CENTER | Facility: HOSPITAL | Age: 70
End: 2020-02-17

## 2020-02-17 NOTE — OUTREACH NOTE
Medical Week 3 Survey      Responses   Facility patient discharged from?  [facility]   Does the patient have one of the following disease processes/diagnoses(primary or secondary)?  Other   Week 3 attempt successful?  Yes   Call start time  1410   Call end time  1412   Discharge diagnosis  Primary osteoarthritis, right shoulder   Meds reviewed with patient/caregiver?  Yes   Is the patient taking all medications as directed (includes completed medication regime)?  Yes   Does the patient have a primary care provider?   Yes   Has the patient kept scheduled appointments due by today?  Yes   Comments  Pt is aware of future appointments during this call.   What DME was ordered?  5L oxygen at all times.  she states she turned it down to 5L this week.   Psychosocial issues?  No   Comments  Pt is going to therapy.   What is the patient's perception of their health status since discharge?  Improving   Is the patient/caregiver able to teach back signs and symptoms related to disease process for when to call PCP?  Yes   Is the patient/caregiver able to teach back signs and symptoms related to disease process for when to call 911?  Yes   Is the patient/caregiver able to teach back the hierarchy of who to call/visit for symptoms/problems? PCP, Specialist, Home health nurse, Urgent Care, ED, 911  Yes   Week 3 Call Completed?  Yes   Wrap up additional comments  Pt reports she is doing very well.  Denies any needs at this time.           Francisca Richardson RN

## 2020-02-25 ENCOUNTER — READMISSION MANAGEMENT (OUTPATIENT)
Dept: CALL CENTER | Facility: HOSPITAL | Age: 70
End: 2020-02-25

## 2020-02-25 NOTE — OUTREACH NOTE
Medical Week 4 Survey      Responses   Facility patient discharged from?  [facility]   Does the patient have one of the following disease processes/diagnoses(primary or secondary)?  Other   Week 4 attempt successful?  No          Marie Ventura RN

## 2020-03-02 ENCOUNTER — TELEPHONE (OUTPATIENT)
Dept: ORTHOPEDIC SURGERY | Facility: CLINIC | Age: 70
End: 2020-03-02

## 2020-03-04 ENCOUNTER — EPISODE CHANGES (OUTPATIENT)
Dept: CASE MANAGEMENT | Facility: OTHER | Age: 70
End: 2020-03-04

## 2020-03-04 DIAGNOSIS — E78.1 HYPERTRIGLYCERIDEMIA: ICD-10-CM

## 2020-03-04 DIAGNOSIS — E78.2 MIXED HYPERLIPIDEMIA: ICD-10-CM

## 2020-03-04 DIAGNOSIS — E11.8 TYPE 2 DIABETES MELLITUS WITH COMPLICATION, WITHOUT LONG-TERM CURRENT USE OF INSULIN (HCC): ICD-10-CM

## 2020-03-04 RX ORDER — FENOFIBRATE 145 MG/1
145 TABLET, COATED ORAL DAILY
Qty: 90 TABLET | Refills: 1 | Status: SHIPPED | OUTPATIENT
Start: 2020-03-04 | End: 2020-11-05

## 2020-03-05 ENCOUNTER — EPISODE CHANGES (OUTPATIENT)
Dept: CASE MANAGEMENT | Facility: OTHER | Age: 70
End: 2020-03-05

## 2020-03-09 ENCOUNTER — HOSPITAL ENCOUNTER (OUTPATIENT)
Dept: INFUSION THERAPY | Facility: HOSPITAL | Age: 70
Discharge: HOME | End: 2020-03-09
Admitting: INTERNAL MEDICINE

## 2020-03-09 VITALS
SYSTOLIC BLOOD PRESSURE: 127 MMHG | TEMPERATURE: 93.9 F | RESPIRATION RATE: 16 BRPM | DIASTOLIC BLOOD PRESSURE: 53 MMHG | HEART RATE: 70 BPM | OXYGEN SATURATION: 94 % | HEIGHT: 64 IN | BODY MASS INDEX: 39.52 KG/M2 | WEIGHT: 231.48 LBS

## 2020-03-09 DIAGNOSIS — K50.10 CROHN'S DISEASE OF LARGE INTESTINE WITHOUT COMPLICATION (HCC): Primary | ICD-10-CM

## 2020-03-09 DIAGNOSIS — K50.119 CROHN'S DISEASE OF COLON WITH COMPLICATION (HCC): ICD-10-CM

## 2020-03-09 PROCEDURE — 96415 CHEMO IV INFUSION ADDL HR: CPT

## 2020-03-09 PROCEDURE — 63710000001 DIPHENHYDRAMINE PER 50 MG: Performed by: INTERNAL MEDICINE

## 2020-03-09 PROCEDURE — 25010000002 INFLIXIMAB PER 10 MG: Performed by: INTERNAL MEDICINE

## 2020-03-09 PROCEDURE — 96413 CHEMO IV INFUSION 1 HR: CPT

## 2020-03-09 PROCEDURE — A9270 NON-COVERED ITEM OR SERVICE: HCPCS | Performed by: INTERNAL MEDICINE

## 2020-03-09 RX ORDER — DIPHENHYDRAMINE HCL 25 MG
25 CAPSULE ORAL ONCE
Status: CANCELLED | OUTPATIENT
Start: 2020-05-04

## 2020-03-09 RX ORDER — ACETAMINOPHEN 325 MG/1
650 TABLET ORAL ONCE
Status: CANCELLED | OUTPATIENT
Start: 2020-05-04

## 2020-03-09 RX ORDER — SODIUM CHLORIDE 9 MG/ML
250 INJECTION, SOLUTION INTRAVENOUS ONCE
Status: CANCELLED | OUTPATIENT
Start: 2020-05-04

## 2020-03-09 RX ORDER — DIPHENHYDRAMINE HCL 25 MG
25 CAPSULE ORAL ONCE
Status: COMPLETED | OUTPATIENT
Start: 2020-03-09 | End: 2020-03-09

## 2020-03-09 RX ORDER — ACETAMINOPHEN 325 MG/1
650 TABLET ORAL ONCE
Status: DISCONTINUED | OUTPATIENT
Start: 2020-03-09 | End: 2020-03-11 | Stop reason: HOSPADM

## 2020-03-09 RX ADMIN — INFLIXIMAB 500 MG: 100 INJECTION, POWDER, LYOPHILIZED, FOR SOLUTION INTRAVENOUS at 09:12

## 2020-03-09 RX ADMIN — DIPHENHYDRAMINE HYDROCHLORIDE 25 MG: 25 CAPSULE ORAL at 09:00

## 2020-03-09 NOTE — NURSING NOTE
NURSING PROGRESS NOTE:    PATIENT ARRIVE TO [facility] FOR SCHEDULED INFUSION VIA W/C AT 0845 .  PROCEDURE WAS PERFORMED WITHOUT INCIDENT. AVS REVIEWED PRIOR TO DISCHARGE.  PATIENT ESCORTED TO LOBBY PER W/C AND DISCHARGED HOME AT 1130, WITH HER SISTER . RAVEN LEES

## 2020-03-11 DIAGNOSIS — E78.2 MIXED HYPERLIPIDEMIA: ICD-10-CM

## 2020-03-11 RX ORDER — FENOFIBRATE 145 MG/1
145 TABLET, COATED ORAL DAILY
Qty: 90 TABLET | Refills: 1 | Status: CANCELLED | OUTPATIENT
Start: 2020-03-11

## 2020-03-14 DIAGNOSIS — F32.0 MILD SINGLE CURRENT EPISODE OF MAJOR DEPRESSIVE DISORDER (HCC): ICD-10-CM

## 2020-03-15 RX ORDER — FLUOXETINE HYDROCHLORIDE 40 MG/1
CAPSULE ORAL
Qty: 30 CAPSULE | Refills: 6 | Status: SHIPPED | OUTPATIENT
Start: 2020-03-15 | End: 2021-01-01

## 2020-03-23 ENCOUNTER — TELEPHONE (OUTPATIENT)
Dept: FAMILY MEDICINE CLINIC | Facility: CLINIC | Age: 70
End: 2020-03-23

## 2020-03-23 DIAGNOSIS — J06.9 ACUTE URI: Primary | ICD-10-CM

## 2020-03-23 RX ORDER — AMOXICILLIN 875 MG/1
875 TABLET, COATED ORAL 2 TIMES DAILY
Qty: 20 TABLET | Refills: 0 | Status: SHIPPED | OUTPATIENT
Start: 2020-03-23 | End: 2020-04-02

## 2020-03-23 NOTE — TELEPHONE ENCOUNTER
Please notify patient that I have sent amoxicillin antibiotic to pharmacy.  Please use over-the-counter plain Robitussin or Coricidin for her cough.  Have her increase fluids and rest.

## 2020-03-23 NOTE — TELEPHONE ENCOUNTER
Pt calling, c/o headache, dry cough, ear itching.  Requests rx, states her family has her in a 'mandatory quarrantine' and cannot come in for an appt.

## 2020-04-03 ENCOUNTER — TELEPHONE (OUTPATIENT)
Dept: FAMILY MEDICINE CLINIC | Facility: CLINIC | Age: 70
End: 2020-04-03

## 2020-04-03 NOTE — TELEPHONE ENCOUNTER
Pt stated she completed her antibiotic 1 week ago. This issue began yesterday am.  Instructed pt to use tylenol, as this may be viral or allergy related. Pt mentioned he has been having her window open.    Pt voiced understanding. Will update Monday.

## 2020-04-03 NOTE — TELEPHONE ENCOUNTER
She was recently prescribed amoxicillin 875 mg twice a day on March 23, 2020.  She should have finished this a few days ago.  I suspect this may be viral in nature.  Have her use over-the-counter Tylenol for discomfort and pain.  If symptoms do not improve in a few days,will consider antibiotic at that time.

## 2020-04-03 NOTE — TELEPHONE ENCOUNTER
Pt calling, C/O rt sided ear pain, radiating into her neck.  She suspects ear inf, as her throat is also sore.    Requests we call something in for her.

## 2020-04-07 ENCOUNTER — PATIENT OUTREACH (OUTPATIENT)
Dept: CASE MANAGEMENT | Facility: OTHER | Age: 70
End: 2020-04-07

## 2020-04-07 NOTE — OUTREACH NOTE
Care Plan Note      Responses   Lifestyle Goals  Avoid respiratory irritants, Decrease falls risk, Eat a healthy diet, Increase physical activity, Less sadness/anxiety, Less shortness of air, Lose weight, Medication management, Maintain blood pressure < 130/80, Routine eye exam, Self monitor blood sugar, Routine foot care, Routine follow-up with doctor(s)   Barriers  Disease education   Self Management  Breathing techniques, Educational materials provided, Home Glucose Monitoring, Increase Physical Activities, Medication Adherence, Use oxygen   Annual Wellness Visit:   Patient Will Schedule   AWV Materials  Send Materials   Care Gaps Addressed  Pneumonia Vaccine   Pneumonia Vaccine Status  Up to Date   Specific Disease Process Teaching  COPD, Diabetes   Other Patient Education/Resources   Advanced Care Planning, 24/7 [facility] Nurse Call Line   24/7 Nurse Call Line Education Method  Send Materials   ACP Education Method  Send Materials   Does patient have depression diagnosis?  Yes   Advanced Directives:  Send Materials   Ed Visits past 12 months:  1   Hospitalizations past 12 months  2 or 3   Medication Adherence  Medications understood   Goal Progress  Making Progress Toward Goal(s)   Readiness Scale  8   Confidence Scale  8   How often do you have someone help you read hospital materials?  Occasionally   How often do you have problems learning about your medical condition because of difficulty understanding written information?  Occasionally   How often do you have a problem understanding what is told to you about your medical condition?  Occasionally   How confident are you filling out medical forms by yourself?  Quite a bit   Health Literacy  Moderate        The main concerns and/or symptoms the patient would like to address are: Unable to attend outpatient rehab post total shoulder.due to pandemic.Shortness of breath with exertion.  Education/instruction provided by Care Coordinator: Avoiding  respiratory irritants and outdoors due to environmental allergens. Has increased O2 to 5 liters and advised to verify O2 liter flow with pulmonologist, Dr Hernadez.  Becomes short of breath with exertion and recovers with rest. Informed Dr. Hernadez would make recommendations for liter flow with exertion and at rest.  Adherent to medication regimen and nebulizer treatments.  Advised to contact her provider, [employer], to obtain clean/new supplies of tubing, nasal cannulae, and nebulizer supplies.  Reports recent completion of antibiotics for a sinus infection.  Adherence to Healthy at Home directive and her sister assists with groceries, medication pick-up, and has always assisted with transportation needs to appts.  Discussed compromised state with COPD, DM, and practicing healthy hygeine as her sister works at a gas station but does wear a mask while working.  Suggested she investigate online purchasing exercise bands for home use in order to progress with daily home exercise program as provided by outpatient therapist.  She reports no access to a computer or cell phone for online ordering.  Adherent to DM management and reports blood sugars within normal limits.  Last HgbA1c 6.1 on 1/29/2020.      Follow Up Outreach Due: One month    Yajaira Stout RN  Ambulatory     4/7/2020, 13:39

## 2020-05-04 ENCOUNTER — HOSPITAL ENCOUNTER (OUTPATIENT)
Dept: INFUSION THERAPY | Facility: HOSPITAL | Age: 70
Discharge: HOME | End: 2020-05-04
Admitting: INTERNAL MEDICINE

## 2020-05-04 VITALS
WEIGHT: 229.6 LBS | DIASTOLIC BLOOD PRESSURE: 54 MMHG | TEMPERATURE: 98 F | HEIGHT: 64 IN | HEART RATE: 78 BPM | BODY MASS INDEX: 39.2 KG/M2 | SYSTOLIC BLOOD PRESSURE: 137 MMHG | OXYGEN SATURATION: 94 % | RESPIRATION RATE: 16 BRPM

## 2020-05-04 DIAGNOSIS — K50.119 CROHN'S DISEASE OF COLON WITH COMPLICATION (HCC): ICD-10-CM

## 2020-05-04 DIAGNOSIS — K50.10 CROHN'S DISEASE OF LARGE INTESTINE WITHOUT COMPLICATION (HCC): Primary | ICD-10-CM

## 2020-05-04 PROCEDURE — A9270 NON-COVERED ITEM OR SERVICE: HCPCS | Performed by: INTERNAL MEDICINE

## 2020-05-04 PROCEDURE — 63710000001 DIPHENHYDRAMINE PER 50 MG: Performed by: INTERNAL MEDICINE

## 2020-05-04 PROCEDURE — 96413 CHEMO IV INFUSION 1 HR: CPT

## 2020-05-04 PROCEDURE — 25010000002 INFLIXIMAB PER 10 MG: Performed by: INTERNAL MEDICINE

## 2020-05-04 PROCEDURE — 63710000001 ACETAMINOPHEN 325 MG TABLET: Performed by: INTERNAL MEDICINE

## 2020-05-04 PROCEDURE — 96415 CHEMO IV INFUSION ADDL HR: CPT

## 2020-05-04 PROCEDURE — 96365 THER/PROPH/DIAG IV INF INIT: CPT

## 2020-05-04 PROCEDURE — 96366 THER/PROPH/DIAG IV INF ADDON: CPT

## 2020-05-04 RX ORDER — DIPHENHYDRAMINE HCL 25 MG
25 CAPSULE ORAL ONCE
Status: CANCELLED | OUTPATIENT
Start: 2020-06-29

## 2020-05-04 RX ORDER — DIPHENHYDRAMINE HCL 25 MG
25 CAPSULE ORAL ONCE
Status: COMPLETED | OUTPATIENT
Start: 2020-05-04 | End: 2020-05-04

## 2020-05-04 RX ORDER — SODIUM CHLORIDE 9 MG/ML
250 INJECTION, SOLUTION INTRAVENOUS ONCE
Status: CANCELLED | OUTPATIENT
Start: 2020-06-29

## 2020-05-04 RX ORDER — ACETAMINOPHEN 325 MG/1
650 TABLET ORAL ONCE
Status: CANCELLED | OUTPATIENT
Start: 2020-06-29

## 2020-05-04 RX ORDER — ACETAMINOPHEN 325 MG/1
650 TABLET ORAL ONCE
Status: COMPLETED | OUTPATIENT
Start: 2020-05-04 | End: 2020-05-04

## 2020-05-04 RX ADMIN — ACETAMINOPHEN 650 MG: 325 TABLET ORAL at 09:16

## 2020-05-04 RX ADMIN — INFLIXIMAB 500 MG: 100 INJECTION, POWDER, LYOPHILIZED, FOR SOLUTION INTRAVENOUS at 09:44

## 2020-05-04 RX ADMIN — DIPHENHYDRAMINE HYDROCHLORIDE 25 MG: 25 CAPSULE ORAL at 09:16

## 2020-05-04 NOTE — NURSING NOTE
NURSING PROGRESS NOTE:    PATIENT ARRIVE TO [facility] FOR SCHEDULED INFUSION AT 0900 .  PROCEDURE WAS PERFORMED WITHOUT INCIDENT. AVS REVIEWED PRIOR TO DISCHARGE AND A COPY PROVIDED.  PATIENT DISCHARGED HOME AT 1220 . RAVEN LEES

## 2020-05-05 ENCOUNTER — EPISODE CHANGES (OUTPATIENT)
Dept: CASE MANAGEMENT | Facility: OTHER | Age: 70
End: 2020-05-05

## 2020-05-05 ENCOUNTER — PATIENT OUTREACH (OUTPATIENT)
Dept: CASE MANAGEMENT | Facility: OTHER | Age: 70
End: 2020-05-05

## 2020-05-05 NOTE — OUTREACH NOTE
Patient Outreach Note    Deferring physician appts during the pandemic and has declined virtual visits.  Reports her sister was insistent that she adhere to infusion regimen for Chron's which she received yesterday at [facility]. COVID19 education and  precautionary measures discussed.  Compliant with wearing  Mask when out.  States weight stable at 229 and verbalizes difficulties with adherence of diet during quarantine. Reports sedentary lifestyle due to mobility issues and O2 use. Denies further needs, concerns, or questions today.      Yajaira Stout RN  Ambulatory     5/5/2020, 14:47

## 2020-06-10 DIAGNOSIS — J44.1 CHRONIC OBSTRUCTIVE PULMONARY DISEASE WITH ACUTE EXACERBATION (HCC): ICD-10-CM

## 2020-06-15 ENCOUNTER — OFFICE VISIT (OUTPATIENT)
Dept: FAMILY MEDICINE CLINIC | Facility: CLINIC | Age: 70
End: 2020-06-15

## 2020-06-15 VITALS
DIASTOLIC BLOOD PRESSURE: 60 MMHG | SYSTOLIC BLOOD PRESSURE: 160 MMHG | BODY MASS INDEX: 40.67 KG/M2 | TEMPERATURE: 96.9 F | RESPIRATION RATE: 16 BRPM | HEART RATE: 115 BPM | HEIGHT: 63 IN | OXYGEN SATURATION: 90 %

## 2020-06-15 DIAGNOSIS — J01.00 ACUTE MAXILLARY SINUSITIS, RECURRENCE NOT SPECIFIED: ICD-10-CM

## 2020-06-15 DIAGNOSIS — N30.01 ACUTE CYSTITIS WITH HEMATURIA: ICD-10-CM

## 2020-06-15 DIAGNOSIS — Z79.899 LONG-TERM USE OF HIGH-RISK MEDICATION: ICD-10-CM

## 2020-06-15 DIAGNOSIS — G62.9 NEUROPATHY: ICD-10-CM

## 2020-06-15 DIAGNOSIS — R35.0 URINE FREQUENCY: Primary | ICD-10-CM

## 2020-06-15 PROBLEM — N30.00 ACUTE CYSTITIS WITHOUT HEMATURIA: Status: ACTIVE | Noted: 2017-02-13

## 2020-06-15 PROBLEM — R31.9 HEMATURIA: Status: ACTIVE | Noted: 2020-06-15

## 2020-06-15 LAB
BILIRUB BLD-MCNC: NEGATIVE MG/DL
CLARITY, POC: ABNORMAL
COLOR UR: YELLOW
GLUCOSE UR STRIP-MCNC: NEGATIVE MG/DL
KETONES UR QL: NEGATIVE
LEUKOCYTE EST, POC: ABNORMAL
NITRITE UR-MCNC: POSITIVE MG/ML
PH UR: 7.5 [PH] (ref 5–8)
PROT UR STRIP-MCNC: ABNORMAL MG/DL
RBC # UR STRIP: ABNORMAL /UL
SP GR UR: 1.01 (ref 1–1.03)
UROBILINOGEN UR QL: NORMAL

## 2020-06-15 PROCEDURE — 99214 OFFICE O/P EST MOD 30 MIN: CPT | Performed by: PHYSICIAN ASSISTANT

## 2020-06-15 PROCEDURE — 81002 URINALYSIS NONAUTO W/O SCOPE: CPT | Performed by: PHYSICIAN ASSISTANT

## 2020-06-15 RX ORDER — CEFUROXIME AXETIL 500 MG/1
500 TABLET ORAL 2 TIMES DAILY
Qty: 20 TABLET | Refills: 0 | Status: SHIPPED | OUTPATIENT
Start: 2020-06-15 | End: 2020-07-13

## 2020-06-15 NOTE — PROGRESS NOTES
Subjective   Mar Camilo is a 70 y.o. female presents for   Chief Complaint   Patient presents with   • Urinary Tract Infection   • URI       History of Present Illness     Krys is a 70-year-old female who presents with sister, Nicolette, at office visit today for several issues.  1.  She has had post nasal drip,green rhinorrhea,sinus pressure,head congestion,slight headache,ear pressure,ear itching,and green productive cough for the past few days.  She has been using her inhalers and using nebulizer 2 times a day.  Denied any fevers,chills,nausea,vomiting,or diarrhea.  Appetite has been slightly decreased.  She has been pushing the fluids.   Krys has been taking Tylenol sinus OTC which has not helped.  Sleep has been normal for her.    2.  Krys has been having some urine odor,urine urgency,frequency and cloudy. She has had flank pain off and on.  Denied any dysuria, fevers, chills or abdominal pain.    The following portions of the patient's history were reviewed and updated as appropriate: allergies, current medications, past family history, past medical history, past social history, past surgical history and problem list.    Review of Systems   Constitutional: Negative.  Negative for chills, fatigue and fever.   HENT: Positive for congestion, ear pain, postnasal drip, rhinorrhea, sinus pressure, sinus pain and sore throat. Negative for facial swelling, sneezing and tinnitus.    Eyes: Negative.    Respiratory: Positive for cough, shortness of breath and wheezing.    Cardiovascular: Negative.  Negative for chest pain, palpitations and leg swelling.   Gastrointestinal: Negative.  Negative for abdominal pain, blood in stool, constipation, diarrhea, nausea and vomiting.   Endocrine: Negative.    Genitourinary: Positive for dysuria, flank pain, frequency and urgency. Negative for hematuria, vaginal bleeding, vaginal discharge and vaginal pain.   Skin: Negative.    Allergic/Immunologic: Negative.    Neurological:  "Positive for headaches. Negative for dizziness and light-headedness.   Hematological: Negative.    Psychiatric/Behavioral: Positive for sleep disturbance.   All other systems reviewed and are negative.        Vitals:    06/15/20 1453 06/15/20 1505   BP:  160/60   BP Location:  Left arm   Patient Position:  Sitting   Cuff Size:  Large Adult   Pulse: 115    Resp: 16    Temp: 96.9 °F (36.1 °C)    SpO2: (!) 77% 90%   Weight: Comment: pt refused    Height: 160 cm (63\")      Wt Readings from Last 3 Encounters:   20 104 kg (229 lb 9.6 oz)   20 105 kg (231 lb 7.7 oz)   20 107 kg (235 lb 3.2 oz)     BP Readings from Last 3 Encounters:   06/15/20 160/60   20 137/54   20 127/53     Social History     Socioeconomic History   • Marital status:      Spouse name: Not on file   • Number of children: Not on file   • Years of education: Not on file   • Highest education level: Not on file   Tobacco Use   • Smoking status: Former Smoker     Packs/day: 2.00     Years: 40.00     Pack years: 80.00     Types: Cigarettes     Last attempt to quit: 2013     Years since quittin.2   • Smokeless tobacco: Never Used   • Tobacco comment: 1 cup coffee daily   Substance and Sexual Activity   • Alcohol use: No     Comment: history of heavy drinker    • Drug use: No   • Sexual activity: Defer       Allergies   Allergen Reactions   • Contrast Dye Hives   • Iodinated Diagnostic Agents Hives   • Norco [Hydrocodone-Acetaminophen] Hallucinations   • Tenoretic [Atenolol-Chlorthalidone] Anaphylaxis       Body mass index is 40.67 kg/m².    Objective   Physical Exam   Constitutional: She is oriented to person, place, and time. Vital signs are normal. She appears well-developed and well-nourished.   Sitting in wheelchair  wearing nasal cannula with 6 L of oxygen on portable tank.  Sister is sitting on exam chair wearing a surgical facial mask   HENT:   Head: Normocephalic and atraumatic.   Right Ear: Hearing, " external ear and ear canal normal. Tympanic membrane is bulging.   Left Ear: Hearing, external ear and ear canal normal. Tympanic membrane is bulging.   Nose: Right sinus exhibits maxillary sinus tenderness. Right sinus exhibits no frontal sinus tenderness. Left sinus exhibits maxillary sinus tenderness. Left sinus exhibits no frontal sinus tenderness.   Mouth/Throat: Uvula is midline, oropharynx is clear and moist and mucous membranes are normal.   Eyes: Pupils are equal, round, and reactive to light. Conjunctivae, EOM and lids are normal.   Fundoscopic exam:       The right eye shows no hemorrhage and no papilledema.        The left eye shows no hemorrhage and no papilledema.   Neck: Trachea normal and phonation normal. Neck supple. Normal carotid pulses, no hepatojugular reflux and no JVD present. No tracheal tenderness present. Carotid bruit is not present. No tracheal deviation and no edema present.   Cardiovascular: Normal rate, regular rhythm, S1 normal, S2 normal, normal heart sounds and normal pulses.   No murmur heard.  Pulmonary/Chest: Effort normal and breath sounds normal.   Abdominal: Soft. Normal appearance, normal aorta and bowel sounds are normal. There is no hepatomegaly. There is no tenderness. There is no rigidity, no rebound, no guarding, no CVA tenderness, no tenderness at McBurney's point and negative Victor's sign.   Lymphadenopathy:     She has no cervical adenopathy.   Neurological: She is alert and oriented to person, place, and time.   Skin: Skin is warm, dry and intact. Capillary refill takes less than 2 seconds.   Psychiatric: She has a normal mood and affect. Her speech is normal and behavior is normal. Judgment and thought content normal. Cognition and memory are normal.      I was wearing surgical mask during the entire office visit encounter.      Results for orders placed or performed in visit on 06/15/20   POCT urinalysis dipstick, manual   Result Value Ref Range    Color Yellow  Yellow, Straw, Dark Yellow, Cha    Clarity, UA Turbid (A) Clear    Glucose, UA Negative Negative, 1000 mg/dL (3+) mg/dL    Bilirubin Negative Negative    Ketones, UA Negative Negative    Specific Gravity  1.015 1.005 - 1.030    Blood, UA Trace (A) Negative    pH, Urine 7.5 5.0 - 8.0    Protein, POC 2+ (A) Negative mg/dL    Urobilinogen, UA Normal Normal    Leukocytes Moderate (2+) (A) Negative    Nitrite, UA Positive (A) Negative     Assessment/Plan   Mar was seen today for urinary tract infection and uri.    Diagnoses and all orders for this visit:    Urine frequency  -     Urine Culture - Urine, Urine, Clean Catch  -     POCT urinalysis dipstick, manual    Neuropathy  -     Compliance Drug Analysis, Ur - Urine, Clean Catch    Long-term use of high-risk medication  -     Compliance Drug Analysis, Ur - Urine, Clean Catch    Acute maxillary sinusitis, recurrence not specified  -     cefuroxime (Ceftin) 500 MG tablet; Take 1 tablet by mouth 2 (Two) Times a Day.    Acute cystitis with hematuria  -     cefuroxime (Ceftin) 500 MG tablet; Take 1 tablet by mouth 2 (Two) Times a Day.    1.  New urine frequency with acute cystitis: In office urinalysis was positive for sites, nitrites and red blood cells.  Urine culture was sent to the laboratory for further evaluation.  She was diagnosed with a UTI and placed on Ceftin antibiotic.  She will be notified of urine culture report and any medication changes.  2.  New acute maxillary sinusitis: She will continue using her over-the-counter sinus medication for now.  Have placed on Ceftin antibiotic.  She will keep her follow-up appointment with pulmonology.  3.  Chronic and stable neuropathy: Doing well with the gabapentin medication.  No refills are needed at this time.  She has signed the appropriate agreement and consent forms for the gabapentin medication.  Will have a urine drug screen collected at office visit today for compliance purposes.  Krys will be notified of test  results when completed.  Has follow-up appointment in 1 month for her diabetes.  She will come to office fasting for lab work.      MARGARITA Narvaez [facility]  [address] [place] [id number]  Dept: [phone]  Dept Fax: [phone]  Loc: [phone]  Loc Fax: [phone]

## 2020-06-18 LAB
BACTERIA UR CULT: ABNORMAL
BACTERIA UR CULT: ABNORMAL
DRUGS UR: NORMAL
OTHER ANTIBIOTIC SUSC ISLT: ABNORMAL

## 2020-06-29 ENCOUNTER — HOSPITAL ENCOUNTER (OUTPATIENT)
Dept: INFUSION THERAPY | Facility: HOSPITAL | Age: 70
Discharge: HOME | End: 2020-06-29
Admitting: INTERNAL MEDICINE

## 2020-06-29 VITALS
RESPIRATION RATE: 16 BRPM | SYSTOLIC BLOOD PRESSURE: 133 MMHG | HEART RATE: 73 BPM | OXYGEN SATURATION: 99 % | BODY MASS INDEX: 41.92 KG/M2 | DIASTOLIC BLOOD PRESSURE: 53 MMHG | TEMPERATURE: 97.6 F | WEIGHT: 236.6 LBS | HEIGHT: 63 IN

## 2020-06-29 DIAGNOSIS — K50.119 CROHN'S DISEASE OF COLON WITH COMPLICATION (HCC): ICD-10-CM

## 2020-06-29 DIAGNOSIS — K50.10 CROHN'S DISEASE OF LARGE INTESTINE WITHOUT COMPLICATION (HCC): Primary | ICD-10-CM

## 2020-06-29 PROCEDURE — A9270 NON-COVERED ITEM OR SERVICE: HCPCS | Performed by: INTERNAL MEDICINE

## 2020-06-29 PROCEDURE — 63710000001 ACETAMINOPHEN 325 MG TABLET: Performed by: INTERNAL MEDICINE

## 2020-06-29 PROCEDURE — 96415 CHEMO IV INFUSION ADDL HR: CPT

## 2020-06-29 PROCEDURE — 96413 CHEMO IV INFUSION 1 HR: CPT

## 2020-06-29 PROCEDURE — 63710000001 DIPHENHYDRAMINE PER 50 MG: Performed by: INTERNAL MEDICINE

## 2020-06-29 PROCEDURE — 25010000002 INFLIXIMAB PER 10 MG: Performed by: INTERNAL MEDICINE

## 2020-06-29 RX ORDER — ACETAMINOPHEN 325 MG/1
650 TABLET ORAL ONCE
Status: COMPLETED | OUTPATIENT
Start: 2020-06-29 | End: 2020-06-29

## 2020-06-29 RX ORDER — DIPHENHYDRAMINE HCL 25 MG
25 CAPSULE ORAL ONCE
Status: CANCELLED | OUTPATIENT
Start: 2020-08-24

## 2020-06-29 RX ORDER — SODIUM CHLORIDE 9 MG/ML
250 INJECTION, SOLUTION INTRAVENOUS ONCE
Status: CANCELLED | OUTPATIENT
Start: 2020-08-24

## 2020-06-29 RX ORDER — ACETAMINOPHEN 325 MG/1
650 TABLET ORAL ONCE
Status: CANCELLED | OUTPATIENT
Start: 2020-08-24

## 2020-06-29 RX ORDER — DIPHENHYDRAMINE HCL 25 MG
25 CAPSULE ORAL ONCE
Status: COMPLETED | OUTPATIENT
Start: 2020-06-29 | End: 2020-06-29

## 2020-06-29 RX ORDER — SODIUM CHLORIDE 9 MG/ML
250 INJECTION, SOLUTION INTRAVENOUS ONCE
Status: DISCONTINUED | OUTPATIENT
Start: 2020-06-29 | End: 2020-07-01 | Stop reason: HOSPADM

## 2020-06-29 RX ADMIN — DIPHENHYDRAMINE HYDROCHLORIDE 25 MG: 25 CAPSULE ORAL at 09:48

## 2020-06-29 RX ADMIN — INFLIXIMAB 535 MG: 100 INJECTION, POWDER, LYOPHILIZED, FOR SOLUTION INTRAVENOUS at 10:22

## 2020-06-29 RX ADMIN — ACETAMINOPHEN 650 MG: 325 TABLET, FILM COATED ORAL at 09:48

## 2020-07-09 ENCOUNTER — OFFICE VISIT (OUTPATIENT)
Dept: ORTHOPEDIC SURGERY | Facility: CLINIC | Age: 70
End: 2020-07-09

## 2020-07-09 VITALS — BODY MASS INDEX: 41.82 KG/M2 | HEIGHT: 63 IN | WEIGHT: 236 LBS

## 2020-07-09 DIAGNOSIS — Z96.611 STATUS POST REVERSE TOTAL ARTHROPLASTY OF RIGHT SHOULDER: Primary | ICD-10-CM

## 2020-07-09 PROCEDURE — 99212 OFFICE O/P EST SF 10 MIN: CPT | Performed by: ORTHOPAEDIC SURGERY

## 2020-07-09 NOTE — PROGRESS NOTES
Subjective:     Patient ID: Mar Camilo is a 70 y.o. female.    Chief Complaint: s/p right reverse TSA Arthroplasty with open biceps tenodesis, 2019    History of Present Illness  Mar Camilo returns to clinic today for evaluation of her right shoulder.  Overall she is doing very well in her recovery after shoulder arthroplasty.  She did have to discontinue physical therapy due to the coronavirus.  She rates the pain as 5/10, describes it as aching in nature. Localizes pain to the anterolateral arm. Has noted improvement with rest. Symptoms are exacerbated with lifting objects. Denies radiation of pain, denies associated numbness or tingling.    Social History     Occupational History   • Not on file   Tobacco Use   • Smoking status: Former Smoker     Packs/day: 2.00     Years: 40.00     Pack years: 80.00     Types: Cigarettes     Last attempt to quit: 2013     Years since quittin.2   • Smokeless tobacco: Never Used   • Tobacco comment: 1 cup coffee daily   Substance and Sexual Activity   • Alcohol use: No     Comment: history of heavy drinker    • Drug use: No   • Sexual activity: Defer      Past Medical History:   Diagnosis Date   • Arthritis    • Carotid arterial disease (CMS/HCC)     US 2015 with 80-99% right and 16-49% left, but CTA with 60-70% right, not a surgical candidate   • Chronic back pain    • Closed fracture of left tibial plateau 2018   • COPD (chronic obstructive pulmonary disease) (CMS/HCC)    • Coronary artery disease    • Crohn's disease (CMS/HCC)     Remicade on hold d/t antibiotics   • DDD (degenerative disc disease)    • Depression    • Diabetes mellitus (CMS/HCC)     Type II   • Elevated cholesterol    • GERD (gastroesophageal reflux disease)    • Heartburn 2016   • Hernia of abdominal wall    • History of stroke 2017    left parietal   • Hyperlipidemia    • Hypertension    • Hypokalemia    • Morbid obesity (CMS/HCC)    • Obstructive pyelonephritis    • On  home oxygen therapy     2L UNLESS STRESSED THEN 2.5-3 L   • Osteopenia 2/21/2016   • PAF (paroxysmal atrial fibrillation) (CMS/HCC)    • PVD (peripheral vascular disease) (CMS/HCC)     RT CAROTID STENOSIS   • Radiculopathy     NECK PAIN   • Right shoulder pain     scheduled for sx   • Seizure (CMS/HCC)     with stroke   • Stroke (CMS/HCC) 2017    memory,    • Subclavian artery stenosis (CMS/HCC)     and axillary artery stenosis, on the left     Past Surgical History:   Procedure Laterality Date   • APPENDECTOMY N/A    • BRONCHOSCOPY N/A 1/5/2018    Procedure: BRONCHOSCOPY;  Surgeon: Gustavo Muniz MD;  Location: [facility] OR;  Service:    • COLON SURGERY     • COLONOSCOPY     • CYSTOSCOPY URETEROSCOPY LASER LITHOTRIPSY Right 4/17/2017    Procedure: CYSTOSCOPY with  right stent removal, right URETEROSCOPY LASER LITHOTRIPSY,  with STONE BASKET EXTRACTION;  Surgeon: Dipesh Bean MD;  Location: [facility] OR;  Service:    • CYSTOSCOPY W/ URETERAL STENT PLACEMENT Right 3/18/2017    Procedure: CYSTOSCOPY URETERAL CATHETER/STENT INSERTION;  Surgeon: Dipesh Bean MD;  Location: [facility] OR;  Service:    • ENDOSCOPY     • FLEXIBLE SIGMOIDOSCOPY N/A 01/25/2016    Dr. Luis Rosas   • FRACTURE SURGERY  2017    broken femur, left   • JOINT REPLACEMENT  12/30/2019    right shoulder replacement   • LAPAROSCOPIC COLON RESECTION N/A 01/25/2016    Laparoscopic mobilization of splenic flexure, transverse colectomy with primary anastomosis-Dr. Christopher Richardson   • LUNG BIOPSY      NEGATIVE   • NE THROMBOENDARTECTMY NECK,NECK INCIS Right 3/14/2016    Procedure: RT CAROTID ENDARTERECTOMY;  Surgeon: Federico Schuler MD;  Location: [facility] OR;  Service: Vascular   • TIBIAL PLATEAU OPEN REDUCTION INTERNAL FIXATION Left 05/09/2018    Open reduction internal fixation of left tibial plateau fracture-Dr. Ayden Cárdenas   • TOTAL SHOULDER ARTHROPLASTY W/ DISTAL CLAVICLE EXCISION Right 12/30/2019    Procedure: TOTAL SHOULDER REVERSE  "ARTHROPLASTY, open  biceps tenodesis;  Surgeon: Altaf Reyes MD;  Location: [facility];  Service: Orthopedics   • VASCULAR SURGERY  2017    caroid artery   • WRIST ARTHROSCOPY Right     WITH RELEASE OF TRANSVERSE CARPAL LIGAMENT       Family History   Problem Relation Age of Onset   • Diabetes Mother    • Stroke Mother    • Other Father         RESPIRATORY FAILURE   • Cancer Other         lung cancer   • Crohn's disease Brother    • Crohn's disease Maternal Aunt          Review of Systems   Constitutional: Negative for chills, diaphoresis, fever and unexpected weight change.   HENT: Negative for hearing loss, nosebleeds, sore throat and tinnitus.    Eyes: Negative for pain and visual disturbance.   Respiratory: Negative for cough, shortness of breath and wheezing.    Cardiovascular: Negative for chest pain and palpitations.   Gastrointestinal: Negative for abdominal pain, diarrhea, nausea and vomiting.   Endocrine: Negative for cold intolerance, heat intolerance and polydipsia.   Genitourinary: Negative for difficulty urinating, dysuria and hematuria.   Musculoskeletal: Positive for arthralgias. Negative for joint swelling and myalgias.   Skin: Negative for rash and wound.   Allergic/Immunologic: Negative for environmental allergies.   Neurological: Negative for dizziness, syncope and numbness.   Hematological: Does not bruise/bleed easily.   Psychiatric/Behavioral: Negative for dysphoric mood and sleep disturbance. The patient is not nervous/anxious.            Objective:  Vitals:    07/09/20 0951   Weight: 107 kg (236 lb)   Height: 160 cm (63\")         07/09/20  0951   Weight: 107 kg (236 lb)     Body mass index is 41.81 kg/m².  General: No acute distress.  Resp: normal respiratory effort  Skin: no rashes or wounds; normal turgor  Psych: mood and affect appropriate; recent and remote memory intact        Ortho Exam       Right shoulder-  Tenderness  located anterior lateral arm  FF-   Active- 150 "    Strength- 4/5  ER-      Active- 45   Strength- 4/5     Strength- 4/5 on belly press test    Cross arm test- able to reach opposite shoulder    Brisk cap refill to all digits, palpable radial pulse    Positive sensation to light touch palmar, dorsal aspects of small and index fingers and anatomic snuffbox right hand    Imaging:  None    Assessment:        1. Status post reverse total arthroplasty of right shoulder, DOS 12/30/2019           Plan:          1. Discussed treatment options at length with patient at today's visit.  2. Advised patient to start at-home exercise program for improvement in strength, range of motion and function with daily activities. Shoulder exercises provided today.  3. Follow-up with me in 6 months for re-evaluation with repeat x-rays of right shoulder at follow-up visit      Mar Camilo and her sister were in agreement with plan and had all questions answered.     Orders:  No orders of the defined types were placed in this encounter.      Medications:  No orders of the defined types were placed in this encounter.      Followup:  Return in about 6 months (around 1/9/2021) for xrays needed at follow up.    Mar was seen today for follow-up.    Diagnoses and all orders for this visit:    Status post reverse total arthroplasty of right shoulder, DOS 12/30/2019           By signing my name here, I Janee Aceves attest that all documentation on 07/09/20 at 11:43 has been prepared under the direction and in the presence of Dr. Altaf Reyes MD.    I, Dr. Altaf Reyes, personally performed the services described in this documentation, as scribed by Janee Aceves, in my presence, and it is both accurate and complete.      Dictated utilizing Dragon dictation

## 2020-07-13 ENCOUNTER — OFFICE VISIT (OUTPATIENT)
Dept: FAMILY MEDICINE CLINIC | Facility: CLINIC | Age: 70
End: 2020-07-13

## 2020-07-13 VITALS
OXYGEN SATURATION: 90 % | DIASTOLIC BLOOD PRESSURE: 50 MMHG | BODY MASS INDEX: 40.93 KG/M2 | HEIGHT: 63 IN | TEMPERATURE: 98.8 F | HEART RATE: 110 BPM | SYSTOLIC BLOOD PRESSURE: 150 MMHG | WEIGHT: 231 LBS

## 2020-07-13 DIAGNOSIS — K43.9 VENTRAL HERNIA WITHOUT OBSTRUCTION OR GANGRENE: ICD-10-CM

## 2020-07-13 DIAGNOSIS — E66.01 MORBID OBESITY (HCC): ICD-10-CM

## 2020-07-13 DIAGNOSIS — I10 ESSENTIAL HYPERTENSION: ICD-10-CM

## 2020-07-13 DIAGNOSIS — E78.2 MIXED HYPERLIPIDEMIA: ICD-10-CM

## 2020-07-13 DIAGNOSIS — N39.0 URINARY TRACT INFECTION WITH HEMATURIA, SITE UNSPECIFIED: ICD-10-CM

## 2020-07-13 DIAGNOSIS — E11.8 TYPE 2 DIABETES MELLITUS WITH COMPLICATION, WITHOUT LONG-TERM CURRENT USE OF INSULIN (HCC): Primary | ICD-10-CM

## 2020-07-13 DIAGNOSIS — R31.9 URINARY TRACT INFECTION WITH HEMATURIA, SITE UNSPECIFIED: ICD-10-CM

## 2020-07-13 DIAGNOSIS — R82.90 ABNORMAL URINE ODOR: ICD-10-CM

## 2020-07-13 DIAGNOSIS — J01.00 ACUTE MAXILLARY SINUSITIS, RECURRENCE NOT SPECIFIED: ICD-10-CM

## 2020-07-13 LAB
BILIRUB BLD-MCNC: NEGATIVE MG/DL
CLARITY, POC: ABNORMAL
COLOR UR: YELLOW
GLUCOSE UR STRIP-MCNC: NEGATIVE MG/DL
KETONES UR QL: NEGATIVE
LEUKOCYTE EST, POC: ABNORMAL
NITRITE UR-MCNC: NEGATIVE MG/ML
PH UR: 5 [PH] (ref 5–8)
PROT UR STRIP-MCNC: ABNORMAL MG/DL
RBC # UR STRIP: ABNORMAL /UL
SP GR UR: 1.02 (ref 1–1.03)
UROBILINOGEN UR QL: NORMAL

## 2020-07-13 PROCEDURE — 81002 URINALYSIS NONAUTO W/O SCOPE: CPT | Performed by: PHYSICIAN ASSISTANT

## 2020-07-13 PROCEDURE — 99214 OFFICE O/P EST MOD 30 MIN: CPT | Performed by: PHYSICIAN ASSISTANT

## 2020-07-13 RX ORDER — SULFAMETHOXAZOLE AND TRIMETHOPRIM 800; 160 MG/1; MG/1
1 TABLET ORAL 2 TIMES DAILY
Qty: 14 TABLET | Refills: 0 | Status: SHIPPED | OUTPATIENT
Start: 2020-07-13 | End: 2020-09-04 | Stop reason: HOSPADM

## 2020-07-13 NOTE — PROGRESS NOTES
Subjective   Mar Camilo is a 70 y.o. female presents for   Chief Complaint   Patient presents with   • Diabetes     management   • Hyperlipidemia     management   • Hypertension     management   • Sinusitis       History of Present Illness   Mar is a 70-year-old female who presents with sister at office visit today for follow-up on type 2 diabetes, hyperlipidemia, hypertension and new sinus issues.  Krys states she has been having several issues today.  1.  She has been having some sinus pressure, green productive cough, postnasal drip, headaches, ear pressure and gum pain off and on for the past week or so.  Denied any fevers, chills, or GI upset.  Currently sees pulmonologist for her emphysema issues.  Currently using 4 L of oxygen with sitting and 6 L with walking.  2.  She has noticed a strong odor to her urine.  She has had a UTI in June 2020.  She did finish the antibiotic.  Denied any dysuria but has had frequency and urgency.  Bowel movements have been daily without dark black tarry stools.  3.  Has been taking her diabetic medication.  She has not taken any medication today though.  She is fasting.  Denied any chest pain, dizziness, swelling of ankles or swelling of ankles.  4.  She has seen Dr. Umaña for her Crohn's disease.  Had 2 surgical consults for her ventral hernia.  She thinks it has gotten bigger.  States causes pain and discomfort from time to time.  Seems like it is pushing up against her rib cage.  Would like to have surgery supple.      The following portions of the patient's history were reviewed and updated as appropriate: allergies, current medications, past family history, past medical history, past social history, past surgical history and problem list.    Review of Systems   Constitutional: Negative.  Negative for chills, fatigue and fever.   HENT: Positive for congestion, ear pain, postnasal drip, rhinorrhea, sinus pressure and sinus pain. Negative for ear discharge, sneezing  "and sore throat.    Eyes: Negative.    Respiratory: Positive for cough, shortness of breath and wheezing. Negative for stridor.    Cardiovascular: Negative.  Negative for chest pain, palpitations and leg swelling.   Gastrointestinal: Positive for abdominal pain. Negative for constipation, diarrhea, nausea, rectal pain and vomiting.   Endocrine: Negative.    Genitourinary: Negative.    Musculoskeletal: Negative.    Skin: Negative.    Allergic/Immunologic: Negative.    Neurological: Negative.  Negative for dizziness, light-headedness and headaches.   Hematological: Negative.    Psychiatric/Behavioral: Negative.  Negative for sleep disturbance.   All other systems reviewed and are negative.        Vitals:    20 1047   BP: 150/50   Pulse: 110   Temp: 98.8 °F (37.1 °C)   SpO2: 90%   Weight: 105 kg (231 lb)   Height: 160 cm (63\")     Wt Readings from Last 3 Encounters:   20 105 kg (231 lb)   20 107 kg (236 lb)   20 107 kg (236 lb 9.6 oz)     BP Readings from Last 3 Encounters:   20 150/50   20 133/53   06/15/20 160/60     Social History     Socioeconomic History   • Marital status:      Spouse name: Not on file   • Number of children: Not on file   • Years of education: Not on file   • Highest education level: Not on file   Tobacco Use   • Smoking status: Former Smoker     Packs/day: 2.00     Years: 40.00     Pack years: 80.00     Types: Cigarettes     Last attempt to quit: 2013     Years since quittin.2   • Smokeless tobacco: Never Used   • Tobacco comment: 1 cup coffee daily   Substance and Sexual Activity   • Alcohol use: No     Comment: history of heavy drinker    • Drug use: No   • Sexual activity: Defer       Allergies   Allergen Reactions   • Contrast Dye Hives   • Iodinated Diagnostic Agents Hives   • Norco [Hydrocodone-Acetaminophen] Hallucinations   • Tenoretic [Atenolol-Chlorthalidone] Anaphylaxis       Body mass index is 40.92 kg/m².    Objective   Physical " Exam   Constitutional: She is oriented to person, place, and time. Vital signs are normal. She appears well-developed and well-nourished.   Sitting in wheelchair wearing nasal cannula with 4 L of oxygen.  She is wearing a facial mask as well.   HENT:   Head: Normocephalic and atraumatic.   Right Ear: Hearing, external ear and ear canal normal. Tympanic membrane is bulging.   Left Ear: Hearing, external ear and ear canal normal. Tympanic membrane is bulging.   Nose: Right sinus exhibits maxillary sinus tenderness. Right sinus exhibits no frontal sinus tenderness. Left sinus exhibits maxillary sinus tenderness. Left sinus exhibits no frontal sinus tenderness.   Mouth/Throat: Uvula is midline, oropharynx is clear and moist and mucous membranes are normal.   Eyes: Pupils are equal, round, and reactive to light. Conjunctivae, EOM and lids are normal.   Neck: Trachea normal and phonation normal. Neck supple. Normal carotid pulses, no hepatojugular reflux and no JVD present. No tracheal tenderness present. Carotid bruit is not present. No tracheal deviation and no edema present. No thyroid mass and no thyromegaly present.   Cardiovascular: Normal rate, regular rhythm, S1 normal, S2 normal, normal heart sounds and normal pulses.   No murmur heard.  Pulmonary/Chest: Effort normal and breath sounds normal.   Abdominal: Soft. Normal appearance, normal aorta and bowel sounds are normal. There is no hepatomegaly. There is tenderness in the left lower quadrant.       Lymphadenopathy:     She has no cervical adenopathy.   Neurological: She is alert and oriented to person, place, and time.   Skin: Skin is warm, dry and intact. Capillary refill takes less than 2 seconds.   Psychiatric: She has a normal mood and affect. Her speech is normal and behavior is normal. Judgment and thought content normal. Cognition and memory are normal.      I was wearing surgical mask during the entire office visit encounter.      Assessment/Plan      Mar was seen today for diabetes, hyperlipidemia, hypertension and sinusitis.    Diagnoses and all orders for this visit:    Type 2 diabetes mellitus with complication, without long-term current use of insulin (CMS/Formerly [facility])  -     CBC & Differential  -     Comprehensive Metabolic Panel  -     Hemoglobin A1c  -     Lipid Panel With LDL / HDL Ratio  -     MicroAlbumin, Urine, Random - Urine, Clean Catch    Mixed hyperlipidemia  -     Comprehensive Metabolic Panel  -     Lipid Panel With LDL / HDL Ratio    Essential hypertension    Morbid obesity (CMS/Formerly [facility])    Abnormal urine odor  -     POC Urinalysis Dipstick    Ventral hernia without obstruction or gangrene    Urinary tract infection with hematuria, site unspecified  -     Urine Culture - Urine, Urine, Clean Catch  -     sulfamethoxazole-trimethoprim (BACTRIM DS,SEPTRA DS) 800-160 MG per tablet; Take 1 tablet by mouth 2 (Two) Times a Day.    Acute maxillary sinusitis, recurrence not specified  -     sulfamethoxazole-trimethoprim (BACTRIM DS,SEPTRA DS) 800-160 MG per tablet; Take 1 tablet by mouth 2 (Two) Times a Day.    1.  Chronic and stable type 2 diabetes with her lipidemia: She is fasting will have a CBC, CMP, lipid profile, urine microalbumin and an A1c collected office visit today.  Krys will be notified of test results when completed.  Encouraged her to decrease sugar and carbohydrates in diet.  2.  Chronic and stable hypertension: She has not taking any of her medications today including blood pressure medication.  I have stressed the importance of taking her blood pressure medicine when she gets home due to elevated readings.  Will reevaluate at office visit in 3 months.  3.  New strong urine odor: In office urinalysis was positive for leukocytes and white cells.  She was diagnosed with a UTI and placed on Bactrim antibiotic.  Culture was sent to the laboratory for further evaluation.  She will be notified of test results when completed.  4.  New acute  maxillary sinusitis.  I have sent Bactrim antibiotic to pharmacy.  We will continue her inhalers at home.  May take Mucinex as well.  Stressed the importance of pushing fluids.  5.  Chronic ventral hernia: Krys thinks this has enlarged.  She has seen Dr. Do in past for surgical consult.  He refused to do surgery due to possible complications.  We have discussed that with her prior history of Crohn disease and surgeries of the intestines unsure if a surgeon would do surgery.  I have reviewed her CT of abdomen pelvis from September 19, 2019 with them at office visit today.  This was unchanged from prior studies.  We will schedule repeat CT of abdomen and pelvis for reevaluation of her hernia.  I will check with Dr. Umaña to see what his recommendations would be.  6.  Chronic morbid obesity: Stressed the importance of eating healthier to help lose weight.  Unfortunately she is unable to exercise due to her chronic lung disease as well as arthritic pain.    CT Abdomen Pelvis Without Contrast [[id number]] (Order [id number])   Order   Status: Final result   Study Notes      Jazlyn Herrera on 9/12/2019  2:06 PM   Large hernia protruding left side of abd for several yrs   Pt states worsening in pain and seems to be larger in size   Appendectomy  2 partial bowel resections due to crohns  No hx of ca  CT-0  NM-0      Appointment Information     Display Notes     pain in stomach and left foot is hurting            PACS Images      Radiology Images   Study Result     NONCONTRAST CT OF THE ABDOMEN AND PELVIS 09/12/2019     HISTORY:  69-year-old female with worsening abdominal pain and increased size of  known abdominal hernia. Chronic hernia for several years. Prior  appendectomy and partial bowel resections due to Crohn's disease.     TECHNIQUE:   Noncontrast CT of the abdomen and pelvis was performed and compared with  08/13/2018 Radiation dose reduction techniques included automated  exposure control or exposure modulation  based on body size. Radiation  audit for CT and nuclear cardiology exams in the last 12 months: 0.     ABDOMEN FINDINGS:   The lungs demonstrate chronic appearing volume loss and fibrosis with  associated bronchiectasis in the left base. There are subtle tree-in-bud  opacities in the right lower lobe superimposed upon more chronic  fibrotic change. Correlate clinically for signs or symptoms of small  airways disease on the right. There is emphysema and chronic pleural  thickening on the left.     Trace pericardial thickening/fluid unchanged. Normal caliber aorta with  advanced atherosclerotic change. The spleen and adrenal glands and  pancreas are unremarkable. Negative gallbladder. Probable mild hepatic  steatosis. No radiopaque stone associated with either kidney or  hydronephrosis. No adenopathy.     PELVIS FINDINGS:   Negative bladder and atrophic uterus. No drainable fluid collection in  the pelvis. There is redundancy of the colon related to prior bowel  resection and re-anastomosis procedures. No evidence of bowel  obstruction or focal inflammatory change of the bowel at this time.     Redemonstration of an anterior abdominal wall hernia to the left of  midline. Entrance to the hernia sac measures up to about 8 cm transverse  by 9 cm craniocaudal previously about 8 cm transverse by 8 cm cranial  caudal. The hernia sac itself measures about 21 cm transverse by 16 cm  craniocaudal, previously 19 cm transverse by 13 cm craniocaudal. It is  slightly larger than on the prior study. The hernia sac contains loops  of small bowel fat and vessels. No complicating features on CT. No  drainable fluid collection. Inguinal canals unremarkable. No suspicious  bone lesion.     IMPRESSION:  1. Anterior abdominal wall hernia to the left of midline contains small  bowel loops fat and vessels. No complicating features. The hernia sac  and entrance to the sac are stable to slightly larger compared to the  prior study.  2.  Background emphysema with chronic fibrosis in both lower lobes  (right. Probable superimposed small airways disease in the right lung  base. Correlate clinically.  3. Hepatic steatosis.  4. Status post small and large bowel surgery and reanastomosis. No  evidence of focal inflammatory change or obstruction.     This report was finalized on 9/12/2019 2:23 PM by Dr. Patrick Blanton MD.      Imaging     CT Abdomen Pelvis Without Contrast (Order: [id number]) - 9/6/2019   Reprint Order Requisition     CT Abdomen Pelvis Without Contrast (Order #[id number]) on 9/6/19   Result Notes for CT Abdomen Pelvis Without Contrast     Notes recorded by Jolanta Zafar MA on 9/13/2019 at 1:34 PM EDT  lvm for pt with info  ------    Notes recorded by Vianey Barrios PA-C on 9/13/2019 at 7:28 AM EDT  Please notify patient that her CT of abdomen and pelvis showed slight enlarging of her hernia compared to last CT scan..  Will monitor.  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  Please keep follow-up appointment with her pulmonologist.  There were no acute findings.  Continue inhalers and using oxygen at home.          Signed by     Signed Date/Time  Phone Pager   PATRICK BLANTON 9/12/2019 14:23 [phone]    Exam Information     Status Exam Begun  Exam Ended    Final [99] 9/12/2019 14:05 9/12/2019 14:07   Questions      Will Oral Contrast be needed for this procedure? No      Reviewed by     Jolanta Zafar MA 9/13/2019 13:34   Vianey Barrios PA-C 9/13/2019 07:28   External Results Report     Open External Results Report    Encounter     View Encounter             Intra Procedure Documentation     Intra Procedure Documentation   Order-Level Documents:     There are no order-level documents.   Encounter-Level Documents - 09/06/2019:     Document on 9/6/2019 1546 by Emily Mercado: After Visit Summary          Implants     None   Patient Care Timeline     No data selected in time range   Reason for Exam   Priority: STAT    Hematuria   Dx: Ventral hernia without obstruction or gangrene [K43.9 (ICD-10-CM)]   Comments:    Results Routing Tracking     View Results Routing Information   Order Report     Order Details     [facility]  [address]  [place] [address]  Dept: [phone]  Dept Fax: [phone]  Loc: [phone]  Loc Fax: [phone]           Office Visit on 07/13/2020   Component Date Value Ref Range Status   • Color 07/13/2020 Yellow  Yellow, Straw, Dark Yellow, Cha Final   • Clarity, UA 07/13/2020 Cloudy* Clear Final   • Glucose, UA 07/13/2020 Negative  Negative, 1000 mg/dL (3+) mg/dL Final   • Bilirubin 07/13/2020 Negative  Negative Final   • Ketones, UA 07/13/2020 Negative  Negative Final   • Specific Gravity  07/13/2020 1.025  1.005 - 1.030 Final   • Blood, UA 07/13/2020 Trace* Negative Final   • pH, Urine 07/13/2020 5.0  5.0 - 8.0 Final   • Protein, POC 07/13/2020 Trace* Negative mg/dL Final   • Urobilinogen, UA 07/13/2020 Normal  Normal Final   • Leukocytes 07/13/2020 Moderate (2+)* Negative Final   • Nitrite, UA 07/13/2020 Negative  Negative Final

## 2020-07-14 PROBLEM — N39.0 URINARY TRACT INFECTION WITH HEMATURIA: Status: ACTIVE | Noted: 2020-07-14

## 2020-07-14 PROBLEM — R31.9 URINARY TRACT INFECTION WITH HEMATURIA: Status: ACTIVE | Noted: 2020-07-14

## 2020-07-14 LAB
ALBUMIN SERPL-MCNC: 4.6 G/DL (ref 3.5–5.2)
ALBUMIN/GLOB SERPL: 1.5 G/DL
ALP SERPL-CCNC: 79 U/L (ref 39–117)
ALT SERPL-CCNC: 16 U/L (ref 1–33)
AST SERPL-CCNC: 15 U/L (ref 1–32)
BASOPHILS # BLD AUTO: 0.04 10*3/MM3 (ref 0–0.2)
BASOPHILS NFR BLD AUTO: 0.2 % (ref 0–1.5)
BILIRUB SERPL-MCNC: 0.2 MG/DL (ref 0–1.2)
BUN SERPL-MCNC: 26 MG/DL (ref 8–23)
BUN/CREAT SERPL: 26.5 (ref 7–25)
CALCIUM SERPL-MCNC: 9.8 MG/DL (ref 8.6–10.5)
CHLORIDE SERPL-SCNC: 100 MMOL/L (ref 98–107)
CHOLEST SERPL-MCNC: 120 MG/DL (ref 0–200)
CO2 SERPL-SCNC: 23.1 MMOL/L (ref 22–29)
CREAT SERPL-MCNC: 0.98 MG/DL (ref 0.57–1)
EOSINOPHIL # BLD AUTO: 0.13 10*3/MM3 (ref 0–0.4)
EOSINOPHIL NFR BLD AUTO: 0.8 % (ref 0.3–6.2)
ERYTHROCYTE [DISTWIDTH] IN BLOOD BY AUTOMATED COUNT: 13.5 % (ref 12.3–15.4)
GLOBULIN SER CALC-MCNC: 3 GM/DL
GLUCOSE SERPL-MCNC: 177 MG/DL (ref 65–99)
HBA1C MFR BLD: 6.8 % (ref 4.8–5.6)
HCT VFR BLD AUTO: 39.4 % (ref 34–46.6)
HDLC SERPL-MCNC: 45 MG/DL (ref 40–60)
HGB BLD-MCNC: 12.8 G/DL (ref 12–15.9)
IMM GRANULOCYTES # BLD AUTO: 0.14 10*3/MM3 (ref 0–0.05)
IMM GRANULOCYTES NFR BLD AUTO: 0.9 % (ref 0–0.5)
LDLC SERPL CALC-MCNC: 30 MG/DL (ref 0–100)
LDLC/HDLC SERPL: 0.68 {RATIO}
LYMPHOCYTES # BLD AUTO: 2.44 10*3/MM3 (ref 0.7–3.1)
LYMPHOCYTES NFR BLD AUTO: 14.9 % (ref 19.6–45.3)
MCH RBC QN AUTO: 28.6 PG (ref 26.6–33)
MCHC RBC AUTO-ENTMCNC: 32.5 G/DL (ref 31.5–35.7)
MCV RBC AUTO: 88.1 FL (ref 79–97)
MICROALBUMIN UR-MCNC: 37.7 UG/ML
MONOCYTES # BLD AUTO: 0.88 10*3/MM3 (ref 0.1–0.9)
MONOCYTES NFR BLD AUTO: 5.4 % (ref 5–12)
NEUTROPHILS # BLD AUTO: 12.73 10*3/MM3 (ref 1.7–7)
NEUTROPHILS NFR BLD AUTO: 77.8 % (ref 42.7–76)
NRBC BLD AUTO-RTO: 0 /100 WBC (ref 0–0.2)
PLATELET # BLD AUTO: 429 10*3/MM3 (ref 140–450)
POTASSIUM SERPL-SCNC: 5.6 MMOL/L (ref 3.5–5.2)
PROT SERPL-MCNC: 7.6 G/DL (ref 6–8.5)
RBC # BLD AUTO: 4.47 10*6/MM3 (ref 3.77–5.28)
SODIUM SERPL-SCNC: 136 MMOL/L (ref 136–145)
TRIGL SERPL-MCNC: 223 MG/DL (ref 0–150)
VLDLC SERPL CALC-MCNC: 44.6 MG/DL
WBC # BLD AUTO: 16.36 10*3/MM3 (ref 3.4–10.8)

## 2020-07-15 DIAGNOSIS — E87.5 HYPERKALEMIA: Primary | ICD-10-CM

## 2020-07-16 LAB
BACTERIA UR CULT: ABNORMAL
BACTERIA UR CULT: ABNORMAL
OTHER ANTIBIOTIC SUSC ISLT: ABNORMAL

## 2020-07-22 DIAGNOSIS — N39.0 URINARY TRACT INFECTION WITH HEMATURIA, SITE UNSPECIFIED: ICD-10-CM

## 2020-07-22 DIAGNOSIS — R31.9 URINARY TRACT INFECTION WITH HEMATURIA, SITE UNSPECIFIED: ICD-10-CM

## 2020-07-22 DIAGNOSIS — J06.9 ACUTE URI: Primary | ICD-10-CM

## 2020-07-22 LAB
BILIRUB BLD-MCNC: NEGATIVE MG/DL
CLARITY, POC: CLEAR
COLOR UR: NORMAL
GLUCOSE UR STRIP-MCNC: NEGATIVE MG/DL
KETONES UR QL: NEGATIVE
LEUKOCYTE EST, POC: NEGATIVE
NITRITE UR-MCNC: NEGATIVE MG/ML
PH UR: 6 [PH] (ref 5–8)
PROT UR STRIP-MCNC: NEGATIVE MG/DL
RBC # UR STRIP: NEGATIVE /UL
SP GR UR: 1.03 (ref 1–1.03)
UROBILINOGEN UR QL: NORMAL

## 2020-07-22 PROCEDURE — 81002 URINALYSIS NONAUTO W/O SCOPE: CPT | Performed by: PHYSICIAN ASSISTANT

## 2020-07-26 DIAGNOSIS — I10 ESSENTIAL HYPERTENSION: ICD-10-CM

## 2020-07-27 DIAGNOSIS — M79.672 CHRONIC PAIN IN LEFT FOOT: ICD-10-CM

## 2020-07-27 DIAGNOSIS — G89.29 CHRONIC PAIN OF LEFT LOWER EXTREMITY: ICD-10-CM

## 2020-07-27 DIAGNOSIS — G62.9 NEUROPATHY: ICD-10-CM

## 2020-07-27 DIAGNOSIS — G89.29 CHRONIC PAIN OF BOTH SHOULDERS: ICD-10-CM

## 2020-07-27 DIAGNOSIS — M79.605 CHRONIC PAIN OF LEFT LOWER EXTREMITY: ICD-10-CM

## 2020-07-27 DIAGNOSIS — M25.512 CHRONIC PAIN OF BOTH SHOULDERS: ICD-10-CM

## 2020-07-27 DIAGNOSIS — M25.511 CHRONIC PAIN OF BOTH SHOULDERS: ICD-10-CM

## 2020-07-27 DIAGNOSIS — G89.29 CHRONIC PAIN IN LEFT FOOT: ICD-10-CM

## 2020-07-27 RX ORDER — VERAPAMIL HYDROCHLORIDE 240 MG/1
TABLET, FILM COATED, EXTENDED RELEASE ORAL
Qty: 180 TABLET | Refills: 0 | Status: SHIPPED | OUTPATIENT
Start: 2020-07-27 | End: 2020-11-25

## 2020-07-27 RX ORDER — GABAPENTIN 300 MG/1
300 CAPSULE ORAL 2 TIMES DAILY
Qty: 60 CAPSULE | Refills: 3 | Status: SHIPPED | OUTPATIENT
Start: 2020-07-27 | End: 2020-12-30

## 2020-07-29 ENCOUNTER — RESULTS ENCOUNTER (OUTPATIENT)
Dept: FAMILY MEDICINE CLINIC | Facility: CLINIC | Age: 70
End: 2020-07-29

## 2020-07-29 DIAGNOSIS — E87.5 HYPERKALEMIA: ICD-10-CM

## 2020-07-30 ENCOUNTER — HOSPITAL ENCOUNTER (OUTPATIENT)
Dept: CT IMAGING | Facility: HOSPITAL | Age: 70
Discharge: HOME | End: 2020-07-30
Admitting: PHYSICIAN ASSISTANT

## 2020-07-30 PROCEDURE — 74176 CT ABD & PELVIS W/O CONTRAST: CPT

## 2020-08-09 DIAGNOSIS — J44.1 CHRONIC OBSTRUCTIVE PULMONARY DISEASE WITH ACUTE EXACERBATION (HCC): ICD-10-CM

## 2020-08-09 DIAGNOSIS — E78.1 HYPERTRIGLYCERIDEMIA: ICD-10-CM

## 2020-08-09 DIAGNOSIS — E78.2 MIXED HYPERLIPIDEMIA: ICD-10-CM

## 2020-08-10 RX ORDER — ROSUVASTATIN CALCIUM 40 MG/1
TABLET, COATED ORAL
Qty: 90 TABLET | Refills: 0 | Status: SHIPPED | OUTPATIENT
Start: 2020-08-10 | End: 2020-11-11 | Stop reason: SDUPTHER

## 2020-08-10 RX ORDER — ALBUTEROL SULFATE 90 UG/1
AEROSOL, METERED RESPIRATORY (INHALATION)
Qty: 18 G | Refills: 0 | Status: SHIPPED | OUTPATIENT
Start: 2020-08-10 | End: 2020-12-30

## 2020-08-24 ENCOUNTER — HOSPITAL ENCOUNTER (OUTPATIENT)
Dept: INFUSION THERAPY | Facility: HOSPITAL | Age: 70
Discharge: HOME | End: 2020-08-24
Admitting: INTERNAL MEDICINE

## 2020-08-24 VITALS
DIASTOLIC BLOOD PRESSURE: 52 MMHG | HEART RATE: 70 BPM | OXYGEN SATURATION: 94 % | TEMPERATURE: 96.8 F | SYSTOLIC BLOOD PRESSURE: 126 MMHG | WEIGHT: 239 LBS | RESPIRATION RATE: 20 BRPM | BODY MASS INDEX: 42.34 KG/M2

## 2020-08-24 DIAGNOSIS — K50.10 CROHN'S DISEASE OF LARGE INTESTINE WITHOUT COMPLICATION (HCC): Primary | ICD-10-CM

## 2020-08-24 DIAGNOSIS — K50.119 CROHN'S DISEASE OF COLON WITH COMPLICATION (HCC): ICD-10-CM

## 2020-08-24 PROCEDURE — 63710000001 ACETAMINOPHEN 325 MG TABLET: Performed by: INTERNAL MEDICINE

## 2020-08-24 PROCEDURE — 96415 CHEMO IV INFUSION ADDL HR: CPT

## 2020-08-24 PROCEDURE — A9270 NON-COVERED ITEM OR SERVICE: HCPCS | Performed by: INTERNAL MEDICINE

## 2020-08-24 PROCEDURE — 25010000002 INFLIXIMAB PER 10 MG: Performed by: INTERNAL MEDICINE

## 2020-08-24 PROCEDURE — 96413 CHEMO IV INFUSION 1 HR: CPT

## 2020-08-24 PROCEDURE — 63710000001 DIPHENHYDRAMINE PER 50 MG: Performed by: INTERNAL MEDICINE

## 2020-08-24 RX ORDER — DIPHENHYDRAMINE HCL 25 MG
25 CAPSULE ORAL ONCE
Status: COMPLETED | OUTPATIENT
Start: 2020-08-24 | End: 2020-08-24

## 2020-08-24 RX ORDER — ACETAMINOPHEN 325 MG/1
650 TABLET ORAL ONCE
Status: CANCELLED | OUTPATIENT
Start: 2020-10-05

## 2020-08-24 RX ORDER — ACETAMINOPHEN 325 MG/1
650 TABLET ORAL ONCE
Status: COMPLETED | OUTPATIENT
Start: 2020-08-24 | End: 2020-08-24

## 2020-08-24 RX ORDER — DIPHENHYDRAMINE HCL 25 MG
25 CAPSULE ORAL ONCE
Status: CANCELLED | OUTPATIENT
Start: 2020-10-05

## 2020-08-24 RX ADMIN — INFLIXIMAB 500 MG: 100 INJECTION, POWDER, LYOPHILIZED, FOR SOLUTION INTRAVENOUS at 10:14

## 2020-08-24 RX ADMIN — DIPHENHYDRAMINE HYDROCHLORIDE 25 MG: 25 CAPSULE ORAL at 09:42

## 2020-08-24 RX ADMIN — ACETAMINOPHEN 650 MG: 325 TABLET, FILM COATED ORAL at 09:42

## 2020-08-24 NOTE — NURSING NOTE
0855  Pt to [facility] per wheelchair to receive Remicade infusion.  1245  Pt discharged to care of family per WC on portable oxygen.  Discharge AVS with next appt given.  Pt dony infusion without any adverse reactions.

## 2020-08-24 NOTE — PATIENT INSTRUCTIONS
"Call Dr. Roberto Carlos Umaña, Gastroenterology at [phone] if you have any problems or concerns.    We know you have a Choice in healthcare and appreciate you using [facility].  Our purpose is to provide you \"Excellent Care\".  We hope that you will always choose us in the future and continue to recommend us to your family and friends.      "

## 2020-08-27 ENCOUNTER — OFFICE VISIT (OUTPATIENT)
Dept: FAMILY MEDICINE CLINIC | Facility: CLINIC | Age: 70
End: 2020-08-27

## 2020-08-27 VITALS
DIASTOLIC BLOOD PRESSURE: 64 MMHG | HEIGHT: 63 IN | BODY MASS INDEX: 42.35 KG/M2 | TEMPERATURE: 97.9 F | OXYGEN SATURATION: 92 % | RESPIRATION RATE: 16 BRPM | HEART RATE: 91 BPM | WEIGHT: 239 LBS | SYSTOLIC BLOOD PRESSURE: 120 MMHG

## 2020-08-27 DIAGNOSIS — R22.2 SUBCUTANEOUS MASS OF ABDOMINAL WALL: Primary | ICD-10-CM

## 2020-08-27 PROCEDURE — 99213 OFFICE O/P EST LOW 20 MIN: CPT | Performed by: PHYSICIAN ASSISTANT

## 2020-08-27 NOTE — PROGRESS NOTES
"Subjective   Mar Camilo is a 70 y.o. female presents for   Chief Complaint   Patient presents with   • Abdominal Pain       History of Present Illness     Mar is a 70-year-old female who presents with sister, Clau, at office visit today.  Krys states she noticed a hard lump in her abdomen a few days ago.  States she was just pulling up her pants when she noticed a hard mass.  States it is very tender to touch.  She has noticed that the mass does move.  Denied any increased nominal pain, nausea, vomiting, diarrhea fevers or chills.  States her bowel movements have been normal for her.  Denied any dark black tarry stools.  Sleep has been normal for her as well.  Currently using 6 L of oxygen.    The following portions of the patient's history were reviewed and updated as appropriate: allergies, current medications, past family history, past medical history, past social history, past surgical history and problem list.    Review of Systems   Constitutional: Negative.  Negative for chills, fatigue and fever.   HENT: Negative.    Eyes: Negative.    Respiratory: Negative.  Negative for cough and chest tightness.    Cardiovascular: Negative.  Negative for chest pain, palpitations and leg swelling.   Gastrointestinal: Positive for abdominal distention and abdominal pain. Negative for blood in stool, constipation, diarrhea, nausea, rectal pain and vomiting.   Endocrine: Negative.    Genitourinary: Negative.    Musculoskeletal: Negative.    Skin: Negative.    Allergic/Immunologic: Negative.    Neurological: Negative.    Hematological: Negative.    Psychiatric/Behavioral: Negative.    All other systems reviewed and are negative.        Vitals:    08/27/20 1018   BP: 120/64   Pulse: 91   Resp: 16   Temp: 97.9 °F (36.6 °C)   SpO2: 92%   Weight: 108 kg (239 lb)   Height: 160 cm (63\")     Wt Readings from Last 3 Encounters:   08/27/20 108 kg (239 lb)   08/24/20 108 kg (239 lb)   07/13/20 105 kg (231 lb)     BP Readings from " Last 3 Encounters:   20 120/64   20 126/52   20 150/50     Social History     Socioeconomic History   • Marital status:      Spouse name: Not on file   • Number of children: Not on file   • Years of education: Not on file   • Highest education level: Not on file   Tobacco Use   • Smoking status: Former Smoker     Packs/day: 2.00     Years: 40.00     Pack years: 80.00     Types: Cigarettes     Last attempt to quit: 2013     Years since quittin.4   • Smokeless tobacco: Never Used   • Tobacco comment: 1 cup coffee daily   Substance and Sexual Activity   • Alcohol use: No     Comment: history of heavy drinker    • Drug use: No   • Sexual activity: Defer       Allergies   Allergen Reactions   • Contrast Dye Hives   • Iodinated Diagnostic Agents Hives   • Norco [Hydrocodone-Acetaminophen] Hallucinations   • Tenoretic [Atenolol-Chlorthalidone] Anaphylaxis       Body mass index is 42.34 kg/m².    Objective   Physical Exam   Constitutional: She is oriented to person, place, and time. Vital signs are normal. She appears well-developed and well-nourished.   Sitting in wheelchair wearing nasal cannula and facial mass.  On 6 L of oxygen.   HENT:   Head: Normocephalic and atraumatic.   Neck: Trachea normal. No tracheal tenderness present. No tracheal deviation and no edema present.   Cardiovascular: Normal rate, regular rhythm, S1 normal, S2 normal, normal heart sounds and normal pulses.   No murmur heard.  Pulmonary/Chest: Effort normal and breath sounds normal.   Abdominal: Soft. Normal appearance, normal aorta and bowel sounds are normal. There is no hepatomegaly. There is tenderness.       Neurological: She is alert and oriented to person, place, and time.   Skin: Skin is warm, dry and intact. Capillary refill takes less than 2 seconds.   Psychiatric: She has a normal mood and affect. Her speech is normal and behavior is normal. Judgment and thought content normal. Cognition and memory are  normal.      I was wearing surgical mask during the entire office visit encounter.    Assessment/Plan   Mar was seen today for abdominal pain.    Diagnoses and all orders for this visit:    Subcutaneous mass of abdominal wall  -     US Abdomen Complete; Future    Ms. Krys Camilo was seen in office today with her sister, Clau, for new niece Ms. abdominal wall.  Will schedule ultrasound of abdomen for further evaluation.  I have reviewed her CT scan of abdomen pelvis from July 30, 2020 with him at office visit today.  She has had a chronic ventral hernia for the past few years.  She saw Dr. Do, general surgeon who did not recommend surgery due to her other health concerns.  States surgery would only be recommended for an emergency case.  I have stressed to see you if she becomes febrile, increased abdominal pain or worsening symptoms, she will go to [facility] emergency room for evaluation.  She voiced understanding.  She will be notified of ultrasound of abdomen results.  Will consider referral to general surgeon if warranted.      Vianey Barrios PA-C    CT ABDOMEN AND PELVIS WITHOUT CONTRAST 07/30/2020     HISTORY:  Chronic large ventral hernia times several years. Appendectomy. Crohn's  disease and history of bowel resection.     TECHNIQUE:   Non contrast CT of the abdomen and pelvis was performed and compared with  09/12/2019 Radiation dose reduction techniques included automated  exposure control or exposure modulation based on body size. Radiation  audit for CT and nuclear cardiology exams in the last 12 months: 1.     ABDOMEN FINDINGS:   Included lung bases demonstrate basilar fibrosis left greater than right  and lower lobe bronchiectasis left greater than right. Normal caliber  aorta and advanced atherosclerotic change. Spleen adrenal glands and  pancreas are unremarkable and the gallbladder negative. The liver is  unremarkable and the kidneys are non obstructed. No adenopathy.      PELVIS FINDINGS:   Negative bladder. No drainable fluid collection. There is a large  anterior abdominal wall hernia containing loops of small and large  bowel. Entrance to the hernia sac measures up to 7.9 cm transverse by  10.2 cm craniocaudal. The hernia sac itself measures up to at least 22  cm transverse by 20 cm craniocaudal. No evidence of bowel obstruction or  focal inflammatory change of the bowel. The hernia sac also contains  vessels and fat. Appendix surgically absent by history. Negative  inguinal canals. No suspicious bone lesion with degenerative change in  the thoracolumbar spine.     IMPRESSION:  1. Anterior abdominal wall hernia containing small and large bowel,  vessels and fat. No, complicating features on CT at this time.  2. Bibasilar lung fibrosis left greater than right.  3. No evidence of outlet obstruction or focal inflammatory change.     This report was finalized on 7/30/2020 9:18 AM by Dr. Patrick Angela MD.      Imaging         [facility] MEDICINE  [address] [place] [id number]  Dept: [phone]  Dept Fax: [phone]  Loc: [phone]  Loc Fax: [phone]

## 2020-08-28 ENCOUNTER — TELEPHONE (OUTPATIENT)
Dept: FAMILY MEDICINE CLINIC | Facility: CLINIC | Age: 70
End: 2020-08-28

## 2020-08-28 NOTE — TELEPHONE ENCOUNTER
Pt's sister calling, states Krys's US isn't scheduled until Sept 4th.   Would like to know if this is okay, as she stated this is life threatening.

## 2020-08-28 NOTE — TELEPHONE ENCOUNTER
Please call patient and see if her pain has increased.  If stable from yesterday should be okay to wait till September 4.

## 2020-08-31 ENCOUNTER — TELEPHONE (OUTPATIENT)
Dept: FAMILY MEDICINE CLINIC | Facility: CLINIC | Age: 70
End: 2020-08-31

## 2020-08-31 ENCOUNTER — APPOINTMENT (OUTPATIENT)
Dept: CT IMAGING | Facility: HOSPITAL | Age: 70
End: 2020-08-31

## 2020-08-31 ENCOUNTER — HOSPITAL ENCOUNTER (INPATIENT)
Facility: HOSPITAL | Age: 70
LOS: 3 days | Discharge: HOME | End: 2020-09-04
Attending: EMERGENCY MEDICINE | Admitting: HOSPITALIST

## 2020-08-31 DIAGNOSIS — K56.1 INTUSSUSCEPTION OF COLON (HCC): Primary | ICD-10-CM

## 2020-08-31 DIAGNOSIS — K43.6 VENTRAL HERNIA WITH OBSTRUCTION AND WITHOUT GANGRENE: ICD-10-CM

## 2020-08-31 LAB
ALBUMIN SERPL-MCNC: 3.9 G/DL (ref 3.5–5.2)
ALBUMIN/GLOB SERPL: 1 G/DL
ALP SERPL-CCNC: 72 U/L (ref 39–117)
ALT SERPL W P-5'-P-CCNC: 31 U/L (ref 1–33)
ANION GAP SERPL CALCULATED.3IONS-SCNC: 10.1 MMOL/L (ref 5–15)
AST SERPL-CCNC: 27 U/L (ref 1–32)
BACTERIA UR QL AUTO: ABNORMAL /HPF
BASOPHILS # BLD AUTO: 0.06 10*3/MM3 (ref 0–0.2)
BASOPHILS NFR BLD AUTO: 0.4 % (ref 0–1.5)
BILIRUB SERPL-MCNC: 0.2 MG/DL (ref 0–1.2)
BILIRUB UR QL STRIP: NEGATIVE
BUN SERPL-MCNC: 22 MG/DL (ref 8–23)
BUN/CREAT SERPL: 24.7 (ref 7–25)
CALCIUM SPEC-SCNC: 10.9 MG/DL (ref 8.6–10.5)
CHLORIDE SERPL-SCNC: 102 MMOL/L (ref 98–107)
CLARITY UR: ABNORMAL
CO2 SERPL-SCNC: 24.9 MMOL/L (ref 22–29)
COLOR UR: YELLOW
CREAT SERPL-MCNC: 0.89 MG/DL (ref 0.57–1)
D-LACTATE SERPL-SCNC: 1.6 MMOL/L (ref 0.5–2)
DEPRECATED RDW RBC AUTO: 48.6 FL (ref 37–54)
EOSINOPHIL # BLD AUTO: 0.15 10*3/MM3 (ref 0–0.4)
EOSINOPHIL NFR BLD AUTO: 1 % (ref 0.3–6.2)
ERYTHROCYTE [DISTWIDTH] IN BLOOD BY AUTOMATED COUNT: 14.3 % (ref 12.3–15.4)
GFR SERPL CREATININE-BSD FRML MDRD: 63 ML/MIN/1.73
GLOBULIN UR ELPH-MCNC: 4 GM/DL
GLUCOSE SERPL-MCNC: 143 MG/DL (ref 65–99)
GLUCOSE UR STRIP-MCNC: NEGATIVE MG/DL
HCT VFR BLD AUTO: 38.5 % (ref 34–46.6)
HGB BLD-MCNC: 12 G/DL (ref 12–15.9)
HGB UR QL STRIP.AUTO: NEGATIVE
HOLD SPECIMEN: NORMAL
HOLD SPECIMEN: NORMAL
HYALINE CASTS UR QL AUTO: ABNORMAL /LPF
IMM GRANULOCYTES # BLD AUTO: 0.1 10*3/MM3 (ref 0–0.05)
IMM GRANULOCYTES NFR BLD AUTO: 0.7 % (ref 0–0.5)
KETONES UR QL STRIP: NEGATIVE
LEUKOCYTE ESTERASE UR QL STRIP.AUTO: ABNORMAL
LIPASE SERPL-CCNC: 31 U/L (ref 13–60)
LYMPHOCYTES # BLD AUTO: 2.26 10*3/MM3 (ref 0.7–3.1)
LYMPHOCYTES NFR BLD AUTO: 15.2 % (ref 19.6–45.3)
MCH RBC QN AUTO: 28.7 PG (ref 26.6–33)
MCHC RBC AUTO-ENTMCNC: 31.2 G/DL (ref 31.5–35.7)
MCV RBC AUTO: 92.1 FL (ref 79–97)
MONOCYTES # BLD AUTO: 1.08 10*3/MM3 (ref 0.1–0.9)
MONOCYTES NFR BLD AUTO: 7.3 % (ref 5–12)
NEUTROPHILS NFR BLD AUTO: 11.2 10*3/MM3 (ref 1.7–7)
NEUTROPHILS NFR BLD AUTO: 75.4 % (ref 42.7–76)
NITRITE UR QL STRIP: NEGATIVE
NRBC BLD AUTO-RTO: 0 /100 WBC (ref 0–0.2)
PH UR STRIP.AUTO: 6 [PH] (ref 5–8)
PLATELET # BLD AUTO: 310 10*3/MM3 (ref 140–450)
PMV BLD AUTO: 9.1 FL (ref 6–12)
POTASSIUM SERPL-SCNC: 5.1 MMOL/L (ref 3.5–5.2)
PROT SERPL-MCNC: 7.9 G/DL (ref 6–8.5)
PROT UR QL STRIP: NEGATIVE
RBC # BLD AUTO: 4.18 10*6/MM3 (ref 3.77–5.28)
RBC # UR: ABNORMAL /HPF
REF LAB TEST METHOD: ABNORMAL
SODIUM SERPL-SCNC: 137 MMOL/L (ref 136–145)
SP GR UR STRIP: 1.02 (ref 1–1.03)
SQUAMOUS #/AREA URNS HPF: ABNORMAL /HPF
UROBILINOGEN UR QL STRIP: ABNORMAL
WBC # BLD AUTO: 14.85 10*3/MM3 (ref 3.4–10.8)
WBC UR QL AUTO: ABNORMAL /HPF
WHOLE BLOOD HOLD SPECIMEN: NORMAL
WHOLE BLOOD HOLD SPECIMEN: NORMAL

## 2020-08-31 PROCEDURE — 85025 COMPLETE CBC W/AUTO DIFF WBC: CPT

## 2020-08-31 PROCEDURE — 25010000002 IOPAMIDOL 61 % SOLUTION: Performed by: EMERGENCY MEDICINE

## 2020-08-31 PROCEDURE — 25010000002 MORPHINE PER 10 MG: Performed by: EMERGENCY MEDICINE

## 2020-08-31 PROCEDURE — 83605 ASSAY OF LACTIC ACID: CPT

## 2020-08-31 PROCEDURE — 0202U NFCT DS 22 TRGT SARS-COV-2: CPT | Performed by: PHYSICIAN ASSISTANT

## 2020-08-31 PROCEDURE — 99284 EMERGENCY DEPT VISIT MOD MDM: CPT

## 2020-08-31 PROCEDURE — 74177 CT ABD & PELVIS W/CONTRAST: CPT

## 2020-08-31 PROCEDURE — 81001 URINALYSIS AUTO W/SCOPE: CPT | Performed by: EMERGENCY MEDICINE

## 2020-08-31 PROCEDURE — 25010000002 METHYLPREDNISOLONE PER 125 MG: Performed by: PHYSICIAN ASSISTANT

## 2020-08-31 PROCEDURE — 83690 ASSAY OF LIPASE: CPT

## 2020-08-31 PROCEDURE — 25010000002 DIPHENHYDRAMINE PER 50 MG: Performed by: PHYSICIAN ASSISTANT

## 2020-08-31 PROCEDURE — 80053 COMPREHEN METABOLIC PANEL: CPT

## 2020-08-31 PROCEDURE — 99214 OFFICE O/P EST MOD 30 MIN: CPT | Performed by: STUDENT IN AN ORGANIZED HEALTH CARE EDUCATION/TRAINING PROGRAM

## 2020-08-31 RX ORDER — METHYLPREDNISOLONE SODIUM SUCCINATE 40 MG/ML
40 INJECTION, POWDER, LYOPHILIZED, FOR SOLUTION INTRAMUSCULAR; INTRAVENOUS EVERY 4 HOURS
Status: DISCONTINUED | OUTPATIENT
Start: 2020-08-31 | End: 2020-08-31

## 2020-08-31 RX ORDER — METHYLPREDNISOLONE SODIUM SUCCINATE 125 MG/2ML
125 INJECTION, POWDER, LYOPHILIZED, FOR SOLUTION INTRAMUSCULAR; INTRAVENOUS ONCE
Status: COMPLETED | OUTPATIENT
Start: 2020-08-31 | End: 2020-08-31

## 2020-08-31 RX ORDER — SODIUM CHLORIDE 0.9 % (FLUSH) 0.9 %
10 SYRINGE (ML) INJECTION AS NEEDED
Status: DISCONTINUED | OUTPATIENT
Start: 2020-08-31 | End: 2020-09-04 | Stop reason: HOSPADM

## 2020-08-31 RX ORDER — DIPHENHYDRAMINE HYDROCHLORIDE 50 MG/ML
50 INJECTION INTRAMUSCULAR; INTRAVENOUS ONCE
Status: COMPLETED | OUTPATIENT
Start: 2020-08-31 | End: 2020-08-31

## 2020-08-31 RX ORDER — MORPHINE SULFATE 2 MG/ML
4 INJECTION, SOLUTION INTRAMUSCULAR; INTRAVENOUS ONCE
Status: COMPLETED | OUTPATIENT
Start: 2020-08-31 | End: 2020-08-31

## 2020-08-31 RX ADMIN — IOPAMIDOL 100 ML: 612 INJECTION, SOLUTION INTRAVENOUS at 22:35

## 2020-08-31 RX ADMIN — DIPHENHYDRAMINE HYDROCHLORIDE 50 MG: 50 INJECTION, SOLUTION INTRAMUSCULAR; INTRAVENOUS at 21:30

## 2020-08-31 RX ADMIN — MORPHINE SULFATE 4 MG: 2 INJECTION, SOLUTION INTRAMUSCULAR; INTRAVENOUS at 21:30

## 2020-08-31 RX ADMIN — METHYLPREDNISOLONE SODIUM SUCCINATE 125 MG: 125 INJECTION, POWDER, FOR SOLUTION INTRAMUSCULAR; INTRAVENOUS at 21:29

## 2020-08-31 NOTE — TELEPHONE ENCOUNTER
Notify patient that her her pain has worsened, would recommend that she go to [facility] for evaluation.

## 2020-09-01 PROBLEM — K56.1 INTUSSUSCEPTION (HCC): Status: ACTIVE | Noted: 2020-09-01

## 2020-09-01 PROBLEM — K50.10: Status: ACTIVE | Noted: 2018-06-14

## 2020-09-01 PROBLEM — K50.90 CROHN'S DISEASE (HCC): Status: ACTIVE | Noted: 2020-09-01

## 2020-09-01 PROBLEM — E87.5 HYPERKALEMIA: Status: ACTIVE | Noted: 2020-09-01

## 2020-09-01 PROBLEM — I25.10 CAD (CORONARY ARTERY DISEASE): Status: ACTIVE | Noted: 2020-09-01

## 2020-09-01 LAB
ALBUMIN SERPL-MCNC: 3.9 G/DL (ref 3.5–5.2)
ALBUMIN/GLOB SERPL: 1 G/DL
ALP SERPL-CCNC: 67 U/L (ref 39–117)
ALT SERPL W P-5'-P-CCNC: 33 U/L (ref 1–33)
ANION GAP SERPL CALCULATED.3IONS-SCNC: 10.1 MMOL/L (ref 5–15)
ARTERIAL PATENCY WRIST A: POSITIVE
AST SERPL-CCNC: 22 U/L (ref 1–32)
ATMOSPHERIC PRESS: 748.2 MMHG
B PARAPERT DNA SPEC QL NAA+PROBE: NOT DETECTED
B PERT DNA SPEC QL NAA+PROBE: NOT DETECTED
BASE EXCESS BLDA CALC-SCNC: 0.7 MMOL/L (ref 0–2)
BDY SITE: ABNORMAL
BILIRUB SERPL-MCNC: 0.2 MG/DL (ref 0–1.2)
BUN SERPL-MCNC: 22 MG/DL (ref 8–23)
BUN/CREAT SERPL: 26.8 (ref 7–25)
C PNEUM DNA NPH QL NAA+NON-PROBE: NOT DETECTED
CALCIUM SPEC-SCNC: 10.5 MG/DL (ref 8.6–10.5)
CHLORIDE SERPL-SCNC: 102 MMOL/L (ref 98–107)
CO2 SERPL-SCNC: 23.9 MMOL/L (ref 22–29)
CREAT SERPL-MCNC: 0.82 MG/DL (ref 0.57–1)
D-LACTATE SERPL-SCNC: 1.7 MMOL/L (ref 0.5–2)
DEPRECATED RDW RBC AUTO: 45.4 FL (ref 37–54)
ERYTHROCYTE [DISTWIDTH] IN BLOOD BY AUTOMATED COUNT: 13.9 % (ref 12.3–15.4)
FLUAV H1 2009 PAND RNA NPH QL NAA+PROBE: NOT DETECTED
FLUAV H1 HA GENE NPH QL NAA+PROBE: NOT DETECTED
FLUAV H3 RNA NPH QL NAA+PROBE: NOT DETECTED
FLUAV SUBTYP SPEC NAA+PROBE: NOT DETECTED
FLUBV RNA ISLT QL NAA+PROBE: NOT DETECTED
GAS FLOW AIRWAY: 6 LPM
GFR SERPL CREATININE-BSD FRML MDRD: 69 ML/MIN/1.73
GLOBULIN UR ELPH-MCNC: 4.1 GM/DL
GLUCOSE BLDC GLUCOMTR-MCNC: 156 MG/DL (ref 70–130)
GLUCOSE BLDC GLUCOMTR-MCNC: 182 MG/DL (ref 70–130)
GLUCOSE BLDC GLUCOMTR-MCNC: 212 MG/DL (ref 70–130)
GLUCOSE BLDC GLUCOMTR-MCNC: 253 MG/DL (ref 70–130)
GLUCOSE SERPL-MCNC: 248 MG/DL (ref 65–99)
HADV DNA SPEC NAA+PROBE: NOT DETECTED
HCO3 BLDA-SCNC: 27.8 MMOL/L (ref 22–28)
HCOV 229E RNA SPEC QL NAA+PROBE: NOT DETECTED
HCOV HKU1 RNA SPEC QL NAA+PROBE: NOT DETECTED
HCOV NL63 RNA SPEC QL NAA+PROBE: NOT DETECTED
HCOV OC43 RNA SPEC QL NAA+PROBE: NOT DETECTED
HCT VFR BLD AUTO: 36.9 % (ref 34–46.6)
HGB BLD-MCNC: 11.9 G/DL (ref 12–15.9)
HMPV RNA NPH QL NAA+NON-PROBE: NOT DETECTED
HPIV1 RNA SPEC QL NAA+PROBE: NOT DETECTED
HPIV2 RNA SPEC QL NAA+PROBE: NOT DETECTED
HPIV3 RNA NPH QL NAA+PROBE: NOT DETECTED
HPIV4 P GENE NPH QL NAA+PROBE: NOT DETECTED
M PNEUMO IGG SER IA-ACNC: NOT DETECTED
MCH RBC QN AUTO: 28.9 PG (ref 26.6–33)
MCHC RBC AUTO-ENTMCNC: 32.2 G/DL (ref 31.5–35.7)
MCV RBC AUTO: 89.6 FL (ref 79–97)
MODALITY: ABNORMAL
PCO2 BLDA: 54.1 MM HG (ref 35–45)
PH BLDA: 7.32 PH UNITS (ref 7.35–7.45)
PLATELET # BLD AUTO: 308 10*3/MM3 (ref 140–450)
PMV BLD AUTO: 9 FL (ref 6–12)
PO2 BLDA: 106.8 MM HG (ref 80–100)
POTASSIUM SERPL-SCNC: 4.6 MMOL/L (ref 3.5–5.2)
POTASSIUM SERPL-SCNC: 5.6 MMOL/L (ref 3.5–5.2)
PROT SERPL-MCNC: 8 G/DL (ref 6–8.5)
RBC # BLD AUTO: 4.12 10*6/MM3 (ref 3.77–5.28)
RHINOVIRUS RNA SPEC NAA+PROBE: NOT DETECTED
RSV RNA NPH QL NAA+NON-PROBE: NOT DETECTED
SAO2 % BLDCOA: 97.5 % (ref 92–99)
SARS-COV-2 RNA NPH QL NAA+NON-PROBE: NOT DETECTED
SODIUM SERPL-SCNC: 136 MMOL/L (ref 136–145)
TOTAL RATE: 18 BREATHS/MINUTE
WBC # BLD AUTO: 10.68 10*3/MM3 (ref 3.4–10.8)

## 2020-09-01 PROCEDURE — 94640 AIRWAY INHALATION TREATMENT: CPT

## 2020-09-01 PROCEDURE — 93010 ELECTROCARDIOGRAM REPORT: CPT | Performed by: INTERNAL MEDICINE

## 2020-09-01 PROCEDURE — 80053 COMPREHEN METABOLIC PANEL: CPT | Performed by: STUDENT IN AN ORGANIZED HEALTH CARE EDUCATION/TRAINING PROGRAM

## 2020-09-01 PROCEDURE — 93005 ELECTROCARDIOGRAM TRACING: CPT | Performed by: NURSE PRACTITIONER

## 2020-09-01 PROCEDURE — 25010000002 FUROSEMIDE PER 20 MG: Performed by: INTERNAL MEDICINE

## 2020-09-01 PROCEDURE — 25010000002 PIPERACILLIN SOD-TAZOBACTAM PER 1 G: Performed by: STUDENT IN AN ORGANIZED HEALTH CARE EDUCATION/TRAINING PROGRAM

## 2020-09-01 PROCEDURE — 63710000001 INSULIN LISPRO (HUMAN) PER 5 UNITS: Performed by: NURSE PRACTITIONER

## 2020-09-01 PROCEDURE — 83605 ASSAY OF LACTIC ACID: CPT | Performed by: STUDENT IN AN ORGANIZED HEALTH CARE EDUCATION/TRAINING PROGRAM

## 2020-09-01 PROCEDURE — 94799 UNLISTED PULMONARY SVC/PX: CPT

## 2020-09-01 PROCEDURE — 85027 COMPLETE CBC AUTOMATED: CPT | Performed by: STUDENT IN AN ORGANIZED HEALTH CARE EDUCATION/TRAINING PROGRAM

## 2020-09-01 PROCEDURE — 99232 SBSQ HOSP IP/OBS MODERATE 35: CPT | Performed by: STUDENT IN AN ORGANIZED HEALTH CARE EDUCATION/TRAINING PROGRAM

## 2020-09-01 PROCEDURE — 25010000002 MORPHINE PER 10 MG: Performed by: NURSE PRACTITIONER

## 2020-09-01 PROCEDURE — 82962 GLUCOSE BLOOD TEST: CPT

## 2020-09-01 PROCEDURE — 36600 WITHDRAWAL OF ARTERIAL BLOOD: CPT

## 2020-09-01 PROCEDURE — 82803 BLOOD GASES ANY COMBINATION: CPT

## 2020-09-01 PROCEDURE — 84132 ASSAY OF SERUM POTASSIUM: CPT | Performed by: INTERNAL MEDICINE

## 2020-09-01 PROCEDURE — 36415 COLL VENOUS BLD VENIPUNCTURE: CPT | Performed by: STUDENT IN AN ORGANIZED HEALTH CARE EDUCATION/TRAINING PROGRAM

## 2020-09-01 RX ORDER — DEXTROSE AND SODIUM CHLORIDE 5; .9 G/100ML; G/100ML
100 INJECTION, SOLUTION INTRAVENOUS CONTINUOUS
Status: DISCONTINUED | OUTPATIENT
Start: 2020-09-01 | End: 2020-09-01

## 2020-09-01 RX ORDER — ONDANSETRON 2 MG/ML
4 INJECTION INTRAMUSCULAR; INTRAVENOUS EVERY 6 HOURS PRN
Status: DISCONTINUED | OUTPATIENT
Start: 2020-09-01 | End: 2020-09-04 | Stop reason: HOSPADM

## 2020-09-01 RX ORDER — MORPHINE SULFATE 2 MG/ML
2 INJECTION, SOLUTION INTRAMUSCULAR; INTRAVENOUS
Status: DISCONTINUED | OUTPATIENT
Start: 2020-09-01 | End: 2020-09-04 | Stop reason: HOSPADM

## 2020-09-01 RX ORDER — SODIUM CHLORIDE 0.9 % (FLUSH) 0.9 %
10 SYRINGE (ML) INJECTION AS NEEDED
Status: DISCONTINUED | OUTPATIENT
Start: 2020-09-01 | End: 2020-09-01

## 2020-09-01 RX ORDER — BISACODYL 5 MG/1
5 TABLET, DELAYED RELEASE ORAL DAILY PRN
Status: DISCONTINUED | OUTPATIENT
Start: 2020-09-01 | End: 2020-09-04 | Stop reason: HOSPADM

## 2020-09-01 RX ORDER — ALBUTEROL SULFATE 90 UG/1
2 AEROSOL, METERED RESPIRATORY (INHALATION) EVERY 4 HOURS PRN
Status: DISCONTINUED | OUTPATIENT
Start: 2020-09-01 | End: 2020-09-01 | Stop reason: CLARIF

## 2020-09-01 RX ORDER — ALUMINA, MAGNESIA, AND SIMETHICONE 2400; 2400; 240 MG/30ML; MG/30ML; MG/30ML
15 SUSPENSION ORAL EVERY 6 HOURS PRN
Status: DISCONTINUED | OUTPATIENT
Start: 2020-09-01 | End: 2020-09-04 | Stop reason: HOSPADM

## 2020-09-01 RX ORDER — NICOTINE POLACRILEX 4 MG
15 LOZENGE BUCCAL
Status: DISCONTINUED | OUTPATIENT
Start: 2020-09-01 | End: 2020-09-04 | Stop reason: HOSPADM

## 2020-09-01 RX ORDER — ROSUVASTATIN CALCIUM 40 MG/1
40 TABLET, COATED ORAL DAILY
Status: DISCONTINUED | OUTPATIENT
Start: 2020-09-01 | End: 2020-09-02

## 2020-09-01 RX ORDER — IPRATROPIUM BROMIDE AND ALBUTEROL SULFATE 2.5; .5 MG/3ML; MG/3ML
3 SOLUTION RESPIRATORY (INHALATION) EVERY 4 HOURS PRN
Status: DISCONTINUED | OUTPATIENT
Start: 2020-09-01 | End: 2020-09-04 | Stop reason: HOSPADM

## 2020-09-01 RX ORDER — HYDRALAZINE HYDROCHLORIDE 20 MG/ML
10 INJECTION INTRAMUSCULAR; INTRAVENOUS EVERY 6 HOURS PRN
Status: DISCONTINUED | OUTPATIENT
Start: 2020-09-01 | End: 2020-09-04 | Stop reason: HOSPADM

## 2020-09-01 RX ORDER — DEXTROSE AND SODIUM CHLORIDE 5; .9 G/100ML; G/100ML
100 INJECTION, SOLUTION INTRAVENOUS CONTINUOUS
Status: DISCONTINUED | OUTPATIENT
Start: 2020-09-01 | End: 2020-09-03

## 2020-09-01 RX ORDER — DEXTROSE MONOHYDRATE 25 G/50ML
25 INJECTION, SOLUTION INTRAVENOUS
Status: DISCONTINUED | OUTPATIENT
Start: 2020-09-01 | End: 2020-09-04 | Stop reason: HOSPADM

## 2020-09-01 RX ORDER — SODIUM CHLORIDE 9 MG/ML
100 INJECTION, SOLUTION INTRAVENOUS CONTINUOUS
Status: DISCONTINUED | OUTPATIENT
Start: 2020-09-01 | End: 2020-09-01

## 2020-09-01 RX ORDER — ONDANSETRON 4 MG/1
4 TABLET, FILM COATED ORAL EVERY 6 HOURS PRN
Status: DISCONTINUED | OUTPATIENT
Start: 2020-09-01 | End: 2020-09-04 | Stop reason: HOSPADM

## 2020-09-01 RX ORDER — FLUOXETINE HYDROCHLORIDE 20 MG/1
40 CAPSULE ORAL DAILY
Status: DISCONTINUED | OUTPATIENT
Start: 2020-09-01 | End: 2020-09-02

## 2020-09-01 RX ORDER — BUDESONIDE AND FORMOTEROL FUMARATE DIHYDRATE 80; 4.5 UG/1; UG/1
2 AEROSOL RESPIRATORY (INHALATION)
Status: DISCONTINUED | OUTPATIENT
Start: 2020-09-01 | End: 2020-09-04 | Stop reason: HOSPADM

## 2020-09-01 RX ORDER — FUROSEMIDE 10 MG/ML
80 INJECTION INTRAMUSCULAR; INTRAVENOUS ONCE
Status: COMPLETED | OUTPATIENT
Start: 2020-09-01 | End: 2020-09-01

## 2020-09-01 RX ORDER — ALBUTEROL SULFATE 2.5 MG/3ML
2.5 SOLUTION RESPIRATORY (INHALATION) EVERY 4 HOURS PRN
Status: DISCONTINUED | OUTPATIENT
Start: 2020-09-01 | End: 2020-09-04 | Stop reason: HOSPADM

## 2020-09-01 RX ORDER — BISACODYL 10 MG
10 SUPPOSITORY, RECTAL RECTAL DAILY PRN
Status: DISCONTINUED | OUTPATIENT
Start: 2020-09-01 | End: 2020-09-04 | Stop reason: HOSPADM

## 2020-09-01 RX ORDER — GABAPENTIN 300 MG/1
300 CAPSULE ORAL 2 TIMES DAILY
Status: DISCONTINUED | OUTPATIENT
Start: 2020-09-01 | End: 2020-09-04 | Stop reason: HOSPADM

## 2020-09-01 RX ORDER — GUAIFENESIN 600 MG/1
1200 TABLET, EXTENDED RELEASE ORAL EVERY MORNING
Status: DISCONTINUED | OUTPATIENT
Start: 2020-09-02 | End: 2020-09-02

## 2020-09-01 RX ORDER — VERAPAMIL HYDROCHLORIDE 240 MG/1
240 TABLET, FILM COATED, EXTENDED RELEASE ORAL EVERY 12 HOURS
Status: DISCONTINUED | OUTPATIENT
Start: 2020-09-01 | End: 2020-09-04 | Stop reason: HOSPADM

## 2020-09-01 RX ORDER — LISINOPRIL 40 MG/1
40 TABLET ORAL DAILY
Status: DISCONTINUED | OUTPATIENT
Start: 2020-09-01 | End: 2020-09-01

## 2020-09-01 RX ORDER — FENOFIBRATE 145 MG/1
145 TABLET, COATED ORAL DAILY
Status: DISCONTINUED | OUTPATIENT
Start: 2020-09-01 | End: 2020-09-02

## 2020-09-01 RX ADMIN — TAZOBACTAM SODIUM AND PIPERACILLIN SODIUM 4.5 G: 500; 4 INJECTION, SOLUTION INTRAVENOUS at 18:57

## 2020-09-01 RX ADMIN — SODIUM CHLORIDE 100 ML/HR: 9 INJECTION, SOLUTION INTRAVENOUS at 11:34

## 2020-09-01 RX ADMIN — FUROSEMIDE 80 MG: 20 INJECTION, SOLUTION INTRAMUSCULAR; INTRAVENOUS at 11:35

## 2020-09-01 RX ADMIN — DEXTROSE AND SODIUM CHLORIDE 100 ML/HR: 5; 900 INJECTION, SOLUTION INTRAVENOUS at 17:45

## 2020-09-01 RX ADMIN — TAZOBACTAM SODIUM AND PIPERACILLIN SODIUM 3.38 G: 375; 3 INJECTION, SOLUTION INTRAVENOUS at 10:01

## 2020-09-01 RX ADMIN — MORPHINE SULFATE 2 MG: 2 INJECTION, SOLUTION INTRAMUSCULAR; INTRAVENOUS at 16:41

## 2020-09-01 RX ADMIN — DEXTROSE AND SODIUM CHLORIDE 100 ML/HR: 5; 900 INJECTION, SOLUTION INTRAVENOUS at 00:52

## 2020-09-01 RX ADMIN — BUDESONIDE AND FORMOTEROL FUMARATE DIHYDRATE 2 PUFF: 80; 4.5 AEROSOL RESPIRATORY (INHALATION) at 19:51

## 2020-09-01 RX ADMIN — INSULIN LISPRO 2 UNITS: 100 INJECTION, SOLUTION INTRAVENOUS; SUBCUTANEOUS at 17:00

## 2020-09-01 RX ADMIN — TAZOBACTAM SODIUM AND PIPERACILLIN SODIUM 3.38 G: 375; 3 INJECTION, SOLUTION INTRAVENOUS at 00:52

## 2020-09-01 RX ADMIN — SODIUM CHLORIDE 100 ML/HR: 9 INJECTION, SOLUTION INTRAVENOUS at 16:33

## 2020-09-01 NOTE — PROGRESS NOTES
Name: Mar Camilo ADMIT: 2020   : 1950  PCP: Vianey Barrios PA-C    MRN: [id number] LOS: 0 days   AGE/SEX: 70 y.o. female  ROOM: [facility]     Subjective   Subjective   Patient appears generally weak, nontoxic, in no apparent distress.  Voices no new concerns or changes since arrival.  Reports voiding without complication.    Review of Systems   Constitutional: Negative for chills and fever.   HENT: Negative for congestion and rhinorrhea.    Respiratory: Positive for cough (chronic dry).    Gastrointestinal: Positive for abdominal distention (proximal hernia) and abdominal pain (LLQ). Negative for constipation, diarrhea, nausea and vomiting.   Genitourinary: Negative for difficulty urinating and dysuria.   Neurological: Positive for weakness (generalized).   Psychiatric/Behavioral: Negative for confusion and hallucinations.        Objective   Objective   Vital Signs  Temp:  [97.8 °F (36.6 °C)-98.2 °F (36.8 °C)] 97.8 °F (36.6 °C)  Heart Rate:  [] 94  Resp:  [18-20] 18  BP: (138-174)/(61-80) 150/74  SpO2:  [94 %-98 %] 94 %  on  Flow (L/min):  [2-6] 2;   Device (Oxygen Therapy): nasal cannula  Body mass index is 43.77 kg/m².     Physical Exam   Constitutional: She is oriented to person, place, and time. No distress.   Morbidly obese, generally weak, nontoxic   HENT:   Head: Normocephalic and atraumatic.   Eyes: Pupils are equal, round, and reactive to light. EOM are normal.   Neck: Normal range of motion. Neck supple.   Cardiovascular: Normal rate and normal heart sounds.   Pulmonary/Chest: Effort normal. No respiratory distress.   Wheeze on left lung   Abdominal: Soft. Bowel sounds are normal. There is tenderness (LLQ).   Maya-umbilical hernia   Musculoskeletal: She exhibits no edema or deformity.   Neurological: She is alert and oriented to person, place, and time. No cranial nerve deficit or sensory deficit. She exhibits normal muscle tone. Coordination normal.   Skin: Skin is warm and  dry. She is not diaphoretic. There is pallor.   Psychiatric: She has a normal mood and affect. Her behavior is normal. Judgment and thought content normal.       Results Review:       I reviewed the patient's new clinical results.  Results from last 7 days   Lab Units 09/01/20  0432 08/31/20 1912   WBC 10*3/mm3 10.68 14.85*   HEMOGLOBIN g/dL 11.9* 12.0   PLATELETS 10*3/mm3 308 310     Results from last 7 days   Lab Units 09/01/20  0432 08/31/20 1912   SODIUM mmol/L 136 137   POTASSIUM mmol/L 5.6* 5.1   CHLORIDE mmol/L 102 102   CO2 mmol/L 23.9 24.9   BUN mg/dL 22 22   CREATININE mg/dL 0.82 0.89   GLUCOSE mg/dL 248* 143*   Estimated Creatinine Clearance: 78.7 mL/min (by C-G formula based on SCr of 0.82 mg/dL).  Results from last 7 days   Lab Units 09/01/20  0432 08/31/20 1912   ALBUMIN g/dL 3.90 3.90   BILIRUBIN mg/dL 0.2 0.2   ALK PHOS U/L 67 72   AST (SGOT) U/L 22 27   ALT (SGPT) U/L 33 31     Results from last 7 days   Lab Units 09/01/20  0432 08/31/20 1912   CALCIUM mg/dL 10.5 10.9*   ALBUMIN g/dL 3.90 3.90     Results from last 7 days   Lab Units 09/01/20  0432 08/31/20 1912   LACTATE mmol/L 1.7 1.6     COVID19   Date Value Ref Range Status   08/31/2020 Not Detected Not Detected - Ref. Range Final     Glucose   Date/Time Value Ref Range Status   09/01/2020 1128 212 (H) 70 - 130 mg/dL Final   09/01/2020 0746 253 (H) 70 - 130 mg/dL Final       CT Abdomen Pelvis With Contrast  Narrative: CT ABDOMEN AND PELVIS WITH IV CONTRAST     HISTORY: 70-year-old with large hernia. Evaluate for incarcerated bowel.     TECHNIQUE: CT abdomen and pelvis with intravenous contrast.     COMPARISON: CT and pelvis with IV contrast 11/19/2015.     FINDINGS: There is a large ventral central to left abdominal wall hernia  that contains large and small bowel. The hernia measures approximately  24 cm transverse by 13 cm AP and extends 20 cm in height. Hernia neck  measures 8 x 9.5 cm. Within the anterior, right aspect of the  hernia  there is a loop of redundant right colon that is abnormally distended  with what appears to represent stool and there are curvilinear rings of  fat density within this segment of colon suspected to represent a focal  intussusception within the hernia. The more proximal ascending colon and  cecum is fluid-filled without significant distention. There is no  distention of the terminal ileum which also extends partially into the  hernia.     Severe basilar pulmonary emphysema is present associated with  interstitial disease. There is mild gastric distention with fluid. The  liver, gallbladder, spleen, adrenal glands, pancreas appear within  normal limits. Kidneys appear normal and there is no hydronephrosis.  There is a levoscoliotic curvature of the lumbar spine associated with  multilevel degenerative disc disease.     Impression: 1.  Large central to left ventral hernia contains large and small bowel.  Within the anterior, right aspect of the hernia there is a dilated  colonic loop which contains internal concentric rings of fat density and  stool and this suggests a localized intussusception.  This finding is  suspected to be the cause of patient's symptoms and raises the  possibility of a lead point for which further evaluation recommended.  2. Severe basilar pulmonary emphysema.     Findings were discussed with DALE Suárez in the emergency  department on 08/31/2020 at 11 PM.     Radiation dose reduction techniques were utilized, including automated  exposure control and exposure modulation based on body size.     This report was finalized on 9/1/2020 12:42 AM by Dr. Ryan Gudino M.D.           budesonide-formoterol 2 puff Inhalation BID - RT   piperacillin-tazobactam 3.375 g Intravenous Q8H   tiotropium bromide monohydrate 2 puff Inhalation Daily - RT       sodium chloride 100 mL/hr Last Rate: 100 mL/hr (09/01/20 8075)   NPO Diet       Assessment/Plan     Active Hospital Problems    Diagnosis   POA   • **Intussusception (CMS/HCC) [K56.1]  Yes   • Crohn's disease (CMS/HCC) [K50.90]  Yes   • CAD (coronary artery disease) [I25.10]  Yes   • Hyperkalemia [E87.5]  Unknown   • PAD (peripheral artery disease) (CMS/[facility]) [I73.9]  Yes   • PAF (paroxysmal atrial fibrillation) (CMS/[facility]) [I48.0]  Yes   • History of CVA (cerebrovascular accident) [Z86.73]  Not Applicable   • Chronic respiratory failure with hypoxia (CMS/[facility]) [J96.11]  Yes   • COPD (chronic obstructive pulmonary disease) (CMS/[facility]) [J44.9]  Yes   • Mixed hyperlipidemia [E78.2]  Yes   • Type 2 diabetes mellitus with complication, without long-term current use of insulin (CMS/[facility]) [E11.8]  Yes   • Class 3 severe obesity in adult (CMS/[facility]) [E66.01]  Yes   • Essential hypertension [I10]  Yes      Resolved Hospital Problems   No resolved problems to display.       70 y.o. female admitted with Intussusception (CMS/[facility]).      Intussusception (CMS/HCC).  Noted CT abd / pelv with contrast--large central and left ventral hernia contains large and small bowel.  General surgery following, input appreciated--no signs of urgent surgical exploration currently, continue n.p.o., NG LWS, IV fluids, PRN analgesic, & plan remove NG on 9/2/2020.  IV Zosyn.  COVID-19 not detected.  Lipase unremarkable.     COPD (chronic obstructive pulmonary disease) (CMS/[facility]).  Reports chronic continuous 6 L nasal cannula at home, currently decreased to 2 L nasal cannula s/p noted elevated PaO2 106.8 and O2 saturation 88% without signs of increased respiratory work effort.  Tiotropium, symbicort.  Duoneb PRN wheeze.  Severe basilar pulmonary emphysema noted on CT abdomen pelvis.    Mixed hyperlipidemia.  LFT unremarkable.      Essential hypertension.  BP acceptable.      Type 2 diabetes mellitus with complication, without long-term current use of insulin (CMS/[facility]).  Glucoscan remains > 200, ordering low dose lispro SS.  Continue monitoring.     PAF (paroxysmal atrial fibrillation) (CMS/[facility]).   Rate controlled.  Ordering EKG.     Class 3 severe obesity in adult (CMS/[facility]).  Recommend portion control.  Consider nutrition / dietician consult.     Crohn's disease (CMS/[facility]).  Reports Remicade every 8 weeks.     CAD (coronary artery disease).  No evidence of angina or atypical signs of ACS.     Hyperkalemia.  Renal consulted, pending recommendations.      · SCD for DVT prophylaxis.  · CPR  · Discussed with Dr. Smith.  · Anticipate discharge TBD      DALE aPcheco  [facility]  09/01/20  14:11    I wore protective equipment throughout this patient encounter including a face mask, gloves and protective eyewear.  Hand hygiene was performed before donning protective equipment and after removal when leaving the room.

## 2020-09-01 NOTE — PLAN OF CARE
Problem: Patient Care Overview  Goal: Plan of Care Review  Outcome: Ongoing (interventions implemented as appropriate)  Flowsheets (Taken 9/1/2020 1752)  Progress: no change  Plan of Care Reviewed With: patient; sibling  Outcome Summary: NGT to LWS, 300ml output, per general surgery hope to dc tomorrow, NPO, meds on hold, PRN Morphine given this evening for abd pain; nephrology consult for hyperkalemia- IV lasix x1, 1750ml UO, bladder scan 0ml; D5NS at 100ml/hr, insulin SSI, potassium recheck this evening; IV Zosyn for intra-abdominal infection; PRN hydralazine if SBP >170; will continue to monitor

## 2020-09-01 NOTE — CONSULTS
GENERAL SURGERY HISTORY AND PHYSICAL     SUMMARY (A/P):    Mrs. Mar Camilo is a 70 y.o. year old lady with a large chronically incarcerated ventral hernia with colonic intussusception.  This was at least somewhat seen on her scan a month prior although more obvious today.  Currently no definitive signs of bowel compromise or bowel obstruction.  Due to significant comorbidities and high likelihood of complications and possibly death with an operation, would recommend maximal medical management.  Will order for Ms. Camilo to be n.p.o. with an NG tube, IV fluids, and antibiotics.  We will recheck labs including lactic acid at 6 in the morning to evaluate for worsening acidosis as a sign of bowel compromise.  Will continue to follow closely.    CC:    Abdominal pain      HPI:    Ms. Mar Camilo is a 70 y.o. year old lady who presents with abdominal pain over her hernia.  She saw her primary care doctor about increased abdominal pain 4 days ago who ordered an abdominal ultrasound and instructed her to go the ER with worsening of symptoms.  She has a history of a transverse colectomy in 2016 and states she developed this hernia sometime afterwards.  It has been present for several years, and she was seen in office about getting it repaired in 2018.  It was recommended she not get this repaired unless in and emergency due to the amount of comorbidities she had and the degree of the hernia.  She denies nausea and vomiting.  She had a bowel movement earlier today.  She states her abdominal pain is better with morphine.    PMH:    • Crohn's disease, on remicade   • DM  • HTN  • HLD  • COPD on 6L O2  • CVA with associated seizure 3-4 years ago  • CAD on aspirin  • H/o paroxysmal afib    PSH:    • Transverse colectomy by Dr. Richardson in 2016  • R carotid endarterectomy  • R shoulder surgery  L femur repair  Appendectomy    FAMILY HISTORY:    • Multiple family members with Crohn's disease    SOCIAL HISTORY:   • Denies  tobacco use, quit 7 years ago   • Denies alcohol use    ALLERGIES:   • Norco  • Atenolol  • IV contrast    MEDICATIONS:   • Reviewed in Epic     RADIOLOGY/ENDOSCOPY:    • Multiple colonoscopies by GI for her Crohn's disease  • CT scan reviewed; large ventral hernia with small and large bowel; possible colonic intussusception, no signs of pneumatosis, stranding, or inflammation; no signs of bowel obstruction    LABS:    White blood cell 14.8, hemoglobin 12, platelets 310  Creatinine 0.9, potassium 5.1, bicarb 24      ROS:   Influenza-like illness: no fever, no  cough, no  sore throat, no  body aches, no loss of sense of taste or smell, no known exposure to person with Covid-19.  Constitutional: Negative for fevers or chills  HENT: Negative for hearing loss or runny nose  Eyes: Negative for vision changes or scleral icterus  Respiratory: Negative for cough or shortness of breath  Cardiovascular: Negative for chest pain or heart palpitations  Gastrointestinal: Ulcerative for abdominal pain; negative for nausea, vomiting, constipation, melena, or hematochezia  Genitourinary: Negative for hematuria or dysuria  Musculoskeletal: Negative for joint swelling or gait instability  Neurologic: Negative for tremors or seizures  Psychiatric: Negative for suicidal ideations or depression  All other systems reviewed and negative    PHYSICAL EXAM:   • Constitutional: Well-developed, well-nourished, no acute distress, present with her sister  • Vital signs: Temperature 98.2 heart rate 89 respiratory rate 20 blood pressure 155/61  • Eyes: Conjunctiva normal, sclera nonicteric  • ENMT: Hearing grossly normal, oral mucosa moist  • Neck: Supple, no palpable mass, trachea midline  • Respiratory: Clear to auscultation, normal inspiratory effort  • Cardiovascular: Regular rate, 1+ peripheral edema, no jugular venous distention  • Gastrointestinal: Obese abdomen, large left-sided ventral hernia, colon and small bowel palpated through the  skin, unable to be reduced, minimally tender over the medial portion of hernia, no peritoneal signs, minimal amount of chronic skin changes in the periphery  • Skin:  Warm, dry, no rash on visualized skin surfaces  • Musculoskeletal: Symmetric strength, normal gait  • Psychiatric: Alert and oriented ×3, normal affect     HAILEY LOPES M.D.  General and Endoscopic Surgery  [facility]    [address], [address]  [place], [place], [address]  P: [phone]  F: [phone]

## 2020-09-01 NOTE — PROGRESS NOTES
"GENERAL SURGERY PROGRESS NOTE    SUMMARY (A/P):  Mrs. Mar Camilo is a 70 y.o. year old lady with a chronically incarcerated ventral hernia with questionable colo-colonic intussusception. No signs of bowel compromise or bowel obstruction. Continue NPO, NG to low wall suction, IVF, pain control today and will attempt to remove NG tomorrow. No signs for urgent surgical exploration currently.     Chief Complaint: abdominal pain with chronically incarcerated ventral hernia    Interval Events: No new issues overnight, states her pain has improved. No BM. No N/V. Has not gotten out of bed.    Review of Systems: No abdominal pain, N/V    O:/74 (BP Location: Right leg, Patient Position: Lying)   Pulse 94   Temp 97.8 °F (36.6 °C) (Oral)   Resp 18   Ht 162.6 cm (64\")   Wt 116 kg (255 lb)   LMP  (LMP Unknown)   SpO2 94%   BMI 43.77 kg/m²     Intake & Output:  NG with 300cc dark brown fluid    GENERAL: alert, well appearing, and in no distress, sleeping comfortably, NG with dark brown fluid   HEENT: normocephalic, atraumatic, moist mucous membranes, clear sclerae   CHEST: no increased work of breathing, no wheezes, symmetric air entry, on 6L O2  CARDIAC: regular rate and rhythm    ABDOMEN: soft, chronically incarcerated ventral hernia, no peritoneal signs, no erythema over hernia   EXTREMITIES: no cyanosis, clubbing, or edema   SKIN: Warm and moist, no rashes    LABS  Results from last 7 days   Lab Units 09/01/20  0432 08/31/20 1912   WBC 10*3/mm3 10.68 14.85*   HEMOGLOBIN g/dL 11.9* 12.0   HEMATOCRIT % 36.9 38.5   PLATELETS 10*3/mm3 308 310     Results from last 7 days   Lab Units 09/01/20  0432 08/31/20 1912   SODIUM mmol/L 136 137   POTASSIUM mmol/L 5.6* 5.1   CHLORIDE mmol/L 102 102   CO2 mmol/L 23.9 24.9   BUN mg/dL 22 22   CREATININE mg/dL 0.82 0.89   CALCIUM mg/dL 10.5 10.9*   BILIRUBIN mg/dL 0.2 0.2   ALK PHOS U/L 67 72   ALT (SGPT) U/L 33 31   AST (SGOT) U/L 22 27   GLUCOSE mg/dL 248* 143*       "     HAILEY LOPES MD  General and Endoscopic Surgery  [facility]    [address], [address]  [place], [place], [address]  P: [phone]  F: [phone]

## 2020-09-01 NOTE — ED PROVIDER NOTES
Pt presents to the ED complaining of pain that started yesterday.  The patient has a known large hernia that she has not had operated on due to her COPD.  However now she has acute pain in her lower abdomen with nausea but no vomiting or diarrhea.    On exam, pt is alert and oriented x3 mild distress  Pale conjunctiva and dry mucous membranes  Regular rate and rhythm  Wheezes in bases  Patient is obese with a large abdominal hernia that is point tender with firmness in the superior aspect that is not reducible.    I agree with midlevel plan to check labs and CT abdomen pelvis    PPE  Pt does not present with symptoms for COVID19; however, I was wearing a mask throughout all patient interaction.      CT shows a large ventral hernia with bowel contents and what appears to be intussusception.    We have consulted Dr. Good from surgery who is seen the patient in the emergency department.  Due to her severe comorbidities she would like to treat the patient medically if possible.  Thus she is ordered an NG tube, IV fluids and antibiotics and a lactic acid for the morning.  She is asked that we call the hospitalist to admit the patient.        The JEANNA - Margarita Chaudhari and I have discussed this patient's history, physical exam, and treatment plan.  I have reviewed the documentation and personally had a face to face interaction with the patient. I affirm the documentation and agree with the treatment and plan.  The attached note describes my personal findings.           Ryan Cheatham MD  09/04/20 1215

## 2020-09-01 NOTE — PROGRESS NOTES
Discharge Planning Assessment  [facility]     Patient Name: Mar Camilo  MRN: [id number]  Today's Date: 9/1/2020    Admit Date: 8/31/2020    Discharge Needs Assessment     Row Name 09/01/20 1555       Living Environment    Lives With  sibling(s);other relative(s)    Current Living Arrangements  home/apartment/condo    Potentially Unsafe Housing Conditions  other (see comments) no concerns     Primary Care Provided by  self    Provides Primary Care For  no one    Family Caregiver if Needed  sibling(s)    Quality of Family Relationships  supportive;helpful;involved    Able to Return to Prior Arrangements  yes       Resource/Environmental Concerns    Resource/Environmental Concerns  none    Transportation Concerns  car, none       Transition Planning    Patient/Family Anticipates Transition to  home    Patient/Family Anticipated Services at Transition  skilled nursing;home health care    Transportation Anticipated  family or friend will provide       Discharge Needs Assessment    Readmission Within the Last 30 Days  no previous admission in last 30 days    Concerns to be Addressed  discharge planning    Equipment Currently Used at Home  oxygen;walker, rolling;shower chair;nebulizer    Anticipated Changes Related to Illness  none    Equipment Needed After Discharge  none    Outpatient/Agency/Support Group Needs  homecare agency;skilled nursing facility    Discharge Facility/Level of Care Needs  home with home health;nursing facility, skilled        Discharge Plan     Row Name 09/01/20 1556       Plan    Plan  Home; may need home health     Patient/Family in Agreement with Plan  yes    Plan Comments  CCP met with patient and patient's sister, Clau at bedside. CCP role explained and face sheet verified. IMM noted. Patient's PCP is Dr. Barrios. Patient lives with her sister, nephew and nephew's granddaughter. Patient has one step to the entrance of their single story home. Patient has a walker she uses for  mobility. Patient has home 02 (24/7) and nebulizer through [facility]. Patient has shower chair present in her bathroom. Patient has not used home health but has been to [facility] for SNF. Patient's preferred pharmacy is [facility] in [place]. Patient is unsure of discharge needs at this time. CCP provided HH/SNF list. CCP will follow for hospital course and assist as needed. Aleshia Brown CSW          Destination      Coordination has not been started for this encounter.      Durable Medical Equipment      Coordination has not been started for this encounter.      Dialysis/Infusion      Coordination has not been started for this encounter.      Home Medical Care      Coordination has not been started for this encounter.      Therapy      Coordination has not been started for this encounter.      Community Resources      Coordination has not been started for this encounter.          Demographic Summary     Row Name 09/01/20 1555       General Information    Admission Type  inpatient    Arrived From  emergency department    Required Notices Provided  Important Message from Medicare    Referral Source  admission list    Reason for Consult  discharge planning    Preferred Language  English     Used During This Interaction  no        Functional Status     Row Name 09/01/20 1555       Functional Status    Usual Activity Tolerance  good    Current Activity Tolerance  good       Functional Status, IADL    Medications  assistive equipment    Meal Preparation  assistive equipment    Housekeeping  assistive equipment    Laundry  assistive equipment    Shopping  assistive equipment       Mental Status    General Appearance WDL  WDL       Mental Status Summary    Recent Changes in Mental Status/Cognitive Functioning  no changes        Psychosocial    No documentation.       Abuse/Neglect    No documentation.       Legal    No documentation.       Substance Abuse    No documentation.       Patient Forms    No  documentation.           JODI FraireW

## 2020-09-01 NOTE — CONSULTS
Referring Provider: Alejandro Smith MD  Reason for Consultation: Hyperkalemia    Subjective     Chief complaint   Chief Complaint   Patient presents with   • Abdominal Pain       History of present illness:        71 Y/O WF with PMH of hypertension, diabetes mellitus type 2, COPD and morbid obesity who came into the hospital because of abdominal pain that started about 4 days ago.  In the emergency department patient was found to have large chronically incarcerated ventral hernia with colonic intussusception.  NG tube was placed.  Lab in the emergency department showed potassium 5.1 on admission and repeat potassium was 5.6 with a calcium 10.9.  We are consulted to help with management of her electrolytes abnormality.  Patient does have history of chronic COPD and she is chronically on oxygen.  She is laying down in bed.  NG tube in place.      Past Medical History:   Diagnosis Date   • Arthritis    • Carotid arterial disease (CMS/HCC)     US 2015 with 80-99% right and 16-49% left, but CTA with 60-70% right, not a surgical candidate   • Chronic back pain    • Closed fracture of left tibial plateau 7/30/2018   • COPD (chronic obstructive pulmonary disease) (CMS/HCC)    • Coronary artery disease    • Crohn's disease (CMS/HCC)     Remicade on hold d/t antibiotics   • DDD (degenerative disc disease)    • Depression    • Diabetes mellitus (CMS/HCC)     Type II   • Elevated cholesterol    • Heartburn 2/21/2016   • Hernia of abdominal wall    • History of stroke 05/03/2017    left parietal   • Hyperlipidemia    • Hypertension    • Hypokalemia    • Morbid obesity (CMS/HCC)    • Obstructive pyelonephritis    • On home oxygen therapy     2L UNLESS STRESSED THEN 2.5-3 L   • Osteopenia 2/21/2016   • PAF (paroxysmal atrial fibrillation) (CMS/HCC)    • PVD (peripheral vascular disease) (CMS/HCC)     RT CAROTID STENOSIS   • Radiculopathy     NECK PAIN   • Right shoulder pain     scheduled for sx   • Seizure (CMS/HCC)      with stroke   • Stroke (CMS/HCC) 2017    memory,    • Subclavian artery stenosis (CMS/HCC)     and axillary artery stenosis, on the left     Past Surgical History:   Procedure Laterality Date   • APPENDECTOMY N/A    • BRONCHOSCOPY N/A 1/5/2018    Procedure: BRONCHOSCOPY;  Surgeon: Gustavo Muniz MD;  Location: [facility];  Service:    • COLON SURGERY     • COLONOSCOPY     • CYSTOSCOPY URETEROSCOPY LASER LITHOTRIPSY Right 4/17/2017    Procedure: CYSTOSCOPY with  right stent removal, right URETEROSCOPY LASER LITHOTRIPSY,  with STONE BASKET EXTRACTION;  Surgeon: Dipesh Bean MD;  Location: [facility];  Service:    • CYSTOSCOPY W/ URETERAL STENT PLACEMENT Right 3/18/2017    Procedure: CYSTOSCOPY URETERAL CATHETER/STENT INSERTION;  Surgeon: Dipesh Bean MD;  Location: [facility];  Service:    • ENDOSCOPY     • FLEXIBLE SIGMOIDOSCOPY N/A 01/25/2016    Dr. Luis Roass   • FRACTURE SURGERY  2017    broken femur, left   • JOINT REPLACEMENT  12/30/2019    right shoulder replacement   • LAPAROSCOPIC COLON RESECTION N/A 01/25/2016    Laparoscopic mobilization of splenic flexure, transverse colectomy with primary anastomosis-Dr. Christopher Richardson   • LUNG BIOPSY      NEGATIVE   • AZ THROMBOENDARTECTMY NECK,NECK INCIS Right 3/14/2016    Procedure: RT CAROTID ENDARTERECTOMY;  Surgeon: Federico Schuler MD;  Location: [facility];  Service: Vascular   • TIBIAL PLATEAU OPEN REDUCTION INTERNAL FIXATION Left 05/09/2018    Open reduction internal fixation of left tibial plateau fracture-Dr. Ayden Cárdenas   • TOTAL SHOULDER ARTHROPLASTY W/ DISTAL CLAVICLE EXCISION Right 12/30/2019    Procedure: TOTAL SHOULDER REVERSE ARTHROPLASTY, open  biceps tenodesis;  Surgeon: Altaf Reyes MD;  Location: [facility];  Service: Orthopedics   • VASCULAR SURGERY  2017    caroid artery   • WRIST ARTHROSCOPY Right     WITH RELEASE OF TRANSVERSE CARPAL LIGAMENT     Family History   Problem Relation Age of Onset   • Diabetes Mother     • Stroke Mother    • Other Father         RESPIRATORY FAILURE   • Cancer Other         lung cancer   • Crohn's disease Brother    • Crohn's disease Maternal Aunt      Social History     Tobacco Use   • Smoking status: Former Smoker     Packs/day: 2.00     Years: 40.00     Pack years: 80.00     Types: Cigarettes     Last attempt to quit: 2013     Years since quittin.4   • Smokeless tobacco: Never Used   • Tobacco comment: 1 cup coffee daily   Substance Use Topics   • Alcohol use: No     Comment: history of heavy drinker    • Drug use: No     Medications Prior to Admission   Medication Sig Dispense Refill Last Dose   • aspirin 81 MG EC tablet Take 81 mg by mouth Every Morning.   2020 at Unknown time   • Blood Glucose Monitoring Suppl (ONETOUCH VERIO) w/Device kit 1 kit 2 (Two) Times a Day. 1 kit 0 2020 at Unknown time   • fenofibrate (TRICOR) 145 MG tablet Take 1 tablet by mouth Daily. 90 tablet 1 2020 at Unknown time   • FLUoxetine (PROzac) 40 MG capsule Take 1 capsule by mouth once daily 30 capsule 6 2020 at Unknown time   • gabapentin (NEURONTIN) 300 MG capsule Take 1 capsule by mouth 2 (Two) Times a Day. 60 capsule 3 2020 at Unknown time   • glucose blood (ONETOUCH VERIO) test strip Use as instructed as to check blood sugars 2-3 times a day for type 2 diabetes 100 each 12 2020 at Unknown time   • guaiFENesin (MUCINEX) 600 MG 12 hr tablet Take 1,200 mg by mouth Every Morning.   2020 at Unknown time   • ipratropium-albuterol (DUO-NEB) 0.5-2.5 mg/3 ml nebulizer Take 3 mL by nebulization Every 4 (Four) Hours As Needed for Shortness of Air. 360 mL 1 2020 at Unknown time   • lamoTRIgine (LaMICtal) 150 MG tablet Take 150 mg by mouth 2 (Two) Times a Day.   2020 at Unknown time   • Lancets (ONETOUCH ULTRASOFT) lancets Used to check blood sugars twice a day as needed for type 2 diabetes monitoring 100 each 12 2020 at Unknown time   • lisinopril (PRINIVIL,ZESTRIL) 40  "MG tablet Take 0.5 tablets by mouth Daily. (Patient taking differently: Take 40 mg by mouth Daily.) 90 tablet 3 8/31/2020 at Unknown time   • metFORMIN (GLUCOPHAGE) 500 MG tablet TAKE 1 TABLET BY MOUTH TWICE DAILY WITH MEALS 180 tablet 0 8/31/2020 at Unknown time   • Multiple Vitamins-Minerals (CENTRUM SILVER PO) Take 1 tablet by mouth every morning.   8/31/2020 at Unknown time   • O2 (OXYGEN) Inhale 4 L/min Continuous. May resume prior 3L/min when O2 sat remains 88% or above on this setting. Patient was given an oximeter here.   8/31/2020 at Unknown time   • rosuvastatin (CRESTOR) 40 MG tablet Take 1 tablet by mouth once daily 90 tablet 0 8/31/2020 at Unknown time   • SYMBICORT 80-4.5 MCG/ACT inhaler Inhale 2 puffs 2 (Two) Times a Day.   8/31/2020 at Unknown time   • tiotropium (Spiriva HandiHaler) 18 MCG per inhalation capsule Place 1 capsule into inhaler and inhale Daily. 30 capsule 3 8/31/2020 at Unknown time   • VENTOLIN  (90 Base) MCG/ACT inhaler INHALE 2 PUFFS BY MOUTH EVERY 4 HOURS AS NEEDED 18 g 0 8/31/2020 at Unknown time   • verapamil SR (CALAN-SR) 240 MG CR tablet TAKE 1 TABLET BY MOUTH EVERY 12 HOURS 180 tablet 0 8/31/2020 at Unknown time   • predniSONE (DELTASONE) 10 MG tablet 60 mg x 1 day, 40 mg x 2 days, 30 mg x 2 days, 20 mg x 2 days, 10 mg x 2 days 26 tablet 0 Taking   • sulfamethoxazole-trimethoprim (BACTRIM DS,SEPTRA DS) 800-160 MG per tablet Take 1 tablet by mouth 2 (Two) Times a Day. 14 tablet 0 Taking     Allergies:  Contrast dye; Iodinated diagnostic agents; Norco [hydrocodone-acetaminophen]; and Tenoretic [atenolol-chlorthalidone]    Review of Systems  Pertinent items are noted in HPI.    Objective     Vital Signs  Temp:  [97.8 °F (36.6 °C)-98.2 °F (36.8 °C)] 98.1 °F (36.7 °C)  Heart Rate:  [] 107  Resp:  [18-20] 18  BP: (138-174)/(61-80) 151/70    Flowsheet Rows      First Filed Value   Admission Height  162.6 cm (64\") Documented at 08/31/2020 2055   Admission Weight  109 kg " (239 lb 3.2 oz) Documented at 08/31/2020 2055           I/O this shift:  In: 193.3 [I.V.:143.3; IV Piggyback:50]  Out: 2050 [Urine:1750; Emesis/NG output:300]  I/O last 3 completed shifts:  In: 50 [IV Piggyback:50]  Out: -     Intake/Output Summary (Last 24 hours) at 9/1/2020 1657  Last data filed at 9/1/2020 1300  Gross per 24 hour   Intake 243.33 ml   Output 2050 ml   Net -1806.67 ml       Physical Exam:     General Appearance:    Alert, cooperative, in no acute distress   Head:    Normocephalic, without obvious abnormality, atraumatic   Eyes:            Lids and lashes normal, conjunctivae and sclerae normal, no   icterus, no pallor, corneas clear, PERRLA   Ears:    Ears appear intact with no abnormalities noted   Throat:   No oral lesions, no thrush, oral mucosa moist   Neck:   No adenopathy, supple, trachea midline, no thyromegaly, no     carotid bruit, no JVD   Back:     No kyphosis present, no scoliosis present, no skin lesions,       erythema or scars, no tenderness to percussion or                   palpation,   range of motion normal   Lungs:     Clear to auscultation,respirations regular, even and                   unlabored    Heart:    Regular rhythm and normal rate, normal S1 and S2, no            murmur, no gallop, no rub, no click   Breast Exam:    Deferred   Abdomen:     Normal bowel sounds, no masses, no organomegaly, soft        + tender, non-distended, no guarding,+  rebound                 tenderness   Genitalia:    Deferred   Extremities:   Moves all extremities well, no edema, no cyanosis, no              redness   Pulses:   Pulses palpable and equal bilaterally   Skin:   No bleeding, bruising or rash   Lymph nodes:   No palpable adenopathy   Neurologic:   Cranial nerves 2 - 12 grossly intact, sensation intact, DTR        present and equal bilaterally       Results Review:  Results from last 7 days   Lab Units 09/01/20  0432 08/31/20  1912   SODIUM mmol/L 136 137   POTASSIUM mmol/L 5.6* 5.1    CHLORIDE mmol/L 102 102   CO2 mmol/L 23.9 24.9   BUN mg/dL 22 22   CREATININE mg/dL 0.82 0.89   CALCIUM mg/dL 10.5 10.9*   BILIRUBIN mg/dL 0.2 0.2   ALK PHOS U/L 67 72   ALT (SGPT) U/L 33 31   AST (SGOT) U/L 22 27   GLUCOSE mg/dL 248* 143*       Estimated Creatinine Clearance: 78.7 mL/min (by C-G formula based on SCr of 0.82 mg/dL).          Results from last 7 days   Lab Units 09/01/20  0432 08/31/20  1912   WBC 10*3/mm3 10.68 14.85*   HEMOGLOBIN g/dL 11.9* 12.0   PLATELETS 10*3/mm3 308 310             Active Medications    budesonide-formoterol 2 puff Inhalation BID - RT   fenofibrate 145 mg Oral Daily   FLUoxetine 40 mg Oral Daily   gabapentin 300 mg Oral BID   [START ON 9/2/2020] guaiFENesin 1,200 mg Oral QAM   insulin lispro 0-7 Units Subcutaneous TID AC   lamoTRIgine 150 mg Oral BID   piperacillin-tazobactam 4.5 g Intravenous Q8H   rosuvastatin 40 mg Oral Daily   tiotropium bromide monohydrate 2 puff Inhalation Daily - RT   verapamil  mg Oral Q12H       dextrose 5 % and sodium chloride 0.9 % 100 mL/hr       Assessment/Plan       Intussusception (CMS/[facility])    COPD (chronic obstructive pulmonary disease) (CMS/[facility])    Mixed hyperlipidemia    Essential hypertension    Type 2 diabetes mellitus with complication, without long-term current use of insulin (CMS/[facility])    Chronic respiratory failure with hypoxia (CMS/[facility])    History of CVA (cerebrovascular accident)    PAF (paroxysmal atrial fibrillation) (CMS/[facility])    PAD (peripheral artery disease) (CMS/[facility])    Class 3 severe obesity in adult (CMS/[facility])    Crohn's disease (CMS/[facility])    CAD (coronary artery disease)    Hyperkalemia    -Mild hyperkalemia: Likely due to respiratory acidosis, possible RTA and side effect of lisinopril.  She is n.p.o. so she will benefit from D5 normal saline with coverage of her hyperglycemia with insulin.  We will hold lisinopril.  She is received 1 dose of Lasix.  We will repeat potassium level to ensure improvement of her  potassium.    -Respiratory acidosis due to COPD  -Mild hypercalcemia: Due to volume depletion.  Will monitor improvement of calcium with volume repletion  -Questionable colo-colonic intussusception: NG tube in place and plan for medical treatment  -Diabetes mellitus type 2  -Hypertension: Hold lisinopril.  Monitor blood pressure closely  -History of COPD          Polina Bess MD  09/01/20  16:57

## 2020-09-01 NOTE — ED NOTES
I wore full protective equipment throughout this patient encounter including a face mask, eye shield and gloves. Hand hygiene/washing of hands was performed before donning protective equipment and after removal when leaving the room.       Rebeca Leos RN  09/01/20 0700

## 2020-09-01 NOTE — H&P
Patient Name:  Mar Camilo  YOB: 1950  MRN:  [id number]  Admit Date:  8/31/2020  Patient Care Team:  Vianey Barrios PA-C as PCP - General (Family Medicine)  Vianey Barrios PA-C as PCP - Claims Attributed  Bhanu Mata MD as Consulting Physician (Cardiology)  Mann Hernadez MD as Consulting Physician (Pulmonary Disease)  Becky Haji MD as Surgeon (General Surgery)      Subjective   History Present Illness     Chief Complaint   Patient presents with   • Abdominal Pain     History of Present Illness   Ms. Camilo is a 70 y.o. former smoker with a history of CAD, COPD (on 6 L of supplemental oxygen), Crohn's disease, essential hypertension, CVA, HLD, PAD, paroxysmal atrial fibrillation, non insulin-dependent type 2 diabetes, and obesity that presents to [facility] complaining of abdominal pain that began 4 days ago.  Patient states that she has been having abdominal pain for for quite some time and she was seen by her PCP on Thursday who ordered an outpatient abdominal ultrasound.  She was instructed that if her abdominal pain became worse, to go to the ED for further evaluation.  She states this morning it became severe. She describes her pain as an ache and a feeling of flatulence.   She has had ventricle hernia for several years status post a transverse colectomy in 2016.  It was recommended that she not repaired it unless it was an emergency due to her comorbidities.  She denies any nausea, vomiting, diarrhea, constipation.  She also denies any shortness of air, chest pain, fever, chills, and loss of taste or smell.  She states her last bowel movement was this morning.  Work-up in the emergency department revealed a colonic intussusception.    Review of Systems   Constitutional: Negative for chills and fever.   HENT: Negative for congestion and rhinorrhea.    Eyes: Negative for photophobia and visual disturbance.   Respiratory: Positive for cough ( Chronic).  Negative for shortness of breath.    Cardiovascular: Negative for chest pain and palpitations.   Gastrointestinal: Positive for abdominal pain. Negative for constipation, diarrhea, nausea and vomiting.   Endocrine: Negative for cold intolerance and heat intolerance.   Genitourinary: Negative for difficulty urinating and dysuria.   Musculoskeletal: Negative for gait problem and joint swelling.   Skin: Negative for rash and wound.   Neurological: Negative for dizziness, light-headedness and headaches.   Psychiatric/Behavioral: Negative for sleep disturbance and suicidal ideas.        Personal History     Past Medical History:   Diagnosis Date   • Arthritis    • Carotid arterial disease (CMS/HCC)     US 2015 with 80-99% right and 16-49% left, but CTA with 60-70% right, not a surgical candidate   • Chronic back pain    • Closed fracture of left tibial plateau 7/30/2018   • COPD (chronic obstructive pulmonary disease) (CMS/HCC)    • Coronary artery disease    • Crohn's disease (CMS/HCC)     Remicade on hold d/t antibiotics   • DDD (degenerative disc disease)    • Depression    • Diabetes mellitus (CMS/[facility])     Type II   • Elevated cholesterol    • Heartburn 2/21/2016   • Hernia of abdominal wall    • History of stroke 05/03/2017    left parietal   • Hyperlipidemia    • Hypertension    • Hypokalemia    • Morbid obesity (CMS/HCC)    • Obstructive pyelonephritis    • On home oxygen therapy     2L UNLESS STRESSED THEN 2.5-3 L   • Osteopenia 2/21/2016   • PAF (paroxysmal atrial fibrillation) (CMS/HCC)    • PVD (peripheral vascular disease) (CMS/HCC)     RT CAROTID STENOSIS   • Radiculopathy     NECK PAIN   • Right shoulder pain     scheduled for sx   • Seizure (CMS/[facility])     with stroke   • Stroke (CMS/HCC) 2017    memory,    • Subclavian artery stenosis (CMS/HCC)     and axillary artery stenosis, on the left     Past Surgical History:   Procedure Laterality Date   • APPENDECTOMY N/A    • BRONCHOSCOPY N/A 1/5/2018     Procedure: BRONCHOSCOPY;  Surgeon: Gustavo Muniz MD;  Location: [facility] OR;  Service:    • COLON SURGERY     • COLONOSCOPY     • CYSTOSCOPY URETEROSCOPY LASER LITHOTRIPSY Right 4/17/2017    Procedure: CYSTOSCOPY with  right stent removal, right URETEROSCOPY LASER LITHOTRIPSY,  with STONE BASKET EXTRACTION;  Surgeon: Dipesh Bean MD;  Location: [facility];  Service:    • CYSTOSCOPY W/ URETERAL STENT PLACEMENT Right 3/18/2017    Procedure: CYSTOSCOPY URETERAL CATHETER/STENT INSERTION;  Surgeon: Dipesh Bean MD;  Location: [facility] OR;  Service:    • ENDOSCOPY     • FLEXIBLE SIGMOIDOSCOPY N/A 01/25/2016    Dr. Luis Rosas   • FRACTURE SURGERY  2017    broken femur, left   • JOINT REPLACEMENT  12/30/2019    right shoulder replacement   • LAPAROSCOPIC COLON RESECTION N/A 01/25/2016    Laparoscopic mobilization of splenic flexure, transverse colectomy with primary anastomosis-Dr. Christopher Richardson   • LUNG BIOPSY      NEGATIVE   • IL THROMBOENDARTECTMY NECK,NECK INCIS Right 3/14/2016    Procedure: RT CAROTID ENDARTERECTOMY;  Surgeon: Federico Schuler MD;  Location: [facility] OR;  Service: Vascular   • TIBIAL PLATEAU OPEN REDUCTION INTERNAL FIXATION Left 05/09/2018    Open reduction internal fixation of left tibial plateau fracture-Dr. Ayden Cárdenas   • TOTAL SHOULDER ARTHROPLASTY W/ DISTAL CLAVICLE EXCISION Right 12/30/2019    Procedure: TOTAL SHOULDER REVERSE ARTHROPLASTY, open  biceps tenodesis;  Surgeon: Altaf Reyes MD;  Location: [facility];  Service: Orthopedics   • VASCULAR SURGERY  2017    caroid artery   • WRIST ARTHROSCOPY Right     WITH RELEASE OF TRANSVERSE CARPAL LIGAMENT     Family History   Problem Relation Age of Onset   • Diabetes Mother    • Stroke Mother    • Other Father         RESPIRATORY FAILURE   • Cancer Other         lung cancer   • Crohn's disease Brother    • Crohn's disease Maternal Aunt      Social History     Tobacco Use   • Smoking status: Former Smoker      Packs/day: 2.00     Years: 40.00     Pack years: 80.00     Types: Cigarettes     Last attempt to quit: 2013     Years since quittin.4   • Smokeless tobacco: Never Used   • Tobacco comment: 1 cup coffee daily   Substance Use Topics   • Alcohol use: No     Comment: history of heavy drinker    • Drug use: No     Current Facility-Administered Medications on File Prior to Encounter   Medication Dose Route Frequency Provider Last Rate Last Dose   • acetaminophen (TYLENOL) 325 MG tablet  - ADS Override Pull              Current Outpatient Medications on File Prior to Encounter   Medication Sig Dispense Refill   • aspirin 81 MG EC tablet Take 81 mg by mouth Every Morning.     • Blood Glucose Monitoring Suppl (ONETOUCH VERIO) w/Device kit 1 kit 2 (Two) Times a Day. 1 kit 0   • fenofibrate (TRICOR) 145 MG tablet Take 1 tablet by mouth Daily. 90 tablet 1   • FLUoxetine (PROzac) 40 MG capsule Take 1 capsule by mouth once daily 30 capsule 6   • gabapentin (NEURONTIN) 300 MG capsule Take 1 capsule by mouth 2 (Two) Times a Day. 60 capsule 3   • glucose blood (ONETOUCH VERIO) test strip Use as instructed as to check blood sugars 2-3 times a day for type 2 diabetes 100 each 12   • guaiFENesin (MUCINEX) 600 MG 12 hr tablet Take 1,200 mg by mouth Every Morning.     • ipratropium-albuterol (DUO-NEB) 0.5-2.5 mg/3 ml nebulizer Take 3 mL by nebulization Every 4 (Four) Hours As Needed for Shortness of Air. 360 mL 1   • lamoTRIgine (LaMICtal) 150 MG tablet Take 150 mg by mouth 2 (Two) Times a Day.     • Lancets (ONETOUCH ULTRASOFT) lancets Used to check blood sugars twice a day as needed for type 2 diabetes monitoring 100 each 12   • lisinopril (PRINIVIL,ZESTRIL) 40 MG tablet Take 0.5 tablets by mouth Daily. (Patient taking differently: Take 40 mg by mouth Daily.) 90 tablet 3   • metFORMIN (GLUCOPHAGE) 500 MG tablet TAKE 1 TABLET BY MOUTH TWICE DAILY WITH MEALS 180 tablet 0   • Multiple Vitamins-Minerals (CENTRUM SILVER PO) Take 1  tablet by mouth every morning.     • O2 (OXYGEN) Inhale 4 L/min Continuous. May resume prior 3L/min when O2 sat remains 88% or above on this setting. Patient was given an oximeter here.     • rosuvastatin (CRESTOR) 40 MG tablet Take 1 tablet by mouth once daily 90 tablet 0   • SYMBICORT 80-4.5 MCG/ACT inhaler Inhale 2 puffs 2 (Two) Times a Day.     • tiotropium (Spiriva HandiHaler) 18 MCG per inhalation capsule Place 1 capsule into inhaler and inhale Daily. 30 capsule 3   • VENTOLIN  (90 Base) MCG/ACT inhaler INHALE 2 PUFFS BY MOUTH EVERY 4 HOURS AS NEEDED 18 g 0   • verapamil SR (CALAN-SR) 240 MG CR tablet TAKE 1 TABLET BY MOUTH EVERY 12 HOURS 180 tablet 0   • predniSONE (DELTASONE) 10 MG tablet 60 mg x 1 day, 40 mg x 2 days, 30 mg x 2 days, 20 mg x 2 days, 10 mg x 2 days 26 tablet 0   • sulfamethoxazole-trimethoprim (BACTRIM DS,SEPTRA DS) 800-160 MG per tablet Take 1 tablet by mouth 2 (Two) Times a Day. 14 tablet 0     Allergies   Allergen Reactions   • Contrast Dye Hives   • Iodinated Diagnostic Agents Hives   • Norco [Hydrocodone-Acetaminophen] Hallucinations   • Tenoretic [Atenolol-Chlorthalidone] Anaphylaxis       Objective    Objective     Vital Signs  Temp:  [98.2 °F (36.8 °C)] 98.2 °F (36.8 °C)  Heart Rate:  [] 96  Resp:  [20] 20  BP: (143-174)/(61-68) 143/68  SpO2:  [94 %-98 %] 98 %  on  Flow (L/min):  [2] 2;      Body mass index is 41.06 kg/m².    Physical Exam   Constitutional: She is oriented to person, place, and time.  Non-toxic appearance. No distress.   Chronically-ill appearing     HENT:   Head: Normocephalic and atraumatic.   Eyes: Conjunctivae and EOM are normal. Right eye exhibits no discharge. Left eye exhibits no discharge. No scleral icterus.   Neck: Normal range of motion. Neck supple. No JVD present.   Cardiovascular: Normal rate, regular rhythm and normal heart sounds. Exam reveals no gallop and no friction rub.   No murmur heard.  Pulmonary/Chest: She is in respiratory  distress ( mild). She has wheezes. She has no rales.   Abdominal: Soft. She exhibits distension. Bowel sounds are increased. There is tenderness. There is no guarding.   Obese, left ventral hernia present     Musculoskeletal: Normal range of motion. She exhibits no edema, tenderness or deformity.   Neurological: She is alert and oriented to person, place, and time.   Skin: Skin is warm and dry. Capillary refill takes less than 2 seconds.   Psychiatric: She has a normal mood and affect. Her behavior is normal. Judgment and thought content normal.     Results Review:  I reviewed the patient's new clinical results.  Discussed with ED provider.    Lab Results (last 24 hours)     Procedure Component Value Units Date/Time    CBC & Differential [[id number]] Collected:  08/31/20 1912    Specimen:  Blood Updated:  08/31/20 1926    Narrative:       The following orders were created for panel order CBC & Differential.  Procedure                               Abnormality         Status                     ---------                               -----------         ------                     CBC Auto Differential[[id number]]        Abnormal            Final result                 Please view results for these tests on the individual orders.    Comprehensive Metabolic Panel [[id number]]  (Abnormal) Collected:  08/31/20 1912    Specimen:  Blood Updated:  08/1950     Glucose 143 mg/dL      BUN 22 mg/dL      Creatinine 0.89 mg/dL      Sodium 137 mmol/L      Potassium 5.1 mmol/L      Chloride 102 mmol/L      CO2 24.9 mmol/L      Calcium 10.9 mg/dL      Total Protein 7.9 g/dL      Albumin 3.90 g/dL      ALT (SGPT) 31 U/L      AST (SGOT) 27 U/L      Alkaline Phosphatase 72 U/L      Total Bilirubin 0.2 mg/dL      eGFR Non African Amer 63 mL/min/1.73      Globulin 4.0 gm/dL      A/G Ratio 1.0 g/dL      BUN/Creatinine Ratio 24.7     Anion Gap 10.1 mmol/L     Narrative:       GFR Normal >60  Chronic Kidney Disease <60  Kidney Failure  <15      Lipase [[id number]]  (Normal) Collected:  08/31/20 1912    Specimen:  Blood Updated:  08/1950     Lipase 31 U/L     Lactic Acid, Plasma [[id number]]  (Normal) Collected:  08/31/20 1912    Specimen:  Blood Updated:  08/31/20 1948     Lactate 1.6 mmol/L     CBC Auto Differential [[id number]]  (Abnormal) Collected:  08/31/20 1912    Specimen:  Blood Updated:  08/31/20 1926     WBC 14.85 10*3/mm3      RBC 4.18 10*6/mm3      Hemoglobin 12.0 g/dL      Hematocrit 38.5 %      MCV 92.1 fL      MCH 28.7 pg      MCHC 31.2 g/dL      RDW 14.3 %      RDW-SD 48.6 fl      MPV 9.1 fL      Platelets 310 10*3/mm3      Neutrophil % 75.4 %      Lymphocyte % 15.2 %      Monocyte % 7.3 %      Eosinophil % 1.0 %      Basophil % 0.4 %      Immature Grans % 0.7 %      Neutrophils, Absolute 11.20 10*3/mm3      Lymphocytes, Absolute 2.26 10*3/mm3      Monocytes, Absolute 1.08 10*3/mm3      Eosinophils, Absolute 0.15 10*3/mm3      Basophils, Absolute 0.06 10*3/mm3      Immature Grans, Absolute 0.10 10*3/mm3      nRBC 0.0 /100 WBC     Urinalysis With Microscopic If Indicated (No Culture) - Urine, Clean Catch [[id number]]  (Abnormal) Collected:  08/31/20 2138    Specimen:  Urine, Clean Catch Updated:  08/31/20 2158     Color, UA Yellow     Appearance, UA Cloudy     pH, UA 6.0     Specific Gravity, UA 1.020     Glucose, UA Negative     Ketones, UA Negative     Bilirubin, UA Negative     Blood, UA Negative     Protein, UA Negative     Leuk Esterase, UA Small (1+)     Nitrite, UA Negative     Urobilinogen, UA 0.2 E.U./dL    Urinalysis, Microscopic Only - Urine, Clean Catch [[id number]]  (Abnormal) Collected:  08/31/20 2138    Specimen:  Urine, Clean Catch Updated:  08/31/20 2158     RBC, UA 0-2 /HPF      WBC, UA 13-20 /HPF      Bacteria, UA 3+ /HPF      Squamous Epithelial Cells, UA 0-2 /HPF      Hyaline Casts, UA None Seen /LPF      Methodology Automated Microscopy    COVID PRE-OP / PRE-PROCEDURE SCREENING ORDER (NO ISOLATION) - Swab,  Nasopharynx [[id number]] Collected:  08/31/20 2258    Specimen:  Swab from Nasopharynx Updated:  09/01/20 0019    Narrative:       The following orders were created for panel order COVID PRE-OP / PRE-PROCEDURE SCREENING ORDER (NO ISOLATION) - Swab, Nasopharynx.  Procedure                               Abnormality         Status                     ---------                               -----------         ------                     Respiratory Panel PCR w/...[[id number]]  Normal              Final result                 Please view results for these tests on the individual orders.    Respiratory Panel PCR w/COVID-19(SARS-CoV-2) [facility] In-House, NP Swab in UTM/VTM, 3-4 HR TAT - Swab, Nasopharynx [[id number]]  (Normal) Collected:  08/31/20 2258    Specimen:  Swab from Nasopharynx Updated:  09/01/20 0019     ADENOVIRUS, PCR Not Detected     Coronavirus 229E Not Detected     Coronavirus HKU1 Not Detected     Coronavirus NL63 Not Detected     Coronavirus OC43 Not Detected     COVID19 Not Detected     Human Metapneumovirus Not Detected     Human Rhinovirus/Enterovirus Not Detected     Influenza A PCR Not Detected     Influenza A H1 Not Detected     Influenza A H1 2009 PCR Not Detected     Influenza A H3 Not Detected     Influenza B PCR Not Detected     Parainfluenza Virus 1 Not Detected     Parainfluenza Virus 2 Not Detected     Parainfluenza Virus 3 Not Detected     Parainfluenza Virus 4 Not Detected     RSV, PCR Not Detected     Bordetella pertussis pcr Not Detected     Bordetella parapertussis PCR Not Detected     Chlamydophila pneumoniae PCR Not Detected     Mycoplasma pneumo by PCR Not Detected    Narrative:       Fact sheet for providers: https://docs.[employer]/wp-content/uploads/CLT8804-5591-BM4.1-EUA-Provider-Fact-Sheet-3.pdf    Fact sheet for patients: https://docs.[employer]/wp-content/uploads/DRV5057-8183-DI4.1-EUA-Patient-Fact-Sheet-1.pdf  Fact sheet for providers:  https://docs.[employer]/wp-content/uploads/LMR8560-1442-YD8.1-EUA-Provider-Fact-Sheet-3.pdf    Fact sheet for patients: https://docs.[employer]/wp-content/uploads/UUF1710-8251-BD1.1-EUA-Patient-Fact-Sheet-1.pdf          Imaging Results (Last 24 Hours)     Procedure Component Value Units Date/Time    CT Abdomen Pelvis With Contrast [[id number]] Collected:  08/31/20 2314     Updated:  09/01/20 0045    Narrative:       CT ABDOMEN AND PELVIS WITH IV CONTRAST     HISTORY: 70-year-old with large hernia. Evaluate for incarcerated bowel.     TECHNIQUE: CT abdomen and pelvis with intravenous contrast.     COMPARISON: CT and pelvis with IV contrast 11/19/2015.     FINDINGS: There is a large ventral central to left abdominal wall hernia  that contains large and small bowel. The hernia measures approximately  24 cm transverse by 13 cm AP and extends 20 cm in height. Hernia neck  measures 8 x 9.5 cm. Within the anterior, right aspect of the hernia  there is a loop of redundant right colon that is abnormally distended  with what appears to represent stool and there are curvilinear rings of  fat density within this segment of colon suspected to represent a focal  intussusception within the hernia. The more proximal ascending colon and  cecum is fluid-filled without significant distention. There is no  distention of the terminal ileum which also extends partially into the  hernia.     Severe basilar pulmonary emphysema is present associated with  interstitial disease. There is mild gastric distention with fluid. The  liver, gallbladder, spleen, adrenal glands, pancreas appear within  normal limits. Kidneys appear normal and there is no hydronephrosis.  There is a levoscoliotic curvature of the lumbar spine associated with  multilevel degenerative disc disease.       Impression:       1.  Large central to left ventral hernia contains large and small bowel.  Within the anterior, right aspect of the hernia there is a  dilated  colonic loop which contains internal concentric rings of fat density and  stool and this suggests a localized intussusception.  This finding is  suspected to be the cause of patient's symptoms and raises the  possibility of a lead point for which further evaluation recommended.  2. Severe basilar pulmonary emphysema.     Findings were discussed with DALE Suárez in the emergency  department on 08/31/2020 at 11 PM.     Radiation dose reduction techniques were utilized, including automated  exposure control and exposure modulation based on body size.     This report was finalized on 9/1/2020 12:42 AM by Dr. Ryan Gudino M.D.             Results for orders placed during the hospital encounter of 12/24/17   Adult Transthoracic Echo Complete W/ Cont if Necessary Per Protocol    Narrative · The study is technically difficult for diagnosis.  · Left ventricular systolic function is hyperdynamic (EF > 70%).   Calculated EF = 72.2%. Estimated EF was in agreement with the calculated   EF. Normal left ventricular cavity size noted. Left ventricular wall   thickness is consistent with mild concentric hypertrophy. Left ventricular   diastolic dysfunction is noted (grade II w/high LAP) consistent with   pseudonormalization.          No orders to display        Assessment/Plan     Active Hospital Problems    Diagnosis  POA   • **Intussusception (CMS/[facility]) [K56.1]  Yes   • Crohn's disease (CMS/[facility]) [K50.90]  Yes   • CAD (coronary artery disease) [I25.10]  Yes   • PAD (peripheral artery disease) (CMS/[facility]) [I73.9]  Yes   • PAF (paroxysmal atrial fibrillation) (CMS/[facility]) [I48.0]  Yes   • History of CVA (cerebrovascular accident) [Z86.73]  Not Applicable   • COPD (chronic obstructive pulmonary disease) (CMS/[facility]) [J44.9]  Yes   • Mixed hyperlipidemia [E78.2]  Yes   • Type 2 diabetes mellitus with complication, without long-term current use of insulin (CMS/[facility]) [E11.8]  Yes   • Class 3 severe obesity in adult (CMS/[facility])  [E66.01]  Yes   • Essential hypertension [I10]  Yes      Resolved Hospital Problems   No resolved problems to display.       Ms. Camilo is a 70 y.o. former smoker with a history of COPD, hypertension, PAF, PAD, CAD, type 2 diabetes mellitus, and ventral hernia who presents with abdominal pain secondary to large chronic incarcerated ventral hernia with colonic intussusception.    Colonic intussusception  -General surgery  has been consulted and recommended maximal medical management with NG tube placement, n.p.o., IV fluids, and antibiotics (Zosyn).  -Hold home medications for now as patient is NPO  -Appreciate general surgery recommendations.    Type 2 diabetes mellitus  -Accu-Cheks 4 times a day with meals and at bedtime  -Moderate dose sliding scale insulin with hypoglycemic protocol  -Check hemoglobin A1c  -Hold metformin    Chronic respiratory failure with hypoxia/COPD  -Patient wears 3 to 4 L of O2, 6 L with exertion at home.  Patient denies any worsening shortness of breath than her baseline.  -Continue supplemental oxygen  -Slight wheezing on exam  -Given 1 dose of IV Solu-Medrol in ED, will hold off on any steroids for now  -Continue home breathing treatment    Hypertension  -Stable, continue to monitor      I discussed the patient's findings and my recommendations with patient and ED provider.    VTE Prophylaxis - SCDs.  Code Status - Full code.       DALE Joe  [facility]  09/01/20  01:56

## 2020-09-01 NOTE — PLAN OF CARE
Problem: Patient Care Overview  Goal: Plan of Care Review  Outcome: Ongoing (interventions implemented as appropriate)  Flowsheets (Taken 9/1/2020 1764)  Progress: no change  Plan of Care Reviewed With: patient  Outcome Summary: NG tube remains to LWS. Minimal abd pain. No nausea. Assist x 1 BSC. Continue to monitor.

## 2020-09-02 ENCOUNTER — APPOINTMENT (OUTPATIENT)
Dept: CT IMAGING | Facility: HOSPITAL | Age: 70
End: 2020-09-02

## 2020-09-02 LAB
ANION GAP SERPL CALCULATED.3IONS-SCNC: 9.1 MMOL/L (ref 5–15)
BUN SERPL-MCNC: 32 MG/DL (ref 8–23)
BUN/CREAT SERPL: 35.6 (ref 7–25)
CALCIUM SPEC-SCNC: 8.9 MG/DL (ref 8.6–10.5)
CHLORIDE SERPL-SCNC: 103 MMOL/L (ref 98–107)
CO2 SERPL-SCNC: 28.9 MMOL/L (ref 22–29)
CREAT SERPL-MCNC: 0.9 MG/DL (ref 0.57–1)
DEPRECATED RDW RBC AUTO: 44.3 FL (ref 37–54)
ERYTHROCYTE [DISTWIDTH] IN BLOOD BY AUTOMATED COUNT: 13.9 % (ref 12.3–15.4)
GFR SERPL CREATININE-BSD FRML MDRD: 62 ML/MIN/1.73
GLUCOSE BLDC GLUCOMTR-MCNC: 154 MG/DL (ref 70–130)
GLUCOSE BLDC GLUCOMTR-MCNC: 159 MG/DL (ref 70–130)
GLUCOSE BLDC GLUCOMTR-MCNC: 165 MG/DL (ref 70–130)
GLUCOSE BLDC GLUCOMTR-MCNC: 188 MG/DL (ref 70–130)
GLUCOSE SERPL-MCNC: 173 MG/DL (ref 65–99)
HBA1C MFR BLD: 6.6 % (ref 4.8–5.6)
HCT VFR BLD AUTO: 35.6 % (ref 34–46.6)
HGB BLD-MCNC: 11.8 G/DL (ref 12–15.9)
MCH RBC QN AUTO: 28.9 PG (ref 26.6–33)
MCHC RBC AUTO-ENTMCNC: 33.1 G/DL (ref 31.5–35.7)
MCV RBC AUTO: 87.3 FL (ref 79–97)
PLATELET # BLD AUTO: 327 10*3/MM3 (ref 140–450)
PMV BLD AUTO: 9.3 FL (ref 6–12)
POTASSIUM SERPL-SCNC: 3.9 MMOL/L (ref 3.5–5.2)
RBC # BLD AUTO: 4.08 10*6/MM3 (ref 3.77–5.28)
SODIUM SERPL-SCNC: 141 MMOL/L (ref 136–145)
WBC # BLD AUTO: 14.64 10*3/MM3 (ref 3.4–10.8)

## 2020-09-02 PROCEDURE — 93005 ELECTROCARDIOGRAM TRACING: CPT | Performed by: INTERNAL MEDICINE

## 2020-09-02 PROCEDURE — 80048 BASIC METABOLIC PNL TOTAL CA: CPT | Performed by: NURSE PRACTITIONER

## 2020-09-02 PROCEDURE — 94799 UNLISTED PULMONARY SVC/PX: CPT

## 2020-09-02 PROCEDURE — 82962 GLUCOSE BLOOD TEST: CPT

## 2020-09-02 PROCEDURE — 83036 HEMOGLOBIN GLYCOSYLATED A1C: CPT | Performed by: NURSE PRACTITIONER

## 2020-09-02 PROCEDURE — 0 DIATRIZOATE MEGLUMINE & SODIUM PER 1 ML: Performed by: STUDENT IN AN ORGANIZED HEALTH CARE EDUCATION/TRAINING PROGRAM

## 2020-09-02 PROCEDURE — 25010000002 MORPHINE PER 10 MG: Performed by: NURSE PRACTITIONER

## 2020-09-02 PROCEDURE — 85027 COMPLETE CBC AUTOMATED: CPT | Performed by: NURSE PRACTITIONER

## 2020-09-02 PROCEDURE — 36415 COLL VENOUS BLD VENIPUNCTURE: CPT | Performed by: NURSE PRACTITIONER

## 2020-09-02 PROCEDURE — 93010 ELECTROCARDIOGRAM REPORT: CPT | Performed by: INTERNAL MEDICINE

## 2020-09-02 PROCEDURE — 25010000002 PIPERACILLIN SOD-TAZOBACTAM PER 1 G: Performed by: STUDENT IN AN ORGANIZED HEALTH CARE EDUCATION/TRAINING PROGRAM

## 2020-09-02 PROCEDURE — 99232 SBSQ HOSP IP/OBS MODERATE 35: CPT | Performed by: STUDENT IN AN ORGANIZED HEALTH CARE EDUCATION/TRAINING PROGRAM

## 2020-09-02 PROCEDURE — 74176 CT ABD & PELVIS W/O CONTRAST: CPT

## 2020-09-02 RX ORDER — DOCUSATE SODIUM 100 MG/1
100 CAPSULE, LIQUID FILLED ORAL 2 TIMES DAILY
Status: DISCONTINUED | OUTPATIENT
Start: 2020-09-02 | End: 2020-09-04 | Stop reason: HOSPADM

## 2020-09-02 RX ORDER — FLUOXETINE HYDROCHLORIDE 20 MG/1
40 CAPSULE ORAL DAILY
Status: DISCONTINUED | OUTPATIENT
Start: 2020-09-02 | End: 2020-09-04 | Stop reason: HOSPADM

## 2020-09-02 RX ADMIN — TAZOBACTAM SODIUM AND PIPERACILLIN SODIUM 4.5 G: 500; 4 INJECTION, SOLUTION INTRAVENOUS at 17:18

## 2020-09-02 RX ADMIN — BUDESONIDE AND FORMOTEROL FUMARATE DIHYDRATE 2 PUFF: 80; 4.5 AEROSOL RESPIRATORY (INHALATION) at 19:26

## 2020-09-02 RX ADMIN — TAZOBACTAM SODIUM AND PIPERACILLIN SODIUM 4.5 G: 500; 4 INJECTION, SOLUTION INTRAVENOUS at 09:00

## 2020-09-02 RX ADMIN — DEXTROSE AND SODIUM CHLORIDE 100 ML/HR: 5; 900 INJECTION, SOLUTION INTRAVENOUS at 02:20

## 2020-09-02 RX ADMIN — FLUOXETINE HYDROCHLORIDE 40 MG: 20 CAPSULE ORAL at 16:00

## 2020-09-02 RX ADMIN — TIOTROPIUM BROMIDE INHALATION SPRAY 2 PUFF: 3.12 SPRAY, METERED RESPIRATORY (INHALATION) at 07:14

## 2020-09-02 RX ADMIN — DOCUSATE SODIUM 100 MG: 100 CAPSULE, LIQUID FILLED ORAL at 23:24

## 2020-09-02 RX ADMIN — DEXTROSE AND SODIUM CHLORIDE 100 ML/HR: 5; 900 INJECTION, SOLUTION INTRAVENOUS at 13:44

## 2020-09-02 RX ADMIN — DIATRIZOATE MEGLUMINE AND DIATRIZOATE SODIUM 30 ML: 600; 100 SOLUTION ORAL; RECTAL at 10:02

## 2020-09-02 RX ADMIN — MORPHINE SULFATE 2 MG: 2 INJECTION, SOLUTION INTRAMUSCULAR; INTRAVENOUS at 16:00

## 2020-09-02 RX ADMIN — BUDESONIDE AND FORMOTEROL FUMARATE DIHYDRATE 2 PUFF: 80; 4.5 AEROSOL RESPIRATORY (INHALATION) at 07:13

## 2020-09-02 RX ADMIN — TAZOBACTAM SODIUM AND PIPERACILLIN SODIUM 4.5 G: 500; 4 INJECTION, SOLUTION INTRAVENOUS at 02:20

## 2020-09-02 RX ADMIN — VERAPAMIL HYDROCHLORIDE 240 MG: 240 TABLET, FILM COATED, EXTENDED RELEASE ORAL at 17:19

## 2020-09-02 RX ADMIN — GABAPENTIN 300 MG: 300 CAPSULE ORAL at 21:57

## 2020-09-02 NOTE — PLAN OF CARE
Problem: Patient Care Overview  Goal: Plan of Care Review  Outcome: Ongoing (interventions implemented as appropriate)  Flowsheets (Taken 9/2/2020 4628)  Progress: no change  Plan of Care Reviewed With: patient  Outcome Summary: VSS. 50 from NG. No c/o pain/nausea. Will CTM.

## 2020-09-02 NOTE — PROGRESS NOTES
Name: Mar Camilo ADMIT: 2020   : 1950  PCP: Vianey Barrios PA-C    MRN: [id number] LOS: 1 days   AGE/SEX: 70 y.o. female  ROOM: [facility]     Subjective   Subjective   Resting in bed. Just got IV contrast for her CT and reports flushing/warmth and feeling of need to urinate. Denies any nausea. No vomiting. Last BM Monday morning. Passing gas. Still with abdominal pain, but only with palpation. Denies any chest pain. Breathing at baseline. Productive cough with clear phlegm that she reports at baseline. No fever or chills.     Objective   Objective   Vital Signs  Temp:  [97.2 °F (36.2 °C)-98.1 °F (36.7 °C)] 97.2 °F (36.2 °C)  Heart Rate:  [] 86  Resp:  [18] 18  BP: (138-151)/(64-70) 149/69  SpO2:  [86 %-96 %] 94 %  on  Flow (L/min):  [2-3] 3;   Device (Oxygen Therapy): nasal cannula  Body mass index is 43.77 kg/m².     Physical Exam   Constitutional: She is oriented to person, place, and time. She appears well-developed and well-nourished. No distress.   HENT:   Head: Normocephalic and atraumatic.   Nose: Nose normal.   Mouth/Throat: Oropharynx is clear and moist.   NG tube in place. Small amount of dark output in suction cannister.   Eyes: Conjunctivae are normal. Right eye exhibits no discharge. Left eye exhibits no discharge.   Neck: Normal range of motion. Neck supple.   Cardiovascular: Normal rate, regular rhythm, normal heart sounds and intact distal pulses.   Pulmonary/Chest: Effort normal. No respiratory distress.   Decreased throughout.   Abdominal: Soft. Bowel sounds are normal. She exhibits distension (mild, hernia noted). There is tenderness.   Musculoskeletal: Normal range of motion. She exhibits no edema or tenderness.   Neurological: She is alert and oriented to person, place, and time. She exhibits normal muscle tone. Coordination normal.   Skin: Skin is warm and dry. She is not diaphoretic. No erythema.   Psychiatric: She has a normal mood and affect. Her behavior is  normal.   Nursing note and vitals reviewed.     Results Review:       I reviewed the patient's new clinical results.  Results from last 7 days   Lab Units 09/02/20  0642 09/01/20 0432 08/31/20 1912   WBC 10*3/mm3 14.64* 10.68 14.85*   HEMOGLOBIN g/dL 11.8* 11.9* 12.0   PLATELETS 10*3/mm3 327 308 310     Results from last 7 days   Lab Units 09/02/20  0642 09/01/20  1728 09/01/20 0432 08/31/20  1912   SODIUM mmol/L 141  --  136 137   POTASSIUM mmol/L 3.9 4.6 5.6* 5.1   CHLORIDE mmol/L 103  --  102 102   CO2 mmol/L 28.9  --  23.9 24.9   BUN mg/dL 32*  --  22 22   CREATININE mg/dL 0.90  --  0.82 0.89   GLUCOSE mg/dL 173*  --  248* 143*   Estimated Creatinine Clearance: 71.7 mL/min (by C-G formula based on SCr of 0.9 mg/dL).  Results from last 7 days   Lab Units 09/01/20 0432 08/31/20  1912   ALBUMIN g/dL 3.90 3.90   BILIRUBIN mg/dL 0.2 0.2   ALK PHOS U/L 67 72   AST (SGOT) U/L 22 27   ALT (SGPT) U/L 33 31     Results from last 7 days   Lab Units 09/02/20  0642 09/01/20 0432 08/31/20  1912   CALCIUM mg/dL 8.9 10.5 10.9*   ALBUMIN g/dL  --  3.90 3.90     Results from last 7 days   Lab Units 09/01/20  0432 08/31/20  1912   LACTATE mmol/L 1.7 1.6     Hemoglobin A1C   Date/Time Value Ref Range Status   09/02/2020 0642 6.60 (H) 4.80 - 5.60 % Final     Glucose   Date/Time Value Ref Range Status   09/02/2020 0635 165 (H) 70 - 130 mg/dL Final   09/01/2020 2032 182 (H) 70 - 130 mg/dL Final   09/01/2020 1648 156 (H) 70 - 130 mg/dL Final   09/01/2020 1128 212 (H) 70 - 130 mg/dL Final   09/01/2020 0746 253 (H) 70 - 130 mg/dL Final         budesonide-formoterol 2 puff Inhalation BID - RT   fenofibrate 145 mg Oral Daily   FLUoxetine 40 mg Oral Daily   gabapentin 300 mg Oral BID   guaiFENesin 1,200 mg Oral QAM   insulin lispro 0-7 Units Subcutaneous TID AC   lamoTRIgine 150 mg Oral BID   piperacillin-tazobactam 4.5 g Intravenous Q8H   rosuvastatin 40 mg Oral Daily   tiotropium bromide monohydrate 2 puff Inhalation Daily - RT      verapamil  mg Oral Q12H       dextrose 5 % and sodium chloride 0.9 % 100 mL/hr Last Rate: 100 mL/hr (09/02/20 0220)   NPO Diet       Assessment/Plan     Active Hospital Problems    Diagnosis  POA   • **Intussusception (CMS/HCC) [K56.1]  Yes   • Crohn's disease (CMS/HCC) [K50.90]  Yes   • CAD (coronary artery disease) [I25.10]  Yes   • Hyperkalemia [E87.5]  Unknown   • PAD (peripheral artery disease) (CMS/HCC) [I73.9]  Yes   • PAF (paroxysmal atrial fibrillation) (CMS/HCC) [I48.0]  Yes   • History of CVA (cerebrovascular accident) [Z86.73]  Not Applicable   • Chronic respiratory failure with hypoxia (CMS/HCC) [J96.11]  Yes   • COPD (chronic obstructive pulmonary disease) (CMS/HCC) [J44.9]  Yes   • Mixed hyperlipidemia [E78.2]  Yes   • Type 2 diabetes mellitus with complication, without long-term current use of insulin (CMS/HCC) [E11.8]  Yes   • Class 3 severe obesity in adult (CMS/HCC) [E66.01]  Yes   • Essential hypertension [I10]  Yes      Resolved Hospital Problems   No resolved problems to display.     70 y.o. female admitted with Intussusception (CMS/HCC).     Intussusception (CMS/HCC).  Noted CT abd / pelv with contrast--large central and left ventral hernia contains large and small bowel.  General surgery following, input appreciated--no signs of urgent surgical exploration currently, continue n.p.o., NG LWS, IV fluids, PRN analgesic, IV Zosyn.  COVID-19 not detected.  Lipase unremarkable. CT with contrast today to re-evaluate. Possible removal of NG today pending that.    COPD (chronic obstructive pulmonary disease) (CMS/HCC).  Reports chronic continuous 6 L nasal cannula at home, currently decreased to 2 L nasal cannula s/p noted elevated PaO2 106.8 and O2 saturation 88% without signs of increased respiratory work effort. Tiotropium, symbicort.  Duoneb PRN wheeze. Severe basilar pulmonary emphysema noted on CT abdomen pelvis. Follows with Dr. Hernadez. No current need for consultation but will  monitor.    Mixed hyperlipidemia.  Fenofibrate continued.    Essential hypertension.  BP acceptable. CCB continued. ARB held.    Type 2 diabetes mellitus with complication, without long-term current use of insulin (CMS/[facility]).  Glucose improving. Meftormin on hold. Continue SSI. Continue monitoring.     PAF (paroxysmal atrial fibrillation) (CMS/[facility]).  Currently in NSR. On baby ASA at home. CCB continued.     Class 3 severe obesity in adult (CMS/[facility]).  Recommend portion control. Consider nutrition / dietician consult.     Crohn's disease (CMS/[facility]).  Reports Remicade every 8 weeks.     CAD (coronary artery disease).  No evidence of angina or atypical signs of ACS.     Hyperkalemia.  Renal consulted and suspect this is related to her respiratory acidosis. Given fluids and IV Lasix. Calcium improved. Lisinopril on hold. Potassium normalized. Consider repeat ABGs soon.     VTE Prophylaxis - SCDs will add pharmacy to dose Lovenox as surgery has okayed this.  Code Status - Full code  Disposition - TBD.    DALE Duff  [facility]  09/02/20  10:33      I wore protective equipment throughout this patient encounter including a face mask, gloves and protective eyewear.  Hand hygiene was performed before donning protective equipment and after removal when leaving the room.

## 2020-09-02 NOTE — PLAN OF CARE
Problem: Patient Care Overview  Goal: Plan of Care Review  Outcome: Ongoing (interventions implemented as appropriate)   Vss. Had CT abd/pelvis, NG tube d/c'd. Labs in am. No c/o pain or n/v.

## 2020-09-02 NOTE — PROGRESS NOTES
Pharmacy consult for Lovenox dosing    Mar Camilo is a 70 y.o. female 116 kg (255 lb).    Indication:DVT prophylaxis   Physician:Lissette HUNTER    Estimated Creatinine Clearance: 71.7 mL/min (by C-G formula based on SCr of 0.9 mg/dL).  Creatinine   Date Value Ref Range Status   09/02/2020 0.90 0.57 - 1.00 mg/dL Final   09/01/2020 0.82 0.57 - 1.00 mg/dL Final   08/31/2020 0.89 0.57 - 1.00 mg/dL Final     Platelets   Date Value Ref Range Status   09/02/2020 327 140 - 450 10*3/mm3 Final   09/01/2020 308 140 - 450 10*3/mm3 Final   08/31/2020 310 140 - 450 10*3/mm3 Final     Hematocrit   Date Value Ref Range Status   09/02/2020 35.6 34.0 - 46.6 % Final   09/01/2020 36.9 34.0 - 46.6 % Final   08/31/2020 38.5 34.0 - 46.6 % Final     Hemoglobin   Date Value Ref Range Status   09/02/2020 11.8 (L) 12.0 - 15.9 g/dL Final   09/01/2020 11.9 (L) 12.0 - 15.9 g/dL Final   08/31/2020 12.0 12.0 - 15.9 g/dL Final         PLAN:  Patient with BMI of 44, therefore will dose Lovenox 40 mg sc Q 12 hr.  Will monitor and adjust as needed.      Staci Zacarias PharmD, BCPS  09/02/20 11:44

## 2020-09-02 NOTE — PROGRESS NOTES
"GENERAL SURGERY PROGRESS NOTE    SUMMARY (A/P):  Mrs. Mar Camilo is a 70 y.o. year old lady with a chronically incarcerated ventral hernia with questionable colo-colonic intussusception. No signs of bowel compromise or bowel obstruction.  White blood cell count is up to 15 today Zosyn.  We will repeat CT scan today without contrast and attempt to evaluate entire ventral hernia.  If no signs of bowel compromise, will evaluate for removal of NG and beginning aggressive bowel regimen. Ok for prophylactic anticoagulation from surgical standpoint.     Chief Complaint: abdominal pain with chronically incarcerated ventral hernia    Interval Events: No new issues overnight, states her pain is stable. No BM since prior to admission. No N/V. Complaining of a sore throat from her NG tube.     Review of Systems: No abdominal pain, N/V    O:/69 (BP Location: Right arm, Patient Position: Lying)   Pulse 86   Temp 97.2 °F (36.2 °C) (Oral)   Resp 18   Ht 162.6 cm (64\")   Wt 116 kg (255 lb)   LMP  (LMP Unknown)   SpO2 94%   BMI 43.77 kg/m²     Intake & Output:  NG with 350cc dark brown fluid  UOP 2.4L     GENERAL: alert, well appearing, and in no distress, sitting up in bed, NG with dark brown fluid   HEENT: normocephalic, atraumatic, moist mucous membranes, clear sclerae   CHEST: no increased work of breathing, no wheezes, symmetric air entry, on 6L O2  CARDIAC: regular rate and rhythm    ABDOMEN: soft, chronically incarcerated ventral hernia, tender to palpation in medial portion of hernia defect, no peritoneal signs, no erythema over hernia   EXTREMITIES: no cyanosis, clubbing, or edema   SKIN: Warm and moist, no rashes    LABS  Results from last 7 days   Lab Units 09/02/20  0642 09/01/20  0432 08/31/20  1912   WBC 10*3/mm3 14.64* 10.68 14.85*   HEMOGLOBIN g/dL 11.8* 11.9* 12.0   HEMATOCRIT % 35.6 36.9 38.5   PLATELETS 10*3/mm3 327 308 310     Results from last 7 days   Lab Units 09/02/20  0642 09/01/20  1728 " 09/01/20  0432 08/31/20  1912   SODIUM mmol/L 141  --  136 137   POTASSIUM mmol/L 3.9 4.6 5.6* 5.1   CHLORIDE mmol/L 103  --  102 102   CO2 mmol/L 28.9  --  23.9 24.9   BUN mg/dL 32*  --  22 22   CREATININE mg/dL 0.90  --  0.82 0.89   CALCIUM mg/dL 8.9  --  10.5 10.9*   BILIRUBIN mg/dL  --   --  0.2 0.2   ALK PHOS U/L  --   --  67 72   ALT (SGPT) U/L  --   --  33 31   AST (SGOT) U/L  --   --  22 27   GLUCOSE mg/dL 173*  --  248* 143*           HAILEY LOPES MD  General and Endoscopic Surgery  [facility]    [address], [address]  [place], [place], [address]  P: [phone]  F: [phone]

## 2020-09-02 NOTE — PROGRESS NOTES
"   LOS: 1 day    Patient Care Team:  Vianey Barrios PA-C as PCP - General (Family Medicine)  Vianey Barrios PA-C as PCP - Claims Attributed  Bhanu Mata MD as Consulting Physician (Cardiology)  Mann Hernadez MD as Consulting Physician (Pulmonary Disease)  Becky Haji MD as Surgeon (General Surgery)    Chief Complaint:    Chief Complaint   Patient presents with   • Abdominal Pain     Follow UP Hyperkalemia  Subjective     Interval History:     Stomach still painful.  NG in place. Throat dry and sore. Thirsty. Denies bm today.  Passing gas. Had CT this am.  Asking for her prozac to be restarted.   Sister at bedside.   Review of Systems:   See above.     Objective     Vital Signs  Temp:  [97.2 °F (36.2 °C)-98.1 °F (36.7 °C)] 97.6 °F (36.4 °C)  Heart Rate:  [] 95  Resp:  [18] 18  BP: (138-151)/(64-70) 150/68    Flowsheet Rows      First Filed Value   Admission Height  162.6 cm (64\") Documented at 08/31/2020 2055   Admission Weight  109 kg (239 lb 3.2 oz) Documented at 08/31/2020 2055          I/O this shift:  In: 100 [IV Piggyback:100]  Out: -   I/O last 3 completed shifts:  In: 480.3 [I.V.:380.3; IV Piggyback:100]  Out: 2800 [Urine:2450; Emesis/NG output:350]    Intake/Output Summary (Last 24 hours) at 9/2/2020 1339  Last data filed at 9/2/2020 0900  Gross per 24 hour   Intake 337 ml   Output 750 ml   Net -413 ml       Physical Exam:  Obese, lying in bed. NG , O2, mask  Neck no jvd  Heart RRR no s3 or rub  Lungs clear to auscultation, no wheezing  Abd Obese, soft, ventral and central hernia, + bs  Ext no edema  Skin no rash  Neuro speech fluent. Moves all 4 ext.       Results Review:    Results from last 7 days   Lab Units 09/02/20  0642 09/01/20  1728 09/01/20  0432 08/31/20  1912   SODIUM mmol/L 141  --  136 137   POTASSIUM mmol/L 3.9 4.6 5.6* 5.1   CHLORIDE mmol/L 103  --  102 102   CO2 mmol/L 28.9  --  23.9 24.9   BUN mg/dL 32*  --  22 22   CREATININE mg/dL 0.90  --  0.82 0.89   CALCIUM " mg/dL 8.9  --  10.5 10.9*   BILIRUBIN mg/dL  --   --  0.2 0.2   ALK PHOS U/L  --   --  67 72   ALT (SGPT) U/L  --   --  33 31   AST (SGOT) U/L  --   --  22 27   GLUCOSE mg/dL 173*  --  248* 143*       Estimated Creatinine Clearance: 71.7 mL/min (by C-G formula based on SCr of 0.9 mg/dL).                Results from last 7 days   Lab Units 09/02/20  0642 09/01/20  0432 08/31/20  1912   WBC 10*3/mm3 14.64* 10.68 14.85*   HEMOGLOBIN g/dL 11.8* 11.9* 12.0   PLATELETS 10*3/mm3 327 308 310               Imaging Results (Last 24 Hours)     Procedure Component Value Units Date/Time    CT Abdomen Pelvis Without Contrast [[id number]] Collected:  09/02/20 1326     Updated:  09/02/20 1327    Narrative:       CT ABDOMEN AND PELVIS WITHOUT IV CONTRAST     HISTORY: 70-year-old female with low-grade bowel obstruction. Large  ventral hernia. Follow-up.     TECHNIQUE: Radiation dose reduction techniques were utilized, including  automated exposure control and exposure modulation based on body size.   3 mm images were obtained through the abdomen and pelvis without the  administration of IV contrast. Compared with previous CT from 08/31/2020  and 11/19/2015.     FINDINGS: Within the large left ventral hernia, there are long segments  of small bowel as well as the distal transverse and descending colon.  The neck of the hernia measures approximately 7.8 cm. At the distal  transverse colon, there appears to be a giant diverticulum filled with a  large volume of formed stool. There is no dilatation of the colon  proximally, the descending colon within the hernia sac is nearly  collapsed and the sigmoid colon distally is normal in caliber. There is  no abnormally dilated small bowel. There is an NG tube within a  collapsed stomach. There is no free fluid or lymphadenopathy. The liver,  gallbladder, spleen, pancreas, adrenals, and kidneys appear  unremarkable. The uterus is diminutive. Adnexa appear unremarkable.  There are extensive  abdominal aortic atherosclerotic changes without  aneurysmal dilatation. There are no acute appearing airspace opacities  at the visualized lower lung fields. There are stable-appearing  reticular nodular opacities at both lung bases and there is coarsening  of the interstitium and some bronchiolectasis at the left lower lobe.  There is volume loss at the left lower lobe and this may be positional.       Impression:       1. There is a giant diverticulum filled with stool at the distal  transverse colon which is within the large left ventral hernia sac. A  bezoar within the giant diverticulum is possible as well. There are also  long segments of small bowel within the hernia. There is no small or  large bowel obstruction.  2. Stable reticular nodular opacities at both lung bases. These may be  chronic, but were not present on a CT of the abdomen from 2012. The  appearance can be seen with acute bronchiolitis or sequela of  bronchiolitis. Please correlate clinically.                   budesonide-formoterol 2 puff Inhalation BID - RT   enoxaparin 40 mg Subcutaneous Q12H   fenofibrate 145 mg Oral Daily   FLUoxetine 40 mg Oral Daily   gabapentin 300 mg Oral BID   guaiFENesin 1,200 mg Oral QAM   insulin lispro 0-7 Units Subcutaneous TID AC   lamoTRIgine 150 mg Oral BID   piperacillin-tazobactam 4.5 g Intravenous Q8H   rosuvastatin 40 mg Oral Daily   tiotropium bromide monohydrate 2 puff Inhalation Daily - RT   verapamil  mg Oral Q12H       dextrose 5 % and sodium chloride 0.9 % 100 mL/hr Last Rate: 100 mL/hr (09/02/20 0220)       Medication Review:   Current Facility-Administered Medications   Medication Dose Route Frequency Provider Last Rate Last Dose   • albuterol (PROVENTIL) nebulizer solution 0.083% 2.5 mg/3mL  2.5 mg Nebulization Q4H PRN Bhanu Murphy, APRN       • aluminum-magnesium hydroxide-simethicone (MAALOX MAX) 400-400-40 MG/5ML suspension 15 mL  15 mL Oral Q6H PRN Bhanu Murphy, APRN       •  bisacodyl (DULCOLAX) EC tablet 5 mg  5 mg Oral Daily PRN Bhanu Murphy APRN       • bisacodyl (DULCOLAX) suppository 10 mg  10 mg Rectal Daily PRN Bhanu Murphy APRN       • budesonide-formoterol (SYMBICORT) 80-4.5 MCG/ACT inhaler 2 puff  2 puff Inhalation BID - RT Bhanu Murphy APRN   2 puff at 09/02/20 0713   • dextrose (D50W) 25 g/ 50mL Intravenous Solution 25 g  25 g Intravenous Q15 Min PRN Bhanu Murphy APRN       • dextrose (GLUTOSE) oral gel 15 g  15 g Oral Q15 Min PRN Bhanu Murphy APRN       • dextrose 5 % and sodium chloride 0.9 % infusion  100 mL/hr Intravenous Continuous Polina Bess  mL/hr at 09/02/20 0220 100 mL/hr at 09/02/20 0220   • enoxaparin (LOVENOX) syringe 40 mg  40 mg Subcutaneous Q12H Lissette Castellano APRN       • fenofibrate (TRICOR) tablet 145 mg  145 mg Oral Daily Bhanu Murphy APRN       • FLUoxetine (PROzac) capsule 40 mg  40 mg Oral Daily Bhanu Murphy APRN       • gabapentin (NEURONTIN) capsule 300 mg  300 mg Oral BID Bhanu Murphy APRN       • glucagon (human recombinant) (GLUCAGEN DIAGNOSTIC) injection 1 mg  1 mg Subcutaneous Q15 Min PRN Bhanu Murphy APRN       • guaiFENesin (MUCINEX) 12 hr tablet 1,200 mg  1,200 mg Oral QAM Bhanu Murphy APRN       • hydrALAZINE (APRESOLINE) injection 10 mg  10 mg Intravenous Q6H PRN Bhanu Murphy APRN       • insulin lispro (humaLOG) injection 0-7 Units  0-7 Units Subcutaneous TID AC Bhanu Murphy APRN   2 Units at 09/01/20 1700   • ipratropium-albuterol (DUO-NEB) nebulizer solution 3 mL  3 mL Nebulization Q4H PRN Bhanu Murphy APRN       • lamoTRIgine (LaMICtal) tablet 150 mg  150 mg Oral BID Bhanu Murphy APRN       • morphine injection 2 mg  2 mg Intravenous Q3H PRN Bhanu Murphy APRN   2 mg at 09/01/20 1641   • ondansetron (ZOFRAN) tablet 4 mg  4 mg Oral Q6H PRN Bhanu Murphy APRN        Or   • ondansetron (ZOFRAN) injection 4 mg  4 mg Intravenous Q6H PRN Bhanu Murphy APRN       •  piperacillin-tazobactam (ZOSYN) 4.5 g in iso-osmotic dextrose 100 mL IVPB (premix)  4.5 g Intravenous Q8H Sanna Good MD   4.5 g at 09/02/20 0900   • rosuvastatin (CRESTOR) tablet 40 mg  40 mg Oral Daily Bhanu Murphy APRN       • sodium chloride 0.9 % flush 10 mL  10 mL Intravenous PRN Bhanu Murphy APRN       • tiotropium (SPIRIVA RESPIMAT) 2.5 mcg/act aerosol solution inhaler  2 puff Inhalation Daily - RT Bhanu Murphy APRN   2 puff at 09/02/20 0714   • verapamil SR (CALAN-SR) CR tablet 240 mg  240 mg Oral Q12H Bhanu Murphy APRN         Facility-Administered Medications Ordered in Other Encounters   Medication Dose Route Frequency Provider Last Rate Last Dose   • acetaminophen (TYLENOL) 325 MG tablet  - ADS Override Pull                Assessment/Plan   1. Hyperkalemia.   Likely due to lisinopril. Now resolved after Calcium, NS, insulin, lasix. Excellent UOP.   2. Intussusception, central and ventral hernia with large and small bowel.  Surgery following. CT abdomen with contrast this am .  3. DM2  4. Chronic hypoxic respiratory failure, mild hypercapnea on admission with partially compensated respiratory acidosis.  5. Prior CVA  6. HTN  7. PAF  8. Depression.  Prozac with clamped NG if not removed.     Unable to take her po meds.         Neha Carlson MD  09/02/20  13:39

## 2020-09-03 LAB
ALBUMIN SERPL-MCNC: 3 G/DL (ref 3.5–5.2)
ALBUMIN/GLOB SERPL: 0.8 G/DL
ALP SERPL-CCNC: 55 U/L (ref 39–117)
ALT SERPL W P-5'-P-CCNC: 19 U/L (ref 1–33)
ANION GAP SERPL CALCULATED.3IONS-SCNC: 11.4 MMOL/L (ref 5–15)
AST SERPL-CCNC: 13 U/L (ref 1–32)
BILIRUB SERPL-MCNC: 0.2 MG/DL (ref 0–1.2)
BUN SERPL-MCNC: 19 MG/DL (ref 8–23)
BUN/CREAT SERPL: 29.7 (ref 7–25)
CALCIUM SPEC-SCNC: 8.7 MG/DL (ref 8.6–10.5)
CHLORIDE SERPL-SCNC: 105 MMOL/L (ref 98–107)
CO2 SERPL-SCNC: 25.6 MMOL/L (ref 22–29)
CREAT SERPL-MCNC: 0.64 MG/DL (ref 0.57–1)
DEPRECATED RDW RBC AUTO: 46.1 FL (ref 37–54)
ERYTHROCYTE [DISTWIDTH] IN BLOOD BY AUTOMATED COUNT: 14.3 % (ref 12.3–15.4)
GFR SERPL CREATININE-BSD FRML MDRD: 91 ML/MIN/1.73
GLOBULIN UR ELPH-MCNC: 3.6 GM/DL
GLUCOSE BLDC GLUCOMTR-MCNC: 131 MG/DL (ref 70–130)
GLUCOSE BLDC GLUCOMTR-MCNC: 148 MG/DL (ref 70–130)
GLUCOSE BLDC GLUCOMTR-MCNC: 175 MG/DL (ref 70–130)
GLUCOSE BLDC GLUCOMTR-MCNC: 177 MG/DL (ref 70–130)
GLUCOSE SERPL-MCNC: 136 MG/DL (ref 65–99)
HCT VFR BLD AUTO: 33.2 % (ref 34–46.6)
HGB BLD-MCNC: 10.7 G/DL (ref 12–15.9)
MCH RBC QN AUTO: 28.6 PG (ref 26.6–33)
MCHC RBC AUTO-ENTMCNC: 32.2 G/DL (ref 31.5–35.7)
MCV RBC AUTO: 88.8 FL (ref 79–97)
PLATELET # BLD AUTO: 305 10*3/MM3 (ref 140–450)
PMV BLD AUTO: 9.4 FL (ref 6–12)
POTASSIUM SERPL-SCNC: 3.6 MMOL/L (ref 3.5–5.2)
PROT SERPL-MCNC: 6.6 G/DL (ref 6–8.5)
RBC # BLD AUTO: 3.74 10*6/MM3 (ref 3.77–5.28)
SODIUM SERPL-SCNC: 142 MMOL/L (ref 136–145)
WBC # BLD AUTO: 12.76 10*3/MM3 (ref 3.4–10.8)

## 2020-09-03 PROCEDURE — 25010000002 MORPHINE PER 10 MG: Performed by: NURSE PRACTITIONER

## 2020-09-03 PROCEDURE — 85027 COMPLETE CBC AUTOMATED: CPT | Performed by: NURSE PRACTITIONER

## 2020-09-03 PROCEDURE — 94799 UNLISTED PULMONARY SVC/PX: CPT

## 2020-09-03 PROCEDURE — 99232 SBSQ HOSP IP/OBS MODERATE 35: CPT | Performed by: STUDENT IN AN ORGANIZED HEALTH CARE EDUCATION/TRAINING PROGRAM

## 2020-09-03 PROCEDURE — 82962 GLUCOSE BLOOD TEST: CPT

## 2020-09-03 PROCEDURE — 25010000002 PIPERACILLIN SOD-TAZOBACTAM PER 1 G: Performed by: STUDENT IN AN ORGANIZED HEALTH CARE EDUCATION/TRAINING PROGRAM

## 2020-09-03 PROCEDURE — 80053 COMPREHEN METABOLIC PANEL: CPT | Performed by: NURSE PRACTITIONER

## 2020-09-03 RX ORDER — ACETAMINOPHEN 500 MG
1000 TABLET ORAL 2 TIMES DAILY PRN
Status: DISCONTINUED | OUTPATIENT
Start: 2020-09-03 | End: 2020-09-04 | Stop reason: HOSPADM

## 2020-09-03 RX ORDER — POLYETHYLENE GLYCOL 3350 17 G/17G
17 POWDER, FOR SOLUTION ORAL DAILY
Status: DISCONTINUED | OUTPATIENT
Start: 2020-09-03 | End: 2020-09-04 | Stop reason: HOSPADM

## 2020-09-03 RX ADMIN — ACETAMINOPHEN 1000 MG: 500 TABLET ORAL at 17:36

## 2020-09-03 RX ADMIN — TIOTROPIUM BROMIDE INHALATION SPRAY 2 PUFF: 3.12 SPRAY, METERED RESPIRATORY (INHALATION) at 09:58

## 2020-09-03 RX ADMIN — LAMOTRIGINE 150 MG: 100 TABLET ORAL at 20:44

## 2020-09-03 RX ADMIN — VERAPAMIL HYDROCHLORIDE 240 MG: 240 TABLET, FILM COATED, EXTENDED RELEASE ORAL at 11:06

## 2020-09-03 RX ADMIN — DEXTROSE AND SODIUM CHLORIDE 100 ML/HR: 5; 900 INJECTION, SOLUTION INTRAVENOUS at 03:13

## 2020-09-03 RX ADMIN — GABAPENTIN 300 MG: 300 CAPSULE ORAL at 11:06

## 2020-09-03 RX ADMIN — BUDESONIDE AND FORMOTEROL FUMARATE DIHYDRATE 2 PUFF: 80; 4.5 AEROSOL RESPIRATORY (INHALATION) at 09:58

## 2020-09-03 RX ADMIN — POLYETHYLENE GLYCOL 3350 17 G: 17 POWDER, FOR SOLUTION ORAL at 15:09

## 2020-09-03 RX ADMIN — TAZOBACTAM SODIUM AND PIPERACILLIN SODIUM 4.5 G: 500; 4 INJECTION, SOLUTION INTRAVENOUS at 03:11

## 2020-09-03 RX ADMIN — LAMOTRIGINE 150 MG: 100 TABLET ORAL at 11:07

## 2020-09-03 RX ADMIN — GABAPENTIN 300 MG: 300 CAPSULE ORAL at 20:44

## 2020-09-03 RX ADMIN — MORPHINE SULFATE 2 MG: 2 INJECTION, SOLUTION INTRAMUSCULAR; INTRAVENOUS at 11:06

## 2020-09-03 RX ADMIN — DOCUSATE SODIUM 100 MG: 100 CAPSULE, LIQUID FILLED ORAL at 20:44

## 2020-09-03 RX ADMIN — FLUOXETINE HYDROCHLORIDE 40 MG: 20 CAPSULE ORAL at 11:06

## 2020-09-03 RX ADMIN — VERAPAMIL HYDROCHLORIDE 240 MG: 240 TABLET, FILM COATED, EXTENDED RELEASE ORAL at 17:34

## 2020-09-03 RX ADMIN — DOCUSATE SODIUM 100 MG: 100 CAPSULE, LIQUID FILLED ORAL at 11:06

## 2020-09-03 RX ADMIN — BUDESONIDE AND FORMOTEROL FUMARATE DIHYDRATE 2 PUFF: 80; 4.5 AEROSOL RESPIRATORY (INHALATION) at 19:22

## 2020-09-03 NOTE — PLAN OF CARE
Problem: Patient Care Overview  Goal: Plan of Care Review  Outcome: Ongoing (interventions implemented as appropriate)  Flowsheets (Taken 9/3/2020 1851)  Progress: improving  Plan of Care Reviewed With: patient; sibling  Outcome Summary: VSS, moderate chronic back and neck pain and some abd cramping this evening; nephrology stopped IVFs,/signed off, Zosyn dc'd; up to [facility], +BM today, adv to regular diet and Miralax added per gen surgery; blood sugars under control; PRN Tylenol added per pt request; per general surgery may be able to go home tomorrow; will continue to monitor

## 2020-09-03 NOTE — PROGRESS NOTES
Name: Mar Camilo ADMIT: 2020   : 1950  PCP: Vianey Barrios PA-C    MRN: [id number] LOS: 2 days   AGE/SEX: 70 y.o. female  ROOM: [facility]     Subjective   Subjective   The patient is lying in the bed in no major distress.  Abdominal pain is reasonably well controlled.  Denies nausea, vomiting, chest pain.  She did have a bowel movement yesterday and is passing gas.       Objective   Objective   Vital Signs  Temp:  [97.5 °F (36.4 °C)-98.4 °F (36.9 °C)] 98.3 °F (36.8 °C)  Heart Rate:  [] 88  Resp:  [18-20] 18  BP: (134-146)/(46-68) 137/46  SpO2:  [90 %-95 %] 90 %  on  Flow (L/min):  [3] 3;   Device (Oxygen Therapy): nasal cannula  Body mass index is 43.77 kg/m².  Physical Exam    HEENT:  Atraumatic, normocephalic.  PERRLA.  Extraocular movements intact.  Conjunctivae pink.  Sclerae, no icterus.  Mucous membranes dry. NECK:  Supple.  No JVD.  HEART:  Regular rate and rhythm.  Normal S1, S2.   LUNGS:  Fairly clear to auscultation anteriorly.  No wheezes.  No crackles.  ABDOMEN:   Soft, nontender.  Bowel sounds present.  No rebound.  No  guarding.  EXTREMITIES:  No cyanosis, clubbing, or edema.  Palpable pedal pulses.  NEURO:  Grossly nonfocal.  No facial asymmetry.  Good strength in all 4  extremities.  SKIN:  Warm and dry.  No evidence of rashes.  LYMPH NODES:  No palpable cervical or supraclavicular lymphadenopathy.      Results Review:       I reviewed the patient's new clinical results.  Results from last 7 days   Lab Units 20  03220  0642 20  0432 20  1912   WBC 10*3/mm3 12.76* 14.64* 10.68 14.85*   HEMOGLOBIN g/dL 10.7* 11.8* 11.9* 12.0   PLATELETS 10*3/mm3 305 327 308 310     Results from last 7 days   Lab Units 20  03220  0642 20  1728 20  0432 20  1912   SODIUM mmol/L 142 141  --  136 137   POTASSIUM mmol/L 3.6 3.9 4.6 5.6* 5.1   CHLORIDE mmol/L 105 103  --  102 102   CO2 mmol/L 25.6 28.9  --  23.9 24.9   BUN mg/dL 19 32*  --   22 22   CREATININE mg/dL 0.64 0.90  --  0.82 0.89   GLUCOSE mg/dL 136* 173*  --  248* 143*   Estimated Creatinine Clearance: 80.6 mL/min (by C-G formula based on SCr of 0.64 mg/dL).  Results from last 7 days   Lab Units 09/03/20  0324 09/01/20  0432 08/31/20  1912   ALBUMIN g/dL 3.00* 3.90 3.90   BILIRUBIN mg/dL 0.2 0.2 0.2   ALK PHOS U/L 55 67 72   AST (SGOT) U/L 13 22 27   ALT (SGPT) U/L 19 33 31     Results from last 7 days   Lab Units 09/03/20  0324 09/02/20  0642 09/01/20  0432 08/31/20  1912   CALCIUM mg/dL 8.7 8.9 10.5 10.9*   ALBUMIN g/dL 3.00*  --  3.90 3.90     Results from last 7 days   Lab Units 09/01/20  0432 08/31/20  1912   LACTATE mmol/L 1.7 1.6     COVID19   Date Value Ref Range Status   08/31/2020 Not Detected Not Detected - Ref. Range Final     Hemoglobin A1C   Date/Time Value Ref Range Status   09/02/2020 0642 6.60 (H) 4.80 - 5.60 % Final     Glucose   Date/Time Value Ref Range Status   09/03/2020 1136 148 (H) 70 - 130 mg/dL Final   09/03/2020 0634 175 (H) 70 - 130 mg/dL Final   09/02/2020 2102 154 (H) 70 - 130 mg/dL Final   09/02/2020 1634 159 (H) 70 - 130 mg/dL Final   09/02/2020 1128 188 (H) 70 - 130 mg/dL Final   09/02/2020 0635 165 (H) 70 - 130 mg/dL Final   09/01/2020 2032 182 (H) 70 - 130 mg/dL Final       CT Abdomen Pelvis Without Contrast  Narrative: CT ABDOMEN AND PELVIS WITHOUT IV CONTRAST     HISTORY: 70-year-old female with low-grade bowel obstruction. Large  ventral hernia. Follow-up.     TECHNIQUE: Radiation dose reduction techniques were utilized, including  automated exposure control and exposure modulation based on body size.   3 mm images were obtained through the abdomen and pelvis without the  administration of IV contrast. Compared with previous CT from 08/31/2020  and 11/19/2015.     FINDINGS: Within the large left ventral hernia, there are long segments  of small bowel as well as the distal transverse and descending colon.  The neck of the hernia measures approximately  7.8 cm. At the distal  transverse colon, there appears to be a giant diverticulum filled with a  large volume of formed stool. There is no dilatation of the colon  proximally, the descending colon within the hernia sac is nearly  collapsed and the sigmoid colon distally is normal in caliber. There is  no abnormally dilated small bowel. There is an NG tube within a  collapsed stomach. There is no free fluid or lymphadenopathy. The liver,  gallbladder, spleen, pancreas, adrenals, and kidneys appear  unremarkable. The uterus is diminutive. Adnexa appear unremarkable.  There are extensive abdominal aortic atherosclerotic changes without  aneurysmal dilatation. There are no acute appearing airspace opacities  at the visualized lower lung fields. There are stable-appearing  reticular nodular opacities at both lung bases and there is coarsening  of the interstitium and some bronchiolectasis at the left lower lobe.  There is volume loss at the left lower lobe and this may be positional.     Impression: 1. The distal transverse colon is within the large left ventral hernia  and has a giant diverticulum filled with stool and possibly a bezoar.  There are also long segments of small bowel within the hernia. There is  no small or large bowel obstruction.  2. Stable reticular nodular opacities at both lung bases. These may be  chronic, but were not present on a CT of the abdomen from 2012. The  appearance can be seen with acute bronchiolitis or sequela of  bronchiolitis. Please correlate clinically.     This report was finalized on 9/2/2020 6:20 PM by Dr. Mallorie Baker M.D.           budesonide-formoterol 2 puff Inhalation BID - RT   docusate sodium 100 mg Oral BID   enoxaparin 40 mg Subcutaneous Q12H   FLUoxetine 40 mg Oral Daily   gabapentin 300 mg Oral BID   insulin lispro 0-7 Units Subcutaneous TID AC   lamoTRIgine 150 mg Oral BID   polyethylene glycol 17 g Oral Daily   tiotropium bromide monohydrate 2 puff Inhalation Daily -  RT   verapamil  mg Oral Q12H      Diet Regular       Assessment/Plan     Active Hospital Problems    Diagnosis  POA   • **Intussusception (CMS/[facility]) [K56.1]  Yes   • Crohn's disease (CMS/[facility]) [K50.90]  Yes   • CAD (coronary artery disease) [I25.10]  Yes   • Hyperkalemia [E87.5]  Unknown   • PAD (peripheral artery disease) (CMS/[facility]) [I73.9]  Yes   • PAF (paroxysmal atrial fibrillation) (CMS/[facility]) [I48.0]  Yes   • History of CVA (cerebrovascular accident) [Z86.73]  Not Applicable   • Chronic respiratory failure with hypoxia (CMS/[facility]) [J96.11]  Yes   • COPD (chronic obstructive pulmonary disease) (CMS/[facility]) [J44.9]  Yes   • Mixed hyperlipidemia [E78.2]  Yes   • Type 2 diabetes mellitus with complication, without long-term current use of insulin (CMS/[facility]) [E11.8]  Yes   • Class 3 severe obesity in adult (CMS/[facility]) [E66.01]  Yes   • Essential hypertension [I10]  Yes      Resolved Hospital Problems   No resolved problems to display.         1. [place] colonic intussusception, patient's diet is being advanced today and if she continues to do well anticipate discharge home in a.m. And appreciate surgical input.  2. History of COPD, will continue with budesonide /formoterol as well as Spiriva.  Currently not in exacerbation.    3. History of proximal atrial fibrillation, continue with aspirin upon discharge.  4. Diabetes mellitus, on corrective dose insulin.    5. History of Crohn's, currently not in exacerbation.  6. Hyperkalemia has resolved.  7. Neuropathy, Neurontin.  8. History of coronary disease, continue aspirin upon discharge and consider statins as well.  9. Obesity, counseled to lose weight.    10. Hypertension, on verapamil and will be continued.  11. On Lovenox for DVT prophylaxis.      Alejandro Smith MD  [facility]  09/03/20  16:28

## 2020-09-03 NOTE — PLAN OF CARE
Problem: Patient Care Overview  Goal: Plan of Care Review  Outcome: Ongoing (interventions implemented as appropriate)  Flowsheets (Taken 9/3/2020 0832)  Progress: improving  Plan of Care Reviewed With: patient  Outcome Summary: VSS. patient tolerating clear liquid diet. Patient C/o of gas aches. X1 BM. Voiding without difficulity. A/Ox 4 Zosyn as ordered, IVF continued. Patient refused lovonox shot. 3L NC, Sinus tach. Will continue to monitor.

## 2020-09-03 NOTE — ED PROVIDER NOTES
EMERGENCY DEPARTMENT ENCOUNTER    Room Number:  E458/1  Date seen:  9/3/2020  Time seen: 10:42 AM  PCP: Vianey Barrios PA-C  Historian: patient      HPI:  Chief Complaint: abdominal pain    A complete HPI/ROS/PMH/PSH/SH/FH are unobtainable due to: none    Context: Mar Camilo is a 70 y.o. female who presents to the ED for evaluation of abdominal pain x yesterday, gradual in onset that is severe, constant, waxes and wanes, and seems to be made worse positionally and with movement and palpation and nothing makes it better. She has a hx of a very large ventral hernia that she has previously seen general surgery for, but was reportedly not considered a candidate for repair due to severe COPD and Diabetes. She uses 6L NC continuously. She denies any fever, chills, increase in her baseline dyspnea, chest pain, vomiting or diarrhea or urinary symptoms.         PAST MEDICAL HISTORY  Active Ambulatory Problems     Diagnosis Date Noted   • Obstructive pyelonephritis 02/21/2016   • Chronic allergic conjunctivitis 02/21/2016   • Chronic back pain 02/21/2016   • Chronic bronchitis (CMS/[facility]) 02/21/2016   • Non-specific colitis 02/21/2016   • COPD (chronic obstructive pulmonary disease) (CMS/[facility]) 02/21/2016   • Degeneration of intervertebral disc of lumbar region 02/21/2016   • Depression 02/21/2016   • Heartburn 02/21/2016   • Herpes labialis 02/21/2016   • Mixed hyperlipidemia 02/21/2016   • Essential hypertension 02/21/2016   • Spinal stenosis of lumbar region 02/21/2016   • Osteopenia 02/21/2016   • Lumbar radiculopathy 02/21/2016   • Type 2 diabetes mellitus with complication, without long-term current use of insulin (CMS/[facility]) 02/21/2016   • Anemia 02/24/2016   • Asymptomatic stenosis of right carotid artery 03/14/2016   • Chronic respiratory failure with hypoxia (CMS/[facility]) 03/15/2016   • Hospital discharge follow-up 03/24/2016   • Osteoarthritis of lumbar spine 05/22/2014   • Need for immunization against influenza  10/21/2016   • Chronic right hip pain 01/23/2017   • Chronic pain of both shoulders 01/23/2017   • Acute cystitis without hematuria 02/13/2017   • Seizure (CMS/HCC) 04/23/2017   • History of CVA (cerebrovascular accident) 05/03/2017   • Tendinopathy of rotator cuff 05/08/2017   • Primary osteoarthritis, left shoulder 05/08/2017   • Pneumonia of left lower lobe due to infectious organism 12/24/2017   • Need for pneumococcal vaccine 03/01/2018   • Regional colitis ([facility]) 06/14/2018   • Ventral hernia without obstruction or gangrene 07/30/2018   • PAF (paroxysmal atrial fibrillation) (CMS/HCC)    • PAD (peripheral artery disease) (CMS/HCC) 09/17/2018   • Class 3 severe obesity in adult (CMS/HCC) 02/21/2016   • Chronic pain syndrome 10/17/2018   • Unsteady gait 10/17/2018   • High risk medication use 10/17/2018   • H/O carotid stenosis 03/18/2019   • Cerebrovascular accident (CVA) (CMS/HCC) 03/18/2019   • Neuropathy 09/19/2019   • Foot swelling 09/19/2019   • Status post reverse total arthroplasty of right shoulder, DOS 12/30/2019 01/14/2020   • Dyspnea on exertion 01/25/2020   • Pain in both lower extremities 01/25/2020   • Recent major surgery 01/25/2020   • COPD exacerbation (CMS/HCC) 01/29/2020   • Hematuria 06/15/2020   • Long-term use of high-risk medication 06/15/2020   • Acute maxillary sinusitis 06/15/2020   • Abnormal urine odor 07/13/2020   • Urinary tract infection with hematuria 07/14/2020     Resolved Ambulatory Problems     Diagnosis Date Noted   • Acute URI 02/21/2016   • Acute urinary tract infection 02/21/2016   • Fatigue 02/21/2016   • Hyperglycemia 02/21/2016   • Hypocalcemia 02/21/2016   • Hypokalemia 02/21/2016   • Sprain of back 02/21/2016   • Increased frequency of urination 02/21/2016   • Hypopotassemia 02/24/2016   • Cellulitis of neck 04/22/2016   • Fall 01/23/2017   • Bursitis, subacromial, right 01/30/2017   • Urine frequency 02/13/2017   • Leukocytosis 04/23/2017   • Primary  osteoarthritis, right shoulder 05/08/2017   • Subacromial bursitis, left 05/08/2017   • Acute pain of both shoulders 01/22/2018   • Need for hepatitis C screening test 03/01/2018   • Closed fracture of left tibial plateau 07/30/2018   • Left sided abdominal pain 07/30/2018   • Pain 12/30/2019     Past Medical History:   Diagnosis Date   • Arthritis    • Carotid arterial disease (CMS/HCC)    • Coronary artery disease    • Crohn's disease (CMS/HCC)    • DDD (degenerative disc disease)    • Diabetes mellitus (CMS/HCC)    • Elevated cholesterol    • Hernia of abdominal wall    • History of stroke 05/03/2017   • Hyperlipidemia    • Hypertension    • Morbid obesity (CMS/HCC)    • On home oxygen therapy    • PVD (peripheral vascular disease) (CMS/HCC)    • Radiculopathy    • Right shoulder pain    • Stroke (CMS/HCC) 2017   • Subclavian artery stenosis (CMS/HCC)          PAST SURGICAL HISTORY  Past Surgical History:   Procedure Laterality Date   • APPENDECTOMY N/A    • BRONCHOSCOPY N/A 1/5/2018    Procedure: BRONCHOSCOPY;  Surgeon: Gustavo Muniz MD;  Location: [facility] OR;  Service:    • COLON SURGERY     • COLONOSCOPY     • CYSTOSCOPY URETEROSCOPY LASER LITHOTRIPSY Right 4/17/2017    Procedure: CYSTOSCOPY with  right stent removal, right URETEROSCOPY LASER LITHOTRIPSY,  with STONE BASKET EXTRACTION;  Surgeon: Dipesh Bean MD;  Location: [facility] OR;  Service:    • CYSTOSCOPY W/ URETERAL STENT PLACEMENT Right 3/18/2017    Procedure: CYSTOSCOPY URETERAL CATHETER/STENT INSERTION;  Surgeon: Dipesh Bean MD;  Location: [facility] OR;  Service:    • ENDOSCOPY     • FLEXIBLE SIGMOIDOSCOPY N/A 01/25/2016    Dr. Luis Rosas   • FRACTURE SURGERY  2017    broken femur, left   • JOINT REPLACEMENT  12/30/2019    right shoulder replacement   • LAPAROSCOPIC COLON RESECTION N/A 01/25/2016    Laparoscopic mobilization of splenic flexure, transverse colectomy with primary anastomosis-Dr. Christopher Richardson   • LUNG BIOPSY       NEGATIVE   • RI THROMBOENDARTECTMY NECK,NECK INCIS Right 3/14/2016    Procedure: RT CAROTID ENDARTERECTOMY;  Surgeon: Federico Schuler MD;  Location: [facility] OR;  Service: Vascular   • TIBIAL PLATEAU OPEN REDUCTION INTERNAL FIXATION Left 2018    Open reduction internal fixation of left tibial plateau fracture-Dr. Ayden Cárdenas   • TOTAL SHOULDER ARTHROPLASTY W/ DISTAL CLAVICLE EXCISION Right 2019    Procedure: TOTAL SHOULDER REVERSE ARTHROPLASTY, open  biceps tenodesis;  Surgeon: Altaf Reyes MD;  Location: [facility] OR;  Service: Orthopedics   • VASCULAR SURGERY  2017    caroid artery   • WRIST ARTHROSCOPY Right     WITH RELEASE OF TRANSVERSE CARPAL LIGAMENT         FAMILY HISTORY  Family History   Problem Relation Age of Onset   • Diabetes Mother    • Stroke Mother    • Other Father         RESPIRATORY FAILURE   • Cancer Other         lung cancer   • Crohn's disease Brother    • Crohn's disease Maternal Aunt          SOCIAL HISTORY  Social History     Socioeconomic History   • Marital status:      Spouse name: Not on file   • Number of children: Not on file   • Years of education: Not on file   • Highest education level: Not on file   Tobacco Use   • Smoking status: Former Smoker     Packs/day: 2.00     Years: 40.00     Pack years: 80.00     Types: Cigarettes     Last attempt to quit: 2013     Years since quittin.4   • Smokeless tobacco: Never Used   • Tobacco comment: 1 cup coffee daily   Substance and Sexual Activity   • Alcohol use: No     Comment: history of heavy drinker    • Drug use: No   • Sexual activity: Defer         ALLERGIES  Contrast dye; Iodinated diagnostic agents; Norco [hydrocodone-acetaminophen]; and Tenoretic [atenolol-chlorthalidone]        REVIEW OF SYSTEMS  Review of Systems     All systems reviewed and negative except for those discussed in HPI.       PHYSICAL EXAM  ED Triage Vitals   Temp Heart Rate Resp BP SpO2   20 1812 20 1812 20  1812 08/31/20 1812 08/31/20 1812   98.2 °F (36.8 °C) 112 20 174/62 95 %      Temp src Heart Rate Source Patient Position BP Location FiO2 (%)   08/31/20 1812 08/31/20 1812 08/31/20 2055 08/31/20 2055 --   Oral Monitor Sitting Right arm          GENERAL: mild distress  HENT: atraumatic  EYES: no scleral icterus  CV: regular rhythm, regular rate  RESPIRATORY: normal effort, 94% on 6L, lungs are diffusely diminished with mild wheezes in bilateral bases  ABDOMEN: abdomen is obese, soft, large ventral hernia that is mostly soft with a firm palpable area that is very tender and not reducible  MUSCULOSKELETAL: no deformity  NEURO: alert, moves all extremities, follows commands  SKIN: warm, dry    Vital signs and nursing notes reviewed.          LAB RESULTS  Recent Results (from the past 24 hour(s))   POC Glucose Once    Collection Time: 09/02/20 11:28 AM   Result Value Ref Range    Glucose 188 (H) 70 - 130 mg/dL   POC Glucose Once    Collection Time: 09/02/20  4:34 PM   Result Value Ref Range    Glucose 159 (H) 70 - 130 mg/dL   POC Glucose Once    Collection Time: 09/02/20  9:02 PM   Result Value Ref Range    Glucose 154 (H) 70 - 130 mg/dL   Comprehensive Metabolic Panel    Collection Time: 09/03/20  3:24 AM   Result Value Ref Range    Glucose 136 (H) 65 - 99 mg/dL    BUN 19 8 - 23 mg/dL    Creatinine 0.64 0.57 - 1.00 mg/dL    Sodium 142 136 - 145 mmol/L    Potassium 3.6 3.5 - 5.2 mmol/L    Chloride 105 98 - 107 mmol/L    CO2 25.6 22.0 - 29.0 mmol/L    Calcium 8.7 8.6 - 10.5 mg/dL    Total Protein 6.6 6.0 - 8.5 g/dL    Albumin 3.00 (L) 3.50 - 5.20 g/dL    ALT (SGPT) 19 1 - 33 U/L    AST (SGOT) 13 1 - 32 U/L    Alkaline Phosphatase 55 39 - 117 U/L    Total Bilirubin 0.2 0.0 - 1.2 mg/dL    eGFR Non African Amer 91 >60 mL/min/1.73    Globulin 3.6 gm/dL    A/G Ratio 0.8 g/dL    BUN/Creatinine Ratio 29.7 (H) 7.0 - 25.0    Anion Gap 11.4 5.0 - 15.0 mmol/L   CBC (No Diff)    Collection Time: 09/03/20  3:24 AM   Result Value Ref  Range    WBC 12.76 (H) 3.40 - 10.80 10*3/mm3    RBC 3.74 (L) 3.77 - 5.28 10*6/mm3    Hemoglobin 10.7 (L) 12.0 - 15.9 g/dL    Hematocrit 33.2 (L) 34.0 - 46.6 %    MCV 88.8 79.0 - 97.0 fL    MCH 28.6 26.6 - 33.0 pg    MCHC 32.2 31.5 - 35.7 g/dL    RDW 14.3 12.3 - 15.4 %    RDW-SD 46.1 37.0 - 54.0 fl    MPV 9.4 6.0 - 12.0 fL    Platelets 305 140 - 450 10*3/mm3   POC Glucose Once    Collection Time: 09/03/20  6:34 AM   Result Value Ref Range    Glucose 175 (H) 70 - 130 mg/dL       Ordered the above labs and independently reviewed the results.        RADIOLOGY  CT Abdomen Pelvis Without Contrast   Final Result   1. The distal transverse colon is within the large left ventral hernia   and has a giant diverticulum filled with stool and possibly a bezoar.   There are also long segments of small bowel within the hernia. There is   no small or large bowel obstruction.   2. Stable reticular nodular opacities at both lung bases. These may be   chronic, but were not present on a CT of the abdomen from 2012. The   appearance can be seen with acute bronchiolitis or sequela of   bronchiolitis. Please correlate clinically.       This report was finalized on 9/2/2020 6:20 PM by Dr. Mallorie Baker M.D.          CT Abdomen Pelvis With Contrast   Final Result   1.  Large central to left ventral hernia contains large and small bowel.   Within the anterior, right aspect of the hernia there is a dilated   colonic loop which contains internal concentric rings of fat density and   stool and this suggests a localized intussusception.  This finding is   suspected to be the cause of patient's symptoms and raises the   possibility of a lead point for which further evaluation recommended.   2. Severe basilar pulmonary emphysema.       Findings were discussed with DALE Suárez in the emergency   department on 08/31/2020 at 11 PM.       Radiation dose reduction techniques were utilized, including automated   exposure control and exposure  modulation based on body size.       This report was finalized on 9/1/2020 12:42 AM by Dr. Ryan Gudino M.D.              I ordered the above noted radiological studies. Reviewed by me and discussed with radiologist.  See dictation for official radiology interpretation.    PROCEDURES  Procedures        MEDICATIONS GIVEN IN ER  Medications   sodium chloride 0.9 % flush 10 mL (has no administration in time range)   bisacodyl (DULCOLAX) suppository 10 mg (has no administration in time range)   bisacodyl (DULCOLAX) EC tablet 5 mg (has no administration in time range)   ondansetron (ZOFRAN) tablet 4 mg (has no administration in time range)     Or   ondansetron (ZOFRAN) injection 4 mg (has no administration in time range)   aluminum-magnesium hydroxide-simethicone (MAALOX MAX) 400-400-40 MG/5ML suspension 15 mL (has no administration in time range)   morphine injection 2 mg (2 mg Intravenous Given 9/2/20 1600)   budesonide-formoterol (SYMBICORT) 80-4.5 MCG/ACT inhaler 2 puff (2 puffs Inhalation Given 9/3/20 0958)   tiotropium (SPIRIVA RESPIMAT) 2.5 mcg/act aerosol solution inhaler (2 puffs Inhalation Given 9/3/20 0958)   ipratropium-albuterol (DUO-NEB) nebulizer solution 3 mL (has no administration in time range)   albuterol (PROVENTIL) nebulizer solution 0.083% 2.5 mg/3mL (has no administration in time range)   dextrose (GLUTOSE) oral gel 15 g (has no administration in time range)   dextrose (D50W) 25 g/ 50mL Intravenous Solution 25 g (has no administration in time range)   glucagon (human recombinant) (GLUCAGEN DIAGNOSTIC) injection 1 mg (has no administration in time range)   insulin lispro (humaLOG) injection 0-7 Units (0 Units Subcutaneous Not Given 9/2/20 1724)   gabapentin (NEURONTIN) capsule 300 mg (300 mg Oral Given 9/2/20 2157)   lamoTRIgine (LaMICtal) tablet 150 mg (150 mg Oral Not Given 9/2/20 2158)   verapamil SR (CALAN-SR) CR tablet 240 mg (240 mg Oral Given 9/2/20 1719)   hydrALAZINE (APRESOLINE)  injection 10 mg (has no administration in time range)   piperacillin-tazobactam (ZOSYN) 4.5 g in iso-osmotic dextrose 100 mL IVPB (premix) (4.5 g Intravenous New Bag 9/3/20 0311)   dextrose 5 % and sodium chloride 0.9 % infusion (100 mL/hr Intravenous New Bag 9/3/20 0313)   enoxaparin (LOVENOX) syringe 40 mg (40 mg Subcutaneous Not Given 9/3/20 0611)   FLUoxetine (PROzac) capsule 40 mg (40 mg Oral Given 9/2/20 1600)   magnesium hydroxide (MILK OF MAGNESIA) suspension 2400 mg/10mL 10 mL (10 mL Oral Not Given 9/2/20 1601)   docusate sodium (COLACE) capsule 100 mg (100 mg Oral Given 9/2/20 2324)   diphenhydrAMINE (BENADRYL) injection 50 mg (50 mg Intravenous Given 8/31/20 2130)   methylPREDNISolone sodium succinate (SOLU-Medrol) injection 125 mg (125 mg Intravenous Given 8/31/20 2129)   morphine injection 4 mg (4 mg Intravenous Given 8/31/20 2130)   iopamidol (ISOVUE-300) 61 % injection 100 mL (100 mL Intravenous Given by Other 8/31/20 2235)   furosemide (LASIX) injection 80 mg (80 mg Intravenous Given 9/1/20 1135)   diatrizoate meglumine-sodium (GASTROGRAFIN) 66-10 % solution 30 mL (30 mL Oral Given 9/2/20 1002)             PROGRESS AND CONSULTS    DDX includes but not limited to ventral hernia, incarcerated or strangulated hernia, bowel obstruction    ED Course as of Sep 03 1051   Mon Aug 31, 2020   1130 I discussed the patient with your quite DALE Shaw for [facility] who agrees to admit the patient on behalf of Dr. Perea    [KA]   2302 I discussed the patient with Dr. Gudino, radiologist and CT abd/pelvis shows Anterior right aspect colonic loop with stool is distended, looks like intusseption within the hernia    [KA]   2315 I discussed the patient with Dr. Good of general surgery who will come evaluate the patient and agrees to consult.  She would like the patient to be admitted to medicine.    [KA]      ED Course User Index  [KA] Denisse Chaudhari PA     I rechecked the patient, she is feeling improved, but  still having pain. We discussed her lab and imaging findings, need for admission for surgical consultation and she is agreeable with the plan.      Reviewed pt's history and workup with Dr. Cheatham.  After a bedside evaluation; they agree with the plan of care      Patient was placed in face mask in first look. Patient was wearing facemask each time I entered the room and throughout our encounter. I wore protective equipment throughout this patient encounter including a face mask, eye shield and gloves. Hand hygiene was performed before donning protective equipment and after removal when leaving the room.        DIAGNOSIS  Final diagnoses:   Intussusception of colon (CMS/HCC)   Ventral hernia with obstruction and without gangrene               Latest Documented Vital Signs:  As of 10:42  BP- 134/68 HR- 84 Temp- 98.4 °F (36.9 °C) (Oral) O2 sat- 90%       Denisse Chaudhari PA  09/03/20 1053

## 2020-09-03 NOTE — PROGRESS NOTES
"GENERAL SURGERY PROGRESS NOTE    SUMMARY (A/P):  Mrs. Mar Camilo is a 70 y.o. year old lady with a chronically incarcerated ventral hernia with questionable colo-colonic intussusception. CT scan yesterday stable with no signs of perforation. No signs of bowel compromise or bowel obstruction. Will advance diet and continue bowel regimen with colace and switch to MiraLAX. D/c'ed antibiotics.  If she tolerates this, okay for DC tomorrow from general surgery standpoint.    Chief Complaint: abdominal pain with chronically incarcerated ventral hernia    Interval Events: No new issues overnight.    Review of Systems: No abdominal pain, N/V    O:/68 (BP Location: Left leg, Patient Position: Lying)   Pulse 84   Temp 98.4 °F (36.9 °C) (Oral)   Resp 18   Ht 162.6 cm (64\")   Wt 116 kg (255 lb)   LMP  (LMP Unknown)   SpO2 90%   BMI 43.77 kg/m²     Intake & Output:  UOP 400cc     GENERAL: alert, well appearing, and in no distress, sitting up in bed  HEENT: normocephalic, atraumatic, moist mucous membranes, clear sclerae   CHEST: no increased work of breathing, no wheezes, symmetric air entry, on 6L O2  CARDIAC: regular rate and rhythm    ABDOMEN: soft, chronically incarcerated ventral hernia, non-tender, much softer, no peritoneal signs, no erythema over hernia   EXTREMITIES: no cyanosis, clubbing, or edema   SKIN: Warm and moist, no rashes    LABS  Results from last 7 days   Lab Units 09/03/20 0324 09/02/20 0642 09/01/20  0432   WBC 10*3/mm3 12.76* 14.64* 10.68   HEMOGLOBIN g/dL 10.7* 11.8* 11.9*   HEMATOCRIT % 33.2* 35.6 36.9   PLATELETS 10*3/mm3 305 327 308     Results from last 7 days   Lab Units 09/03/20  0324 09/02/20 0642 09/01/20  1728 09/01/20  0432 08/31/20  1912   SODIUM mmol/L 142 141  --  136 137   POTASSIUM mmol/L 3.6 3.9 4.6 5.6* 5.1   CHLORIDE mmol/L 105 103  --  102 102   CO2 mmol/L 25.6 28.9  --  23.9 24.9   BUN mg/dL 19 32*  --  22 22   CREATININE mg/dL 0.64 0.90  --  0.82 0.89   CALCIUM " mg/dL 8.7 8.9  --  10.5 10.9*   BILIRUBIN mg/dL 0.2  --   --  0.2 0.2   ALK PHOS U/L 55  --   --  67 72   ALT (SGPT) U/L 19  --   --  33 31   AST (SGOT) U/L 13  --   --  22 27   GLUCOSE mg/dL 136* 173*  --  248* 143*           HAILEY LOPES MD  General and Endoscopic Surgery  [facility]    [address] [place] [address], [address]  [place], [place], [address]  P: [phone]  F: [phone]

## 2020-09-03 NOTE — PROGRESS NOTES
"   LOS: 2 days    Patient Care Team:  Vianey Barrios PA-C as PCP - General (Family Medicine)  Vianey Barrios PA-C as PCP - Claims Attributed  Bhanu Mata MD as Consulting Physician (Cardiology)  Mann Hernadez MD as Consulting Physician (Pulmonary Disease)  Becky Haji MD as Surgeon (General Surgery)    Chief Complaint:    Chief Complaint   Patient presents with   • Abdominal Pain     Follow UP Hyperkalemia  Subjective     Interval History:   Feels better. NG out. Bowels moved. Tolerating clear liquids.  Abdomen feels full of gas, but not as sore.  Review of chem from 5/18 K range 4.9 to 5.5.    Review of Systems:   See above.     Objective     Vital Signs  Temp:  [97.5 °F (36.4 °C)-98.4 °F (36.9 °C)] 98.4 °F (36.9 °C)  Heart Rate:  [] 84  Resp:  [18-20] 18  BP: (134-150)/(49-68) 134/68    Flowsheet Rows      First Filed Value   Admission Height  162.6 cm (64\") Documented at 08/31/2020 2055   Admission Weight  109 kg (239 lb 3.2 oz) Documented at 08/31/2020 2055          No intake/output data recorded.  I/O last 3 completed shifts:  In: 2437 [I.V.:2137; IV Piggyback:300]  Out: 1150 [Urine:1100; Emesis/NG output:50]    Intake/Output Summary (Last 24 hours) at 9/3/2020 1030  Last data filed at 9/3/2020 0311  Gross per 24 hour   Intake 2100 ml   Output 400 ml   Net 1700 ml       Physical Exam:  Obese, lying in bed.  O2, mask  Neck no jvd  Heart RRR no s3 or rub  Lungs clear to auscultation, no wheezing  Abd Obese, soft, ventral and central hernia, + bs, much less tender.   Ext no edema  Skin no rash  Neuro speech fluent. Moves all 4 ext.       Results Review:    Results from last 7 days   Lab Units 09/03/20  0324 09/02/20  0642 09/01/20  1728 09/01/20  0432 08/31/20  1912   SODIUM mmol/L 142 141  --  136 137   POTASSIUM mmol/L 3.6 3.9 4.6 5.6* 5.1   CHLORIDE mmol/L 105 103  --  102 102   CO2 mmol/L 25.6 28.9  --  23.9 24.9   BUN mg/dL 19 32*  --  22 22   CREATININE mg/dL 0.64 0.90  --  0.82 " 0.89   CALCIUM mg/dL 8.7 8.9  --  10.5 10.9*   BILIRUBIN mg/dL 0.2  --   --  0.2 0.2   ALK PHOS U/L 55  --   --  67 72   ALT (SGPT) U/L 19  --   --  33 31   AST (SGOT) U/L 13  --   --  22 27   GLUCOSE mg/dL 136* 173*  --  248* 143*       Estimated Creatinine Clearance: 80.6 mL/min (by C-G formula based on SCr of 0.64 mg/dL).                Results from last 7 days   Lab Units 09/03/20  0324 09/02/20  0642 09/01/20  0432 08/31/20  1912   WBC 10*3/mm3 12.76* 14.64* 10.68 14.85*   HEMOGLOBIN g/dL 10.7* 11.8* 11.9* 12.0   PLATELETS 10*3/mm3 305 327 308 310               Imaging Results (Last 24 Hours)     Procedure Component Value Units Date/Time    CT Abdomen Pelvis Without Contrast [[id number]] Collected:  09/02/20 1326     Updated:  09/02/20 1823    Narrative:       CT ABDOMEN AND PELVIS WITHOUT IV CONTRAST     HISTORY: 70-year-old female with low-grade bowel obstruction. Large  ventral hernia. Follow-up.     TECHNIQUE: Radiation dose reduction techniques were utilized, including  automated exposure control and exposure modulation based on body size.   3 mm images were obtained through the abdomen and pelvis without the  administration of IV contrast. Compared with previous CT from 08/31/2020  and 11/19/2015.     FINDINGS: Within the large left ventral hernia, there are long segments  of small bowel as well as the distal transverse and descending colon.  The neck of the hernia measures approximately 7.8 cm. At the distal  transverse colon, there appears to be a giant diverticulum filled with a  large volume of formed stool. There is no dilatation of the colon  proximally, the descending colon within the hernia sac is nearly  collapsed and the sigmoid colon distally is normal in caliber. There is  no abnormally dilated small bowel. There is an NG tube within a  collapsed stomach. There is no free fluid or lymphadenopathy. The liver,  gallbladder, spleen, pancreas, adrenals, and kidneys appear  unremarkable. The uterus  is diminutive. Adnexa appear unremarkable.  There are extensive abdominal aortic atherosclerotic changes without  aneurysmal dilatation. There are no acute appearing airspace opacities  at the visualized lower lung fields. There are stable-appearing  reticular nodular opacities at both lung bases and there is coarsening  of the interstitium and some bronchiolectasis at the left lower lobe.  There is volume loss at the left lower lobe and this may be positional.       Impression:       1. The distal transverse colon is within the large left ventral hernia  and has a giant diverticulum filled with stool and possibly a bezoar.  There are also long segments of small bowel within the hernia. There is  no small or large bowel obstruction.  2. Stable reticular nodular opacities at both lung bases. These may be  chronic, but were not present on a CT of the abdomen from 2012. The  appearance can be seen with acute bronchiolitis or sequela of  bronchiolitis. Please correlate clinically.     This report was finalized on 9/2/2020 6:20 PM by Dr. Mallorie Baker M.D.             budesonide-formoterol 2 puff Inhalation BID - RT   docusate sodium 100 mg Oral BID   enoxaparin 40 mg Subcutaneous Q12H   FLUoxetine 40 mg Oral Daily   gabapentin 300 mg Oral BID   insulin lispro 0-7 Units Subcutaneous TID AC   lamoTRIgine 150 mg Oral BID   magnesium hydroxide 10 mL Oral Daily   piperacillin-tazobactam 4.5 g Intravenous Q8H   tiotropium bromide monohydrate 2 puff Inhalation Daily - RT   verapamil  mg Oral Q12H       dextrose 5 % and sodium chloride 0.9 % 100 mL/hr Last Rate: 100 mL/hr (09/03/20 0313)       Medication Review:   Current Facility-Administered Medications   Medication Dose Route Frequency Provider Last Rate Last Dose   • albuterol (PROVENTIL) nebulizer solution 0.083% 2.5 mg/3mL  2.5 mg Nebulization Q4H PRN Bhanu Murphy, APRN       • aluminum-magnesium hydroxide-simethicone (MAALOX MAX) 400-400-40 MG/5ML suspension 15 mL   15 mL Oral Q6H PRN Bhanu Murphy APRN       • bisacodyl (DULCOLAX) EC tablet 5 mg  5 mg Oral Daily PRN Bhanu Murphy APRN       • bisacodyl (DULCOLAX) suppository 10 mg  10 mg Rectal Daily PRN Bhanu Murphy APRN       • budesonide-formoterol (SYMBICORT) 80-4.5 MCG/ACT inhaler 2 puff  2 puff Inhalation BID - RT Bhanu Murphy APRN   2 puff at 09/03/20 0958   • dextrose (D50W) 25 g/ 50mL Intravenous Solution 25 g  25 g Intravenous Q15 Min PRN Bhanu Murphy APRN       • dextrose (GLUTOSE) oral gel 15 g  15 g Oral Q15 Min PRN Bhanu Murphy APRN       • dextrose 5 % and sodium chloride 0.9 % infusion  100 mL/hr Intravenous Continuous Polina Bess  mL/hr at 09/03/20 0313 100 mL/hr at 09/03/20 0313   • docusate sodium (COLACE) capsule 100 mg  100 mg Oral BID Sanna Good MD   100 mg at 09/02/20 2324   • enoxaparin (LOVENOX) syringe 40 mg  40 mg Subcutaneous Q12H Lissette Castellano APRN       • FLUoxetine (PROzac) capsule 40 mg  40 mg Oral Daily Neha Carlson MD   40 mg at 09/02/20 1600   • gabapentin (NEURONTIN) capsule 300 mg  300 mg Oral BID Bhanu Murphy APRN   300 mg at 09/02/20 2157   • glucagon (human recombinant) (GLUCAGEN DIAGNOSTIC) injection 1 mg  1 mg Subcutaneous Q15 Min PRN Bhanu Murphy APRN       • hydrALAZINE (APRESOLINE) injection 10 mg  10 mg Intravenous Q6H PRN Bhanu Murphy APRN       • insulin lispro (humaLOG) injection 0-7 Units  0-7 Units Subcutaneous TID AC Bhanu Murphy APRN   2 Units at 09/01/20 1700   • ipratropium-albuterol (DUO-NEB) nebulizer solution 3 mL  3 mL Nebulization Q4H PRN Bhanu Murphy APRN       • lamoTRIgine (LaMICtal) tablet 150 mg  150 mg Oral BID Bhanu Murphy APRN       • magnesium hydroxide (MILK OF MAGNESIA) suspension 2400 mg/10mL 10 mL  10 mL Oral Daily Sanna Good MD       • morphine injection 2 mg  2 mg Intravenous Q3H PRN Bhanu Murphy APRN   2 mg at 09/02/20 1600   • ondansetron (ZOFRAN) tablet 4 mg  4 mg  Oral Q6H PRN Bhanu Murphy APRN        Or   • ondansetron (ZOFRAN) injection 4 mg  4 mg Intravenous Q6H PRN Bhanu Murphy APRN       • piperacillin-tazobactam (ZOSYN) 4.5 g in iso-osmotic dextrose 100 mL IVPB (premix)  4.5 g Intravenous Q8H Sanna Good MD   4.5 g at 09/03/20 0311   • sodium chloride 0.9 % flush 10 mL  10 mL Intravenous PRN Bhanu Murphy APRN       • tiotropium (SPIRIVA RESPIMAT) 2.5 mcg/act aerosol solution inhaler  2 puff Inhalation Daily - RT Bhanu Murphy APRN   2 puff at 09/03/20 0958   • verapamil SR (CALAN-SR) CR tablet 240 mg  240 mg Oral Q12H Bhanu Murphy APRN   240 mg at 09/02/20 1719     Facility-Administered Medications Ordered in Other Encounters   Medication Dose Route Frequency Provider Last Rate Last Dose   • acetaminophen (TYLENOL) 325 MG tablet  - ADS Override Pull                Assessment/Plan   1. Hyperkalemia.   Likely due to lisinopril with minor component of respiratory acidosis. Acute on mild chronic.  Lisinopril dc.  I rec not restarting lisinopril for now .  DC IVF.  2. Intussusception, central and ventral hernia with large and small bowel.  Surgery following. NG out.  Clear liquids .  3. DM2  4. Chronic hypoxic respiratory failure, mild hypercapnea on admission with partially compensated respiratory acidosis.  5. Prior CVA  6. HTN  . Currently on verapamil.  Would not resume lisinopril for now .  7. PAF  8. Depression.     DC IVF.   Will sign off .  Please call with questions.         Neha Carlson MD  09/03/20  10:30

## 2020-09-04 ENCOUNTER — TELEPHONE (OUTPATIENT)
Dept: FAMILY MEDICINE CLINIC | Facility: CLINIC | Age: 70
End: 2020-09-04

## 2020-09-04 ENCOUNTER — READMISSION MANAGEMENT (OUTPATIENT)
Dept: CALL CENTER | Facility: HOSPITAL | Age: 70
End: 2020-09-04

## 2020-09-04 ENCOUNTER — APPOINTMENT (OUTPATIENT)
Dept: ULTRASOUND IMAGING | Facility: HOSPITAL | Age: 70
End: 2020-09-04

## 2020-09-04 VITALS
BODY MASS INDEX: 43.54 KG/M2 | WEIGHT: 255 LBS | SYSTOLIC BLOOD PRESSURE: 134 MMHG | HEIGHT: 64 IN | RESPIRATION RATE: 18 BRPM | OXYGEN SATURATION: 95 % | TEMPERATURE: 97.6 F | DIASTOLIC BLOOD PRESSURE: 44 MMHG | HEART RATE: 76 BPM

## 2020-09-04 LAB
ALBUMIN SERPL-MCNC: 3.3 G/DL (ref 3.5–5.2)
ANION GAP SERPL CALCULATED.3IONS-SCNC: 8.7 MMOL/L (ref 5–15)
BUN SERPL-MCNC: 11 MG/DL (ref 8–23)
BUN/CREAT SERPL: 17.2 (ref 7–25)
CALCIUM SPEC-SCNC: 8.4 MG/DL (ref 8.6–10.5)
CHLORIDE SERPL-SCNC: 105 MMOL/L (ref 98–107)
CO2 SERPL-SCNC: 25.3 MMOL/L (ref 22–29)
CREAT SERPL-MCNC: 0.64 MG/DL (ref 0.57–1)
GFR SERPL CREATININE-BSD FRML MDRD: 91 ML/MIN/1.73
GLUCOSE BLDC GLUCOMTR-MCNC: 137 MG/DL (ref 70–130)
GLUCOSE BLDC GLUCOMTR-MCNC: 163 MG/DL (ref 70–130)
GLUCOSE SERPL-MCNC: 148 MG/DL (ref 65–99)
PHOSPHATE SERPL-MCNC: 2.6 MG/DL (ref 2.5–4.5)
POTASSIUM SERPL-SCNC: 4.1 MMOL/L (ref 3.5–5.2)
SODIUM SERPL-SCNC: 139 MMOL/L (ref 136–145)

## 2020-09-04 PROCEDURE — 94799 UNLISTED PULMONARY SVC/PX: CPT

## 2020-09-04 PROCEDURE — 63710000001 INSULIN LISPRO (HUMAN) PER 5 UNITS: Performed by: NURSE PRACTITIONER

## 2020-09-04 PROCEDURE — 99232 SBSQ HOSP IP/OBS MODERATE 35: CPT | Performed by: STUDENT IN AN ORGANIZED HEALTH CARE EDUCATION/TRAINING PROGRAM

## 2020-09-04 PROCEDURE — 82962 GLUCOSE BLOOD TEST: CPT

## 2020-09-04 PROCEDURE — 80069 RENAL FUNCTION PANEL: CPT | Performed by: INTERNAL MEDICINE

## 2020-09-04 RX ADMIN — DOCUSATE SODIUM 100 MG: 100 CAPSULE, LIQUID FILLED ORAL at 08:35

## 2020-09-04 RX ADMIN — ACETAMINOPHEN 1000 MG: 500 TABLET ORAL at 06:22

## 2020-09-04 RX ADMIN — LAMOTRIGINE 150 MG: 100 TABLET ORAL at 08:34

## 2020-09-04 RX ADMIN — TIOTROPIUM BROMIDE INHALATION SPRAY 2 PUFF: 3.12 SPRAY, METERED RESPIRATORY (INHALATION) at 08:23

## 2020-09-04 RX ADMIN — POLYETHYLENE GLYCOL 3350 17 G: 17 POWDER, FOR SOLUTION ORAL at 08:35

## 2020-09-04 RX ADMIN — BUDESONIDE AND FORMOTEROL FUMARATE DIHYDRATE 2 PUFF: 80; 4.5 AEROSOL RESPIRATORY (INHALATION) at 08:23

## 2020-09-04 RX ADMIN — INSULIN LISPRO 2 UNITS: 100 INJECTION, SOLUTION INTRAVENOUS; SUBCUTANEOUS at 06:30

## 2020-09-04 RX ADMIN — VERAPAMIL HYDROCHLORIDE 240 MG: 240 TABLET, FILM COATED, EXTENDED RELEASE ORAL at 08:34

## 2020-09-04 RX ADMIN — FLUOXETINE HYDROCHLORIDE 40 MG: 20 CAPSULE ORAL at 08:34

## 2020-09-04 RX ADMIN — GABAPENTIN 300 MG: 300 CAPSULE ORAL at 08:35

## 2020-09-04 NOTE — TELEPHONE ENCOUNTER
PATIENT IS CALLING TO GET A PRESCRIPTION CHANGE FOR HER OXYGEN FROM 6-4. PLEASE CALL IF HAVE ANY QUESTION S FOR PATIENT.    SENT TO:  [facility]  [phone]    [facility]#: [phone]

## 2020-09-04 NOTE — PROGRESS NOTES
"GENERAL SURGERY PROGRESS NOTE    SUMMARY (A/P):  Mrs. Mar Camilo is a 70 y.o. year old lady with a chronically incarcerated ventral hernia with questionable colo-colonic intussusception versus stool impaction. Doing well, tolerating a regular diet, having BMs. Ok for home from general surgery standpoint, continue bowel regimen at discharge. Can follow up in office in two weeks for evaluation of symptom resolution.    Chief Complaint: abdominal pain with chronically incarcerated ventral hernia    Interval Events: No new issues overnight. Tolerated a diet, having BMs.    Review of Systems: No abdominal pain, N/V    O:/44 (BP Location: Right arm, Patient Position: Lying)   Pulse 76   Temp 97.6 °F (36.4 °C) (Oral)   Resp 18   Ht 162.6 cm (64\")   Wt 116 kg (255 lb)   LMP  (LMP Unknown)   SpO2 95%   BMI 43.77 kg/m²     GENERAL: alert, well appearing, and in no distress, sitting up in bed  HEENT: normocephalic, atraumatic, moist mucous membranes, clear sclerae   CHEST: no increased work of breathing, no wheezes, symmetric air entry, on 4L O2  CARDIAC: regular rate and rhythm    ABDOMEN: soft, chronically incarcerated ventral hernia, non-tender, soft, no peritoneal signs, no erythema over hernia   EXTREMITIES: no cyanosis, clubbing, or edema   SKIN: Warm and moist, no rashes    LABS  Results from last 7 days   Lab Units 09/03/20  0324 09/02/20 0642 09/01/20  0432   WBC 10*3/mm3 12.76* 14.64* 10.68   HEMOGLOBIN g/dL 10.7* 11.8* 11.9*   HEMATOCRIT % 33.2* 35.6 36.9   PLATELETS 10*3/mm3 305 327 308     Results from last 7 days   Lab Units 09/04/20  0632 09/03/20  0324 09/02/20  0642  09/01/20  0432 08/31/20  1912   SODIUM mmol/L 139 142 141  --  136 137   POTASSIUM mmol/L 4.1 3.6 3.9   < > 5.6* 5.1   CHLORIDE mmol/L 105 105 103  --  102 102   CO2 mmol/L 25.3 25.6 28.9  --  23.9 24.9   BUN mg/dL 11 19 32*  --  22 22   CREATININE mg/dL 0.64 0.64 0.90  --  0.82 0.89   CALCIUM mg/dL 8.4* 8.7 8.9  --  10.5 10.9* "   BILIRUBIN mg/dL  --  0.2  --   --  0.2 0.2   ALK PHOS U/L  --  55  --   --  67 72   ALT (SGPT) U/L  --  19  --   --  33 31   AST (SGOT) U/L  --  13  --   --  22 27   GLUCOSE mg/dL 148* 136* 173*  --  248* 143*    < > = values in this interval not displayed.           HAILEY LOPES MD  General and Endoscopic Surgery  [facility]    [address] [place] [address], [address]  [place], [place], [address]  P: [phone]  F: [phone]

## 2020-09-04 NOTE — TELEPHONE ENCOUNTER
PATIENT CALLED AND SHE IS UNABLE TO MAKE VISIT SCHEDULED FOR 9/11/2020. PLEASE CALL HER TO RESCHEDULE FOR A Monday OR Wednesday.    BEST #: [phone]

## 2020-09-04 NOTE — PLAN OF CARE
Problem: Patient Care Overview  Goal: Plan of Care Review  Outcome: Ongoing (interventions implemented as appropriate)  Flowsheets (Taken 9/4/2020 8908)  Progress: no change  Plan of Care Reviewed With: patient  Outcome Summary: Pt slept throughout most of the shift. VSS. O2 increased from 3LNC to 4LNC. VSS. NAD. Will continue to monitor.

## 2020-09-04 NOTE — TELEPHONE ENCOUNTER
Have her keep appointment for next week to readdress her O2.  Have not received the discharge paperwork yet from hospital.

## 2020-09-04 NOTE — OUTREACH NOTE
Prep Survey      Responses   [facility] patient discharged from?  [facility]   Is LACE score < 7 ?  No   Eligibility  [facility]   Date of Admission  08/31/20   Date of Discharge  09/04/20   Discharge Disposition  Home or Self Care   Discharge diagnosis  abd pain, incarcerated ventral hernia with colonic intussuseption - treated medically   COVID-19 Test Status  Negative   Does the patient have one of the following disease processes/diagnoses(primary or secondary)?  Other   Does the patient have Home health ordered?  No   Is there a DME ordered?  No   Prep survey completed?  Yes          Candelaria Bean RN

## 2020-09-04 NOTE — DISCHARGE SUMMARY
Patient Name: Mar Camilo  : 1950  MRN: [id number]    Date of Admission: 2020  Date of Discharge:  2020  Primary Care Physician: Vianey Barrios PA-C      Chief Complaint:   Abdominal Pain      Discharge Diagnoses     Active Hospital Problems    Diagnosis  POA   • **Intussusception (CMS/[facility]) [K56.1]  Yes   • Crohn's disease (CMS/[facility]) [K50.90]  Yes   • CAD (coronary artery disease) [I25.10]  Yes   • Hyperkalemia [E87.5]  Unknown   • PAD (peripheral artery disease) (CMS/[facility]) [I73.9]  Yes   • PAF (paroxysmal atrial fibrillation) (CMS/[facility]) [I48.0]  Yes   • History of CVA (cerebrovascular accident) [Z86.73]  Not Applicable   • Chronic respiratory failure with hypoxia (CMS/[facility]) [J96.11]  Yes   • COPD (chronic obstructive pulmonary disease) (CMS/[facility]) [J44.9]  Yes   • Mixed hyperlipidemia [E78.2]  Yes   • Type 2 diabetes mellitus with complication, without long-term current use of insulin (CMS/[facility]) [E11.8]  Yes   • Class 3 severe obesity in adult (CMS/[facility]) [E66.01]  Yes   • Essential hypertension [I10]  Yes      Resolved Hospital Problems   No resolved problems to display.        Hospital Course     Ms. Camilo is a 70 y.o. former smoker with a history of CAD, COPD (on 6 L of supplemental oxygen), Crohn's disease, essential hypertension, CVA, HLD, PAD, paroxysmal atrial fibrillation, non insulin-dependent type 2 diabetes, and obesity that presents to [facility] complaining of abdominal pain that began 4 days ago.  Patient states that she has been having abdominal pain for for quite some time and she was seen by her PCP on Thursday who ordered an outpatient abdominal ultrasound.  She was instructed that if her abdominal pain became worse, to go to the ED for further evaluation.  She states this morning it became severe. She describes her pain as an ache and a feeling of flatulence.   She has had ventricle hernia for several years status post a transverse colectomy in 2016.  It was  recommended that she not repaired it unless it was an emergency due to her comorbidities.  She denies any nausea, vomiting, diarrhea, constipation.  She also denies any shortness of air, chest pain, fever, chills, and loss of taste or smell.  She states her last bowel movement was this morning.  Work-up in the emergency department revealed a colonic intussusception.     1. Inglis colonic intussusception,Patient has chronically incarcerated ventral hernia with questionable colo colonic intussusception versus stool impaction.  She was managed symptomatically and now she is having regular bowel movements with no nausea vomiting abdominal pain.  Surgery did evaluate and cleared her to be discharged home.  2. History of COPD, will continue with budesonide /formoterol as well as Spiriva.  Currently not in exacerbation.    3. History of proximal atrial fibrillation, continue with aspirin upon discharge.  4. Diabetes mellitus, on corrective dose insulin.    5. History of Crohn's, currently not in exacerbation.  6. Hyperkalemia has resolved.  7. Neuropathy, Neurontin.  8. History of coronary disease, continue aspirin upon discharge and consider statins as well.  9. Obesity, counseled to lose weight.    10. Hypertension, on verapamil and will be continued.       Please note patient was seen examined today on day of discharge.      Day of Discharge         Physical Exam:  Temp:  [97.6 °F (36.4 °C)-98.5 °F (36.9 °C)] 97.6 °F (36.4 °C)  Heart Rate:  [74-95] 76  Resp:  [18] 18  BP: (115-145)/(44-80) 134/44  Body mass index is 43.77 kg/m².  Physical Exam    HEENT:  Atraumatic, normocephalic.  PERRLA.  Extraocular movements intact.  Conjunctivae pink.  Sclerae, no icterus.  Mucous membranes dry. NECK:  Supple.  No JVD.  HEART:  Regular rate and rhythm.  Normal S1, S2.   LUNGS:  Fairly clear to auscultation anteriorly.  No wheezes.  No crackles.  ABDOMEN:   Soft, nontender.  Bowel sounds present.  No rebound.  No  Guarding.  Abdominal  wall hernia noted with no tenderness.  EXTREMITIES:  No cyanosis, clubbing, or edema.  Palpable pedal pulses.  NEURO:  Grossly nonfocal.  No facial asymmetry.  Good strength in all 4  extremities.  SKIN:  Warm and dry.  No evidence of rashes.  LYMPH NODES:  No palpable cervical or supraclavicular lymphadenopathy.    Consultants     Consult Orders (all) (From admission, onward)     Start     Ordered    09/01/20 1036  Inpatient Nephrology Consult  Once     Specialty:  Nephrology  Provider:  Peter Montilla MD    09/01/20 1036    08/31/20 2315  [facility] (on-call MD unless specified) Details  Once     Specialty:  Hospitalist  Provider:  (Not yet assigned)    08/31/20 2315    08/31/20 2304  Surgery (on-call MD unless specified)  Once     Specialty:  General Surgery  Provider:  (Not yet assigned)    08/31/20 2303              Procedures     Imaging Results (All)     Procedure Component Value Units Date/Time    CT Abdomen Pelvis Without Contrast [[id number]] Collected:  09/02/20 1326     Updated:  09/02/20 1823    Narrative:       CT ABDOMEN AND PELVIS WITHOUT IV CONTRAST     HISTORY: 70-year-old female with low-grade bowel obstruction. Large  ventral hernia. Follow-up.     TECHNIQUE: Radiation dose reduction techniques were utilized, including  automated exposure control and exposure modulation based on body size.   3 mm images were obtained through the abdomen and pelvis without the  administration of IV contrast. Compared with previous CT from 08/31/2020  and 11/19/2015.     FINDINGS: Within the large left ventral hernia, there are long segments  of small bowel as well as the distal transverse and descending colon.  The neck of the hernia measures approximately 7.8 cm. At the distal  transverse colon, there appears to be a giant diverticulum filled with a  large volume of formed stool. There is no dilatation of the colon  proximally, the descending colon within the hernia sac is nearly  collapsed and the sigmoid colon  distally is normal in caliber. There is  no abnormally dilated small bowel. There is an NG tube within a  collapsed stomach. There is no free fluid or lymphadenopathy. The liver,  gallbladder, spleen, pancreas, adrenals, and kidneys appear  unremarkable. The uterus is diminutive. Adnexa appear unremarkable.  There are extensive abdominal aortic atherosclerotic changes without  aneurysmal dilatation. There are no acute appearing airspace opacities  at the visualized lower lung fields. There are stable-appearing  reticular nodular opacities at both lung bases and there is coarsening  of the interstitium and some bronchiolectasis at the left lower lobe.  There is volume loss at the left lower lobe and this may be positional.       Impression:       1. The distal transverse colon is within the large left ventral hernia  and has a giant diverticulum filled with stool and possibly a bezoar.  There are also long segments of small bowel within the hernia. There is  no small or large bowel obstruction.  2. Stable reticular nodular opacities at both lung bases. These may be  chronic, but were not present on a CT of the abdomen from 2012. The  appearance can be seen with acute bronchiolitis or sequela of  bronchiolitis. Please correlate clinically.     This report was finalized on 9/2/2020 6:20 PM by Dr. Mallorie Baker M.D.       CT Abdomen Pelvis With Contrast [[id number]] Collected:  08/31/20 2314     Updated:  09/01/20 0045    Narrative:       CT ABDOMEN AND PELVIS WITH IV CONTRAST     HISTORY: 70-year-old with large hernia. Evaluate for incarcerated bowel.     TECHNIQUE: CT abdomen and pelvis with intravenous contrast.     COMPARISON: CT and pelvis with IV contrast 11/19/2015.     FINDINGS: There is a large ventral central to left abdominal wall hernia  that contains large and small bowel. The hernia measures approximately  24 cm transverse by 13 cm AP and extends 20 cm in height. Hernia neck  measures 8 x 9.5 cm. Within the  anterior, right aspect of the hernia  there is a loop of redundant right colon that is abnormally distended  with what appears to represent stool and there are curvilinear rings of  fat density within this segment of colon suspected to represent a focal  intussusception within the hernia. The more proximal ascending colon and  cecum is fluid-filled without significant distention. There is no  distention of the terminal ileum which also extends partially into the  hernia.     Severe basilar pulmonary emphysema is present associated with  interstitial disease. There is mild gastric distention with fluid. The  liver, gallbladder, spleen, adrenal glands, pancreas appear within  normal limits. Kidneys appear normal and there is no hydronephrosis.  There is a levoscoliotic curvature of the lumbar spine associated with  multilevel degenerative disc disease.       Impression:       1.  Large central to left ventral hernia contains large and small bowel.  Within the anterior, right aspect of the hernia there is a dilated  colonic loop which contains internal concentric rings of fat density and  stool and this suggests a localized intussusception.  This finding is  suspected to be the cause of patient's symptoms and raises the  possibility of a lead point for which further evaluation recommended.  2. Severe basilar pulmonary emphysema.     Findings were discussed with DALE Suárez in the emergency  department on 08/31/2020 at 11 PM.     Radiation dose reduction techniques were utilized, including automated  exposure control and exposure modulation based on body size.     This report was finalized on 9/1/2020 12:42 AM by Dr. Ryan Gudino M.D.             Pertinent Labs     Results from last 7 days   Lab Units 09/03/20  0324 09/02/20  0642 09/01/20  0432 08/31/20  1912   WBC 10*3/mm3 12.76* 14.64* 10.68 14.85*   HEMOGLOBIN g/dL 10.7* 11.8* 11.9* 12.0   PLATELETS 10*3/mm3 305 327 308 310     Results from last 7 days    Lab Units 09/04/20 0632 09/03/20 0324 09/02/20 0642 09/01/20  1728 09/01/20  0432   SODIUM mmol/L 139 142 141  --  136   POTASSIUM mmol/L 4.1 3.6 3.9 4.6 5.6*   CHLORIDE mmol/L 105 105 103  --  102   CO2 mmol/L 25.3 25.6 28.9  --  23.9   BUN mg/dL 11 19 32*  --  22   CREATININE mg/dL 0.64 0.64 0.90  --  0.82   GLUCOSE mg/dL 148* 136* 173*  --  248*   Estimated Creatinine Clearance: 80.6 mL/min (by C-G formula based on SCr of 0.64 mg/dL).  Results from last 7 days   Lab Units 09/04/20 0632 09/03/20 0324 09/01/20 0432 08/31/20 1912   ALBUMIN g/dL 3.30* 3.00* 3.90 3.90   BILIRUBIN mg/dL  --  0.2 0.2 0.2   ALK PHOS U/L  --  55 67 72   AST (SGOT) U/L  --  13 22 27   ALT (SGPT) U/L  --  19 33 31     Results from last 7 days   Lab Units 09/04/20 0632 09/03/20 0324 09/02/20 0642 09/01/20 0432 08/31/20  1912   CALCIUM mg/dL 8.4* 8.7 8.9 10.5 10.9*   ALBUMIN g/dL 3.30* 3.00*  --  3.90 3.90   PHOSPHORUS mg/dL 2.6  --   --   --   --      Results from last 7 days   Lab Units 08/31/20 1912   LIPASE U/L 31             Invalid input(s): LDLCALC        Test Results Pending at Discharge       Discharge Details        Discharge Medications      Changes to Medications      Instructions Start Date   lisinopril 40 MG tablet  Commonly known as:  PRINIVIL,ZESTRIL  What changed:  how much to take   20 mg, Oral, Daily         Continue These Medications      Instructions Start Date   aspirin 81 MG EC tablet   81 mg, Oral, Every Morning      CENTRUM SILVER PO   1 tablet, Oral, Every Morning      fenofibrate 145 MG tablet  Commonly known as:  TRICOR   145 mg, Oral, Daily      FLUoxetine 40 MG capsule  Commonly known as:  PROzac   Take 1 capsule by mouth once daily      gabapentin 300 MG capsule  Commonly known as:  NEURONTIN   300 mg, Oral, 2 Times Daily      glucose blood test strip  Commonly known as:  OneTouch Verio   Use as instructed as to check blood sugars 2-3 times a day for type 2 diabetes      guaiFENesin 600 MG 12 hr  tablet  Commonly known as:  MUCINEX   1,200 mg, Oral, Every Morning      ipratropium-albuterol 0.5-2.5 mg/3 ml nebulizer  Commonly known as:  DUO-NEB   3 mL, Nebulization, Every 4 Hours PRN      lamoTRIgine 150 MG tablet  Commonly known as:  LaMICtal   150 mg, Oral, 2 Times Daily      metFORMIN 500 MG tablet  Commonly known as:  GLUCOPHAGE   TAKE 1 TABLET BY MOUTH TWICE DAILY WITH MEALS      O2  Commonly known as:  OXYGEN   4 L/min (4 L/min), Inhalation, Continuous, May resume prior 3L/min when O2 sat remains 88% or above on this setting. Patient was given an oximeter here.      onetouch ultrasoft lancets   Used to check blood sugars twice a day as needed for type 2 diabetes monitoring      OneTouch Verio w/Device kit   1 kit, Does not apply, 2 Times Daily      rosuvastatin 40 MG tablet  Commonly known as:  CRESTOR   Take 1 tablet by mouth once daily      Symbicort 80-4.5 MCG/ACT inhaler  Generic drug:  budesonide-formoterol   2 puffs, Inhalation, 2 Times Daily - RT      tiotropium 18 MCG per inhalation capsule  Commonly known as:  Spiriva HandiHaler   1 capsule, Inhalation, Daily - RT      Ventolin  (90 Base) MCG/ACT inhaler  Generic drug:  albuterol sulfate HFA   INHALE 2 PUFFS BY MOUTH EVERY 4 HOURS AS NEEDED      verapamil  MG CR tablet  Commonly known as:  CALAN-SR   TAKE 1 TABLET BY MOUTH EVERY 12 HOURS         Stop These Medications    predniSONE 10 MG tablet  Commonly known as:  DELTASONE     sulfamethoxazole-trimethoprim 800-160 MG per tablet  Commonly known as:  BACTRIM DS,SEPTRA DS            Allergies   Allergen Reactions   • Contrast Dye Hives   • Iodinated Diagnostic Agents Hives   • Norco [Hydrocodone-Acetaminophen] Hallucinations   • Tenoretic [Atenolol-Chlorthalidone] Anaphylaxis         Discharge Disposition:  Home or Self Care    Discharge Diet:  No active diet order      Discharge Activity:       CODE STATUS:    Code Status and Medical Interventions:   Ordered at: 09/01/20 0139      Code Status:    CPR     Medical Interventions (Level of Support Prior to Arrest):    Full       Future Appointments   Date Time Provider Department Center   9/14/2020  1:00 PM Vianey Barrios PA-C [facility] None   9/21/2020  1:30 PM Roberto Carlos Umaña MD [facility] None   10/19/2020  9:00 AM ROOM 2-B [facility]   1/14/2021  9:10 AM Altaf Reyes MD [facility]     Additional Instructions for the Follow-ups that You Need to Schedule     Discharge Follow-up with PCP   As directed       Currently Documented PCP:    Vianey Barrios PA-C    PCP Phone Number:    [phone]     Follow Up Details:  2 weeks         Discharge Follow-up with Specified Provider: ; 2 Weeks   As directed      To:      Follow Up:  2 Weeks           Follow-up Information     Vianey Barrios PA-C .    Specialty:  Family Medicine  Why:  2 weeks  Contact information:  [address] [facility] [id number] [phone]             Sanna Good MD Follow up.    Specialty:  General Surgery  Contact information:  [address]  [facility] [id number] [phone]                   Additional Instructions for the Follow-ups that You Need to Schedule     Discharge Follow-up with PCP   As directed       Currently Documented PCP:    Vianey Barrios PA-C    PCP Phone Number:    [phone]     Follow Up Details:  2 weeks         Discharge Follow-up with Specified Provider: ; 2 Weeks   As directed      To:      Follow Up:  2 Weeks           Time Spent on Discharge:  Greater than 30 minutes      Alejandro Smith MD  [facility]  09/04/20  5:47 PM

## 2020-09-04 NOTE — PROGRESS NOTES
Continued Stay Note  [facility]     Patient Name: Mar Camilo  MRN: [id number]  Today's Date: 9/4/2020    Admit Date: 8/31/2020    Discharge Plan     Row Name 09/04/20 1205       Plan    Plan  Home with sister. Denies any needs. Sister transporting    Patient/Family in Agreement with Plan  yes    Plan Comments  Met with pt at bedside to discuss D/C plan. Pt states she has everything she needs. States her sister is coming to get her. Pierre Blanca RN-BC    Final Discharge Disposition Code  01 - home or self-care    Final Note  Home with sister. Denies any needs. Sister transporting        Discharge Codes    No documentation.       Expected Discharge Date and Time     Expected Discharge Date Expected Discharge Time    Sep 4, 2020             Pierre Blanca RN

## 2020-09-04 NOTE — TELEPHONE ENCOUNTER
I spoke with Krys on September 4, 2020 at 4:40 PM.  I have also spoken with [facility].  Unfortunately the discharge paperwork is not completed at this time.  According to the Hospitalist note in chart, she is to use 3-4 L of oxygen when sitting and up to 6 L with exertion.  Will send prescription stating this to [facility].  They will send someone to Krys's house to show her how to adjust her oxygen tank.

## 2020-09-04 NOTE — TELEPHONE ENCOUNTER
Spoke with pt, she thinks awaiting her appt on the 14th wouldn't be safe, as she was told in hospital that her O2 was too high, and poisoning her body. Would like to alter dose ASAP.   Please advise.

## 2020-09-04 NOTE — PROGRESS NOTES
Case Management Discharge Note      Final Note: Home with sister. Deneies any needs. Sister transporting         Destination      No service has been selected for the patient.      Durable Medical Equipment      No service has been selected for the patient.      Dialysis/Infusion      No service has been selected for the patient.      Home Medical Care      No service has been selected for the patient.      Therapy      No service has been selected for the patient.      Community Resources      No service has been selected for the patient.        Transportation Services  Private: Car    Final Discharge Disposition Code: 01 - home or self-care

## 2020-09-07 ENCOUNTER — TRANSITIONAL CARE MANAGEMENT TELEPHONE ENCOUNTER (OUTPATIENT)
Dept: CALL CENTER | Facility: HOSPITAL | Age: 70
End: 2020-09-07

## 2020-09-07 NOTE — OUTREACH NOTE
Call Center TCM Note      Responses   [facility]   COVID-19 Test Status  Negative   Does the patient have one of the following disease processes/diagnoses(primary or secondary)?  Other   TCM attempt successful?  Yes   Call start time  1251   Call end time  1253   Discharge diagnosis  abd pain, incarcerated ventral hernia with colonic intussuseption - treated medically   Meds reviewed with patient/caregiver?  Yes   Is the patient having any side effects they believe may be caused by any medication additions or changes?  No   Does the patient have all medications ordered at discharge?  Yes   Is the patient taking all medications as directed (includes completed medication regime)?  Yes   Does the patient have a primary care provider?   Yes   Does the patient have an appointment with their PCP within 7 days of discharge?  Greater than 7 days   Comments regarding PCP  Hospital d/c f/u appt is on 9/14/20 at 1:00 pm   What is preventing the patient from scheduling follow up appointments within 7 days of discharge?  Earlier appointment not available   Nursing Interventions  Verified appointment date/time/provider   Has the patient kept scheduled appointments due by today?  N/A   Pulse Ox monitoring  Intermittent   Pulse Ox device source  Patient   Psychosocial issues?  No   Did the patient receive a copy of their discharge instructions?  Yes   Nursing interventions  Reviewed instructions with patient   What is the patient's perception of their health status since discharge?  Improving   Is the patient/caregiver able to teach back signs and symptoms related to disease process for when to call PCP?  Yes   Is the patient/caregiver able to teach back signs and symptoms related to disease process for when to call 911?  Yes   Is the patient/caregiver able to teach back the hierarchy of who to call/visit for symptoms/problems? PCP, Specialist, Home health nurse, Urgent Care, ED, 911  Yes    TCM call completed?  Yes          Minnie Carrion, RN    9/7/2020, 12:53

## 2020-09-09 NOTE — TELEPHONE ENCOUNTER
So I thought I got this in??   What Type Of Note Output Would You Prefer (Optional)?: Standard Output Hpi Title: Evaluation of Skin Lesions How Severe Are Your Spot(S)?: moderate Have Your Spot(S) Been Treated In The Past?: has not been treated

## 2020-09-15 ENCOUNTER — READMISSION MANAGEMENT (OUTPATIENT)
Dept: CALL CENTER | Facility: HOSPITAL | Age: 70
End: 2020-09-15

## 2020-09-15 NOTE — OUTREACH NOTE
Medical Week 2 Survey      Responses   List of hospitals in [place] patient discharged from?  [facility]   COVID-19 Test Status  Negative   Does the patient have one of the following disease processes/diagnoses(primary or secondary)?  Other   Week 2 attempt successful?  Yes   Call start time  0829   Discharge diagnosis  abd pain, incarcerated ventral hernia with colonic intussuseption - treated medically   Call end time  0830   Meds reviewed with patient/caregiver?  Yes   Is the patient having any side effects they believe may be caused by any medication additions or changes?  No   Does the patient have all medications ordered at discharge?  Yes   Is the patient taking all medications as directed (includes completed medication regime)?  Yes   Does the patient have a primary care provider?   Yes   Has the patient kept scheduled appointments due by today?  Yes   Pulse Ox monitoring  Intermittent   Pulse Ox device source  Patient   Psychosocial issues?  No   Did the patient receive a copy of their discharge instructions?  Yes   Nursing interventions  Reviewed instructions with patient, Educated on MyChart   What is the patient's perception of their health status since discharge?  Improving   Is the patient/caregiver able to teach back signs and symptoms related to disease process for when to call PCP?  Yes   Is the patient/caregiver able to teach back signs and symptoms related to disease process for when to call 911?  Yes   Is the patient/caregiver able to teach back the hierarchy of who to call/visit for symptoms/problems? PCP, Specialist, Home health nurse, Urgent Care, ED, 911  Yes   Week 2 Call Completed?  Yes          Valerie Gonzalez RN

## 2020-09-18 ENCOUNTER — OFFICE VISIT (OUTPATIENT)
Dept: FAMILY MEDICINE CLINIC | Facility: CLINIC | Age: 70
End: 2020-09-18

## 2020-09-18 VITALS
HEIGHT: 64 IN | BODY MASS INDEX: 39.95 KG/M2 | HEART RATE: 106 BPM | TEMPERATURE: 98 F | SYSTOLIC BLOOD PRESSURE: 148 MMHG | WEIGHT: 234 LBS | DIASTOLIC BLOOD PRESSURE: 78 MMHG | OXYGEN SATURATION: 91 % | RESPIRATION RATE: 16 BRPM

## 2020-09-18 DIAGNOSIS — Z09 HOSPITAL DISCHARGE FOLLOW-UP: Primary | ICD-10-CM

## 2020-09-18 DIAGNOSIS — K56.1 INTUSSUSCEPTION (HCC): ICD-10-CM

## 2020-09-18 DIAGNOSIS — J44.1 CHRONIC OBSTRUCTIVE PULMONARY DISEASE WITH ACUTE EXACERBATION (HCC): ICD-10-CM

## 2020-09-18 PROCEDURE — 99495 TRANSJ CARE MGMT MOD F2F 14D: CPT | Performed by: PHYSICIAN ASSISTANT

## 2020-09-18 NOTE — PROGRESS NOTES
Transitional Care Follow Up Visit  Subjective     Mar Camilo is a 70 y.o. female who presents for a transitional care management visit.    Within 48 business hours after discharge our office contacted her via telephone to coordinate her care and needs.      I reviewed and discussed the details of that call along with the discharge summary, hospital problems, inpatient lab results, inpatient diagnostic studies, and consultation reports with Mar.     Current outpatient and discharge medications have been reconciled for the patient.  Reviewed by: Vianey Barrios PA-C      Date of TCM Phone Call 9/4/2020   [facility]   Date of Admission 8/31/2020   Date of Discharge 9/4/2020   Discharge Disposition Home or Self Care     Risk for Readmission (LACE) Score: 13 (9/4/2020  6:00 AM)      History of Present Illness   Course During Hospital Stay:  Krys is a 70-year-old female who presents with sister, Clau, at office visit today for hospital discharge follow-up.  She was admitted to [facility] on August 31, 2020 with discharge date of September 4, 2020.  I have reviewed her hospital discharge paperwork and lab work with her at office visit today.  She was admitted to hospital due to having increasing abdominal pain.  She has had a chronic ventral hernia that we have been monitoring over the past few years.  It was recommended that she not have surgery due to her comorbid conditions unless it was an emergency.  Her abdominal pain had increased on August 31, 2020 and she proceeded to the emergency room.  There she was found to have colonics intussusception.  She was treated with antibiotics and IV fluids.  States when she had a regular bowel movement the pain lessened.  She was evaluated by Dr. Good, general surgery.  She is due for follow-up with her in the next few weeks.  Krys states her oxygen level was adjusted while in hospital.  They told her she was getting too much oxygen.  Currently today  using 5 L while in exam room.  States she usually uses 5 L when she is up moving around due to her O2 SATs dropping.  At rest sometimes she can do 3 L.  She is due a follow-up with her pulmonologist.  At office visit today.,  Krys states she is feeling slightly better.  She has been fatigued from her hospital stay though.  The abdominal pain has lessened but has still been there.  She has had chronic pain off and on dealing with her ventral hernia.  Denied any fevers, chills, nausea, vomiting, constipation, dark black tarry stools or current diarrhea.  States last week she ate some chicken from [employer] that was not thoroughly cooked.  Her and her nephew got sick.  States her bowel movements have been improving since this time.     The following portions of the patient's history were reviewed and updated as appropriate:   She  has a past medical history of Arthritis, Carotid arterial disease (CMS/[facility]), Chronic back pain, Closed fracture of left tibial plateau (7/30/2018), COPD (chronic obstructive pulmonary disease) (CMS/[facility]), Coronary artery disease, Crohn's disease (CMS/[facility]), DDD (degenerative disc disease), Depression, Diabetes mellitus (CMS/[facility]), Elevated cholesterol, Heartburn (2/21/2016), Hernia of abdominal wall, History of stroke (05/03/2017), Hyperlipidemia, Hypertension, Hypokalemia, Morbid obesity (CMS/[facility]), Obstructive pyelonephritis, On home oxygen therapy, Osteopenia (2/21/2016), PAF (paroxysmal atrial fibrillation) (CMS/[facility]), PVD (peripheral vascular disease) (CMS/[facility]), Radiculopathy, Right shoulder pain, Seizure (CMS/[facility]), Stroke (CMS/[facility]) (2017), and Subclavian artery stenosis (CMS/[facility]).  She does not have any pertinent problems on file.  She  has a past surgical history that includes Appendectomy (N/A); Wrist Arthroscopy (Right); Lung biopsy; pr thromboendartectmy neck,neck incis (Right, 3/14/2016); Cystoscopy w/ ureteral stent placement (Right, 3/18/2017); cystoscopy ureteroscopy laser lithotripsy  (Right, 4/17/2017); Bronchoscopy (N/A, 1/5/2018); Laparoscopic colon resection (N/A, 01/25/2016); Flexible sigmoidoscopy (N/A, 01/25/2016); Tibial Plateau Open Reduction Internal Fixation (Left, 05/09/2018); Colon surgery; Total shoulder arthroplasty w/ distal clavicle excision (Right, 12/30/2019); Colonoscopy; Esophagogastroduodenoscopy; Fracture surgery (2017); Joint replacement (12/30/2019); and Vascular surgery (2017).  Her family history includes Cancer in an other family member; Crohn's disease in her brother and maternal aunt; Diabetes in her mother; Other in her father; Stroke in her mother.  She  reports that she quit smoking about 7 years ago. Her smoking use included cigarettes. She has a 80.00 pack-year smoking history. She has never used smokeless tobacco. She reports that she does not drink alcohol or use drugs.  Current Outpatient Medications   Medication Sig Dispense Refill   • aspirin 81 MG EC tablet Take 81 mg by mouth Every Morning.     • Blood Glucose Monitoring Suppl (ONETOUCH VERIO) w/Device kit 1 kit 2 (Two) Times a Day. 1 kit 0   • fenofibrate (TRICOR) 145 MG tablet Take 1 tablet by mouth Daily. 90 tablet 1   • FLUoxetine (PROzac) 40 MG capsule Take 1 capsule by mouth once daily 30 capsule 6   • gabapentin (NEURONTIN) 300 MG capsule Take 1 capsule by mouth 2 (Two) Times a Day. 60 capsule 3   • glucose blood (ONETOUCH VERIO) test strip Use as instructed as to check blood sugars 2-3 times a day for type 2 diabetes 100 each 12   • guaiFENesin (MUCINEX) 600 MG 12 hr tablet Take 1,200 mg by mouth Every Morning.     • ipratropium-albuterol (DUO-NEB) 0.5-2.5 mg/3 ml nebulizer Take 3 mL by nebulization Every 4 (Four) Hours As Needed for Shortness of Air. 360 mL 1   • lamoTRIgine (LaMICtal) 150 MG tablet Take 150 mg by mouth 2 (Two) Times a Day.     • Lancets (ONETOUCH ULTRASOFT) lancets Used to check blood sugars twice a day as needed for type 2 diabetes monitoring 100 each 12   • lisinopril  (PRINIVIL,ZESTRIL) 40 MG tablet Take 0.5 tablets by mouth Daily. (Patient taking differently: Take 40 mg by mouth Daily.) 90 tablet 3   • metFORMIN (GLUCOPHAGE) 500 MG tablet TAKE 1 TABLET BY MOUTH TWICE DAILY WITH MEALS 180 tablet 0   • Multiple Vitamins-Minerals (CENTRUM SILVER PO) Take 1 tablet by mouth every morning.     • O2 (OXYGEN) Inhale 4 L/min Continuous. May resume prior 3L/min when O2 sat remains 88% or above on this setting. Patient was given an oximeter here.     • rosuvastatin (CRESTOR) 40 MG tablet Take 1 tablet by mouth once daily 90 tablet 0   • SYMBICORT 80-4.5 MCG/ACT inhaler Inhale 2 puffs 2 (Two) Times a Day.     • tiotropium (Spiriva HandiHaler) 18 MCG per inhalation capsule Place 1 capsule into inhaler and inhale Daily. 30 capsule 3   • VENTOLIN  (90 Base) MCG/ACT inhaler INHALE 2 PUFFS BY MOUTH EVERY 4 HOURS AS NEEDED 18 g 0   • verapamil SR (CALAN-SR) 240 MG CR tablet TAKE 1 TABLET BY MOUTH EVERY 12 HOURS 180 tablet 0     No current facility-administered medications for this visit.      Facility-Administered Medications Ordered in Other Visits   Medication Dose Route Frequency Provider Last Rate Last Dose   • acetaminophen (TYLENOL) 325 MG tablet  - ADS Override Pull              Current Outpatient Medications on File Prior to Visit   Medication Sig   • aspirin 81 MG EC tablet Take 81 mg by mouth Every Morning.   • Blood Glucose Monitoring Suppl (ONETOUCH VERIO) w/Device kit 1 kit 2 (Two) Times a Day.   • fenofibrate (TRICOR) 145 MG tablet Take 1 tablet by mouth Daily.   • FLUoxetine (PROzac) 40 MG capsule Take 1 capsule by mouth once daily   • gabapentin (NEURONTIN) 300 MG capsule Take 1 capsule by mouth 2 (Two) Times a Day.   • glucose blood (ONETOUCH VERIO) test strip Use as instructed as to check blood sugars 2-3 times a day for type 2 diabetes   • guaiFENesin (MUCINEX) 600 MG 12 hr tablet Take 1,200 mg by mouth Every Morning.   • ipratropium-albuterol (DUO-NEB) 0.5-2.5 mg/3 ml  nebulizer Take 3 mL by nebulization Every 4 (Four) Hours As Needed for Shortness of Air.   • lamoTRIgine (LaMICtal) 150 MG tablet Take 150 mg by mouth 2 (Two) Times a Day.   • Lancets (ONETOUCH ULTRASOFT) lancets Used to check blood sugars twice a day as needed for type 2 diabetes monitoring   • lisinopril (PRINIVIL,ZESTRIL) 40 MG tablet Take 0.5 tablets by mouth Daily. (Patient taking differently: Take 40 mg by mouth Daily.)   • metFORMIN (GLUCOPHAGE) 500 MG tablet TAKE 1 TABLET BY MOUTH TWICE DAILY WITH MEALS   • Multiple Vitamins-Minerals (CENTRUM SILVER PO) Take 1 tablet by mouth every morning.   • O2 (OXYGEN) Inhale 4 L/min Continuous. May resume prior 3L/min when O2 sat remains 88% or above on this setting. Patient was given an oximeter here.   • rosuvastatin (CRESTOR) 40 MG tablet Take 1 tablet by mouth once daily   • SYMBICORT 80-4.5 MCG/ACT inhaler Inhale 2 puffs 2 (Two) Times a Day.   • tiotropium (Spiriva HandiHaler) 18 MCG per inhalation capsule Place 1 capsule into inhaler and inhale Daily.   • VENTOLIN  (90 Base) MCG/ACT inhaler INHALE 2 PUFFS BY MOUTH EVERY 4 HOURS AS NEEDED   • verapamil SR (CALAN-SR) 240 MG CR tablet TAKE 1 TABLET BY MOUTH EVERY 12 HOURS     Current Facility-Administered Medications on File Prior to Visit   Medication   • acetaminophen (TYLENOL) 325 MG tablet  - ADS Override Pull     She is allergic to contrast dye; iodinated diagnostic agents; norco [hydrocodone-acetaminophen]; and tenoretic [atenolol-chlorthalidone]..    Review of Systems   Constitutional: Negative.  Negative for chills, fatigue and fever.   HENT: Negative.    Eyes: Negative.    Respiratory: Negative.  Negative for cough, chest tightness, shortness of breath and wheezing.    Cardiovascular: Negative.  Negative for chest pain, palpitations and leg swelling.   Gastrointestinal: Positive for abdominal pain. Negative for anal bleeding, blood in stool, constipation, diarrhea, nausea, rectal pain and vomiting.    Endocrine: Negative.    Genitourinary: Negative.    Musculoskeletal: Negative.    Skin: Negative.    Allergic/Immunologic: Negative.    Neurological: Negative.  Negative for dizziness, light-headedness and headaches.   Hematological: Negative.    Psychiatric/Behavioral: Negative.  Negative for sleep disturbance and suicidal ideas.   All other systems reviewed and are negative.      Objective   Physical Exam  Constitutional:       Appearance: Normal appearance. She is well-developed and well-groomed. She is morbidly obese.      Interventions: Face mask in place.      Comments: Sitting in wheelchair wearing nasal cannula using 5 L of oxygen.   HENT:      Head: Normocephalic and atraumatic.   Neck:      Musculoskeletal: Neck supple. No edema.      Vascular: Normal carotid pulses. No carotid bruit, hepatojugular reflux or JVD.      Trachea: Trachea and phonation normal.   Cardiovascular:      Rate and Rhythm: Normal rate and regular rhythm.      Heart sounds: Normal heart sounds, S1 normal and S2 normal. No murmur.   Pulmonary:      Effort: Pulmonary effort is normal.      Breath sounds: Normal breath sounds.   Abdominal:      General: Abdomen is protuberant. Bowel sounds are normal.      Palpations: Abdomen is soft. There is mass. There is no hepatomegaly.      Tenderness: There is no abdominal tenderness.       Skin:     General: Skin is warm and dry.      Capillary Refill: Capillary refill takes less than 2 seconds.   Neurological:      Mental Status: She is alert and oriented to person, place, and time.   Psychiatric:         Attention and Perception: Attention and perception normal.         Mood and Affect: Mood and affect normal.         Speech: Speech normal.         Behavior: Behavior normal. Behavior is cooperative.         Thought Content: Thought content normal.         Cognition and Memory: Cognition and memory normal.         Judgment: Judgment normal.        I was wearing surgical mask during the entire  office visit encounter.      Assessment/Plan   Mar was seen today for transitional care management.    Diagnoses and all orders for this visit:    Hospital discharge follow-up  -     Ambulatory Referral to General Surgery  -     Ambulatory Referral to Pulmonology    Intussusception (CMS/[facility])  -     Ambulatory Referral to General Surgery    Chronic obstructive pulmonary disease with acute exacerbation (CMS/[facility])  -     Ambulatory Referral to Pulmonology      1. New hospital discharge follow-up for Intussusception: I have reviewed her hospital discharge paperwork with her at office visit today.  I will make follow-up appointments with general surgeon, Dr. Good and pulmonologist, Dr. Hernadez.  Krys states she had an appointment with Dr. Hernadez while in hospital and had to cancel due to her hospitalization.  She will keep her appointment with Dr. Roberto Carlos Umaña, GI specialist, on Monday, September 21, 2020.  She voiced understanding.  2.  Chronic and stable COPD: We will place a referral to her pulmonologist.  She will continue using oxygen as directed by hospital discharge.  She may have to increase due to her O2 saturation levels.  She is monitoring this at home.

## 2020-09-21 ENCOUNTER — OFFICE VISIT (OUTPATIENT)
Dept: GASTROENTEROLOGY | Facility: CLINIC | Age: 70
End: 2020-09-21

## 2020-09-21 VITALS — BODY MASS INDEX: 39.5 KG/M2 | WEIGHT: 231.4 LBS | TEMPERATURE: 97.9 F | HEIGHT: 64 IN

## 2020-09-21 DIAGNOSIS — Z11.59 ENCOUNTER FOR SCREENING FOR OTHER VIRAL DISEASES: ICD-10-CM

## 2020-09-21 DIAGNOSIS — I10 ESSENTIAL (PRIMARY) HYPERTENSION: ICD-10-CM

## 2020-09-21 DIAGNOSIS — K50.10 CROHN'S DISEASE OF COLON WITHOUT COMPLICATION (HCC): Primary | ICD-10-CM

## 2020-09-21 PROCEDURE — 99214 OFFICE O/P EST MOD 30 MIN: CPT | Performed by: INTERNAL MEDICINE

## 2020-09-21 NOTE — PROGRESS NOTES
PATIENT INFORMATION  Mar Camilo       - 1950    CHIEF COMPLAINT  Chief Complaint   Patient presents with   • Follow-up     hospital follow up on Crohn's       HISTORY OF PRESENT ILLNESS  Bowels are good and daily but no issues recently.     So she was just in the hosp at [facility] for Bowel obstruction in the face of a large ventral hernia and conservative measures were successful and saw Dr Good     And abigail elliott here for her Crohns colitis and is on remicade  spo jeed her surveillance labs      PERTINENT LABS  Lab Results   Component Value Date    HGB 10.7 (L) 2020    MCV 88.8 2020     2020    ALT 19 2020    AST 13 2020    HGBA1C 6.60 (H) 2020    INR 1.00 2020    TRIG 223 (H) 2020        REVIEW OF SYSTEMS  Review of Systems   Gastrointestinal: Positive for abdominal pain.   All other systems reviewed and are negative.        ACTIVE PROBLEMS  Patient Active Problem List    Diagnosis   • Intussusception (CMS/[facility]) [K56.1]   • Crohn's disease (CMS/HCC) [K50.90]   • CAD (coronary artery disease) [I25.10]   • Hyperkalemia [E87.5]   • Urinary tract infection with hematuria [N39.0, R31.9]   • Abnormal urine odor [R82.90]   • Hematuria [R31.9]   • Long-term use of high-risk medication [Z79.899]   • Acute maxillary sinusitis [J01.00]   • COPD exacerbation (CMS/[facility]) [J44.1]   • Dyspnea on exertion [R06.00]   • Pain in both lower extremities [M79.604, M79.605]   • Recent major surgery [Z98.890]   • Status post reverse total arthroplasty of right shoulder, DOS 2019 [Z96.611]   • Neuropathy [G62.9]   • Foot swelling [M79.89]   • H/O carotid stenosis [Z86.79]   • Cerebrovascular accident (CVA) (CMS/[facility]) [I63.9]   • Chronic pain syndrome [G89.4]   • Unsteady gait [R26.81]   • High risk medication use [Z79.899]   • PAD (peripheral artery disease) (CMS/HCC) [I73.9]   • PAF (paroxysmal atrial fibrillation) (CMS/HCC) [I48.0]   • Ventral hernia without  obstruction or gangrene [K43.9]   • Regional colitis (CMS/[facility]) [K50.10]   • Need for pneumococcal vaccine [Z23]   • Pneumonia of left lower lobe due to infectious organism [J18.9]   • Tendinopathy of rotator cuff [M67.919]   • Primary osteoarthritis, left shoulder [M19.012]   • History of CVA (cerebrovascular accident) [Z86.73]   • Seizure (CMS/[facility]) [R56.9]   • Acute cystitis without hematuria [N30.00]   • Chronic right hip pain [M25.551, G89.29]   • Chronic pain of both shoulders [M25.511, G89.29, M25.512]   • Need for immunization against influenza [Z23]   • Hospital discharge follow-up [Z09]   • Chronic respiratory failure with hypoxia (CMS/HCC) [J96.11]   • Asymptomatic stenosis of right carotid artery [I65.21]   • Anemia [D64.9]   • Obstructive pyelonephritis [N11.1]   • Chronic allergic conjunctivitis [H10.45]   • Chronic back pain [M54.9, G89.29]   • Chronic bronchitis (CMS/[facility]) [J42]   • Non-specific colitis [K52.9]   • COPD (chronic obstructive pulmonary disease) (CMS/HCC) [J44.9]   • Degeneration of intervertebral disc of lumbar region [M51.36]   • Depression [F32.9]   • Heartburn [R12]   • Herpes labialis [B00.1]   • Mixed hyperlipidemia [E78.2]   • Essential hypertension [I10]   • Spinal stenosis of lumbar region [M48.061]   • Osteopenia [M85.80]   • Lumbar radiculopathy [M54.16]   • Type 2 diabetes mellitus with complication, without long-term current use of insulin (CMS/[facility]) [E11.8]   • Class 3 severe obesity in adult (CMS/[facility]) [E66.01]   • Osteoarthritis of lumbar spine [M47.816]         PAST MEDICAL HISTORY  Past Medical History:   Diagnosis Date   • Arthritis    • Carotid arterial disease (CMS/[facility])     US 2015 with 80-99% right and 16-49% left, but CTA with 60-70% right, not a surgical candidate   • Chronic back pain    • Closed fracture of left tibial plateau 7/30/2018   • COPD (chronic obstructive pulmonary disease) (CMS/HCC)    • Coronary artery disease    • Crohn's disease (CMS/HCC)     Remicade  on hold d/t antibiotics   • DDD (degenerative disc disease)    • Depression    • Diabetes mellitus (CMS/HCC)     Type II   • Elevated cholesterol    • Heartburn 2/21/2016   • Hernia of abdominal wall    • History of stroke 05/03/2017    left parietal   • Hyperlipidemia    • Hypertension    • Hypokalemia    • Morbid obesity (CMS/HCC)    • Obstructive pyelonephritis    • On home oxygen therapy     2L UNLESS STRESSED THEN 2.5-3 L   • Osteopenia 2/21/2016   • PAF (paroxysmal atrial fibrillation) (CMS/HCC)    • PVD (peripheral vascular disease) (CMS/HCC)     RT CAROTID STENOSIS   • Radiculopathy     NECK PAIN   • Right shoulder pain     scheduled for sx   • Seizure (CMS/HCC)     with stroke   • Stroke (CMS/HCC) 2017    memory,    • Subclavian artery stenosis (CMS/HCC)     and axillary artery stenosis, on the left         SURGICAL HISTORY  Past Surgical History:   Procedure Laterality Date   • APPENDECTOMY N/A    • BRONCHOSCOPY N/A 1/5/2018    Procedure: BRONCHOSCOPY;  Surgeon: Gustavo Muniz MD;  Location: [facility] OR;  Service:    • COLON SURGERY     • COLONOSCOPY     • CYSTOSCOPY URETEROSCOPY LASER LITHOTRIPSY Right 4/17/2017    Procedure: CYSTOSCOPY with  right stent removal, right URETEROSCOPY LASER LITHOTRIPSY,  with STONE BASKET EXTRACTION;  Surgeon: Dipesh Bean MD;  Location: [facility] OR;  Service:    • CYSTOSCOPY W/ URETERAL STENT PLACEMENT Right 3/18/2017    Procedure: CYSTOSCOPY URETERAL CATHETER/STENT INSERTION;  Surgeon: Dipesh Bean MD;  Location: [facility] OR;  Service:    • ENDOSCOPY     • FLEXIBLE SIGMOIDOSCOPY N/A 01/25/2016    Dr. Luis Rosas   • FRACTURE SURGERY  2017    broken femur, left   • JOINT REPLACEMENT  12/30/2019    right shoulder replacement   • LAPAROSCOPIC COLON RESECTION N/A 01/25/2016    Laparoscopic mobilization of splenic flexure, transverse colectomy with primary anastomosis-Dr. Christopher Richardson   • LUNG BIOPSY      NEGATIVE   • [place] THROMBOENDARTECTMY NECK,NECK INCIS Right  3/14/2016    Procedure: RT CAROTID ENDARTERECTOMY;  Surgeon: Federico Schuler MD;  Location: [facility] OR;  Service: Vascular   • TIBIAL PLATEAU OPEN REDUCTION INTERNAL FIXATION Left 2018    Open reduction internal fixation of left tibial plateau fracture-Dr. Ayden Cárdenas   • TOTAL SHOULDER ARTHROPLASTY W/ DISTAL CLAVICLE EXCISION Right 2019    Procedure: TOTAL SHOULDER REVERSE ARTHROPLASTY, open  biceps tenodesis;  Surgeon: Altaf Reyes MD;  Location: [facility] OR;  Service: Orthopedics   • VASCULAR SURGERY  2017    caroid artery   • WRIST ARTHROSCOPY Right     WITH RELEASE OF TRANSVERSE CARPAL LIGAMENT         FAMILY HISTORY  Family History   Problem Relation Age of Onset   • Diabetes Mother    • Stroke Mother    • Other Father         RESPIRATORY FAILURE   • Cancer Other         lung cancer   • Crohn's disease Brother    • Crohn's disease Maternal Aunt          SOCIAL HISTORY  Social History     Occupational History   • Not on file   Tobacco Use   • Smoking status: Former Smoker     Packs/day: 2.00     Years: 40.00     Pack years: 80.00     Types: Cigarettes     Quit date: 2013     Years since quittin.4   • Smokeless tobacco: Never Used   • Tobacco comment: 1 cup coffee daily   Substance and Sexual Activity   • Alcohol use: No     Comment: history of heavy drinker    • Drug use: No   • Sexual activity: Defer         CURRENT MEDICATIONS    Current Outpatient Medications:   •  aspirin 81 MG EC tablet, Take 81 mg by mouth Every Morning., Disp: , Rfl:   •  Blood Glucose Monitoring Suppl (ONETOUCH VERIO) w/Device kit, 1 kit 2 (Two) Times a Day., Disp: 1 kit, Rfl: 0  •  fenofibrate (TRICOR) 145 MG tablet, Take 1 tablet by mouth Daily., Disp: 90 tablet, Rfl: 1  •  FLUoxetine (PROzac) 40 MG capsule, Take 1 capsule by mouth once daily, Disp: 30 capsule, Rfl: 6  •  gabapentin (NEURONTIN) 300 MG capsule, Take 1 capsule by mouth 2 (Two) Times a Day., Disp: 60 capsule, Rfl: 3  •  glucose blood  (ONETOUCH VERIO) test strip, Use as instructed as to check blood sugars 2-3 times a day for type 2 diabetes, Disp: 100 each, Rfl: 12  •  guaiFENesin (MUCINEX) 600 MG 12 hr tablet, Take 1,200 mg by mouth Every Morning., Disp: , Rfl:   •  ipratropium-albuterol (DUO-NEB) 0.5-2.5 mg/3 ml nebulizer, Take 3 mL by nebulization Every 4 (Four) Hours As Needed for Shortness of Air., Disp: 360 mL, Rfl: 1  •  lamoTRIgine (LaMICtal) 150 MG tablet, Take 150 mg by mouth 2 (Two) Times a Day., Disp: , Rfl:   •  Lancets (ONETOUCH ULTRASOFT) lancets, Used to check blood sugars twice a day as needed for type 2 diabetes monitoring, Disp: 100 each, Rfl: 12  •  lisinopril (PRINIVIL,ZESTRIL) 40 MG tablet, Take 0.5 tablets by mouth Daily. (Patient taking differently: Take 40 mg by mouth Daily.), Disp: 90 tablet, Rfl: 3  •  metFORMIN (GLUCOPHAGE) 500 MG tablet, TAKE 1 TABLET BY MOUTH TWICE DAILY WITH MEALS, Disp: 180 tablet, Rfl: 0  •  Multiple Vitamins-Minerals (CENTRUM SILVER PO), Take 1 tablet by mouth every morning., Disp: , Rfl:   •  O2 (OXYGEN), Inhale 4 L/min Continuous. May resume prior 3L/min when O2 sat remains 88% or above on this setting. Patient was given an oximeter here., Disp: , Rfl:   •  rosuvastatin (CRESTOR) 40 MG tablet, Take 1 tablet by mouth once daily, Disp: 90 tablet, Rfl: 0  •  SYMBICORT 80-4.5 MCG/ACT inhaler, Inhale 2 puffs 2 (Two) Times a Day., Disp: , Rfl:   •  tiotropium (Spiriva HandiHaler) 18 MCG per inhalation capsule, Place 1 capsule into inhaler and inhale Daily., Disp: 30 capsule, Rfl: 3  •  VENTOLIN  (90 Base) MCG/ACT inhaler, INHALE 2 PUFFS BY MOUTH EVERY 4 HOURS AS NEEDED, Disp: 18 g, Rfl: 0  •  verapamil SR (CALAN-SR) 240 MG CR tablet, TAKE 1 TABLET BY MOUTH EVERY 12 HOURS, Disp: 180 tablet, Rfl: 0  No current facility-administered medications for this visit.     Facility-Administered Medications Ordered in Other Visits:   •  acetaminophen (TYLENOL) 325 MG tablet  - ADS Override Pull, , , ,  "    ALLERGIES  Contrast dye, Iodinated diagnostic agents, Norco [hydrocodone-acetaminophen], and Tenoretic [atenolol-chlorthalidone]    VITALS  Vitals:    09/21/20 1359   Temp: 97.9 °F (36.6 °C)   TempSrc: Temporal   Weight: 105 kg (231 lb 6.4 oz)   Height: 162.6 cm (64.02\")       LAST RESULTS   No results displayed because visit has over 200 results.        Ct Abdomen Pelvis Without Contrast    Result Date: 9/2/2020  Narrative: CT ABDOMEN AND PELVIS WITHOUT IV CONTRAST  HISTORY: 70-year-old female with low-grade bowel obstruction. Large ventral hernia. Follow-up.  TECHNIQUE: Radiation dose reduction techniques were utilized, including automated exposure control and exposure modulation based on body size. 3 mm images were obtained through the abdomen and pelvis without the administration of IV contrast. Compared with previous CT from 08/31/2020 and 11/19/2015.  FINDINGS: Within the large left ventral hernia, there are long segments of small bowel as well as the distal transverse and descending colon. The neck of the hernia measures approximately 7.8 cm. At the distal transverse colon, there appears to be a giant diverticulum filled with a large volume of formed stool. There is no dilatation of the colon proximally, the descending colon within the hernia sac is nearly collapsed and the sigmoid colon distally is normal in caliber. There is no abnormally dilated small bowel. There is an NG tube within a collapsed stomach. There is no free fluid or lymphadenopathy. The liver, gallbladder, spleen, pancreas, adrenals, and kidneys appear unremarkable. The uterus is diminutive. Adnexa appear unremarkable. There are extensive abdominal aortic atherosclerotic changes without aneurysmal dilatation. There are no acute appearing airspace opacities at the visualized lower lung fields. There are stable-appearing reticular nodular opacities at both lung bases and there is coarsening of the interstitium and some bronchiolectasis at " the left lower lobe. There is volume loss at the left lower lobe and this may be positional.      Impression: 1. The distal transverse colon is within the large left ventral hernia and has a giant diverticulum filled with stool and possibly a bezoar. There are also long segments of small bowel within the hernia. There is no small or large bowel obstruction. 2. Stable reticular nodular opacities at both lung bases. These may be chronic, but were not present on a CT of the abdomen from 2012. The appearance can be seen with acute bronchiolitis or sequela of bronchiolitis. Please correlate clinically.  This report was finalized on 9/2/2020 6:20 PM by Dr. Mallorie Baker M.D.      Ct Abdomen Pelvis With Contrast    Result Date: 9/1/2020  Narrative: CT ABDOMEN AND PELVIS WITH IV CONTRAST  HISTORY: 70-year-old with large hernia. Evaluate for incarcerated bowel.  TECHNIQUE: CT abdomen and pelvis with intravenous contrast.  COMPARISON: CT and pelvis with IV contrast 11/19/2015.  FINDINGS: There is a large ventral central to left abdominal wall hernia that contains large and small bowel. The hernia measures approximately 24 cm transverse by 13 cm AP and extends 20 cm in height. Hernia neck measures 8 x 9.5 cm. Within the anterior, right aspect of the hernia there is a loop of redundant right colon that is abnormally distended with what appears to represent stool and there are curvilinear rings of fat density within this segment of colon suspected to represent a focal intussusception within the hernia. The more proximal ascending colon and cecum is fluid-filled without significant distention. There is no distention of the terminal ileum which also extends partially into the hernia.  Severe basilar pulmonary emphysema is present associated with interstitial disease. There is mild gastric distention with fluid. The liver, gallbladder, spleen, adrenal glands, pancreas appear within normal limits. Kidneys appear normal and there is no  "hydronephrosis. There is a levoscoliotic curvature of the lumbar spine associated with multilevel degenerative disc disease.      Impression: 1.  Large central to left ventral hernia contains large and small bowel. Within the anterior, right aspect of the hernia there is a dilated colonic loop which contains internal concentric rings of fat density and stool and this suggests a localized intussusception.  This finding is suspected to be the cause of patient's symptoms and raises the possibility of a lead point for which further evaluation recommended. 2. Severe basilar pulmonary emphysema.  Findings were discussed with DALE Suárez in the emergency department on 08/31/2020 at 11 PM.  Radiation dose reduction techniques were utilized, including automated exposure control and exposure modulation based on body size.  This report was finalized on 9/1/2020 12:42 AM by Dr. Ryan Gudino M.D.        PHYSICAL EXAM  Debilities/Disabilities Identified: None  Emotional Behavior: Appropriate  Wt Readings from Last 3 Encounters:   09/21/20 105 kg (231 lb 6.4 oz)   09/18/20 106 kg (234 lb)   09/01/20 116 kg (255 lb)     Ht Readings from Last 1 Encounters:   09/21/20 162.6 cm (64.02\")     Body mass index is 39.7 kg/m².  Physical Exam  Constitutional:       Appearance: She is well-developed.   HENT:      Head: Normocephalic and atraumatic.   Eyes:      General: No scleral icterus.     Pupils: Pupils are equal, round, and reactive to light.   Neck:      Musculoskeletal: Normal range of motion and neck supple.      Thyroid: No thyromegaly.   Cardiovascular:      Rate and Rhythm: Normal rate and regular rhythm.      Heart sounds: Normal heart sounds. No murmur. No gallop.    Pulmonary:      Effort: Pulmonary effort is normal.      Breath sounds: Normal breath sounds. No wheezing or rales.   Abdominal:      General: Bowel sounds are normal. There is no distension or abdominal bruit.      Palpations: Abdomen is soft. Abdomen " is not rigid. There is no shifting dullness, fluid wave, hepatomegaly, splenomegaly, mass or pulsatile mass.      Tenderness: There is no abdominal tenderness. There is no guarding or rebound. Negative signs include Victor's sign.      Hernia: A hernia is present. Hernia is present in the ventral area.      Comments: Large not reducible   Musculoskeletal: Normal range of motion.   Lymphadenopathy:      Cervical: No cervical adenopathy.   Skin:     General: Skin is warm and dry.      Findings: No erythema or rash.   Neurological:      Mental Status: She is alert and oriented to person, place, and time.         CLINICAL DATA REVIEWED   reviewed previous lab results and integrated with today's visit, reviewed notes from other physicians and/or last GI encounter, reviewed previous endoscopy results and available photos, reviewed surgical pathology results from previous biopsies    ASSESSMENT  Diagnoses and all orders for this visit:    Crohn's disease of colon without complication (CMS/HCC)  -     HBV Core Antibody, IgG / IgM Diff; Future  -     QuantiFERON TB Gold; Future  -     Prometheus Monitr Crohn's Disease; Future    Encounter for screening for other viral diseases   -     HBV Core Antibody, IgG / IgM Diff; Future    Essential (primary) hypertension   -     Prometheus Monitr Crohn's Disease; Future          PLAN  Will check labs but cant recommend surgery at this time.    Return in about 6 months (around 3/21/2021).    I have discussed the above plan with the patient.  They verbalize understanding and are in agreement with the plan.  They have been advised to contact the office for any questions, concerns, or changes related to their health.

## 2020-09-23 ENCOUNTER — LAB (OUTPATIENT)
Dept: LAB | Facility: HOSPITAL | Age: 70
End: 2020-09-23

## 2020-09-23 ENCOUNTER — READMISSION MANAGEMENT (OUTPATIENT)
Dept: CALL CENTER | Facility: HOSPITAL | Age: 70
End: 2020-09-23

## 2020-09-23 DIAGNOSIS — K50.10 CROHN'S DISEASE OF COLON WITHOUT COMPLICATION (HCC): ICD-10-CM

## 2020-09-23 DIAGNOSIS — Z11.59 ENCOUNTER FOR SCREENING FOR OTHER VIRAL DISEASES: ICD-10-CM

## 2020-09-23 DIAGNOSIS — I10 ESSENTIAL (PRIMARY) HYPERTENSION: ICD-10-CM

## 2020-09-23 PROCEDURE — 86480 TB TEST CELL IMMUN MEASURE: CPT

## 2020-09-23 PROCEDURE — 83520 IMMUNOASSAY QUANT NOS NONAB: CPT

## 2020-09-23 PROCEDURE — 36415 COLL VENOUS BLD VENIPUNCTURE: CPT

## 2020-09-23 PROCEDURE — 86141 C-REACTIVE PROTEIN HS: CPT

## 2020-09-23 PROCEDURE — 86704 HEP B CORE ANTIBODY TOTAL: CPT

## 2020-09-23 PROCEDURE — 86705 HEP B CORE ANTIBODY IGM: CPT

## 2020-09-23 NOTE — OUTREACH NOTE
Medical Week 3 Survey      Responses   [facility] patient discharged from?  [place]   COVID-19 Test Status  Negative   Does the patient have one of the following disease processes/diagnoses(primary or secondary)?  Other   Week 3 attempt successful?  Yes   Call start time  1301   Call end time  1304   Discharge diagnosis  abd pain, incarcerated ventral hernia with colonic intussuseption - treated medically   Meds reviewed with patient/caregiver?  Yes   Is the patient taking all medications as directed (includes completed medication regime)?  Yes   Has the patient kept scheduled appointments due by today?  Yes   Has home health visited the patient within 72 hours of discharge?  N/A   Pulse Ox monitoring  Intermittent   Pulse Ox device source  Patient   O2 Sat comments  92% on 4 L   Psychosocial issues?  No   What is the patient's perception of their health status since discharge?  Improving   Is the patient/caregiver able to teach back signs and symptoms related to disease process for when to call PCP?  Yes   Is the patient/caregiver able to teach back signs and symptoms related to disease process for when to call 911?  Yes   Is the patient/caregiver able to teach back the hierarchy of who to call/visit for symptoms/problems? PCP, Specialist, Home health nurse, Urgent Care, ED, 911  Yes   Additional teach back comments  Eating better and drinking plenty of fluids   Week 3 Call Completed?  Yes          Kalee Best RN

## 2020-09-24 LAB
HBV CORE AB SERPL QL IA: NEGATIVE
HBV CORE IGM SERPL QL IA: NEGATIVE

## 2020-09-25 LAB
GAMMA INTERFERON BACKGROUND BLD IA-ACNC: 0.03 IU/ML
M TB IFN-G BLD-IMP: NEGATIVE
M TB IFN-G CD4+ BCKGRND COR BLD-ACNC: 0.02 IU/ML
M TB IFN-G CD4+CD8+ BCKGRND COR BLD-ACNC: 0.02 IU/ML
MITOGEN IGNF BLD-ACNC: 0.93 IU/ML
QUANTIFERON INCUBATION: NORMAL
SERVICE CMNT-IMP: NORMAL

## 2020-09-30 ENCOUNTER — LAB (OUTPATIENT)
Dept: LAB | Facility: HOSPITAL | Age: 70
End: 2020-09-30

## 2020-09-30 ENCOUNTER — READMISSION MANAGEMENT (OUTPATIENT)
Dept: CALL CENTER | Facility: HOSPITAL | Age: 70
End: 2020-09-30

## 2020-09-30 ENCOUNTER — TRANSCRIBE ORDERS (OUTPATIENT)
Dept: ADMINISTRATIVE | Facility: HOSPITAL | Age: 70
End: 2020-09-30

## 2020-09-30 DIAGNOSIS — R53.83 OTHER FATIGUE: ICD-10-CM

## 2020-09-30 DIAGNOSIS — F43.0: ICD-10-CM

## 2020-09-30 DIAGNOSIS — D64.9 ANEMIA, UNSPECIFIED TYPE: ICD-10-CM

## 2020-09-30 DIAGNOSIS — D64.9 ANEMIA, UNSPECIFIED TYPE: Primary | ICD-10-CM

## 2020-09-30 LAB
25(OH)D3 SERPL-MCNC: 41.1 NG/ML (ref 30–100)
HCYS SERPL-MCNC: 9.1 UMOL/L (ref 0–15)
IRON 24H UR-MRATE: 63 MCG/DL (ref 37–145)
VIT B12 BLD-MCNC: 571 PG/ML (ref 211–946)

## 2020-09-30 PROCEDURE — 82306 VITAMIN D 25 HYDROXY: CPT

## 2020-09-30 PROCEDURE — 36415 COLL VENOUS BLD VENIPUNCTURE: CPT

## 2020-09-30 PROCEDURE — 83540 ASSAY OF IRON: CPT

## 2020-09-30 PROCEDURE — 83921 ORGANIC ACID SINGLE QUANT: CPT

## 2020-09-30 PROCEDURE — 83090 ASSAY OF HOMOCYSTEINE: CPT

## 2020-09-30 PROCEDURE — 82607 VITAMIN B-12: CPT

## 2020-09-30 NOTE — OUTREACH NOTE
Medical Week 4 Survey      Responses   [facility] patient discharged from?  [place]   COVID-19 Test Status  Negative   Does the patient have one of the following disease processes/diagnoses(primary or secondary)?  Other   Week 4 attempt successful?  No          Mary Suazo RN

## 2020-10-02 ENCOUNTER — EPISODE CHANGES (OUTPATIENT)
Dept: CASE MANAGEMENT | Facility: OTHER | Age: 70
End: 2020-10-02

## 2020-10-03 DIAGNOSIS — K50.10 CROHN'S DISEASE OF COLON WITHOUT COMPLICATION (HCC): Primary | ICD-10-CM

## 2020-10-03 DIAGNOSIS — J96.11 CHRONIC RESPIRATORY FAILURE WITH HYPOXIA (HCC): ICD-10-CM

## 2020-10-03 DIAGNOSIS — R56.9 SEIZURE (HCC): ICD-10-CM

## 2020-10-03 DIAGNOSIS — I48.0 PAF (PAROXYSMAL ATRIAL FIBRILLATION) (HCC): ICD-10-CM

## 2020-10-03 DIAGNOSIS — I73.9 PAD (PERIPHERAL ARTERY DISEASE) (HCC): ICD-10-CM

## 2020-10-04 LAB
Lab: NORMAL
METHYLMALONATE SERPL-SCNC: 170 NMOL/L (ref 0–378)

## 2020-10-05 ENCOUNTER — TELEPHONE (OUTPATIENT)
Dept: FAMILY MEDICINE CLINIC | Facility: CLINIC | Age: 70
End: 2020-10-05

## 2020-10-07 DIAGNOSIS — E78.2 MIXED HYPERLIPIDEMIA: ICD-10-CM

## 2020-10-07 DIAGNOSIS — E78.1 HYPERTRIGLYCERIDEMIA: ICD-10-CM

## 2020-10-07 RX ORDER — ROSUVASTATIN CALCIUM 40 MG/1
40 TABLET, COATED ORAL DAILY
Qty: 90 TABLET | Refills: 0 | OUTPATIENT
Start: 2020-10-07

## 2020-10-07 NOTE — TELEPHONE ENCOUNTER
Patient stated that pharmacy is needing a new Rx for rosuvastatin (CRESTOR) 40 MG tablet. Patient stated she is out and has been out for several days and cannot get a refill until Dr signs off     Please advise     [phone]     [facility] - [place], [place] - [address] - [phone]  - [phone] FX

## 2020-10-15 ENCOUNTER — PATIENT OUTREACH (OUTPATIENT)
Dept: CASE MANAGEMENT | Facility: OTHER | Age: 70
End: 2020-10-15

## 2020-10-15 NOTE — OUTREACH NOTE
Care Coordination Assessment    Documented/Reviewed By: Laquita Carbajal RN Date/time: 10/15/2020 11:14 AM   Assessment completed with: patient  Enrolled in care management program: Yes  Living arrangement: family members (Comment: Lives with her sister and nephew)  Support system: family  Type of residence: private residence  Home care services: No  Equipment used at home: cane, walker, oxygen/respiratory treatment (Comment: O2, nebulizer, glucometer)  Communication device: Yes  Bed or wheelchair confined: No  Inadequate nutrition: No  Medication adherence problem: No  Experiencing side effects from current medications: No  History of fall(s) in last 6 months: No  Difficulty keeping appointments: No  Family aware of the patient's advance care planning wishes: Yes  Chronic pain: Yes  Location of chronic pain: Back  Chronic pain timing: intermittent

## 2020-10-15 NOTE — OUTREACH NOTE
Care Plan Note      Responses   Lifestyle Goals  Avoid respiratory irritants, Decrease falls risk, Eat a healthy diet, Exercise a number of times per week, Fewer exacerbations, Increase physical activity, Less pain, Less sadness/anxiety, Lose weight, Lower blood sugar, Maintain blood pressure < 130/80, Medication management, Plan meals, Reduce blood pressure, Routine follow-up with doctor(s), Routine foot care, Self monitor blood pressure, Self monitor blood sugar   Barriers  Disease education, Pain, Side effects of medication   Self Management  Check Weight Daily, Dietary Changes - Eat More Fruits/Vegetables, Get flu/pneumonia shot, Home BP Monitoring, Home Glucose Monitoring, Increase Physical Activities, Medication Adherence, Use oxygen, Weight Monitoring   Annual Wellness Visit:   Patient Refuses at This Time [states she does not do AWV because she has so many problems she see the physicians routinely]   AWV Materials  Send Materials   Care Gaps Addressed  Diabetic Eye Exam, Flu Shot, Pneumonia Vaccine   Diabetic Eye Exam Status  Patient will Complete   Flu Shot Status  Patient will Complete   Pneumonia Vaccine Status  Patient will Complete   Specific Disease Process Teaching  COPD, Diabetes, Hypertension   Does patient have depression diagnosis?  Yes   Advanced Directives:  Send Materials   Ed Visits past 12 months:  3 or more   Hospitalizations past 12 months  3 or more   Discharged From:  [facility]   Discharged to:  home   Admit Date:  08/31/20   Discharge Date:  09/04/20   Discharge destination:  Home   Medication Adherence  Medications understood   Goal Progress  Making Progress Toward Goal(s)   Health Literacy  Moderate        The main concerns and/or symptoms the patient would like to address are:need for health care gaps, increase activity    Education/instruction provided by Care Coordinator: Call to pt. Introduced self and role of ACM. Pt states she is feeling better than she has in a long  time. Her breathing is doing well. She is on 4L and up to 6 with activity. Discussed need to keep as active as possible to avoid pneumonia, DVT's and pressure ulcers. Suggested to pt she stand up during each commercial or on the hour. She routinely ambulates to BR encouraged pt on her best days to increase this distance each time. Pt verbalized understanding. Pt states she goes to her Dr to get her Crohn's injection on the 19th. She is mindful of pandemic precautions and healthy at home. Reinforced masks or visors if able, as well as frequent use of frequent hand sanitizers.   Pt's last A1C was 6.1. Pt states she is doing well with her diabetes. She has a book recommended to her by her MD, Chyna Vasquez, that has made a bid difference for her. Pt has not had her eye exam due to pandemic and is afraid to go see her eye Dr at this time. Pt does plan on getting her flu shot at her next apt with PCP. She does not get AWV's due to her frequency of MD apts. She does not feel it is necessary. Pt is also due for a pneumonia vaccine and will discuss with her PCP. No other questions or concerns at this time. Pt states she enjoyed her phone call and would like monthly care advising. Will follow    Follow Up Outreach Due: monthly    Laquita Carbajal RN  Ambulatory     10/15/2020, 11:21 EDT

## 2020-10-16 DIAGNOSIS — E11.8 TYPE 2 DIABETES MELLITUS WITH COMPLICATION, WITHOUT LONG-TERM CURRENT USE OF INSULIN (HCC): ICD-10-CM

## 2020-10-16 DIAGNOSIS — E78.1 HYPERTRIGLYCERIDEMIA: ICD-10-CM

## 2020-10-16 DIAGNOSIS — E78.2 MIXED HYPERLIPIDEMIA: ICD-10-CM

## 2020-10-16 NOTE — PATIENT INSTRUCTIONS
Call Dr. Roberto Carlos Umaña, Gastroenterology at [phone] Infliximab injection  What is this medicine?  INFLIXIMAB (in FLIX i mab) is used to treat Crohn's disease and ulcerative colitis. It is also used to treat ankylosing spondylitis, plaque psoriasis, and some forms of arthritis.  This medicine may be used for other purposes; ask your health care provider or pharmacist if you have questions.  COMMON BRAND NAME(S): AVSOLA, INFLECTRA, Remicade, RENFLEXIS  What should I tell my health care provider before I take this medicine?  They need to know if you have any of these conditions:  · cancer  · current or past resident of [place]  · diabetes  · exposure to tuberculosis  · Guillain-[place] syndrome  · heart failure  · hepatitis or liver disease  · immune system problems  · infection  · lung or breathing disease, like COPD  · multiple sclerosis  · receiving phototherapy for the skin  · seizure disorder  · an unusual or allergic reaction to infliximab, mouse proteins, other medicines, foods, dyes, or preservatives  · pregnant or trying to get pregnant  · breast-feeding  How should I use this medicine?  This medicine is for injection into a vein. It is usually given by a health care professional in a hospital or clinic setting.  A special MedGuide will be given to you by the pharmacist with each prescription and refill. Be sure to read this information carefully each time.  Talk to your pediatrician regarding the use of this medicine in children. While this drug may be prescribed for children as young as 6 years of age for selected conditions, precautions do apply.  Overdosage: If you think you have taken too much of this medicine contact a poison control center or emergency room at once.  NOTE: This medicine is only for you. Do not share this medicine with others.  What if I miss a dose?  It is important not to miss your dose. Call your doctor or health care professional if you are  unable to keep an appointment.  What may interact with this medicine?  Do not take this medicine with any of the following medications:  · biologic medicines such as abatacept, adalimumab, anakinra, certolizumab, etanercept, golimumab, rituximab, secukinumab, tocilizumab, tofactinib, ustekinumab  · live vaccines  This list may not describe all possible interactions. Give your health care provider a list of all the medicines, herbs, non-prescription drugs, or dietary supplements you use. Also tell them if you smoke, drink alcohol, or use illegal drugs. Some items may interact with your medicine.  What should I watch for while using this medicine?  Your condition will be monitored carefully while you are receiving this medicine. Visit your doctor or health care professional for regular checks on your progress. You may need blood work done while you are taking this medicine. Before beginning therapy, your doctor may do a test to see if you have been exposed to tuberculosis.  Call your doctor or health care professional for advice if you get a fever, chills or sore throat, or other symptoms of a cold or flu. Do not treat yourself. This drug decreases your body's ability to fight infections. Try to avoid being around people who are sick.  This medicine may make the symptoms of heart failure worse in some patients. If you notice symptoms such as increased shortness of breath or swelling of the ankles or legs, contact your health care provider right away.  If you are going to have surgery or dental work, tell your health care professional or dentist that you have received this medicine.  If you take this medicine for plaque psoriasis, stay out of the sun. If you cannot avoid being in the sun, wear protective clothing and use sunscreen. Do not use sun lamps or tanning beds/booths.  Talk to your doctor about your risk of cancer. You may be more at risk for certain types of cancers if you take this medicine.  What side  effects may I notice from receiving this medicine?  Side effects that you should report to your doctor or health care professional as soon as possible:  · allergic reactions like skin rash, itching or hives, swelling of the face, lips, or tongue  · breathing problems  · changes in vision  · chest pain  · fever or chills, usually related to the infusion  · joint pain  · pain, tingling, numbness in the hands or feet  · redness, blistering, peeling or loosening of the skin, including inside the mouth  · seizures  · signs of infection - fever or chills, cough, sore throat, flu-like symptoms, pain or difficulty passing urine  · signs and symptoms of liver injury like dark yellow or brown urine; general ill feeling; light-colored stools; loss of appetite; nausea; right upper belly pain; unusually weak or tired; yellowing of the eyes or skin  · signs and symptoms of a stroke like changes in vision; confusion; trouble speaking or understanding; severe headaches; sudden numbness or weakness of the face, arm or leg; trouble walking; dizziness; loss of balance or coordination  · swelling of the ankles, feet, or hands  · swollen lymph nodes in the neck, underarm, or groin areas  · unusual bleeding or bruising  · unusually weak or tired  Side effects that usually do not require medical attention (report to your doctor or health care professional if they continue or are bothersome):  · headache  · nausea  · stomach pain  · upset stomach  This list may not describe all possible side effects. Call your doctor for medical advice about side effects. You may report side effects to FDA at 7-580-KYL-3498.  Where should I keep my medicine?  This drug is given in a hospital or clinic and will not be stored at home.  NOTE: This sheet is a summary. It may not cover all possible information. If you have questions about this medicine, talk to your doctor, pharmacist, or health care provider.  © 2020 Elsevier/Gold Standard (2018-01-17  "13:45:32)   if you have any problems or concerns.    We know you have a Choice in healthcare and appreciate you using [facility].  Our purpose is to provide you \"Excellent Care\".  We hope that you will always choose us in the future and continue to recommend us to your family and friends.              "

## 2020-10-19 ENCOUNTER — HOSPITAL ENCOUNTER (OUTPATIENT)
Dept: INFUSION THERAPY | Facility: HOSPITAL | Age: 70
Setting detail: INFUSION SERIES
Discharge: HOME | End: 2020-10-19

## 2020-10-19 VITALS
TEMPERATURE: 97 F | DIASTOLIC BLOOD PRESSURE: 49 MMHG | OXYGEN SATURATION: 91 % | SYSTOLIC BLOOD PRESSURE: 141 MMHG | BODY MASS INDEX: 39.78 KG/M2 | HEART RATE: 80 BPM | RESPIRATION RATE: 16 BRPM | HEIGHT: 64 IN | WEIGHT: 233 LBS

## 2020-10-19 DIAGNOSIS — K50.10 CROHN'S DISEASE OF LARGE INTESTINE WITHOUT COMPLICATION (HCC): Primary | ICD-10-CM

## 2020-10-19 DIAGNOSIS — K50.10 CROHN'S DISEASE OF COLON WITHOUT COMPLICATION (HCC): ICD-10-CM

## 2020-10-19 PROCEDURE — 96415 CHEMO IV INFUSION ADDL HR: CPT

## 2020-10-19 PROCEDURE — 63710000001 DIPHENHYDRAMINE PER 50 MG: Performed by: INTERNAL MEDICINE

## 2020-10-19 PROCEDURE — 96413 CHEMO IV INFUSION 1 HR: CPT

## 2020-10-19 PROCEDURE — 25010000002 INFLIXIMAB PER 10 MG: Performed by: INTERNAL MEDICINE

## 2020-10-19 RX ORDER — ACETAMINOPHEN 325 MG/1
650 TABLET ORAL ONCE
Status: CANCELLED | OUTPATIENT
Start: 2020-11-16

## 2020-10-19 RX ORDER — ACETAMINOPHEN 325 MG/1
650 TABLET ORAL ONCE
Status: COMPLETED | OUTPATIENT
Start: 2020-10-19 | End: 2020-10-19

## 2020-10-19 RX ORDER — DIPHENHYDRAMINE HCL 25 MG
25 CAPSULE ORAL ONCE
Status: COMPLETED | OUTPATIENT
Start: 2020-10-19 | End: 2020-10-19

## 2020-10-19 RX ORDER — DIPHENHYDRAMINE HCL 25 MG
25 CAPSULE ORAL ONCE
Status: CANCELLED | OUTPATIENT
Start: 2020-11-16

## 2020-10-19 RX ADMIN — ACETAMINOPHEN 650 MG: 325 TABLET, FILM COATED ORAL at 09:18

## 2020-10-19 RX ADMIN — INFLIXIMAB 500 MG: 100 INJECTION, POWDER, LYOPHILIZED, FOR SOLUTION INTRAVENOUS at 09:42

## 2020-10-19 RX ADMIN — DIPHENHYDRAMINE HYDROCHLORIDE 25 MG: 25 CAPSULE ORAL at 09:19

## 2020-10-19 NOTE — NURSING NOTE
NURSING PROGRESS NOTE      0900 Pt to ACC per  scheduled appointment.  Pt ID verified by (2) identifiers. Allergies, medications and medical history reviewed and verified.Carmen Montanez RN

## 2020-10-19 NOTE — NURSING NOTE
NURSING PROGRESS NOTE      Tolerated prescribed treatment without incident. Patient received AVS, Pt verbalized understanding.  Discharged to home @ 1210,  Condition Satisfactory .  Carmen Montanez RN

## 2020-11-05 DIAGNOSIS — J44.1 CHRONIC OBSTRUCTIVE PULMONARY DISEASE WITH ACUTE EXACERBATION (HCC): ICD-10-CM

## 2020-11-05 DIAGNOSIS — E78.2 MIXED HYPERLIPIDEMIA: ICD-10-CM

## 2020-11-05 RX ORDER — FENOFIBRATE 145 MG/1
TABLET, COATED ORAL
Qty: 90 TABLET | Refills: 0 | Status: SHIPPED | OUTPATIENT
Start: 2020-11-05 | End: 2020-11-11 | Stop reason: SDUPTHER

## 2020-11-05 RX ORDER — TIOTROPIUM BROMIDE 18 UG/1
CAPSULE ORAL; RESPIRATORY (INHALATION)
Qty: 30 CAPSULE | Refills: 0 | Status: SHIPPED | OUTPATIENT
Start: 2020-11-05 | End: 2020-12-13

## 2020-11-11 ENCOUNTER — TELEPHONE (OUTPATIENT)
Dept: FAMILY MEDICINE CLINIC | Facility: CLINIC | Age: 70
End: 2020-11-11

## 2020-11-11 DIAGNOSIS — E78.1 HYPERTRIGLYCERIDEMIA: ICD-10-CM

## 2020-11-11 DIAGNOSIS — E78.2 MIXED HYPERLIPIDEMIA: ICD-10-CM

## 2020-11-11 RX ORDER — ROSUVASTATIN CALCIUM 40 MG/1
40 TABLET, COATED ORAL DAILY
Qty: 90 TABLET | Refills: 0 | Status: SHIPPED | OUTPATIENT
Start: 2020-11-11 | End: 2021-01-01

## 2020-11-11 RX ORDER — FENOFIBRATE 145 MG/1
145 TABLET, COATED ORAL DAILY
Qty: 90 TABLET | Refills: 1 | Status: SHIPPED | OUTPATIENT
Start: 2020-11-11 | End: 2020-11-13

## 2020-11-11 RX ORDER — FENOFIBRATE 145 MG/1
145 TABLET, COATED ORAL DAILY
Qty: 90 TABLET | Refills: 0 | Status: CANCELLED | OUTPATIENT
Start: 2020-11-11

## 2020-11-11 NOTE — TELEPHONE ENCOUNTER
Caller: Mar Camilo    Relationship: Self    Best call back number:  [phone]    Medication needed:   Requested Prescriptions     Pending Prescriptions Disp Refills   • rosuvastatin (CRESTOR) 40 MG tablet 90 tablet 0     Sig: Take 1 tablet by mouth Daily.   • fenofibrate (TRICOR) 145 MG tablet 90 tablet 0     Sig: Take 1 tablet by mouth Daily.       When do you need the refill by: 11/11    What details did the patient provide when requesting the medication: TOOK LAST ONE YESTERDAY    Does the patient have less than a 3 day supply:  [x] Yes  [] No    What is the patient's preferred pharmacy: [facility] - [place] - [address] [phone] [facility] [phone]

## 2020-11-13 DIAGNOSIS — E78.2 MIXED HYPERLIPIDEMIA: ICD-10-CM

## 2020-11-13 RX ORDER — FENOFIBRATE 145 MG/1
TABLET, COATED ORAL
Qty: 90 TABLET | Refills: 0 | Status: SHIPPED | OUTPATIENT
Start: 2020-11-13 | End: 2020-12-02 | Stop reason: SDUPTHER

## 2020-11-16 DIAGNOSIS — I10 ESSENTIAL HYPERTENSION: ICD-10-CM

## 2020-11-16 RX ORDER — LISINOPRIL 40 MG/1
TABLET ORAL
Qty: 90 TABLET | Refills: 0 | Status: SHIPPED | OUTPATIENT
Start: 2020-11-16 | End: 2021-01-01

## 2020-11-19 ENCOUNTER — PATIENT OUTREACH (OUTPATIENT)
Dept: CASE MANAGEMENT | Facility: OTHER | Age: 70
End: 2020-11-19

## 2020-11-19 NOTE — OUTREACH NOTE
Care Coordination Assessment    Documented/Reviewed By: Laquita Carbajal RN Date/time: 11/19/2020 10:55 AM   Assessment completed with: patient  Enrolled in care management program: Yes  Living arrangement: family members (Comment: Lives with her sister and nephew)  Support system: family  Type of residence: private residence  Home care services: No  Equipment used at home: cane, walker, oxygen/respiratory treatment (Comment: O2, nebulizer, glucometer)  Communication device: Yes  Bed or wheelchair confined: No  Inadequate nutrition: No  Medication adherence problem: No  Experiencing side effects from current medications: No  History of fall(s) in last 6 months: No  Difficulty keeping appointments: No  Family aware of the patient's advance care planning wishes: Yes  Chronic pain: Yes  Location of chronic pain: Back  Chronic pain timing: intermittent

## 2020-11-19 NOTE — OUTREACH NOTE
The main concerns and/or symptoms the patient would like to address are: safety concerns over Thanksgiving. Need for flu shot    Education/instruction provided by Care Coordinator: Call to pt who states she is doing well. She denies any new issues or concerns. Pt states she needs to get her Flu shot. She thinks this has made a lot of differences in the last few years. Suggested pt check with her local pharmacies including [facility], [facility] to see if any would come to her car to give her injections. Some pharmacies have been doing this. Pt will check.   Pt excited for upcoming Thanksgiving and family visitations. Cautioned against having a large crowd suggested to keep under 10. Reinforced use of mask and asking those that feel ill to stay home. Pt verbalized understanding and agrees to comply.     Follow Up Outreach Due:monthly    Laquita Carbajal RN  Ambulatory     11/19/2020, 10:56 EST

## 2020-11-25 DIAGNOSIS — I10 ESSENTIAL HYPERTENSION: ICD-10-CM

## 2020-11-25 RX ORDER — VERAPAMIL HYDROCHLORIDE 240 MG/1
TABLET, FILM COATED, EXTENDED RELEASE ORAL
Qty: 180 TABLET | Refills: 0 | Status: SHIPPED | OUTPATIENT
Start: 2020-11-25 | End: 2021-01-01

## 2020-12-02 ENCOUNTER — OFFICE VISIT (OUTPATIENT)
Dept: FAMILY MEDICINE CLINIC | Facility: CLINIC | Age: 70
End: 2020-12-02

## 2020-12-02 DIAGNOSIS — E78.2 MIXED HYPERLIPIDEMIA: ICD-10-CM

## 2020-12-02 DIAGNOSIS — J06.9 ACUTE URI: Primary | ICD-10-CM

## 2020-12-02 PROCEDURE — 99421 OL DIG E/M SVC 5-10 MIN: CPT | Performed by: PHYSICIAN ASSISTANT

## 2020-12-02 RX ORDER — FENOFIBRATE 145 MG/1
145 TABLET, COATED ORAL DAILY
Qty: 90 TABLET | Refills: 0 | Status: SHIPPED | OUTPATIENT
Start: 2020-12-02 | End: 2021-01-01

## 2020-12-02 RX ORDER — AMOXICILLIN 875 MG/1
875 TABLET, COATED ORAL 2 TIMES DAILY
Qty: 20 TABLET | Refills: 0 | Status: SHIPPED | OUTPATIENT
Start: 2020-12-02 | End: 2020-12-12

## 2020-12-02 NOTE — PROGRESS NOTES
Subjective   Mar Camilo is a 70 y.o. female presents for   Chief Complaint   Patient presents with   • URI     head congestion   • Cough   • Headache   You have chosen to receive care through a telephone visit. Do you consent to use a telephone visit for your medical care today? Yes    History of Present Illness     Mar is a 70-year-old female who presents using telephone encounter today.  She has been having sinus pressure, nose congestion, bilateral ear pain, slight headache, gray productive cough and postnasal drip.  She has been using her inhalers and nebulizer at home.  Also seeing her oxygen as directed.  Denied any fevers, chills, increased shortness of breath or worsening wheezing.  She does have COPD.  Her appetite and sleep have been normal for her.  Still has taste and smell.  She has not been out of her house.  No COVID-19 exposure.    Mar states pharmacy would not refill her fenofibrate.  [facility] insurance would not improve.  She needs a refill.    The following portions of the patient's history were reviewed and updated as appropriate: allergies, current medications, past family history, past medical history, past social history, past surgical history and problem list.    Review of Systems   Constitutional: Negative.  Negative for chills, fatigue and fever.   HENT: Positive for congestion, ear pain, postnasal drip, sinus pressure and sinus pain. Negative for rhinorrhea and sore throat.    Eyes: Negative.    Respiratory: Positive for cough, shortness of breath and wheezing. Negative for chest tightness.    Cardiovascular: Negative.  Negative for chest pain, palpitations and leg swelling.   Gastrointestinal: Negative.  Negative for abdominal pain, constipation, diarrhea, nausea and vomiting.   Endocrine: Negative.    Genitourinary: Negative.    Musculoskeletal: Negative.  Negative for myalgias.   Skin: Negative.    Allergic/Immunologic: Negative.    Neurological: Positive for headaches. Negative  for dizziness and light-headedness.   Hematological: Negative.    Psychiatric/Behavioral: Negative.  Negative for sleep disturbance.   All other systems reviewed and are negative.    No vital signs were obtained due to telephone encounter.    There were no vitals filed for this visit.  Wt Readings from Last 3 Encounters:   10/19/20 106 kg (233 lb)   20 105 kg (231 lb 6.4 oz)   20 106 kg (234 lb)     BP Readings from Last 3 Encounters:   10/19/20 141/49   20 148/78   20 134/44     Social History     Socioeconomic History   • Marital status:      Spouse name: Not on file   • Number of children: Not on file   • Years of education: Not on file   • Highest education level: Not on file   Social Needs   • Financial resource strain: Not hard at all   • Food insecurity     Worry: Never true     Inability: Never true   • Transportation needs     Medical: No     Non-medical: No   Tobacco Use   • Smoking status: Former Smoker     Packs/day: 2.00     Years: 40.00     Pack years: 80.00     Types: Cigarettes     Quit date: 2013     Years since quittin.6   • Smokeless tobacco: Never Used   • Tobacco comment: 1 cup coffee daily   Substance and Sexual Activity   • Alcohol use: No     Comment: history of heavy drinker    • Drug use: No   • Sexual activity: Defer       Allergies   Allergen Reactions   • Contrast Dye Hives   • Iodinated Diagnostic Agents Hives   • Norco [Hydrocodone-Acetaminophen] Hallucinations   • Tenoretic [Atenolol-Chlorthalidone] Anaphylaxis       There is no height or weight on file to calculate BMI.    Objective   Physical Exam  Constitutional:       Comments: Sounds congested and stuffy.   HENT:      Right Ear: Hearing normal.      Left Ear: Hearing normal.   Pulmonary:      Comments: Occasional coughing without any noticeable wheezing or breathing difficulties during the telephone encounter.  Neurological:      Mental Status: She is alert and oriented to person, place, and  time.   Psychiatric:         Attention and Perception: Attention and perception normal.         Mood and Affect: Mood and affect normal.         Speech: Speech normal.         Behavior: Behavior is cooperative.         Thought Content: Thought content normal.         Cognition and Memory: Cognition and memory normal.         Judgment: Judgment normal.       Physical exam was limited due to telephone encounter.  Assessment/Plan   Diagnoses and all orders for this visit:    1. Acute URI (Primary)  -     amoxicillin (AMOXIL) 875 MG tablet; Take 1 tablet by mouth 2 (Two) Times a Day for 10 days.  Dispense: 20 tablet; Refill: 0    2. Mixed hyperlipidemia  -     fenofibrate (TRICOR) 145 MG tablet; Take 1 tablet by mouth Daily.  Dispense: 90 tablet; Refill: 0    1.  New acute upper respiratory infection: I have sent amoxicillin to pharmacy.  Mar will continue using her allergy, sinus and Mucinex at home.  I encouraged her to continue using her nebulizer inhalers as directed.  She will notify office if symptoms do not improve.  She will return to office for flu shot when she is feeling better.  2.  Chronic and stable mixed hyperlipidemia: Have refilled her Tricor to pharmacy.  She is unable to take Gemfibrozil due to interactions with her Crestor.    I spent approximately 7-9 minutes via telephone discussing her current signs, symptoms, medication tried and treatment options with her.    [facility]ARGAR[facility]vaez [facility]  [address] [place] [id number]  Dept: [phone]  Dept Fax: [phone]  Loc: [phone]  Loc Fax: [phone]

## 2020-12-08 ENCOUNTER — HOSPITAL ENCOUNTER (OUTPATIENT)
Facility: HOSPITAL | Age: 70
Setting detail: HOSPITAL OUTPATIENT SURGERY
End: 2020-12-08
Attending: INTERNAL MEDICINE | Admitting: INTERNAL MEDICINE

## 2020-12-08 PROBLEM — K50.10 CROHN'S DISEASE OF COLON WITHOUT COMPLICATION: Status: ACTIVE | Noted: 2020-12-08

## 2020-12-13 DIAGNOSIS — J44.1 CHRONIC OBSTRUCTIVE PULMONARY DISEASE WITH ACUTE EXACERBATION (HCC): ICD-10-CM

## 2020-12-13 RX ORDER — TIOTROPIUM BROMIDE 18 UG/1
CAPSULE ORAL; RESPIRATORY (INHALATION)
Qty: 30 CAPSULE | Refills: 0 | Status: SHIPPED | OUTPATIENT
Start: 2020-12-13 | End: 2021-01-18

## 2020-12-14 ENCOUNTER — HOSPITAL ENCOUNTER (OUTPATIENT)
Dept: INFUSION THERAPY | Facility: HOSPITAL | Age: 70
Discharge: HOME | End: 2020-12-14
Admitting: INTERNAL MEDICINE

## 2020-12-14 VITALS
OXYGEN SATURATION: 97 % | RESPIRATION RATE: 18 BRPM | WEIGHT: 236.6 LBS | TEMPERATURE: 98.3 F | DIASTOLIC BLOOD PRESSURE: 48 MMHG | SYSTOLIC BLOOD PRESSURE: 136 MMHG | HEIGHT: 64 IN | HEART RATE: 82 BPM | BODY MASS INDEX: 40.39 KG/M2

## 2020-12-14 DIAGNOSIS — K50.10 CROHN'S DISEASE OF LARGE INTESTINE WITHOUT COMPLICATION (HCC): Primary | ICD-10-CM

## 2020-12-14 DIAGNOSIS — K50.10 CROHN'S DISEASE OF COLON WITHOUT COMPLICATION (HCC): ICD-10-CM

## 2020-12-14 PROCEDURE — 63710000001 DIPHENHYDRAMINE PER 50 MG: Performed by: INTERNAL MEDICINE

## 2020-12-14 PROCEDURE — 25010000002 INFLIXIMAB PER 10 MG: Performed by: INTERNAL MEDICINE

## 2020-12-14 PROCEDURE — A9270 NON-COVERED ITEM OR SERVICE: HCPCS | Performed by: INTERNAL MEDICINE

## 2020-12-14 PROCEDURE — 96413 CHEMO IV INFUSION 1 HR: CPT

## 2020-12-14 PROCEDURE — 96415 CHEMO IV INFUSION ADDL HR: CPT

## 2020-12-14 PROCEDURE — 63710000001 ACETAMINOPHEN 325 MG TABLET: Performed by: INTERNAL MEDICINE

## 2020-12-14 RX ORDER — DOCUSATE SODIUM 100 MG/1
100 CAPSULE, LIQUID FILLED ORAL EVERY OTHER DAY
COMMUNITY

## 2020-12-14 RX ORDER — DIPHENHYDRAMINE HCL 25 MG
25 CAPSULE ORAL ONCE
Status: COMPLETED | OUTPATIENT
Start: 2020-12-14 | End: 2020-12-14

## 2020-12-14 RX ORDER — ACETAMINOPHEN 325 MG/1
650 TABLET ORAL ONCE
Status: COMPLETED | OUTPATIENT
Start: 2020-12-14 | End: 2020-12-14

## 2020-12-14 RX ORDER — ACETAMINOPHEN 325 MG/1
650 TABLET ORAL ONCE
Status: CANCELLED | OUTPATIENT
Start: 2021-01-11

## 2020-12-14 RX ORDER — DIPHENHYDRAMINE HCL 25 MG
25 CAPSULE ORAL ONCE
Status: CANCELLED | OUTPATIENT
Start: 2021-01-11

## 2020-12-14 RX ADMIN — DIPHENHYDRAMINE HYDROCHLORIDE 25 MG: 25 CAPSULE ORAL at 09:19

## 2020-12-14 RX ADMIN — ACETAMINOPHEN 650 MG: 325 TABLET, FILM COATED ORAL at 09:19

## 2020-12-14 RX ADMIN — INFLIXIMAB 535 MG: 100 INJECTION, POWDER, LYOPHILIZED, FOR SOLUTION INTRAVENOUS at 09:30

## 2020-12-14 NOTE — PATIENT INSTRUCTIONS
"Call Dr. Roberto Carlos Umaña, Gastroenterology at [phone] if you have any problems or concerns.    We know you have a Choice in healthcare and appreciate you using [facility].  Our purpose is to provide you \"Excellent Care\".  We hope that you will always choose us in the future and continue to recommend us to your family and friends.      Infliximab infusion 535mg   What is this medicine?  INFLIXIMAB (in FLIX i mab) is used to treat Crohn's disease and ulcerative colitis. It is also used to treat ankylosing spondylitis, plaque psoriasis, and some forms of arthritis.  This medicine may be used for other purposes; ask your health care provider or pharmacist if you have questions.  COMMON BRAND NAME(S): AVSOLA, INFLECTRA, Remicade, RENFLEXIS  What should I tell my health care provider before I take this medicine?  They need to know if you have any of these conditions:  · cancer  · current or past resident of [place]  · diabetes  · exposure to tuberculosis  · Guillain-Pickering syndrome  · heart failure  · hepatitis or liver disease  · immune system problems  · infection  · lung or breathing disease, like COPD  · multiple sclerosis  · receiving phototherapy for the skin  · seizure disorder  · an unusual or allergic reaction to infliximab, mouse proteins, other medicines, foods, dyes, or preservatives  · pregnant or trying to get pregnant  · breast-feeding  How should I use this medicine?  This medicine is for injection into a vein. It is usually given by a health care professional in a hospital or clinic setting.  A special MedGuide will be given to you by the pharmacist with each prescription and refill. Be sure to read this information carefully each time.  Talk to your pediatrician regarding the use of this medicine in children. While this drug may be prescribed for children as young as 6 years of age for selected conditions, precautions do apply.  Overdosage: If you think you " have taken too much of this medicine contact a poison control center or emergency room at once.  NOTE: This medicine is only for you. Do not share this medicine with others.  What if I miss a dose?  It is important not to miss your dose. Call your doctor or health care professional if you are unable to keep an appointment.  What may interact with this medicine?  Do not take this medicine with any of the following medications:  · biologic medicines such as abatacept, adalimumab, anakinra, certolizumab, etanercept, golimumab, rituximab, secukinumab, tocilizumab, tofactinib, ustekinumab  · live vaccines  This list may not describe all possible interactions. Give your health care provider a list of all the medicines, herbs, non-prescription drugs, or dietary supplements you use. Also tell them if you smoke, drink alcohol, or use illegal drugs. Some items may interact with your medicine.  What should I watch for while using this medicine?  Your condition will be monitored carefully while you are receiving this medicine. Visit your doctor or health care professional for regular checks on your progress. You may need blood work done while you are taking this medicine. Before beginning therapy, your doctor may do a test to see if you have been exposed to tuberculosis.  Call your doctor or health care professional for advice if you get a fever, chills or sore throat, or other symptoms of a cold or flu. Do not treat yourself. This drug decreases your body's ability to fight infections. Try to avoid being around people who are sick.  This medicine may make the symptoms of heart failure worse in some patients. If you notice symptoms such as increased shortness of breath or swelling of the ankles or legs, contact your health care provider right away.  If you are going to have surgery or dental work, tell your health care professional or dentist that you have received this medicine.  If you take this medicine for plaque psoriasis,  stay out of the sun. If you cannot avoid being in the sun, wear protective clothing and use sunscreen. Do not use sun lamps or tanning beds/booths.  Talk to your doctor about your risk of cancer. You may be more at risk for certain types of cancers if you take this medicine.  What side effects may I notice from receiving this medicine?  Side effects that you should report to your doctor or health care professional as soon as possible:  · allergic reactions like skin rash, itching or hives, swelling of the face, lips, or tongue  · breathing problems  · changes in vision  · chest pain  · fever or chills, usually related to the infusion  · joint pain  · pain, tingling, numbness in the hands or feet  · redness, blistering, peeling or loosening of the skin, including inside the mouth  · seizures  · signs of infection - fever or chills, cough, sore throat, flu-like symptoms, pain or difficulty passing urine  · signs and symptoms of liver injury like dark yellow or brown urine; general ill feeling; light-colored stools; loss of appetite; nausea; right upper belly pain; unusually weak or tired; yellowing of the eyes or skin  · signs and symptoms of a stroke like changes in vision; confusion; trouble speaking or understanding; severe headaches; sudden numbness or weakness of the face, arm or leg; trouble walking; dizziness; loss of balance or coordination  · swelling of the ankles, feet, or hands  · swollen lymph nodes in the neck, underarm, or groin areas  · unusual bleeding or bruising  · unusually weak or tired  Side effects that usually do not require medical attention (report to your doctor or health care professional if they continue or are bothersome):  · headache  · nausea  · stomach pain  · upset stomach  This list may not describe all possible side effects. Call your doctor for medical advice about side effects. You may report side effects to FDA at 3-315-FDA-9343.  Where should I keep my medicine?  This drug is  given in a hospital or clinic and will not be stored at home.  NOTE: This sheet is a summary. It may not cover all possible information. If you have questions about this medicine, talk to your doctor, pharmacist, or health care provider.  © 2020 [employer]/Gold Standard (2018-01-17 13:45:32)

## 2020-12-14 NOTE — NURSING NOTE
NURSING PROGRESS NOTE  1155Tolerated prescribed treatment without incident. Patient received AVS, Pt verbalized understanding.  Discharged to home @ 1200. Condition Satisfactory .  Carmen Montanez RN

## 2020-12-14 NOTE — NURSING NOTE
NURSING PROGRESS NOTE      0900 Pt to [facility] per  scheduled appointment.  Pt ID verified by (2) identifiers. Allergies, medications and medical history reviewed and verified.Premeds given. 0930 Remicade started. Carmen Montanez RN

## 2020-12-22 ENCOUNTER — PATIENT OUTREACH (OUTPATIENT)
Dept: CASE MANAGEMENT | Facility: OTHER | Age: 70
End: 2020-12-22

## 2020-12-22 NOTE — OUTREACH NOTE
The main concerns and/or symptoms the patient would like to address are: recent sinus infection and need for flu shot    Education/instruction provided by Care Coordinator: Call to pt who states she is doing well. She did have a sinus infection and did do a teelvisit with her PCP at that time. She completed her antibiotics and is feeling well. She is concerned about the need for her flu shot. Pt states she has to wait for a ride. She does have plans out Wed to finish up [employer] shopping. She will go to [place] and use the scooters there. Suggested she go to the pharmacy there to get in flu shot and she hopes to do it there. Reviewed measures to take for safety while out and during the holidays. Pt denies any questions or concerns at this time.     Follow Up Outreach Due: monthly    Laquita Carbajal RN  Ambulatory     12/22/2020, 13:35 EST

## 2020-12-30 DIAGNOSIS — J44.1 CHRONIC OBSTRUCTIVE PULMONARY DISEASE WITH ACUTE EXACERBATION (HCC): ICD-10-CM

## 2020-12-30 DIAGNOSIS — M25.512 CHRONIC PAIN OF BOTH SHOULDERS: ICD-10-CM

## 2020-12-30 DIAGNOSIS — G89.29 CHRONIC PAIN OF LEFT LOWER EXTREMITY: ICD-10-CM

## 2020-12-30 DIAGNOSIS — G89.29 CHRONIC PAIN OF BOTH SHOULDERS: ICD-10-CM

## 2020-12-30 DIAGNOSIS — M79.605 CHRONIC PAIN OF LEFT LOWER EXTREMITY: ICD-10-CM

## 2020-12-30 DIAGNOSIS — G89.29 CHRONIC PAIN IN LEFT FOOT: ICD-10-CM

## 2020-12-30 DIAGNOSIS — M79.672 CHRONIC PAIN IN LEFT FOOT: ICD-10-CM

## 2020-12-30 DIAGNOSIS — G62.9 NEUROPATHY: ICD-10-CM

## 2020-12-30 DIAGNOSIS — M25.511 CHRONIC PAIN OF BOTH SHOULDERS: ICD-10-CM

## 2020-12-30 RX ORDER — ALBUTEROL SULFATE 90 UG/1
AEROSOL, METERED RESPIRATORY (INHALATION)
Qty: 18 G | Refills: 0 | Status: SHIPPED | OUTPATIENT
Start: 2020-12-30 | End: 2021-01-01

## 2020-12-30 RX ORDER — GABAPENTIN 300 MG/1
CAPSULE ORAL
Qty: 60 CAPSULE | Refills: 3 | Status: SHIPPED | OUTPATIENT
Start: 2020-12-30 | End: 2021-01-01

## 2021-01-01 ENCOUNTER — HOME CARE VISIT (OUTPATIENT)
Dept: HOME HEALTH SERVICES | Facility: HOME HEALTHCARE | Age: 71
End: 2021-01-01

## 2021-01-01 ENCOUNTER — READMISSION MANAGEMENT (OUTPATIENT)
Dept: CALL CENTER | Facility: HOSPITAL | Age: 71
End: 2021-01-01

## 2021-01-01 ENCOUNTER — HOSPITAL ENCOUNTER (INPATIENT)
Facility: HOSPITAL | Age: 71
LOS: 2 days | Discharge: HOME | End: 2021-05-23
Attending: EMERGENCY MEDICINE | Admitting: HOSPITALIST

## 2021-01-01 ENCOUNTER — APPOINTMENT (OUTPATIENT)
Dept: CT IMAGING | Facility: HOSPITAL | Age: 71
End: 2021-01-01

## 2021-01-01 ENCOUNTER — PATIENT OUTREACH (OUTPATIENT)
Dept: CASE MANAGEMENT | Facility: OTHER | Age: 71
End: 2021-01-01

## 2021-01-01 ENCOUNTER — OFFICE VISIT (OUTPATIENT)
Dept: GASTROENTEROLOGY | Facility: CLINIC | Age: 71
End: 2021-01-01

## 2021-01-01 ENCOUNTER — ANESTHESIA (OUTPATIENT)
Dept: PERIOP | Facility: HOSPITAL | Age: 71
End: 2021-01-01

## 2021-01-01 ENCOUNTER — APPOINTMENT (OUTPATIENT)
Dept: NUCLEAR MEDICINE | Facility: HOSPITAL | Age: 71
End: 2021-01-01

## 2021-01-01 ENCOUNTER — APPOINTMENT (OUTPATIENT)
Dept: LAB | Facility: HOSPITAL | Age: 71
End: 2021-01-01

## 2021-01-01 ENCOUNTER — OFFICE VISIT (OUTPATIENT)
Dept: FAMILY MEDICINE CLINIC | Facility: CLINIC | Age: 71
End: 2021-01-01

## 2021-01-01 ENCOUNTER — OFFICE VISIT (OUTPATIENT)
Dept: SURGERY | Facility: CLINIC | Age: 71
End: 2021-01-01

## 2021-01-01 ENCOUNTER — LAB (OUTPATIENT)
Dept: LAB | Facility: HOSPITAL | Age: 71
End: 2021-01-01

## 2021-01-01 ENCOUNTER — TELEPHONE (OUTPATIENT)
Dept: FAMILY MEDICINE CLINIC | Facility: CLINIC | Age: 71
End: 2021-01-01

## 2021-01-01 ENCOUNTER — HOSPITAL ENCOUNTER (OUTPATIENT)
Dept: INFUSION THERAPY | Facility: HOSPITAL | Age: 71
Setting detail: INFUSION SERIES
Discharge: HOME | End: 2021-11-15

## 2021-01-01 ENCOUNTER — HOSPITAL ENCOUNTER (OUTPATIENT)
Dept: INFUSION THERAPY | Facility: HOSPITAL | Age: 71
Discharge: HOME | End: 2021-04-05
Admitting: INTERNAL MEDICINE

## 2021-01-01 ENCOUNTER — APPOINTMENT (OUTPATIENT)
Dept: GENERAL RADIOLOGY | Facility: HOSPITAL | Age: 71
End: 2021-01-01

## 2021-01-01 ENCOUNTER — HOSPITAL ENCOUNTER (OUTPATIENT)
Dept: INFUSION THERAPY | Facility: HOSPITAL | Age: 71
Discharge: HOME | End: 2021-02-08
Admitting: INTERNAL MEDICINE

## 2021-01-01 ENCOUNTER — HOSPITAL ENCOUNTER (OUTPATIENT)
Dept: CT IMAGING | Facility: HOSPITAL | Age: 71
Discharge: HOME | End: 2021-08-11
Admitting: PHYSICIAN ASSISTANT

## 2021-01-01 ENCOUNTER — HOSPITAL ENCOUNTER (OUTPATIENT)
Dept: INFUSION THERAPY | Facility: HOSPITAL | Age: 71
Discharge: HOME | End: 2021-04-06

## 2021-01-01 ENCOUNTER — TELEPHONE (OUTPATIENT)
Dept: SURGERY | Facility: CLINIC | Age: 71
End: 2021-01-01

## 2021-01-01 ENCOUNTER — TELEPHONE (OUTPATIENT)
Dept: GASTROENTEROLOGY | Facility: CLINIC | Age: 71
End: 2021-01-01

## 2021-01-01 ENCOUNTER — HOSPITAL ENCOUNTER (OUTPATIENT)
Dept: INFUSION THERAPY | Facility: HOSPITAL | Age: 71
Discharge: HOME | End: 2021-06-02
Admitting: INTERNAL MEDICINE

## 2021-01-01 ENCOUNTER — HOSPITAL ENCOUNTER (OUTPATIENT)
Dept: INFUSION THERAPY | Facility: HOSPITAL | Age: 71
Setting detail: INFUSION SERIES
Discharge: HOME | End: 2021-07-26

## 2021-01-01 ENCOUNTER — HOME HEALTH ADMISSION (OUTPATIENT)
Dept: HOME HEALTH SERVICES | Facility: HOME HEALTHCARE | Age: 71
End: 2021-01-01

## 2021-01-01 ENCOUNTER — ANESTHESIA EVENT (OUTPATIENT)
Dept: PERIOP | Facility: HOSPITAL | Age: 71
End: 2021-01-01

## 2021-01-01 ENCOUNTER — APPOINTMENT (OUTPATIENT)
Dept: CARDIOLOGY | Facility: HOSPITAL | Age: 71
End: 2021-01-01

## 2021-01-01 ENCOUNTER — TRANSCRIBE ORDERS (OUTPATIENT)
Dept: ADMINISTRATIVE | Facility: HOSPITAL | Age: 71
End: 2021-01-01

## 2021-01-01 ENCOUNTER — TRANSITIONAL CARE MANAGEMENT TELEPHONE ENCOUNTER (OUTPATIENT)
Dept: CALL CENTER | Facility: HOSPITAL | Age: 71
End: 2021-01-01

## 2021-01-01 ENCOUNTER — HOSPITAL ENCOUNTER (OUTPATIENT)
Dept: INFUSION THERAPY | Facility: HOSPITAL | Age: 71
Discharge: HOME | End: 2021-09-20
Admitting: INTERNAL MEDICINE

## 2021-01-01 VITALS
DIASTOLIC BLOOD PRESSURE: 60 MMHG | HEART RATE: 73 BPM | RESPIRATION RATE: 18 BRPM | TEMPERATURE: 97.9 F | OXYGEN SATURATION: 95 % | SYSTOLIC BLOOD PRESSURE: 140 MMHG

## 2021-01-01 VITALS
OXYGEN SATURATION: 95 % | TEMPERATURE: 98.3 F | RESPIRATION RATE: 20 BRPM | SYSTOLIC BLOOD PRESSURE: 138 MMHG | HEART RATE: 93 BPM | DIASTOLIC BLOOD PRESSURE: 62 MMHG

## 2021-01-01 VITALS
HEART RATE: 84 BPM | OXYGEN SATURATION: 95 % | TEMPERATURE: 98.5 F | DIASTOLIC BLOOD PRESSURE: 68 MMHG | SYSTOLIC BLOOD PRESSURE: 126 MMHG | RESPIRATION RATE: 20 BRPM

## 2021-01-01 VITALS — HEIGHT: 64 IN | WEIGHT: 235 LBS | BODY MASS INDEX: 40.12 KG/M2

## 2021-01-01 VITALS
DIASTOLIC BLOOD PRESSURE: 60 MMHG | HEIGHT: 64 IN | BODY MASS INDEX: 41.54 KG/M2 | HEART RATE: 115 BPM | OXYGEN SATURATION: 90 % | SYSTOLIC BLOOD PRESSURE: 140 MMHG

## 2021-01-01 VITALS
HEIGHT: 64 IN | HEART RATE: 83 BPM | WEIGHT: 242 LBS | RESPIRATION RATE: 20 BRPM | BODY MASS INDEX: 41.32 KG/M2 | OXYGEN SATURATION: 93 % | TEMPERATURE: 97 F | SYSTOLIC BLOOD PRESSURE: 136 MMHG | DIASTOLIC BLOOD PRESSURE: 60 MMHG

## 2021-01-01 VITALS
OXYGEN SATURATION: 94 % | HEART RATE: 78 BPM | DIASTOLIC BLOOD PRESSURE: 82 MMHG | SYSTOLIC BLOOD PRESSURE: 128 MMHG | TEMPERATURE: 98 F | RESPIRATION RATE: 16 BRPM

## 2021-01-01 VITALS
DIASTOLIC BLOOD PRESSURE: 64 MMHG | SYSTOLIC BLOOD PRESSURE: 126 MMHG | OXYGEN SATURATION: 91 % | TEMPERATURE: 97.3 F | HEART RATE: 90 BPM | RESPIRATION RATE: 22 BRPM | BODY MASS INDEX: 40.6 KG/M2 | WEIGHT: 244 LBS

## 2021-01-01 VITALS
RESPIRATION RATE: 18 BRPM | TEMPERATURE: 98 F | OXYGEN SATURATION: 94 % | HEART RATE: 84 BPM | SYSTOLIC BLOOD PRESSURE: 132 MMHG | DIASTOLIC BLOOD PRESSURE: 78 MMHG

## 2021-01-01 VITALS
SYSTOLIC BLOOD PRESSURE: 140 MMHG | HEART RATE: 103 BPM | HEIGHT: 65 IN | DIASTOLIC BLOOD PRESSURE: 60 MMHG | WEIGHT: 247 LBS | BODY MASS INDEX: 41.15 KG/M2 | OXYGEN SATURATION: 90 % | TEMPERATURE: 97.7 F

## 2021-01-01 VITALS
OXYGEN SATURATION: 98 % | HEART RATE: 76 BPM | DIASTOLIC BLOOD PRESSURE: 70 MMHG | TEMPERATURE: 98 F | RESPIRATION RATE: 16 BRPM | SYSTOLIC BLOOD PRESSURE: 118 MMHG

## 2021-01-01 VITALS
HEART RATE: 64 BPM | RESPIRATION RATE: 16 BRPM | SYSTOLIC BLOOD PRESSURE: 126 MMHG | DIASTOLIC BLOOD PRESSURE: 78 MMHG | TEMPERATURE: 98 F | OXYGEN SATURATION: 98 %

## 2021-01-01 VITALS
TEMPERATURE: 97.3 F | DIASTOLIC BLOOD PRESSURE: 72 MMHG | RESPIRATION RATE: 18 BRPM | OXYGEN SATURATION: 97 % | SYSTOLIC BLOOD PRESSURE: 126 MMHG | HEART RATE: 70 BPM

## 2021-01-01 VITALS
TEMPERATURE: 97.1 F | HEART RATE: 75 BPM | HEIGHT: 64 IN | SYSTOLIC BLOOD PRESSURE: 149 MMHG | OXYGEN SATURATION: 93 % | DIASTOLIC BLOOD PRESSURE: 63 MMHG | BODY MASS INDEX: 40.22 KG/M2 | RESPIRATION RATE: 20 BRPM | WEIGHT: 235.6 LBS

## 2021-01-01 VITALS
TEMPERATURE: 97.4 F | WEIGHT: 242 LBS | OXYGEN SATURATION: 95 % | HEART RATE: 70 BPM | RESPIRATION RATE: 22 BRPM | HEIGHT: 64 IN | BODY MASS INDEX: 41.32 KG/M2 | SYSTOLIC BLOOD PRESSURE: 139 MMHG | DIASTOLIC BLOOD PRESSURE: 68 MMHG

## 2021-01-01 VITALS
HEART RATE: 92 BPM | RESPIRATION RATE: 20 BRPM | DIASTOLIC BLOOD PRESSURE: 82 MMHG | OXYGEN SATURATION: 98 % | SYSTOLIC BLOOD PRESSURE: 136 MMHG | TEMPERATURE: 98.5 F

## 2021-01-01 VITALS
SYSTOLIC BLOOD PRESSURE: 132 MMHG | TEMPERATURE: 98 F | OXYGEN SATURATION: 97 % | RESPIRATION RATE: 18 BRPM | DIASTOLIC BLOOD PRESSURE: 72 MMHG | HEART RATE: 84 BPM

## 2021-01-01 VITALS
DIASTOLIC BLOOD PRESSURE: 69 MMHG | BODY MASS INDEX: 40.69 KG/M2 | SYSTOLIC BLOOD PRESSURE: 139 MMHG | TEMPERATURE: 97.4 F | OXYGEN SATURATION: 92 % | HEART RATE: 82 BPM | RESPIRATION RATE: 18 BRPM | WEIGHT: 244.5 LBS

## 2021-01-01 VITALS
TEMPERATURE: 98.6 F | RESPIRATION RATE: 19 BRPM | HEART RATE: 81 BPM | OXYGEN SATURATION: 92 % | DIASTOLIC BLOOD PRESSURE: 60 MMHG | SYSTOLIC BLOOD PRESSURE: 132 MMHG

## 2021-01-01 VITALS
HEART RATE: 75 BPM | DIASTOLIC BLOOD PRESSURE: 76 MMHG | HEART RATE: 88 BPM | OXYGEN SATURATION: 99 % | OXYGEN SATURATION: 98 % | SYSTOLIC BLOOD PRESSURE: 132 MMHG | SYSTOLIC BLOOD PRESSURE: 128 MMHG | RESPIRATION RATE: 19 BRPM | DIASTOLIC BLOOD PRESSURE: 74 MMHG | TEMPERATURE: 97.9 F | TEMPERATURE: 97.8 F | RESPIRATION RATE: 19 BRPM

## 2021-01-01 VITALS
OXYGEN SATURATION: 90 % | TEMPERATURE: 97.9 F | HEART RATE: 87 BPM | DIASTOLIC BLOOD PRESSURE: 58 MMHG | SYSTOLIC BLOOD PRESSURE: 148 MMHG | RESPIRATION RATE: 20 BRPM

## 2021-01-01 VITALS
HEART RATE: 64 BPM | RESPIRATION RATE: 18 BRPM | HEIGHT: 64 IN | OXYGEN SATURATION: 95 % | TEMPERATURE: 97.8 F | WEIGHT: 240.2 LBS | SYSTOLIC BLOOD PRESSURE: 158 MMHG | BODY MASS INDEX: 41.01 KG/M2 | DIASTOLIC BLOOD PRESSURE: 77 MMHG

## 2021-01-01 VITALS
DIASTOLIC BLOOD PRESSURE: 70 MMHG | RESPIRATION RATE: 18 BRPM | OXYGEN SATURATION: 96 % | SYSTOLIC BLOOD PRESSURE: 138 MMHG | HEART RATE: 57 BPM

## 2021-01-01 VITALS
SYSTOLIC BLOOD PRESSURE: 128 MMHG | HEART RATE: 78 BPM | TEMPERATURE: 97.4 F | DIASTOLIC BLOOD PRESSURE: 80 MMHG | OXYGEN SATURATION: 95 % | RESPIRATION RATE: 20 BRPM

## 2021-01-01 VITALS
SYSTOLIC BLOOD PRESSURE: 160 MMHG | TEMPERATURE: 96.9 F | HEART RATE: 64 BPM | DIASTOLIC BLOOD PRESSURE: 84 MMHG | RESPIRATION RATE: 20 BRPM | OXYGEN SATURATION: 90 %

## 2021-01-01 VITALS
BODY MASS INDEX: 40.97 KG/M2 | OXYGEN SATURATION: 87 % | HEIGHT: 64 IN | DIASTOLIC BLOOD PRESSURE: 40 MMHG | HEART RATE: 109 BPM | WEIGHT: 240 LBS | SYSTOLIC BLOOD PRESSURE: 150 MMHG | RESPIRATION RATE: 18 BRPM | TEMPERATURE: 98.2 F

## 2021-01-01 VITALS
RESPIRATION RATE: 18 BRPM | OXYGEN SATURATION: 98 % | SYSTOLIC BLOOD PRESSURE: 122 MMHG | TEMPERATURE: 97.5 F | HEART RATE: 88 BPM | DIASTOLIC BLOOD PRESSURE: 70 MMHG

## 2021-01-01 VITALS
OXYGEN SATURATION: 94 % | DIASTOLIC BLOOD PRESSURE: 72 MMHG | HEART RATE: 88 BPM | TEMPERATURE: 96.4 F | RESPIRATION RATE: 22 BRPM | SYSTOLIC BLOOD PRESSURE: 160 MMHG

## 2021-01-01 VITALS
OXYGEN SATURATION: 94 % | DIASTOLIC BLOOD PRESSURE: 70 MMHG | HEART RATE: 96 BPM | RESPIRATION RATE: 22 BRPM | TEMPERATURE: 97.1 F | SYSTOLIC BLOOD PRESSURE: 120 MMHG

## 2021-01-01 VITALS
HEART RATE: 98 BPM | OXYGEN SATURATION: 90 % | DIASTOLIC BLOOD PRESSURE: 64 MMHG | SYSTOLIC BLOOD PRESSURE: 132 MMHG | RESPIRATION RATE: 18 BRPM | TEMPERATURE: 97.5 F

## 2021-01-01 VITALS
HEART RATE: 88 BPM | DIASTOLIC BLOOD PRESSURE: 60 MMHG | OXYGEN SATURATION: 91 % | RESPIRATION RATE: 18 BRPM | SYSTOLIC BLOOD PRESSURE: 152 MMHG

## 2021-01-01 VITALS
RESPIRATION RATE: 18 BRPM | SYSTOLIC BLOOD PRESSURE: 130 MMHG | HEART RATE: 85 BPM | OXYGEN SATURATION: 91 % | DIASTOLIC BLOOD PRESSURE: 52 MMHG | TEMPERATURE: 97.6 F

## 2021-01-01 VITALS
HEART RATE: 76 BPM | RESPIRATION RATE: 18 BRPM | DIASTOLIC BLOOD PRESSURE: 82 MMHG | SYSTOLIC BLOOD PRESSURE: 128 MMHG | OXYGEN SATURATION: 94 % | TEMPERATURE: 97.8 F

## 2021-01-01 VITALS
RESPIRATION RATE: 20 BRPM | OXYGEN SATURATION: 91 % | DIASTOLIC BLOOD PRESSURE: 58 MMHG | TEMPERATURE: 97.6 F | HEART RATE: 90 BPM | SYSTOLIC BLOOD PRESSURE: 136 MMHG

## 2021-01-01 VITALS
DIASTOLIC BLOOD PRESSURE: 51 MMHG | BODY MASS INDEX: 41.57 KG/M2 | TEMPERATURE: 97.3 F | RESPIRATION RATE: 24 BRPM | OXYGEN SATURATION: 91 % | WEIGHT: 249.8 LBS | SYSTOLIC BLOOD PRESSURE: 122 MMHG | HEART RATE: 77 BPM

## 2021-01-01 VITALS
BODY MASS INDEX: 40.89 KG/M2 | WEIGHT: 245.4 LBS | SYSTOLIC BLOOD PRESSURE: 150 MMHG | DIASTOLIC BLOOD PRESSURE: 70 MMHG | HEIGHT: 65 IN

## 2021-01-01 VITALS
RESPIRATION RATE: 20 BRPM | OXYGEN SATURATION: 96 % | SYSTOLIC BLOOD PRESSURE: 138 MMHG | BODY MASS INDEX: 39.81 KG/M2 | TEMPERATURE: 97.2 F | HEIGHT: 64 IN | HEART RATE: 68 BPM | DIASTOLIC BLOOD PRESSURE: 72 MMHG | WEIGHT: 233.2 LBS

## 2021-01-01 VITALS
TEMPERATURE: 98 F | OXYGEN SATURATION: 93 % | HEIGHT: 65 IN | BODY MASS INDEX: 38.32 KG/M2 | SYSTOLIC BLOOD PRESSURE: 142 MMHG | WEIGHT: 230 LBS | RESPIRATION RATE: 20 BRPM | DIASTOLIC BLOOD PRESSURE: 68 MMHG | HEART RATE: 91 BPM

## 2021-01-01 VITALS
TEMPERATURE: 98.4 F | SYSTOLIC BLOOD PRESSURE: 168 MMHG | DIASTOLIC BLOOD PRESSURE: 74 MMHG | RESPIRATION RATE: 20 BRPM | HEART RATE: 94 BPM | OXYGEN SATURATION: 98 %

## 2021-01-01 VITALS
SYSTOLIC BLOOD PRESSURE: 118 MMHG | DIASTOLIC BLOOD PRESSURE: 78 MMHG | TEMPERATURE: 98 F | RESPIRATION RATE: 16 BRPM | OXYGEN SATURATION: 96 % | HEART RATE: 78 BPM

## 2021-01-01 VITALS
SYSTOLIC BLOOD PRESSURE: 124 MMHG | HEART RATE: 78 BPM | OXYGEN SATURATION: 92 % | RESPIRATION RATE: 20 BRPM | TEMPERATURE: 98.4 F | DIASTOLIC BLOOD PRESSURE: 70 MMHG

## 2021-01-01 VITALS
HEART RATE: 83 BPM | TEMPERATURE: 97.9 F | DIASTOLIC BLOOD PRESSURE: 60 MMHG | OXYGEN SATURATION: 92 % | RESPIRATION RATE: 20 BRPM | SYSTOLIC BLOOD PRESSURE: 122 MMHG

## 2021-01-01 VITALS
DIASTOLIC BLOOD PRESSURE: 68 MMHG | SYSTOLIC BLOOD PRESSURE: 132 MMHG | TEMPERATURE: 97 F | OXYGEN SATURATION: 94 % | HEART RATE: 88 BPM | RESPIRATION RATE: 18 BRPM

## 2021-01-01 VITALS — BODY MASS INDEX: 41.15 KG/M2 | WEIGHT: 247 LBS | HEIGHT: 65 IN

## 2021-01-01 VITALS
HEIGHT: 64 IN | OXYGEN SATURATION: 95 % | HEART RATE: 101 BPM | DIASTOLIC BLOOD PRESSURE: 80 MMHG | BODY MASS INDEX: 39.78 KG/M2 | SYSTOLIC BLOOD PRESSURE: 130 MMHG | WEIGHT: 233 LBS | TEMPERATURE: 97.7 F

## 2021-01-01 VITALS
RESPIRATION RATE: 18 BRPM | SYSTOLIC BLOOD PRESSURE: 126 MMHG | DIASTOLIC BLOOD PRESSURE: 78 MMHG | TEMPERATURE: 98.4 F | HEART RATE: 87 BPM | OXYGEN SATURATION: 94 %

## 2021-01-01 VITALS
RESPIRATION RATE: 16 BRPM | SYSTOLIC BLOOD PRESSURE: 116 MMHG | DIASTOLIC BLOOD PRESSURE: 79 MMHG | HEART RATE: 72 BPM | OXYGEN SATURATION: 98 % | TEMPERATURE: 98 F

## 2021-01-01 VITALS — BODY MASS INDEX: 40.65 KG/M2 | WEIGHT: 244 LBS | HEIGHT: 65 IN

## 2021-01-01 DIAGNOSIS — E78.1 HYPERTRIGLYCERIDEMIA: ICD-10-CM

## 2021-01-01 DIAGNOSIS — K43.9 VENTRAL HERNIA WITHOUT OBSTRUCTION OR GANGRENE: ICD-10-CM

## 2021-01-01 DIAGNOSIS — J44.1 CHRONIC OBSTRUCTIVE PULMONARY DISEASE WITH ACUTE EXACERBATION (HCC): ICD-10-CM

## 2021-01-01 DIAGNOSIS — M79.672 CHRONIC PAIN IN LEFT FOOT: ICD-10-CM

## 2021-01-01 DIAGNOSIS — G62.9 NEUROPATHY: ICD-10-CM

## 2021-01-01 DIAGNOSIS — J96.10 CHRONIC RESPIRATORY FAILURE, UNSPECIFIED WHETHER WITH HYPOXIA OR HYPERCAPNIA (HCC): ICD-10-CM

## 2021-01-01 DIAGNOSIS — G89.29 CHRONIC PAIN IN LEFT FOOT: ICD-10-CM

## 2021-01-01 DIAGNOSIS — K43.9 VENTRAL HERNIA WITHOUT OBSTRUCTION OR GANGRENE: Primary | ICD-10-CM

## 2021-01-01 DIAGNOSIS — L03.311 CELLULITIS OF ABDOMINAL WALL: ICD-10-CM

## 2021-01-01 DIAGNOSIS — M25.511 CHRONIC PAIN OF BOTH SHOULDERS: ICD-10-CM

## 2021-01-01 DIAGNOSIS — R09.02 HYPOXIA: ICD-10-CM

## 2021-01-01 DIAGNOSIS — M25.512 CHRONIC PAIN OF BOTH SHOULDERS: ICD-10-CM

## 2021-01-01 DIAGNOSIS — E78.2 MIXED HYPERLIPIDEMIA: ICD-10-CM

## 2021-01-01 DIAGNOSIS — I10 ESSENTIAL HYPERTENSION: ICD-10-CM

## 2021-01-01 DIAGNOSIS — G89.29 CHRONIC PAIN OF LEFT LOWER EXTREMITY: ICD-10-CM

## 2021-01-01 DIAGNOSIS — Z00.00 MEDICARE ANNUAL WELLNESS VISIT, SUBSEQUENT: Primary | ICD-10-CM

## 2021-01-01 DIAGNOSIS — S31.109D OPEN WOUND OF ABDOMINAL WALL, SUBSEQUENT ENCOUNTER: ICD-10-CM

## 2021-01-01 DIAGNOSIS — E11.8 TYPE 2 DIABETES MELLITUS WITH COMPLICATION, WITHOUT LONG-TERM CURRENT USE OF INSULIN (HCC): ICD-10-CM

## 2021-01-01 DIAGNOSIS — R00.0 TACHYCARDIA: ICD-10-CM

## 2021-01-01 DIAGNOSIS — K50.10 CROHN'S DISEASE OF COLON WITHOUT COMPLICATION (HCC): Primary | ICD-10-CM

## 2021-01-01 DIAGNOSIS — F32.0 MILD SINGLE CURRENT EPISODE OF MAJOR DEPRESSIVE DISORDER (HCC): ICD-10-CM

## 2021-01-01 DIAGNOSIS — K50.00 CROHN'S DISEASE OF SMALL INTESTINE WITHOUT COMPLICATION (HCC): ICD-10-CM

## 2021-01-01 DIAGNOSIS — J96.21 ACUTE ON CHRONIC RESPIRATORY FAILURE WITH HYPOXIA (HCC): Primary | ICD-10-CM

## 2021-01-01 DIAGNOSIS — R10.9 ACUTE ABDOMINAL PAIN: Primary | ICD-10-CM

## 2021-01-01 DIAGNOSIS — J01.00 ACUTE NON-RECURRENT MAXILLARY SINUSITIS: Primary | ICD-10-CM

## 2021-01-01 DIAGNOSIS — R10.9 ACUTE ABDOMINAL PAIN: ICD-10-CM

## 2021-01-01 DIAGNOSIS — G89.29 CHRONIC PAIN OF BOTH SHOULDERS: ICD-10-CM

## 2021-01-01 DIAGNOSIS — M79.605 CHRONIC PAIN OF LEFT LOWER EXTREMITY: ICD-10-CM

## 2021-01-01 DIAGNOSIS — K50.119 CROHN'S DISEASE OF COLON WITH COMPLICATION (HCC): Primary | ICD-10-CM

## 2021-01-01 DIAGNOSIS — Z79.899 LONG-TERM USE OF HIGH-RISK MEDICATION: ICD-10-CM

## 2021-01-01 DIAGNOSIS — K50.00 CROHN'S DISEASE OF SMALL INTESTINE WITHOUT COMPLICATION (HCC): Primary | ICD-10-CM

## 2021-01-01 DIAGNOSIS — K50.10 CROHN'S DISEASE OF COLON WITHOUT COMPLICATION (HCC): ICD-10-CM

## 2021-01-01 DIAGNOSIS — E66.01 MORBID OBESITY (HCC): ICD-10-CM

## 2021-01-01 DIAGNOSIS — J01.00 ACUTE NON-RECURRENT MAXILLARY SINUSITIS: ICD-10-CM

## 2021-01-01 DIAGNOSIS — Z09 HOSPITAL DISCHARGE FOLLOW-UP: Primary | ICD-10-CM

## 2021-01-01 DIAGNOSIS — Z11.59 ENCOUNTER FOR SCREENING FOR OTHER VIRAL DISEASES: ICD-10-CM

## 2021-01-01 DIAGNOSIS — R06.00 DYSPNEA, UNSPECIFIED TYPE: Primary | ICD-10-CM

## 2021-01-01 DIAGNOSIS — K50.10 CROHN'S DISEASE OF LARGE INTESTINE WITHOUT COMPLICATION (HCC): Primary | ICD-10-CM

## 2021-01-01 DIAGNOSIS — U07.1 COVID-19: Primary | ICD-10-CM

## 2021-01-01 DIAGNOSIS — R05.9 COUGH: Primary | ICD-10-CM

## 2021-01-01 DIAGNOSIS — M54.16 LUMBAR RADICULOPATHY: ICD-10-CM

## 2021-01-01 DIAGNOSIS — J06.9 ACUTE URI: ICD-10-CM

## 2021-01-01 LAB
ALBUMIN SERPL-MCNC: 3.5 G/DL (ref 3.5–5.2)
ALBUMIN SERPL-MCNC: 4.3 G/DL (ref 3.5–5.2)
ALBUMIN SERPL-MCNC: 4.4 G/DL (ref 3.7–4.7)
ALBUMIN/CREAT UR: 190 MG/G CREAT (ref 0–29)
ALBUMIN/GLOB SERPL: 1.1 G/DL
ALBUMIN/GLOB SERPL: 1.2 G/DL
ALBUMIN/GLOB SERPL: 1.5 {RATIO} (ref 1.2–2.2)
ALP SERPL-CCNC: 55 U/L (ref 39–117)
ALP SERPL-CCNC: 77 IU/L (ref 44–121)
ALP SERPL-CCNC: 77 U/L (ref 39–117)
ALT SERPL W P-5'-P-CCNC: 20 U/L (ref 1–33)
ALT SERPL W P-5'-P-CCNC: 28 U/L (ref 1–33)
ALT SERPL-CCNC: 27 IU/L (ref 0–32)
ANA SER QL: NEGATIVE
ANION GAP SERPL CALCULATED.3IONS-SCNC: 14.7 MMOL/L (ref 5–15)
ANION GAP SERPL CALCULATED.3IONS-SCNC: 8.4 MMOL/L (ref 5–15)
ANION GAP SERPL CALCULATED.3IONS-SCNC: 8.6 MMOL/L (ref 5–15)
AORTIC DIMENSIONLESS INDEX: 1 (DI)
ARTERIAL PATENCY WRIST A: POSITIVE
AST SERPL-CCNC: 19 U/L (ref 1–32)
AST SERPL-CCNC: 19 U/L (ref 1–32)
AST SERPL-CCNC: 22 IU/L (ref 0–40)
ATMOSPHERIC PRESS: 746 MMHG
B PARAPERT DNA SPEC QL NAA+PROBE: NOT DETECTED
B PERT DNA SPEC QL NAA+PROBE: NOT DETECTED
BACTERIA BLD CULT: ABNORMAL
BACTERIA SPEC AEROBE CULT: ABNORMAL
BACTERIA SPEC AEROBE CULT: NORMAL
BACTERIA SPEC RESP CULT: NORMAL
BASE EXCESS BLDA CALC-SCNC: 0.1 MMOL/L (ref 0–2)
BASOPHILS # BLD AUTO: 0 X10E3/UL (ref 0–0.2)
BASOPHILS # BLD AUTO: 0.01 10*3/MM3 (ref 0–0.2)
BASOPHILS # BLD AUTO: 0.01 10*3/MM3 (ref 0–0.2)
BASOPHILS # BLD AUTO: 0.06 10*3/MM3 (ref 0–0.2)
BASOPHILS NFR BLD AUTO: 0 %
BASOPHILS NFR BLD AUTO: 0.1 % (ref 0–1.5)
BASOPHILS NFR BLD AUTO: 0.1 % (ref 0–1.5)
BASOPHILS NFR BLD AUTO: 0.4 % (ref 0–1.5)
BDY SITE: ABNORMAL
BH CV ECHO MEAS - ACS: 1.5 CM
BH CV ECHO MEAS - AO MAX PG: 6 MMHG
BH CV ECHO MEAS - AO MEAN PG (FULL): 1 MMHG
BH CV ECHO MEAS - AO MEAN PG: 3 MMHG
BH CV ECHO MEAS - AO ROOT AREA (BSA CORRECTED): 0.99
BH CV ECHO MEAS - AO ROOT AREA: 3.5 CM^2
BH CV ECHO MEAS - AO ROOT DIAM: 2.1 CM
BH CV ECHO MEAS - AO V2 MAX: 126 CM/SEC
BH CV ECHO MEAS - AO V2 MEAN: 83 CM/SEC
BH CV ECHO MEAS - AO V2 VTI: 27 CM
BH CV ECHO MEAS - AVA(I,A): 3.8 CM^2
BH CV ECHO MEAS - AVA(I,D): 3.8 CM^2
BH CV ECHO MEAS - BSA(HAYCOCK): 2.3 M^2
BH CV ECHO MEAS - BSA: 2.1 M^2
BH CV ECHO MEAS - BZI_BMI: 41.5 KILOGRAMS/M^2
BH CV ECHO MEAS - BZI_METRIC_HEIGHT: 162.6 CM
BH CV ECHO MEAS - BZI_METRIC_WEIGHT: 109.8 KG
BH CV ECHO MEAS - EDV(CUBED): 102.5 ML
BH CV ECHO MEAS - EDV(MOD-SP2): 75.6 ML
BH CV ECHO MEAS - EDV(MOD-SP4): 124 ML
BH CV ECHO MEAS - EDV(TEICH): 101.3 ML
BH CV ECHO MEAS - EF(CUBED): 78.8 %
BH CV ECHO MEAS - EF(MOD-BP): 74.7 %
BH CV ECHO MEAS - EF(MOD-SP2): 72.4 %
BH CV ECHO MEAS - EF(MOD-SP4): 76.9 %
BH CV ECHO MEAS - EF(TEICH): 71.1 %
BH CV ECHO MEAS - ESV(CUBED): 21.7 ML
BH CV ECHO MEAS - ESV(MOD-SP2): 20.9 ML
BH CV ECHO MEAS - ESV(MOD-SP4): 28.6 ML
BH CV ECHO MEAS - ESV(TEICH): 29.3 ML
BH CV ECHO MEAS - FS: 40.4 %
BH CV ECHO MEAS - IVS/LVPW: 1
BH CV ECHO MEAS - IVSD: 1.5 CM
BH CV ECHO MEAS - LAT PEAK E' VEL: 7.5 CM/SEC
BH CV ECHO MEAS - LV DIASTOLIC VOL/BSA (35-75): 58.5 ML/M^2
BH CV ECHO MEAS - LV MASS(C)D: 280.5 GRAMS
BH CV ECHO MEAS - LV MASS(C)DI: 132.3 GRAMS/M^2
BH CV ECHO MEAS - LV MAX PG: 112 MMHG
BH CV ECHO MEAS - LV MEAN PG: 2 MMHG
BH CV ECHO MEAS - LV SYSTOLIC VOL/BSA (12-30): 13.5 ML/M^2
BH CV ECHO MEAS - LV V1 MAX: 112 CM/SEC
BH CV ECHO MEAS - LV V1 MEAN: 69.2 CM/SEC
BH CV ECHO MEAS - LV V1 VTI: 32.8 CM
BH CV ECHO MEAS - LVIDD: 4.7 CM
BH CV ECHO MEAS - LVIDS: 2.8 CM
BH CV ECHO MEAS - LVLD AP2: 8 CM
BH CV ECHO MEAS - LVLD AP4: 8.9 CM
BH CV ECHO MEAS - LVLS AP2: 5.7 CM
BH CV ECHO MEAS - LVLS AP4: 6.4 CM
BH CV ECHO MEAS - LVOT AREA (M): 3.1 CM^2
BH CV ECHO MEAS - LVOT AREA: 3.1 CM^2
BH CV ECHO MEAS - LVOT DIAM: 2 CM
BH CV ECHO MEAS - LVPWD: 1.4 CM
BH CV ECHO MEAS - MED PEAK E' VEL: 7.1 CM/SEC
BH CV ECHO MEAS - MR MAX PG: 26.8 MMHG
BH CV ECHO MEAS - MR MAX VEL: 246.5 CM/SEC
BH CV ECHO MEAS - MV A DUR: 0.13 SEC
BH CV ECHO MEAS - MV A MAX VEL: 99.4 CM/SEC
BH CV ECHO MEAS - MV DEC SLOPE: 711 CM/SEC^2
BH CV ECHO MEAS - MV DEC TIME: 208 SEC
BH CV ECHO MEAS - MV E MAX VEL: 99.4 CM/SEC
BH CV ECHO MEAS - MV E/A: 1
BH CV ECHO MEAS - MV MEAN PG: 3 MMHG
BH CV ECHO MEAS - MV P1/2T MAX VEL: 113 CM/SEC
BH CV ECHO MEAS - MV P1/2T: 46.5 MSEC
BH CV ECHO MEAS - MV V2 MEAN: 76 CM/SEC
BH CV ECHO MEAS - MV V2 VTI: 26.1 CM
BH CV ECHO MEAS - MVA P1/2T LCG: 1.9 CM^2
BH CV ECHO MEAS - MVA(P1/2T): 4.7 CM^2
BH CV ECHO MEAS - MVA(VTI): 3.9 CM^2
BH CV ECHO MEAS - PA MAX PG: 4.8 MMHG
BH CV ECHO MEAS - PA V2 MAX: 109 CM/SEC
BH CV ECHO MEAS - QP/QS: 0.96
BH CV ECHO MEAS - RAP SYSTOLE: 8 MMHG
BH CV ECHO MEAS - RV MEAN PG: 1 MMHG
BH CV ECHO MEAS - RV V1 MEAN: 52 CM/SEC
BH CV ECHO MEAS - RV V1 VTI: 18.6 CM
BH CV ECHO MEAS - RVOT AREA: 5.3 CM^2
BH CV ECHO MEAS - RVOT DIAM: 2.6 CM
BH CV ECHO MEAS - RVSP: 22.4 MMHG
BH CV ECHO MEAS - SI(AO): 44.1 ML/M^2
BH CV ECHO MEAS - SI(CUBED): 38.1 ML/M^2
BH CV ECHO MEAS - SI(LVOT): 48.6 ML/M^2
BH CV ECHO MEAS - SI(MOD-SP2): 25.8 ML/M^2
BH CV ECHO MEAS - SI(MOD-SP4): 45 ML/M^2
BH CV ECHO MEAS - SI(TEICH): 34 ML/M^2
BH CV ECHO MEAS - SV(AO): 93.5 ML
BH CV ECHO MEAS - SV(CUBED): 80.8 ML
BH CV ECHO MEAS - SV(LVOT): 103 ML
BH CV ECHO MEAS - SV(MOD-SP2): 54.7 ML
BH CV ECHO MEAS - SV(MOD-SP4): 95.4 ML
BH CV ECHO MEAS - SV(RVOT): 98.8 ML
BH CV ECHO MEAS - SV(TEICH): 72.1 ML
BH CV ECHO MEAS - TAPSE (>1.6): 2.6 CM
BH CV ECHO MEAS - TR MAX VEL: 190 CM/SEC
BH CV ECHO MEASUREMENTS AVERAGE E/E' RATIO: 13.62
BH CV XLRA - TDI S': 15 CM/SEC
BILIRUB SERPL-MCNC: 0.2 MG/DL (ref 0–1.2)
BODY TEMPERATURE: 37 C
BOTTLE TYPE: ABNORMAL
BUN SERPL-MCNC: 18 MG/DL (ref 8–23)
BUN SERPL-MCNC: 21 MG/DL (ref 8–23)
BUN SERPL-MCNC: 21 MG/DL (ref 8–27)
BUN SERPL-MCNC: 25 MG/DL (ref 8–23)
BUN/CREAT SERPL: 17.3 (ref 7–25)
BUN/CREAT SERPL: 26.9 (ref 7–25)
BUN/CREAT SERPL: 27 (ref 12–28)
BUN/CREAT SERPL: 36.2 (ref 7–25)
C PNEUM DNA NPH QL NAA+NON-PROBE: NOT DETECTED
CALCIUM SERPL-MCNC: 9.9 MG/DL (ref 8.7–10.3)
CALCIUM SPEC-SCNC: 10.5 MG/DL (ref 8.6–10.5)
CALCIUM SPEC-SCNC: 8.9 MG/DL (ref 8.6–10.5)
CALCIUM SPEC-SCNC: 9 MG/DL (ref 8.6–10.5)
CHLORIDE SERPL-SCNC: 101 MMOL/L (ref 98–107)
CHLORIDE SERPL-SCNC: 102 MMOL/L (ref 98–107)
CHLORIDE SERPL-SCNC: 104 MMOL/L (ref 98–107)
CHLORIDE SERPL-SCNC: 99 MMOL/L (ref 96–106)
CHOLEST SERPL-MCNC: 124 MG/DL (ref 100–199)
CO2 SERPL-SCNC: 22 MMOL/L (ref 20–29)
CO2 SERPL-SCNC: 24.4 MMOL/L (ref 22–29)
CO2 SERPL-SCNC: 25.3 MMOL/L (ref 22–29)
CO2 SERPL-SCNC: 28.6 MMOL/L (ref 22–29)
CREAT SERPL-MCNC: 0.69 MG/DL (ref 0.57–1)
CREAT SERPL-MCNC: 0.78 MG/DL (ref 0.57–1)
CREAT SERPL-MCNC: 0.79 MG/DL (ref 0.57–1)
CREAT SERPL-MCNC: 1.04 MG/DL (ref 0.57–1)
CREAT UR-MCNC: 83.4 MG/DL
D DIMER PPP FEU-MCNC: 0.84 MCGFEU/ML (ref 0–0.46)
D-LACTATE SERPL-SCNC: 1.9 MMOL/L (ref 0.5–2)
D-LACTATE SERPL-SCNC: 5.9 MMOL/L (ref 0.5–2)
DEPRECATED RDW RBC AUTO: 49.3 FL (ref 37–54)
DEPRECATED RDW RBC AUTO: 50.4 FL (ref 37–54)
DEPRECATED RDW RBC AUTO: 51.2 FL (ref 37–54)
DRUGS UR: NORMAL
EOSINOPHIL # BLD AUTO: 0 10*3/MM3 (ref 0–0.4)
EOSINOPHIL # BLD AUTO: 0 10*3/MM3 (ref 0–0.4)
EOSINOPHIL # BLD AUTO: 0.1 10*3/MM3 (ref 0–0.4)
EOSINOPHIL # BLD AUTO: 0.1 X10E3/UL (ref 0–0.4)
EOSINOPHIL NFR BLD AUTO: 0 % (ref 0.3–6.2)
EOSINOPHIL NFR BLD AUTO: 0 % (ref 0.3–6.2)
EOSINOPHIL NFR BLD AUTO: 0.6 % (ref 0.3–6.2)
EOSINOPHIL NFR BLD AUTO: 1 %
ERYTHROCYTE [DISTWIDTH] IN BLOOD BY AUTOMATED COUNT: 13.6 % (ref 11.7–15.4)
ERYTHROCYTE [DISTWIDTH] IN BLOOD BY AUTOMATED COUNT: 14.3 % (ref 12.3–15.4)
ERYTHROCYTE [DISTWIDTH] IN BLOOD BY AUTOMATED COUNT: 14.3 % (ref 12.3–15.4)
ERYTHROCYTE [DISTWIDTH] IN BLOOD BY AUTOMATED COUNT: 14.5 % (ref 12.3–15.4)
FLUAV SUBTYP SPEC NAA+PROBE: NOT DETECTED
FLUBV RNA ISLT QL NAA+PROBE: NOT DETECTED
GAMMA INTERFERON BACKGROUND BLD IA-ACNC: 0.15 IU/ML
GAS FLOW AIRWAY: 6 LPM
GFR SERPL CREATININE-BSD FRML MDRD: 52 ML/MIN/1.73
GFR SERPL CREATININE-BSD FRML MDRD: 73 ML/MIN/1.73
GFR SERPL CREATININE-BSD FRML MDRD: 84 ML/MIN/1.73
GLOBULIN SER CALC-MCNC: 2.9 G/DL (ref 1.5–4.5)
GLOBULIN UR ELPH-MCNC: 3 GM/DL
GLOBULIN UR ELPH-MCNC: 4 GM/DL
GLUCOSE BLDC GLUCOMTR-MCNC: 185 MG/DL (ref 70–130)
GLUCOSE BLDC GLUCOMTR-MCNC: 221 MG/DL (ref 70–130)
GLUCOSE BLDC GLUCOMTR-MCNC: 232 MG/DL (ref 70–130)
GLUCOSE BLDC GLUCOMTR-MCNC: 274 MG/DL (ref 70–130)
GLUCOSE BLDC GLUCOMTR-MCNC: 278 MG/DL (ref 70–130)
GLUCOSE BLDC GLUCOMTR-MCNC: 282 MG/DL (ref 70–130)
GLUCOSE BLDC GLUCOMTR-MCNC: 283 MG/DL (ref 70–130)
GLUCOSE BLDC GLUCOMTR-MCNC: 284 MG/DL (ref 70–130)
GLUCOSE BLDC GLUCOMTR-MCNC: 297 MG/DL (ref 70–130)
GLUCOSE BLDC GLUCOMTR-MCNC: 331 MG/DL (ref 70–130)
GLUCOSE BLDC GLUCOMTR-MCNC: 337 MG/DL (ref 70–130)
GLUCOSE SERPL-MCNC: 174 MG/DL (ref 65–99)
GLUCOSE SERPL-MCNC: 239 MG/DL (ref 65–99)
GLUCOSE SERPL-MCNC: 258 MG/DL (ref 65–99)
GLUCOSE SERPL-MCNC: 359 MG/DL (ref 65–99)
GRAM STN SPEC: ABNORMAL
GRAM STN SPEC: NORMAL
HADV DNA SPEC NAA+PROBE: NOT DETECTED
HBA1C MFR BLD: 6.9 % (ref 4.8–5.6)
HBA1C MFR BLD: 7.9 % (ref 4.8–5.6)
HBV CORE AB SERPL QL IA: NEGATIVE
HBV CORE IGM SERPL QL IA: NEGATIVE
HCO3 BLDA-SCNC: 25.6 MMOL/L (ref 20–26)
HCOV 229E RNA SPEC QL NAA+PROBE: NOT DETECTED
HCOV HKU1 RNA SPEC QL NAA+PROBE: NOT DETECTED
HCOV NL63 RNA SPEC QL NAA+PROBE: NOT DETECTED
HCOV OC43 RNA SPEC QL NAA+PROBE: NOT DETECTED
HCT VFR BLD AUTO: 36.3 % (ref 34–46.6)
HCT VFR BLD AUTO: 37.6 % (ref 34–46.6)
HCT VFR BLD AUTO: 41.9 % (ref 34–46.6)
HCT VFR BLD AUTO: 46.9 % (ref 34–46.6)
HDLC SERPL-MCNC: 45 MG/DL
HGB BLD-MCNC: 10.8 G/DL (ref 12–15.9)
HGB BLD-MCNC: 11 G/DL (ref 12–15.9)
HGB BLD-MCNC: 12.8 G/DL (ref 11.1–15.9)
HGB BLD-MCNC: 13.5 G/DL (ref 12–15.9)
HGB BLDA-MCNC: 12.7 G/DL (ref 13.5–17.5)
HMPV RNA NPH QL NAA+NON-PROBE: NOT DETECTED
HPIV1 RNA SPEC QL NAA+PROBE: NOT DETECTED
HPIV2 RNA SPEC QL NAA+PROBE: NOT DETECTED
HPIV3 RNA NPH QL NAA+PROBE: NOT DETECTED
HPIV4 P GENE NPH QL NAA+PROBE: NOT DETECTED
IMM GRANULOCYTES # BLD AUTO: 0.08 10*3/MM3 (ref 0–0.05)
IMM GRANULOCYTES # BLD AUTO: 0.08 10*3/MM3 (ref 0–0.05)
IMM GRANULOCYTES # BLD AUTO: 0.1 X10E3/UL (ref 0–0.1)
IMM GRANULOCYTES # BLD AUTO: 0.24 10*3/MM3 (ref 0–0.05)
IMM GRANULOCYTES NFR BLD AUTO: 0.5 % (ref 0–0.5)
IMM GRANULOCYTES NFR BLD AUTO: 0.7 % (ref 0–0.5)
IMM GRANULOCYTES NFR BLD AUTO: 1 %
IMM GRANULOCYTES NFR BLD AUTO: 1.5 % (ref 0–0.5)
ISOLATED FROM: ABNORMAL
L PNEUMO1 AG UR QL IA: NEGATIVE
LDLC SERPL CALC-MCNC: 44 MG/DL (ref 0–99)
LDLC/HDLC SERPL: 1 RATIO (ref 0–3.2)
LEFT ATRIUM VOLUME INDEX: 22 ML/M2
LYMPHOCYTES # BLD AUTO: 0.94 10*3/MM3 (ref 0.7–3.1)
LYMPHOCYTES # BLD AUTO: 1.09 10*3/MM3 (ref 0.7–3.1)
LYMPHOCYTES # BLD AUTO: 1.8 X10E3/UL (ref 0.7–3.1)
LYMPHOCYTES # BLD AUTO: 2.99 10*3/MM3 (ref 0.7–3.1)
LYMPHOCYTES NFR BLD AUTO: 10 % (ref 19.6–45.3)
LYMPHOCYTES NFR BLD AUTO: 14 %
LYMPHOCYTES NFR BLD AUTO: 17.9 % (ref 19.6–45.3)
LYMPHOCYTES NFR BLD AUTO: 5.8 % (ref 19.6–45.3)
Lab: 0
M PNEUMO IGG SER IA-ACNC: NOT DETECTED
M TB IFN-G BLD-IMP: NEGATIVE
M TB IFN-G CD4+ BCKGRND COR BLD-ACNC: 0.15 IU/ML
M TB IFN-G CD4+CD8+ BCKGRND COR BLD-ACNC: 0.02 IU/ML
MAXIMAL PREDICTED HEART RATE: 148 BPM
MCH RBC QN AUTO: 27.6 PG (ref 26.6–33)
MCH RBC QN AUTO: 27.6 PG (ref 26.6–33)
MCH RBC QN AUTO: 28 PG (ref 26.6–33)
MCH RBC QN AUTO: 28.1 PG (ref 26.6–33)
MCHC RBC AUTO-ENTMCNC: 28.8 G/DL (ref 31.5–35.7)
MCHC RBC AUTO-ENTMCNC: 29.3 G/DL (ref 31.5–35.7)
MCHC RBC AUTO-ENTMCNC: 29.8 G/DL (ref 31.5–35.7)
MCHC RBC AUTO-ENTMCNC: 30.5 G/DL (ref 31.5–35.7)
MCV RBC AUTO: 92 FL (ref 79–97)
MCV RBC AUTO: 94 FL (ref 79–97)
MCV RBC AUTO: 94.5 FL (ref 79–97)
MCV RBC AUTO: 95.9 FL (ref 79–97)
MICROALBUMIN UR-MCNC: 158.6 UG/ML
MITOGEN IGNF BLD-ACNC: 9.17 IU/ML
MODALITY: ABNORMAL
MONOCYTES # BLD AUTO: 0.16 10*3/MM3 (ref 0.1–0.9)
MONOCYTES # BLD AUTO: 0.55 10*3/MM3 (ref 0.1–0.9)
MONOCYTES # BLD AUTO: 0.8 X10E3/UL (ref 0.1–0.9)
MONOCYTES # BLD AUTO: 1 10*3/MM3 (ref 0.1–0.9)
MONOCYTES NFR BLD AUTO: 1.5 % (ref 5–12)
MONOCYTES NFR BLD AUTO: 3.4 % (ref 5–12)
MONOCYTES NFR BLD AUTO: 6 %
MONOCYTES NFR BLD AUTO: 6 % (ref 5–12)
NEUTROPHILS # BLD AUTO: 10.5 X10E3/UL (ref 1.4–7)
NEUTROPHILS NFR BLD AUTO: 12.45 10*3/MM3 (ref 1.7–7)
NEUTROPHILS NFR BLD AUTO: 14.48 10*3/MM3 (ref 1.7–7)
NEUTROPHILS NFR BLD AUTO: 74.6 % (ref 42.7–76)
NEUTROPHILS NFR BLD AUTO: 78 %
NEUTROPHILS NFR BLD AUTO: 87.7 % (ref 42.7–76)
NEUTROPHILS NFR BLD AUTO: 89.2 % (ref 42.7–76)
NEUTROPHILS NFR BLD AUTO: 9.53 10*3/MM3 (ref 1.7–7)
NRBC BLD AUTO-RTO: 0 /100 WBC (ref 0–0.2)
NT-PROBNP SERPL-MCNC: 2645 PG/ML (ref 0–900)
NT-PROBNP SERPL-MCNC: 372.8 PG/ML (ref 0–900)
PCO2 BLDA: 43.6 MM HG (ref 35–45)
PCO2 TEMP ADJ BLD: 43.6 MM HG (ref 35–45)
PH BLDA: 7.38 PH UNITS (ref 7.35–7.45)
PH, TEMP CORRECTED: 7.38 PH UNITS (ref 7.35–7.45)
PLATELET # BLD AUTO: 316 10*3/MM3 (ref 140–450)
PLATELET # BLD AUTO: 326 10*3/MM3 (ref 140–450)
PLATELET # BLD AUTO: 355 X10E3/UL (ref 150–450)
PLATELET # BLD AUTO: 452 10*3/MM3 (ref 140–450)
PMV BLD AUTO: 9.3 FL (ref 6–12)
PMV BLD AUTO: 9.4 FL (ref 6–12)
PMV BLD AUTO: 9.7 FL (ref 6–12)
PO2 BLDA: 67 MM HG (ref 83–108)
PO2 TEMP ADJ BLD: 67 MM HG (ref 83–108)
POTASSIUM SERPL-SCNC: 4.4 MMOL/L (ref 3.5–5.2)
POTASSIUM SERPL-SCNC: 4.8 MMOL/L (ref 3.5–5.2)
POTASSIUM SERPL-SCNC: 4.9 MMOL/L (ref 3.5–5.2)
POTASSIUM SERPL-SCNC: 5.3 MMOL/L (ref 3.5–5.2)
PROCALCITONIN SERPL-MCNC: 1.97 NG/ML (ref 0–0.25)
PROCALCITONIN SERPL-MCNC: 3.91 NG/ML (ref 0–0.25)
PROT SERPL-MCNC: 6.5 G/DL (ref 6–8.5)
PROT SERPL-MCNC: 7.3 G/DL (ref 6–8.5)
PROT SERPL-MCNC: 8.3 G/DL (ref 6–8.5)
QT INTERVAL: 362 MS
QUANTIFERON INCUBATION: NORMAL
RBC # BLD AUTO: 3.86 10*6/MM3 (ref 3.77–5.28)
RBC # BLD AUTO: 3.98 10*6/MM3 (ref 3.77–5.28)
RBC # BLD AUTO: 4.56 X10E6/UL (ref 3.77–5.28)
RBC # BLD AUTO: 4.89 10*6/MM3 (ref 3.77–5.28)
REF LAB TEST METHOD: NORMAL
RHINOVIRUS RNA SPEC NAA+PROBE: NOT DETECTED
RSV RNA NPH QL NAA+NON-PROBE: NOT DETECTED
S PNEUM AG SPEC QL LA: NEGATIVE
SAO2 % BLDCOA: 92.6 % (ref 94–99)
SARS-COV-2 RNA PNL SPEC NAA+PROBE: NOT DETECTED
SERVICE CMNT-IMP: NORMAL
SINUS: 2.6 CM
SODIUM SERPL-SCNC: 137 MMOL/L (ref 134–144)
SODIUM SERPL-SCNC: 137 MMOL/L (ref 136–145)
SODIUM SERPL-SCNC: 138 MMOL/L (ref 136–145)
SODIUM SERPL-SCNC: 142 MMOL/L (ref 136–145)
STRESS TARGET HR: 126 BPM
TRIGL SERPL-MCNC: 219 MG/DL (ref 0–149)
TROPONIN T SERPL-MCNC: <0.01 NG/ML (ref 0–0.03)
VENTILATOR MODE: ABNORMAL
VLDLC SERPL CALC-MCNC: 35 MG/DL (ref 5–40)
WBC # BLD AUTO: 10.87 10*3/MM3 (ref 3.4–10.8)
WBC # BLD AUTO: 13.3 X10E3/UL (ref 3.4–10.8)
WBC # BLD AUTO: 16.22 10*3/MM3 (ref 3.4–10.8)
WBC # BLD AUTO: 16.68 10*3/MM3 (ref 3.4–10.8)

## 2021-01-01 PROCEDURE — 94799 UNLISTED PULMONARY SVC/PX: CPT

## 2021-01-01 PROCEDURE — A9270 NON-COVERED ITEM OR SERVICE: HCPCS | Performed by: INTERNAL MEDICINE

## 2021-01-01 PROCEDURE — G0299 HHS/HOSPICE OF RN EA 15 MIN: HCPCS

## 2021-01-01 PROCEDURE — G0300 HHS/HOSPICE OF LPN EA 15 MIN: HCPCS

## 2021-01-01 PROCEDURE — 93010 ELECTROCARDIOGRAM REPORT: CPT | Performed by: INTERNAL MEDICINE

## 2021-01-01 PROCEDURE — 99213 OFFICE O/P EST LOW 20 MIN: CPT | Performed by: INTERNAL MEDICINE

## 2021-01-01 PROCEDURE — 25010000002 CEFEPIME-DEXTROSE 2-5 GM-%(50ML) RECONSTITUTED SOLUTION: Performed by: HOSPITALIST

## 2021-01-01 PROCEDURE — 96413 CHEMO IV INFUSION 1 HR: CPT

## 2021-01-01 PROCEDURE — 99285 EMERGENCY DEPT VISIT HI MDM: CPT | Performed by: EMERGENCY MEDICINE

## 2021-01-01 PROCEDURE — 80053 COMPREHEN METABOLIC PANEL: CPT | Performed by: HOSPITALIST

## 2021-01-01 PROCEDURE — 63710000001 DIPHENHYDRAMINE PER 50 MG: Performed by: INTERNAL MEDICINE

## 2021-01-01 PROCEDURE — 0 TECHNETIUM ALBUMIN AGGREGATED: Performed by: EMERGENCY MEDICINE

## 2021-01-01 PROCEDURE — 86704 HEP B CORE ANTIBODY TOTAL: CPT

## 2021-01-01 PROCEDURE — 63710000001 ACETAMINOPHEN 325 MG TABLET: Performed by: INTERNAL MEDICINE

## 2021-01-01 PROCEDURE — 99213 OFFICE O/P EST LOW 20 MIN: CPT | Performed by: STUDENT IN AN ORGANIZED HEALTH CARE EDUCATION/TRAINING PROGRAM

## 2021-01-01 PROCEDURE — 83036 HEMOGLOBIN GLYCOSYLATED A1C: CPT | Performed by: HOSPITALIST

## 2021-01-01 PROCEDURE — 96415 CHEMO IV INFUSION ADDL HR: CPT

## 2021-01-01 PROCEDURE — 94640 AIRWAY INHALATION TREATMENT: CPT

## 2021-01-01 PROCEDURE — 85379 FIBRIN DEGRADATION QUANT: CPT | Performed by: EMERGENCY MEDICINE

## 2021-01-01 PROCEDURE — 71045 X-RAY EXAM CHEST 1 VIEW: CPT

## 2021-01-01 PROCEDURE — 99495 TRANSJ CARE MGMT MOD F2F 14D: CPT | Performed by: PHYSICIAN ASSISTANT

## 2021-01-01 PROCEDURE — 25010000002 METHYLPREDNISOLONE PER 40 MG: Performed by: NURSE PRACTITIONER

## 2021-01-01 PROCEDURE — 86705 HEP B CORE ANTIBODY IGM: CPT

## 2021-01-01 PROCEDURE — 99213 OFFICE O/P EST LOW 20 MIN: CPT | Performed by: PHYSICIAN ASSISTANT

## 2021-01-01 PROCEDURE — 86480 TB TEST CELL IMMUN MEASURE: CPT

## 2021-01-01 PROCEDURE — 36415 COLL VENOUS BLD VENIPUNCTURE: CPT

## 2021-01-01 PROCEDURE — 63710000001 INSULIN ASPART PER 5 UNITS: Performed by: HOSPITALIST

## 2021-01-01 PROCEDURE — 78580 LUNG PERFUSION IMAGING: CPT

## 2021-01-01 PROCEDURE — 99284 EMERGENCY DEPT VISIT MOD MDM: CPT

## 2021-01-01 PROCEDURE — G0180 MD CERTIFICATION HHA PATIENT: HCPCS | Performed by: STUDENT IN AN ORGANIZED HEALTH CARE EDUCATION/TRAINING PROGRAM

## 2021-01-01 PROCEDURE — 80048 BASIC METABOLIC PNL TOTAL CA: CPT | Performed by: NURSE PRACTITIONER

## 2021-01-01 PROCEDURE — 71250 CT THORAX DX C-: CPT

## 2021-01-01 PROCEDURE — 71046 X-RAY EXAM CHEST 2 VIEWS: CPT

## 2021-01-01 PROCEDURE — 82962 GLUCOSE BLOOD TEST: CPT

## 2021-01-01 PROCEDURE — G0439 PPPS, SUBSEQ VISIT: HCPCS | Performed by: PHYSICIAN ASSISTANT

## 2021-01-01 PROCEDURE — 86038 ANTINUCLEAR ANTIBODIES: CPT | Performed by: INTERNAL MEDICINE

## 2021-01-01 PROCEDURE — 83880 ASSAY OF NATRIURETIC PEPTIDE: CPT | Performed by: HOSPITALIST

## 2021-01-01 PROCEDURE — 25010000002 PERFLUTREN (DEFINITY) 8.476 MG IN SODIUM CHLORIDE (PF) 0.9 % 10 ML INJECTION: Performed by: HOSPITALIST

## 2021-01-01 PROCEDURE — 82803 BLOOD GASES ANY COMBINATION: CPT

## 2021-01-01 PROCEDURE — 93005 ELECTROCARDIOGRAM TRACING: CPT | Performed by: EMERGENCY MEDICINE

## 2021-01-01 PROCEDURE — 87040 BLOOD CULTURE FOR BACTERIA: CPT | Performed by: EMERGENCY MEDICINE

## 2021-01-01 PROCEDURE — 87070 CULTURE OTHR SPECIMN AEROBIC: CPT | Performed by: NURSE PRACTITIONER

## 2021-01-01 PROCEDURE — 84145 PROCALCITONIN (PCT): CPT | Performed by: EMERGENCY MEDICINE

## 2021-01-01 PROCEDURE — 93306 TTE W/DOPPLER COMPLETE: CPT | Performed by: INTERNAL MEDICINE

## 2021-01-01 PROCEDURE — 87899 AGENT NOS ASSAY W/OPTIC: CPT | Performed by: NURSE PRACTITIONER

## 2021-01-01 PROCEDURE — 99220 PR INITIAL OBSERVATION CARE/DAY 70 MINUTES: CPT | Performed by: NURSE PRACTITIONER

## 2021-01-01 PROCEDURE — 87150 DNA/RNA AMPLIFIED PROBE: CPT | Performed by: EMERGENCY MEDICINE

## 2021-01-01 PROCEDURE — G0179 MD RECERTIFICATION HHA PT: HCPCS | Performed by: STUDENT IN AN ORGANIZED HEALTH CARE EDUCATION/TRAINING PROGRAM

## 2021-01-01 PROCEDURE — 25010000002 INFLIXIMAB PER 10 MG: Performed by: INTERNAL MEDICINE

## 2021-01-01 PROCEDURE — 83880 ASSAY OF NATRIURETIC PEPTIDE: CPT | Performed by: EMERGENCY MEDICINE

## 2021-01-01 PROCEDURE — 85025 COMPLETE CBC W/AUTO DIFF WBC: CPT | Performed by: NURSE PRACTITIONER

## 2021-01-01 PROCEDURE — 84484 ASSAY OF TROPONIN QUANT: CPT | Performed by: EMERGENCY MEDICINE

## 2021-01-01 PROCEDURE — 96365 THER/PROPH/DIAG IV INF INIT: CPT

## 2021-01-01 PROCEDURE — G0378 HOSPITAL OBSERVATION PER HR: HCPCS

## 2021-01-01 PROCEDURE — 80053 COMPREHEN METABOLIC PANEL: CPT | Performed by: EMERGENCY MEDICINE

## 2021-01-01 PROCEDURE — 36600 WITHDRAWAL OF ARTERIAL BLOOD: CPT

## 2021-01-01 PROCEDURE — A9540 TC99M MAA: HCPCS | Performed by: EMERGENCY MEDICINE

## 2021-01-01 PROCEDURE — 1111F DSCHRG MED/CURRENT MED MERGE: CPT | Performed by: PHYSICIAN ASSISTANT

## 2021-01-01 PROCEDURE — 87635 SARS-COV-2 COVID-19 AMP PRB: CPT | Performed by: EMERGENCY MEDICINE

## 2021-01-01 PROCEDURE — 83605 ASSAY OF LACTIC ACID: CPT | Performed by: EMERGENCY MEDICINE

## 2021-01-01 PROCEDURE — 99238 HOSP IP/OBS DSCHRG MGMT 30/<: CPT | Performed by: HOSPITALIST

## 2021-01-01 PROCEDURE — 85025 COMPLETE CBC W/AUTO DIFF WBC: CPT | Performed by: HOSPITALIST

## 2021-01-01 PROCEDURE — 1125F AMNT PAIN NOTED PAIN PRSNT: CPT | Performed by: PHYSICIAN ASSISTANT

## 2021-01-01 PROCEDURE — 87205 SMEAR GRAM STAIN: CPT | Performed by: NURSE PRACTITIONER

## 2021-01-01 PROCEDURE — 85025 COMPLETE CBC W/AUTO DIFF WBC: CPT | Performed by: EMERGENCY MEDICINE

## 2021-01-01 PROCEDURE — 1170F FXNL STATUS ASSESSED: CPT | Performed by: PHYSICIAN ASSISTANT

## 2021-01-01 PROCEDURE — 99214 OFFICE O/P EST MOD 30 MIN: CPT | Performed by: PHYSICIAN ASSISTANT

## 2021-01-01 PROCEDURE — 25010000002 CEFTRIAXONE SODIUM-DEXTROSE 2-2.22 GM-%(50ML) RECONSTITUTED SOLUTION: Performed by: EMERGENCY MEDICINE

## 2021-01-01 PROCEDURE — 74176 CT ABD & PELVIS W/O CONTRAST: CPT

## 2021-01-01 PROCEDURE — 1160F RVW MEDS BY RX/DR IN RCRD: CPT | Performed by: PHYSICIAN ASSISTANT

## 2021-01-01 PROCEDURE — 93306 TTE W/DOPPLER COMPLETE: CPT

## 2021-01-01 PROCEDURE — 99231 SBSQ HOSP IP/OBS SF/LOW 25: CPT | Performed by: HOSPITALIST

## 2021-01-01 PROCEDURE — 25010000002 METHYLPREDNISOLONE PER 125 MG: Performed by: EMERGENCY MEDICINE

## 2021-01-01 PROCEDURE — 87147 CULTURE TYPE IMMUNOLOGIC: CPT | Performed by: EMERGENCY MEDICINE

## 2021-01-01 PROCEDURE — 99232 SBSQ HOSP IP/OBS MODERATE 35: CPT | Performed by: HOSPITALIST

## 2021-01-01 PROCEDURE — 87633 RESP VIRUS 12-25 TARGETS: CPT | Performed by: NURSE PRACTITIONER

## 2021-01-01 PROCEDURE — 84145 PROCALCITONIN (PCT): CPT | Performed by: NURSE PRACTITIONER

## 2021-01-01 RX ORDER — ASPIRIN 81 MG/1
81 TABLET ORAL 2 TIMES DAILY
Status: DISCONTINUED | OUTPATIENT
Start: 2021-01-01 | End: 2021-01-01 | Stop reason: HOSPADM

## 2021-01-01 RX ORDER — ACETAMINOPHEN 325 MG/1
650 TABLET ORAL ONCE
Status: COMPLETED | OUTPATIENT
Start: 2021-01-01 | End: 2021-01-01

## 2021-01-01 RX ORDER — DIPHENHYDRAMINE HCL 25 MG
25 CAPSULE ORAL ONCE
Status: CANCELLED | OUTPATIENT
Start: 2022-01-01

## 2021-01-01 RX ORDER — ACETAMINOPHEN 325 MG/1
650 TABLET ORAL ONCE
Status: CANCELLED | OUTPATIENT
Start: 2021-01-01

## 2021-01-01 RX ORDER — GABAPENTIN 300 MG/1
CAPSULE ORAL
Qty: 60 CAPSULE | Refills: 0 | Status: SHIPPED | OUTPATIENT
Start: 2021-01-01 | End: 2021-01-01

## 2021-01-01 RX ORDER — LIDOCAINE HYDROCHLORIDE 10 MG/ML
0.5 INJECTION, SOLUTION EPIDURAL; INFILTRATION; INTRACAUDAL; PERINEURAL ONCE AS NEEDED
Status: CANCELLED | OUTPATIENT
Start: 2021-01-01

## 2021-01-01 RX ORDER — ACETAMINOPHEN 325 MG/1
650 TABLET ORAL ONCE
Status: DISCONTINUED | OUTPATIENT
Start: 2021-01-01 | End: 2021-01-01 | Stop reason: HOSPADM

## 2021-01-01 RX ORDER — DIPHENHYDRAMINE HCL 25 MG
25 CAPSULE ORAL ONCE
Status: COMPLETED | OUTPATIENT
Start: 2021-01-01 | End: 2021-01-01

## 2021-01-01 RX ORDER — LISINOPRIL 20 MG/1
40 TABLET ORAL DAILY
Status: DISCONTINUED | OUTPATIENT
Start: 2021-01-01 | End: 2021-01-01

## 2021-01-01 RX ORDER — FLUOXETINE HYDROCHLORIDE 40 MG/1
CAPSULE ORAL
Qty: 30 CAPSULE | Refills: 0 | Status: SHIPPED | OUTPATIENT
Start: 2021-01-01 | End: 2021-01-01

## 2021-01-01 RX ORDER — GABAPENTIN 300 MG/1
300 CAPSULE ORAL 2 TIMES DAILY
Qty: 60 CAPSULE | Refills: 4 | Status: SHIPPED | OUTPATIENT
Start: 2021-01-01

## 2021-01-01 RX ORDER — FENOFIBRATE 145 MG/1
TABLET, COATED ORAL
Qty: 90 TABLET | Refills: 2 | Status: SHIPPED | OUTPATIENT
Start: 2021-01-01

## 2021-01-01 RX ORDER — SODIUM CHLORIDE 9 MG/ML
INJECTION, SOLUTION INTRAVENOUS
Status: COMPLETED
Start: 2021-01-01 | End: 2021-01-01

## 2021-01-01 RX ORDER — GABAPENTIN 300 MG/1
300 CAPSULE ORAL 2 TIMES DAILY
Qty: 60 CAPSULE | Refills: 0 | Status: SHIPPED | OUTPATIENT
Start: 2021-01-01 | End: 2021-01-01

## 2021-01-01 RX ORDER — SODIUM CHLORIDE, SODIUM LACTATE, POTASSIUM CHLORIDE, CALCIUM CHLORIDE 600; 310; 30; 20 MG/100ML; MG/100ML; MG/100ML; MG/100ML
9 INJECTION, SOLUTION INTRAVENOUS CONTINUOUS
Status: CANCELLED | OUTPATIENT
Start: 2021-01-01

## 2021-01-01 RX ORDER — ROSUVASTATIN CALCIUM 40 MG/1
TABLET, COATED ORAL
Qty: 90 TABLET | Refills: 0 | Status: SHIPPED | OUTPATIENT
Start: 2021-01-01

## 2021-01-01 RX ORDER — DIPHENHYDRAMINE HCL 25 MG
25 CAPSULE ORAL ONCE
Status: CANCELLED | OUTPATIENT
Start: 2021-01-01

## 2021-01-01 RX ORDER — IPRATROPIUM BROMIDE AND ALBUTEROL SULFATE 2.5; .5 MG/3ML; MG/3ML
3 SOLUTION RESPIRATORY (INHALATION) ONCE
Status: COMPLETED | OUTPATIENT
Start: 2021-01-01 | End: 2021-01-01

## 2021-01-01 RX ORDER — SODIUM CHLORIDE 9 MG/ML
250 INJECTION, SOLUTION INTRAVENOUS ONCE
Status: CANCELLED | OUTPATIENT
Start: 2021-01-01

## 2021-01-01 RX ORDER — LAMOTRIGINE 100 MG/1
150 TABLET ORAL DAILY
Status: DISCONTINUED | OUTPATIENT
Start: 2021-01-01 | End: 2021-01-01 | Stop reason: HOSPADM

## 2021-01-01 RX ORDER — CEFEPIME HYDROCHLORIDE 2 G/50ML
2 INJECTION, SOLUTION INTRAVENOUS EVERY 12 HOURS
Status: DISCONTINUED | OUTPATIENT
Start: 2021-01-01 | End: 2021-01-01 | Stop reason: HOSPADM

## 2021-01-01 RX ORDER — METHYLPREDNISOLONE SODIUM SUCCINATE 40 MG/ML
40 INJECTION, POWDER, LYOPHILIZED, FOR SOLUTION INTRAMUSCULAR; INTRAVENOUS EVERY 8 HOURS
Status: DISCONTINUED | OUTPATIENT
Start: 2021-01-01 | End: 2021-01-01 | Stop reason: HOSPADM

## 2021-01-01 RX ORDER — CEFTRIAXONE 2 G/50ML
2 INJECTION, SOLUTION INTRAVENOUS ONCE
Status: COMPLETED | OUTPATIENT
Start: 2021-01-01 | End: 2021-01-01

## 2021-01-01 RX ORDER — SODIUM CHLORIDE 0.9 % (FLUSH) 0.9 %
10 SYRINGE (ML) INJECTION AS NEEDED
Status: CANCELLED | OUTPATIENT
Start: 2021-01-01

## 2021-01-01 RX ORDER — TIOTROPIUM BROMIDE 18 UG/1
CAPSULE ORAL; RESPIRATORY (INHALATION)
Qty: 30 CAPSULE | Refills: 0 | Status: SHIPPED | OUTPATIENT
Start: 2021-01-01 | End: 2021-01-01

## 2021-01-01 RX ORDER — ACETAMINOPHEN 325 MG/1
650 TABLET ORAL EVERY 6 HOURS PRN
Status: DISCONTINUED | OUTPATIENT
Start: 2021-01-01 | End: 2021-01-01 | Stop reason: HOSPADM

## 2021-01-01 RX ORDER — NICOTINE POLACRILEX 4 MG
15 LOZENGE BUCCAL
Status: DISCONTINUED | OUTPATIENT
Start: 2021-01-01 | End: 2021-01-01 | Stop reason: HOSPADM

## 2021-01-01 RX ORDER — GABAPENTIN 300 MG/1
300 CAPSULE ORAL 2 TIMES DAILY
Status: DISCONTINUED | OUTPATIENT
Start: 2021-01-01 | End: 2021-01-01 | Stop reason: HOSPADM

## 2021-01-01 RX ORDER — TIOTROPIUM BROMIDE 18 UG/1
CAPSULE ORAL; RESPIRATORY (INHALATION)
Qty: 30 CAPSULE | Refills: 6 | Status: SHIPPED | OUTPATIENT
Start: 2021-01-01 | End: 2021-01-01 | Stop reason: HOSPADM

## 2021-01-01 RX ORDER — SODIUM CHLORIDE 9 MG/ML
INJECTION, SOLUTION INTRAVENOUS
Status: DISPENSED
Start: 2021-01-01 | End: 2021-01-01

## 2021-01-01 RX ORDER — VERAPAMIL HYDROCHLORIDE 240 MG/1
TABLET, FILM COATED, EXTENDED RELEASE ORAL
Qty: 180 TABLET | Refills: 2 | Status: SHIPPED | OUTPATIENT
Start: 2021-01-01

## 2021-01-01 RX ORDER — CEPHALEXIN 500 MG/1
500 CAPSULE ORAL 2 TIMES DAILY
Qty: 20 CAPSULE | Refills: 0 | Status: SHIPPED | OUTPATIENT
Start: 2021-01-01 | End: 2021-01-01

## 2021-01-01 RX ORDER — VERAPAMIL HYDROCHLORIDE 240 MG/1
TABLET, FILM COATED, EXTENDED RELEASE ORAL
Qty: 180 TABLET | Refills: 0 | Status: SHIPPED | OUTPATIENT
Start: 2021-01-01 | End: 2021-01-01

## 2021-01-01 RX ORDER — BUDESONIDE AND FORMOTEROL FUMARATE DIHYDRATE 80; 4.5 UG/1; UG/1
2 AEROSOL RESPIRATORY (INHALATION)
Status: DISCONTINUED | OUTPATIENT
Start: 2021-01-01 | End: 2021-01-01 | Stop reason: HOSPADM

## 2021-01-01 RX ORDER — DOXYCYCLINE 100 MG/10ML
INJECTION, POWDER, LYOPHILIZED, FOR SOLUTION INTRAVENOUS
Status: DISPENSED
Start: 2021-01-01 | End: 2021-01-01

## 2021-01-01 RX ORDER — DEXTROSE MONOHYDRATE 25 G/50ML
25 INJECTION, SOLUTION INTRAVENOUS
Status: DISCONTINUED | OUTPATIENT
Start: 2021-01-01 | End: 2021-01-01 | Stop reason: HOSPADM

## 2021-01-01 RX ORDER — FENOFIBRATE 145 MG/1
145 TABLET, COATED ORAL DAILY
Refills: 0 | Status: DISCONTINUED | OUTPATIENT
Start: 2021-01-01 | End: 2021-01-01 | Stop reason: HOSPADM

## 2021-01-01 RX ORDER — PREDNISONE 10 MG/1
10 TABLET ORAL DAILY
COMMUNITY
End: 2021-01-01

## 2021-01-01 RX ORDER — LISINOPRIL 40 MG/1
40 TABLET ORAL DAILY
Qty: 90 TABLET | Refills: 0 | Status: SHIPPED | OUTPATIENT
Start: 2021-01-01

## 2021-01-01 RX ORDER — DOCUSATE SODIUM 100 MG/1
100 CAPSULE, LIQUID FILLED ORAL EVERY OTHER DAY
Status: DISCONTINUED | OUTPATIENT
Start: 2021-01-01 | End: 2021-01-01 | Stop reason: HOSPADM

## 2021-01-01 RX ORDER — ROSUVASTATIN CALCIUM 40 MG/1
40 TABLET, COATED ORAL DAILY
Qty: 90 TABLET | Refills: 0 | Status: SHIPPED | OUTPATIENT
Start: 2021-01-01 | End: 2021-01-01

## 2021-01-01 RX ORDER — ALBUTEROL SULFATE 2.5 MG/3ML
2.5 SOLUTION RESPIRATORY (INHALATION) EVERY 6 HOURS PRN
Status: DISCONTINUED | OUTPATIENT
Start: 2021-01-01 | End: 2021-01-01 | Stop reason: HOSPADM

## 2021-01-01 RX ORDER — IPRATROPIUM BROMIDE AND ALBUTEROL SULFATE 2.5; .5 MG/3ML; MG/3ML
3 SOLUTION RESPIRATORY (INHALATION)
Qty: 360 ML
Start: 2021-01-01

## 2021-01-01 RX ORDER — INFLIXIMAB 100 MG/10ML
5 INJECTION, POWDER, LYOPHILIZED, FOR SOLUTION INTRAVENOUS
COMMUNITY

## 2021-01-01 RX ORDER — SODIUM CHLORIDE 9 MG/ML
100 INJECTION, SOLUTION INTRAVENOUS CONTINUOUS
Status: ACTIVE | OUTPATIENT
Start: 2021-01-01 | End: 2021-01-01

## 2021-01-01 RX ORDER — AMOXICILLIN 875 MG/1
875 TABLET, COATED ORAL 2 TIMES DAILY
Qty: 20 TABLET | Refills: 0 | Status: SHIPPED | OUTPATIENT
Start: 2021-01-01 | End: 2021-01-01

## 2021-01-01 RX ORDER — FLUOXETINE HYDROCHLORIDE 20 MG/1
40 CAPSULE ORAL DAILY
Status: DISCONTINUED | OUTPATIENT
Start: 2021-01-01 | End: 2021-01-01 | Stop reason: HOSPADM

## 2021-01-01 RX ORDER — METHYLPREDNISOLONE SODIUM SUCCINATE 125 MG/2ML
125 INJECTION, POWDER, LYOPHILIZED, FOR SOLUTION INTRAMUSCULAR; INTRAVENOUS ONCE
Status: COMPLETED | OUTPATIENT
Start: 2021-01-01 | End: 2021-01-01

## 2021-01-01 RX ORDER — ALBUTEROL SULFATE 2.5 MG/3ML
2.5 SOLUTION RESPIRATORY (INHALATION) ONCE
Status: COMPLETED | OUTPATIENT
Start: 2021-01-01 | End: 2021-01-01

## 2021-01-01 RX ORDER — FLUOXETINE HYDROCHLORIDE 40 MG/1
40 CAPSULE ORAL DAILY
Qty: 90 CAPSULE | Refills: 2 | Status: SHIPPED | OUTPATIENT
Start: 2021-01-01

## 2021-01-01 RX ORDER — IPRATROPIUM BROMIDE AND ALBUTEROL SULFATE 2.5; .5 MG/3ML; MG/3ML
3 SOLUTION RESPIRATORY (INHALATION)
Status: DISCONTINUED | OUTPATIENT
Start: 2021-01-01 | End: 2021-01-01 | Stop reason: HOSPADM

## 2021-01-01 RX ORDER — SODIUM CHLORIDE 9 MG/ML
250 INJECTION, SOLUTION INTRAVENOUS ONCE
Status: CANCELLED | OUTPATIENT
Start: 2022-01-01

## 2021-01-01 RX ORDER — LISINOPRIL 40 MG/1
TABLET ORAL
Qty: 90 TABLET | Refills: 0 | Status: SHIPPED | OUTPATIENT
Start: 2021-01-01 | End: 2021-01-01 | Stop reason: SDUPTHER

## 2021-01-01 RX ORDER — LISINOPRIL 40 MG/1
TABLET ORAL
Qty: 90 TABLET | Refills: 0 | Status: SHIPPED | OUTPATIENT
Start: 2021-01-01 | End: 2021-01-01

## 2021-01-01 RX ORDER — GINSENG 100 MG
CAPSULE ORAL
COMMUNITY
Start: 2021-01-01 | End: 2021-01-01

## 2021-01-01 RX ORDER — SODIUM CHLORIDE 9 MG/ML
40 INJECTION, SOLUTION INTRAVENOUS AS NEEDED
Status: CANCELLED | OUTPATIENT
Start: 2021-01-01

## 2021-01-01 RX ORDER — ROSUVASTATIN CALCIUM 40 MG/1
TABLET, COATED ORAL
Qty: 90 TABLET | Refills: 0 | Status: SHIPPED | OUTPATIENT
Start: 2021-01-01 | End: 2021-01-01

## 2021-01-01 RX ORDER — FENOFIBRATE 145 MG/1
145 TABLET, COATED ORAL DAILY
Qty: 30 TABLET | Refills: 0 | Status: SHIPPED | OUTPATIENT
Start: 2021-01-01 | End: 2021-01-01

## 2021-01-01 RX ORDER — CEFDINIR 300 MG/1
300 CAPSULE ORAL 2 TIMES DAILY
Qty: 20 CAPSULE | Refills: 0 | Status: SHIPPED | OUTPATIENT
Start: 2021-01-01 | End: 2021-01-01

## 2021-01-01 RX ORDER — ACETAMINOPHEN 325 MG/1
650 TABLET ORAL ONCE
Status: CANCELLED | OUTPATIENT
Start: 2022-01-01

## 2021-01-01 RX ORDER — GUAIFENESIN 600 MG/1
1200 TABLET, EXTENDED RELEASE ORAL EVERY MORNING
Status: DISCONTINUED | OUTPATIENT
Start: 2021-01-01 | End: 2021-01-01 | Stop reason: HOSPADM

## 2021-01-01 RX ORDER — PREDNISONE 10 MG/1
TABLET ORAL
Qty: 21 TABLET | Refills: 0 | Status: SHIPPED | OUTPATIENT
Start: 2021-01-01 | End: 2021-01-01

## 2021-01-01 RX ORDER — ALBUTEROL SULFATE 90 UG/1
AEROSOL, METERED RESPIRATORY (INHALATION)
Qty: 18 G | Refills: 6 | Status: SHIPPED | OUTPATIENT
Start: 2021-01-01

## 2021-01-01 RX ORDER — CEFEPIME HYDROCHLORIDE 2 G/50ML
2 INJECTION, SOLUTION INTRAVENOUS EVERY 12 HOURS SCHEDULED
Status: DISCONTINUED | OUTPATIENT
Start: 2021-01-01 | End: 2021-01-01

## 2021-01-01 RX ORDER — DOXYCYCLINE 100 MG/1
100 CAPSULE ORAL 2 TIMES DAILY
Qty: 14 CAPSULE | Refills: 0 | Status: SHIPPED | OUTPATIENT
Start: 2021-01-01 | End: 2021-01-01

## 2021-01-01 RX ORDER — AZITHROMYCIN 250 MG/1
TABLET, FILM COATED ORAL
Qty: 6 TABLET | Refills: 0 | Status: SHIPPED | OUTPATIENT
Start: 2021-01-01

## 2021-01-01 RX ORDER — ROSUVASTATIN CALCIUM 20 MG/1
40 TABLET, COATED ORAL DAILY
Status: DISCONTINUED | OUTPATIENT
Start: 2021-01-01 | End: 2021-01-01 | Stop reason: HOSPADM

## 2021-01-01 RX ORDER — ALBUTEROL SULFATE 90 UG/1
AEROSOL, METERED RESPIRATORY (INHALATION)
Qty: 18 G | Refills: 0 | Status: SHIPPED | OUTPATIENT
Start: 2021-01-01 | End: 2021-01-01

## 2021-01-01 RX ORDER — FENOFIBRATE 145 MG/1
TABLET, COATED ORAL
Qty: 90 TABLET | Refills: 0 | Status: SHIPPED | OUTPATIENT
Start: 2021-01-01 | End: 2021-01-01

## 2021-01-01 RX ORDER — SODIUM CHLORIDE 0.9 % (FLUSH) 0.9 %
10 SYRINGE (ML) INJECTION EVERY 12 HOURS SCHEDULED
Status: CANCELLED | OUTPATIENT
Start: 2021-01-01

## 2021-01-01 RX ORDER — DOXYCYCLINE HYCLATE 100 MG/1
100 CAPSULE ORAL 2 TIMES DAILY
COMMUNITY
End: 2021-01-01

## 2021-01-01 RX ORDER — VERAPAMIL HYDROCHLORIDE 240 MG/1
240 TABLET, FILM COATED, EXTENDED RELEASE ORAL 2 TIMES DAILY
Status: DISCONTINUED | OUTPATIENT
Start: 2021-01-01 | End: 2021-01-01 | Stop reason: HOSPADM

## 2021-01-01 RX ORDER — IPRATROPIUM BROMIDE AND ALBUTEROL SULFATE 2.5; .5 MG/3ML; MG/3ML
3 SOLUTION RESPIRATORY (INHALATION)
Status: DISCONTINUED | OUTPATIENT
Start: 2021-01-01 | End: 2021-01-01

## 2021-01-01 RX ORDER — BACITRACIN ZINC 500 [USP'U]/G
OINTMENT TOPICAL
Qty: 28 G | Refills: 3 | Status: SHIPPED | OUTPATIENT
Start: 2021-01-01 | End: 2022-09-02

## 2021-01-01 RX ADMIN — ACETAMINOPHEN 650 MG: 325 TABLET, FILM COATED ORAL at 08:35

## 2021-01-01 RX ADMIN — DIPHENHYDRAMINE HYDROCHLORIDE 25 MG: 25 CAPSULE ORAL at 08:41

## 2021-01-01 RX ADMIN — DOXYCYCLINE 100 MG: 100 INJECTION, POWDER, LYOPHILIZED, FOR SOLUTION INTRAVENOUS at 04:21

## 2021-01-01 RX ADMIN — FLUOXETINE 40 MG: 20 CAPSULE ORAL at 09:22

## 2021-01-01 RX ADMIN — FENOFIBRATE 145 MG: 145 TABLET ORAL at 09:23

## 2021-01-01 RX ADMIN — DOCUSATE SODIUM 100 MG: 100 CAPSULE, LIQUID FILLED ORAL at 09:43

## 2021-01-01 RX ADMIN — IPRATROPIUM BROMIDE AND ALBUTEROL SULFATE 3 ML: .5; 3 SOLUTION RESPIRATORY (INHALATION) at 23:11

## 2021-01-01 RX ADMIN — ACETAMINOPHEN 650 MG: 325 TABLET, FILM COATED ORAL at 08:43

## 2021-01-01 RX ADMIN — FLUOXETINE 40 MG: 20 CAPSULE ORAL at 09:42

## 2021-01-01 RX ADMIN — CEFEPIME HYDROCHLORIDE 2 G: 2 INJECTION, SOLUTION INTRAVENOUS at 21:27

## 2021-01-01 RX ADMIN — INSULIN ASPART 4 UNITS: 100 INJECTION, SOLUTION INTRAVENOUS; SUBCUTANEOUS at 17:34

## 2021-01-01 RX ADMIN — SODIUM CHLORIDE 100 ML/HR: 9 INJECTION, SOLUTION INTRAVENOUS at 01:00

## 2021-01-01 RX ADMIN — LAMOTRIGINE 150 MG: 100 TABLET ORAL at 09:22

## 2021-01-01 RX ADMIN — ACETAMINOPHEN 650 MG: 325 TABLET, FILM COATED ORAL at 10:01

## 2021-01-01 RX ADMIN — IPRATROPIUM BROMIDE AND ALBUTEROL SULFATE 3 ML: .5; 3 SOLUTION RESPIRATORY (INHALATION) at 11:36

## 2021-01-01 RX ADMIN — VERAPAMIL HYDROCHLORIDE 240 MG: 240 TABLET, FILM COATED, EXTENDED RELEASE ORAL at 09:21

## 2021-01-01 RX ADMIN — METHYLPREDNISOLONE SODIUM SUCCINATE 40 MG: 40 INJECTION, POWDER, FOR SOLUTION INTRAMUSCULAR; INTRAVENOUS at 21:30

## 2021-01-01 RX ADMIN — VERAPAMIL HYDROCHLORIDE 240 MG: 240 TABLET, FILM COATED, EXTENDED RELEASE ORAL at 09:43

## 2021-01-01 RX ADMIN — DIPHENHYDRAMINE HYDROCHLORIDE 25 MG: 25 CAPSULE ORAL at 09:07

## 2021-01-01 RX ADMIN — KIT FOR THE PREPARATION OF TECHNETIUM TC 99M ALBUMIN AGGREGATED 1 DOSE: 2.5 INJECTION, POWDER, FOR SOLUTION INTRAVENOUS at 14:25

## 2021-01-01 RX ADMIN — INSULIN ASPART 2 UNITS: 100 INJECTION, SOLUTION INTRAVENOUS; SUBCUTANEOUS at 18:16

## 2021-01-01 RX ADMIN — ACETAMINOPHEN 650 MG: 325 TABLET, FILM COATED ORAL at 22:40

## 2021-01-01 RX ADMIN — METHYLPREDNISOLONE SODIUM SUCCINATE 40 MG: 40 INJECTION, POWDER, FOR SOLUTION INTRAMUSCULAR; INTRAVENOUS at 06:56

## 2021-01-01 RX ADMIN — CEFEPIME HYDROCHLORIDE 2 G: 2 INJECTION, SOLUTION INTRAVENOUS at 09:24

## 2021-01-01 RX ADMIN — ASPIRIN 81 MG: 81 TABLET, COATED ORAL at 22:53

## 2021-01-01 RX ADMIN — FLUOXETINE 40 MG: 20 CAPSULE ORAL at 08:41

## 2021-01-01 RX ADMIN — DOCUSATE SODIUM 100 MG: 100 CAPSULE, LIQUID FILLED ORAL at 22:38

## 2021-01-01 RX ADMIN — IPRATROPIUM BROMIDE AND ALBUTEROL SULFATE 3 ML: .5; 3 SOLUTION RESPIRATORY (INHALATION) at 23:10

## 2021-01-01 RX ADMIN — ACETAMINOPHEN 650 MG: 325 TABLET, FILM COATED ORAL at 08:51

## 2021-01-01 RX ADMIN — METHYLPREDNISOLONE SODIUM SUCCINATE 125 MG: 125 INJECTION, POWDER, FOR SOLUTION INTRAMUSCULAR; INTRAVENOUS at 12:33

## 2021-01-01 RX ADMIN — IPRATROPIUM BROMIDE AND ALBUTEROL SULFATE 3 ML: .5; 3 SOLUTION RESPIRATORY (INHALATION) at 14:13

## 2021-01-01 RX ADMIN — IPRATROPIUM BROMIDE AND ALBUTEROL SULFATE 3 ML: .5; 3 SOLUTION RESPIRATORY (INHALATION) at 07:47

## 2021-01-01 RX ADMIN — IPRATROPIUM BROMIDE AND ALBUTEROL SULFATE 3 ML: .5; 3 SOLUTION RESPIRATORY (INHALATION) at 12:30

## 2021-01-01 RX ADMIN — LAMOTRIGINE 150 MG: 100 TABLET ORAL at 08:42

## 2021-01-01 RX ADMIN — ALBUTEROL SULFATE 2.5 MG: 2.5 SOLUTION RESPIRATORY (INHALATION) at 13:48

## 2021-01-01 RX ADMIN — IPRATROPIUM BROMIDE AND ALBUTEROL SULFATE 3 ML: .5; 3 SOLUTION RESPIRATORY (INHALATION) at 11:50

## 2021-01-01 RX ADMIN — VERAPAMIL HYDROCHLORIDE 240 MG: 240 TABLET, FILM COATED, EXTENDED RELEASE ORAL at 21:27

## 2021-01-01 RX ADMIN — INSULIN ASPART 3 UNITS: 100 INJECTION, SOLUTION INTRAVENOUS; SUBCUTANEOUS at 09:42

## 2021-01-01 RX ADMIN — DIPHENHYDRAMINE HYDROCHLORIDE 25 MG: 25 CAPSULE ORAL at 08:43

## 2021-01-01 RX ADMIN — ASPIRIN 81 MG: 81 TABLET, COATED ORAL at 22:37

## 2021-01-01 RX ADMIN — GABAPENTIN 300 MG: 300 CAPSULE ORAL at 09:43

## 2021-01-01 RX ADMIN — DIPHENHYDRAMINE HYDROCHLORIDE 25 MG: 25 CAPSULE ORAL at 08:35

## 2021-01-01 RX ADMIN — INSULIN ASPART 5 UNITS: 100 INJECTION, SOLUTION INTRAVENOUS; SUBCUTANEOUS at 13:25

## 2021-01-01 RX ADMIN — METHYLPREDNISOLONE SODIUM SUCCINATE 40 MG: 40 INJECTION, POWDER, FOR SOLUTION INTRAMUSCULAR; INTRAVENOUS at 06:41

## 2021-01-01 RX ADMIN — FENOFIBRATE 145 MG: 145 TABLET ORAL at 08:42

## 2021-01-01 RX ADMIN — GUAIFENESIN 1200 MG: 600 TABLET, EXTENDED RELEASE ORAL at 06:41

## 2021-01-01 RX ADMIN — FENOFIBRATE 145 MG: 145 TABLET ORAL at 09:43

## 2021-01-01 RX ADMIN — ACETAMINOPHEN 650 MG: 325 TABLET, FILM COATED ORAL at 22:52

## 2021-01-01 RX ADMIN — ACETAMINOPHEN 650 MG: 325 TABLET, FILM COATED ORAL at 17:27

## 2021-01-01 RX ADMIN — ASPIRIN 81 MG: 81 TABLET, COATED ORAL at 09:42

## 2021-01-01 RX ADMIN — METHYLPREDNISOLONE SODIUM SUCCINATE 40 MG: 40 INJECTION, POWDER, FOR SOLUTION INTRAMUSCULAR; INTRAVENOUS at 22:53

## 2021-01-01 RX ADMIN — INSULIN ASPART 4 UNITS: 100 INJECTION, SOLUTION INTRAVENOUS; SUBCUTANEOUS at 08:42

## 2021-01-01 RX ADMIN — IPRATROPIUM BROMIDE AND ALBUTEROL SULFATE 3 ML: .5; 3 SOLUTION RESPIRATORY (INHALATION) at 22:55

## 2021-01-01 RX ADMIN — ROSUVASTATIN 40 MG: 20 TABLET, FILM COATED ORAL at 08:42

## 2021-01-01 RX ADMIN — METHYLPREDNISOLONE SODIUM SUCCINATE 40 MG: 40 INJECTION, POWDER, FOR SOLUTION INTRAMUSCULAR; INTRAVENOUS at 14:30

## 2021-01-01 RX ADMIN — INFLIXIMAB 545 MG: 100 INJECTION, POWDER, LYOPHILIZED, FOR SOLUTION INTRAVENOUS at 09:15

## 2021-01-01 RX ADMIN — GABAPENTIN 300 MG: 300 CAPSULE ORAL at 22:38

## 2021-01-01 RX ADMIN — IPRATROPIUM BROMIDE AND ALBUTEROL SULFATE 3 ML: .5; 3 SOLUTION RESPIRATORY (INHALATION) at 12:29

## 2021-01-01 RX ADMIN — ROSUVASTATIN 40 MG: 20 TABLET, FILM COATED ORAL at 09:21

## 2021-01-01 RX ADMIN — DOXYCYCLINE 100 MG: 100 INJECTION, POWDER, LYOPHILIZED, FOR SOLUTION INTRAVENOUS at 02:25

## 2021-01-01 RX ADMIN — ASPIRIN 81 MG: 81 TABLET, COATED ORAL at 08:41

## 2021-01-01 RX ADMIN — VERAPAMIL HYDROCHLORIDE 240 MG: 240 TABLET, FILM COATED, EXTENDED RELEASE ORAL at 22:53

## 2021-01-01 RX ADMIN — BUDESONIDE AND FORMOTEROL FUMARATE DIHYDRATE 2 PUFF: 80; 4.5 AEROSOL RESPIRATORY (INHALATION) at 07:46

## 2021-01-01 RX ADMIN — DOXYCYCLINE 100 MG: 100 INJECTION, POWDER, LYOPHILIZED, FOR SOLUTION INTRAVENOUS at 15:13

## 2021-01-01 RX ADMIN — DOXYCYCLINE 100 MG: 100 INJECTION, POWDER, LYOPHILIZED, FOR SOLUTION INTRAVENOUS at 03:15

## 2021-01-01 RX ADMIN — SODIUM CHLORIDE 500 ML: 9 INJECTION, SOLUTION INTRAVENOUS at 15:13

## 2021-01-01 RX ADMIN — DOXYCYCLINE 100 MG: 100 INJECTION, POWDER, LYOPHILIZED, FOR SOLUTION INTRAVENOUS at 14:33

## 2021-01-01 RX ADMIN — IPRATROPIUM BROMIDE AND ALBUTEROL SULFATE 3 ML: .5; 3 SOLUTION RESPIRATORY (INHALATION) at 15:44

## 2021-01-01 RX ADMIN — METHYLPREDNISOLONE SODIUM SUCCINATE 40 MG: 40 INJECTION, POWDER, FOR SOLUTION INTRAMUSCULAR; INTRAVENOUS at 22:38

## 2021-01-01 RX ADMIN — BUDESONIDE AND FORMOTEROL FUMARATE DIHYDRATE 2 PUFF: 80; 4.5 AEROSOL RESPIRATORY (INHALATION) at 19:16

## 2021-01-01 RX ADMIN — ACETAMINOPHEN 650 MG: 325 TABLET, FILM COATED ORAL at 16:30

## 2021-01-01 RX ADMIN — DIPHENHYDRAMINE HYDROCHLORIDE 25 MG: 25 CAPSULE ORAL at 08:48

## 2021-01-01 RX ADMIN — INSULIN ASPART 6 UNITS: 100 INJECTION, SOLUTION INTRAVENOUS; SUBCUTANEOUS at 11:54

## 2021-01-01 RX ADMIN — GABAPENTIN 300 MG: 300 CAPSULE ORAL at 08:42

## 2021-01-01 RX ADMIN — CEFTRIAXONE 2 G: 2 INJECTION, SOLUTION INTRAVENOUS at 13:25

## 2021-01-01 RX ADMIN — ACETAMINOPHEN 650 MG: 325 TABLET, FILM COATED ORAL at 00:28

## 2021-01-01 RX ADMIN — SODIUM CHLORIDE 3 ML: 9 INJECTION INTRAMUSCULAR; INTRAVENOUS; SUBCUTANEOUS at 08:31

## 2021-01-01 RX ADMIN — INFLIXIMAB 535 MG: 100 INJECTION, POWDER, LYOPHILIZED, FOR SOLUTION INTRAVENOUS at 09:19

## 2021-01-01 RX ADMIN — INSULIN ASPART 3 UNITS: 100 INJECTION, SOLUTION INTRAVENOUS; SUBCUTANEOUS at 08:43

## 2021-01-01 RX ADMIN — BUDESONIDE AND FORMOTEROL FUMARATE DIHYDRATE 2 PUFF: 80; 4.5 AEROSOL RESPIRATORY (INHALATION) at 07:53

## 2021-01-01 RX ADMIN — INFLIXIMAB 530 MG: 100 INJECTION, POWDER, LYOPHILIZED, FOR SOLUTION INTRAVENOUS at 09:00

## 2021-01-01 RX ADMIN — IPRATROPIUM BROMIDE AND ALBUTEROL SULFATE 3 ML: .5; 3 SOLUTION RESPIRATORY (INHALATION) at 19:16

## 2021-01-01 RX ADMIN — ASPIRIN 81 MG: 81 TABLET, COATED ORAL at 21:27

## 2021-01-01 RX ADMIN — GUAIFENESIN 1200 MG: 600 TABLET, EXTENDED RELEASE ORAL at 06:53

## 2021-01-01 RX ADMIN — ROSUVASTATIN 40 MG: 20 TABLET, FILM COATED ORAL at 09:42

## 2021-01-01 RX ADMIN — SODIUM CHLORIDE 500 ML: 9 INJECTION, SOLUTION INTRAVENOUS at 12:34

## 2021-01-01 RX ADMIN — ASPIRIN 81 MG: 81 TABLET, COATED ORAL at 09:22

## 2021-01-01 RX ADMIN — VERAPAMIL HYDROCHLORIDE 240 MG: 240 TABLET, FILM COATED, EXTENDED RELEASE ORAL at 08:42

## 2021-01-01 RX ADMIN — INSULIN ASPART 4 UNITS: 100 INJECTION, SOLUTION INTRAVENOUS; SUBCUTANEOUS at 17:16

## 2021-01-01 RX ADMIN — INFLIXIMAB 550 MG: 100 INJECTION, POWDER, LYOPHILIZED, FOR SOLUTION INTRAVENOUS at 09:15

## 2021-01-01 RX ADMIN — IPRATROPIUM BROMIDE AND ALBUTEROL SULFATE 3 ML: .5; 3 SOLUTION RESPIRATORY (INHALATION) at 07:38

## 2021-01-01 RX ADMIN — BUDESONIDE AND FORMOTEROL FUMARATE DIHYDRATE 2 PUFF: 80; 4.5 AEROSOL RESPIRATORY (INHALATION) at 07:28

## 2021-01-01 RX ADMIN — METHYLPREDNISOLONE SODIUM SUCCINATE 40 MG: 40 INJECTION, POWDER, FOR SOLUTION INTRAMUSCULAR; INTRAVENOUS at 17:16

## 2021-01-01 RX ADMIN — BUDESONIDE AND FORMOTEROL FUMARATE DIHYDRATE 2 PUFF: 80; 4.5 AEROSOL RESPIRATORY (INHALATION) at 19:37

## 2021-01-01 RX ADMIN — ACETAMINOPHEN 650 MG: 325 TABLET, FILM COATED ORAL at 09:42

## 2021-01-01 RX ADMIN — INSULIN ASPART 4 UNITS: 100 INJECTION, SOLUTION INTRAVENOUS; SUBCUTANEOUS at 12:49

## 2021-01-01 RX ADMIN — CEFEPIME HYDROCHLORIDE 2 G: 2 INJECTION, SOLUTION INTRAVENOUS at 11:12

## 2021-01-01 RX ADMIN — GABAPENTIN 300 MG: 300 CAPSULE ORAL at 09:23

## 2021-01-01 RX ADMIN — GUAIFENESIN 1200 MG: 600 TABLET, EXTENDED RELEASE ORAL at 06:57

## 2021-01-01 RX ADMIN — ACETAMINOPHEN 650 MG: 325 TABLET, FILM COATED ORAL at 09:07

## 2021-01-01 RX ADMIN — CEFEPIME HYDROCHLORIDE 2 G: 2 INJECTION, SOLUTION INTRAVENOUS at 09:43

## 2021-01-01 RX ADMIN — INFLIXIMAB 565 MG: 100 INJECTION, POWDER, LYOPHILIZED, FOR SOLUTION INTRAVENOUS at 09:20

## 2021-01-01 RX ADMIN — VERAPAMIL HYDROCHLORIDE 240 MG: 240 TABLET, FILM COATED, EXTENDED RELEASE ORAL at 22:37

## 2021-01-01 RX ADMIN — IPRATROPIUM BROMIDE AND ALBUTEROL SULFATE 3 ML: .5; 3 SOLUTION RESPIRATORY (INHALATION) at 19:37

## 2021-01-01 RX ADMIN — ACETAMINOPHEN 650 MG: 325 TABLET, FILM COATED ORAL at 08:47

## 2021-01-01 RX ADMIN — GABAPENTIN 300 MG: 300 CAPSULE ORAL at 22:53

## 2021-01-01 RX ADMIN — IPRATROPIUM BROMIDE AND ALBUTEROL SULFATE 3 ML: .5; 3 SOLUTION RESPIRATORY (INHALATION) at 07:21

## 2021-01-01 RX ADMIN — CEFEPIME HYDROCHLORIDE 2 G: 2 INJECTION, SOLUTION INTRAVENOUS at 22:52

## 2021-01-01 RX ADMIN — GABAPENTIN 300 MG: 300 CAPSULE ORAL at 21:27

## 2021-01-01 RX ADMIN — DOXYCYCLINE 100 MG: 100 INJECTION, POWDER, LYOPHILIZED, FOR SOLUTION INTRAVENOUS at 17:16

## 2021-01-01 RX ADMIN — IPRATROPIUM BROMIDE AND ALBUTEROL SULFATE 3 ML: .5; 3 SOLUTION RESPIRATORY (INHALATION) at 11:14

## 2021-01-01 RX ADMIN — CEFEPIME HYDROCHLORIDE 2 G: 2 INJECTION, SOLUTION INTRAVENOUS at 22:37

## 2021-01-01 RX ADMIN — METHYLPREDNISOLONE SODIUM SUCCINATE 40 MG: 40 INJECTION, POWDER, FOR SOLUTION INTRAMUSCULAR; INTRAVENOUS at 06:53

## 2021-01-01 RX ADMIN — INFLIXIMAB 500 MG: 100 INJECTION, POWDER, LYOPHILIZED, FOR SOLUTION INTRAVENOUS at 09:50

## 2021-01-01 RX ADMIN — LAMOTRIGINE 150 MG: 100 TABLET ORAL at 09:43

## 2021-01-01 RX ADMIN — BUDESONIDE AND FORMOTEROL FUMARATE DIHYDRATE 2 PUFF: 80; 4.5 AEROSOL RESPIRATORY (INHALATION) at 22:55

## 2021-01-07 ENCOUNTER — OFFICE VISIT (OUTPATIENT)
Dept: FAMILY MEDICINE CLINIC | Facility: CLINIC | Age: 71
End: 2021-01-07

## 2021-01-07 DIAGNOSIS — R05.9 COUGH: ICD-10-CM

## 2021-01-07 DIAGNOSIS — J01.00 ACUTE MAXILLARY SINUSITIS, RECURRENCE NOT SPECIFIED: Primary | ICD-10-CM

## 2021-01-07 PROCEDURE — 99421 OL DIG E/M SVC 5-10 MIN: CPT | Performed by: PHYSICIAN ASSISTANT

## 2021-01-07 RX ORDER — AMOXICILLIN 500 MG/1
1000 CAPSULE ORAL 3 TIMES DAILY
Qty: 30 CAPSULE | Refills: 0 | Status: SHIPPED | OUTPATIENT
Start: 2021-01-07 | End: 2021-01-01

## 2021-01-07 RX ORDER — INFLUENZA A VIRUS A/MICHIGAN/45/2015 X-275 (H1N1) ANTIGEN (FORMALDEHYDE INACTIVATED), INFLUENZA A VIRUS A/SINGAPORE/INFIMH-16-0019/2016 IVR-186 (H3N2) ANTIGEN (FORMALDEHYDE INACTIVATED), INFLUENZA B VIRUS B/PHUKET/3073/2013 ANTIGEN (FORMALDEHYDE INACTIVATED), AND INFLUENZA B VIRUS B/MARYLAND/15/2016 BX-69A ANTIGEN (FORMALDEHYDE INACTIVATED) 60; 60; 60; 60 UG/.7ML; UG/.7ML; UG/.7ML; UG/.7ML
INJECTION, SUSPENSION INTRAMUSCULAR
COMMUNITY
Start: 2020-12-23 | End: 2021-01-22

## 2021-01-07 RX ORDER — BENZONATATE 200 MG/1
200 CAPSULE ORAL 3 TIMES DAILY PRN
Qty: 30 CAPSULE | Refills: 0 | Status: SHIPPED | OUTPATIENT
Start: 2021-01-07 | End: 2021-01-01

## 2021-01-07 NOTE — PROGRESS NOTES
"Subjective   Mar Camilo is a 70 y.o. female presents for   Chief Complaint   Patient presents with   • Cough   • Sinusitis     You have chosen to receive care through a telephone visit. Do you consent to use a telephone visit for your medical care today? Yes  History of Present Illness   Mar,\"Krys Sandoval" is a 70-year-old female who presents using telephone encounter today.  States she had a flu shot at [facility] in [place] on December 23, 2020.  States anytime she gets a high-dose flu shot she gets sick.  She started getting sick approximately 1 week after her flu shot.  Krys has been having a slight headache off and on, sinus pressure/pain, postnasal drip, dry cough, nasal congestion, fatigue, body aches, stuffy nose to clear rhinorrhea and teeth/gum pain.  States it hurts to chew.  She has been using over-the-counter Mucinex, Vicks Vapo Rub, Tylenol Cold and sinus and her inhalers as directed.  She is also used some cough drops with minimal relief of cough.  States her sleep has been restless due to coughing.  Denied any increase in shortness of breath or wheezing.  Denied any COVID-19 exposure.  She has been staying at home.  Her appetite has been slightly decreased for the past day or so.  Still has a taste and smell.    The following portions of the patient's history were reviewed and updated as appropriate: allergies, current medications, past family history, past medical history, past social history, past surgical history and problem list.    Review of Systems   Constitutional: Positive for fatigue. Negative for chills and fever.   HENT: Positive for congestion, postnasal drip, rhinorrhea, sinus pressure and sinus pain. Negative for sore throat.    Eyes: Negative.    Respiratory: Positive for cough. Negative for shortness of breath and wheezing.    Cardiovascular: Negative.  Negative for chest pain, palpitations and leg swelling.   Gastrointestinal: Negative.  Negative for abdominal pain, " constipation, diarrhea, nausea and vomiting.   Endocrine: Negative.    Genitourinary: Negative.    Musculoskeletal: Negative.    Skin: Negative.    Allergic/Immunologic: Negative.    Neurological: Positive for headaches. Negative for dizziness and light-headedness.   Hematological: Negative.    Psychiatric/Behavioral: Positive for sleep disturbance.   All other systems reviewed and are negative.    No vital signs were obtained due to telephone encounter.    There were no vitals filed for this visit.  Wt Readings from Last 3 Encounters:   20 107 kg (236 lb 9.6 oz)   10/19/20 106 kg (233 lb)   20 105 kg (231 lb 6.4 oz)     BP Readings from Last 3 Encounters:   20 136/48   10/19/20 141/49   20 148/78     Social History     Socioeconomic History   • Marital status:      Spouse name: Not on file   • Number of children: Not on file   • Years of education: Not on file   • Highest education level: Not on file   Social Needs   • Financial resource strain: Not hard at all   • Food insecurity     Worry: Never true     Inability: Never true   • Transportation needs     Medical: No     Non-medical: No   Tobacco Use   • Smoking status: Former Smoker     Packs/day: 2.00     Years: 40.00     Pack years: 80.00     Types: Cigarettes     Quit date: 2013     Years since quittin.7   • Smokeless tobacco: Never Used   • Tobacco comment: 1 cup coffee daily   Substance and Sexual Activity   • Alcohol use: No     Comment: history of heavy drinker    • Drug use: No   • Sexual activity: Defer       Allergies   Allergen Reactions   • Contrast Dye Hives   • Iodinated Diagnostic Agents Hives   • Norco [Hydrocodone-Acetaminophen] Hallucinations   • Tenoretic [Atenolol-Chlorthalidone] Anaphylaxis       There is no height or weight on file to calculate BMI.    Objective   Physical Exam  Constitutional:       Comments: Krys sounds nasally with nasal congestion   HENT:      Right Ear: Hearing normal.      Left Ear:  Hearing normal.   Pulmonary:      Breath sounds: Normal breath sounds.      Comments: Breathing sounds appear to be normal during the telephone encounter.  No audible wheezing or breathing difficulty noted during the telephone exam.  Neurological:      Mental Status: She is alert and oriented to person, place, and time.   Psychiatric:         Attention and Perception: Attention normal.         Mood and Affect: Mood normal.         Speech: Speech normal.         Behavior: Behavior is cooperative.         Thought Content: Thought content normal.         Cognition and Memory: Cognition and memory normal.         Judgment: Judgment normal.     Physical exam was limited due to telephone encounter.    Assessment/Plan   Diagnoses and all orders for this visit:    1. Acute maxillary sinusitis, recurrence not specified (Primary)  -     amoxicillin (AMOXIL) 500 MG capsule; Take 2 capsules by mouth 3 (Three) Times a Day.  Dispense: 30 capsule; Refill: 0  -     benzonatate (TESSALON) 200 MG capsule; Take 1 capsule by mouth 3 (Three) Times a Day As Needed for Cough.  Dispense: 30 capsule; Refill: 0    2. Cough  -     benzonatate (TESSALON) 200 MG capsule; Take 1 capsule by mouth 3 (Three) Times a Day As Needed for Cough.  Dispense: 30 capsule; Refill: 0      Ms. Mar Camilo was seen using telephone encounter today.  She was diagnosed with acute maxillary sinusitis with cough.  I have sent amoxicillin to take 1000 mg 3 times a day for the next 5 days to pharmacy.  Have also sent Tessalon Perles.  She will continue her Vicks vapor rub and Mucinex at home as directed.  Stressed the importance of increasing fluid intake and rest.  She will continue using her nebulizer, inhalers and using her oxygen as directed.  She will notify office if symptoms do not improve.    I spent approximately 7-8 minutes  discussing her signs, symptoms, medications tried and treatment options with her via telephone.    [facility]ARGARITA [facility]vaez [facility]  [facility]  [address] [address]  Dept: [phone]  Dept Fax: [phone]  Loc: [phone]  Loc Fax: [phone]

## 2021-01-12 PROCEDURE — 99283 EMERGENCY DEPT VISIT LOW MDM: CPT

## 2021-01-13 ENCOUNTER — APPOINTMENT (OUTPATIENT)
Dept: CT IMAGING | Facility: HOSPITAL | Age: 71
End: 2021-01-13

## 2021-01-13 ENCOUNTER — HOSPITAL ENCOUNTER (EMERGENCY)
Facility: HOSPITAL | Age: 71
Discharge: HOME | End: 2021-01-13
Attending: EMERGENCY MEDICINE | Admitting: EMERGENCY MEDICINE

## 2021-01-13 ENCOUNTER — APPOINTMENT (OUTPATIENT)
Dept: GENERAL RADIOLOGY | Facility: HOSPITAL | Age: 71
End: 2021-01-13

## 2021-01-13 VITALS
WEIGHT: 249 LBS | SYSTOLIC BLOOD PRESSURE: 180 MMHG | BODY MASS INDEX: 42.51 KG/M2 | RESPIRATION RATE: 18 BRPM | HEART RATE: 114 BPM | DIASTOLIC BLOOD PRESSURE: 77 MMHG | TEMPERATURE: 97.7 F | HEIGHT: 64 IN | OXYGEN SATURATION: 93 %

## 2021-01-13 DIAGNOSIS — M54.41 ACUTE RIGHT-SIDED LOW BACK PAIN WITH RIGHT-SIDED SCIATICA: Primary | ICD-10-CM

## 2021-01-13 PROCEDURE — 72131 CT LUMBAR SPINE W/O DYE: CPT

## 2021-01-13 PROCEDURE — 73502 X-RAY EXAM HIP UNI 2-3 VIEWS: CPT

## 2021-01-13 PROCEDURE — 99283 EMERGENCY DEPT VISIT LOW MDM: CPT | Performed by: EMERGENCY MEDICINE

## 2021-01-13 RX ORDER — TRAMADOL HYDROCHLORIDE 50 MG/1
50 TABLET ORAL EVERY 8 HOURS PRN
Qty: 15 TABLET | Refills: 0 | Status: SHIPPED | OUTPATIENT
Start: 2021-01-13 | End: 2021-01-22

## 2021-01-13 RX ORDER — NABUMETONE 500 MG/1
500 TABLET, FILM COATED ORAL 2 TIMES DAILY PRN
Qty: 14 TABLET | Refills: 0 | Status: SHIPPED | OUTPATIENT
Start: 2021-01-13 | End: 2021-01-20

## 2021-01-13 RX ORDER — METAXALONE 800 MG/1
800 TABLET ORAL 3 TIMES DAILY
Qty: 21 TABLET | Refills: 0 | Status: SHIPPED | OUTPATIENT
Start: 2021-01-13 | End: 2021-01-20

## 2021-01-13 RX ORDER — OXYCODONE HYDROCHLORIDE AND ACETAMINOPHEN 5; 325 MG/1; MG/1
1 TABLET ORAL ONCE
Status: COMPLETED | OUTPATIENT
Start: 2021-01-13 | End: 2021-01-13

## 2021-01-13 RX ORDER — METAXALONE 800 MG/1
800 TABLET ORAL ONCE
Status: COMPLETED | OUTPATIENT
Start: 2021-01-13 | End: 2021-01-13

## 2021-01-13 RX ORDER — TRAMADOL HYDROCHLORIDE 50 MG/1
100 TABLET ORAL ONCE
Status: COMPLETED | OUTPATIENT
Start: 2021-01-13 | End: 2021-01-13

## 2021-01-13 RX ORDER — METAXALONE 800 MG/1
800 TABLET ORAL 3 TIMES DAILY
Status: DISCONTINUED | OUTPATIENT
Start: 2021-01-13 | End: 2021-01-13

## 2021-01-13 RX ADMIN — TRAMADOL HYDROCHLORIDE 100 MG: 50 TABLET, FILM COATED ORAL at 00:18

## 2021-01-13 RX ADMIN — OXYCODONE AND ACETAMINOPHEN 1 TABLET: 5; 325 TABLET ORAL at 01:07

## 2021-01-13 RX ADMIN — METAXALONE 800 MG: 800 TABLET ORAL at 01:07

## 2021-01-13 NOTE — DISCHARGE INSTRUCTIONS
Medications as directed.  Both Skelaxin and Ultram can make you drowsy.  This can increase your risk of falls.  Recommend only ambulating with assistance while taking these medications.  Follow-up with your PCP as above.  Return to ED for medical emergencies.

## 2021-01-13 NOTE — ED PROVIDER NOTES
"Delta Camilo is a 69 yo WF who presents secondary to low back and right hip pain.  Onset 3 days ago.  Patient states she was lying in bed and attempted to scoot herself up.  She felt sharp pain in her right low back.  The pain has been present since.  It is dull when she is not moving however the pain becomes sharp with movement.  Tonight patient stood up beside the bed.  This worsened her pain acutely.  Patient is taken Tylenol and an over-the-counter medication called \"Back Pain\" without any relief.  Patient called 911 for transportation to the hospital.  Patient was stable in route.    Patient has a history of chronic back pain, degenerative disc disease and obesity.    Patient is currently on antibiotics for a sinusitis.      History provided by:  Patient and EMS personnel      Review of Systems   Constitutional: Negative.  Negative for fever.   HENT: Negative.  Negative for rhinorrhea.    Eyes: Negative.  Negative for redness.   Respiratory: Positive for shortness of breath (Oxygen dependent COPD). Negative for cough.    Cardiovascular: Negative for chest pain.   Gastrointestinal: Negative for abdominal pain.   Endocrine: Negative.    Genitourinary: Negative.  Negative for difficulty urinating.   Musculoskeletal: Positive for back pain.   Skin: Negative.  Negative for color change.   Neurological: Negative.  Negative for syncope.   Hematological: Negative.    Psychiatric/Behavioral: Negative.    All other systems reviewed and are negative.      Past Medical History:   Diagnosis Date   • Arthritis    • Carotid arterial disease (CMS/HCC)     US 2015 with 80-99% right and 16-49% left, but CTA with 60-70% right, not a surgical candidate   • Chronic back pain    • Closed fracture of left tibial plateau 7/30/2018   • COPD (chronic obstructive pulmonary disease) (CMS/HCC)    • Coronary artery disease    • Crohn's disease (CMS/HCC)     Remicade on hold d/t antibiotics   • DDD (degenerative disc disease)  "   • Depression    • Diabetes mellitus (CMS/HCC)     Type II   • Elevated cholesterol    • Heartburn 2/21/2016   • Hernia of abdominal wall    • History of stroke 05/03/2017    left parietal   • Hyperlipidemia    • Hypertension    • Hypokalemia    • Morbid obesity (CMS/HCC)    • Obstructive pyelonephritis    • On home oxygen therapy     2L UNLESS STRESSED THEN 2.5-3 L   • Osteopenia 2/21/2016   • PAF (paroxysmal atrial fibrillation) (CMS/HCC)    • PVD (peripheral vascular disease) (CMS/HCC)     RT CAROTID STENOSIS   • Radiculopathy     NECK PAIN   • Right shoulder pain     scheduled for sx   • Seizure (CMS/HCC)     with stroke   • Stroke (CMS/HCC) 2017    memory,    • Subclavian artery stenosis (CMS/HCC)     and axillary artery stenosis, on the left       Allergies   Allergen Reactions   • Contrast Dye Hives   • Iodinated Diagnostic Agents Hives   • Norco [Hydrocodone-Acetaminophen] Hallucinations   • Tenoretic [Atenolol-Chlorthalidone] Anaphylaxis       Past Surgical History:   Procedure Laterality Date   • APPENDECTOMY N/A    • BRONCHOSCOPY N/A 1/5/2018    Procedure: BRONCHOSCOPY;  Surgeon: Gustavo Munzi MD;  Location: [facility] OR;  Service:    • COLON SURGERY     • COLONOSCOPY     • CYSTOSCOPY URETEROSCOPY LASER LITHOTRIPSY Right 4/17/2017    Procedure: CYSTOSCOPY with  right stent removal, right URETEROSCOPY LASER LITHOTRIPSY,  with STONE BASKET EXTRACTION;  Surgeon: Dipesh Bean MD;  Location: [facility] OR;  Service:    • CYSTOSCOPY W/ URETERAL STENT PLACEMENT Right 3/18/2017    Procedure: CYSTOSCOPY URETERAL CATHETER/STENT INSERTION;  Surgeon: Dipesh Bean MD;  Location: [facility] OR;  Service:    • ENDOSCOPY     • FLEXIBLE SIGMOIDOSCOPY N/A 01/25/2016    Dr. Luis Rosas   • FRACTURE SURGERY  2017    broken femur, left   • JOINT REPLACEMENT  12/30/2019    right shoulder replacement   • LAPAROSCOPIC COLON RESECTION N/A 01/25/2016    Laparoscopic mobilization of splenic flexure, transverse colectomy  with primary anastomosis-Dr. Christopher Richardson   • LUNG BIOPSY      NEGATIVE   • SD THROMBOENDARTECTMY NECK,NECK INCIS Right 3/14/2016    Procedure: RT CAROTID ENDARTERECTOMY;  Surgeon: Federico Schuler MD;  Location: [facility] OR;  Service: Vascular   • TIBIAL PLATEAU OPEN REDUCTION INTERNAL FIXATION Left 2018    Open reduction internal fixation of left tibial plateau fracture-Dr. Ayden Cárdenas   • TOTAL SHOULDER ARTHROPLASTY W/ DISTAL CLAVICLE EXCISION Right 2019    Procedure: TOTAL SHOULDER REVERSE ARTHROPLASTY, open  biceps tenodesis;  Surgeon: Altaf Reyes MD;  Location: [facility] OR;  Service: Orthopedics   • VASCULAR SURGERY  2017    caroid artery   • WRIST ARTHROSCOPY Right     WITH RELEASE OF TRANSVERSE CARPAL LIGAMENT       Family History   Problem Relation Age of Onset   • Diabetes Mother    • Stroke Mother    • Other Father         RESPIRATORY FAILURE   • Cancer Other         lung cancer   • Crohn's disease Brother    • Crohn's disease Maternal Aunt        Social History     Socioeconomic History   • Marital status:      Spouse name: Not on file   • Number of children: Not on file   • Years of education: Not on file   • Highest education level: Not on file   Social Needs   • Financial resource strain: Not hard at all   • Food insecurity     Worry: Never true     Inability: Never true   • Transportation needs     Medical: No     Non-medical: No   Tobacco Use   • Smoking status: Former Smoker     Packs/day: 2.00     Years: 40.00     Pack years: 80.00     Types: Cigarettes     Quit date: 2013     Years since quittin.7   • Smokeless tobacco: Never Used   • Tobacco comment: 1 cup coffee daily   Substance and Sexual Activity   • Alcohol use: No     Comment: history of heavy drinker    • Drug use: No   • Sexual activity: Defer           Objective   Physical Exam  Vitals signs and nursing note reviewed.   Constitutional:       General: She is not in acute distress.     Appearance:  She is well-developed. She is obese. She is not diaphoretic.      Comments: 70-year-old white female laying in bed.  Patient is morbidly obese.  She appears in fair overall health.  Vital signs notable for heart rate of 114 and blood pressure 180/77.  Patient is friendly and cooperative.   HENT:      Head: Normocephalic and atraumatic.      Right Ear: Tympanic membrane, ear canal and external ear normal.      Left Ear: Tympanic membrane, ear canal and external ear normal.      Nose: Nose normal.      Mouth/Throat:      Mouth: Mucous membranes are moist.      Pharynx: Oropharynx is clear.   Eyes:      Conjunctiva/sclera: Conjunctivae normal.      Pupils: Pupils are equal, round, and reactive to light.   Neck:      Musculoskeletal: Normal range of motion and neck supple.   Cardiovascular:      Rate and Rhythm: Normal rate and regular rhythm.      Heart sounds: Normal heart sounds. No murmur. No friction rub. No gallop.    Pulmonary:      Effort: Pulmonary effort is normal.      Breath sounds: Normal breath sounds.   Abdominal:      General: Bowel sounds are normal. There is no distension.      Palpations: Abdomen is soft.      Tenderness: There is no abdominal tenderness.   Musculoskeletal:      Right hip: She exhibits normal range of motion, normal strength, no tenderness, no bony tenderness, no swelling, no crepitus and no deformity.      Lumbar back: She exhibits decreased range of motion, tenderness, bony tenderness and pain. She exhibits no swelling, no edema and no deformity.        Back:         Legs:    Skin:     General: Skin is warm and dry.   Neurological:      Mental Status: She is alert and oriented to person, place, and time.      Deep Tendon Reflexes: Reflexes are normal and symmetric.   Psychiatric:         Behavior: Behavior normal.         Procedures           ED Course  ED Course as of Jan 13 0108 Wed Jan 13, 2021   0015 Patient denies any injury.  Her pain starts in the lumbar spine and radiates  to the right hip and buttock.  Concerning for sciatica.  Will obtain a CT of L-spine.  Also obtaining x-rays of right hip and pelvic bones.  Patient is allergic to hydrocodone.  It causes hallucinations.  Patient states she tolerates other narcotics without any side effects.  Giving Narco 100 mg    [SS]      ED Course User Index  [SS] Ronnie Gaxiola MD      Ct Lumbar Spine Without Contrast    Result Date: 1/13/2021  Narrative: CT Spine Lumbar WO INDICATION:  Low back pain and right hip pain for 3 days. No trauma. TECHNIQUE: CT of the lumbar spine without IV contrast. Coronal and sagittal reconstructions were obtained.  Radiation dose reduction techniques included automated exposure control or exposure modulation based on body size. Count of known CT and cardiac nuc med studies performed in previous 12 months: 1. COMPARISON:  CT abdomen and pelvis 7/30/2020 FINDINGS: Chronic emphysematous and fibrotic changes are noted in the lung bases, and there is left lower lobe bronchiectasis. Findings are unchanged. Additionally, there is diffuse atherosclerotic disease with no aortic aneurysm. Very mild grade 1 anterolisthesis of L4 on L5 is unchanged. There is levoscoliosis that is also unchanged. No acute lumbar spine fracture. At L5-S1, there is loss of disc height with a broad-based posterior disc osteophyte complex. There is bilateral facet arthropathy. There is severe bilateral foraminal stenosis with mild central stenosis. At L4-5, there is a broad-based posterior disc osteophyte complex with a vacuum phenomenon. There is hypertrophic facet arthropathy with ligamentum flavum hypertrophy. There is severe narrowing of both neural foramina and the central canal. At L3-4, there is a broad-based posterior disc osteophyte complex. There is facet arthropathy and ligamentum flavum hypertrophy. There is moderate bilateral foraminal stenosis and moderate narrowing of the central canal. At L2-3, there is a broad-based  posterior disc bulge. There is facet arthropathy and ligamentum flavum hypertrophy. There is moderate bilateral foraminal stenosis with mild narrowing of the central canal. At L1-2, there is a mild broad-based posterior disc bulge. There is mild facet arthropathy. Mild narrowing of both foramina is present with no central stenosis. At T12-L1, the disc is normal.     Impression: 1. No acute lumbar spine fracture. 2. L4-5 spondylolisthesis with levoscoliosis. 3. Multilevel degenerative disc disease and facet arthropathy as detailed above. Findings result in central canal and foraminal stenosis where indicated. Signer Name: Tanner Hare MD  Signed: 1/13/2021 12:52 AM  Workstation [facility]  [facility]    Xr Hip With Or Without Pelvis 2 - 3 View Right    Result Date: 1/13/2021  Narrative: CR Hip Uni Comp Min 2 Vws RT INDICATION: Pain for 3 days in the hip. No trauma. COMPARISON: None. FINDINGS: AP pelvis and 2 view(s) of the right hip.  No fracture or dislocation. No bone erosion or destruction. There is degenerative joint space narrowing in both hips.     Impression: Degenerative disease. No acute findings. Signer Name: Tanner Hare MD  Signed: 1/13/2021 12:47 AM  Workstation Name: [facility]  [facility]    My differential diagnosis for back pain includes but is not limited to:  Musculoskeletal strain, contusion, retroperitoneal hematoma, disc protrusion, vertebral fracture, transverse process fracture, rib fracture, facet syndrome, sacroiliac joint strain, sciatica, renal injury, splenic injury, pancreatic injury, osteoarthritis, lumbar spondylosis, spinal stenosis, ankylosing spondylitis, sacroiliac joint inflammation, pancreatitis, perforated peptic ulcer, diverticulitis, endometriosis, chronic PID, epidural abscess, osteomyelitis, retroperitoneal abscess, pyelonephritis, pneumonia, subphrenic abscess, tuberculosis, neurofibroma, meningioma, multiple  myeloma, lymphoma, metastatic cancer, primary cancer, AAA, aortic dissection, spinal ischemia, referred pain, ureterolithiasis    My diagnosis for lower extremity pain and injury includes but is not limited to hip fracture, femur fracture, hip dislocation, hip contusion, hip sprain, hip strain, pelvic fracture, knee sprain, patella dislocation, knee dislocation, internal derangement of knee, fractures of the femur, tibia, fibula, ankle, foot and digits, ankle sprain, ankle dislocation, Lisfranc fracture, fracture dislocations of the digits, pulmonary embolism, claudication, peripheral vascular disease, gout, osteoarthritis, rheumatoid arthritis, bursitis, septic joint, poly-rheumatica, polyarthralgia and other inflammatory or infectious disease processes.                                       MDM    Final diagnoses:   Acute right-sided low back pain with right-sided sciatica            Ronnie Gaxiola MD  01/13/21 0108

## 2021-01-13 NOTE — ED NOTES
"Pt presents via [facility] with c/o sharp shooting R hip pain that radiating around the side and up her spine x 3 days. She has a hx of chronic back pain from arthritis and had an old hip injury in 1980 from slip and fall in a hot tub. She says generally her pain is usually just in her back. 3 days ago she began having this shooting hip pain especially when she goes from sitting to standing , \"when my body is just about to take on the full weight on that leg\" and also when pushing herself up in bed. She denies any new injury or fall. She is on 5-6L NC at home. She is A&Ox4. She has not been taking anything for the pain. Distal pulses palpable. No numbness/tingling     Gwendolyn Pierson, RN  01/13/21 0011    "

## 2021-01-15 ENCOUNTER — PATIENT OUTREACH (OUTPATIENT)
Dept: CASE MANAGEMENT | Facility: OTHER | Age: 71
End: 2021-01-15

## 2021-01-15 ENCOUNTER — TELEPHONE (OUTPATIENT)
Dept: GASTROENTEROLOGY | Facility: CLINIC | Age: 71
End: 2021-01-15

## 2021-01-15 NOTE — OUTREACH NOTE
The main concerns and/or symptoms the patient would like to address are: back pain    Education/instruction provided by Care Coordinator: Call to pt who states she is not doing very well right now. She is having severe pain in her back. It is somewhat relieved with medication given in the ED but still to painful to get up. She has been staying in bed and using heating pad for some relief. Reinforced the benefit of using cold pack vs heating pad. She will do so 3 times a day. She is using the muscle relaxers as well. She has not followed up with her PCP yet. Informed pt to call Monday if not better as she may benefit from home health. Pt agreeable. Reinforced need to move frequently while in bed. Encouraged moving legs, flexing ankles to prevent blood clots. Verbalized understanding. Will call back next week to check on pt and contact MD if pt has not. No questions or concerns.     Follow Up Outreach Due: next week  Laquita Carbajal RN  Ambulatory     1/15/2021, 14:06 EST

## 2021-01-15 NOTE — TELEPHONE ENCOUNTER
CALL FROM PATIENT.  HAVING ISSUES WITH HER BACK.  UNABLE TO WALK WITH OUT HER WALKER.  SHE WANT TO CANCEL HER COLONOSCOPY ON 01/25/2021.  SHE WILL CALL BACK TO RESCHEDULE AT LATER DATE.

## 2021-01-18 DIAGNOSIS — J44.1 CHRONIC OBSTRUCTIVE PULMONARY DISEASE WITH ACUTE EXACERBATION (HCC): ICD-10-CM

## 2021-01-18 RX ORDER — TIOTROPIUM BROMIDE 18 UG/1
CAPSULE ORAL; RESPIRATORY (INHALATION)
Qty: 30 CAPSULE | Refills: 0 | Status: SHIPPED | OUTPATIENT
Start: 2021-01-18 | End: 2021-01-01

## 2021-01-20 ENCOUNTER — PATIENT OUTREACH (OUTPATIENT)
Dept: CASE MANAGEMENT | Facility: OTHER | Age: 71
End: 2021-01-20

## 2021-01-20 NOTE — OUTREACH NOTE
The main concerns and/or symptoms the patient would like to address are: back pain    Education/instruction provided by Care Coordinator: Fu call with pt who states she has called her PCP and plan a  Video visit Friday. Denies needs at this time. Pt remains in a great deal of pain but states she does have help at home if needed.     Follow Up Outreach Due: monthly    Laquita Carbajal RN  Ambulatory     1/20/2021, 14:20 EST

## 2021-01-22 ENCOUNTER — APPOINTMENT (OUTPATIENT)
Dept: LAB | Facility: HOSPITAL | Age: 71
End: 2021-01-22

## 2021-01-22 ENCOUNTER — OFFICE VISIT (OUTPATIENT)
Dept: FAMILY MEDICINE CLINIC | Facility: CLINIC | Age: 71
End: 2021-01-22

## 2021-01-22 DIAGNOSIS — M54.50 LUMBAR SPINE PAIN: Primary | ICD-10-CM

## 2021-01-22 DIAGNOSIS — M54.41 ACUTE RIGHT-SIDED LOW BACK PAIN WITH RIGHT-SIDED SCIATICA: ICD-10-CM

## 2021-01-22 DIAGNOSIS — M54.31 SCIATICA OF RIGHT SIDE: ICD-10-CM

## 2021-01-22 DIAGNOSIS — M25.551 ACUTE HIP PAIN, RIGHT: ICD-10-CM

## 2021-01-22 PROCEDURE — 99422 OL DIG E/M SVC 11-20 MIN: CPT | Performed by: PHYSICIAN ASSISTANT

## 2021-01-22 RX ORDER — NABUMETONE 500 MG/1
500 TABLET, FILM COATED ORAL 2 TIMES DAILY PRN
Qty: 60 TABLET | Refills: 0 | Status: SHIPPED | OUTPATIENT
Start: 2021-01-22 | End: 2021-01-01

## 2021-01-22 RX ORDER — TRAMADOL HYDROCHLORIDE 50 MG/1
50 TABLET ORAL EVERY 8 HOURS PRN
Qty: 30 TABLET | Refills: 0 | Status: SHIPPED | OUTPATIENT
Start: 2021-01-22 | End: 2021-01-01

## 2021-01-22 RX ORDER — METAXALONE 800 MG/1
800 TABLET ORAL 3 TIMES DAILY PRN
Qty: 30 TABLET | Refills: 0 | Status: SHIPPED | OUTPATIENT
Start: 2021-01-22 | End: 2021-01-01

## 2021-01-22 NOTE — PROGRESS NOTES
I, Dr. Jay Jay Villanueva, have reviewed the notes, assessments, and/or procedures performed by Vianey MARTINEZ, that occurred within our practice at [facility] location, I concur with her documentation of Mar Camilo. I have a current collaborative medical agreement with Vianey MARTINEZ.

## 2021-01-22 NOTE — PROGRESS NOTES
Chief Complaint  Back Pain (ED F/U 1/13/21) and Hip Pain    Subjective          Mar Camilo presents to [facility] for   History of Present Illness  You have chosen to receive care through a telephone visit. Do you consent to use a telephone visit for your medical care today? Yes      Krys is a 70-year-old female who presents with her sister for follow-up from [facility] for back pain.  She was seen at the ER on January 13, 2021.  States she was in excruciating pain and had to call 911.  She had CT scan of lumbar spine and x-ray of right hip.  Showed lumbar stenosis, scoliosis and degenerative changes to right hip and lumbar spine area.  She was prescribed tramadol, Relafen and Skelaxin medications.  States the medications are helping.  Needs refills.  Has been taking the tramadol maybe once or twice daily.  Takes the Relafen in between.  The muscle relaxer is helping.  Denied any injury or trauma to her back.  States she has a dull pain with sitting.  The pain will increase to a sharp pain when she gets up from sitting or lying position.  She has tried heat and ice without relief.  She tried the over-the-counter pain patches without relief.  Has not been sleeping with a pillow between legs.  Appetite has been healthy.  Bowel movements are normal without dark black tarry stools.  Has been using her walker to ambulate.  Overall her pain is improving.  Refuses physical therapy at this time.    IObjective   Vital signs were not obtained due to telephone encounter.  Vital Signs: There were no vitals taken for this visit.    Physical Exam   Unable to do physical exam due to telephone encounter.    Result Review :       Data reviewed: Radiologic studies CT scan of lumbar spine/x-ray of right hip from [facility] on January 13, 2021          Assessment and Plan    Problem List Items Addressed This Visit     None      Visit Diagnoses     Lumbar spine  pain    -  Primary    Relevant Medications    metaxalone (Skelaxin) 800 MG tablet    nabumetone (RELAFEN) 500 MG tablet    traMADol (ULTRAM) 50 MG tablet    Acute hip pain, right        Relevant Medications    metaxalone (Skelaxin) 800 MG tablet    nabumetone (RELAFEN) 500 MG tablet    traMADol (ULTRAM) 50 MG tablet    Sciatica of right side        Relevant Medications    metaxalone (Skelaxin) 800 MG tablet    nabumetone (RELAFEN) 500 MG tablet    traMADol (ULTRAM) 50 MG tablet    Acute right-sided low back pain with right-sided sciatica        Relevant Medications    traMADol (ULTRAM) 50 MG tablet        Ms. Krys Camilo was seen using telephone encounter today for follow-up from [facility] on January 13, 2021.  I have reviewed her ER report and imaging studies with her today.  She is doing slightly better with the current medications.  I have sent refills on Relafen, Ultram and Skelaxin to her pharmacy.  See below for Geoffrey information.  She will update status in 1 week.  She refuses physical therapy or dry needling at this time.    I spent 15 minutes caring for Mar on this date of service. This time includes time spent by me in the following activities:preparing for the visit, reviewing tests, obtaining and/or reviewing a separately obtained history, counseling and educating the patient/family/caregiver, ordering medications, tests, or procedures and documenting information in the medical record  Follow Up   No follow-ups on file.  Patient was given instructions and counseling regarding her condition or for health maintenance advice. Please see specific information pulled into the AVS if appropriate.       As part of this patient's treatment plan I am prescribing controlled substances.  The patient has been made aware of appropriate use of such medications, including potential risk of somnolence. Limited ability to drive and/or work safely, and potential for dependence or overdose.  It has also  been made clear that these medications are for use by this patient only, without concomitant use of alcohol or other substances unless prescribed.  GASPER report has been reviewed and scanned into the patient's chart.  Gasper number is [id number] dated January 20, 2021.  MARGARITA Narvaez [facility] [address] [address]  Dept: [phone]  Dept Fax: [phone]  Loc: [phone]  Loc Fax: [phone]

## 2021-01-25 NOTE — TELEPHONE ENCOUNTER
CALL FROM PATIENT.  SCHEDULED AT [facility] 06/07/2021 AT 09:15AM - ARRIVE 8AM.  SHE CORRECTED HER COPY OF INSTRUCTIONS.    COVID TEST ON 06/04/2021, WILL CALL WITH TIME.  NEED TO SELF QUARANTINE  UNTIL AFTER PROCEDURE.  SHE UNDERSTANDS.

## 2021-01-27 ENCOUNTER — HOME CARE VISIT (OUTPATIENT)
Dept: HOME HEALTH SERVICES | Facility: HOME HEALTHCARE | Age: 71
End: 2021-01-27

## 2021-02-08 NOTE — NURSING NOTE
NURSING PROGRESS NOTE      0825 Pt to [facility] per  scheduled appointment.  Pt ID verified by (2) identifiers. Allergies, medications and medical history reviewed and verified.Pre-meds given. Carmen Montanez RN

## 2021-02-08 NOTE — NURSING NOTE
NURSING PROGRESS NOTE      Tolerated prescribed treatment without incident. Patient received AVS, Pt verbalized understanding.  Discharged to home @ 1155 Condition Satisfactory .  Carmen Montanez RN

## 2021-02-08 NOTE — PATIENT INSTRUCTIONS
"Call Dr. Roberto Carlos Umaña, Gastroenterology at [phone] if you have any problems or concerns.    We know you have a Choice in healthcare and appreciate you using [facility].  Our purpose is to provide you \"Excellent Care\".  We hope that you will always choose us in the future and continue to recommend us to your family and friends.    Infliximab infusion 545 mg  What is this medicine?  INFLIXIMAB (in FLIX i mab) is used to treat Crohn's disease and ulcerative colitis. It is also used to treat ankylosing spondylitis, plaque psoriasis, and some forms of arthritis.  This medicine may be used for other purposes; ask your health care provider or pharmacist if you have questions.  COMMON BRAND NAME(S): AVSOLA, INFLECTRA, Remicade, RENFLEXIS  What should I tell my health care provider before I take this medicine?  They need to know if you have any of these conditions:  · cancer  · current or past resident of [place]  · diabetes  · exposure to tuberculosis  · Guillain-[place] syndrome  · heart failure  · hepatitis or liver disease  · immune system problems  · infection  · lung or breathing disease, like COPD  · multiple sclerosis  · receiving phototherapy for the skin  · seizure disorder  · an unusual or allergic reaction to infliximab, mouse proteins, other medicines, foods, dyes, or preservatives  · pregnant or trying to get pregnant  · breast-feeding  How should I use this medicine?  This medicine is for injection into a vein. It is usually given by a health care professional in a hospital or clinic setting.  A special MedGuide will be given to you by the pharmacist with each prescription and refill. Be sure to read this information carefully each time.  Talk to your pediatrician regarding the use of this medicine in children. While this drug may be prescribed for children as young as 6 years of age for selected conditions, precautions do apply.  Overdosage: If you think you " have taken too much of this medicine contact a poison control center or emergency room at once.  NOTE: This medicine is only for you. Do not share this medicine with others.  What if I miss a dose?  It is important not to miss your dose. Call your doctor or health care professional if you are unable to keep an appointment.  What may interact with this medicine?  Do not take this medicine with any of the following medications:  · biologic medicines such as abatacept, adalimumab, anakinra, certolizumab, etanercept, golimumab, rituximab, secukinumab, tocilizumab, tofactinib, ustekinumab  · live vaccines  This list may not describe all possible interactions. Give your health care provider a list of all the medicines, herbs, non-prescription drugs, or dietary supplements you use. Also tell them if you smoke, drink alcohol, or use illegal drugs. Some items may interact with your medicine.  What should I watch for while using this medicine?  Your condition will be monitored carefully while you are receiving this medicine. Visit your doctor or health care professional for regular checks on your progress. You may need blood work done while you are taking this medicine. Before beginning therapy, your doctor may do a test to see if you have been exposed to tuberculosis.  Call your doctor or health care professional for advice if you get a fever, chills or sore throat, or other symptoms of a cold or flu. Do not treat yourself. This drug decreases your body's ability to fight infections. Try to avoid being around people who are sick.  This medicine may make the symptoms of heart failure worse in some patients. If you notice symptoms such as increased shortness of breath or swelling of the ankles or legs, contact your health care provider right away.  If you are going to have surgery or dental work, tell your health care professional or dentist that you have received this medicine.  If you take this medicine for plaque psoriasis,  stay out of the sun. If you cannot avoid being in the sun, wear protective clothing and use sunscreen. Do not use sun lamps or tanning beds/booths.  Talk to your doctor about your risk of cancer. You may be more at risk for certain types of cancers if you take this medicine.  What side effects may I notice from receiving this medicine?  Side effects that you should report to your doctor or health care professional as soon as possible:  · allergic reactions like skin rash, itching or hives, swelling of the face, lips, or tongue  · breathing problems  · changes in vision  · chest pain  · fever or chills, usually related to the infusion  · joint pain  · pain, tingling, numbness in the hands or feet  · redness, blistering, peeling or loosening of the skin, including inside the mouth  · seizures  · signs of infection - fever or chills, cough, sore throat, flu-like symptoms, pain or difficulty passing urine  · signs and symptoms of liver injury like dark yellow or brown urine; general ill feeling; light-colored stools; loss of appetite; nausea; right upper belly pain; unusually weak or tired; yellowing of the eyes or skin  · signs and symptoms of a stroke like changes in vision; confusion; trouble speaking or understanding; severe headaches; sudden numbness or weakness of the face, arm or leg; trouble walking; dizziness; loss of balance or coordination  · swelling of the ankles, feet, or hands  · swollen lymph nodes in the neck, underarm, or groin areas  · unusual bleeding or bruising  · unusually weak or tired  Side effects that usually do not require medical attention (report to your doctor or health care professional if they continue or are bothersome):  · headache  · nausea  · stomach pain  · upset stomach  This list may not describe all possible side effects. Call your doctor for medical advice about side effects. You may report side effects to FDA at 5-060-FDA-1311.  Where should I keep my medicine?  This drug is  given in a hospital or clinic and will not be stored at home.  NOTE: This sheet is a summary. It may not cover all possible information. If you have questions about this medicine, talk to your doctor, pharmacist, or health care provider.  © 2020 Elsevier/Gold Standard (2018-01-17 13:45:32)

## 2021-02-12 NOTE — TELEPHONE ENCOUNTER
Notify patient I have sent amoxicillin to pharmacy.  If her symptoms do not improve, have her notify office.

## 2021-02-12 NOTE — TELEPHONE ENCOUNTER
PATIENT CALLED AND SHE IS HAVING COUGH WITH NASTY MUCASE, SINUS CONGESTIONS AND SHE IS AFRAID THAT IT MIGHT GO INTO PNEUMONIA.      PLEASE CONTACT DIRECTIONS OF CARE :  [phone]

## 2021-02-24 NOTE — OUTREACH NOTE
The main concerns and/or symptoms the patient would like to address are: Pt to have eye surgery march 9th    Education/instruction provided by Care Coordinator: Call to pt who states she is doing well. Her back is much better and is not requiring pain medication or muscle relaxers. She is able to get up easily with the use of her walker. She has a sinus infection at this time and remains on antibiotics until next week. Discussed use of probiotic while on antibiotics. Pt denies any nausea or diarrhea at this time.   Pt states she is scheduled for eye surgery march 9th. Her sister lives with her and can help out as needed. Agreeable to AC following up after surgery in case she needs anything. Pt always a maninder to speak with. No questions or concerns at this time.     Follow Up Outreach Due: monthly    Laquita Carbajal RN  Ambulatory     2/24/2021, 12:16 EST

## 2021-03-31 NOTE — OUTREACH NOTE
The main concerns and/or symptoms the patient would like to address are: COVID vaccine    Education/instruction provided by Care Coordinator: Call to pt who states she is doing well. She has had both her eye surgeries and is doing very well. She has received her first COVID vaccine and is scheduled to complete her 2nd on 4/19.   Discussed HCG's with pt. She does not want to get her AWV. She goes to her PCP every 3 months for lab work and does not feel this is needed. Have sent her information in the past about the benefits of AWV. She states she did get her Diabetic eye exam in Jan and it was at that time they decided she needed her surgery. Long enjoyable convesation with Mrs Camilo. She denies any other issues or concerns. Will call back next month. She does have [facility] number if any problems arise.    Follow Up Outreach Due:monthly    Laquita Carbajal RN  Ambulatory     3/31/2021, 12:07 EDT

## 2021-04-05 NOTE — NURSING NOTE
NURSING PROGRESS NOTE:    PATIENT ARRIVE TO [facility] FOR SCHEDULED INFUSION AT 0823 .  PROCEDURE WAS PERFORMED WITHOUT INCIDENT. AVS REVIEWED PRIOR TO DISCHARGE.  PATIENT ESCORTED TO LOBBY PER W/C AND DISCHARGED HOME WITH HER SISTER  AT [address]

## 2021-04-05 NOTE — PATIENT INSTRUCTIONS
Call Dr. Roberto Carlos Umaña, Gastroenterology at [phone]       Infliximab infusion  What is this medicine?  INFLIXIMAB (in FLIX i mab) is used to treat Crohn's disease and ulcerative colitis. It is also used to treat ankylosing spondylitis, plaque psoriasis, and some forms of arthritis.  This medicine may be used for other purposes; ask your health care provider or pharmacist if you have questions.  COMMON BRAND NAME(S): AVSOLA, INFLECTRA, Remicade, RENFLEXIS  What should I tell my health care provider before I take this medicine?  They need to know if you have any of these conditions:  · cancer  · current or past resident of [place]  · diabetes  · exposure to tuberculosis  · Guillain-[place] syndrome  · heart failure  · hepatitis or liver disease  · immune system problems  · infection  · lung or breathing disease, like COPD  · multiple sclerosis  · receiving phototherapy for the skin  · seizure disorder  · an unusual or allergic reaction to infliximab, mouse proteins, other medicines, foods, dyes, or preservatives  · pregnant or trying to get pregnant  · breast-feeding  How should I use this medicine?  This medicine is for injection into a vein. It is usually given by a health care professional in a hospital or clinic setting.  A special MedGuide will be given to you by the pharmacist with each prescription and refill. Be sure to read this information carefully each time.  Talk to your pediatrician regarding the use of this medicine in children. While this drug may be prescribed for children as young as 6 years of age for selected conditions, precautions do apply.  Overdosage: If you think you have taken too much of this medicine contact a poison control center or emergency room at once.  NOTE: This medicine is only for you. Do not share this medicine with others.  What if I miss a dose?  It is important not to miss your dose. Call your doctor or health care professional if you are  unable to keep an appointment.  What may interact with this medicine?  Do not take this medicine with any of the following medications:  · biologic medicines such as abatacept, adalimumab, anakinra, certolizumab, etanercept, golimumab, rituximab, secukinumab, tocilizumab, tofactinib, ustekinumab  · live vaccines  This list may not describe all possible interactions. Give your health care provider a list of all the medicines, herbs, non-prescription drugs, or dietary supplements you use. Also tell them if you smoke, drink alcohol, or use illegal drugs. Some items may interact with your medicine.  What should I watch for while using this medicine?  Your condition will be monitored carefully while you are receiving this medicine. Visit your doctor or health care professional for regular checks on your progress. You may need blood work done while you are taking this medicine. Before beginning therapy, your doctor may do a test to see if you have been exposed to tuberculosis.  Call your doctor or health care professional for advice if you get a fever, chills or sore throat, or other symptoms of a cold or flu. Do not treat yourself. This drug decreases your body's ability to fight infections. Try to avoid being around people who are sick.  This medicine may make the symptoms of heart failure worse in some patients. If you notice symptoms such as increased shortness of breath or swelling of the ankles or legs, contact your health care provider right away.  If you are going to have surgery or dental work, tell your health care professional or dentist that you have received this medicine.  If you take this medicine for plaque psoriasis, stay out of the sun. If you cannot avoid being in the sun, wear protective clothing and use sunscreen. Do not use sun lamps or tanning beds/booths.  Talk to your doctor about your risk of cancer. You may be more at risk for certain types of cancers if you take this medicine.  What side  effects may I notice from receiving this medicine?  Side effects that you should report to your doctor or health care professional as soon as possible:  · allergic reactions like skin rash, itching or hives, swelling of the face, lips, or tongue  · breathing problems  · changes in vision  · chest pain  · fever or chills, usually related to the infusion  · joint pain  · pain, tingling, numbness in the hands or feet  · redness, blistering, peeling or loosening of the skin, including inside the mouth  · seizures  · signs of infection - fever or chills, cough, sore throat, flu-like symptoms, pain or difficulty passing urine  · signs and symptoms of liver injury like dark yellow or brown urine; general ill feeling; light-colored stools; loss of appetite; nausea; right upper belly pain; unusually weak or tired; yellowing of the eyes or skin  · signs and symptoms of a stroke like changes in vision; confusion; trouble speaking or understanding; severe headaches; sudden numbness or weakness of the face, arm or leg; trouble walking; dizziness; loss of balance or coordination  · swelling of the ankles, feet, or hands  · swollen lymph nodes in the neck, underarm, or groin areas  · unusual bleeding or bruising  · unusually weak or tired  Side effects that usually do not require medical attention (report to your doctor or health care professional if they continue or are bothersome):  · headache  · nausea  · stomach pain  · upset stomach  This list may not describe all possible side effects. Call your doctor for medical advice about side effects. You may report side effects to FDA at 2-839-QEM-1353.  Where should I keep my medicine?  This drug is given in a hospital or clinic and will not be stored at home.  NOTE: This sheet is a summary. It may not cover all possible information. If you have questions about this medicine, talk to your doctor, pharmacist, or health care provider.  © 2021 Elsevier/Gold Standard (2018-01-17  "13:45:32)   if you have any problems or concerns.    We know you have a Choice in healthcare and appreciate you using [facility].  Our purpose is to provide you \"Excellent Care\".  We hope that you will always choose us in the future and continue to recommend us to your family and friends.      "

## 2021-05-06 NOTE — OUTREACH NOTE
The main concerns and/or symptoms the patient would like to address are: no new needs    Education/instruction provided by Care Coordinator:   Call to pt who states she is doing well. She has completed her COVID vaccine. Updated in epic. She denies any new issues. She does have her colonoscopy scheduled in June. She just completed her diabetic eye exam and did get her new glasses. No new questions or concerns. Enjoyable conversation with pt. Will call again next month.    Follow Up Outreach Due: monthly    Laquita Carbajal RN  Ambulatory     5/6/2021, 12:52 EDT

## 2021-05-19 NOTE — TELEPHONE ENCOUNTER
I spoke with Krys and her sister, Clau, on Wednesday, May 19, 2021 at 4 PM.  She refuses to go to the ER now.  I stressed the importance of being evaluated today due to her symptoms.  States she will go to the ER if symptoms worsen.  She would like an appointment for tomorrow in office if possible.  I have scheduled her appointment with me tomorrow, May 20, 2021 at 11:15 AM.  She was instructed if her symptoms worsen she must go to the emergency room.  Krys and her sister both agreed.

## 2021-05-19 NOTE — TELEPHONE ENCOUNTER
Caller: Mar Camilo    Relationship to patient: Self    Best call back number: [phone]    Chief complaint: SHORTNESS OF BREATH, DIFFICULTY BREATHING, CHEST TIGHTNESS     Patient directed to call 911 or go to their nearest emergency room.     Additional notes: PATIENT CALLED AND ASKED TO SCHEDULE AN APPOITNMENT. WHEN ASKED WHAT PATIENT WAS NEEDING TO BE SEEN FOR PATIENT STATED SHE WAS HAVING SHORTNESS OF BREATH AND HAVING A REALLY DIFFICULT TIME BREATHING. PATIENT ALSO MENTIONED SHE WAS HAVING CHEST TIGHTNESS. I INFORMED PATIENT THAT DUE TO SYMPTOMS SHE SHOULD GO TO THE ER. PATIENT HUNG UP BEFORE VERBALIZING UNDERSTANDING. CONTACTED OFFICE TO ADVISE, OFFICE STAFF STATED THEY WILL FOLLOW UP WITH PATIENT

## 2021-05-20 PROBLEM — F41.0 PANIC ATTACK: Status: ACTIVE | Noted: 2021-01-01

## 2021-05-20 PROBLEM — K44.9 HIATAL HERNIA: Status: ACTIVE | Noted: 2021-01-01

## 2021-05-20 PROBLEM — Z99.81 OXYGEN DEPENDENT: Status: ACTIVE | Noted: 2021-01-01

## 2021-05-20 PROBLEM — J96.21 ACUTE ON CHRONIC RESPIRATORY FAILURE WITH HYPOXIA (HCC): Status: ACTIVE | Noted: 2021-01-01

## 2021-05-20 PROBLEM — I49.9 IRREGULAR HEART RHYTHM: Status: ACTIVE | Noted: 2021-01-01

## 2021-05-20 PROBLEM — R05.3 CHRONIC COUGH: Status: ACTIVE | Noted: 2021-01-01

## 2021-05-20 PROBLEM — K50.00 CROHN'S DISEASE OF SMALL INTESTINE: Status: ACTIVE | Noted: 2021-01-01

## 2021-05-20 PROBLEM — H26.9 CATARACT: Status: ACTIVE | Noted: 2021-01-01

## 2021-05-20 NOTE — PROGRESS NOTES
"Chief Complaint  Shortness of Breath    Subjective          Mar Camilo presents to [facility]  History of Present Illness  Mar is a 71-year-old female who presents with sister at office visit today with dyspnea and lip sores.  States she has been feeling short of breath for the past few days.  She also has noticed \"bumps on her lower lip\".  Currently using 4-1/2 L of oxygen at home and 4 L when she is on portable oxygen.  States she has had some chest tightness with the shortness of breath.  States she is very fatigued as well.  Has used her inhalers more often.  Had to use her inhaler when she got to the exam room this morning due to the shortness of breath.  States she has been sweaty walking into the office today.  Denied any chest pain, fevers, or chills.  Has had some wheezing as well.  She was instructed to go to the ER yesterday but refused. Diet hs been normal.     Objective   Vital Signs:   /40 (BP Location: Right arm, Patient Position: Sitting, Cuff Size: Adult)   Pulse 109   Temp 98.2 °F (36.8 °C)   Ht 162.6 cm (64\")   Wt 109 kg (240 lb)   SpO2 (!) 87%   BMI 41.20 kg/m²     Physical Exam  Vitals and nursing note reviewed.   Constitutional:       Appearance: She is obese. She is diaphoretic.      Interventions: Nasal cannula in place.      Comments: Sister,Clau sitting on exam chair wearing facial mask.  Patient has nasal cannula with 4 L of oxygen.     HENT:      Head: Normocephalic.   Neck:      Vascular: No carotid bruit.      Trachea: Trachea and phonation normal. No tracheal tenderness.   Cardiovascular:      Rate and Rhythm: Regular rhythm. Tachycardia present.      Heart sounds: Normal heart sounds, S1 normal and S2 normal. No murmur heard.     Pulmonary:      Breath sounds: Normal air entry. Examination of the right-upper field reveals rales. Examination of the left-upper field reveals rales. Examination of the right-middle field reveals rales. " Examination of the left-middle field reveals rales. Examination of the right-lower field reveals rales. Examination of the left-lower field reveals rales. Rales present.   Abdominal:      General: Bowel sounds are normal.      Tenderness: There is no abdominal tenderness.       Musculoskeletal:      Right lower leg: No edema.      Left lower leg: No edema.   Skin:     General: Skin is warm and moist.      Capillary Refill: Capillary refill takes less than 2 seconds.   Neurological:      Mental Status: She is alert and oriented to person, place, and time.   Psychiatric:         Attention and Perception: Attention normal.         Speech: Speech normal.         Behavior: Behavior normal. Behavior is cooperative.         Thought Content: Thought content normal.         Cognition and Memory: Cognition and memory normal.         Judgment: Judgment normal.       I was wearing a surgical mask and face shield during the entire office visit/encounter.     Result Review :                 Assessment and Plan    Diagnoses and all orders for this visit:    1. Dyspnea, unspecified type (Primary)    2. Hypoxia    3. Tachycardia    Ms. Krys Camilo was seen in office today with her sister, Clau, for new onset dyspnea with tachycardia and hypoxia.  She has a history of type 2 diabetes with COPD.  Currently on 4 L of oxygen.  I am concerned that she may have worsening COPD, pneumonia, anemia or possibly CHF. I have sent her to [facility] from office for evaluation and treatment.  Mar was agreeable to go to the emergency room.  The ER staff was notified of her impending arrival.    Follow Up   No follow-ups on file.  Patient was given instructions and counseling regarding her condition or for health maintenance advice. Please see specific information pulled into the AVS if appropriate.     MARGARITA Narvaez [facility]  [address] [place] [address]  [id number]  Dept: [phone]  Dept Fax: [phone]  Loc: [phone]  Loc Fax: [phone]

## 2021-05-22 NOTE — PROGRESS NOTES
"Hospitalist Team      Patient Care Team:  Vianey Barrios PA-C as PCP - General (Family Medicine)  Bhanu Mata MD as Consulting Physician (Cardiology)  Mann Hernadez MD as Consulting Physician (Pulmonary Disease)  Becky Haji MD as Surgeon (General Surgery)  Laquita Carbajal, RAVEN as Ambulatory  ([facility])        Chief Complaint:  SOA and follow up on AECOPD with hypoxia and suspect pneumonia    Subjective    Interval History and ROS:     Patient States Denies any improvement in breathing.    Patient Complaints: I am still SOA.   Patient Denies:  Nausea or vomiting  History taken from: patient chart RN      Objective    Vital Signs  Temp:  [97.4 °F (36.3 °C)-97.5 °F (36.4 °C)] 97.4 °F (36.3 °C)  Heart Rate:  [82-97] 97  Resp:  [18-24] 20  BP: (121-170)/(58-69) 170/69  Oxygen Therapy  SpO2: 91 %  Pulse Oximetry Type: Continuous  Device (Oxygen Therapy): nasal cannula, humidified  Flow (L/min): 4  Oxygen Concentration (%): 36  Probe Placed On (Pulse Ox): Right:, forehead}  Flowsheet Rows      First Filed Value   Admission Height  162.6 cm (64\") Documented at 05/20/2021 1210   Admission Weight  110 kg (242 lb) Documented at 05/20/2021 1210          Body mass index is 41.54 kg/m².      Physical Exam:    Physical Exam  Vitals and nursing note reviewed.   Constitutional:       Comments: Patient laying in bed on side and having trouble breathing.  She is purse lip breathing.   Cardiovascular:      Rate and Rhythm: Normal rate and regular rhythm.   Pulmonary:      Effort: Respiratory distress present.      Comments: There are no wheeze or rhonchi but there are crackling rales at bases.  I will check bnp  Abdominal:      Palpations: Abdomen is soft.      Comments: rotund   Skin:     General: Skin is warm and dry.   Neurological:      Mental Status: She is oriented to person, place, and time.   Psychiatric:      Comments: Patient is anxious and concerned today         Results Review:     I " reviewed the patient's new clinical results.    Lab Results (last 24 hours)     Procedure Component Value Units Date/Time    Blood Culture - Blood, Blood, Venous Line [[id number]] Collected: 05/20/21 1234    Specimen: Blood, Venous Line Updated: 05/22/21 1245     Blood Culture No growth at 2 days    POC Glucose Once [[id number]]  (Abnormal) Collected: 05/22/21 1134    Specimen: Blood Updated: 05/22/21 1140     Glucose 283 mg/dL     Respiratory Culture - Sputum, Cough [[id number]] Collected: 05/21/21 0228    Specimen: Sputum from Cough Updated: 05/22/21 1029     Respiratory Culture Culture in progress     Gram Stain Few (2+) WBCs seen      No No epithelial cells seen      Few (2+) Gram positive cocci in pairs      Rare (1+) Gram negative bacilli    Narrative:      Organism under investigation.      POC Glucose Once [[id number]]  (Abnormal) Collected: 05/22/21 0716    Specimen: Blood Updated: 05/22/21 0722     Glucose 232 mg/dL     Blood Culture - Blood, Blood, Venous Line [[id number]]  (Abnormal) Collected: 05/20/21 1234    Specimen: Blood, Venous Line Updated: 05/22/21 0655     Blood Culture Staphylococcus, coagulase negative     Comment: Probable contaminant requires clinical correlation, susceptibility not performed unless requested by physician.          Isolated from Aerobic Bottle     Gram Stain Aerobic Bottle Gram positive cocci in clusters    POC Glucose Once [[id number]]  (Abnormal) Collected: 05/21/21 2027    Specimen: Blood Updated: 05/21/21 2034     Glucose 331 mg/dL           Imaging Results (Last 24 Hours)     ** No results found for the last 24 hours. **          X-ray images personally reviewed  by physician; Kayce Ariza D.O.      ECG not reviewed personally by physician  ECG/EMG Results (most recent)     Procedure Component Value Units Date/Time    Adult Transthoracic Echo Complete w/ Color, Spectral and Contrast if Necessary Per Protocol [[id number]] Collected: 05/21/21 0753     Updated: 05/21/21  1314     BSA 2.1 m^2      IVSd 1.5 cm      LVIDd 4.7 cm      LVIDs 2.8 cm      LVPWd 1.4 cm      IVS/LVPW 1.0     FS 40.4 %      EDV(Teich) 101.3 ml      ESV(Teich) 29.3 ml      EF(Teich) 71.1 %      EDV(cubed) 102.5 ml      ESV(cubed) 21.7 ml      EF(cubed) 78.8 %      LV mass(C)d 280.5 grams      LV mass(C)dI 132.3 grams/m^2      SV(Teich) 72.1 ml      SI(Teich) 34.0 ml/m^2      SV(cubed) 80.8 ml      SI(cubed) 38.1 ml/m^2      Ao root diam 2.1 cm      Ao root area 3.5 cm^2      ACS 1.5 cm      LVOT diam 2.0 cm      LVOT area 3.1 cm^2      LVOT area(traced) 3.1 cm^2      RVOT diam 2.6 cm      RVOT area 5.3 cm^2      LVLd ap4 8.9 cm      EDV(MOD-sp4) 124.0 ml      LVLs ap4 6.4 cm      ESV(MOD-sp4) 28.6 ml      EF(MOD-sp4) 76.9 %      LVLd ap2 8.0 cm      EDV(MOD-sp2) 75.6 ml      LVLs ap2 5.7 cm      ESV(MOD-sp2) 20.9 ml      EF(MOD-sp2) 72.4 %      SV(MOD-sp4) 95.4 ml      SI(MOD-sp4) 45.0 ml/m^2      SV(MOD-sp2) 54.7 ml      SI(MOD-sp2) 25.8 ml/m^2      Ao root area (BSA corrected) 0.99     LV Carlson Vol (BSA corrected) 58.5 ml/m^2      LV Sys Vol (BSA corrected) 13.5 ml/m^2      TAPSE (>1.6) 2.6 cm      EF(MOD-bp) 74.7 %      MV A dur 0.13 sec      MV E max gigi 99.4 cm/sec      MV A max gigi 99.4 cm/sec      MV E/A 1.0     MV V2 mean 76.0 cm/sec      MV mean PG 3.0 mmHg      MV V2 VTI 26.1 cm      MVA(VTI) 3.9 cm^2      MV P1/2t max gigi 113.0 cm/sec      MV P1/2t 46.5 msec      MVA(P1/2t) 4.7 cm^2      MV dec slope 711.0 cm/sec^2      MV dec time 208 sec      Ao V2 mean 83.0 cm/sec      Ao mean PG 3.0 mmHg      Ao mean PG (full) 1.0 mmHg      Ao V2 VTI 27.0 cm      KULWANT(I,A) 3.8 cm^2      KULWANT(I,D) 3.8 cm^2      LV V1 mean PG 2.0 mmHg      LV V1 mean 69.2 cm/sec      LV V1 VTI 32.8 cm      MR max gigi 246.5 cm/sec      MR max PG 26.8 mmHg      SV(Ao) 93.5 ml      SI(Ao) 44.1 ml/m^2      SV(LVOT) 103.0 ml      SV(RVOT) 98.8 ml      SI(LVOT) 48.6 ml/m^2      PA V2 max 109.0 cm/sec      PA max PG 4.8 mmHg      RV V1 mean  PG 1.0 mmHg      RV V1 mean 52.0 cm/sec      RV V1 VTI 18.6 cm      TR max marco 190.0 cm/sec      RVSP(TR) 22.4 mmHg      RAP systole 8.0 mmHg      Qp/Qs 0.96     MVA P1/2T LCG 1.9 cm^2      Lat Peak E' Marco 7.5 cm/sec      Med Peak E' Marco 7.1 cm/sec      RV S' 15.0 cm/sec       CV ECHO ANNI - BZI_BMI 41.5 kilograms/m^2       CV ECHO ANNI - BSA(HAYCOCK) 2.3 m^2       CV ECHO ANNI - BZI_METRIC_WEIGHT 109.8 kg       CV ECHO ANNI - BZI_METRIC_HEIGHT 162.6 cm      Avg E/e' ratio 13.62     Target HR (85%) 126 bpm      Max. Pred. HR (100%) 148 bpm      Sinus 2.6 cm      Dimensionless Index 1.00 (DI)      LA Volume Index 22.0 mL/m2      Ao pk marco 126.0 cm/sec      LV V1 max .0 mmHg      LV V1 max 112.0 cm/sec      Ao max PG 6 mmHg     Narrative:      · Calculated left ventricular EF = 74.7% Left ventricular ejection   fraction appears to be greater than 70%. Left ventricular systolic   function is hyperdynamic (EF > 70%). Normal left ventricular cavity size   noted. Left ventricular wall thickness is consistent with mild to moderate   concentric hypertrophy. All left ventricular wall segments contract   normally. Left ventricular diastolic function was indeterminate. Normal   left atrial pressure       ECG 12 Lead [[id number]] Collected: 05/20/21 1258     Updated: 05/21/21 2053     QT Interval 362 ms     Narrative:      HEART RATE= 90  bpm  RR Interval= 664  ms  IA Interval= 111  ms  P Horizontal Axis= -14  deg  P Front Axis= 83  deg  QRSD Interval= 89  ms  QT Interval= 362  ms  QRS Axis= 6  deg  T Wave Axis= 98  deg  - ABNORMAL ECG -  Sinus rhythm  Nonspecific T abnrm, anterolateral leads  Rate has increased  Electronically Signed By: Xiomara Anderson ([facility]) 21-May-2021 20:42:15  Date and Time of Study: 2021-05-20 12:58:55          Medication Review:   I have reviewed the patient's current medication list    Current Facility-Administered Medications:   •  acetaminophen (TYLENOL) tablet 650 mg, 650 mg, Oral, Q6H  PRN, Juan R Patel MD, 650 mg at 05/20/21 1727  •  acetaminophen (TYLENOL) tablet 650 mg, 650 mg, Oral, Q6H PRN, Jihan Monroe APRN, 650 mg at 05/22/21 1001  •  albuterol (PROVENTIL) nebulizer solution 0.083% 2.5 mg/3mL, 2.5 mg, Nebulization, Q6H PRN, Jihan Monore APRN  •  aspirin EC tablet 81 mg, 81 mg, Oral, BID, Jihan Monroe APRN, 81 mg at 05/22/21 0942  •  budesonide-formoterol (SYMBICORT) 80-4.5 MCG/ACT inhaler 2 puff, 2 puff, Inhalation, BID - RT, Jihan Monroe APRN, 2 puff at 05/22/21 0728  •  cefepime (MAXIPIME) IVPB 2 g/50ml D5W (premix), 2 g, Intravenous, Q12H, Kayce Ariza,   •  dextrose (D50W) 25 g/ 50mL Intravenous Solution 25 g, 25 g, Intravenous, Q15 Min PRN, Juan R Patel MD  •  dextrose (GLUTOSE) oral gel 15 g, 15 g, Oral, Q15 Min PRN, Juan R Patel MD  •  docusate sodium (COLACE) capsule 100 mg, 100 mg, Oral, Every Other Day, Jhian Monroe APRN, 100 mg at 05/22/21 0943  •  doxycycline (VIBRAMYCIN) 100 mg in sodium chloride 0.9 % 250 mL IVPB, 100 mg, Intravenous, Q12H, Juan R Patel MD, Last Rate: 250 mL/hr at 05/22/21 0421, 100 mg at 05/22/21 0421  •  fenofibrate (TRICOR) tablet 145 mg, 145 mg, Oral, Daily, Jihan Monroe APRN, 145 mg at 05/22/21 0943  •  FLUoxetine (PROzac) capsule 40 mg, 40 mg, Oral, Daily, Jihan Monroe APRJACKIE, 40 mg at 05/22/21 0942  •  gabapentin (NEURONTIN) capsule 300 mg, 300 mg, Oral, BID, Jihan Monroe APRN, 300 mg at 05/22/21 0943  •  glucagon (GLUCAGEN) injection 1 mg, 1 mg, Subcutaneous, Q15 Min PRN, Juan R Patel MD  •  guaiFENesin (MUCINEX) 12 hr tablet 1,200 mg, 1,200 mg, Oral, QAM, Jihan Monroe APRN, 1,200 mg at 05/22/21 0657  •  insulin aspart (novoLOG) injection 0-7 Units, 0-7 Units, Subcutaneous, TID AC, Juan R Patel MD, 4 Units at 05/22/21 1249  •  ipratropium-albuterol (DUO-NEB) nebulizer solution 3 mL, 3 mL, Nebulization, Q4H While Awake - RT, Gurmeet Rosa MD, 3 mL at 05/22/21 1413  •   lamoTRIgine (LaMICtal) tablet 150 mg, 150 mg, Oral, Daily, Jihan Monroe APRN, 150 mg at 05/22/21 0943  •  methylPREDNISolone sodium succinate (SOLU-Medrol) injection 40 mg, 40 mg, Intravenous, Q8H, Jihan Monroe, APRN, 40 mg at 05/22/21 0656  •  O2 (OXYGEN), 4 L/min, Inhalation, Continuous, Jihan Monroe APRN, Last Rate: 240,000 mL/hr at 05/21/21 1128, 4 L/min at 05/21/21 1128  •  rosuvastatin (CRESTOR) tablet 40 mg, 40 mg, Oral, Daily, Jihan Monroe APRN, 40 mg at 05/22/21 0942  •  verapamil SR (CALAN-SR) CR tablet 240 mg, 240 mg, Oral, BID, Jihan Monroe, APRN, 240 mg at 05/22/21 0943    Facility-Administered Medications Ordered in Other Encounters:   •  acetaminophen (TYLENOL) 325 MG tablet  - ADS Override Pull, , , ,       Assessment/Plan         Acute on chronic hypoxic respiratory failure   Patient still severely SOA              Pulmonary Consulted              Lactic acidosis probably secondary to hypoxia              Procal 3.91 admission and 1.9 now this am/  so empirically treating with antibiotics for possible pneumonia?              Resp viral panel is negative   Sputum culture is pending with 2+ WBC and 2+ G + cocci in pairs and rare 1+ gram negative bacilli   Strep pneumo and legionella are negative              Echo ordered  normal EF, mild concentric hypertrophy, LV diastolic function indeterminate, Nl atrial pressure              Duonebs and solumedrol and continue symbicort and mucinex              2 view CXR viewed personally by me and no acute changes seen     LELO              Fron o.6 to 1.04/ creatinine     PAF               Intolerant of blood thinners other than aspirin?     CAD  HLD  HTN     DM2 in obese              A1C 6.9     Obesity/ Body mass index is 41.54 kg/m².     Chronic back paqin     H/O PVD  H/O Stroke  H/O left subclavian artery stenosis     Depression/ lamictal continued              Plan for disposition:  Continued monitoring.  Patient remains  very tight and having difficultly breathing.     Kayce Ariza DO  05/22/21  15:26 EDT      Time: 30 min

## 2021-05-22 NOTE — CASE MANAGEMENT/SOCIAL WORK
Continued Stay Note  LIONEL Barton     Patient Name: Mar Camilo  MRN: [id number]  Today's Date: 5/22/2021    Admit Date: 5/20/2021    Discharge Plan     Row Name 05/22/21 1502       Plan    Plan Comments  Patient complains of shortness of breath today.  Remains on 4 liters of O2.  Plan still to return home with sister.  Will continue to follow        Discharge Codes    No documentation.             Nina Melissa RN

## 2021-05-22 NOTE — PLAN OF CARE
Goal Outcome Evaluation:  Plan of Care Reviewed With: patient  Progress: improving  Outcome Summary: VSS; Pt continues on 4L O2, BS's elevated; C/o headache

## 2021-05-22 NOTE — PLAN OF CARE
Goal Outcome Evaluation:  Plan of Care Reviewed With: patient     Outcome Summary: VSS. On 4.5L NC. Receiving solu-medrol and ABX. DUO-NEBs q4 hours.

## 2021-05-23 NOTE — CASE MANAGEMENT/SOCIAL WORK
Continued Stay Note  LIONEL Barton     Patient Name: Mar Camilo  MRN: [id number]  Today's Date: 5/23/2021    Admit Date: 5/20/2021    Discharge Plan     Row Name 05/23/21 1138       Plan    Plan Comments  Mrs Camilo is less short of breath today.  Still on 4l O2.  Plan is home.  Will continue to follow        Discharge Codes    No documentation.             Nina Melissa RN

## 2021-05-23 NOTE — PLAN OF CARE
"Goal Outcome Evaluation:  Plan of Care Reviewed With: patient  Progress: improving  Outcome Summary: VSS; 4.5L NC. IV ABX. desats when ambulating; reiterated pearsed lip breathing technique \"quality vs quantity\" \"roses / candles\" slow and controled. well accepted  "

## 2021-05-23 NOTE — DISCHARGE SUMMARY
Mar MARROQUIN Scriver  1950  [id number]    Hospitalists Discharge Summary    Date of Admission: 5/20/2021  Date of Discharge:  5/23/2021    History of Present Illness and Hospital Course Summary:     Patient admitted with SOA, Hypoxia and green grey sputum.  Pulmonary was consulted and was put on solumedrol and duo nebs Q 4 hours while awake.  CT scan was done and no infiltrate was seen.  She does have signs of chronic bronchitis.  No interstitial pneumonitis.  LAURA was previously positive and so pulmonary repeated it.    Patient was recommended a sleep study and she declines this work up.  She is improved today with very little mucous production and sputum is clearing.  We will discharge her with a few more days of doxycycline for bronchitis.  Acute kidney injury is resolved and so is lactic acidosis    Primary Discharge Diagnoses and Secondary Discharge Diagnoses:        Acute on chronic hypoxic respiratory failure              Patient still SOA but no wheeze and feels improved              Pulmonary Consulted              CT scan with diffuse bronchial wall thickening lower lung/ chronic bronchitis                          No acute pneumonia or interstitial pneumonia              Lactic acidosis probably secondary to hypoxia              Procal 3.91 admission and 1.9 now this am.  On doxycycline.              Resp viral panel is negative              Sputum culture is pending with 2+ WBC and 2+ G + cocci in pairs and rare 1+ gram negative bacilli              Strep pneumo and legionella are negative              Echo ordered  normal EF, mild concentric hypertrophy, LV diastolic function indeterminate, Nl atrial pressure              Duonebs and solumedrol and continue symbicort and mucinex              2 view CXR viewed personally by me and no acute changes seen     LELO              Fron o.6 to 1.04/ creatinine   Much improved at 0.69 today     PAF               Intolerant of blood thinners other than  aspirin?    Anemia of chronic disease/ stable     CAD  HLD  HTN     DM2 in obese              A1C 6.9     Obesity/ Body mass index is 41.54 kg/m².     Chronic back paqin     H/O PVD  H/O Stroke  H/O left subclavian artery stenosis     Depression/ lamictal continued    PCP  Patient Care Team:  Vianey Barrios PA-C as PCP - General (Family Medicine)  Bhanu Mata MD as Consulting Physician (Cardiology)  Mann Hernadez MD as Consulting Physician (Pulmonary Disease)  Becky Haji MD as Surgeon (General Surgery)  Laquita Carbajal RN as Ambulatory  (Population Health)    Consults:   Consults     Date and Time Order Name Status Description    5/20/2021  9:30 PM Inpatient Pulmonology Consult Completed           Operations and Procedures Performed:       Adult Transthoracic Echo Complete w/ Color, Spectral and Contrast if Necessary Per Protocol    Result Date: 5/21/2021  Narrative: · Calculated left ventricular EF = 74.7% Left ventricular ejection fraction appears to be greater than 70%. Left ventricular systolic function is hyperdynamic (EF > 70%). Normal left ventricular cavity size noted. Left ventricular wall thickness is consistent with mild to moderate concentric hypertrophy. All left ventricular wall segments contract normally. Left ventricular diastolic function was indeterminate. Normal left atrial pressure      CT Chest Without Contrast Diagnostic    Result Date: 5/21/2021  Narrative: CT Chest WO INDICATION: 71-year-old woman with acute and chronic respiratory failure with hypoxia. Patient on oxygen supplementation. Evaluate for pneumonia versus interstitial lung disease. TECHNIQUE: CT of the thorax without IV contrast. Coronal and sagittal reconstructions were obtained.  Radiation dose reduction techniques included automated exposure control or exposure modulation based on body size. Count of known CT and cardiac nuc med studies performed in previous 12 months: 0. COMPARISON: Chest x-ray  5/21/2021, lung nuclear medicine perfusion scan 5/20/2021, chest CT 3/12/2018 FINDINGS: Images through the thoracic inlet demonstrate no thyroid mass Images of the chest demonstrate atherosclerotic calcifications of the arch vessels and coronary arteries. Cardiac chambers are within normal limits. No pericardial fluid. The esophagus is normal. Lung window images demonstrate significant underlying emphysematous changes in the upper and lower lungs diffusely similar to the 18 area there is reticulonodular change of the posterior right lung apex which is similar to prior study. There is mild bronchial wall thickening in the left mid lung left lung base likely representing bronchitis. There is reticular interstitial change in the left lung base increased over the prior study but not new area there is very mild subpleural reticular nodular change at the right lung base felt similar to 2018 no evidence of acute pneumonia. No evidence of honeycombing. Limited views of the upper abdomen demonstrate diffuse low-attenuation of the liver consistent with steatosis. Spleen is normal in size. There are loops of bowel in the subcutaneous fat on the last images of this study likely related to a large ventral wall hernia which is not included in the field-of-view of this exam.     Impression: 1. There is severe emphysematous change with biapical reticulonodular change in left greater than right reticular nodular change in the lungs minimally progressive from 2018. There is suggestion of diffuse bronchial wall thickening in the lower lungs particularly on the left which may indicate chronic bronchitis. No evidence of acute acute pneumonia. Findings do not meet criteria for usual interstitial pneumonia. 2. Diffuse hepatic steatosis 3. Severe coronary calcifications 4. The last image of this study demonstrates fat and colon loops in the subcutaneous fat likely related to a ventral wall hernia which is caudal to these loops of bowel are  not included in the field-of-view of this exam. Signer Name: Maddy Terry MD  Signed: 5/21/2021 1:46 PM  Workstation Name: [id number]  [facility] Lung Scan Perfusion Particulate    Result Date: 5/20/2021  Narrative: EXAM: PERFUSION STUDY LUNGS  HISTORY: Shortness of air for 2 days  DOSE: 5.7 mCi technetium 99m MAA  COMPARISON: 01/24/2020, chest x-ray 05/20/2021  FINDINGS: After administration of the proximal, the lungs were imaged in multiple projections.   The distribution throughout the lungs is slightly inhomogeneous but no significant perfusion defects are identified.      Impression: Low probability for pulmonary embolus  This report was finalized on 5/20/2021 2:52 PM by Dr. Cecilio Hauser MD.      XR Chest 1 View    Result Date: 5/20/2021  Narrative: Lateral PORTABLE CHEST, 05/20/2021  HISTORY: Shortness of air for 2 days  COMPARISON: AP view from earlier today  TECHNIQUE: Lateral portable chest x-ray  FINDINGS: No infiltrates are visible on this lateral view      Impression: Negative single lateral view chest  This report was finalized on 5/20/2021 2:50 PM by Dr. Cecilio Hauser MD.      XR Chest 1 View    Result Date: 5/20/2021  Narrative: AP PORTABLE CHEST, 05/20/2021  HISTORY: Shortness of air for 2 days  COMPARISON: 01/29/2020  TECHNIQUE: AP portable chest x-ray.  FINDINGS: Study slightly limited by patient's size. No infiltrates are visible. The heart size is within normal limits. A right shoulder prosthesis is present.      Impression: No active disease  This report was finalized on 5/20/2021 1:35 PM by Dr. Cecilio Hauser MD.      XR Chest PA & Lateral    Result Date: 5/21/2021  Narrative: PA AND LATERAL CHEST, 5/21/2021 8:58 AM  HISTORY: f/u respiratory failure; J96.21-Acute and chronic respiratory failure with hypoxia  COMPARISON: 05/20/2021  TECHNIQUE: PA and lateral upright chest series.  FINDINGS: Heart size and vascularity normal. Lungs are clear. Study is limited by  patient's size.      Impression: Study is limited by patient's size. No focal infiltrates are visible.. No effusions are visible  This report was finalized on 5/21/2021 9:14 AM by Dr. Cecilio Hauser MD.        Allergies:  is allergic to contrast dye, iodinated diagnostic agents, norco [hydrocodone-acetaminophen], and tenoretic [atenolol-chlorthalidone].    Geoffrey  reviewed    Discharge Medications:     Discharge Medications      New Medications      Instructions Start Date   doxycycline 100 MG capsule  Commonly known as: MONODOX   100 mg, Oral, 2 Times Daily      ipratropium-albuterol 0.5-2.5 mg/3 ml nebulizer  Commonly known as: DUO-NEB   3 mL, Nebulization, Every 4 Hours While Awake - RT      predniSONE 10 MG (48) dose pack  Commonly known as: DELTASONE   Take 6 day 1 and 5 day 2 and 4 day3 and 5 day 2 and 6 day 1 then stop         Changes to Medications      Instructions Start Date   fenofibrate 145 MG tablet  Commonly known as: TRICOR  What changed: how to take this   Take 1 tablet by mouth once daily      FLUoxetine 40 MG capsule  Commonly known as: PROzac  What changed: how to take this   Take 1 capsule by mouth once daily      metFORMIN 500 MG tablet  Commonly known as: GLUCOPHAGE  What changed:   · how to take this  · additional instructions   TAKE 1 TABLET BY MOUTH TWICE DAILY WITH MEALS      O2  Commonly known as: OXYGEN  What changed: how much to take   4 L/min (4 L/min), Inhalation, Continuous, May resume prior 3L/min when O2 sat remains 88% or above on this setting. Patient was given an oximeter here.      verapamil  MG CR tablet  Commonly known as: CALAN-SR  What changed: when to take this   TAKE 1 TABLET BY MOUTH EVERY 12 HOURS         Continue These Medications      Instructions Start Date   Acetaminophen 325 MG capsule   650 mg, Every 8 Hours PRN      aspirin 81 MG EC tablet   81 mg, Oral, 2 Times Daily      docusate sodium 100 MG capsule  Commonly known as: COLACE   100 mg, Oral, Every Other  Day      gabapentin 300 MG capsule  Commonly known as: NEURONTIN   Take 1 capsule by mouth twice daily      glucose blood test strip  Commonly known as: OneTouch Verio   Use as instructed as to check blood sugars 2-3 times a day for type 2 diabetes      guaiFENesin 600 MG 12 hr tablet  Commonly known as: MUCINEX   1,200 mg, Oral, Every Morning      lamoTRIgine 150 MG tablet  Commonly known as: LaMICtal   150 mg, Oral, Daily      lisinopril 40 MG tablet  Commonly known as: PRINIVIL,ZESTRIL   Take 1 tablet by mouth once daily      multivitamin with minerals tablet tablet   1 tablet, Oral, Daily      onetouch ultrasoft lancets   Used to check blood sugars twice a day as needed for type 2 diabetes monitoring      OneTouch Verio w/Device kit   1 kit, Does not apply, 2 Times Daily      rosuvastatin 40 MG tablet  Commonly known as: CRESTOR   Take 1 tablet by mouth once daily      Symbicort 80-4.5 MCG/ACT inhaler  Generic drug: budesonide-formoterol   2 puffs, Inhalation, 2 Times Daily - RT      Ventolin  (90 Base) MCG/ACT inhaler  Generic drug: albuterol sulfate HFA   INHALE 2 PUFFS BY MOUTH EVERY 4 HOURS AS NEEDED         Stop These Medications    Spiriva HandiHaler 18 MCG per inhalation capsule  Generic drug: tiotropium            Last Lab Results:   Lab Results (most recent)     Procedure Component Value Units Date/Time    Blood Culture - Blood, Blood, Venous Line [[id number]] Collected: 05/20/21 1234    Specimen: Blood, Venous Line Updated: 05/23/21 1245     Blood Culture No growth at 3 days    Comprehensive Metabolic Panel [[id number]]  (Abnormal) Collected: 05/23/21 0935    Specimen: Blood Updated: 05/23/21 1009     Glucose 359 mg/dL      BUN 25 mg/dL      Creatinine 0.69 mg/dL      Sodium 138 mmol/L      Potassium 4.8 mmol/L      Chloride 101 mmol/L      CO2 28.6 mmol/L      Calcium 9.0 mg/dL      Total Protein 6.5 g/dL      Albumin 3.50 g/dL      ALT (SGPT) 28 U/L      AST (SGOT) 19 U/L      Alkaline  Phosphatase 55 U/L      Total Bilirubin 0.2 mg/dL      eGFR Non African Amer 84 mL/min/1.73      Globulin 3.0 gm/dL      A/G Ratio 1.2 g/dL      BUN/Creatinine Ratio 36.2     Anion Gap 8.4 mmol/L     Narrative:      GFR Normal >60  Chronic Kidney Disease <60  Kidney Failure <15      CBC & Differential [[id number]]  (Abnormal) Collected: 05/23/21 0935    Specimen: Blood Updated: 05/23/21 0954    Narrative:      The following orders were created for panel order CBC & Differential.  Procedure                               Abnormality         Status                     ---------                               -----------         ------                     CBC Auto Differential[[id number]]        Abnormal            Final result                 Please view results for these tests on the individual orders.    CBC Auto Differential [[id number]]  (Abnormal) Collected: 05/23/21 0935    Specimen: Blood Updated: 05/23/21 0954     WBC 16.22 10*3/mm3      RBC 3.86 10*6/mm3      Hemoglobin 10.8 g/dL      Hematocrit 36.3 %      MCV 94.0 fL      MCH 28.0 pg      MCHC 29.8 g/dL      RDW 14.3 %      RDW-SD 49.3 fl      MPV 9.4 fL      Platelets 316 10*3/mm3      Neutrophil % 89.2 %      Lymphocyte % 5.8 %      Monocyte % 3.4 %      Eosinophil % 0.0 %      Basophil % 0.1 %      Immature Grans % 1.5 %      Neutrophils, Absolute 14.48 10*3/mm3      Lymphocytes, Absolute 0.94 10*3/mm3      Monocytes, Absolute 0.55 10*3/mm3      Eosinophils, Absolute 0.00 10*3/mm3      Basophils, Absolute 0.01 10*3/mm3      Immature Grans, Absolute 0.24 10*3/mm3     Respiratory Culture - Sputum, Cough [[id number]] Collected: 05/21/21 0228    Specimen: Sputum from Cough Updated: 05/23/21 0934     Respiratory Culture Scant growth (1+) Normal Respiratory Maral     Gram Stain Few (2+) WBCs seen      No No epithelial cells seen      Few (2+) Gram positive cocci in pairs      Rare (1+) Gram negative bacilli    Narrative:            POC Glucose Once [[id number]]   (Abnormal) Collected: 05/23/21 0744    Specimen: Blood Updated: 05/23/21 0751     Glucose 282 mg/dL     POC Glucose Once [[id number]]  (Abnormal) Collected: 05/22/21 2053    Specimen: Blood Updated: 05/22/21 2059     Glucose 274 mg/dL     BNP [[id number]]  (Abnormal) Collected: 05/22/21 1545    Specimen: Blood Updated: 05/22/21 1617     proBNP 2,645.0 pg/mL     Narrative:      Among patients with dyspnea, NT-proBNP is highly sensitive for the detection of acute congestive heart failure. In addition NT-proBNP of <300 pg/ml effectively rules out acute congestive heart failure with 99% negative predictive value.    Results may be falsely decreased if patient taking Biotin.      LAURA With / DsDNA, RNP, Sjogrens A / B, Bean [[id number]] Collected: 05/21/21 0414    Specimen: Blood Updated: 05/22/21 1609     LAURA Direct Negative    Narrative:      Performed at:  [address] [place] [address]  [id number]  : Suleiman Reyes PhD, Phone:  [phone]    Blood Culture - Blood, Blood, Venous Line [[id number]]  (Abnormal) Collected: 05/20/21 1234    Specimen: Blood, Venous Line Updated: 05/22/21 0655     Blood Culture Staphylococcus, coagulase negative     Comment: Probable contaminant requires clinical correlation, susceptibility not performed unless requested by physician.          Isolated from Aerobic Bottle     Gram Stain Aerobic Bottle Gram positive cocci in clusters    Blood Culture ID, PCR - Blood, Blood, Venous Line [[id number]]  (Abnormal) Collected: 05/20/21 1234    Specimen: Blood, Venous Line Updated: 05/21/21 1404     BCID, PCR Staphylococcus spp, not aureus. Identification by BCID PCR.     BOTTLE TYPE Aerobic Bottle    Respiratory Panel, PCR (WITHOUT COVID) - Swab, Nasopharynx [[id number]]  (Normal) Collected: 05/21/21 0229    Specimen: Swab from Nasopharynx Updated: 05/21/21 1111     ADENOVIRUS, PCR Not Detected     Coronavirus 229E Not Detected     Coronavirus HKU1 Not Detected      Coronavirus NL63 Not Detected     Coronavirus OC43 Not Detected     Human Metapneumovirus Not Detected     Human Rhinovirus/Enterovirus Not Detected     Influenza B PCR Not Detected     Parainfluenza Virus 1 Not Detected     Parainfluenza Virus 2 Not Detected     Parainfluenza Virus 3 Not Detected     Parainfluenza Virus 4 Not Detected     Bordetella pertussis pcr Not Detected     Chlamydophila pneumoniae PCR Not Detected     Mycoplasma pneumo by PCR Not Detected     Influenza A PCR Not Detected     RSV, PCR Not Detected     Bordetella parapertussis PCR Not Detected    Narrative:      The coronavirus on the RVP is NOT COVID-19 and is NOT indicative of infection with COVID-19.     Procalcitonin [[id number]]  (Abnormal) Collected: 05/21/21 0414    Specimen: Blood Updated: 05/21/21 0545     Procalcitonin 1.97 ng/mL     Narrative:      Results may be falsely decreased if patient taking Biotin.     Basic Metabolic Panel [[id number]]  (Abnormal) Collected: 05/21/21 0414    Specimen: Blood Updated: 05/21/21 0541     Glucose 258 mg/dL      BUN 21 mg/dL      Creatinine 0.78 mg/dL      Sodium 137 mmol/L      Potassium 4.4 mmol/L      Chloride 104 mmol/L      CO2 24.4 mmol/L      Calcium 8.9 mg/dL      eGFR Non African Amer 73 mL/min/1.73      BUN/Creatinine Ratio 26.9     Anion Gap 8.6 mmol/L     Narrative:      GFR Normal >60  Chronic Kidney Disease <60  Kidney Failure <15      Legionella Antigen, Urine - Urine, Urine, Clean Catch [[id number]]  (Normal) Collected: 05/21/21 0444    Specimen: Urine, Clean Catch Updated: 05/21/21 0533     LEGIONELLA ANTIGEN, URINE Negative    S. Pneumo Ag Urine or CSF - Urine, Urine, Clean Catch [[id number]]  (Normal) Collected: 05/21/21 0444    Specimen: Urine, Clean Catch Updated: 05/21/21 0533     Strep Pneumo Ag Negative    CBC & Differential [[id number]]  (Abnormal) Collected: 05/21/21 0414    Specimen: Blood Updated: 05/21/21 0529    Narrative:      The following orders were created for panel  order CBC & Differential.  Procedure                               Abnormality         Status                     ---------                               -----------         ------                     CBC Auto Differential[[id number]]        Abnormal            Final result                 Please view results for these tests on the individual orders.    CBC Auto Differential [[id number]]  (Abnormal) Collected: 05/21/21 0414    Specimen: Blood Updated: 05/21/21 0529     WBC 10.87 10*3/mm3      RBC 3.98 10*6/mm3      Hemoglobin 11.0 g/dL      Hematocrit 37.6 %      MCV 94.5 fL      MCH 27.6 pg      MCHC 29.3 g/dL      RDW 14.5 %      RDW-SD 50.4 fl      MPV 9.7 fL      Platelets 326 10*3/mm3      Neutrophil % 87.7 %      Lymphocyte % 10.0 %      Monocyte % 1.5 %      Eosinophil % 0.0 %      Basophil % 0.1 %      Immature Grans % 0.7 %      Neutrophils, Absolute 9.53 10*3/mm3      Lymphocytes, Absolute 1.09 10*3/mm3      Monocytes, Absolute 0.16 10*3/mm3      Eosinophils, Absolute 0.00 10*3/mm3      Basophils, Absolute 0.01 10*3/mm3      Immature Grans, Absolute 0.08 10*3/mm3      nRBC 0.0 /100 WBC     Hemoglobin A1c [[id number]]  (Abnormal) Collected: 05/20/21 1225    Specimen: Blood Updated: 05/20/21 1721     Hemoglobin A1C 6.90 %     Narrative:      Hemoglobin A1C Ranges:    Increased Risk for Diabetes  5.7% to 6.4%  Diabetes                     >= 6.5%  Diabetic Goal                < 7.0%    Timed Lactic Acid, Reflex [[id number]]  (Normal) Collected: 05/20/21 1551    Specimen: Blood Updated: 05/20/21 1615     Lactate 1.9 mmol/L     Procalcitonin [[id number]]  (Abnormal) Collected: 05/20/21 1225    Specimen: Blood Updated: 05/20/21 1514     Procalcitonin 3.91 ng/mL     Narrative:      Results may be falsely decreased if patient taking Biotin.     COVID PRE-OP / PRE-PROCEDURE SCREENING ORDER (NO ISOLATION) - Swab, Nasal Cavity [[id number]]  (Normal) Collected: 05/20/21 1325    Specimen: Swab from Nasal Cavity Updated:  05/20/21 1429    Narrative:      The following orders were created for panel order COVID PRE-OP / PRE-PROCEDURE SCREENING ORDER (NO ISOLATION) - Swab, Nasal Cavity.  Procedure                               Abnormality         Status                     ---------                               -----------         ------                     COVID-19,[employer] IN-JESS...[[id number]]  Normal              Final result                 Please view results for these tests on the individual orders.    COVID-19,[employer] IN-HOUSE,Nasal Swab No Transport Media 3-4 HR TAT - Swab, Nasal Cavity [[id number]]  (Normal) Collected: 05/20/21 1325    Specimen: Swab from Nasal Cavity Updated: 05/20/21 1429     COVID19 Not Detected    Narrative:      Fact sheet for providers: https://www.fda.gov/media/828260/download     Fact sheet for patients: https://www.fda.gov/media/182270/download    Test performed by PCR.    Consider negative results in combination with clinical observations, patient history, and epidemiological information.    Blood Gas, Arterial - [[id number]]  (Abnormal) Collected: 05/20/21 1351    Specimen: Arterial Blood Updated: 05/20/21 1351     Site Left Radial     Dylan's Test Positive     pH, Arterial 7.377 pH units      pCO2, Arterial 43.6 mm Hg      pO2, Arterial 67.0 mm Hg      Comment: 84 Value below reference range        HCO3, Arterial 25.6 mmol/L      Base Excess, Arterial 0.1 mmol/L      O2 Saturation, Arterial 92.6 %      Comment: 84 Value below reference range        Hemoglobin, Blood Gas 12.7 g/dL      Temperature 37.0 C      Barometric Pressure for Blood Gas 746 mmHg      Modality Nasal Cannula     Flow Rate 6.0 lpm      Ventilator Mode NA     Collected by 0     Comment: Meter: [id number]     :  [id number]        pCO2, Temperature Corrected 43.6 mm Hg      pH, Temp Corrected 7.377 pH Units      pO2, Temperature Corrected 67.0 mm Hg     BNP [[id number]]  (Normal) Collected: 05/20/21 1225    Specimen: Blood  Updated: 05/20/21 1312     proBNP 372.8 pg/mL     Narrative:      Among patients with dyspnea, NT-proBNP is highly sensitive for the detection of acute congestive heart failure. In addition NT-proBNP of <300 pg/ml effectively rules out acute congestive heart failure with 99% negative predictive value.    Results may be falsely decreased if patient taking Biotin.      Lactic Acid, Plasma [[id number]]  (Abnormal) Collected: 05/20/21 1234    Specimen: Blood Updated: 05/20/21 1308     Lactate 5.9 mmol/L     Troponin [[id number]]  (Normal) Collected: 05/20/21 1225    Specimen: Blood Updated: 05/20/21 1308     Troponin T <0.010 ng/mL     Narrative:      Troponin T Reference Range:  <= 0.03 ng/mL-   Negative for AMI  >0.03 ng/mL-     Abnormal for myocardial necrosis.  Clinicians would have to utilize clinical acumen, EKG, Troponin and serial changes to determine if it is an Acute Myocardial Infarction or myocardial injury due to an underlying chronic condition.       Results may be falsely decreased if patient taking Biotin.      D-dimer, Quantitative [[id number]]  (Abnormal) Collected: 05/20/21 1225    Specimen: Blood Updated: 05/20/21 1306     D-Dimer, Quantitative 0.84 MCGFEU/mL     Narrative:      Can be elevated in, but is not diagnostic for deep vein thrombosis (DVT) or pulmonary embolis (PE).  It is also elevated in other medical conditions.  Clinical correlation is required.  The negative cut-off value for the D-Dimer is 0.50 mcg FEU/mL for DVT and PE.      Comprehensive Metabolic Panel [[id number]]  (Abnormal) Collected: 05/20/21 1225    Specimen: Blood Updated: 05/20/21 1306     Glucose 174 mg/dL      BUN 18 mg/dL      Creatinine 1.04 mg/dL      Sodium 142 mmol/L      Potassium 4.9 mmol/L      Chloride 102 mmol/L      CO2 25.3 mmol/L      Calcium 10.5 mg/dL      Total Protein 8.3 g/dL      Albumin 4.30 g/dL      ALT (SGPT) 20 U/L      AST (SGOT) 19 U/L      Alkaline Phosphatase 77 U/L      Total Bilirubin 0.2 mg/dL       eGFR Non African Amer 52 mL/min/1.73      Globulin 4.0 gm/dL      A/G Ratio 1.1 g/dL      BUN/Creatinine Ratio 17.3     Anion Gap 14.7 mmol/L     Narrative:      GFR Normal >60  Chronic Kidney Disease <60  Kidney Failure <15          Imaging Results (Most Recent)     Procedure Component Value Units Date/Time    CT Chest Without Contrast Diagnostic [[id number]] Collected: 05/21/21 1346     Updated: 05/21/21 1348    Narrative:      CT Chest WO    INDICATION:   71-year-old woman with acute and chronic respiratory failure with hypoxia. Patient on oxygen supplementation. Evaluate for pneumonia versus interstitial lung disease.    TECHNIQUE:   CT of the thorax without IV contrast. Coronal and sagittal reconstructions were obtained.  Radiation dose reduction techniques included automated exposure control or exposure modulation based on body size. Count of known CT and cardiac nuc med studies  performed in previous 12 months: 0.     COMPARISON:   Chest x-ray 5/21/2021, lung nuclear medicine perfusion scan 5/20/2021, chest CT 3/12/2018    FINDINGS:  Images through the thoracic inlet demonstrate no thyroid mass    Images of the chest demonstrate atherosclerotic calcifications of the arch vessels and coronary arteries. Cardiac chambers are within normal limits. No pericardial fluid. The esophagus is normal.    Lung window images demonstrate significant underlying emphysematous changes in the upper and lower lungs diffusely similar to the 18 area there is reticulonodular change of the posterior right lung apex which is similar to prior study. There is mild  bronchial wall thickening in the left mid lung left lung base likely representing bronchitis. There is reticular interstitial change in the left lung base increased over the prior study but not new area there is very mild subpleural reticular nodular  change at the right lung base felt similar to 2018 no evidence of acute pneumonia. No evidence of honeycombing.    Limited  views of the upper abdomen demonstrate diffuse low-attenuation of the liver consistent with steatosis. Spleen is normal in size. There are loops of bowel in the subcutaneous fat on the last images of this study likely related to a large ventral  wall hernia which is not included in the field-of-view of this exam.      Impression:        1. There is severe emphysematous change with biapical reticulonodular change in left greater than right reticular nodular change in the lungs minimally progressive from 2018. There is suggestion of diffuse bronchial wall thickening in the lower lungs  particularly on the left which may indicate chronic bronchitis. No evidence of acute acute pneumonia. Findings do not meet criteria for usual interstitial pneumonia.  2. Diffuse hepatic steatosis  3. Severe coronary calcifications  4. The last image of this study demonstrates fat and colon loops in the subcutaneous fat likely related to a ventral wall hernia which is caudal to these loops of bowel are not included in the field-of-view of this exam.    Signer Name: Maddy Terry MD   Signed: 5/21/2021 1:46 PM   Workstation Name: [id number]    [facility]    XR Chest PA & Lateral [[id number]] Collected: 05/21/21 0913     Updated: 05/21/21 0916    Narrative:      PA AND LATERAL CHEST, 5/21/2021 8:58 AM     HISTORY:  f/u respiratory failure; J96.21-Acute and chronic respiratory failure  with hypoxia     COMPARISON:  05/20/2021     TECHNIQUE:  PA and lateral upright chest series.     FINDINGS:  Heart size and vascularity normal. Lungs are clear. Study is limited by  patient's size.       Impression:      Study is limited by patient's size. No focal infiltrates are visible..  No effusions are visible     This report was finalized on 5/21/2021 9:14 AM by Dr. Cecilio Hauser MD.       NM Lung Scan Perfusion Particulate [[id number]] Collected: 05/20/21 1450     Updated: 05/20/21 5448    Narrative:      EXAM: PERFUSION STUDY  LUNGS     HISTORY:   Shortness of air for 2 days     DOSE:   5.7 mCi technetium 99m MAA     COMPARISON:   01/24/2020, chest x-ray 05/20/2021     FINDINGS: After administration of the proximal, the lungs were imaged in  multiple projections.        The distribution throughout the lungs is slightly inhomogeneous but no  significant perfusion defects are identified.       Impression:      Low probability for pulmonary embolus     This report was finalized on 5/20/2021 2:52 PM by Dr. Cecilio Hauser MD.       XR Chest 1 View [[id number]] Collected: 05/20/21 1450     Updated: 05/20/21 1452    Narrative:      Lateral PORTABLE CHEST, 05/20/2021     HISTORY:  Shortness of air for 2 days     COMPARISON:  AP view from earlier today     TECHNIQUE:  Lateral portable chest x-ray     FINDINGS:  No infiltrates are visible on this lateral view       Impression:      Negative single lateral view chest     This report was finalized on 5/20/2021 2:50 PM by Dr. Cecilio Hauser MD.       XR Chest 1 View [[id number]] Collected: 05/20/21 1334     Updated: 05/20/21 1337    Narrative:      AP PORTABLE CHEST, 05/20/2021     HISTORY:  Shortness of air for 2 days     COMPARISON:  01/29/2020     TECHNIQUE:  AP portable chest x-ray.     FINDINGS:  Study slightly limited by patient's size. No infiltrates are visible.  The heart size is within normal limits. A right shoulder prosthesis is  present.       Impression:      No active disease     This report was finalized on 5/20/2021 1:35 PM by Dr. Cecilio Hauser MD.             PROCEDURES      Condition on Discharge:  Stable    Physical Exam at Discharge  Vital Signs  Temp:  [96.9 °F (36.1 °C)-97.9 °F (36.6 °C)] 97 °F (36.1 °C)  Heart Rate:  [82-96] 87  Resp:  [20-24] 22  BP: (131-138)/(60-68) 136/60    Body mass index is 41.54 kg/m².      Physical Exam  Vitals and nursing note reviewed.   Cardiovascular:      Rate and Rhythm: Normal rate and regular rhythm.   Pulmonary:      Effort: Pulmonary effort is normal.      " Breath sounds: Normal breath sounds.      Comments: However, she has decreased capacity exemplified by decrease breath sound.  She is \"tight\".  Not coughing up green now.  Abdominal:      General: Abdomen is flat.      Palpations: Abdomen is soft.   Skin:     General: Skin is warm and dry.   Neurological:      Mental Status: She is oriented to person, place, and time.      Comments: Tells me she is ready to go home this afternoon.  This morning she was not so sure.   Psychiatric:         Mood and Affect: Mood normal.         Behavior: Behavior normal.           Discharge Disposition  Home    Visiting Nurse:    No     Home PT/OT:  No     Home Safety Evaluation:  No     DME  None    Discharge Diet:      Dietary Orders (From admission, onward)     Start     Ordered    05/20/21 1700  Diet Regular; Cardiac, Consistent Carbohydrate  Diet Effective Now     Question Answer Comment   Diet Texture / Consistency Regular    Common Modifiers Cardiac    Common Modifiers Consistent Carbohydrate        05/20/21 1700                Activity at Discharge:  As tolerated    Pre-discharge education  Wear oxygen at 4 1/2 L NC continuous  Keep appointments and review them with her  Review all medications with her.  Pulmonary to follow up in [place] in 2 weeks and check on LAURA that Dr. Rosa ordered  See PCP in 1 week post hospitalization follow up  Refuses sleep apnea work  Up  Continue Doxycycline for 7 more days.  Do not take vitamins and doxycycline at the same time.            Follow-up Appointments  Future Appointments   Date Time Provider Department Center   6/2/2021  8:30 AM ROOM 1 [facility] ACU [facility] ACU [facility]   6/4/2021 10:40 AM C19 PRE SCREEN LAG BH [facility] C19PS [facility]   7/26/2021  8:30 AM ROOM 2-B [facility] ACU [facility] ACU [facility]     Additional Instructions for the Follow-ups that You Need to Schedule     Discharge Follow-up with PCP   As directed       Currently Documented PCP:    Vianey Barrios PA-C    PCP Phone Number:    [phone]     " Follow Up Details: 1 week post hospitalization follow up         Discharge Follow-up with Specialty: Pulmonary in [place] in 2 weeks; 2 Weeks   As directed      Specialty: Pulmonary in [place] in 2 weeks    Follow Up: 2 Weeks    Follow Up Details: Post hospitalization follow up               Test Results Pending at Discharge  Pending Labs     Order Current Status    Blood Culture - Blood, Blood, Venous Line Preliminary result           Kayce Ariza DO  05/23/21  14:47 EDT    Time: 30 min (if over 30 minutes give explanation as to why it took greater than 30 minutes)

## 2021-05-24 NOTE — CASE MANAGEMENT/SOCIAL WORK
Case Management Discharge Note      Final Note: Discharged home.    Provided Post Acute Provider List?: Refused  Refused Provider List Comment: Offered community resources but patient declines the need for them at this time.    Selected Continued Care - Discharged on 5/23/2021 Admission date: 5/20/2021 - Discharge disposition: Home or Self Care    Destination    No services have been selected for the patient.              Durable Medical Equipment    No services have been selected for the patient.              Dialysis/Infusion    No services have been selected for the patient.              Home Medical Care    No services have been selected for the patient.              Therapy    No services have been selected for the patient.              Community Resources    No services have been selected for the patient.                       Final Discharge Disposition Code: 01 - home or self-care

## 2021-05-24 NOTE — OUTREACH NOTE
Prep Survey      Responses   [facility] facility patient discharged from?  [place]   Is LACE score < 7 ?  No   Emergency Room discharge w/ pulse ox?  No   Eligibility  [facility]   Hospital  [place]   Date of Admission  05/20/21   Date of Discharge  05/23/21   Discharge Disposition  Home or Self Care   Discharge diagnosis  A/C hypoxic resp failure, chronic bronchitis, T2DM, LELO   Does the patient have one of the following disease processes/diagnoses(primary or secondary)?  Other   Does the patient have Home health ordered?  No   Is there a DME ordered?  No   Prep survey completed?  Yes          Candelaria Bean RN

## 2021-05-24 NOTE — OUTREACH NOTE
Call Center TCM Note      Responses   [facility] patient discharged from?  [place]   Does the patient have one of the following disease processes/diagnoses(primary or secondary)?  Other   TCM attempt successful?  Yes   Discharge diagnosis  A/C hypoxic resp failure, chronic bronchitis, T2DM, LELO   Meds reviewed with patient/caregiver?  Yes   Is the patient having any side effects they believe may be caused by any medication additions or changes?  No   Does the patient have all medications ordered at discharge?  Yes   Is the patient taking all medications as directed (includes completed medication regime)?  Yes   Does the patient have a primary care provider?   Yes   Does the patient have an appointment with their PCP within 7 days of discharge?  Greater than 7 days [TCM FWP IS SCHED 9 DAYS POST D/C DUE TO PT OTHER APPTS ALREADY SCHED]   Comments regarding PCP  TCM FWP with PCP Vianey Barrios PA-C is 06/02/2021   Has the patient kept scheduled appointments due by today?  Yes   Has home health visited the patient within 72 hours of discharge?  N/A   Psychosocial issues?  No   Did the patient receive a copy of their discharge instructions?  Yes   Nursing interventions  Reviewed instructions with patient   What is the patient's perception of their health status since discharge?  Improving   Is the patient/caregiver able to teach back signs and symptoms related to disease process for when to call PCP?  Yes   Is the patient/caregiver able to teach back signs and symptoms related to disease process for when to call 911?  Yes   Is the patient/caregiver able to teach back the hierarchy of who to call/visit for symptoms/problems? PCP, Specialist, Home health nurse, Urgent Care, ED, 911  Yes   If the patient is a current smoker, are they able to teach back resources for cessation?  Not a smoker   Wrap up additional comments  Pt has resumed Home O2 as directed. All new meds in place. Pt does feel she is improving. She is  "coughing up alot more and feels her chest has \"loosened up\". No fever, chills. TCM FWP with PCP Vianey Barrios PA-C is 06/02/2021          Nina Childs MA    5/24/2021, 12:30 EDT      "

## 2021-05-31 NOTE — PROGRESS NOTES
LOS: 11 days   Patient Care Team:  Vianey Barrios PA-C as PCP - General (Family Medicine)  Bhanu Mata MD as Consulting Physician (Cardiology)  Mann Hernadez MD as Consulting Physician (Pulmonary Disease)    Chief Complaint: AF       Interval History:  Still with some SOB, No CP      Objective   Vital Signs  Temp:  [96.4 °F (35.8 °C)-98.4 °F (36.9 °C)] 97.2 °F (36.2 °C)  Heart Rate:  [75-93] 75  Resp:  [20-24] 20  BP: (112-156)/(56-71) 120/60    Intake/Output Summary (Last 24 hours) at 01/04/18 0846  Last data filed at 01/03/18 1800   Gross per 24 hour   Intake              780 ml   Output             1400 ml   Net             -620 ml           Physical Exam   Constitutional: She is oriented to person, place, and time.   obese   HENT:   Head: Normocephalic.   Mouth/Throat: Oropharynx is clear and moist.   Eyes: Conjunctivae and EOM are normal. Pupils are equal, round, and reactive to light.   Neck: Normal range of motion.   Cannot assess JVD due to body habitus   Cardiovascular: Normal rate, regular rhythm, normal heart sounds and intact distal pulses.  Frequent extrasystoles are present.   Pulmonary/Chest: Effort normal. She has wheezes. She has rhonchi.   Abdominal: Soft.   Cannot feel organs or aorta   Musculoskeletal: Normal range of motion. She exhibits no edema.   Neurological: She is alert and oriented to person, place, and time. No cranial nerve deficit.   Skin: Skin is warm and dry. No erythema.   Psychiatric: She has a normal mood and affect. Her behavior is normal. Judgment and thought content normal.   Vitals reviewed.      Results Review:        Results from last 7 days  Lab Units 01/04/18  0325 01/03/18  0614 01/02/18  0629   SODIUM mmol/L 138 137 144   POTASSIUM mmol/L 3.9 4.1 3.6   CHLORIDE mmol/L 96* 96* 101   CO2 mmol/L 33.8* 33.4* 34.5*   BUN mg/dL 28* 21 21   CREATININE mg/dL 0.69 0.58 0.62   GLUCOSE mg/dL 161* 202* 145*   CALCIUM mg/dL 9.0 8.4* 8.1*           Results from last 7  days  Lab Units 01/04/18  0325 01/03/18  0614 01/02/18  0629   WBC 10*3/mm3 22.77* 17.97* 17.47*   HEMOGLOBIN g/dL 12.3 12.6 12.5   HEMATOCRIT % 37.6 39.7 38.6   PLATELETS 10*3/mm3 266 269 249                       I reviewed the patient's new clinical results.  I personally viewed and interpreted the patient's EKG/Telemetry data        Medication Review:     acetaminophen 650 mg Oral TID   acetylcysteine 4 mL Nebulization 4x Daily - RT   amiodarone 400 mg Oral Q24H   arformoterol 15 mcg Nebulization BID - RT   clopidogrel 75 mg Oral Daily   FLUoxetine 20 mg Oral Daily   guaiFENesin 1,200 mg Oral Q12H   hydrALAZINE 10 mg Intravenous Once   hydrochlorothiazide 25 mg Oral Daily   insulin aspart 0-24 Units Subcutaneous 4x Daily AC & at Bedtime   insulin detemir 25 Units Subcutaneous Nightly   ipratropium-albuterol 3 mL Nebulization 4x Daily - RT   levETIRAcetam 500 mg Oral Q12H   lisinopril 40 mg Oral Daily   metFORMIN 500 mg Oral BID With Meals   predniSONE 60 mg Oral Daily With Breakfast   rosuvastatin 40 mg Oral Daily   verapamil  mg Oral Q12H            Assessment/Plan     1.  Paroxysmal atrial fibrillation.  CHADSVASc = 5.  Echo with hyperdynamic LV systolic function.  Currently in SR, will continue current medical therapy. Anticoagulated with NOAC- held for potential bronch in the AM.    2.  COPD with exacerbation / pneumonia -  per pulmonary/primary team    3.  DM2    4.  Morbid obesity    5.  Suspected RANDOLPH    6.  HTN - better control, follow          Angelo Simmons III, MD  01/04/18  8:46 AM       present

## 2021-06-02 NOTE — PROGRESS NOTES
"Transitional Care Follow Up Visit  Subjective     Mar Camilo is a 71 y.o. female who presents for a transitional care management visit.    Within 48 business hours after discharge our office contacted her via telephone to coordinate her care and needs.      I reviewed and discussed the details of that call along with the discharge summary, hospital problems, inpatient lab results, inpatient diagnostic studies, and consultation reports with Mar.     Current outpatient and discharge medications have been reconciled for the patient.  Reviewed by: Vianey Barrios PA-C      Date of TCM Phone Call 5/23/2021   Hospital [facility]   Date of Admission 5/20/2021   Date of Discharge 5/23/2021   Discharge Disposition Home or Self Care     Risk for Readmission (LACE) Score: 13 (5/23/2021  6:01 AM)      History of Present Illness   Course During Hospital Stay:    Mar, \"Krys\", is a 71-year-old female who presents with sister, Clau, at office visit today for hospital discharge follow-up due to acute on chronic hypoxemic respiratory failure with COPD.  I have reviewed her hospital discharge paperwork with them at office visit today.  I have reviewed her laboratory results as well as imaging as well.  Overall she states she is feeling better.  States she is still fatigued and achy at times.  States she has been cold off and on as well.  Denied any fevers, chills, chest pain, wheezing, or swelling of ankles.  States she has a little shortness of air.  Currently on 4 L of oxygen when she is out in public.  Is on 4.5 L of oxygen at home.  Sleep has been restless for her which is normal.  Bowel movements have been normal without dark black tarry stools.      The following portions of the patient's history were reviewed and updated as appropriate:   She  has a past medical history of Arthritis, Carotid arterial disease (CMS/HCC), Chronic back pain, Closed fracture of left tibial plateau (7/30/2018), COPD (chronic obstructive " pulmonary disease) (CMS/[facility]), Coronary artery disease, Crohn's disease (CMS/[facility]), DDD (degenerative disc disease), Depression, Diabetes mellitus (CMS/[facility]), Elevated cholesterol, Heartburn (2/21/2016), Hernia of abdominal wall, History of stroke (05/03/2017), Hyperlipidemia, Hypertension, Hypokalemia, Morbid obesity (CMS/[facility]), Obstructive pyelonephritis, On home oxygen therapy, Osteopenia (2/21/2016), PAF (paroxysmal atrial fibrillation) (CMS/[facility]), PVD (peripheral vascular disease) (CMS/[facility]), Radiculopathy, Right shoulder pain, Seizure (CMS/[facility]), Stroke (CMS/[facility]) (2017), and Subclavian artery stenosis (CMS/[facility]).  She does not have any pertinent problems on file.  She  has a past surgical history that includes Appendectomy (N/A); Wrist Arthroscopy (Right); Lung biopsy; pr thromboendartectmy neck,neck incis (Right, 3/14/2016); Cystoscopy w/ ureteral stent placement (Right, 3/18/2017); cystoscopy ureteroscopy laser lithotripsy (Right, 4/17/2017); Bronchoscopy (N/A, 1/5/2018); Laparoscopic colon resection (N/A, 01/25/2016); Flexible sigmoidoscopy (N/A, 01/25/2016); Tibial Plateau Open Reduction Internal Fixation (Left, 05/09/2018); Colon surgery; Total shoulder arthroplasty w/ distal clavicle excision (Right, 12/30/2019); Colonoscopy; Esophagogastroduodenoscopy; Fracture surgery (2017); Joint replacement (12/30/2019); and Vascular surgery (2017).  Her family history includes Cancer in an other family member; Crohn's disease in her brother and maternal aunt; Diabetes in her mother; Other in her father; Stroke in her mother.  She  reports that she quit smoking about 8 years ago. Her smoking use included cigarettes. She has a 80.00 pack-year smoking history. She has never used smokeless tobacco. She reports that she does not drink alcohol and does not use drugs.  Current Outpatient Medications   Medication Sig Dispense Refill   • Acetaminophen 325 MG capsule 650 mg Every 8 (Eight) Hours As Needed.     • aspirin 81 MG EC  tablet Take 81 mg by mouth 2 (Two) Times a Day.     • Blood Glucose Monitoring Suppl (ONETOUCH VERIO) w/Device kit 1 kit 2 (Two) Times a Day. 1 kit 0   • docusate sodium (COLACE) 100 MG capsule Take 100 mg by mouth Every Other Day.     • fenofibrate (TRICOR) 145 MG tablet Take 1 tablet by mouth once daily (Patient taking differently: 145 mg Daily.) 90 tablet 0   • FLUoxetine (PROzac) 40 MG capsule Take 1 capsule by mouth once daily (Patient taking differently: 40 mg Daily.) 30 capsule 0   • gabapentin (NEURONTIN) 300 MG capsule Take 1 capsule by mouth twice daily (Patient taking differently: Take 300 mg by mouth 2 (Two) Times a Day.) 60 capsule 3   • glucose blood (ONETOUCH VERIO) test strip Use as instructed as to check blood sugars 2-3 times a day for type 2 diabetes 100 each 12   • guaiFENesin (MUCINEX) 600 MG 12 hr tablet Take 1,200 mg by mouth Every Morning.     • ipratropium-albuterol (DUO-NEB) 0.5-2.5 mg/3 ml nebulizer Take 3 mL by nebulization Every 4 (Four) Hours While Awake. 360 mL    • lamoTRIgine (LaMICtal) 150 MG tablet Take 150 mg by mouth Daily.     • Lancets (ONETOUCH ULTRASOFT) lancets Used to check blood sugars twice a day as needed for type 2 diabetes monitoring 100 each 12   • lisinopril (PRINIVIL,ZESTRIL) 40 MG tablet Take 1 tablet by mouth once daily (Patient taking differently: Take 40 mg by mouth Daily.) 90 tablet 0   • metFORMIN (GLUCOPHAGE) 500 MG tablet TAKE 1 TABLET BY MOUTH TWICE DAILY WITH MEALS (Patient taking differently: 500 mg 2 (Two) Times a Day With Meals. Do not give at time of or within 48 hours of iodinated intravenous contrast. Confirm renal function is normal before continuing.) 180 tablet 0   • Multiple Vitamins-Minerals (CENTRUM SILVER PO) Take 1 tablet by mouth Daily.     • O2 (OXYGEN) Inhale 4 L/min Continuous. May resume prior 3L/min when O2 sat remains 88% or above on this setting. Patient was given an oximeter here. (Patient taking differently: Inhale 4.5 L/min  Continuous. May resume prior 3L/min when O2 sat remains 88% or above on this setting. Patient was given an oximeter here.)     • rosuvastatin (CRESTOR) 40 MG tablet Take 1 tablet by mouth once daily (Patient taking differently: Take 40 mg by mouth Daily.) 90 tablet 0   • SYMBICORT 80-4.5 MCG/ACT inhaler Inhale 2 puffs 2 (Two) Times a Day.     • Ventolin  (90 Base) MCG/ACT inhaler INHALE 2 PUFFS BY MOUTH EVERY 4 HOURS AS NEEDED (Patient taking differently: Inhale 2 puffs Every 4 (Four) Hours As Needed.) 18 g 0   • verapamil SR (CALAN-SR) 240 MG CR tablet TAKE 1 TABLET BY MOUTH EVERY 12 HOURS (Patient taking differently: Take 240 mg by mouth 2 (Two) Times a Day.) 180 tablet 0     No current facility-administered medications for this visit.     Facility-Administered Medications Ordered in Other Visits   Medication Dose Route Frequency Provider Last Rate Last Admin   • acetaminophen (TYLENOL) 325 MG tablet  - ADS Override Pull            • acetaminophen (TYLENOL) tablet 650 mg  650 mg Oral Once Chasidy, Roberto Carlos Blum MD         Current Outpatient Medications on File Prior to Visit   Medication Sig   • Acetaminophen 325 MG capsule 650 mg Every 8 (Eight) Hours As Needed.   • aspirin 81 MG EC tablet Take 81 mg by mouth 2 (Two) Times a Day.   • Blood Glucose Monitoring Suppl (ONETOUCH VERIO) w/Device kit 1 kit 2 (Two) Times a Day.   • docusate sodium (COLACE) 100 MG capsule Take 100 mg by mouth Every Other Day.   • fenofibrate (TRICOR) 145 MG tablet Take 1 tablet by mouth once daily (Patient taking differently: 145 mg Daily.)   • FLUoxetine (PROzac) 40 MG capsule Take 1 capsule by mouth once daily (Patient taking differently: 40 mg Daily.)   • gabapentin (NEURONTIN) 300 MG capsule Take 1 capsule by mouth twice daily (Patient taking differently: Take 300 mg by mouth 2 (Two) Times a Day.)   • glucose blood (ONETOUCH VERIO) test strip Use as instructed as to check blood sugars 2-3 times a day for type 2 diabetes    • guaiFENesin (MUCINEX) 600 MG 12 hr tablet Take 1,200 mg by mouth Every Morning.   • ipratropium-albuterol (DUO-NEB) 0.5-2.5 mg/3 ml nebulizer Take 3 mL by nebulization Every 4 (Four) Hours While Awake.   • lamoTRIgine (LaMICtal) 150 MG tablet Take 150 mg by mouth Daily.   • Lancets (ONETOUCH ULTRASOFT) lancets Used to check blood sugars twice a day as needed for type 2 diabetes monitoring   • lisinopril (PRINIVIL,ZESTRIL) 40 MG tablet Take 1 tablet by mouth once daily (Patient taking differently: Take 40 mg by mouth Daily.)   • metFORMIN (GLUCOPHAGE) 500 MG tablet TAKE 1 TABLET BY MOUTH TWICE DAILY WITH MEALS (Patient taking differently: 500 mg 2 (Two) Times a Day With Meals. Do not give at time of or within 48 hours of iodinated intravenous contrast. Confirm renal function is normal before continuing.)   • Multiple Vitamins-Minerals (CENTRUM SILVER PO) Take 1 tablet by mouth Daily.   • O2 (OXYGEN) Inhale 4 L/min Continuous. May resume prior 3L/min when O2 sat remains 88% or above on this setting. Patient was given an oximeter here. (Patient taking differently: Inhale 4.5 L/min Continuous. May resume prior 3L/min when O2 sat remains 88% or above on this setting. Patient was given an oximeter here.)   • rosuvastatin (CRESTOR) 40 MG tablet Take 1 tablet by mouth once daily (Patient taking differently: Take 40 mg by mouth Daily.)   • SYMBICORT 80-4.5 MCG/ACT inhaler Inhale 2 puffs 2 (Two) Times a Day.   • Ventolin  (90 Base) MCG/ACT inhaler INHALE 2 PUFFS BY MOUTH EVERY 4 HOURS AS NEEDED (Patient taking differently: Inhale 2 puffs Every 4 (Four) Hours As Needed.)   • verapamil SR (CALAN-SR) 240 MG CR tablet TAKE 1 TABLET BY MOUTH EVERY 12 HOURS (Patient taking differently: Take 240 mg by mouth 2 (Two) Times a Day.)     Current Facility-Administered Medications on File Prior to Visit   Medication   • acetaminophen (TYLENOL) 325 MG tablet  - ADS Override Pull   • acetaminophen (TYLENOL) tablet 650 mg   •  [COMPLETED] diphenhydrAMINE (BENADRYL) capsule 25 mg   • [COMPLETED] inFLIXimab (REMICADE) 5 mg/kg = 530 mg in sodium chloride 0.9 % 250 mL IVPB     She is allergic to contrast dye, iodinated diagnostic agents, norco [hydrocodone-acetaminophen], and tenoretic [atenolol-chlorthalidone]..  No results displayed because visit has over 200 results.          Vitals:    06/02/21 1303   BP: 130/80   Pulse: 101   Temp: 97.7 °F (36.5 °C)   SpO2: 95%         Current outpatient and discharge medications have been reconciled for the patient.  Reviewed by: Vianey Barrios PA-C    Review of Systems    Objective   Physical Exam  Vitals and nursing note reviewed.   Constitutional:       Appearance: Normal appearance. She is well-developed and well-groomed. She is morbidly obese.      Interventions: Nasal cannula and face mask in place.      Comments: On 4 L of portable oxygen.  Sitting in wheelchair.   HENT:      Head: Normocephalic and atraumatic.   Neck:      Vascular: No carotid bruit.      Trachea: Trachea and phonation normal. No tracheal tenderness.   Cardiovascular:      Rate and Rhythm: Normal rate and regular rhythm.      Pulses: Normal pulses.      Heart sounds: Normal heart sounds, S1 normal and S2 normal. No murmur heard.     Pulmonary:      Effort: Pulmonary effort is normal.      Breath sounds: Normal breath sounds and air entry.   Abdominal:      General: Bowel sounds are normal.      Palpations: Abdomen is soft. There is no hepatomegaly.      Tenderness: There is no abdominal tenderness.       Musculoskeletal:      Cervical back: Neck supple.      Right lower leg: No edema.      Left lower leg: No edema.   Skin:     General: Skin is warm and dry.      Capillary Refill: Capillary refill takes less than 2 seconds.   Neurological:      Mental Status: She is alert and oriented to person, place, and time.   Psychiatric:         Attention and Perception: Attention and perception normal.         Mood and Affect: Mood and  affect normal.         Speech: Speech normal.         Behavior: Behavior normal. Behavior is cooperative.         Thought Content: Thought content normal.         Cognition and Memory: Cognition and memory normal.         Judgment: Judgment normal.         Assessment/Plan   Diagnoses and all orders for this visit:    1. Hospital discharge follow-up (Primary)    2. Hypoxia    3. Chronic obstructive pulmonary disease with acute exacerbation (CMS/HCC)    4. Type 2 diabetes mellitus with complication, without long-term current use of insulin (CMS/[facility])    5. Chronic respiratory failure, unspecified whether with hypoxia or hypercapnia (CMS/[facility])    6. Morbid obesity (CMS/[facility])      1.  New hospital discharge hypoxia and chronic obstructive pulmonary disease: I have reviewed with her hospital discharge paperwork, laboratory results and imaging studies at office visit today.  She will keep her appointment with pulmonology.  Mar will return to office in November.  She will continue her current medication at home as directed.  3.  Chronic and stable type 2 diabetes, have reviewed her lab work from hospital.  A1c was in therapeutic range.  She will continue current medication.  Will return to office in November for diabetes fasting lab.  4.  Chronic and stable obesity.  She has lost 9 pounds since May 20, 2021.  Encouraged to decrease sugar and carbohydrates in diet.  Will reevaluate her weight at next office visit.    MARGARITA Narvaez [facility]  [address] [place] [id number]  Dept: [phone]  Dept Fax: [phone]  Loc: [phone]  Loc Fax: [phone]

## 2021-06-02 NOTE — PATIENT INSTRUCTIONS
"Call Dr. Roberto Carlos Umaña, Gastroenterology at [phone] if you have any problems or concerns.    We know you have a Choice in healthcare and appreciate you using [facility].  Our purpose is to provide you \"Excellent Care\".  We hope that you will always choose us in the future and continue to recommend us to your family and friends.    Infliximab injection  What is this medicine?  INFLIXIMAB (in FLIX i mab) is used to treat Crohn's disease and ulcerative colitis. It is also used to treat ankylosing spondylitis, plaque psoriasis, and some forms of arthritis.  This medicine may be used for other purposes; ask your health care provider or pharmacist if you have questions.  COMMON BRAND NAME(S): AVSOLA, INFLECTRA, Remicade, RENFLEXIS  What should I tell my health care provider before I take this medicine?  They need to know if you have any of these conditions:  · cancer  · current or past resident of [place]  · diabetes  · exposure to tuberculosis  · Guillain-[place] syndrome  · heart failure  · hepatitis or liver disease  · immune system problems  · infection  · lung or breathing disease, like COPD  · multiple sclerosis  · receiving phototherapy for the skin  · seizure disorder  · an unusual or allergic reaction to infliximab, mouse proteins, other medicines, foods, dyes, or preservatives  · pregnant or trying to get pregnant  · breast-feeding  How should I use this medicine?  This medicine is for injection into a vein. It is usually given by a health care professional in a hospital or clinic setting.  A special MedGuide will be given to you by the pharmacist with each prescription and refill. Be sure to read this information carefully each time.  Talk to your pediatrician regarding the use of this medicine in children. While this drug may be prescribed for children as young as 6 years of age for selected conditions, precautions do apply.  Overdosage: If you think you have " taken too much of this medicine contact a poison control center or emergency room at once.  NOTE: This medicine is only for you. Do not share this medicine with others.  What if I miss a dose?  It is important not to miss your dose. Call your doctor or health care professional if you are unable to keep an appointment.  What may interact with this medicine?  Do not take this medicine with any of the following medications:  · biologic medicines such as abatacept, adalimumab, anakinra, certolizumab, etanercept, golimumab, rituximab, secukinumab, tocilizumab, tofactinib, ustekinumab  · live vaccines  This list may not describe all possible interactions. Give your health care provider a list of all the medicines, herbs, non-prescription drugs, or dietary supplements you use. Also tell them if you smoke, drink alcohol, or use illegal drugs. Some items may interact with your medicine.  What should I watch for while using this medicine?  Your condition will be monitored carefully while you are receiving this medicine. Visit your doctor or health care professional for regular checks on your progress. You may need blood work done while you are taking this medicine. Before beginning therapy, your doctor may do a test to see if you have been exposed to tuberculosis.  Call your doctor or health care professional for advice if you get a fever, chills or sore throat, or other symptoms of a cold or flu. Do not treat yourself. This drug decreases your body's ability to fight infections. Try to avoid being around people who are sick.  This medicine may make the symptoms of heart failure worse in some patients. If you notice symptoms such as increased shortness of breath or swelling of the ankles or legs, contact your health care provider right away.  If you are going to have surgery or dental work, tell your health care professional or dentist that you have received this medicine.  If you take this medicine for plaque psoriasis, stay  out of the sun. If you cannot avoid being in the sun, wear protective clothing and use sunscreen. Do not use sun lamps or tanning beds/booths.  Talk to your doctor about your risk of cancer. You may be more at risk for certain types of cancers if you take this medicine.  What side effects may I notice from receiving this medicine?  Side effects that you should report to your doctor or health care professional as soon as possible:  · allergic reactions like skin rash, itching or hives, swelling of the face, lips, or tongue  · breathing problems  · changes in vision  · chest pain  · fever or chills, usually related to the infusion  · joint pain  · pain, tingling, numbness in the hands or feet  · redness, blistering, peeling or loosening of the skin, including inside the mouth  · seizures  · signs of infection - fever or chills, cough, sore throat, flu-like symptoms, pain or difficulty passing urine  · signs and symptoms of liver injury like dark yellow or brown urine; general ill feeling; light-colored stools; loss of appetite; nausea; right upper belly pain; unusually weak or tired; yellowing of the eyes or skin  · signs and symptoms of a stroke like changes in vision; confusion; trouble speaking or understanding; severe headaches; sudden numbness or weakness of the face, arm or leg; trouble walking; dizziness; loss of balance or coordination  · swelling of the ankles, feet, or hands  · swollen lymph nodes in the neck, underarm, or groin areas  · unusual bleeding or bruising  · unusually weak or tired  Side effects that usually do not require medical attention (report to your doctor or health care professional if they continue or are bothersome):  · headache  · nausea  · stomach pain  · upset stomach  This list may not describe all possible side effects. Call your doctor for medical advice about side effects. You may report side effects to FDA at 7-473-FDA-1841.  Where should I keep my medicine?  This drug is given  in a hospital or clinic and will not be stored at home.  NOTE: This sheet is a summary. It may not cover all possible information. If you have questions about this medicine, talk to your doctor, pharmacist, or health care provider.  © 2021 [employer]/Gold Standard (2018-01-17 13:45:32)

## 2021-06-02 NOTE — OUTREACH NOTE
Medical Week 2 Survey      Responses   [facility] patient discharged from?  [place]   Does the patient have one of the following disease processes/diagnoses(primary or secondary)?  Other   Week 2 attempt successful?  No   Unsuccessful attempts  Attempt 1          Mary Suazo RN

## 2021-06-02 NOTE — NURSING NOTE
Patient's vital signs taken and IV placed. Infusion tolerated well. Vital signs taken post infusion. Printed AVS given and escorted to door.

## 2021-06-03 NOTE — TELEPHONE ENCOUNTER
PATIENT CALLED AND LEFT MESSAGE ON MY VOICE MAIL.  NEED TO CANCEL HER COLONOSCOPY ON 06/07/2021.  WILL NEVER HAVE IT DONE.  DO NOT CALL ME BACK TO RESCHEDULE.

## 2021-06-07 NOTE — OUTREACH NOTE
Medical Week 2 Survey      Responses   [facility] patient discharged from?  [place]   Does the patient have one of the following disease processes/diagnoses(primary or secondary)?  Other   Week 2 attempt successful?  No   Unsuccessful attempts  Attempt 2 [sister, Clau suggested trying pt later if UTR]          Tabatha Alford RN

## 2021-06-14 NOTE — OUTREACH NOTE
"Medical Week 3 Survey      Responses   [facility] patient discharged from?  [place]   Does the patient have one of the following disease processes/diagnoses(primary or secondary)?  Other   Week 3 attempt successful?  Yes   Call start time  1241   Call end time  1243   Meds reviewed with patient/caregiver?  Yes   Is the patient having any side effects they believe may be caused by any medication additions or changes?  No   Does the patient have all medications ordered at discharge?  Yes   Is the patient taking all medications as directed (includes completed medication regime)?  Yes   Does the patient have a primary care provider?   Yes   Has the patient kept scheduled appointments due by today?  Yes   Has home health visited the patient within 72 hours of discharge?  N/A   Psychosocial issues?  No   What is the patient's perception of their health status since discharge?  Improving   Is the patient/caregiver able to teach back signs and symptoms related to disease process for when to call PCP?  Yes   Is the patient/caregiver able to teach back signs and symptoms related to disease process for when to call 911?  Yes   Is the patient/caregiver able to teach back the hierarchy of who to call/visit for symptoms/problems? PCP, Specialist, Home health nurse, Urgent Care, ED, 911  Yes   If the patient is a current smoker, are they able to teach back resources for cessation?  Not a smoker   Week 3 Call Completed?  Yes   Graduated  Yes   Is the patient interested in additional calls from an ambulatory ?  NOTE:  applies to high risk patients requiring additional follow-up.  No   Did the patient feel the follow up calls were helpful during their recovery period?  Yes   Was the number of calls appropriate?  Yes   Graduated/Revoked comments  States is \"doing great\". Denies any needs today.          Carolyn Perez RN  "

## 2021-06-21 NOTE — OUTREACH NOTE
Ambulatory Case Management Note    Call to pt to FU recent hospiitalization. Pt states she is feeling great. She has follow up with her PCP and pulmonologist. Review of AVS without any questions or concern. Will continue to follow up monthly. She does have ACM number if needed.         Laquita Carbajal RN  Ambulatory Case Management    6/21/2021, 14:02 EDT

## 2021-07-26 NOTE — PATIENT INSTRUCTIONS
"Call   Call Dr. Roberto Carlos Umaña, Gastroenterology at [phone] if you have any problems or concerns.    We know you have a Choice in healthcare and appreciate you using [facility].  Our purpose is to provide you \"Excellent Care\".  We hope that you will always choose us in the future and continue to recommend us to your family and friends.              Call Dr. Roberto Carlos Umaña, Gastroenterology at [phone] if you have any problems or concerns.    We know you have a Choice in healthcare and appreciate you using [facility].  Our purpose is to provide you \"Excellent Care\".  We hope that you will always choose us in the future and continue to recommend us to your family and friends.            We know you have a Choice in healthcare and appreciate you using [facility].  Our purpose is to provide you \"Excellent Care\".  We hope that you will always choose us in the future and continue to recommend us to your family and friends.    Infliximab injection  What is this medicine?  INFLIXIMAB (in FLIX i mab) is used to treat Crohn's disease and ulcerative colitis. It is also used to treat ankylosing spondylitis, plaque psoriasis, and some forms of arthritis.  This medicine may be used for other purposes; ask your health care provider or pharmacist if you have questions.  COMMON BRAND NAME(S): AVSOLA, INFLECTRA, Remicade, RENFLEXIS  What should I tell my health care provider before I take this medicine?  They need to know if you have any of these conditions:  · cancer  · current or past resident of [place]  · diabetes  · exposure to tuberculosis  · Guillain-Chamberlain syndrome  · heart failure  · hepatitis or liver disease  · immune system problems  · infection  · lung or breathing disease, like COPD  · multiple sclerosis  · receiving phototherapy for the skin  · seizure disorder  · an unusual or allergic reaction to infliximab, mouse proteins, other " medicines, foods, dyes, or preservatives  · pregnant or trying to get pregnant  · breast-feeding  How should I use this medicine?  This medicine is for injection into a vein. It is usually given by a health care professional in a hospital or clinic setting.  A special MedGuide will be given to you by the pharmacist with each prescription and refill. Be sure to read this information carefully each time.  Talk to your pediatrician regarding the use of this medicine in children. While this drug may be prescribed for children as young as 6 years of age for selected conditions, precautions do apply.  Overdosage: If you think you have taken too much of this medicine contact a poison control center or emergency room at once.  NOTE: This medicine is only for you. Do not share this medicine with others.  What if I miss a dose?  It is important not to miss your dose. Call your doctor or health care professional if you are unable to keep an appointment.  What may interact with this medicine?  Do not take this medicine with any of the following medications:  · biologic medicines such as abatacept, adalimumab, anakinra, certolizumab, etanercept, golimumab, rituximab, secukinumab, tocilizumab, tofactinib, ustekinumab  · live vaccines  This list may not describe all possible interactions. Give your health care provider a list of all the medicines, herbs, non-prescription drugs, or dietary supplements you use. Also tell them if you smoke, drink alcohol, or use illegal drugs. Some items may interact with your medicine.  What should I watch for while using this medicine?  Your condition will be monitored carefully while you are receiving this medicine. Visit your doctor or health care professional for regular checks on your progress. You may need blood work done while you are taking this medicine. Before beginning therapy, your doctor may do a test to see if you have been exposed to tuberculosis.  Call your doctor or health care  professional for advice if you get a fever, chills or sore throat, or other symptoms of a cold or flu. Do not treat yourself. This drug decreases your body's ability to fight infections. Try to avoid being around people who are sick.  This medicine may make the symptoms of heart failure worse in some patients. If you notice symptoms such as increased shortness of breath or swelling of the ankles or legs, contact your health care provider right away.  If you are going to have surgery or dental work, tell your health care professional or dentist that you have received this medicine.  If you take this medicine for plaque psoriasis, stay out of the sun. If you cannot avoid being in the sun, wear protective clothing and use sunscreen. Do not use sun lamps or tanning beds/booths.  Talk to your doctor about your risk of cancer. You may be more at risk for certain types of cancers if you take this medicine.  What side effects may I notice from receiving this medicine?  Side effects that you should report to your doctor or health care professional as soon as possible:  · allergic reactions like skin rash, itching or hives, swelling of the face, lips, or tongue  · breathing problems  · changes in vision  · chest pain  · fever or chills, usually related to the infusion  · joint pain  · pain, tingling, numbness in the hands or feet  · redness, blistering, peeling or loosening of the skin, including inside the mouth  · seizures  · signs of infection - fever or chills, cough, sore throat, flu-like symptoms, pain or difficulty passing urine  · signs and symptoms of liver injury like dark yellow or brown urine; general ill feeling; light-colored stools; loss of appetite; nausea; right upper belly pain; unusually weak or tired; yellowing of the eyes or skin  · signs and symptoms of a stroke like changes in vision; confusion; trouble speaking or understanding; severe headaches; sudden numbness or weakness of the face, arm or leg;  trouble walking; dizziness; loss of balance or coordination  · swelling of the ankles, feet, or hands  · swollen lymph nodes in the neck, underarm, or groin areas  · unusual bleeding or bruising  · unusually weak or tired  Side effects that usually do not require medical attention (report to your doctor or health care professional if they continue or are bothersome):  · headache  · nausea  · stomach pain  · upset stomach  This list may not describe all possible side effects. Call your doctor for medical advice about side effects. You may report side effects to FDA at 2-576-WYQ-1351.  Where should I keep my medicine?  This drug is given in a hospital or clinic and will not be stored at home.  NOTE: This sheet is a summary. It may not cover all possible information. If you have questions about this medicine, talk to your doctor, pharmacist, or health care provider.  © 2021 Elsevier/Gold Standard (2018-01-17 13:45:32)

## 2021-07-26 NOTE — NURSING NOTE
NURSING PROGRESS NOTE:    PATIENT ARRIVE TO [facility] FOR SCHEDULED INFUSION AT 0830, VIA W/C .  PROCEDURE WAS PERFORMED WITHOUT INCIDENT. AVS REVIEWED PRIOR TO DISCHARGE.  PATIENT ESCORTED TO LOBBY PER W/C AND DISCHARGED HOME AT 1140 WITH FAMILY . RAVEN LEES

## 2021-07-28 NOTE — OUTREACH NOTE
Patient Outreach    Ambulatory Case Management Note    Call to pt who states she is doing well. Discussed pt's crohns treatment. She gets an infusion of remicaid every 8 weeks. She is not exactly sure of what it does. Reviewed drug information, side effect and how it treats her crohns. Pt verbalized understanding and states she has had no issues with her treatment. No questions or concerns at this time.     Laquita Carbajal RN  Ambulatory Case Management    7/28/2021, 12:26 EDT

## 2021-08-11 NOTE — PROGRESS NOTES
"Chief Complaint  scab (on stomach)    Subjective          Mar Camilo presents to [facility]  History of Present Illness  Krys is a 71 year old female who presents with her sister,Clau, at office visit today for new scab on abdomen area.  States her ventral hernia has enlarged and has shifted to the midline to right side of abdomen.  Also says the hernia has started to drop in her 'lap\".  Krys states the pain has worsened.  Denied any fevers, chills, nausea, vomiting or diarrhea.  Bowel movements have been normal for her.  Currently on 5 L of oxygen continuous.     Objective   Vital Signs:   /60 (BP Location: Left arm, Patient Position: Sitting, Cuff Size: Large Adult)   Pulse 115   Ht 162.6 cm (64\")   SpO2 90%   BMI 41.54 kg/m²     Physical Exam  Vitals and nursing note reviewed.   Constitutional:       Appearance: Normal appearance. She is well-developed and well-groomed. She is morbidly obese.      Interventions: Nasal cannula and face mask in place.   HENT:      Head: Normocephalic and atraumatic.   Neck:      Trachea: Trachea and phonation normal. No tracheal tenderness.   Cardiovascular:      Rate and Rhythm: Normal rate and regular rhythm.      Pulses: Normal pulses.      Heart sounds: Normal heart sounds, S1 normal and S2 normal. No murmur heard.     Pulmonary:      Effort: Pulmonary effort is normal.      Breath sounds: Normal breath sounds and air entry.   Abdominal:      General: Bowel sounds are normal.      Palpations: Abdomen is soft.      Tenderness: There is abdominal tenderness in the left upper quadrant and left lower quadrant.      Hernia: A hernia is present. Hernia is present in the ventral area.       Musculoskeletal:      Cervical back: Neck supple.   Skin:     General: Skin is warm and dry.      Capillary Refill: Capillary refill takes less than 2 seconds.      Findings: Wound present.          Neurological:      Mental Status: She is alert and " oriented to person, place, and time.   Psychiatric:         Attention and Perception: Attention and perception normal.         Mood and Affect: Mood and affect normal.         Speech: Speech normal.         Behavior: Behavior normal. Behavior is cooperative.         Thought Content: Thought content normal.         Cognition and Memory: Cognition and memory normal.         Judgment: Judgment normal.     I was wearing a surgical mask and face shield during the entire office visit/encounter.     Result Review :                 Assessment and Plan    Diagnoses and all orders for this visit:    1. Acute abdominal pain (Primary)  -     CT Abdomen Pelvis Without Contrast; Future  -     Ambulatory Referral to General Surgery    2. Cellulitis of abdominal wall  -     CT Abdomen Pelvis Without Contrast; Future  -     Ambulatory Referral to General Surgery  -     cephalexin (Keflex) 500 MG capsule; Take 1 capsule by mouth 2 (Two) Times a Day.  Dispense: 20 capsule; Refill: 0    3. Ventral hernia without obstruction or gangrene  -     CT Abdomen Pelvis Without Contrast; Future  -     Ambulatory Referral to General Surgery    Krys was seen in office today with worsening acute abdominal with increasing ventral hernia with cellulitis of abdominal wall.  I will proceed with stat CT of abdomen pelvis without contrast for further evaluation of her hernia status.  Concern for possible infectious versus obstruction process.  May need to go to [facility] for further treatment tonight.  Will wait on CT scan.  Will consider antibiotic and referral to general surgeon, Dr. Good.    Addendum: CT scan showed no acute findings.  I will send Keflex to pharmacy for cellulitis.  Have placed a referral to Dr. Good  for reevaluation of her worsening ventral hernia.    Follow Up   No follow-ups on file.  Patient was given instructions and counseling regarding her condition or for health maintenance advice. Please see specific  information pulled into the AVS if appropriate.     MARGARITA Narvaez [facility]  [address] [address]  Dept: [phone]  Dept Fax: [phone]  Loc: [phone]  Loc Fax: [phone]

## 2021-08-24 NOTE — PROGRESS NOTES
General Surgery History and Physical      Summary:    Mrs. Mar Camilo is a 71 y.o. lady with a large complex ventral hernia developing skin ulceration.  She knows she is very high risk for any operative intervention and is likely not a surgical candidate both due to how complex her hernias and due to her multiple medical comorbidities.  She carries a high risk of death or severe complication with any type of operative procedure.  We discussed that we believe the best plan is for home health for aggressive wound care of this area to try to prevent further ulceration and begin healing.  She knows she still at high risk for fistula formation.  Home health order.  She and her sister understand and agree.    Referring Provider: Vianey Barrios PA-C    Chief Complaint:    Abdominal pain, nonhealing wound     History of Present Illness:    Mrs. Mar Camilo is a 71 y.o. lady with Crohn's disease on Remicade, diabetes, coronary artery disease, COPD on home oxygen who presents with a new wound on her abdomen.  She has a known large ventral hernia in her left abdomen.  She knows she is a poor operative candidate for this.  She has begun to develop skin ulceration over this area.  No fever or chills.  No drainage.    Past Medical History:   • COPD on home O2  • Carotid stenosis   • CAD   • Crohn's disease on Remicade  • DM type 2  • HLD   • HTN   • Morbid obesity  • History of CVA     Past Surgical History:    • Appendectomy  • : Surgery  • Cystoscopy  • Left femur fracture repair  • Right shoulder replacement  • Laparoscopic transverse colectomy with primary anastomosis  • Right carotid endarterectomy  • Left tibial plateau internal fixation    Family History:    • Crohn's disease in a brother and maternal aunt  • No family history of colon cancer    Social History:    • Denies tobacco use  • Denies alcohol use    Allergies:   Allergies   Allergen Reactions   • Contrast Dye Hives   • Iodinated Diagnostic Agents  Hives   • Norco [Hydrocodone-Acetaminophen] Hallucinations   • Tenoretic [Atenolol-Chlorthalidone] Anaphylaxis       Medications:     Current Outpatient Medications:   •  Acetaminophen 325 MG capsule, 650 mg Every 8 (Eight) Hours As Needed., Disp: , Rfl:   •  aspirin 81 MG EC tablet, Take 81 mg by mouth 2 (Two) Times a Day., Disp: , Rfl:   •  Blood Glucose Monitoring Suppl (ONETOUCH VERIO) w/Device kit, 1 kit 2 (Two) Times a Day., Disp: 1 kit, Rfl: 0  •  cephalexin (Keflex) 500 MG capsule, Take 1 capsule by mouth 2 (Two) Times a Day., Disp: 20 capsule, Rfl: 0  •  docusate sodium (COLACE) 100 MG capsule, Take 100 mg by mouth Every Other Day., Disp: , Rfl:   •  fenofibrate (TRICOR) 145 MG tablet, Take 1 tablet by mouth once daily (Patient taking differently: 145 mg Daily.), Disp: 90 tablet, Rfl: 0  •  FLUoxetine (PROzac) 40 MG capsule, Take 1 capsule by mouth Daily., Disp: 90 capsule, Rfl: 2  •  gabapentin (NEURONTIN) 300 MG capsule, TAKE 1 CAPSULE BY MOUTH TWICE DAILY. NEEDS APPOINTMENT AND LABS BEFORE NEXT REFILL, Disp: 60 capsule, Rfl: 0  •  glucose blood (ONETOUCH VERIO) test strip, Use as instructed as to check blood sugars 2-3 times a day for type 2 diabetes, Disp: 100 each, Rfl: 12  •  guaiFENesin (MUCINEX) 600 MG 12 hr tablet, Take 1,200 mg by mouth Every Morning., Disp: , Rfl:   •  ipratropium-albuterol (DUO-NEB) 0.5-2.5 mg/3 ml nebulizer, Take 3 mL by nebulization Every 4 (Four) Hours While Awake., Disp: 360 mL, Rfl:   •  lamoTRIgine (LaMICtal) 150 MG tablet, Take 150 mg by mouth Daily., Disp: , Rfl:   •  Lancets (ONETOUCH ULTRASOFT) lancets, Used to check blood sugars twice a day as needed for type 2 diabetes monitoring, Disp: 100 each, Rfl: 12  •  lisinopril (PRINIVIL,ZESTRIL) 40 MG tablet, Take 1 tablet by mouth once daily, Disp: 90 tablet, Rfl: 0  •  metFORMIN (GLUCOPHAGE) 500 MG tablet, Take 1 tablet by mouth 2 (Two) Times a Day With Meals. Need appointment and labs before next refill., Disp: 180  tablet, Rfl: 0  •  Multiple Vitamins-Minerals (CENTRUM SILVER PO), Take 1 tablet by mouth Daily., Disp: , Rfl:   •  O2 (OXYGEN), Inhale 4 L/min Continuous. May resume prior 3L/min when O2 sat remains 88% or above on this setting. Patient was given an oximeter here. (Patient taking differently: Inhale 5 L/min Continuous. May resume prior 3L/min when O2 sat remains 88% or above on this setting. Patient was given an oximeter here.), Disp: , Rfl:   •  rosuvastatin (CRESTOR) 40 MG tablet, Take 1 tablet by mouth Daily. Need appointment and labs before next refill., Disp: 90 tablet, Rfl: 0  •  SYMBICORT 80-4.5 MCG/ACT inhaler, Inhale 2 puffs 2 (Two) Times a Day., Disp: , Rfl:   •  Ventolin  (90 Base) MCG/ACT inhaler, INHALE 2 PUFFS BY MOUTH EVERY 4 HOURS AS NEEDED, Disp: 18 g, Rfl: 6  •  verapamil SR (CALAN-SR) 240 MG CR tablet, TAKE 1 TABLET BY MOUTH EVERY 12 HOURS, Disp: 180 tablet, Rfl: 0  No current facility-administered medications for this visit.    Facility-Administered Medications Ordered in Other Visits:   •  acetaminophen (TYLENOL) 325 MG tablet  - ADS Override Pull, , , ,     Radiology/Endoscopy:    • 8/11/2021 CT abdomen/pelvis reviewed; similar to prior, large ventral hernia     Labs:    • Labs from 5/23/2021 reviewed    Review of Systems:   Influenza-like illness: no fever, no  cough, no  sore throat, no  body aches, no loss of sense of taste or smell, no known exposure to person with Covid-19.  Constitutional: Negative for fevers or chills  HENT: Negative for hearing loss or runny nose  Eyes: Negative for vision changes or scleral icterus  Respiratory: Negative for cough or shortness of breath  Cardiovascular: Negative for chest pain or heart palpitations  Gastrointestinal: + for abdominal pain;  Negative for nausea, vomiting, constipation, melena, or hematochezia  Genitourinary: Negative for hematuria or dysuria  Musculoskeletal: Negative for joint swelling or gait instability  Neurologic: Negative  for tremors or seizures  Psychiatric: Negative for suicidal ideations or depression  All other systems reviewed and negative    Physical Exam:   • Constitutional: Well-developed well-nourished, no acute distress, in a wheelchair  • Eyes: Conjunctiva normal, sclera nonicteric  • ENMT: Hearing grossly normal, oral mucosa moist  • Neck: Supple, trachea midline  • Respiratory: On home O2, increased work of breathing, normal inspiratory effort  • Cardiovascular: Regular rate, no peripheral edema, no jugular venous distention  • Gastrointestinal: Soft, nontender, large left ventral hernia, 4 x 5 area of skin ulceration and thinning, no drainage  • Skin:  Warm, dry, no rash on visualized skin surfaces  • Musculoskeletal: Symmetric strength, normal gait  • Psychiatric: Alert and oriented ×3, normal affect       Sanna Good M.D.  General and Endoscopic Surgery  [facility]    [address], [address]  [place], [place], [address]  P: [phone]  F: [phone]

## 2021-08-26 NOTE — TELEPHONE ENCOUNTER
Patient's sister, Clau, called wanting to know what the next steps are for her? She would like you to call her back at [phone].

## 2021-09-08 NOTE — OUTREACH NOTE
Patient Outreach    Ambulatory Case Management Note    Call to pt for monthly check in. She states she is doing about the same with her breathing. She does have an area to her abd that has ulcerated due to her hernia. Home Health has started and is due to see her today. She states she needs to have surgery but her surgeon is afraid to do it because she may not make it thru the surgery. Long discussion with pt regarding advanced directives. She does not have one but states she has discussed this with both her DR and her family. She feels they will have to do surgery and is ok with that. She is agreeable to being placed on life support only temporarily.   Long enjoyable conversation with pt. Will call again next month. She does have [facility] number for any questions or concerns.       Laquita Carbajal RN  Ambulatory Case Management    9/8/2021, 10:46 EDT

## 2021-09-17 NOTE — PATIENT INSTRUCTIONS
"Call Dr. Roberto Carlos Umaña, Gastroenterology at [phone] if you have any problems or concerns.    We know you have a Choice in healthcare and appreciate you using [facility].  Our purpose is to provide you \"Excellent Care\".  We hope that you will always choose us in the future and continue to recommend us to your family and friends.          Infliximab infusion  What is this medicine?  INFLIXIMAB (in FLIX i mab) is used to treat Crohn's disease and ulcerative colitis. It is also used to treat ankylosing spondylitis, plaque psoriasis, and some forms of arthritis.  This medicine may be used for other purposes; ask your health care provider or pharmacist if you have questions.  COMMON BRAND NAME(S): AVSOLA, INFLECTRA, Remicade, RENFLEXIS  What should I tell my health care provider before I take this medicine?  They need to know if you have any of these conditions:  · cancer  · current or past resident of [place]  · diabetes  · exposure to tuberculosis  · Guillain-[place] syndrome  · heart failure  · hepatitis or liver disease  · immune system problems  · infection  · lung or breathing disease, like COPD  · multiple sclerosis  · receiving phototherapy for the skin  · seizure disorder  · an unusual or allergic reaction to infliximab, mouse proteins, other medicines, foods, dyes, or preservatives  · pregnant or trying to get pregnant  · breast-feeding  How should I use this medicine?  This medicine is for injection into a vein. It is usually given by a health care professional in a hospital or clinic setting.  A special MedGuide will be given to you by the pharmacist with each prescription and refill. Be sure to read this information carefully each time.  Talk to your pediatrician regarding the use of this medicine in children. While this drug may be prescribed for children as young as 6 years of age for selected conditions, precautions do apply.  Overdosage: If you think you " have taken too much of this medicine contact a poison control center or emergency room at once.  NOTE: This medicine is only for you. Do not share this medicine with others.  What if I miss a dose?  It is important not to miss your dose. Call your doctor or health care professional if you are unable to keep an appointment.  What may interact with this medicine?  Do not take this medicine with any of the following medications:  · biologic medicines such as abatacept, adalimumab, anakinra, certolizumab, etanercept, golimumab, rituximab, secukinumab, tocilizumab, tofactinib, ustekinumab  · live vaccines  This list may not describe all possible interactions. Give your health care provider a list of all the medicines, herbs, non-prescription drugs, or dietary supplements you use. Also tell them if you smoke, drink alcohol, or use illegal drugs. Some items may interact with your medicine.  What should I watch for while using this medicine?  Your condition will be monitored carefully while you are receiving this medicine. Visit your doctor or health care professional for regular checks on your progress. You may need blood work done while you are taking this medicine. Before beginning therapy, your doctor may do a test to see if you have been exposed to tuberculosis.  Call your doctor or health care professional for advice if you get a fever, chills or sore throat, or other symptoms of a cold or flu. Do not treat yourself. This drug decreases your body's ability to fight infections. Try to avoid being around people who are sick.  This medicine may make the symptoms of heart failure worse in some patients. If you notice symptoms such as increased shortness of breath or swelling of the ankles or legs, contact your health care provider right away.  If you are going to have surgery or dental work, tell your health care professional or dentist that you have received this medicine.  If you take this medicine for plaque psoriasis,  stay out of the sun. If you cannot avoid being in the sun, wear protective clothing and use sunscreen. Do not use sun lamps or tanning beds/booths.  Talk to your doctor about your risk of cancer. You may be more at risk for certain types of cancers if you take this medicine.  What side effects may I notice from receiving this medicine?  Side effects that you should report to your doctor or health care professional as soon as possible:  · allergic reactions like skin rash, itching or hives, swelling of the face, lips, or tongue  · breathing problems  · changes in vision  · chest pain  · fever or chills, usually related to the infusion  · joint pain  · pain, tingling, numbness in the hands or feet  · redness, blistering, peeling or loosening of the skin, including inside the mouth  · seizures  · signs of infection - fever or chills, cough, sore throat, flu-like symptoms, pain or difficulty passing urine  · signs and symptoms of liver injury like dark yellow or brown urine; general ill feeling; light-colored stools; loss of appetite; nausea; right upper belly pain; unusually weak or tired; yellowing of the eyes or skin  · signs and symptoms of a stroke like changes in vision; confusion; trouble speaking or understanding; severe headaches; sudden numbness or weakness of the face, arm or leg; trouble walking; dizziness; loss of balance or coordination  · swelling of the ankles, feet, or hands  · swollen lymph nodes in the neck, underarm, or groin areas  · unusual bleeding or bruising  · unusually weak or tired  Side effects that usually do not require medical attention (report to your doctor or health care professional if they continue or are bothersome):  · headache  · nausea  · stomach pain  · upset stomach  This list may not describe all possible side effects. Call your doctor for medical advice about side effects. You may report side effects to FDA at 7-519-FDA-5550.  Where should I keep my medicine?  This drug is  given in a hospital or clinic and will not be stored at home.  NOTE: This sheet is a summary. It may not cover all possible information. If you have questions about this medicine, talk to your doctor, pharmacist, or health care provider.  © 2021 [employer]/Gold Standard (2018-01-17 13:45:32)

## 2021-09-20 NOTE — TELEPHONE ENCOUNTER
[facility] called Provider placed new order for Remicade.    Pt will need Office visit and yearly labs for Crohns/ Remicade.

## 2021-09-20 NOTE — NURSING NOTE
NURSING PROGRESS NOTE      Pt to [facility] per  scheduled appointment.  Pt ID verified by (2) identifiers. Allergies, medications and medical history reviewed and verified. Tolerated prescribed treatment without incident. Patient received AVS, Pt verbalized understanding.  Discharged to home. Condition Satisfactory .  Carmen Montanez RN

## 2021-09-23 NOTE — TELEPHONE ENCOUNTER
Spoke with pt added to [facility] schedule for 10/14 at 8:45 for F/U Crohns- Remicade  Will need labs done that day as well.  Pt verbalized understanding.    Currently pt is seeing surgery for wound on abdomin.

## 2021-09-27 NOTE — CASE COMMUNICATION
Dear Dr. Sanna Good    Re:Mar Camilo  :1950    The LPN home visit  on 2021 for the above patient was missed due to appointment with surgeon today , therefore, the prescribed frequency of visits was not met.    If you have questions or would like further information about this patient, please contact us at [phone].    Regards,    Gris Rhodes LPN

## 2021-09-27 NOTE — PROGRESS NOTES
General Surgery History and Physical      Summary:    Mrs. Mar Camilo is a 71 y.o. lady with a large complex ventral hernia with skin ulceration. She knows she is very high risk for any operative intervention and is likely not a surgical candidate both due to how complex her hernias and due to her multiple medical comorbidities.  She carries a high risk of death or severe complication with any type of operative procedure.  Wound appears stable with local wound care and home health.  We will continue doing this.  Discussed weight loss and bowel regimen.  Follow-up in 3 months or sooner if there is any changes in the wound.    Referring Provider: No ref. provider found    Chief Complaint:    Abdominal pain, nonhealing wound     History of Present Illness:    Mrs. Mar Camilo is a 71 y.o. lady with Crohn's disease on Remicade, diabetes, coronary artery disease, COPD on home oxygen who presents with a new wound on her abdomen.  She has a known large ventral hernia in her left abdomen.  She knows she is a poor operative candidate for this.  She has begun to develop skin ulceration over this area.  No fever or chills.  No drainage.    9/27/2021 she is overall doing well.  She is tolerating a diet and having bowel function every 2 to 3 days.  She is doing wound care and has home health coming twice a week.  Her wound is stable.  She does states she has less abdominal pain than previously.    Past Medical History:   • COPD on home O2  • Carotid stenosis   • CAD   • Crohn's disease on Remicade  • DM type 2  • HLD   • HTN   • Morbid obesity  • History of CVA     Past Surgical History:    • Appendectomy  • : Surgery  • Cystoscopy  • Left femur fracture repair  • Right shoulder replacement  • Laparoscopic transverse colectomy with primary anastomosis  • Right carotid endarterectomy  • Left tibial plateau internal fixation    Family History:    • Crohn's disease in a brother and maternal aunt  • No family history of  colon cancer    Social History:    • Denies tobacco use  • Denies alcohol use    Allergies:   Allergies   Allergen Reactions   • Contrast Dye Hives   • Iodinated Diagnostic Agents Hives   • Norco [Hydrocodone-Acetaminophen] Hallucinations   • Tenoretic [Atenolol-Chlorthalidone] Anaphylaxis       Medications:     Current Outpatient Medications:   •  Acetaminophen 325 MG capsule, 650 mg Every 8 (Eight) Hours As Needed., Disp: , Rfl:   •  aspirin 81 MG EC tablet, Take 81 mg by mouth 2 (Two) Times a Day., Disp: , Rfl:   •  bacitracin 500 UNIT/GM ointment, Apply to affected area daily, Disp: 28 g, Rfl: 3  •  Blood Glucose Monitoring Suppl (ONETOUCH VERIO) w/Device kit, 1 kit 2 (Two) Times a Day., Disp: 1 kit, Rfl: 0  •  docusate sodium (COLACE) 100 MG capsule, Take 100 mg by mouth Every Other Day., Disp: , Rfl:   •  fenofibrate (TRICOR) 145 MG tablet, Take 1 tablet by mouth once daily (Patient taking differently: 145 mg Daily.), Disp: 90 tablet, Rfl: 0  •  FLUoxetine (PROzac) 40 MG capsule, Take 1 capsule by mouth Daily., Disp: 90 capsule, Rfl: 2  •  gabapentin (NEURONTIN) 300 MG capsule, TAKE 1 CAPSULE BY MOUTH TWICE DAILY . APPOINTMENT REQUIRED FOR FUTURE REFILLS, Disp: 60 capsule, Rfl: 0  •  glucose blood (ONETOUCH VERIO) test strip, Use as instructed as to check blood sugars 2-3 times a day for type 2 diabetes, Disp: 100 each, Rfl: 12  •  guaiFENesin (MUCINEX) 600 MG 12 hr tablet, Take 1,200 mg by mouth Every Morning., Disp: , Rfl:   •  inFLIXimab (REMICADE) 100 MG injection, Infuse 5 mg/kg into a venous catheter Every 2 (Two) Months., Disp: , Rfl:   •  ipratropium-albuterol (DUO-NEB) 0.5-2.5 mg/3 ml nebulizer, Take 3 mL by nebulization Every 4 (Four) Hours While Awake., Disp: 360 mL, Rfl:   •  lamoTRIgine (LaMICtal) 150 MG tablet, Take 150 mg by mouth Daily., Disp: , Rfl:   •  Lancets (ONETOUCH ULTRASOFT) lancets, Used to check blood sugars twice a day as needed for type 2 diabetes monitoring, Disp: 100 each, Rfl:  12  •  lisinopril (PRINIVIL,ZESTRIL) 40 MG tablet, Take 1 tablet by mouth once daily, Disp: 90 tablet, Rfl: 0  •  metFORMIN (GLUCOPHAGE) 500 MG tablet, Take 1 tablet by mouth 2 (Two) Times a Day With Meals. Need appointment and labs before next refill., Disp: 180 tablet, Rfl: 0  •  Multiple Vitamins-Minerals (CENTRUM SILVER PO), Take 1 tablet by mouth Daily., Disp: , Rfl:   •  O2 (OXYGEN), Inhale 4 L/min Continuous. May resume prior 3L/min when O2 sat remains 88% or above on this setting. Patient was given an oximeter here. (Patient taking differently: Inhale 5 L/min Continuous. May resume prior 3L/min when O2 sat remains 88% or above on this setting. Patient was given an oximeter here.), Disp: , Rfl:   •  rosuvastatin (CRESTOR) 40 MG tablet, Take 1 tablet by mouth Daily. Need appointment and labs before next refill., Disp: 90 tablet, Rfl: 0  •  SYMBICORT 80-4.5 MCG/ACT inhaler, Inhale 2 puffs 2 (Two) Times a Day., Disp: , Rfl:   •  Ventolin  (90 Base) MCG/ACT inhaler, INHALE 2 PUFFS BY MOUTH EVERY 4 HOURS AS NEEDED, Disp: 18 g, Rfl: 6  •  verapamil SR (CALAN-SR) 240 MG CR tablet, TAKE 1 TABLET BY MOUTH EVERY 12 HOURS, Disp: 180 tablet, Rfl: 0  No current facility-administered medications for this visit.    Facility-Administered Medications Ordered in Other Visits:   •  acetaminophen (TYLENOL) 325 MG tablet  - ADS Override Pull, , , ,     Radiology/Endoscopy:    • 8/11/2021 CT abdomen/pelvis reviewed; similar to prior, large ventral hernia     Labs:    • Labs from 5/23/2021 reviewed    Review of Systems:   Influenza-like illness: no fever, no  cough, no  sore throat, no  body aches, no loss of sense of taste or smell, no known exposure to person with Covid-19.  Constitutional: Negative for fevers or chills  HENT: Negative for hearing loss or runny nose  Eyes: Negative for vision changes or scleral icterus  Respiratory: Negative for cough or shortness of breath  Cardiovascular: Negative for chest pain or heart  palpitations  Gastrointestinal: + for abdominal pain;  Negative for nausea, vomiting, constipation, melena, or hematochezia  Genitourinary: Negative for hematuria or dysuria  Musculoskeletal: Negative for joint swelling or gait instability  Neurologic: Negative for tremors or seizures  Psychiatric: Negative for suicidal ideations or depression  All other systems reviewed and negative    Physical Exam:   • Constitutional: Well-developed, no acute distress, in a wheelchair  • Eyes: Conjunctiva normal, sclera nonicteric  • ENMT: Hearing grossly normal, oral mucosa moist  • Neck: Supple, trachea midline  • Respiratory: On home O2, increased work of breathing, normal inspiratory effort  • Cardiovascular: Regular rate, no peripheral edema, no jugular venous distention  • Gastrointestinal: Soft, nontender, large left ventral hernia, 2 x 3 area of skin ulceration and thinning, no drainage  • Skin:  Warm, dry, no rash on visualized skin surfaces  • Musculoskeletal: Symmetric strength, normal gait  • Psychiatric: Alert and oriented ×3, normal affect       Sanna Good M.D.  General and Endoscopic Surgery  [facility]    [address], [address]  [place], [place], [facility]  P: [phone]  F: [phone]

## 2021-10-04 NOTE — HOME HEALTH
PATIENT IN BED ON NURSE ARRIVAL. O2 PATIENT VIA N/C . PT VOICED SHE HAS PRODUCTIVE COUGH WITH CLEAR PHLEGM. EDUCATED ON INCREASING FLUID INTAKE TO THIN SECRECTIONS. PATIENT STATEED SHE  DO NOT TAKE BLOOD GLUCOSE. NURSE INFORMED PATIENT KEEPING BLOOD GLUCOSE WNL WILL HELP WITH WOUND HEALING. NURSE NOTED PATIENT HAS PHLETHORA OF SWEETS IN ROOM.        NEXT VISIT: WOUND CARE- ASSESS ABDOMEN SWELLING  PIC/ MEASUREMENT TAKEN THIS VISIT

## 2021-10-14 NOTE — PROGRESS NOTES
PATIENT INFORMATION  Mar Camilo       - 1950    CHIEF COMPLAINT  Chief Complaint   Patient presents with   • Crohn's Disease     f/u for Remicade approval       HISTORY OF PRESENT ILLNESS  Here for Crohns Colitis and usually skips a day a week and takes a SS TIW    No drainage from her chronic abd wound    Not seen for a year but has had 2 bouts of PSBO from her large ventral hernia      REVIEWED PERTINENT RESULTS/ LABS  Lab Results   Component Value Date    CASEREPORT  2018     [facility] Cytology                           Case: [id number]                                  Authorizing Provider:  Gustavo Muniz MD          Collected:           2018 07:51 AM          Ordering Location:     [facility]   Received:            2018 09:40 AM                                 OR                                                                           Pathologist:           Tanner Mcgregor MD                                                          Specimen:    Lung, Right Middle Lobe, right lung wash                                                   FINALDX  2018     Testing performed at outside laboratory. See scanned report.         Lab Results   Component Value Date    HGB 10.8 (L) 2021    MCV 94.0 2021     2021    ALT 28 2021    AST 19 2021    HGBA1C 6.90 (H) 2021    INR 1.00 2020    TRIG 223 (H) 2020    IRON 63 2020      No results found.    REVIEW OF SYSTEMS  Review of Systems   Constitutional: Negative for activity change, chills, fever and unexpected weight change.   HENT: Negative for congestion.    Eyes: Negative for visual disturbance.   Respiratory: Negative for shortness of breath.    Cardiovascular: Negative for chest pain and palpitations.   Gastrointestinal: Positive for abdominal distention and abdominal pain. Negative for blood in stool.   Endocrine: Negative for cold intolerance and heat  intolerance.   Genitourinary: Negative for hematuria.   Musculoskeletal: Negative for gait problem.   Skin: Negative for color change.   Allergic/Immunologic: Negative for immunocompromised state.   Neurological: Negative for weakness and light-headedness.   Hematological: Negative for adenopathy.   Psychiatric/Behavioral: Negative for sleep disturbance. The patient is not nervous/anxious.          ACTIVE PROBLEMS  Patient Active Problem List    Diagnosis    • Oxygen dependent [Z99.81]    • Panic attack [F41.0]    • Irregular heart rhythm [I49.9]    • Hiatal hernia [K44.9]    • Crohn's disease of small intestine (HCC) [K50.00]    • Chronic cough [R05.3]    • Cataract [H26.9]    • Acute on chronic respiratory failure with hypoxia ([facility]) [J96.21]    • Crohn's disease of colon without complication ([facility]) [K50.10]    • Intussusception ([facility]) [K56.1]    • Crohn's disease ([facility]) [K50.90]    • CAD (coronary artery disease) [I25.10]    • Hyperkalemia [E87.5]    • Urinary tract infection with hematuria [N39.0, R31.9]    • Abnormal urine odor [R82.90]    • Hematuria [R31.9]    • Long-term use of high-risk medication [Z79.899]    • Acute maxillary sinusitis [J01.00]    • COPD exacerbation ([facility]) [J44.1]    • Dyspnea on exertion [R06.00]    • Pain in both lower extremities [M79.604, M79.605]    • Recent major surgery [Z98.890]    • Status post reverse total arthroplasty of right shoulder, DOS 12/30/2019 [Z96.611]    • Neuropathy [G62.9]    • Foot swelling [M79.89]    • H/O carotid stenosis [Z86.79]    • Cerebrovascular accident (CVA) ([facility]) [I63.9]    • Chronic pain syndrome [G89.4]    • Unsteady gait [R26.81]    • High risk medication use [Z79.899]    • PAD (peripheral artery disease) ([facility]) [I73.9]    • PAF (paroxysmal atrial fibrillation) ([facility]) [I48.0]    • Ventral hernia without obstruction or gangrene [K43.9]    • Regional colitis (HCC) [K50.10]    • Need for pneumococcal vaccine [Z23]    • Pneumonia of left lower lobe due to  infectious organism [J18.9]    • Tendinopathy of rotator cuff [M67.919]    • Primary osteoarthritis, left shoulder [M19.012]    • History of CVA (cerebrovascular accident) [Z86.73]    • Seizure ([facility]) [R56.9]    • Acute cystitis without hematuria [N30.00]    • Chronic right hip pain [M25.551, G89.29]    • Chronic pain of both shoulders [M25.511, G89.29, M25.512]    • Need for immunization against influenza [Z23]    • Hospital discharge follow-up [Z09]    • Chronic respiratory failure with hypoxia ([facility]) [J96.11]    • Asymptomatic stenosis of right carotid artery [I65.21]    • Anemia [D64.9]    • Obstructive pyelonephritis [N11.1]    • Chronic allergic conjunctivitis [H10.45]    • Chronic back pain [M54.9, G89.29]    • Chronic bronchitis ([facility]) [J42]    • Non-specific colitis [K52.9]    • COPD (chronic obstructive pulmonary disease) ([facility]) [J44.9]    • Degeneration of intervertebral disc of lumbar region [M51.36]    • Depression [F32.A]    • Heartburn [R12]    • Herpes labialis [B00.1]    • Mixed hyperlipidemia [E78.2]    • Essential hypertension [I10]    • Spinal stenosis of lumbar region [M48.061]    • Osteopenia [M85.80]    • Lumbar radiculopathy [M54.16]    • Type 2 diabetes mellitus with complication, without long-term current use of insulin ([facility]) [E11.8]    • Class 3 severe obesity in adult ([facility]) [E66.01]    • Osteoarthritis of lumbar spine [M47.816]          PAST MEDICAL HISTORY  Past Medical History:   Diagnosis Date   • Arthritis    • Carotid arterial disease ([facility])     US 2015 with 80-99% right and 16-49% left, but CTA with 60-70% right, not a surgical candidate   • Chronic back pain    • Closed fracture of left tibial plateau 7/30/2018   • COPD (chronic obstructive pulmonary disease) ([facility])    • Coronary artery disease    • Crohn's disease ([facility])     Remicade on hold d/t antibiotics   • DDD (degenerative disc disease)    • Depression    • Diabetes mellitus ([facility])     Type II   • Elevated cholesterol    • Heartburn  2/21/2016   • Hernia of abdominal wall    • History of stroke 05/03/2017    left parietal   • Hyperlipidemia    • Hypertension    • Hypokalemia    • Morbid obesity (HCC)    • Obstructive pyelonephritis    • On home oxygen therapy     2L UNLESS STRESSED THEN 2.5-3 L   • Osteopenia 2/21/2016   • PAF (paroxysmal atrial fibrillation) (HCC)    • PVD (peripheral vascular disease) (HCC)     RT CAROTID STENOSIS   • Radiculopathy     NECK PAIN   • Right shoulder pain     scheduled for sx   • Seizure (HCC)     with stroke   • Stroke (HCC) 2017    memory,    • Subclavian artery stenosis (HCC)     and axillary artery stenosis, on the left         SURGICAL HISTORY  Past Surgical History:   Procedure Laterality Date   • APPENDECTOMY N/A    • BRONCHOSCOPY N/A 1/5/2018    Procedure: BRONCHOSCOPY;  Surgeon: Gustavo Muniz MD;  Location: [facility] OR;  Service:    • COLON SURGERY     • COLONOSCOPY     • CYSTOSCOPY URETEROSCOPY LASER LITHOTRIPSY Right 4/17/2017    Procedure: CYSTOSCOPY with  right stent removal, right URETEROSCOPY LASER LITHOTRIPSY,  with STONE BASKET EXTRACTION;  Surgeon: Dipesh Bean MD;  Location: [facility] OR;  Service:    • CYSTOSCOPY W/ URETERAL STENT PLACEMENT Right 3/18/2017    Procedure: CYSTOSCOPY URETERAL CATHETER/STENT INSERTION;  Surgeon: Dipesh Bean MD;  Location: [facility] OR;  Service:    • ENDOSCOPY     • FLEXIBLE SIGMOIDOSCOPY N/A 01/25/2016    Dr. Luis Rosas   • FRACTURE SURGERY  2017    broken femur, left   • JOINT REPLACEMENT  12/30/2019    right shoulder replacement   • LAPAROSCOPIC COLON RESECTION N/A 01/25/2016    Laparoscopic mobilization of splenic flexure, transverse colectomy with primary anastomosis-Dr. Christopher Richardson   • LUNG BIOPSY      NEGATIVE   • ID THROMBOENDARTECTMY NECK,NECK INCIS Right 3/14/2016    Procedure: RT CAROTID ENDARTERECTOMY;  Surgeon: Federico Schuler MD;  Location: [facility] OR;  Service: Vascular   • TIBIAL PLATEAU OPEN REDUCTION INTERNAL FIXATION  Left 2018    Open reduction internal fixation of left tibial plateau fracture-Dr. Ayden Cárdenas   • TOTAL SHOULDER ARTHROPLASTY W/ DISTAL CLAVICLE EXCISION Right 2019    Procedure: TOTAL SHOULDER REVERSE ARTHROPLASTY, open  biceps tenodesis;  Surgeon: Altaf Reyes MD;  Location: [facility];  Service: Orthopedics   • VASCULAR SURGERY  2017    caroid artery   • WRIST ARTHROSCOPY Right     WITH RELEASE OF TRANSVERSE CARPAL LIGAMENT         FAMILY HISTORY  Family History   Problem Relation Age of Onset   • Diabetes Mother    • Stroke Mother    • Other Father         RESPIRATORY FAILURE   • Cancer Other         lung cancer   • Crohn's disease Brother    • Crohn's disease Maternal Aunt          SOCIAL HISTORY  Social History     Occupational History   • Not on file   Tobacco Use   • Smoking status: Former Smoker     Packs/day: 2.00     Years: 40.00     Pack years: 80.00     Types: Cigarettes     Quit date: 2013     Years since quittin.5   • Smokeless tobacco: Never Used   • Tobacco comment: 1 cup coffee daily   Vaping Use   • Vaping Use: Never used   Substance and Sexual Activity   • Alcohol use: No     Comment: history of heavy drinker    • Drug use: No   • Sexual activity: Defer         CURRENT MEDICATIONS    Current Outpatient Medications:   •  Acetaminophen 325 MG capsule, 650 mg Every 8 (Eight) Hours As Needed., Disp: , Rfl:   •  aspirin 81 MG EC tablet, Take 81 mg by mouth 2 (Two) Times a Day., Disp: , Rfl:   •  bacitracin 500 UNIT/GM ointment, Apply to affected area daily, Disp: 28 g, Rfl: 3  •  Blood Glucose Monitoring Suppl (ONETOUCH VERIO) w/Device kit, 1 kit 2 (Two) Times a Day., Disp: 1 kit, Rfl: 0  •  docusate sodium (COLACE) 100 MG capsule, Take 100 mg by mouth Every Other Day., Disp: , Rfl:   •  fenofibrate (TRICOR) 145 MG tablet, Take 1 tablet by mouth Daily., Disp: 30 tablet, Rfl: 0  •  FLUoxetine (PROzac) 40 MG capsule, Take 1 capsule by mouth Daily., Disp: 90 capsule, Rfl: 2  •   gabapentin (NEURONTIN) 300 MG capsule, TAKE 1 CAPSULE BY MOUTH TWICE DAILY . APPOINTMENT REQUIRED FOR FUTURE REFILLS, Disp: 60 capsule, Rfl: 0  •  glucose blood (ONETOUCH VERIO) test strip, Use as instructed as to check blood sugars 2-3 times a day for type 2 diabetes, Disp: 100 each, Rfl: 12  •  guaiFENesin (MUCINEX) 600 MG 12 hr tablet, Take 1,200 mg by mouth Every Morning., Disp: , Rfl:   •  inFLIXimab (REMICADE) 100 MG injection, Infuse 5 mg/kg into a venous catheter Every 2 (Two) Months., Disp: , Rfl:   •  ipratropium-albuterol (DUO-NEB) 0.5-2.5 mg/3 ml nebulizer, Take 3 mL by nebulization Every 4 (Four) Hours While Awake., Disp: 360 mL, Rfl:   •  lamoTRIgine (LaMICtal) 150 MG tablet, Take 150 mg by mouth Daily., Disp: , Rfl:   •  Lancets (ONETOUCH ULTRASOFT) lancets, Used to check blood sugars twice a day as needed for type 2 diabetes monitoring, Disp: 100 each, Rfl: 12  •  lisinopril (PRINIVIL,ZESTRIL) 40 MG tablet, Take 1 tablet by mouth Daily., Disp: 90 tablet, Rfl: 0  •  metFORMIN (GLUCOPHAGE) 500 MG tablet, TAKE 1 TABLET BY MOUTH TWICE DAILY BEFORE MEAL(S). NEED APPOINTMENT AND LABS BEFORE NEXT REFILL, Disp: 180 tablet, Rfl: 0  •  Multiple Vitamins-Minerals (CENTRUM SILVER PO), Take 1 tablet by mouth Daily., Disp: , Rfl:   •  O2 (OXYGEN), Inhale 4 L/min Continuous. May resume prior 3L/min when O2 sat remains 88% or above on this setting. Patient was given an oximeter here. (Patient taking differently: Inhale 5 L/min Continuous. May resume prior 3L/min when O2 sat remains 88% or above on this setting. Patient was given an oximeter here.), Disp: , Rfl:   •  rosuvastatin (CRESTOR) 40 MG tablet, Take 1 tablet by mouth once daily, Disp: 90 tablet, Rfl: 0  •  SYMBICORT 80-4.5 MCG/ACT inhaler, Inhale 2 puffs 2 (Two) Times a Day., Disp: , Rfl:   •  tiotropium (Spiriva HandiHaler) 18 MCG per inhalation capsule, Place 1 capsule into inhaler and inhale Daily., Disp: , Rfl:   •  Ventolin  (90 Base) MCG/ACT  "inhaler, INHALE 2 PUFFS BY MOUTH EVERY 4 HOURS AS NEEDED, Disp: 18 g, Rfl: 6  •  verapamil SR (CALAN-SR) 240 MG CR tablet, TAKE 1 TABLET BY MOUTH EVERY 12 HOURS, Disp: 180 tablet, Rfl: 0  No current facility-administered medications for this visit.    Facility-Administered Medications Ordered in Other Visits:   •  acetaminophen (TYLENOL) 325 MG tablet  - ADS Override Pull, , , ,     ALLERGIES  Contrast dye, Iodinated diagnostic agents, Norco [hydrocodone-acetaminophen], and Tenoretic [atenolol-chlorthalidone]    VITALS  Vitals:    10/14/21 0846   BP: 150/70   BP Location: Right arm   Patient Position: Sitting   Cuff Size: Large Adult   Weight: 111 kg (245 lb 6.4 oz)   Height: 165.1 cm (65\")       PHYSICAL EXAM  Debilities/Disabilities Identified: None  Emotional Behavior: Appropriate  Wt Readings from Last 3 Encounters:   10/14/21 111 kg (245 lb 6.4 oz)   09/27/21 111 kg (244 lb)   09/23/21 111 kg (244 lb)     Ht Readings from Last 1 Encounters:   10/14/21 165.1 cm (65\")     Body mass index is 40.84 kg/m².  Physical Exam  Constitutional:       Appearance: She is well-developed. She is obese.   HENT:      Head: Normocephalic and atraumatic.   Eyes:      General: No scleral icterus.     Pupils: Pupils are equal, round, and reactive to light.   Neck:      Thyroid: No thyromegaly.   Cardiovascular:      Rate and Rhythm: Normal rate and regular rhythm.      Heart sounds: Normal heart sounds. No murmur heard.  No gallop.    Pulmonary:      Effort: Pulmonary effort is normal.      Breath sounds: Normal breath sounds. No wheezing or rales.   Abdominal:      General: Bowel sounds are normal. There is no distension or abdominal bruit.      Palpations: Abdomen is soft. Abdomen is not rigid. There is no shifting dullness, fluid wave, hepatomegaly, splenomegaly, mass or pulsatile mass.      Tenderness: There is no abdominal tenderness. There is no guarding or rebound. Negative signs include Victor's sign.      Hernia: No hernia " is present. There is no hernia in the ventral area.          Comments: LLQ abd wound is ressesd but with drainage   Musculoskeletal:         General: Normal range of motion.      Cervical back: Normal range of motion and neck supple.   Lymphadenopathy:      Cervical: No cervical adenopathy.   Skin:     General: Skin is warm and dry.      Findings: No erythema or rash.   Neurological:      Mental Status: She is alert and oriented to person, place, and time.         CLINICAL DATA REVIEWED   reviewed previous lab results and integrated with today's visit, reviewed notes from other physicians and/or last GI encounter, reviewed previous endoscopy results and available photos, reviewed surgical pathology results from previous biopsies    ASSESSMENT  Diagnoses and all orders for this visit:    Crohn's disease of small intestine without complication (HCC)  -     QuantiFERON TB Plus Client Incubated; Future  -     HBV Core Antibody, IgG / IgM Diff; Future    Encounter for screening for other viral diseases   -     HBV Core Antibody, IgG / IgM Diff; Future    Other orders  -     tiotropium (Spiriva HandiHaler) 18 MCG per inhalation capsule; Place 1 capsule into inhaler and inhale Daily.          PLAN  Continue Remicade , do not feel this is a fistula and will recheck her annual labs  Return in about 6 months (around 4/14/2022).    I have discussed the above plan with the patient.  They verbalize understanding and are in agreement with the plan.  They have been advised to contact the office for any questions, concerns, or changes related to their health.

## 2021-10-19 NOTE — OUTREACH NOTE
Patient Outreach    Ambulatory Case Management Note    Call placed to pt who states she is doing well. She is currently having home health come to the home two times a week for dressing changes. She is enjoying the company and really likes the nurse that are coming out. She denies any signs and symptoms of infection. Denies questions or concerns. Will continue to call monthly.         Laquita Carbajal RN  Ambulatory Case Management    10/19/2021, 13:59 EDT

## 2021-11-02 NOTE — TELEPHONE ENCOUNTER
Contacted secondary insurance regarding PA for Remicade. [facility] is retail pharmacy would require PA for infusion if outpt services/HH referred patient is Medicare pt. Reaching out clarify coverage from Medicare services for Remicade at [facility] located [facility].     Pt has received Remicade infusions since 2016

## 2021-11-03 NOTE — TELEPHONE ENCOUNTER
Patient's sister called stating that HH was supposed to come out on Monday, but no one showed. I called  and they are going to look into why no one went to see the patient.

## 2021-11-11 PROBLEM — Z00.00 MEDICARE ANNUAL WELLNESS VISIT, SUBSEQUENT: Status: ACTIVE | Noted: 2021-01-01

## 2021-11-11 NOTE — PROGRESS NOTES
The ABCs of the Annual Wellness Visit  Subsequent Medicare Wellness Visit    Chief Complaint   Patient presents with   • Medicare Wellness-subsequent   • Hyperlipidemia     neuropathy   • Hypertension     management   • Diabetes     management      Subjective    History of Present Illness:  Mar Camilo is a 71 y.o. female who presents for a Subsequent Medicare Wellness Visit, type 2 diabetes, hypertension and hyperlipidemia management.  She is at office visit today with her sister Clau.  Krys is currently using 5 liters of oxygen continuously.  She has noticed that she has been having pain in abdomen area.  Recently saw the general surgeon.  Not due back for another month or so.  Home health is coming to take care of the abdominal wound as well as taping.  They will consider possible surgery in 3 months.  Bowel movements have been every other day without dark black tarry stools.  Her sleep has been okay.  Diet has been so-so.  States she does not cook or fix food herself.  She eats what ever her sister leaves for her to eat.  Last eye exam was April 2021.  She has had clear rhinorrhea, yellow productive cough, sinus pressure, sinus headache and postnasal drip for the past 3-4 days.  Has had slight increase with shortness of breath and wheezing.  Has been using her oxygen and allergy medication at home as directed.  Krys denied any fevers, chills, chest pain, diarrhea, vomiting, nausea, urinary symptoms or swelling of ankles.    The following portions of the patient's history were reviewed and   updated as appropriate:   She  has a past medical history of Arthritis, Carotid arterial disease ([facility]), Chronic back pain, Closed fracture of left tibial plateau (7/30/2018), COPD (chronic obstructive pulmonary disease) ([facility]), Coronary artery disease, Crohn's disease ([facility]), DDD (degenerative disc disease), Depression, Diabetes mellitus ([facility]), Elevated cholesterol, Heartburn (2/21/2016), Hernia of abdominal wall, History  of stroke (05/03/2017), Hyperlipidemia, Hypertension, Hypokalemia, Morbid obesity ([facility]), Obstructive pyelonephritis, On home oxygen therapy, Osteopenia (2/21/2016), PAF (paroxysmal atrial fibrillation) ([facility]), PVD (peripheral vascular disease) ([facility]), Radiculopathy, Right shoulder pain, Seizure ([facility]), Stroke ([facility]) (2017), and Subclavian artery stenosis ([facility]).  She does not have any pertinent problems on file.  She  has a past surgical history that includes Appendectomy (N/A); Wrist Arthroscopy (Right); Lung biopsy; pr thromboendartectmy neck,neck incis (Right, 3/14/2016); Cystoscopy w/ ureteral stent placement (Right, 3/18/2017); cystoscopy ureteroscopy laser lithotripsy (Right, 4/17/2017); Bronchoscopy (N/A, 1/5/2018); Laparoscopic colon resection (N/A, 01/25/2016); Flexible sigmoidoscopy (N/A, 01/25/2016); Tibial Plateau Open Reduction Internal Fixation (Left, 05/09/2018); Colon surgery; Total shoulder arthroplasty w/ distal clavicle excision (Right, 12/30/2019); Colonoscopy; Esophagogastroduodenoscopy; Fracture surgery (2017); Joint replacement (12/30/2019); and Vascular surgery (2017).  Her family history includes Cancer in an other family member; Crohn's disease in her brother and maternal aunt; Diabetes in her mother; Other in her father; Stroke in her mother.  She  reports that she quit smoking about 8 years ago. Her smoking use included cigarettes. She has a 80.00 pack-year smoking history. She has never used smokeless tobacco. She reports that she does not drink alcohol and does not use drugs.  Current Outpatient Medications   Medication Sig Dispense Refill   • Acetaminophen 325 MG capsule 650 mg Every 8 (Eight) Hours As Needed.     • aspirin 81 MG EC tablet Take 81 mg by mouth 2 (Two) Times a Day.     • bacitracin 500 UNIT/GM ointment Apply to affected area daily 28 g 3   • Blood Glucose Monitoring Suppl (ONETOUCH VERIO) w/Device kit 1 kit 2 (Two) Times a Day. 1 kit 0   • docusate sodium (COLACE) 100 MG  capsule Take 100 mg by mouth Every Other Day.     • fenofibrate (TRICOR) 145 MG tablet Take 1 tablet by mouth Daily. 30 tablet 0   • FLUoxetine (PROzac) 40 MG capsule Take 1 capsule by mouth Daily. 90 capsule 2   • gabapentin (NEURONTIN) 300 MG capsule TAKE 1 CAPSULE BY MOUTH TWICE DAILY . APPOINTMENT REQUIRED FOR FUTURE REFILLS 60 capsule 0   • glucose blood (ONETOUCH VERIO) test strip Use as instructed as to check blood sugars 2-3 times a day for type 2 diabetes 100 each 12   • guaiFENesin (MUCINEX) 600 MG 12 hr tablet Take 1,200 mg by mouth Every Morning.     • inFLIXimab (REMICADE) 100 MG injection Infuse 5 mg/kg into a venous catheter Every 2 (Two) Months.     • ipratropium-albuterol (DUO-NEB) 0.5-2.5 mg/3 ml nebulizer Take 3 mL by nebulization Every 4 (Four) Hours While Awake. 360 mL    • lamoTRIgine (LaMICtal) 150 MG tablet Take 150 mg by mouth Daily.     • Lancets (ONETOUCH ULTRASOFT) lancets Used to check blood sugars twice a day as needed for type 2 diabetes monitoring 100 each 12   • lisinopril (PRINIVIL,ZESTRIL) 40 MG tablet Take 1 tablet by mouth Daily. 90 tablet 0   • metFORMIN (GLUCOPHAGE) 500 MG tablet TAKE 1 TABLET BY MOUTH TWICE DAILY BEFORE MEAL(S). NEED APPOINTMENT AND LABS BEFORE NEXT REFILL 180 tablet 0   • Multiple Vitamins-Minerals (CENTRUM SILVER PO) Take 1 tablet by mouth Daily.     • O2 (OXYGEN) Inhale 4 L/min Continuous. May resume prior 3L/min when O2 sat remains 88% or above on this setting. Patient was given an oximeter here. (Patient taking differently: Inhale 5 L/min Continuous. May resume prior 3L/min when O2 sat remains 88% or above on this setting. Patient was given an oximeter here.)     • rosuvastatin (CRESTOR) 40 MG tablet Take 1 tablet by mouth once daily 90 tablet 0   • SYMBICORT 80-4.5 MCG/ACT inhaler Inhale 2 puffs 2 (Two) Times a Day.     • tiotropium (Spiriva HandiHaler) 18 MCG per inhalation capsule Place 1 capsule into inhaler and inhale Daily.     • Ventolin   (90 Base) MCG/ACT inhaler INHALE 2 PUFFS BY MOUTH EVERY 4 HOURS AS NEEDED 18 g 6   • verapamil SR (CALAN-SR) 240 MG CR tablet TAKE 1 TABLET BY MOUTH EVERY 12 HOURS 180 tablet 0   • azithromycin (Zithromax Z-Hu) 250 MG tablet Take 2 tablets by mouth on day 1, then 1 tablet daily on days 2-5 6 tablet 0     No current facility-administered medications for this visit.     Facility-Administered Medications Ordered in Other Visits   Medication Dose Route Frequency Provider Last Rate Last Admin   • acetaminophen (TYLENOL) 325 MG tablet  - ADS Override Pull              Current Outpatient Medications on File Prior to Visit   Medication Sig   • Acetaminophen 325 MG capsule 650 mg Every 8 (Eight) Hours As Needed.   • aspirin 81 MG EC tablet Take 81 mg by mouth 2 (Two) Times a Day.   • bacitracin 500 UNIT/GM ointment Apply to affected area daily   • Blood Glucose Monitoring Suppl (ONETOUCH VERIO) w/Device kit 1 kit 2 (Two) Times a Day.   • docusate sodium (COLACE) 100 MG capsule Take 100 mg by mouth Every Other Day.   • fenofibrate (TRICOR) 145 MG tablet Take 1 tablet by mouth Daily.   • FLUoxetine (PROzac) 40 MG capsule Take 1 capsule by mouth Daily.   • gabapentin (NEURONTIN) 300 MG capsule TAKE 1 CAPSULE BY MOUTH TWICE DAILY . APPOINTMENT REQUIRED FOR FUTURE REFILLS   • glucose blood (ONETOUCH VERIO) test strip Use as instructed as to check blood sugars 2-3 times a day for type 2 diabetes   • guaiFENesin (MUCINEX) 600 MG 12 hr tablet Take 1,200 mg by mouth Every Morning.   • inFLIXimab (REMICADE) 100 MG injection Infuse 5 mg/kg into a venous catheter Every 2 (Two) Months.   • ipratropium-albuterol (DUO-NEB) 0.5-2.5 mg/3 ml nebulizer Take 3 mL by nebulization Every 4 (Four) Hours While Awake.   • lamoTRIgine (LaMICtal) 150 MG tablet Take 150 mg by mouth Daily.   • Lancets (ONETOUCH ULTRASOFT) lancets Used to check blood sugars twice a day as needed for type 2 diabetes monitoring   • lisinopril (PRINIVIL,ZESTRIL) 40 MG  tablet Take 1 tablet by mouth Daily.   • metFORMIN (GLUCOPHAGE) 500 MG tablet TAKE 1 TABLET BY MOUTH TWICE DAILY BEFORE MEAL(S). NEED APPOINTMENT AND LABS BEFORE NEXT REFILL   • Multiple Vitamins-Minerals (CENTRUM SILVER PO) Take 1 tablet by mouth Daily.   • O2 (OXYGEN) Inhale 4 L/min Continuous. May resume prior 3L/min when O2 sat remains 88% or above on this setting. Patient was given an oximeter here. (Patient taking differently: Inhale 5 L/min Continuous. May resume prior 3L/min when O2 sat remains 88% or above on this setting. Patient was given an oximeter here.)   • rosuvastatin (CRESTOR) 40 MG tablet Take 1 tablet by mouth once daily   • SYMBICORT 80-4.5 MCG/ACT inhaler Inhale 2 puffs 2 (Two) Times a Day.   • tiotropium (Spiriva HandiHaler) 18 MCG per inhalation capsule Place 1 capsule into inhaler and inhale Daily.   • Ventolin  (90 Base) MCG/ACT inhaler INHALE 2 PUFFS BY MOUTH EVERY 4 HOURS AS NEEDED   • verapamil SR (CALAN-SR) 240 MG CR tablet TAKE 1 TABLET BY MOUTH EVERY 12 HOURS     Current Facility-Administered Medications on File Prior to Visit   Medication   • acetaminophen (TYLENOL) 325 MG tablet  - ADS Override Pull     She is allergic to contrast dye, iodinated diagnostic agents, norco [hydrocodone-acetaminophen], and tenoretic [atenolol-chlorthalidone]..    Compared to one year ago, the patient feels her physical   health is worse.    Compared to one year ago, the patient feels her mental   health is the same.    Recent Hospitalizations:  This patient has had a [facility] admission record on file within the last 365 days.    Current Medical Providers:  Patient Care Team:  Vianey Barrios PA-C as PCP - General (Family Medicine)  Bhanu Mata MD as Consulting Physician (Cardiology)  Mann Hernadez MD as Consulting Physician (Pulmonary Disease)  Becky Haji MD as Surgeon (General Surgery)  Laquita Carbajal RN as Ambulatory  (Population Health)    Outpatient  Medications Prior to Visit   Medication Sig Dispense Refill   • Acetaminophen 325 MG capsule 650 mg Every 8 (Eight) Hours As Needed.     • aspirin 81 MG EC tablet Take 81 mg by mouth 2 (Two) Times a Day.     • bacitracin 500 UNIT/GM ointment Apply to affected area daily 28 g 3   • Blood Glucose Monitoring Suppl (ONETOUCH VERIO) w/Device kit 1 kit 2 (Two) Times a Day. 1 kit 0   • docusate sodium (COLACE) 100 MG capsule Take 100 mg by mouth Every Other Day.     • fenofibrate (TRICOR) 145 MG tablet Take 1 tablet by mouth Daily. 30 tablet 0   • FLUoxetine (PROzac) 40 MG capsule Take 1 capsule by mouth Daily. 90 capsule 2   • gabapentin (NEURONTIN) 300 MG capsule TAKE 1 CAPSULE BY MOUTH TWICE DAILY . APPOINTMENT REQUIRED FOR FUTURE REFILLS 60 capsule 0   • glucose blood (ONETOUCH VERIO) test strip Use as instructed as to check blood sugars 2-3 times a day for type 2 diabetes 100 each 12   • guaiFENesin (MUCINEX) 600 MG 12 hr tablet Take 1,200 mg by mouth Every Morning.     • inFLIXimab (REMICADE) 100 MG injection Infuse 5 mg/kg into a venous catheter Every 2 (Two) Months.     • ipratropium-albuterol (DUO-NEB) 0.5-2.5 mg/3 ml nebulizer Take 3 mL by nebulization Every 4 (Four) Hours While Awake. 360 mL    • lamoTRIgine (LaMICtal) 150 MG tablet Take 150 mg by mouth Daily.     • Lancets (ONETOUCH ULTRASOFT) lancets Used to check blood sugars twice a day as needed for type 2 diabetes monitoring 100 each 12   • lisinopril (PRINIVIL,ZESTRIL) 40 MG tablet Take 1 tablet by mouth Daily. 90 tablet 0   • metFORMIN (GLUCOPHAGE) 500 MG tablet TAKE 1 TABLET BY MOUTH TWICE DAILY BEFORE MEAL(S). NEED APPOINTMENT AND LABS BEFORE NEXT REFILL 180 tablet 0   • Multiple Vitamins-Minerals (CENTRUM SILVER PO) Take 1 tablet by mouth Daily.     • O2 (OXYGEN) Inhale 4 L/min Continuous. May resume prior 3L/min when O2 sat remains 88% or above on this setting. Patient was given an oximeter here. (Patient taking differently: Inhale 5 L/min  Continuous. May resume prior 3L/min when O2 sat remains 88% or above on this setting. Patient was given an oximeter here.)     • rosuvastatin (CRESTOR) 40 MG tablet Take 1 tablet by mouth once daily 90 tablet 0   • SYMBICORT 80-4.5 MCG/ACT inhaler Inhale 2 puffs 2 (Two) Times a Day.     • tiotropium (Spiriva HandiHaler) 18 MCG per inhalation capsule Place 1 capsule into inhaler and inhale Daily.     • Ventolin  (90 Base) MCG/ACT inhaler INHALE 2 PUFFS BY MOUTH EVERY 4 HOURS AS NEEDED 18 g 6   • verapamil SR (CALAN-SR) 240 MG CR tablet TAKE 1 TABLET BY MOUTH EVERY 12 HOURS 180 tablet 0     Facility-Administered Medications Prior to Visit   Medication Dose Route Frequency Provider Last Rate Last Admin   • acetaminophen (TYLENOL) 325 MG tablet  - ADS Override Pull                No opioid medication identified on active medication list. I have reviewed chart for other potential  high risk medication/s and harmful drug interactions in the elderly.          Aspirin is on active medication list. Aspirin use is indicated based on review of current medical condition/s. Pros and cons of this therapy have been discussed today. Benefits of this medication outweigh potential harm.  Patient has been encouraged to continue taking this medication.  .      Patient Active Problem List   Diagnosis   • Obstructive pyelonephritis   • Chronic allergic conjunctivitis   • Chronic back pain   • Chronic bronchitis ([facility])   • Non-specific colitis   • COPD (chronic obstructive pulmonary disease) ([facility])   • Degeneration of intervertebral disc of lumbar region   • Depression   • Heartburn   • Herpes labialis   • Mixed hyperlipidemia   • Essential hypertension   • Spinal stenosis of lumbar region   • Osteopenia   • Lumbar radiculopathy   • Type 2 diabetes mellitus with complication, without long-term current use of insulin ([facility])   • Anemia   • Asymptomatic stenosis of right carotid artery   • Chronic respiratory failure with hypoxia ([facility])    • Hospital discharge follow-up   • Osteoarthritis of lumbar spine   • Need for immunization against influenza   • Chronic right hip pain   • Chronic pain of both shoulders   • Acute cystitis without hematuria   • Seizure ([facility])   • History of CVA (cerebrovascular accident)   • Tendinopathy of rotator cuff   • Primary osteoarthritis, left shoulder   • Pneumonia of left lower lobe due to infectious organism   • Need for pneumococcal vaccine   • Regional colitis ([facility])   • Ventral hernia without obstruction or gangrene   • PAF (paroxysmal atrial fibrillation) ([facility])   • PAD (peripheral artery disease) ([facility])   • Class 3 severe obesity in adult ([facility])   • Chronic pain syndrome   • Unsteady gait   • High risk medication use   • H/O carotid stenosis   • Cerebrovascular accident (CVA) ([facility])   • Neuropathy   • Foot swelling   • Status post reverse total arthroplasty of right shoulder, DOS 12/30/2019   • Dyspnea on exertion   • Pain in both lower extremities   • Recent major surgery   • COPD exacerbation ([facility])   • Hematuria   • Long-term use of high-risk medication   • Acute maxillary sinusitis   • Abnormal urine odor   • Urinary tract infection with hematuria   • Intussusception ([facility])   • Crohn's disease ([facility])   • CAD (coronary artery disease)   • Hyperkalemia   • Crohn's disease of colon without complication ([facility])   • Oxygen dependent   • Panic attack   • Irregular heart rhythm   • Hiatal hernia   • Crohn's disease of small intestine ([facility])   • Chronic cough   • Cataract   • Acute on chronic respiratory failure with hypoxia ([facility])   • Medicare annual wellness visit, subsequent     Advance Care Planning  Advance Directive is not on file.  ACP discussion was held with the patient during this visit. Patient does not have an advance directive, declines further assistance.          Objective    Vitals:    11/11/21 0902   BP: 140/60   Pulse: 103   Temp: 97.7 °F (36.5 °C)   SpO2: 90%  Comment: 5 liters O2   Weight: 112 kg (247 lb)  "  Height: 165.1 cm (65\")   PainSc:   8   PainLoc: Generalized  Comment: everywhere     BMI Readings from Last 1 Encounters:   11/11/21 41.10 kg/m²   BMI is above normal parameters. Recommendations include: nutrition counseling    Does the patient have evidence of cognitive impairment? No    Physical Exam  Vitals and nursing note reviewed.   Constitutional:       Appearance: Normal appearance. She is well-developed and well-groomed. She is morbidly obese.      Interventions: Nasal cannula and face mask in place.      Comments: On 6 L of oxygen.  Sister, Clau, in exam room wearing facial mask   HENT:      Head: Normocephalic and atraumatic.      Jaw: There is normal jaw occlusion.      Right Ear: Hearing, ear canal and external ear normal. Tympanic membrane is bulging.      Left Ear: Hearing, ear canal and external ear normal. Tympanic membrane is bulging.      Nose:      Right Sinus: Maxillary sinus tenderness present. No frontal sinus tenderness.      Left Sinus: Maxillary sinus tenderness present. No frontal sinus tenderness.      Mouth/Throat:      Lips: Pink.      Mouth: Mucous membranes are moist.      Dentition: Normal dentition.      Tongue: No lesions. Tongue does not deviate from midline.      Palate: No mass and lesions.      Pharynx: Oropharynx is clear. Uvula midline.      Tonsils: No tonsillar exudate.   Eyes:      General: Lids are normal.      Conjunctiva/sclera: Conjunctivae normal.      Pupils: Pupils are equal, round, and reactive to light.   Neck:      Thyroid: No thyroid mass, thyromegaly or thyroid tenderness.      Vascular: No carotid bruit.      Trachea: Trachea and phonation normal. No tracheal tenderness.   Cardiovascular:      Rate and Rhythm: Normal rate and regular rhythm.      Pulses: Normal pulses.      Heart sounds: Normal heart sounds, S1 normal and S2 normal. No murmur heard.      Pulmonary:      Effort: Pulmonary effort is normal.      Breath sounds: Normal breath sounds and air " entry.   Abdominal:      General: Bowel sounds are normal.      Palpations: Abdomen is soft.      Tenderness: There is abdominal tenderness in the left lower quadrant. There is no right CVA tenderness, left CVA tenderness, guarding or rebound. Negative signs include Victor's sign, Rovsing's sign, McBurney's sign, psoas sign and obturator sign.      Hernia: A hernia is present. Hernia is present in the ventral area.       Musculoskeletal:      Cervical back: Neck supple.      Right lower leg: No edema.      Left lower leg: No edema.        Feet:    Feet:      Right foot:      Protective Sensation: 10 sites tested. 8 sites sensed.      Left foot:      Protective Sensation: 10 sites tested. 9 sites sensed.      Comments: Diabetic Foot Exam Performed and Monofilament Test Performed  Lymphadenopathy:      Cervical: No cervical adenopathy.      Right cervical: No superficial, deep or posterior cervical adenopathy.     Left cervical: No superficial, deep or posterior cervical adenopathy.   Skin:     General: Skin is warm and dry.      Capillary Refill: Capillary refill takes less than 2 seconds.   Neurological:      Mental Status: She is alert and oriented to person, place, and time.   Psychiatric:         Attention and Perception: Attention and perception normal.         Mood and Affect: Mood and affect normal.         Speech: Speech normal.         Behavior: Behavior is cooperative.         Thought Content: Thought content normal.         Cognition and Memory: Cognition and memory normal.         Judgment: Judgment normal.         I wore an facial mask, face shield, and gloves during this patient encounter.  Patient also wearing a surgical mask.  Hand hygiene performed before and after seeing the patient.           HEALTH RISK ASSESSMENT    Smoking Status:  Social History     Tobacco Use   Smoking Status Former Smoker   • Packs/day: 2.00   • Years: 40.00   • Pack years: 80.00   • Types: Cigarettes   • Quit date: 04/2013    • Years since quittin.6   Smokeless Tobacco Never Used   Tobacco Comment    1 cup coffee daily     Alcohol Consumption:  Social History     Substance and Sexual Activity   Alcohol Use No    Comment: history of heavy drinker      Fall Risk Screen:    IMANI Fall Risk Assessment was completed, and patient is at LOW risk for falls.Assessment completed on:2021    Depression Screening:  PHQ-2/PHQ-9 Depression Screening 2021   Little interest or pleasure in doing things 3   Feeling down, depressed, or hopeless 0   Trouble falling or staying asleep, or sleeping too much 0   Feeling tired or having little energy 0   Poor appetite or overeating 3   Feeling bad about yourself - or that you are a failure or have let yourself or your family down 0   Trouble concentrating on things, such as reading the newspaper or watching television 0   Moving or speaking so slowly that other people could have noticed. Or the opposite - being so fidgety or restless that you have been moving around a lot more than usual 0   Thoughts that you would be better off dead, or of hurting yourself in some way 0   Total Score 6   If you checked off any problems, how difficult have these problems made it for you to do your work, take care of things at home, or get along with other people? Not difficult at all       Health Habits and Functional and Cognitive Screening:  Functional & Cognitive Status 2021   Do you have difficulty preparing food and eating? Yes   Do you have difficulty bathing yourself, getting dressed or grooming yourself? No   Do you have difficulty using the toilet? No   Do you have difficulty moving around from place to place? Yes   Do you have trouble with steps or getting out of a bed or a chair? Yes   Current Diet Well Balanced Diet   Dental Exam Other   Eye Exam Up to date   Exercise (times per week) 0 times per week   Current Exercises Include No Regular Exercise   Do you need help using the phone?  No   Are  you deaf or do you have serious difficulty hearing?  No   Do you need help with transportation? Yes   Do you need help shopping? Yes   Do you need help preparing meals?  Yes   Do you need help with housework?  Yes   Do you need help with laundry? Yes   Do you need help taking your medications? No   Do you need help managing money? No   Do you ever drive or ride in a car without wearing a seat belt? Yes   Have you felt unusual stress, anger or loneliness in the last month? No   Who do you live with? Sibling   If you need help, do you have trouble finding someone available to you? No   Have you been bothered in the last four weeks by sexual problems? No   Do you have difficulty concentrating, remembering or making decisions? No       Age-appropriate Screening Schedule:  Refer to the list below for future screening recommendations based on patient's age, sex and/or medical conditions. Orders for these recommended tests are listed in the plan section. The patient has been provided with a written plan.    Health Maintenance   Topic Date Due   • DIABETIC FOOT EXAM  09/06/2020   • LIPID PANEL  07/13/2021   • URINE MICROALBUMIN  07/13/2021   • DXA SCAN  11/11/2021 (Originally 8/12/2016)   • ZOSTER VACCINE (2 of 3) 06/11/2022 (Originally 9/26/2014)   • INFLUENZA VACCINE  11/29/2022 (Originally 8/1/2021)   • HEMOGLOBIN A1C  11/20/2021   • DIABETIC EYE EXAM  07/29/2022   • MAMMOGRAM  Discontinued   • TDAP/TD VACCINES  Discontinued              Assessment/Plan   CMS Preventative Services Quick Reference  Risk Factors Identified During Encounter  Immunizations Discussed/Encouraged (specific Immunizations; Influenza  Obesity/Overweight   Polypharmacy  The above risks/problems have been discussed with the patient.  Follow up actions/plans if indicated are seen below in the Assessment/Plan Section.  Pertinent information has been shared with the patient in the After Visit Summary.    Diagnoses and all orders for this visit:    1.  Medicare annual wellness visit, subsequent (Primary)  -     CBC & Differential  -     Comprehensive Metabolic Panel    2. Type 2 diabetes mellitus with complication, without long-term current use of insulin (HCC)  -     CBC & Differential  -     Comprehensive Metabolic Panel  -     Hemoglobin A1c  -     Lipid Panel With LDL / HDL Ratio  -     Microalbumin / Creatinine Urine Ratio - Urine, Clean Catch    3. Mixed hyperlipidemia  -     CBC & Differential  -     Comprehensive Metabolic Panel  -     Lipid Panel With LDL / HDL Ratio    4. Essential hypertension    5. Long-term use of high-risk medication  -     Compliance Drug Analysis, Ur - Urine, Clean Catch    6. Lumbar radiculopathy  -     Compliance Drug Analysis, Ur - Urine, Clean Catch    7. Acute non-recurrent maxillary sinusitis  -     azithromycin (Zithromax Z-Hu) 250 MG tablet; Take 2 tablets by mouth on day 1, then 1 tablet daily on days 2-5  Dispense: 6 tablet; Refill: 0    1.  Annual Medicare wellness exam with hypertension: Blood pressure was in therapeutic range.  She will continue her current medication at home.  Plan to follow-up in 6 months.  2.  Chronic and stable type 2 diabetes with hyperlipidemia: She is fasting will have CBC, CMP, lipid profile, A1c and a urine microalbumin collected.  Krys will be notified of test results when completed.  Krys will continue her current medication at home as directed.  Stressed the importance of eating healthier by decreasing carbohydrates and fatty food in diet.  We will recheck fasting labs in 6 months.  3.  Chronic and stable lumbar radiculopathy with high risk for long-term medication usage: Doing well with her gabapentin medication.  No refills are needed today.  Will check a urine drug screen for compliance purposes.  Krys has signed the appropriate agreement and the consent forms for the medication.  4.  New acute maxillary sinusitis: I suspect her symptoms are related to maxillary sinusitis.  I have sent a  VIVIENHu to pharmacy to use as directed.  We will continue her current allergy medications at home as directed.  Stressed the importance of staying hydrated and eating healthy.  She will return to office if symptoms do not improve.    Follow Up:   Return in about 3 months (around 2/11/2022), or DM.     An After Visit Summary and PPPS were made available to the patient.               Patient Counseling:  --Nutrition: Stressed importance of moderation in sodium/caffeine intake, saturated fat and cholesterol.  Discussed caloric balance, sufficient intake of fresh fruits, vegetables, fiber,   calcium, iron.  --Discussed the new recommendation against daily use of baby aspirin for primary prevention in low risk patients.  --Exercise: Stressed the importance of regular exercise by incorporating into daily routine.    --Substance Abuse: Discussed cessation/primary prevention of tobacco, alcohol, or other drug use; driving or other dangerous activities under the influence.    --Dental health: Discussed importance of regular tooth brushing, flossing, and dental visits.  -- Suggested having eyes and vision checked if needed or past due.  --Immunizations reviewed.  --Discussed benefits of screening colonoscopy.      MARGARITA Narvaez [facility]  [address] [address]  Dept: [phone]  Dept Fax: [phone]  Loc: [phone]  Loc Fax: [phone]

## 2021-11-12 NOTE — PATIENT INSTRUCTIONS
Call Dr. Roberto Carlos Umaña, Gastroenterology at [phone]           Crohn's Disease    Crohn's disease is a long-lasting (chronic) disease that affects the gastrointestinal (GI) tract. Crohn's disease often causes irritation and inflammation in the small intestine and the beginning of the large intestine, but it can affect any part of the GI tract. Crohn's disease is part of a group of illnesses that are known as inflammatory bowel disease (IBD).  Crohn's disease may start slowly and get worse over time. Symptoms may come and go. They may also go away for months or even years at a time (remission).  What are the causes?  The exact cause of this condition is not known. It may involve a response that causes your body's disease-fighting (immune) system to attack healthy cells and tissues (autoimmune response). Bacteria, genes, and your environment may also play a role.  What increases the risk?  The following factors may make you more likely to develop this condition:  · Having a family member who has Crohn's disease, another IBD, or an autoimmune condition.  · Using products that contain nicotine or tobacco, such as cigarettes and e-cigarettes.  · Being in your 20s.  · Having [place]  ancestry.  What are the signs or symptoms?  The main symptoms of this condition involve your GI tract. These include:  · Diarrhea.  · Pain or cramping in the abdomen. This is commonly felt in the lower right side of the abdomen.  · Frequent watery or bloody stools.  · Constipation. This may mean having:  ? Fewer bowel movements in a week than normal.  ? Difficulty having a bowel movement.  ? Stools that are dry, hard, or larger than normal.  · Rectal bleeding.  · Rectal pain.  · An urgent need to have a bowel movement.  · The feeling that you are not finished having a bowel movement.  Other symptoms may include:  · Unexplained weight loss.  · Fatigue.  · Fever.  · Nausea.  · Loss of appetite.  · Joint pain.  · Vision  changes.  · Red bumps or sores on the skin.  · Sores inside the mouth.  How is this diagnosed?  This condition may be diagnosed based on:  · Your symptoms and your medical history.  · A physical exam.  · Tests, which may include:  ? Blood tests.  ? Stool sample tests.  ? Imaging tests, such as X-rays and CT scans.  ? Tests to examine the inside of your intestines using a long, flexible tube that has a light and a camera on the end (endoscopy or colonoscopy).  ? A procedure to remove tissue samples from inside your bowel for testing (biopsy).  You may need to work with a health care provider who specializes in diseases of the digestive tract (gastroenterologist).  How is this treated?  There is no cure for this condition, but treatment can help you manage your symptoms. Crohn's disease affects each person differently. Your treatment may include:  · Resting your bowels. This involves having a period of healing time when your bowels are not passing stools. This may be done by:  ? Drinking only clear liquids. These are liquids that you can see through, such as water, black coffee, fruit juice without pulp, broth, gelatin, and ice pops.  ? Getting nutrition through an IV for a period of time.  · Medicines. These may be used by themselves or with other treatments (combination therapy). These may include antibiotic medicines. You may be given medicines that help to:  ? Reduce inflammation.  ? Control your immune system activity.  ? Fight infections.  ? Relieve cramps and prevent diarrhea.  ? Control your pain.  · Surgery. You may need surgery if:  ? Medicines and other treatments are not working anymore.  ? You develop complications from severe Crohn's disease.  ? A section of your intestine becomes so damaged that it needs to be removed.  Follow these instructions at home:  Medicines  · Take over-the-counter and prescription medicines only as told by your health care provider.  · If you were prescribed an antibiotic, take  it as told by your health care provider. Do not stop taking the antibiotic even if you start to feel better.  · Avoid taking ibuprofen or other NSAID medicines if possible, these can make Crohn's disease worse.  Eating and drinking  · Talk with your health care provider or a diet and nutrition specialist (registered dietitian) about what diet is best for you.  · Drink enough fluid to keep your urine pale yellow.  · If you are taking steroids to reduce inflammation, get plenty of calcium in your diet to help keep your bones healthy. You may also consider taking a calcium supplement with vitamin D.  · Keep a food diary to identify foods that make your symptoms better or worse.  · Avoid foods that cause symptoms.  · Follow instructions from your health care provider about eating or drinking restrictions if you have worsening symptoms (flare-up).  · Limit alcohol intake to no more than 1 drink a day for nonpregnant women and 2 drinks a day for men. One drink equals 12 oz of beer, 5 oz of wine, or 1½ oz of hard liquor.  General instructions  · Make sure you get all the vaccines that your health care provider recommends, especially pneumonia (pneumococcal) and flu (influenza) vaccines.  · Do not use any products that contain nicotine or tobacco, such as cigarettes and e-cigarettes. If you need help quitting, ask your health care provider.  · Exercise every day, or as often told by your health care provider.  · Keep all follow-up visits as told by your health care provider. This is important.  Contact a health care provider if:  · You have diarrhea, cramps in your abdomen, and other GI problems that are present almost all the time.  · Your symptoms do not improve with treatment.  · You continue to lose weight.  · You develop a rash or sores on your skin.  · You develop eye problems.  · You have a fever.  · Your symptoms get worse.  · You develop new symptoms.  Get help right away if:  · You have bloody diarrhea.  · You  have severe pain in your abdomen.  · You cannot pass stools.  Summary  · Crohn's disease affects each person differently. There are multiple treatment options that can help you manage the condition.  · Talk with your health care provider or diet and nutrition specialist (registered dietitian) about what diet is best for you.  · Make sure you get all the vaccines that your health care provider recommends, especially pneumonia (pneumococcal) and flu (influenza) vaccines.  This information is not intended to replace advice given to you by your health care provider. Make sure you discuss any questions you have with your health care provider.  Document Revised: 11/30/2018 Document Reviewed: 08/20/2018  [employer] Patient Education © 2021 [employer]      Infliximab injection  What is this medicine?  INFLIXIMAB (in FLIX i mab) is used to treat Crohn's disease and ulcerative colitis. It is also used to treat ankylosing spondylitis, plaque psoriasis, and some forms of arthritis.  This medicine may be used for other purposes; ask your health care provider or pharmacist if you have questions.  COMMON BRAND NAME(S): AVSOLA, INFLECTRA, Remicade, RENFLEXIS  What should I tell my health care provider before I take this medicine?  They need to know if you have any of these conditions:  · cancer  · current or past resident of [place]  · diabetes  · exposure to tuberculosis  · Guillain-[place] syndrome  · heart failure  · hepatitis or liver disease  · immune system problems  · infection  · lung or breathing disease, like COPD  · multiple sclerosis  · receiving phototherapy for the skin  · seizure disorder  · an unusual or allergic reaction to infliximab, mouse proteins, other medicines, foods, dyes, or preservatives  · pregnant or trying to get pregnant  · breast-feeding  How should I use this medicine?  This medicine is for injection into a vein. It is usually given by a health care professional in a  hospital or clinic setting.  A special MedGuide will be given to you by the pharmacist with each prescription and refill. Be sure to read this information carefully each time.  Talk to your pediatrician regarding the use of this medicine in children. While this drug may be prescribed for children as young as 6 years of age for selected conditions, precautions do apply.  Overdosage: If you think you have taken too much of this medicine contact a poison control center or emergency room at once.  NOTE: This medicine is only for you. Do not share this medicine with others.  What if I miss a dose?  It is important not to miss your dose. Call your doctor or health care professional if you are unable to keep an appointment.  What may interact with this medicine?  Do not take this medicine with any of the following medications:  · biologic medicines such as abatacept, adalimumab, anakinra, certolizumab, etanercept, golimumab, rituximab, secukinumab, tocilizumab, tofactinib, ustekinumab  · live vaccines  This list may not describe all possible interactions. Give your health care provider a list of all the medicines, herbs, non-prescription drugs, or dietary supplements you use. Also tell them if you smoke, drink alcohol, or use illegal drugs. Some items may interact with your medicine.  What should I watch for while using this medicine?  Your condition will be monitored carefully while you are receiving this medicine. Visit your doctor or health care professional for regular checks on your progress. You may need blood work done while you are taking this medicine. Before beginning therapy, your doctor may do a test to see if you have been exposed to tuberculosis.  Call your doctor or health care professional for advice if you get a fever, chills or sore throat, or other symptoms of a cold or flu. Do not treat yourself. This drug decreases your body's ability to fight infections. Try to avoid being around people who are  sick.  This medicine may make the symptoms of heart failure worse in some patients. If you notice symptoms such as increased shortness of breath or swelling of the ankles or legs, contact your health care provider right away.  If you are going to have surgery or dental work, tell your health care professional or dentist that you have received this medicine.  If you take this medicine for plaque psoriasis, stay out of the sun. If you cannot avoid being in the sun, wear protective clothing and use sunscreen. Do not use sun lamps or tanning beds/booths.  Talk to your doctor about your risk of cancer. You may be more at risk for certain types of cancers if you take this medicine.  What side effects may I notice from receiving this medicine?  Side effects that you should report to your doctor or health care professional as soon as possible:  · allergic reactions like skin rash, itching or hives, swelling of the face, lips, or tongue  · breathing problems  · changes in vision  · chest pain  · fever or chills, usually related to the infusion  · joint pain  · pain, tingling, numbness in the hands or feet  · redness, blistering, peeling or loosening of the skin, including inside the mouth  · seizures  · signs of infection - fever or chills, cough, sore throat, flu-like symptoms, pain or difficulty passing urine  · signs and symptoms of liver injury like dark yellow or brown urine; general ill feeling; light-colored stools; loss of appetite; nausea; right upper belly pain; unusually weak or tired; yellowing of the eyes or skin  · signs and symptoms of a stroke like changes in vision; confusion; trouble speaking or understanding; severe headaches; sudden numbness or weakness of the face, arm or leg; trouble walking; dizziness; loss of balance or coordination  · swelling of the ankles, feet, or hands  · swollen lymph nodes in the neck, underarm, or groin areas  · unusual bleeding or bruising  · unusually weak or tired  Side  "effects that usually do not require medical attention (report to your doctor or health care professional if they continue or are bothersome):  · headache  · nausea  · stomach pain  · upset stomach  This list may not describe all possible side effects. Call your doctor for medical advice about side effects. You may report side effects to FDA at 7-112-VRE-1731.  Where should I keep my medicine?  This drug is given in a hospital or clinic and will not be stored at home.  NOTE: This sheet is a summary. It may not cover all possible information. If you have questions about this medicine, talk to your doctor, pharmacist, or health care provider.  © 2021 Elsevier/Gold Standard (2018-01-17 13:45:32)   if you have any problems or concerns.    We know you have a Choice in healthcare and appreciate you using [facility].  Our purpose is to provide you \"Excellent Care\".  We hope that you will always choose us in the future and continue to recommend us to your family and friends.              "

## 2021-11-15 NOTE — NURSING NOTE
NURSING PROGRESS NOTE      Pt to [facility] per  scheduled appointment.  Pt ID verified by (2) identifiers. Allergies, medications and medical history reviewed and verified. Tolerated prescribed treatment without incident. Patient received AVS, Pt verbalized understanding.  Discharged to home @ 1151. Condition Satisfactory .  Carmen Montanez RN

## 2021-11-19 NOTE — HOME HEALTH
Patient sitting in bed at time of visit with dogs. Supplies arrived and new bordered dressing applied due to sensitivity and irritation surrounding skin. Tolerated well.

## 2021-11-29 NOTE — HOME HEALTH
Patient resides in her home with 4 others.  They also have 5 dogs that are present in the home,  Patient is on O2 at 2 l/m via nasal cannula.  She has a wound to her L lower ABD r/t turmor working its way to the surface.

## 2021-12-01 NOTE — OUTREACH NOTE
Patient Outreach    Ambulatory Case Management Note    Call placed to pt who states she is doing well. Home Health is still seeing her for wound care. She states it is looking much better. Pt verbalizes understanding of signs and symptoms of infection.   Discussed booster shot. She does plan on getting this. [facility] offered to assist with setting this up but she states she and her sister clau can arrange. Clau with transport her there. She will more than likely get this at [facility]. She does currently have several members of the family that are COVID +. None of which live with her. They are maintaining distance. Denies any questions or concerns at this time.         Laquita Carbajal RN  Ambulatory Case Management    12/1/2021, 10:23 EST

## 2021-12-19 NOTE — PROGRESS NOTES
General Surgery History and Physical      Summary:    Mrs. Mar Camilo is a 71 y.o. lady with a large complex ventral hernia with skin ulceration. She knows she is very high risk for any operative intervention and is likely not a surgical candidate both due to how complex her hernias are and due to her multiple medical comorbidities.  She carries a high risk of death or severe complication with any type of operative procedure.      Wound appears to be making progress and closing slowly. Continue home health and local wound care. Call with any worsening wound changes or signs of infection. Follow up in 6 months.     Referring Provider: No ref. provider found    Chief Complaint:    Abdominal pain, nonhealing wound     History of Present Illness:    Mrs. Mar Camilo is a 71 y.o. lady with Crohn's disease on Remicade, diabetes, coronary artery disease, COPD on home oxygen who presents with a new wound on her abdomen.  She has a known large ventral hernia in her left abdomen.  She knows she is a poor operative candidate for this.  She has begun to develop skin ulceration over this area.  No fever or chills.  No drainage.    9/27/2021 she is overall doing well.  She is tolerating a diet and having bowel function every 2 to 3 days.  She is doing wound care and has home health coming twice a week.  Her wound is stable.  She does states she has less abdominal pain than previously.    12/20/2021 she is overall doing better from a wound standpoint.  The wound is closed somewhat since her last visit.  Is now approximately 2 x 1/2 cm.  She is doing a nonadherent dressing over it.  She is tolerating her diet and having loose bowel movements.  She is having issues with breathing and states she has increased work of breathing recently.  She has less abdominal pain than she has recently.    Past Medical History:   • COPD on home O2  • Carotid stenosis   • CAD   • Crohn's disease on Remicade  • DM type 2  • HLD   • HTN    • Morbid obesity  • History of CVA     Past Surgical History:    • Appendectomy  • : Surgery  • Cystoscopy  • Left femur fracture repair  • Right shoulder replacement  • Laparoscopic transverse colectomy with primary anastomosis  • Right carotid endarterectomy  • Left tibial plateau internal fixation    Family History:    • Crohn's disease in a brother and maternal aunt  • No family history of colon cancer    Social History:    • Denies tobacco use  • Denies alcohol use    Allergies:   Allergies   Allergen Reactions   • Contrast Dye Hives   • Iodinated Diagnostic Agents Hives   • Norco [Hydrocodone-Acetaminophen] Hallucinations   • Tenoretic [Atenolol-Chlorthalidone] Anaphylaxis       Medications:     Current Outpatient Medications:   •  Acetaminophen 325 MG capsule, 650 mg Every 8 (Eight) Hours As Needed., Disp: , Rfl:   •  aspirin 81 MG EC tablet, Take 81 mg by mouth 2 (Two) Times a Day., Disp: , Rfl:   •  azithromycin (Zithromax Z-Hu) 250 MG tablet, Take 2 tablets by mouth on day 1, then 1 tablet daily on days 2-5, Disp: 6 tablet, Rfl: 0  •  bacitracin 500 UNIT/GM ointment, Apply to affected area daily, Disp: 28 g, Rfl: 3  •  Blood Glucose Monitoring Suppl (ONETOUCH VERIO) w/Device kit, 1 kit 2 (Two) Times a Day., Disp: 1 kit, Rfl: 0  •  cefdinir (OMNICEF) 300 MG capsule, Take 1 capsule by mouth 2 (Two) Times a Day., Disp: 20 capsule, Rfl: 0  •  docusate sodium (COLACE) 100 MG capsule, Take 100 mg by mouth Every Other Day., Disp: , Rfl:   •  fenofibrate (TRICOR) 145 MG tablet, Take 1 tablet by mouth once daily, Disp: 90 tablet, Rfl: 2  •  FLUoxetine (PROzac) 40 MG capsule, Take 1 capsule by mouth Daily., Disp: 90 capsule, Rfl: 2  •  gabapentin (NEURONTIN) 300 MG capsule, Take 1 capsule by mouth 2 (Two) Times a Day., Disp: 60 capsule, Rfl: 4  •  glucose blood (ONETOUCH VERIO) test strip, Use as instructed as to check blood sugars 2-3 times a day for type 2 diabetes, Disp: 100 each, Rfl: 12  •  guaiFENesin  (MUCINEX) 600 MG 12 hr tablet, Take 1,200 mg by mouth Every Morning., Disp: , Rfl:   •  inFLIXimab (REMICADE) 100 MG injection, Infuse 5 mg/kg into a venous catheter Every 2 (Two) Months., Disp: , Rfl:   •  ipratropium-albuterol (DUO-NEB) 0.5-2.5 mg/3 ml nebulizer, Take 3 mL by nebulization Every 4 (Four) Hours While Awake., Disp: 360 mL, Rfl:   •  lamoTRIgine (LaMICtal) 150 MG tablet, Take 150 mg by mouth Daily., Disp: , Rfl:   •  Lancets (ONETOUCH ULTRASOFT) lancets, Used to check blood sugars twice a day as needed for type 2 diabetes monitoring, Disp: 100 each, Rfl: 12  •  lisinopril (PRINIVIL,ZESTRIL) 40 MG tablet, Take 1 tablet by mouth Daily., Disp: 90 tablet, Rfl: 0  •  metFORMIN (GLUCOPHAGE) 500 MG tablet, Take 2 tablets in a.m. and 1 tablet at dinner, Disp: 180 tablet, Rfl: 0  •  Multiple Vitamins-Minerals (CENTRUM SILVER PO), Take 1 tablet by mouth Daily., Disp: , Rfl:   •  O2 (OXYGEN), Inhale 4 L/min Continuous. May resume prior 3L/min when O2 sat remains 88% or above on this setting. Patient was given an oximeter here. (Patient taking differently: Inhale 5 L/min Continuous. May resume prior 3L/min when O2 sat remains 88% or above on this setting. Patient was given an oximeter here.), Disp: , Rfl:   •  rosuvastatin (CRESTOR) 40 MG tablet, Take 1 tablet by mouth once daily, Disp: 90 tablet, Rfl: 0  •  SYMBICORT 80-4.5 MCG/ACT inhaler, Inhale 2 puffs 2 (Two) Times a Day., Disp: , Rfl:   •  tiotropium (Spiriva HandiHaler) 18 MCG per inhalation capsule, Place 1 capsule into inhaler and inhale Daily., Disp: , Rfl:   •  Ventolin  (90 Base) MCG/ACT inhaler, INHALE 2 PUFFS BY MOUTH EVERY 4 HOURS AS NEEDED, Disp: 18 g, Rfl: 6  •  verapamil SR (CALAN-SR) 240 MG CR tablet, TAKE 1 TABLET BY MOUTH EVERY 12 HOURS, Disp: 180 tablet, Rfl: 2  No current facility-administered medications for this visit.    Facility-Administered Medications Ordered in Other Visits:   •  acetaminophen (TYLENOL) 325 MG tablet  - ADS  Override Pull, , , ,     Radiology/Endoscopy:    • 8/11/2021 CT abdomen/pelvis reviewed; similar to prior, large ventral hernia     Labs:    • Labs from 11/12/2021 reviewed    Review of Systems:   Influenza-like illness: no fever, no  cough, no  sore throat, no  body aches, no loss of sense of taste or smell, no known exposure to person with Covid-19.  Constitutional: Negative for fevers or chills  HENT: Negative for hearing loss or runny nose  Eyes: Negative for vision changes or scleral icterus  Respiratory: Negative for cough or shortness of breath  Cardiovascular: Negative for chest pain or heart palpitations  Gastrointestinal: Negative for nausea, vomiting, constipation, melena, or hematochezia  Genitourinary: Negative for hematuria or dysuria  Musculoskeletal: Negative for joint swelling or gait instability  Neurologic: Negative for tremors or seizures  Psychiatric: Negative for suicidal ideations or depression  All other systems reviewed and negative    Physical Exam:   • Constitutional: Well-developed, no acute distress, in a wheelchair  • Eyes: Conjunctiva normal, sclera nonicteric  • ENMT: Hearing grossly normal, oral mucosa moist  • Neck: Supple, trachea midline  • Respiratory: On home O2, increased work of breathing, normal inspiratory effort  • Cardiovascular: Regular rate, no peripheral edema, no jugular venous distention  • Gastrointestinal: Soft, nontender, large complex left ventral hernia, 2 x 1.5cm area of skin ulceration and thinning, no drainage  • Skin:  Warm, dry, no rash on visualized skin surfaces  • Musculoskeletal: Symmetric strength, normal gait  • Psychiatric: Alert and oriented ×3, normal affect       Sanna Good M.D.  General and Endoscopic Surgery  [facility]    [address] [place] [address], [address]  [place], [place], [address]  P: [phone]  F: [phone]

## 2021-12-22 NOTE — HOME HEALTH
patient saw MD this morning and a new dsg was placed wound not assessed inst on dse process of crohns- ss tx and effects on log term health   patient teaches back all inst corectly

## 2021-12-27 NOTE — HOME HEALTH
"Arrived at patients home, patient states to \"come on in.\" Found patient sitting in bed, finishing breakfast, pets on bed with patient."

## 2022-01-01 ENCOUNTER — ANESTHESIA (OUTPATIENT)
Dept: TELEMETRY | Facility: HOSPITAL | Age: 72
End: 2022-01-01

## 2022-01-01 ENCOUNTER — APPOINTMENT (OUTPATIENT)
Dept: GENERAL RADIOLOGY | Facility: HOSPITAL | Age: 72
End: 2022-01-01

## 2022-01-01 ENCOUNTER — APPOINTMENT (OUTPATIENT)
Dept: CT IMAGING | Facility: HOSPITAL | Age: 72
End: 2022-01-01

## 2022-01-01 ENCOUNTER — ANESTHESIA (OUTPATIENT)
Dept: PERIOP | Facility: HOSPITAL | Age: 72
End: 2022-01-01

## 2022-01-01 ENCOUNTER — HOME CARE VISIT (OUTPATIENT)
Dept: HOME HEALTH SERVICES | Facility: HOME HEALTHCARE | Age: 72
End: 2022-01-01

## 2022-01-01 ENCOUNTER — ANESTHESIA EVENT (OUTPATIENT)
Dept: PERIOP | Facility: HOSPITAL | Age: 72
End: 2022-01-01

## 2022-01-01 ENCOUNTER — ANESTHESIA EVENT (OUTPATIENT)
Dept: TELEMETRY | Facility: HOSPITAL | Age: 72
End: 2022-01-01

## 2022-01-01 ENCOUNTER — APPOINTMENT (OUTPATIENT)
Dept: ULTRASOUND IMAGING | Facility: HOSPITAL | Age: 72
End: 2022-01-01

## 2022-01-01 ENCOUNTER — HOSPITAL ENCOUNTER (INPATIENT)
Facility: HOSPITAL | Age: 72
LOS: 33 days | End: 2022-02-06
Attending: EMERGENCY MEDICINE | Admitting: INTERNAL MEDICINE

## 2022-01-01 ENCOUNTER — HOSPITAL ENCOUNTER (OUTPATIENT)
Dept: INFUSION THERAPY | Facility: HOSPITAL | Age: 72
Setting detail: INFUSION SERIES
Discharge: HOME | End: 2022-01-10

## 2022-01-01 ENCOUNTER — APPOINTMENT (OUTPATIENT)
Dept: CARDIOLOGY | Facility: HOSPITAL | Age: 72
End: 2022-01-01

## 2022-01-01 VITALS
SYSTOLIC BLOOD PRESSURE: 142 MMHG | HEIGHT: 64 IN | OXYGEN SATURATION: 25 % | RESPIRATION RATE: 18 BRPM | WEIGHT: 227.6 LBS | DIASTOLIC BLOOD PRESSURE: 40 MMHG | TEMPERATURE: 96.9 F | HEART RATE: 70 BPM | BODY MASS INDEX: 38.86 KG/M2

## 2022-01-01 VITALS
SYSTOLIC BLOOD PRESSURE: 168 MMHG | DIASTOLIC BLOOD PRESSURE: 74 MMHG | OXYGEN SATURATION: 98 % | HEART RATE: 94 BPM | TEMPERATURE: 98.4 F | RESPIRATION RATE: 20 BRPM

## 2022-01-01 DIAGNOSIS — S72.001A CLOSED DISPLACED FRACTURE OF RIGHT FEMORAL NECK: ICD-10-CM

## 2022-01-01 DIAGNOSIS — R77.8 TROPONIN LEVEL ELEVATED: ICD-10-CM

## 2022-01-01 DIAGNOSIS — L03.311 ABDOMINAL WALL CELLULITIS: ICD-10-CM

## 2022-01-01 DIAGNOSIS — R52 PAIN: ICD-10-CM

## 2022-01-01 DIAGNOSIS — K56.609 INTESTINAL OBSTRUCTION, UNSPECIFIED CAUSE, UNSPECIFIED WHETHER PARTIAL OR COMPLETE: Primary | ICD-10-CM

## 2022-01-01 DIAGNOSIS — S72.001A CLOSED FRACTURE OF RIGHT HIP, INITIAL ENCOUNTER: ICD-10-CM

## 2022-01-01 LAB
ABO GROUP BLD: NORMAL
ABO GROUP BLD: NORMAL
ACTIN IGG SERPL-ACNC: 11 UNITS (ref 0–19)
ALBUMIN SERPL-MCNC: 1.8 G/DL (ref 3.5–5.2)
ALBUMIN SERPL-MCNC: 1.8 G/DL (ref 3.5–5.2)
ALBUMIN SERPL-MCNC: 1.9 G/DL (ref 3.5–5.2)
ALBUMIN SERPL-MCNC: 2 G/DL (ref 3.5–5.2)
ALBUMIN SERPL-MCNC: 2.1 G/DL (ref 3.5–5.2)
ALBUMIN SERPL-MCNC: 2.3 G/DL (ref 3.5–5.2)
ALBUMIN SERPL-MCNC: 2.4 G/DL (ref 3.5–5.2)
ALBUMIN SERPL-MCNC: 2.5 G/DL (ref 3.5–5.2)
ALBUMIN SERPL-MCNC: 2.6 G/DL (ref 3.5–5.2)
ALBUMIN SERPL-MCNC: 2.7 G/DL (ref 3.5–5.2)
ALBUMIN SERPL-MCNC: 2.8 G/DL (ref 3.5–5.2)
ALBUMIN SERPL-MCNC: 2.8 G/DL (ref 3.5–5.2)
ALBUMIN SERPL-MCNC: 2.9 G/DL (ref 3.5–5.2)
ALBUMIN SERPL-MCNC: 2.9 G/DL (ref 3.5–5.2)
ALBUMIN SERPL-MCNC: 3 G/DL (ref 3.5–5.2)
ALBUMIN SERPL-MCNC: 3.1 G/DL (ref 3.5–5.2)
ALBUMIN SERPL-MCNC: 3.1 G/DL (ref 3.5–5.2)
ALBUMIN SERPL-MCNC: 3.2 G/DL (ref 3.5–5.2)
ALBUMIN/GLOB SERPL: 0.4 G/DL
ALBUMIN/GLOB SERPL: 0.5 G/DL
ALBUMIN/GLOB SERPL: 0.6 G/DL
ALBUMIN/GLOB SERPL: 0.7 G/DL
ALBUMIN/GLOB SERPL: 0.8 G/DL
ALBUMIN/GLOB SERPL: 0.9 G/DL
ALBUMIN/GLOB SERPL: 1 G/DL
ALBUMIN/GLOB SERPL: 1.1 G/DL
ALBUMIN/GLOB SERPL: 1.2 G/DL
ALBUMIN/GLOB SERPL: 1.3 G/DL
ALBUMIN/GLOB SERPL: 1.3 G/DL
ALBUMIN/GLOB SERPL: 1.5 G/DL
ALP SERPL-CCNC: 100 U/L (ref 39–117)
ALP SERPL-CCNC: 109 U/L (ref 39–117)
ALP SERPL-CCNC: 113 U/L (ref 39–117)
ALP SERPL-CCNC: 115 U/L (ref 39–117)
ALP SERPL-CCNC: 126 U/L (ref 39–117)
ALP SERPL-CCNC: 132 U/L (ref 39–117)
ALP SERPL-CCNC: 133 U/L (ref 39–117)
ALP SERPL-CCNC: 136 U/L (ref 39–117)
ALP SERPL-CCNC: 144 U/L (ref 39–117)
ALP SERPL-CCNC: 154 U/L (ref 39–117)
ALP SERPL-CCNC: 158 U/L (ref 39–117)
ALP SERPL-CCNC: 171 U/L (ref 39–117)
ALP SERPL-CCNC: 173 U/L (ref 39–117)
ALP SERPL-CCNC: 174 U/L (ref 39–117)
ALP SERPL-CCNC: 181 U/L (ref 39–117)
ALP SERPL-CCNC: 211 U/L (ref 39–117)
ALP SERPL-CCNC: 276 U/L (ref 39–117)
ALP SERPL-CCNC: 67 U/L (ref 39–117)
ALP SERPL-CCNC: 71 U/L (ref 39–117)
ALP SERPL-CCNC: 72 U/L (ref 39–117)
ALP SERPL-CCNC: 75 U/L (ref 39–117)
ALP SERPL-CCNC: 79 U/L (ref 39–117)
ALP SERPL-CCNC: 83 U/L (ref 39–117)
ALP SERPL-CCNC: 84 U/L (ref 39–117)
ALP SERPL-CCNC: 85 U/L (ref 39–117)
ALP SERPL-CCNC: 86 U/L (ref 39–117)
ALP SERPL-CCNC: 87 U/L (ref 39–117)
ALP SERPL-CCNC: 89 U/L (ref 39–117)
ALP SERPL-CCNC: 91 U/L (ref 39–117)
ALP SERPL-CCNC: 93 U/L (ref 39–117)
ALP SERPL-CCNC: 96 U/L (ref 39–117)
ALP SERPL-CCNC: 97 U/L (ref 39–117)
ALP SERPL-CCNC: 98 U/L (ref 39–117)
ALT SERPL W P-5'-P-CCNC: 120 U/L (ref 1–33)
ALT SERPL W P-5'-P-CCNC: 1309 U/L (ref 1–33)
ALT SERPL W P-5'-P-CCNC: 131 U/L (ref 1–33)
ALT SERPL W P-5'-P-CCNC: 157 U/L (ref 1–33)
ALT SERPL W P-5'-P-CCNC: 169 U/L (ref 1–33)
ALT SERPL W P-5'-P-CCNC: 2324 U/L (ref 1–33)
ALT SERPL W P-5'-P-CCNC: 271 U/L (ref 1–33)
ALT SERPL W P-5'-P-CCNC: 316 U/L (ref 1–33)
ALT SERPL W P-5'-P-CCNC: 37 U/L (ref 1–33)
ALT SERPL W P-5'-P-CCNC: 3713 U/L (ref 1–33)
ALT SERPL W P-5'-P-CCNC: 38 U/L (ref 1–33)
ALT SERPL W P-5'-P-CCNC: 45 U/L (ref 1–33)
ALT SERPL W P-5'-P-CCNC: 46 U/L (ref 1–33)
ALT SERPL W P-5'-P-CCNC: 54 U/L (ref 1–33)
ALT SERPL W P-5'-P-CCNC: 5692 U/L (ref 1–33)
ALT SERPL W P-5'-P-CCNC: 5926 U/L (ref 1–33)
ALT SERPL W P-5'-P-CCNC: 60 U/L (ref 1–33)
ALT SERPL W P-5'-P-CCNC: 60 U/L (ref 1–33)
ALT SERPL W P-5'-P-CCNC: 61 U/L (ref 1–33)
ALT SERPL W P-5'-P-CCNC: 63 U/L (ref 1–33)
ALT SERPL W P-5'-P-CCNC: 63 U/L (ref 1–33)
ALT SERPL W P-5'-P-CCNC: 64 U/L (ref 1–33)
ALT SERPL W P-5'-P-CCNC: 677 U/L (ref 1–33)
ALT SERPL W P-5'-P-CCNC: 70 U/L (ref 1–33)
ALT SERPL W P-5'-P-CCNC: 73 U/L (ref 1–33)
ALT SERPL W P-5'-P-CCNC: 75 U/L (ref 1–33)
ALT SERPL W P-5'-P-CCNC: 77 U/L (ref 1–33)
ALT SERPL W P-5'-P-CCNC: 85 U/L (ref 1–33)
ALT SERPL W P-5'-P-CCNC: 859 U/L (ref 1–33)
ALT SERPL W P-5'-P-CCNC: 92 U/L (ref 1–33)
ALT SERPL W P-5'-P-CCNC: 96 U/L (ref 1–33)
ANION GAP SERPL CALCULATED.3IONS-SCNC: 10 MMOL/L (ref 5–15)
ANION GAP SERPL CALCULATED.3IONS-SCNC: 10 MMOL/L (ref 5–15)
ANION GAP SERPL CALCULATED.3IONS-SCNC: 10.1 MMOL/L (ref 5–15)
ANION GAP SERPL CALCULATED.3IONS-SCNC: 10.2 MMOL/L (ref 5–15)
ANION GAP SERPL CALCULATED.3IONS-SCNC: 10.5 MMOL/L (ref 5–15)
ANION GAP SERPL CALCULATED.3IONS-SCNC: 11.1 MMOL/L (ref 5–15)
ANION GAP SERPL CALCULATED.3IONS-SCNC: 11.1 MMOL/L (ref 5–15)
ANION GAP SERPL CALCULATED.3IONS-SCNC: 12.7 MMOL/L (ref 5–15)
ANION GAP SERPL CALCULATED.3IONS-SCNC: 12.8 MMOL/L (ref 5–15)
ANION GAP SERPL CALCULATED.3IONS-SCNC: 12.8 MMOL/L (ref 5–15)
ANION GAP SERPL CALCULATED.3IONS-SCNC: 12.9 MMOL/L (ref 5–15)
ANION GAP SERPL CALCULATED.3IONS-SCNC: 13 MMOL/L (ref 5–15)
ANION GAP SERPL CALCULATED.3IONS-SCNC: 14.1 MMOL/L (ref 5–15)
ANION GAP SERPL CALCULATED.3IONS-SCNC: 14.1 MMOL/L (ref 5–15)
ANION GAP SERPL CALCULATED.3IONS-SCNC: 14.8 MMOL/L (ref 5–15)
ANION GAP SERPL CALCULATED.3IONS-SCNC: 15.4 MMOL/L (ref 5–15)
ANION GAP SERPL CALCULATED.3IONS-SCNC: 16.4 MMOL/L (ref 5–15)
ANION GAP SERPL CALCULATED.3IONS-SCNC: 16.5 MMOL/L (ref 5–15)
ANION GAP SERPL CALCULATED.3IONS-SCNC: 16.6 MMOL/L (ref 5–15)
ANION GAP SERPL CALCULATED.3IONS-SCNC: 17.7 MMOL/L (ref 5–15)
ANION GAP SERPL CALCULATED.3IONS-SCNC: 7.3 MMOL/L (ref 5–15)
ANION GAP SERPL CALCULATED.3IONS-SCNC: 7.7 MMOL/L (ref 5–15)
ANION GAP SERPL CALCULATED.3IONS-SCNC: 8.3 MMOL/L (ref 5–15)
ANION GAP SERPL CALCULATED.3IONS-SCNC: 8.3 MMOL/L (ref 5–15)
ANION GAP SERPL CALCULATED.3IONS-SCNC: 8.7 MMOL/L (ref 5–15)
ANION GAP SERPL CALCULATED.3IONS-SCNC: 8.7 MMOL/L (ref 5–15)
ANION GAP SERPL CALCULATED.3IONS-SCNC: 9.3 MMOL/L (ref 5–15)
ANION GAP SERPL CALCULATED.3IONS-SCNC: 9.5 MMOL/L (ref 5–15)
ANION GAP SERPL CALCULATED.3IONS-SCNC: 9.6 MMOL/L (ref 5–15)
ANION GAP SERPL CALCULATED.3IONS-SCNC: 9.7 MMOL/L (ref 5–15)
ANION GAP SERPL CALCULATED.3IONS-SCNC: 9.8 MMOL/L (ref 5–15)
ANISOCYTOSIS BLD QL: ABNORMAL
APTT PPP: 29.7 SECONDS (ref 22.7–35.4)
AST SERPL-CCNC: 11 U/L (ref 1–32)
AST SERPL-CCNC: 129 U/L (ref 1–32)
AST SERPL-CCNC: 15 U/L (ref 1–32)
AST SERPL-CCNC: 17 U/L (ref 1–32)
AST SERPL-CCNC: 17 U/L (ref 1–32)
AST SERPL-CCNC: 172 U/L (ref 1–32)
AST SERPL-CCNC: 18 U/L (ref 1–32)
AST SERPL-CCNC: 1960 U/L (ref 1–32)
AST SERPL-CCNC: 21 U/L (ref 1–32)
AST SERPL-CCNC: 23 U/L (ref 1–32)
AST SERPL-CCNC: 23 U/L (ref 1–32)
AST SERPL-CCNC: 25 U/L (ref 1–32)
AST SERPL-CCNC: 25 U/L (ref 1–32)
AST SERPL-CCNC: 26 U/L (ref 1–32)
AST SERPL-CCNC: 29 U/L (ref 1–32)
AST SERPL-CCNC: 30 U/L (ref 1–32)
AST SERPL-CCNC: 32 U/L (ref 1–32)
AST SERPL-CCNC: 36 U/L (ref 1–32)
AST SERPL-CCNC: 44 U/L (ref 1–32)
AST SERPL-CCNC: 45 U/L (ref 1–32)
AST SERPL-CCNC: 49 U/L (ref 1–32)
AST SERPL-CCNC: 53 U/L (ref 1–32)
AST SERPL-CCNC: 56 U/L (ref 1–32)
AST SERPL-CCNC: 621 U/L (ref 1–32)
AST SERPL-CCNC: 68 U/L (ref 1–32)
AST SERPL-CCNC: 68 U/L (ref 1–32)
AST SERPL-CCNC: 70 U/L (ref 1–32)
AST SERPL-CCNC: >7000 U/L (ref 1–32)
AST SERPL-CCNC: >7000 U/L (ref 1–32)
BACTERIA SPEC AEROBE CULT: NO GROWTH
BACTERIA SPEC AEROBE CULT: NORMAL
BACTERIA UR QL AUTO: ABNORMAL /HPF
BACTERIA UR QL AUTO: ABNORMAL /HPF
BASOPHILS # BLD AUTO: 0.01 10*3/MM3 (ref 0–0.2)
BASOPHILS # BLD AUTO: 0.02 10*3/MM3 (ref 0–0.2)
BASOPHILS # BLD AUTO: 0.03 10*3/MM3 (ref 0–0.2)
BASOPHILS # BLD AUTO: 0.04 10*3/MM3 (ref 0–0.2)
BASOPHILS # BLD AUTO: 0.06 10*3/MM3 (ref 0–0.2)
BASOPHILS # BLD MANUAL: 0.08 10*3/MM3 (ref 0–0.2)
BASOPHILS NFR BLD AUTO: 0.1 % (ref 0–1.5)
BASOPHILS NFR BLD AUTO: 0.2 % (ref 0–1.5)
BASOPHILS NFR BLD AUTO: 0.3 % (ref 0–1.5)
BASOPHILS NFR BLD AUTO: 0.3 % (ref 0–1.5)
BASOPHILS NFR BLD AUTO: 0.6 % (ref 0–1.5)
BASOPHILS NFR BLD AUTO: 0.9 % (ref 0–1.5)
BASOPHILS NFR BLD MANUAL: 1 % (ref 0–1.5)
BH BB BLOOD EXPIRATION DATE: NORMAL
BH BB BLOOD TYPE BARCODE: 5100
BH BB DISPENSE STATUS: NORMAL
BH BB PRODUCT CODE: NORMAL
BH BB UNIT NUMBER: NORMAL
BH CV VAS HEPATIC PORTAL VEIN DIAMETER: 1.28 CM
BH CV VAS HEPATOPORTAL HEPATIC V LT DIRECTION: NORMAL
BH CV VAS HEPATOPORTAL HEPATIC V LT FLOW: NORMAL
BH CV VAS HEPATOPORTAL HEPATIC V LT SPONT: NORMAL
BH CV VAS HEPATOPORTAL HEPATIC V MID DIRECTION: NORMAL
BH CV VAS HEPATOPORTAL HEPATIC V MID FLOW: NORMAL
BH CV VAS HEPATOPORTAL HEPATIC V MID SPONT: NORMAL
BH CV VAS HEPATOPORTAL HEPATIC V RT DIRECTION: NORMAL
BH CV VAS HEPATOPORTAL HEPATIC V RT FLOW: NORMAL
BH CV VAS HEPATOPORTAL HEPATIC V RT SPONT: NORMAL
BH CV VAS HEPATOPORTAL IVC CONFLUENCE FLOW: NORMAL
BH CV VAS HEPATOPORTAL IVC CONFLUENCE SPONT: NORMAL
BH CV VAS HEPATOPORTAL IVC FLOW: NORMAL
BH CV VAS HEPATOPORTAL IVC SPONT: NORMAL
BH CV VAS HEPATOPORTAL PORTAL V EXTRAHEPATIC DIRECTION: NORMAL
BH CV VAS HEPATOPORTAL PORTAL V EXTRAHEPATIC FLOW: NORMAL
BH CV VAS HEPATOPORTAL PORTAL V EXTRAHEPATIC SPONT: NORMAL
BH CV VAS HEPATOPORTAL PORTAL V LT INTRA DIRECTION: NORMAL
BH CV VAS HEPATOPORTAL PORTAL V LT INTRA FLOW: NORMAL
BH CV VAS HEPATOPORTAL PORTAL V LT INTRA SPONT: NORMAL
BH CV VAS HEPATOPORTAL PORTAL V MAIN INTRA DIRECTION: NORMAL
BH CV VAS HEPATOPORTAL PORTAL V MAIN INTRA FLOW: NORMAL
BH CV VAS HEPATOPORTAL PORTAL V MAIN INTRA SPONT: NORMAL
BH CV VAS HEPATOPORTAL PORTAL V PSV: 37 CM/S
BH CV VAS HEPATOPORTAL PORTAL V RT INTRA DIRECTION: NORMAL
BH CV VAS HEPATOPORTAL PORTAL V RT INTRA FLOW: NORMAL
BH CV VAS HEPATOPORTAL PORTAL V RT INTRA SPONT: NORMAL
BH CV VAS HEPATOPORTAL SMV DIRECTION: NORMAL
BH CV VAS HEPATOPORTAL SMV PSV: 32 CM/S
BH CV VAS HEPATOPORTAL SPLENIC DIRECTION: NORMAL
BH CV VAS HEPATOPORTAL SPLENIC V PSV: 28 CM/S
BH CV VAS SMA HEPATIC A RI: 0.78
BH CV VAS SMA HEPATIC EDV: 21 CM/S
BH CV VAS SMA HEPATIC PSV: 95 CM/S
BH CV VAS SMA SPLENIC A RI: 0.67
BH CV VAS SMA SPLENIC EDV: 40 CM/S
BH CV VAS SMA SPLENIC PSV: 123 CM/S
BILIRUB CONJ SERPL-MCNC: 0.2 MG/DL (ref 0–0.3)
BILIRUB CONJ SERPL-MCNC: <0.2 MG/DL (ref 0–0.3)
BILIRUB INDIRECT SERPL-MCNC: 0.1 MG/DL
BILIRUB INDIRECT SERPL-MCNC: ABNORMAL MG/DL
BILIRUB SERPL-MCNC: 0.2 MG/DL (ref 0–1.2)
BILIRUB SERPL-MCNC: 0.3 MG/DL (ref 0–1.2)
BILIRUB SERPL-MCNC: 0.4 MG/DL (ref 0–1.2)
BILIRUB SERPL-MCNC: 0.5 MG/DL (ref 0–1.2)
BILIRUB SERPL-MCNC: 0.7 MG/DL (ref 0–1.2)
BILIRUB SERPL-MCNC: 0.8 MG/DL (ref 0–1.2)
BILIRUB SERPL-MCNC: 0.8 MG/DL (ref 0–1.2)
BILIRUB UR QL STRIP: NEGATIVE
BILIRUB UR QL STRIP: NEGATIVE
BLD GP AB SCN SERPL QL: NEGATIVE
BLD GP AB SCN SERPL QL: NEGATIVE
BUN SERPL-MCNC: 12 MG/DL (ref 8–23)
BUN SERPL-MCNC: 13 MG/DL (ref 8–23)
BUN SERPL-MCNC: 15 MG/DL (ref 8–23)
BUN SERPL-MCNC: 15 MG/DL (ref 8–23)
BUN SERPL-MCNC: 16 MG/DL (ref 8–23)
BUN SERPL-MCNC: 18 MG/DL (ref 8–23)
BUN SERPL-MCNC: 18 MG/DL (ref 8–23)
BUN SERPL-MCNC: 19 MG/DL (ref 8–23)
BUN SERPL-MCNC: 19 MG/DL (ref 8–23)
BUN SERPL-MCNC: 20 MG/DL (ref 8–23)
BUN SERPL-MCNC: 20 MG/DL (ref 8–23)
BUN SERPL-MCNC: 21 MG/DL (ref 8–23)
BUN SERPL-MCNC: 23 MG/DL (ref 8–23)
BUN SERPL-MCNC: 23 MG/DL (ref 8–23)
BUN SERPL-MCNC: 24 MG/DL (ref 8–23)
BUN SERPL-MCNC: 25 MG/DL (ref 8–23)
BUN SERPL-MCNC: 26 MG/DL (ref 8–23)
BUN SERPL-MCNC: 27 MG/DL (ref 8–23)
BUN SERPL-MCNC: 27 MG/DL (ref 8–23)
BUN SERPL-MCNC: 28 MG/DL (ref 8–23)
BUN SERPL-MCNC: 28 MG/DL (ref 8–23)
BUN SERPL-MCNC: 29 MG/DL (ref 8–23)
BUN SERPL-MCNC: 32 MG/DL (ref 8–23)
BUN SERPL-MCNC: 32 MG/DL (ref 8–23)
BUN SERPL-MCNC: 33 MG/DL (ref 8–23)
BUN SERPL-MCNC: 40 MG/DL (ref 8–23)
BUN SERPL-MCNC: 49 MG/DL (ref 8–23)
BUN SERPL-MCNC: 63 MG/DL (ref 8–23)
BUN SERPL-MCNC: 66 MG/DL (ref 8–23)
BUN SERPL-MCNC: 8 MG/DL (ref 8–23)
BUN SERPL-MCNC: 8 MG/DL (ref 8–23)
BUN/CREAT SERPL: 14 (ref 7–25)
BUN/CREAT SERPL: 26.1 (ref 7–25)
BUN/CREAT SERPL: 26.9 (ref 7–25)
BUN/CREAT SERPL: 27.1 (ref 7–25)
BUN/CREAT SERPL: 32.9 (ref 7–25)
BUN/CREAT SERPL: 35.7 (ref 7–25)
BUN/CREAT SERPL: 35.7 (ref 7–25)
BUN/CREAT SERPL: 36.6 (ref 7–25)
BUN/CREAT SERPL: 38.1 (ref 7–25)
BUN/CREAT SERPL: 39 (ref 7–25)
BUN/CREAT SERPL: 40.8 (ref 7–25)
BUN/CREAT SERPL: 41.3 (ref 7–25)
BUN/CREAT SERPL: 42.1 (ref 7–25)
BUN/CREAT SERPL: 43.4 (ref 7–25)
BUN/CREAT SERPL: 44.7 (ref 7–25)
BUN/CREAT SERPL: 46.2 (ref 7–25)
BUN/CREAT SERPL: 46.4 (ref 7–25)
BUN/CREAT SERPL: 47.2 (ref 7–25)
BUN/CREAT SERPL: 47.4 (ref 7–25)
BUN/CREAT SERPL: 47.5 (ref 7–25)
BUN/CREAT SERPL: 50.9 (ref 7–25)
BUN/CREAT SERPL: 53.3 (ref 7–25)
BUN/CREAT SERPL: 53.5 (ref 7–25)
BUN/CREAT SERPL: 53.7 (ref 7–25)
BUN/CREAT SERPL: 55.6 (ref 7–25)
BUN/CREAT SERPL: 58.3 (ref 7–25)
BUN/CREAT SERPL: 64.9 (ref 7–25)
BUN/CREAT SERPL: 65.6 (ref 7–25)
BUN/CREAT SERPL: 67.5 (ref 7–25)
BUN/CREAT SERPL: 68.8 (ref 7–25)
BUN/CREAT SERPL: 70 (ref 7–25)
BUN/CREAT SERPL: 71.1 (ref 7–25)
BUN/CREAT SERPL: 81.6 (ref 7–25)
CALCIUM SPEC-SCNC: 7.7 MG/DL (ref 8.6–10.5)
CALCIUM SPEC-SCNC: 7.8 MG/DL (ref 8.6–10.5)
CALCIUM SPEC-SCNC: 7.8 MG/DL (ref 8.6–10.5)
CALCIUM SPEC-SCNC: 7.9 MG/DL (ref 8.6–10.5)
CALCIUM SPEC-SCNC: 8 MG/DL (ref 8.6–10.5)
CALCIUM SPEC-SCNC: 8 MG/DL (ref 8.6–10.5)
CALCIUM SPEC-SCNC: 8.1 MG/DL (ref 8.6–10.5)
CALCIUM SPEC-SCNC: 8.2 MG/DL (ref 8.6–10.5)
CALCIUM SPEC-SCNC: 8.2 MG/DL (ref 8.6–10.5)
CALCIUM SPEC-SCNC: 8.3 MG/DL (ref 8.6–10.5)
CALCIUM SPEC-SCNC: 8.4 MG/DL (ref 8.6–10.5)
CALCIUM SPEC-SCNC: 8.5 MG/DL (ref 8.6–10.5)
CALCIUM SPEC-SCNC: 8.5 MG/DL (ref 8.6–10.5)
CALCIUM SPEC-SCNC: 8.6 MG/DL (ref 8.6–10.5)
CALCIUM SPEC-SCNC: 8.7 MG/DL (ref 8.6–10.5)
CALCIUM SPEC-SCNC: 8.8 MG/DL (ref 8.6–10.5)
CALCIUM SPEC-SCNC: 8.9 MG/DL (ref 8.6–10.5)
CALCIUM SPEC-SCNC: 9 MG/DL (ref 8.6–10.5)
CALCIUM SPEC-SCNC: 9.1 MG/DL (ref 8.6–10.5)
CENTROMERE B AB SER-ACNC: NORMAL
CHLORIDE SERPL-SCNC: 100 MMOL/L (ref 98–107)
CHLORIDE SERPL-SCNC: 100 MMOL/L (ref 98–107)
CHLORIDE SERPL-SCNC: 101 MMOL/L (ref 98–107)
CHLORIDE SERPL-SCNC: 102 MMOL/L (ref 98–107)
CHLORIDE SERPL-SCNC: 103 MMOL/L (ref 98–107)
CHLORIDE SERPL-SCNC: 105 MMOL/L (ref 98–107)
CHLORIDE SERPL-SCNC: 106 MMOL/L (ref 98–107)
CHLORIDE SERPL-SCNC: 106 MMOL/L (ref 98–107)
CHLORIDE SERPL-SCNC: 107 MMOL/L (ref 98–107)
CHLORIDE SERPL-SCNC: 92 MMOL/L (ref 98–107)
CHLORIDE SERPL-SCNC: 93 MMOL/L (ref 98–107)
CHLORIDE SERPL-SCNC: 94 MMOL/L (ref 98–107)
CHLORIDE SERPL-SCNC: 94 MMOL/L (ref 98–107)
CHLORIDE SERPL-SCNC: 96 MMOL/L (ref 98–107)
CHLORIDE SERPL-SCNC: 97 MMOL/L (ref 98–107)
CHLORIDE SERPL-SCNC: 98 MMOL/L (ref 98–107)
CHLORIDE SERPL-SCNC: 99 MMOL/L (ref 98–107)
CHOLEST SERPL-MCNC: 113 MG/DL (ref 0–200)
CHROMATIN AB SERPL-ACNC: NORMAL
CK SERPL-CCNC: 105 U/L (ref 20–180)
CLARITY UR: ABNORMAL
CLARITY UR: CLEAR
CO2 SERPL-SCNC: 19.5 MMOL/L (ref 22–29)
CO2 SERPL-SCNC: 20.6 MMOL/L (ref 22–29)
CO2 SERPL-SCNC: 21.6 MMOL/L (ref 22–29)
CO2 SERPL-SCNC: 22.2 MMOL/L (ref 22–29)
CO2 SERPL-SCNC: 23.4 MMOL/L (ref 22–29)
CO2 SERPL-SCNC: 24.3 MMOL/L (ref 22–29)
CO2 SERPL-SCNC: 25.2 MMOL/L (ref 22–29)
CO2 SERPL-SCNC: 25.3 MMOL/L (ref 22–29)
CO2 SERPL-SCNC: 25.4 MMOL/L (ref 22–29)
CO2 SERPL-SCNC: 25.7 MMOL/L (ref 22–29)
CO2 SERPL-SCNC: 26 MMOL/L (ref 22–29)
CO2 SERPL-SCNC: 26.2 MMOL/L (ref 22–29)
CO2 SERPL-SCNC: 27 MMOL/L (ref 22–29)
CO2 SERPL-SCNC: 27.1 MMOL/L (ref 22–29)
CO2 SERPL-SCNC: 27.2 MMOL/L (ref 22–29)
CO2 SERPL-SCNC: 27.3 MMOL/L (ref 22–29)
CO2 SERPL-SCNC: 27.7 MMOL/L (ref 22–29)
CO2 SERPL-SCNC: 27.9 MMOL/L (ref 22–29)
CO2 SERPL-SCNC: 28.3 MMOL/L (ref 22–29)
CO2 SERPL-SCNC: 28.3 MMOL/L (ref 22–29)
CO2 SERPL-SCNC: 28.5 MMOL/L (ref 22–29)
CO2 SERPL-SCNC: 28.7 MMOL/L (ref 22–29)
CO2 SERPL-SCNC: 28.8 MMOL/L (ref 22–29)
CO2 SERPL-SCNC: 29.3 MMOL/L (ref 22–29)
CO2 SERPL-SCNC: 29.5 MMOL/L (ref 22–29)
CO2 SERPL-SCNC: 29.7 MMOL/L (ref 22–29)
CO2 SERPL-SCNC: 30.9 MMOL/L (ref 22–29)
CO2 SERPL-SCNC: 30.9 MMOL/L (ref 22–29)
CO2 SERPL-SCNC: 34 MMOL/L (ref 22–29)
COLOR UR: ABNORMAL
COLOR UR: ABNORMAL
CREAT SERPL-MCNC: 0.21 MG/DL (ref 0.57–1)
CREAT SERPL-MCNC: 0.32 MG/DL (ref 0.57–1)
CREAT SERPL-MCNC: 0.37 MG/DL (ref 0.57–1)
CREAT SERPL-MCNC: 0.38 MG/DL (ref 0.57–1)
CREAT SERPL-MCNC: 0.38 MG/DL (ref 0.57–1)
CREAT SERPL-MCNC: 0.39 MG/DL (ref 0.57–1)
CREAT SERPL-MCNC: 0.4 MG/DL (ref 0.57–1)
CREAT SERPL-MCNC: 0.41 MG/DL (ref 0.57–1)
CREAT SERPL-MCNC: 0.42 MG/DL (ref 0.57–1)
CREAT SERPL-MCNC: 0.43 MG/DL (ref 0.57–1)
CREAT SERPL-MCNC: 0.45 MG/DL (ref 0.57–1)
CREAT SERPL-MCNC: 0.45 MG/DL (ref 0.57–1)
CREAT SERPL-MCNC: 0.46 MG/DL (ref 0.57–1)
CREAT SERPL-MCNC: 0.46 MG/DL (ref 0.57–1)
CREAT SERPL-MCNC: 0.48 MG/DL (ref 0.57–1)
CREAT SERPL-MCNC: 0.49 MG/DL (ref 0.57–1)
CREAT SERPL-MCNC: 0.49 MG/DL (ref 0.57–1)
CREAT SERPL-MCNC: 0.53 MG/DL (ref 0.57–1)
CREAT SERPL-MCNC: 0.53 MG/DL (ref 0.57–1)
CREAT SERPL-MCNC: 0.54 MG/DL (ref 0.57–1)
CREAT SERPL-MCNC: 0.56 MG/DL (ref 0.57–1)
CREAT SERPL-MCNC: 0.56 MG/DL (ref 0.57–1)
CREAT SERPL-MCNC: 0.57 MG/DL (ref 0.57–1)
CREAT SERPL-MCNC: 0.7 MG/DL (ref 0.57–1)
CREAT SERPL-MCNC: 0.82 MG/DL (ref 0.57–1)
CREAT SERPL-MCNC: 0.92 MG/DL (ref 0.57–1)
CREAT SERPL-MCNC: 0.93 MG/DL (ref 0.57–1)
CREAT SERPL-MCNC: 1.41 MG/DL (ref 0.57–1)
CREAT SERPL-MCNC: 1.52 MG/DL (ref 0.57–1)
CROSSMATCH INTERPRETATION: NORMAL
CRP SERPL-MCNC: 0.84 MG/DL (ref 0–0.5)
CRP SERPL-MCNC: 0.93 MG/DL (ref 0–0.5)
CRP SERPL-MCNC: 0.96 MG/DL (ref 0–0.5)
CRP SERPL-MCNC: 1.05 MG/DL (ref 0–0.5)
CRP SERPL-MCNC: 1.05 MG/DL (ref 0–0.5)
CRP SERPL-MCNC: 1.1 MG/DL (ref 0–0.5)
CRP SERPL-MCNC: 1.19 MG/DL (ref 0–0.5)
CRP SERPL-MCNC: 1.25 MG/DL (ref 0–0.5)
CRP SERPL-MCNC: 1.29 MG/DL (ref 0–0.5)
CRP SERPL-MCNC: 10.83 MG/DL (ref 0–0.5)
CRP SERPL-MCNC: 12.02 MG/DL (ref 0–0.5)
CRP SERPL-MCNC: 17.2 MG/DL (ref 0–0.5)
CRP SERPL-MCNC: 2.21 MG/DL (ref 0–0.5)
CRP SERPL-MCNC: 2.59 MG/DL (ref 0–0.5)
CRP SERPL-MCNC: 23.66 MG/DL (ref 0–0.5)
CRP SERPL-MCNC: 25.59 MG/DL (ref 0–0.5)
CRP SERPL-MCNC: 25.9 MG/DL (ref 0–0.5)
CRP SERPL-MCNC: 27.05 MG/DL (ref 0–0.5)
CRP SERPL-MCNC: 36.32 MG/DL (ref 0–0.5)
CRP SERPL-MCNC: 4.28 MG/DL (ref 0–0.5)
CRP SERPL-MCNC: 4.33 MG/DL (ref 0–0.5)
CRP SERPL-MCNC: 8.58 MG/DL (ref 0–0.5)
D DIMER PPP FEU-MCNC: 8.69 MCGFEU/ML (ref 0–0.49)
D-LACTATE SERPL-SCNC: 1.7 MMOL/L (ref 0.5–2)
DEPRECATED RDW RBC AUTO: 43 FL (ref 37–54)
DEPRECATED RDW RBC AUTO: 43.2 FL (ref 37–54)
DEPRECATED RDW RBC AUTO: 43.6 FL (ref 37–54)
DEPRECATED RDW RBC AUTO: 43.7 FL (ref 37–54)
DEPRECATED RDW RBC AUTO: 44.4 FL (ref 37–54)
DEPRECATED RDW RBC AUTO: 44.6 FL (ref 37–54)
DEPRECATED RDW RBC AUTO: 44.9 FL (ref 37–54)
DEPRECATED RDW RBC AUTO: 44.9 FL (ref 37–54)
DEPRECATED RDW RBC AUTO: 45 FL (ref 37–54)
DEPRECATED RDW RBC AUTO: 45.1 FL (ref 37–54)
DEPRECATED RDW RBC AUTO: 45.1 FL (ref 37–54)
DEPRECATED RDW RBC AUTO: 45.2 FL (ref 37–54)
DEPRECATED RDW RBC AUTO: 45.4 FL (ref 37–54)
DEPRECATED RDW RBC AUTO: 45.4 FL (ref 37–54)
DEPRECATED RDW RBC AUTO: 45.5 FL (ref 37–54)
DEPRECATED RDW RBC AUTO: 45.6 FL (ref 37–54)
DEPRECATED RDW RBC AUTO: 45.7 FL (ref 37–54)
DEPRECATED RDW RBC AUTO: 45.7 FL (ref 37–54)
DEPRECATED RDW RBC AUTO: 45.8 FL (ref 37–54)
DEPRECATED RDW RBC AUTO: 45.9 FL (ref 37–54)
DEPRECATED RDW RBC AUTO: 45.9 FL (ref 37–54)
DEPRECATED RDW RBC AUTO: 46.1 FL (ref 37–54)
DEPRECATED RDW RBC AUTO: 46.2 FL (ref 37–54)
DEPRECATED RDW RBC AUTO: 46.6 FL (ref 37–54)
DEPRECATED RDW RBC AUTO: 46.7 FL (ref 37–54)
DEPRECATED RDW RBC AUTO: 46.8 FL (ref 37–54)
DEPRECATED RDW RBC AUTO: 47.5 FL (ref 37–54)
DEPRECATED RDW RBC AUTO: 47.6 FL (ref 37–54)
DEPRECATED RDW RBC AUTO: 48.3 FL (ref 37–54)
DIGOXIN SERPL-MCNC: 0.8 NG/ML (ref 0.6–1.2)
DSDNA AB SER-ACNC: NORMAL [IU]/ML
ENA JO1 AB SER-ACNC: NORMAL
ENA RNP AB SER-ACNC: NORMAL
ENA SCL70 AB SER-ACNC: NORMAL
ENA SM AB SER-ACNC: NORMAL
ENA SS-A AB SER-ACNC: NORMAL
ENA SS-B AB SER-ACNC: NORMAL
EOSINOPHIL # BLD AUTO: 0 10*3/MM3 (ref 0–0.4)
EOSINOPHIL # BLD AUTO: 0.01 10*3/MM3 (ref 0–0.4)
EOSINOPHIL # BLD AUTO: 0.08 10*3/MM3 (ref 0–0.4)
EOSINOPHIL # BLD AUTO: 0.14 10*3/MM3 (ref 0–0.4)
EOSINOPHIL # BLD MANUAL: 0.17 10*3/MM3 (ref 0–0.4)
EOSINOPHIL NFR BLD AUTO: 0 % (ref 0.3–6.2)
EOSINOPHIL NFR BLD AUTO: 0.1 % (ref 0.3–6.2)
EOSINOPHIL NFR BLD AUTO: 1.1 % (ref 0.3–6.2)
EOSINOPHIL NFR BLD AUTO: 2 % (ref 0.3–6.2)
EOSINOPHIL NFR BLD MANUAL: 2.1 % (ref 0.3–6.2)
ERYTHROCYTE [DISTWIDTH] IN BLOOD BY AUTOMATED COUNT: 13.5 % (ref 12.3–15.4)
ERYTHROCYTE [DISTWIDTH] IN BLOOD BY AUTOMATED COUNT: 13.6 % (ref 12.3–15.4)
ERYTHROCYTE [DISTWIDTH] IN BLOOD BY AUTOMATED COUNT: 13.7 % (ref 12.3–15.4)
ERYTHROCYTE [DISTWIDTH] IN BLOOD BY AUTOMATED COUNT: 13.8 % (ref 12.3–15.4)
ERYTHROCYTE [DISTWIDTH] IN BLOOD BY AUTOMATED COUNT: 13.8 % (ref 12.3–15.4)
ERYTHROCYTE [DISTWIDTH] IN BLOOD BY AUTOMATED COUNT: 13.9 % (ref 12.3–15.4)
ERYTHROCYTE [DISTWIDTH] IN BLOOD BY AUTOMATED COUNT: 14 % (ref 12.3–15.4)
ERYTHROCYTE [DISTWIDTH] IN BLOOD BY AUTOMATED COUNT: 14.1 % (ref 12.3–15.4)
ERYTHROCYTE [DISTWIDTH] IN BLOOD BY AUTOMATED COUNT: 14.1 % (ref 12.3–15.4)
ERYTHROCYTE [DISTWIDTH] IN BLOOD BY AUTOMATED COUNT: 14.2 % (ref 12.3–15.4)
ERYTHROCYTE [DISTWIDTH] IN BLOOD BY AUTOMATED COUNT: 14.2 % (ref 12.3–15.4)
ERYTHROCYTE [DISTWIDTH] IN BLOOD BY AUTOMATED COUNT: 14.3 % (ref 12.3–15.4)
ERYTHROCYTE [DISTWIDTH] IN BLOOD BY AUTOMATED COUNT: 14.4 % (ref 12.3–15.4)
ERYTHROCYTE [DISTWIDTH] IN BLOOD BY AUTOMATED COUNT: 14.5 % (ref 12.3–15.4)
ERYTHROCYTE [DISTWIDTH] IN BLOOD BY AUTOMATED COUNT: 14.8 % (ref 12.3–15.4)
ERYTHROCYTE [DISTWIDTH] IN BLOOD BY AUTOMATED COUNT: 15.1 % (ref 12.3–15.4)
ERYTHROCYTE [DISTWIDTH] IN BLOOD BY AUTOMATED COUNT: 15.1 % (ref 12.3–15.4)
FERRITIN SERPL-MCNC: 5495 NG/ML (ref 13–150)
FIBRINOGEN PPP-MCNC: 848 MG/DL (ref 219–464)
GFR SERPL CREATININE-BSD FRML MDRD: 104 ML/MIN/1.73
GFR SERPL CREATININE-BSD FRML MDRD: 106 ML/MIN/1.73
GFR SERPL CREATININE-BSD FRML MDRD: 106 ML/MIN/1.73
GFR SERPL CREATININE-BSD FRML MDRD: 111 ML/MIN/1.73
GFR SERPL CREATININE-BSD FRML MDRD: 113 ML/MIN/1.73
GFR SERPL CREATININE-BSD FRML MDRD: 113 ML/MIN/1.73
GFR SERPL CREATININE-BSD FRML MDRD: 124 ML/MIN/1.73
GFR SERPL CREATININE-BSD FRML MDRD: 124 ML/MIN/1.73
GFR SERPL CREATININE-BSD FRML MDRD: 127 ML/MIN/1.73
GFR SERPL CREATININE-BSD FRML MDRD: 134 ML/MIN/1.73
GFR SERPL CREATININE-BSD FRML MDRD: 134 ML/MIN/1.73
GFR SERPL CREATININE-BSD FRML MDRD: 137 ML/MIN/1.73
GFR SERPL CREATININE-BSD FRML MDRD: 137 ML/MIN/1.73
GFR SERPL CREATININE-BSD FRML MDRD: 144 ML/MIN/1.73
GFR SERPL CREATININE-BSD FRML MDRD: 148 ML/MIN/1.73
GFR SERPL CREATININE-BSD FRML MDRD: 34 ML/MIN/1.73
GFR SERPL CREATININE-BSD FRML MDRD: 37 ML/MIN/1.73
GFR SERPL CREATININE-BSD FRML MDRD: 59 ML/MIN/1.73
GFR SERPL CREATININE-BSD FRML MDRD: 60 ML/MIN/1.73
GFR SERPL CREATININE-BSD FRML MDRD: 69 ML/MIN/1.73
GFR SERPL CREATININE-BSD FRML MDRD: 82 ML/MIN/1.73
GFR SERPL CREATININE-BSD FRML MDRD: >150 ML/MIN/1.73
GLOBULIN UR ELPH-MCNC: 2.1 GM/DL
GLOBULIN UR ELPH-MCNC: 2.1 GM/DL
GLOBULIN UR ELPH-MCNC: 2.3 GM/DL
GLOBULIN UR ELPH-MCNC: 2.4 GM/DL
GLOBULIN UR ELPH-MCNC: 2.5 GM/DL
GLOBULIN UR ELPH-MCNC: 2.6 GM/DL
GLOBULIN UR ELPH-MCNC: 2.8 GM/DL
GLOBULIN UR ELPH-MCNC: 3 GM/DL
GLOBULIN UR ELPH-MCNC: 3 GM/DL
GLOBULIN UR ELPH-MCNC: 3.1 GM/DL
GLOBULIN UR ELPH-MCNC: 3.1 GM/DL
GLOBULIN UR ELPH-MCNC: 3.2 GM/DL
GLOBULIN UR ELPH-MCNC: 3.2 GM/DL
GLOBULIN UR ELPH-MCNC: 3.5 GM/DL
GLOBULIN UR ELPH-MCNC: 3.6 GM/DL
GLOBULIN UR ELPH-MCNC: 3.6 GM/DL
GLOBULIN UR ELPH-MCNC: 3.7 GM/DL
GLOBULIN UR ELPH-MCNC: 3.7 GM/DL
GLOBULIN UR ELPH-MCNC: 3.8 GM/DL
GLOBULIN UR ELPH-MCNC: 3.9 GM/DL
GLOBULIN UR ELPH-MCNC: 4.2 GM/DL
GLOBULIN UR ELPH-MCNC: 4.5 GM/DL
GLOBULIN UR ELPH-MCNC: 4.6 GM/DL
GLUCOSE BLDC GLUCOMTR-MCNC: 100 MG/DL (ref 70–130)
GLUCOSE BLDC GLUCOMTR-MCNC: 100 MG/DL (ref 70–130)
GLUCOSE BLDC GLUCOMTR-MCNC: 101 MG/DL (ref 70–130)
GLUCOSE BLDC GLUCOMTR-MCNC: 102 MG/DL (ref 70–130)
GLUCOSE BLDC GLUCOMTR-MCNC: 102 MG/DL (ref 70–130)
GLUCOSE BLDC GLUCOMTR-MCNC: 104 MG/DL (ref 70–130)
GLUCOSE BLDC GLUCOMTR-MCNC: 105 MG/DL (ref 70–130)
GLUCOSE BLDC GLUCOMTR-MCNC: 112 MG/DL (ref 70–130)
GLUCOSE BLDC GLUCOMTR-MCNC: 114 MG/DL (ref 70–130)
GLUCOSE BLDC GLUCOMTR-MCNC: 118 MG/DL (ref 70–130)
GLUCOSE BLDC GLUCOMTR-MCNC: 120 MG/DL (ref 70–130)
GLUCOSE BLDC GLUCOMTR-MCNC: 122 MG/DL (ref 70–130)
GLUCOSE BLDC GLUCOMTR-MCNC: 124 MG/DL (ref 70–130)
GLUCOSE BLDC GLUCOMTR-MCNC: 131 MG/DL (ref 70–130)
GLUCOSE BLDC GLUCOMTR-MCNC: 131 MG/DL (ref 70–130)
GLUCOSE BLDC GLUCOMTR-MCNC: 132 MG/DL (ref 70–130)
GLUCOSE BLDC GLUCOMTR-MCNC: 133 MG/DL (ref 70–130)
GLUCOSE BLDC GLUCOMTR-MCNC: 138 MG/DL (ref 70–130)
GLUCOSE BLDC GLUCOMTR-MCNC: 139 MG/DL (ref 70–130)
GLUCOSE BLDC GLUCOMTR-MCNC: 140 MG/DL (ref 70–130)
GLUCOSE BLDC GLUCOMTR-MCNC: 140 MG/DL (ref 70–130)
GLUCOSE BLDC GLUCOMTR-MCNC: 145 MG/DL (ref 70–130)
GLUCOSE BLDC GLUCOMTR-MCNC: 151 MG/DL (ref 70–130)
GLUCOSE BLDC GLUCOMTR-MCNC: 152 MG/DL (ref 70–130)
GLUCOSE BLDC GLUCOMTR-MCNC: 153 MG/DL (ref 70–130)
GLUCOSE BLDC GLUCOMTR-MCNC: 157 MG/DL (ref 70–130)
GLUCOSE BLDC GLUCOMTR-MCNC: 157 MG/DL (ref 70–130)
GLUCOSE BLDC GLUCOMTR-MCNC: 160 MG/DL (ref 70–130)
GLUCOSE BLDC GLUCOMTR-MCNC: 163 MG/DL (ref 70–130)
GLUCOSE BLDC GLUCOMTR-MCNC: 165 MG/DL (ref 70–130)
GLUCOSE BLDC GLUCOMTR-MCNC: 168 MG/DL (ref 70–130)
GLUCOSE BLDC GLUCOMTR-MCNC: 168 MG/DL (ref 70–130)
GLUCOSE BLDC GLUCOMTR-MCNC: 172 MG/DL (ref 70–130)
GLUCOSE BLDC GLUCOMTR-MCNC: 176 MG/DL (ref 70–130)
GLUCOSE BLDC GLUCOMTR-MCNC: 178 MG/DL (ref 70–130)
GLUCOSE BLDC GLUCOMTR-MCNC: 179 MG/DL (ref 70–130)
GLUCOSE BLDC GLUCOMTR-MCNC: 186 MG/DL (ref 70–130)
GLUCOSE BLDC GLUCOMTR-MCNC: 187 MG/DL (ref 70–130)
GLUCOSE BLDC GLUCOMTR-MCNC: 190 MG/DL (ref 70–130)
GLUCOSE BLDC GLUCOMTR-MCNC: 196 MG/DL (ref 70–130)
GLUCOSE BLDC GLUCOMTR-MCNC: 199 MG/DL (ref 70–130)
GLUCOSE BLDC GLUCOMTR-MCNC: 202 MG/DL (ref 70–130)
GLUCOSE BLDC GLUCOMTR-MCNC: 202 MG/DL (ref 70–130)
GLUCOSE BLDC GLUCOMTR-MCNC: 203 MG/DL (ref 70–130)
GLUCOSE BLDC GLUCOMTR-MCNC: 206 MG/DL (ref 70–130)
GLUCOSE BLDC GLUCOMTR-MCNC: 206 MG/DL (ref 70–130)
GLUCOSE BLDC GLUCOMTR-MCNC: 207 MG/DL (ref 70–130)
GLUCOSE BLDC GLUCOMTR-MCNC: 209 MG/DL (ref 70–130)
GLUCOSE BLDC GLUCOMTR-MCNC: 211 MG/DL (ref 70–130)
GLUCOSE BLDC GLUCOMTR-MCNC: 211 MG/DL (ref 70–130)
GLUCOSE BLDC GLUCOMTR-MCNC: 212 MG/DL (ref 70–130)
GLUCOSE BLDC GLUCOMTR-MCNC: 213 MG/DL (ref 70–130)
GLUCOSE BLDC GLUCOMTR-MCNC: 225 MG/DL (ref 70–130)
GLUCOSE BLDC GLUCOMTR-MCNC: 229 MG/DL (ref 70–130)
GLUCOSE BLDC GLUCOMTR-MCNC: 233 MG/DL (ref 70–130)
GLUCOSE BLDC GLUCOMTR-MCNC: 242 MG/DL (ref 70–130)
GLUCOSE BLDC GLUCOMTR-MCNC: 244 MG/DL (ref 70–130)
GLUCOSE BLDC GLUCOMTR-MCNC: 244 MG/DL (ref 70–130)
GLUCOSE BLDC GLUCOMTR-MCNC: 248 MG/DL (ref 70–130)
GLUCOSE BLDC GLUCOMTR-MCNC: 248 MG/DL (ref 70–130)
GLUCOSE BLDC GLUCOMTR-MCNC: 249 MG/DL (ref 70–130)
GLUCOSE BLDC GLUCOMTR-MCNC: 255 MG/DL (ref 70–130)
GLUCOSE BLDC GLUCOMTR-MCNC: 256 MG/DL (ref 70–130)
GLUCOSE BLDC GLUCOMTR-MCNC: 258 MG/DL (ref 70–130)
GLUCOSE BLDC GLUCOMTR-MCNC: 262 MG/DL (ref 70–130)
GLUCOSE BLDC GLUCOMTR-MCNC: 262 MG/DL (ref 70–130)
GLUCOSE BLDC GLUCOMTR-MCNC: 265 MG/DL (ref 70–130)
GLUCOSE BLDC GLUCOMTR-MCNC: 268 MG/DL (ref 70–130)
GLUCOSE BLDC GLUCOMTR-MCNC: 269 MG/DL (ref 70–130)
GLUCOSE BLDC GLUCOMTR-MCNC: 274 MG/DL (ref 70–130)
GLUCOSE BLDC GLUCOMTR-MCNC: 277 MG/DL (ref 70–130)
GLUCOSE BLDC GLUCOMTR-MCNC: 281 MG/DL (ref 70–130)
GLUCOSE BLDC GLUCOMTR-MCNC: 284 MG/DL (ref 70–130)
GLUCOSE BLDC GLUCOMTR-MCNC: 287 MG/DL (ref 70–130)
GLUCOSE BLDC GLUCOMTR-MCNC: 287 MG/DL (ref 70–130)
GLUCOSE BLDC GLUCOMTR-MCNC: 288 MG/DL (ref 70–130)
GLUCOSE BLDC GLUCOMTR-MCNC: 289 MG/DL (ref 70–130)
GLUCOSE BLDC GLUCOMTR-MCNC: 289 MG/DL (ref 70–130)
GLUCOSE BLDC GLUCOMTR-MCNC: 292 MG/DL (ref 70–130)
GLUCOSE BLDC GLUCOMTR-MCNC: 296 MG/DL (ref 70–130)
GLUCOSE BLDC GLUCOMTR-MCNC: 298 MG/DL (ref 70–130)
GLUCOSE BLDC GLUCOMTR-MCNC: 299 MG/DL (ref 70–130)
GLUCOSE BLDC GLUCOMTR-MCNC: 300 MG/DL (ref 70–130)
GLUCOSE BLDC GLUCOMTR-MCNC: 303 MG/DL (ref 70–130)
GLUCOSE BLDC GLUCOMTR-MCNC: 304 MG/DL (ref 70–130)
GLUCOSE BLDC GLUCOMTR-MCNC: 306 MG/DL (ref 70–130)
GLUCOSE BLDC GLUCOMTR-MCNC: 309 MG/DL (ref 70–130)
GLUCOSE BLDC GLUCOMTR-MCNC: 309 MG/DL (ref 70–130)
GLUCOSE BLDC GLUCOMTR-MCNC: 310 MG/DL (ref 70–130)
GLUCOSE BLDC GLUCOMTR-MCNC: 312 MG/DL (ref 70–130)
GLUCOSE BLDC GLUCOMTR-MCNC: 313 MG/DL (ref 70–130)
GLUCOSE BLDC GLUCOMTR-MCNC: 320 MG/DL (ref 70–130)
GLUCOSE BLDC GLUCOMTR-MCNC: 323 MG/DL (ref 70–130)
GLUCOSE BLDC GLUCOMTR-MCNC: 323 MG/DL (ref 70–130)
GLUCOSE BLDC GLUCOMTR-MCNC: 325 MG/DL (ref 70–130)
GLUCOSE BLDC GLUCOMTR-MCNC: 327 MG/DL (ref 70–130)
GLUCOSE BLDC GLUCOMTR-MCNC: 330 MG/DL (ref 70–130)
GLUCOSE BLDC GLUCOMTR-MCNC: 331 MG/DL (ref 70–130)
GLUCOSE BLDC GLUCOMTR-MCNC: 335 MG/DL (ref 70–130)
GLUCOSE BLDC GLUCOMTR-MCNC: 337 MG/DL (ref 70–130)
GLUCOSE BLDC GLUCOMTR-MCNC: 341 MG/DL (ref 70–130)
GLUCOSE BLDC GLUCOMTR-MCNC: 355 MG/DL (ref 70–130)
GLUCOSE BLDC GLUCOMTR-MCNC: 365 MG/DL (ref 70–130)
GLUCOSE BLDC GLUCOMTR-MCNC: 366 MG/DL (ref 70–130)
GLUCOSE BLDC GLUCOMTR-MCNC: 366 MG/DL (ref 70–130)
GLUCOSE BLDC GLUCOMTR-MCNC: 370 MG/DL (ref 70–130)
GLUCOSE BLDC GLUCOMTR-MCNC: 375 MG/DL (ref 70–130)
GLUCOSE BLDC GLUCOMTR-MCNC: 384 MG/DL (ref 70–130)
GLUCOSE BLDC GLUCOMTR-MCNC: 410 MG/DL (ref 70–130)
GLUCOSE BLDC GLUCOMTR-MCNC: 52 MG/DL (ref 70–130)
GLUCOSE BLDC GLUCOMTR-MCNC: 68 MG/DL (ref 70–130)
GLUCOSE BLDC GLUCOMTR-MCNC: 69 MG/DL (ref 70–130)
GLUCOSE BLDC GLUCOMTR-MCNC: 70 MG/DL (ref 70–130)
GLUCOSE BLDC GLUCOMTR-MCNC: 75 MG/DL (ref 70–130)
GLUCOSE BLDC GLUCOMTR-MCNC: 76 MG/DL (ref 70–130)
GLUCOSE BLDC GLUCOMTR-MCNC: 77 MG/DL (ref 70–130)
GLUCOSE BLDC GLUCOMTR-MCNC: 80 MG/DL (ref 70–130)
GLUCOSE BLDC GLUCOMTR-MCNC: 80 MG/DL (ref 70–130)
GLUCOSE BLDC GLUCOMTR-MCNC: 83 MG/DL (ref 70–130)
GLUCOSE BLDC GLUCOMTR-MCNC: 86 MG/DL (ref 70–130)
GLUCOSE BLDC GLUCOMTR-MCNC: 90 MG/DL (ref 70–130)
GLUCOSE BLDC GLUCOMTR-MCNC: 90 MG/DL (ref 70–130)
GLUCOSE BLDC GLUCOMTR-MCNC: 91 MG/DL (ref 70–130)
GLUCOSE BLDC GLUCOMTR-MCNC: 94 MG/DL (ref 70–130)
GLUCOSE BLDC GLUCOMTR-MCNC: 97 MG/DL (ref 70–130)
GLUCOSE BLDC GLUCOMTR-MCNC: 97 MG/DL (ref 70–130)
GLUCOSE BLDC GLUCOMTR-MCNC: 98 MG/DL (ref 70–130)
GLUCOSE BLDC GLUCOMTR-MCNC: 99 MG/DL (ref 70–130)
GLUCOSE SERPL-MCNC: 103 MG/DL (ref 65–99)
GLUCOSE SERPL-MCNC: 108 MG/DL (ref 65–99)
GLUCOSE SERPL-MCNC: 118 MG/DL (ref 65–99)
GLUCOSE SERPL-MCNC: 120 MG/DL (ref 65–99)
GLUCOSE SERPL-MCNC: 122 MG/DL (ref 65–99)
GLUCOSE SERPL-MCNC: 146 MG/DL (ref 65–99)
GLUCOSE SERPL-MCNC: 159 MG/DL (ref 65–99)
GLUCOSE SERPL-MCNC: 165 MG/DL (ref 65–99)
GLUCOSE SERPL-MCNC: 174 MG/DL (ref 65–99)
GLUCOSE SERPL-MCNC: 174 MG/DL (ref 65–99)
GLUCOSE SERPL-MCNC: 196 MG/DL (ref 65–99)
GLUCOSE SERPL-MCNC: 201 MG/DL (ref 65–99)
GLUCOSE SERPL-MCNC: 206 MG/DL (ref 65–99)
GLUCOSE SERPL-MCNC: 218 MG/DL (ref 65–99)
GLUCOSE SERPL-MCNC: 223 MG/DL (ref 65–99)
GLUCOSE SERPL-MCNC: 237 MG/DL (ref 65–99)
GLUCOSE SERPL-MCNC: 237 MG/DL (ref 65–99)
GLUCOSE SERPL-MCNC: 243 MG/DL (ref 65–99)
GLUCOSE SERPL-MCNC: 245 MG/DL (ref 65–99)
GLUCOSE SERPL-MCNC: 256 MG/DL (ref 65–99)
GLUCOSE SERPL-MCNC: 262 MG/DL (ref 65–99)
GLUCOSE SERPL-MCNC: 272 MG/DL (ref 65–99)
GLUCOSE SERPL-MCNC: 282 MG/DL (ref 65–99)
GLUCOSE SERPL-MCNC: 290 MG/DL (ref 65–99)
GLUCOSE SERPL-MCNC: 293 MG/DL (ref 65–99)
GLUCOSE SERPL-MCNC: 307 MG/DL (ref 65–99)
GLUCOSE SERPL-MCNC: 320 MG/DL (ref 65–99)
GLUCOSE SERPL-MCNC: 355 MG/DL (ref 65–99)
GLUCOSE SERPL-MCNC: 407 MG/DL (ref 65–99)
GLUCOSE SERPL-MCNC: 80 MG/DL (ref 65–99)
GLUCOSE SERPL-MCNC: 95 MG/DL (ref 65–99)
GLUCOSE SERPL-MCNC: 98 MG/DL (ref 65–99)
GLUCOSE SERPL-MCNC: 99 MG/DL (ref 65–99)
GLUCOSE UR STRIP-MCNC: ABNORMAL MG/DL
GLUCOSE UR STRIP-MCNC: NEGATIVE MG/DL
GRAN CASTS URNS QL MICRO: ABNORMAL /LPF
GRAN CASTS URNS QL MICRO: ABNORMAL /LPF
HAPTOGLOB SERPL-MCNC: 500 MG/DL (ref 30–200)
HBA1C MFR BLD: 8.07 % (ref 4.8–5.6)
HCT VFR BLD AUTO: 17.2 % (ref 34–46.6)
HCT VFR BLD AUTO: 20.5 % (ref 34–46.6)
HCT VFR BLD AUTO: 21.6 % (ref 34–46.6)
HCT VFR BLD AUTO: 22.3 % (ref 34–46.6)
HCT VFR BLD AUTO: 22.5 % (ref 34–46.6)
HCT VFR BLD AUTO: 24.1 % (ref 34–46.6)
HCT VFR BLD AUTO: 24.3 % (ref 34–46.6)
HCT VFR BLD AUTO: 24.6 % (ref 34–46.6)
HCT VFR BLD AUTO: 24.7 % (ref 34–46.6)
HCT VFR BLD AUTO: 25.1 % (ref 34–46.6)
HCT VFR BLD AUTO: 25.2 % (ref 34–46.6)
HCT VFR BLD AUTO: 25.4 % (ref 34–46.6)
HCT VFR BLD AUTO: 25.8 % (ref 34–46.6)
HCT VFR BLD AUTO: 25.9 % (ref 34–46.6)
HCT VFR BLD AUTO: 26 % (ref 34–46.6)
HCT VFR BLD AUTO: 26 % (ref 34–46.6)
HCT VFR BLD AUTO: 26.1 % (ref 34–46.6)
HCT VFR BLD AUTO: 26.1 % (ref 34–46.6)
HCT VFR BLD AUTO: 26.3 % (ref 34–46.6)
HCT VFR BLD AUTO: 26.4 % (ref 34–46.6)
HCT VFR BLD AUTO: 26.5 % (ref 34–46.6)
HCT VFR BLD AUTO: 26.5 % (ref 34–46.6)
HCT VFR BLD AUTO: 26.7 % (ref 34–46.6)
HCT VFR BLD AUTO: 26.9 % (ref 34–46.6)
HCT VFR BLD AUTO: 27 % (ref 34–46.6)
HCT VFR BLD AUTO: 27.2 % (ref 34–46.6)
HCT VFR BLD AUTO: 27.3 % (ref 34–46.6)
HCT VFR BLD AUTO: 27.5 % (ref 34–46.6)
HCT VFR BLD AUTO: 27.5 % (ref 34–46.6)
HCT VFR BLD AUTO: 27.7 % (ref 34–46.6)
HCT VFR BLD AUTO: 27.9 % (ref 34–46.6)
HCT VFR BLD AUTO: 28 % (ref 34–46.6)
HCT VFR BLD AUTO: 28.1 % (ref 34–46.6)
HCT VFR BLD AUTO: 28.1 % (ref 34–46.6)
HCT VFR BLD AUTO: 28.2 % (ref 34–46.6)
HCT VFR BLD AUTO: 28.2 % (ref 34–46.6)
HCT VFR BLD AUTO: 28.4 % (ref 34–46.6)
HCT VFR BLD AUTO: 28.4 % (ref 34–46.6)
HCT VFR BLD AUTO: 28.5 % (ref 34–46.6)
HCT VFR BLD AUTO: 28.5 % (ref 34–46.6)
HCT VFR BLD AUTO: 29.1 % (ref 34–46.6)
HCT VFR BLD AUTO: 29.2 % (ref 34–46.6)
HCT VFR BLD AUTO: 29.4 % (ref 34–46.6)
HCT VFR BLD AUTO: 29.9 % (ref 34–46.6)
HCT VFR BLD AUTO: 33.7 % (ref 34–46.6)
HCT VFR BLD AUTO: 33.9 % (ref 34–46.6)
HCT VFR BLD AUTO: 34.9 % (ref 34–46.6)
HCT VFR BLD AUTO: 35.8 % (ref 34–46.6)
HCT VFR BLD AUTO: 36.3 % (ref 34–46.6)
HCT VFR BLD AUTO: 37.1 % (ref 34–46.6)
HCT VFR BLD AUTO: 40.3 % (ref 34–46.6)
HCT VFR BLD AUTO: NORMAL %
HDLC SERPL-MCNC: 28 MG/DL (ref 40–60)
HGB BLD-MCNC: 10.3 G/DL (ref 12–15.9)
HGB BLD-MCNC: 10.5 G/DL (ref 12–15.9)
HGB BLD-MCNC: 10.9 G/DL (ref 12–15.9)
HGB BLD-MCNC: 11 G/DL (ref 12–15.9)
HGB BLD-MCNC: 11.2 G/DL (ref 12–15.9)
HGB BLD-MCNC: 11.3 G/DL (ref 12–15.9)
HGB BLD-MCNC: 12.7 G/DL (ref 12–15.9)
HGB BLD-MCNC: 5.3 G/DL (ref 12–15.9)
HGB BLD-MCNC: 6.9 G/DL (ref 12–15.9)
HGB BLD-MCNC: 6.9 G/DL (ref 12–15.9)
HGB BLD-MCNC: 7 G/DL (ref 12–15.9)
HGB BLD-MCNC: 7 G/DL (ref 12–15.9)
HGB BLD-MCNC: 7.5 G/DL (ref 12–15.9)
HGB BLD-MCNC: 7.5 G/DL (ref 12–15.9)
HGB BLD-MCNC: 7.8 G/DL (ref 12–15.9)
HGB BLD-MCNC: 7.9 G/DL (ref 12–15.9)
HGB BLD-MCNC: 8 G/DL (ref 12–15.9)
HGB BLD-MCNC: 8 G/DL (ref 12–15.9)
HGB BLD-MCNC: 8.1 G/DL (ref 12–15.9)
HGB BLD-MCNC: 8.2 G/DL (ref 12–15.9)
HGB BLD-MCNC: 8.3 G/DL (ref 12–15.9)
HGB BLD-MCNC: 8.4 G/DL (ref 12–15.9)
HGB BLD-MCNC: 8.5 G/DL (ref 12–15.9)
HGB BLD-MCNC: 8.6 G/DL (ref 12–15.9)
HGB BLD-MCNC: 8.7 G/DL (ref 12–15.9)
HGB BLD-MCNC: 8.8 G/DL (ref 12–15.9)
HGB BLD-MCNC: 8.8 G/DL (ref 12–15.9)
HGB BLD-MCNC: 8.9 G/DL (ref 12–15.9)
HGB BLD-MCNC: 9 G/DL (ref 12–15.9)
HGB BLD-MCNC: 9 G/DL (ref 12–15.9)
HGB BLD-MCNC: 9.2 G/DL (ref 12–15.9)
HGB BLD-MCNC: 9.4 G/DL (ref 12–15.9)
HGB BLD-MCNC: NORMAL G/DL
HGB UR QL STRIP.AUTO: NEGATIVE
HGB UR QL STRIP.AUTO: NEGATIVE
HYALINE CASTS UR QL AUTO: ABNORMAL /LPF
HYALINE CASTS UR QL AUTO: ABNORMAL /LPF
HYPOCHROMIA BLD QL: ABNORMAL
IMM GRANULOCYTES # BLD AUTO: 0.05 10*3/MM3 (ref 0–0.05)
IMM GRANULOCYTES # BLD AUTO: 0.06 10*3/MM3 (ref 0–0.05)
IMM GRANULOCYTES # BLD AUTO: 0.08 10*3/MM3 (ref 0–0.05)
IMM GRANULOCYTES # BLD AUTO: 0.08 10*3/MM3 (ref 0–0.05)
IMM GRANULOCYTES # BLD AUTO: 0.1 10*3/MM3 (ref 0–0.05)
IMM GRANULOCYTES # BLD AUTO: 0.11 10*3/MM3 (ref 0–0.05)
IMM GRANULOCYTES # BLD AUTO: 0.13 10*3/MM3 (ref 0–0.05)
IMM GRANULOCYTES # BLD AUTO: 0.15 10*3/MM3 (ref 0–0.05)
IMM GRANULOCYTES # BLD AUTO: 0.16 10*3/MM3 (ref 0–0.05)
IMM GRANULOCYTES # BLD AUTO: 0.17 10*3/MM3 (ref 0–0.05)
IMM GRANULOCYTES # BLD AUTO: 0.18 10*3/MM3 (ref 0–0.05)
IMM GRANULOCYTES # BLD AUTO: 0.19 10*3/MM3 (ref 0–0.05)
IMM GRANULOCYTES # BLD AUTO: 0.23 10*3/MM3 (ref 0–0.05)
IMM GRANULOCYTES # BLD AUTO: 0.24 10*3/MM3 (ref 0–0.05)
IMM GRANULOCYTES # BLD AUTO: 0.25 10*3/MM3 (ref 0–0.05)
IMM GRANULOCYTES # BLD AUTO: 0.4 10*3/MM3 (ref 0–0.05)
IMM GRANULOCYTES # BLD AUTO: 0.42 10*3/MM3 (ref 0–0.05)
IMM GRANULOCYTES # BLD AUTO: 0.52 10*3/MM3 (ref 0–0.05)
IMM GRANULOCYTES # BLD AUTO: 0.54 10*3/MM3 (ref 0–0.05)
IMM GRANULOCYTES # BLD AUTO: 0.56 10*3/MM3 (ref 0–0.05)
IMM GRANULOCYTES # BLD AUTO: 0.6 10*3/MM3 (ref 0–0.05)
IMM GRANULOCYTES NFR BLD AUTO: 0.4 % (ref 0–0.5)
IMM GRANULOCYTES NFR BLD AUTO: 0.6 % (ref 0–0.5)
IMM GRANULOCYTES NFR BLD AUTO: 0.7 % (ref 0–0.5)
IMM GRANULOCYTES NFR BLD AUTO: 0.9 % (ref 0–0.5)
IMM GRANULOCYTES NFR BLD AUTO: 1.2 % (ref 0–0.5)
IMM GRANULOCYTES NFR BLD AUTO: 1.2 % (ref 0–0.5)
IMM GRANULOCYTES NFR BLD AUTO: 1.3 % (ref 0–0.5)
IMM GRANULOCYTES NFR BLD AUTO: 1.4 % (ref 0–0.5)
IMM GRANULOCYTES NFR BLD AUTO: 1.6 % (ref 0–0.5)
IMM GRANULOCYTES NFR BLD AUTO: 1.8 % (ref 0–0.5)
IMM GRANULOCYTES NFR BLD AUTO: 2.4 % (ref 0–0.5)
IMM GRANULOCYTES NFR BLD AUTO: 3.5 % (ref 0–0.5)
IMM GRANULOCYTES NFR BLD AUTO: 4 % (ref 0–0.5)
IMM GRANULOCYTES NFR BLD AUTO: 4.3 % (ref 0–0.5)
IMM GRANULOCYTES NFR BLD AUTO: 4.6 % (ref 0–0.5)
IMM GRANULOCYTES NFR BLD AUTO: 4.8 % (ref 0–0.5)
INR PPP: 1.05 (ref 0.9–1.1)
INR PPP: 1.09 (ref 0.9–1.1)
INR PPP: 1.23 (ref 0.9–1.1)
INR PPP: 1.28 (ref 0.9–1.1)
INR PPP: 1.47 (ref 0.9–1.1)
INR PPP: 2.2 (ref 0.9–1.1)
KETONES UR QL STRIP: ABNORMAL
KETONES UR QL STRIP: NEGATIVE
LDH SERPL-CCNC: >2500 U/L (ref 135–214)
LDLC SERPL CALC-MCNC: 66 MG/DL (ref 0–100)
LDLC/HDLC SERPL: 2.34 {RATIO}
LEUKOCYTE ESTERASE UR QL STRIP.AUTO: ABNORMAL
LEUKOCYTE ESTERASE UR QL STRIP.AUTO: ABNORMAL
LIPASE SERPL-CCNC: 14 U/L (ref 13–60)
LKM-1 AB SER-ACNC: 1.4 UNITS (ref 0–20)
LYMPHOCYTES # BLD AUTO: 0.82 10*3/MM3 (ref 0.7–3.1)
LYMPHOCYTES # BLD AUTO: 0.88 10*3/MM3 (ref 0.7–3.1)
LYMPHOCYTES # BLD AUTO: 0.97 10*3/MM3 (ref 0.7–3.1)
LYMPHOCYTES # BLD AUTO: 1 10*3/MM3 (ref 0.7–3.1)
LYMPHOCYTES # BLD AUTO: 1.01 10*3/MM3 (ref 0.7–3.1)
LYMPHOCYTES # BLD AUTO: 1.1 10*3/MM3 (ref 0.7–3.1)
LYMPHOCYTES # BLD AUTO: 1.1 10*3/MM3 (ref 0.7–3.1)
LYMPHOCYTES # BLD AUTO: 1.11 10*3/MM3 (ref 0.7–3.1)
LYMPHOCYTES # BLD AUTO: 1.14 10*3/MM3 (ref 0.7–3.1)
LYMPHOCYTES # BLD AUTO: 1.16 10*3/MM3 (ref 0.7–3.1)
LYMPHOCYTES # BLD AUTO: 1.2 10*3/MM3 (ref 0.7–3.1)
LYMPHOCYTES # BLD AUTO: 1.21 10*3/MM3 (ref 0.7–3.1)
LYMPHOCYTES # BLD AUTO: 1.23 10*3/MM3 (ref 0.7–3.1)
LYMPHOCYTES # BLD AUTO: 1.3 10*3/MM3 (ref 0.7–3.1)
LYMPHOCYTES # BLD AUTO: 1.31 10*3/MM3 (ref 0.7–3.1)
LYMPHOCYTES # BLD AUTO: 1.34 10*3/MM3 (ref 0.7–3.1)
LYMPHOCYTES # BLD AUTO: 1.38 10*3/MM3 (ref 0.7–3.1)
LYMPHOCYTES # BLD AUTO: 1.42 10*3/MM3 (ref 0.7–3.1)
LYMPHOCYTES # BLD AUTO: 1.45 10*3/MM3 (ref 0.7–3.1)
LYMPHOCYTES # BLD AUTO: 1.5 10*3/MM3 (ref 0.7–3.1)
LYMPHOCYTES # BLD AUTO: 1.5 10*3/MM3 (ref 0.7–3.1)
LYMPHOCYTES # BLD AUTO: 1.57 10*3/MM3 (ref 0.7–3.1)
LYMPHOCYTES # BLD AUTO: 1.68 10*3/MM3 (ref 0.7–3.1)
LYMPHOCYTES # BLD MANUAL: 0.48 10*3/MM3 (ref 0.7–3.1)
LYMPHOCYTES # BLD MANUAL: 0.82 10*3/MM3 (ref 0.7–3.1)
LYMPHOCYTES # BLD MANUAL: 1.29 10*3/MM3 (ref 0.7–3.1)
LYMPHOCYTES # BLD MANUAL: 1.44 10*3/MM3 (ref 0.7–3.1)
LYMPHOCYTES # BLD MANUAL: 1.5 10*3/MM3 (ref 0.7–3.1)
LYMPHOCYTES NFR BLD AUTO: 10.3 % (ref 19.6–45.3)
LYMPHOCYTES NFR BLD AUTO: 10.3 % (ref 19.6–45.3)
LYMPHOCYTES NFR BLD AUTO: 10.7 % (ref 19.6–45.3)
LYMPHOCYTES NFR BLD AUTO: 11.2 % (ref 19.6–45.3)
LYMPHOCYTES NFR BLD AUTO: 12.1 % (ref 19.6–45.3)
LYMPHOCYTES NFR BLD AUTO: 12.2 % (ref 19.6–45.3)
LYMPHOCYTES NFR BLD AUTO: 12.8 % (ref 19.6–45.3)
LYMPHOCYTES NFR BLD AUTO: 13.2 % (ref 19.6–45.3)
LYMPHOCYTES NFR BLD AUTO: 22.5 % (ref 19.6–45.3)
LYMPHOCYTES NFR BLD AUTO: 24 % (ref 19.6–45.3)
LYMPHOCYTES NFR BLD AUTO: 6.7 % (ref 19.6–45.3)
LYMPHOCYTES NFR BLD AUTO: 6.9 % (ref 19.6–45.3)
LYMPHOCYTES NFR BLD AUTO: 7.2 % (ref 19.6–45.3)
LYMPHOCYTES NFR BLD AUTO: 7.6 % (ref 19.6–45.3)
LYMPHOCYTES NFR BLD AUTO: 7.6 % (ref 19.6–45.3)
LYMPHOCYTES NFR BLD AUTO: 8.1 % (ref 19.6–45.3)
LYMPHOCYTES NFR BLD AUTO: 8.3 % (ref 19.6–45.3)
LYMPHOCYTES NFR BLD AUTO: 8.3 % (ref 19.6–45.3)
LYMPHOCYTES NFR BLD AUTO: 8.6 % (ref 19.6–45.3)
LYMPHOCYTES NFR BLD AUTO: 9 % (ref 19.6–45.3)
LYMPHOCYTES NFR BLD AUTO: 9.1 % (ref 19.6–45.3)
LYMPHOCYTES NFR BLD AUTO: 9.1 % (ref 19.6–45.3)
LYMPHOCYTES NFR BLD AUTO: 9.7 % (ref 19.6–45.3)
LYMPHOCYTES NFR BLD MANUAL: 2.1 % (ref 5–12)
LYMPHOCYTES NFR BLD MANUAL: 3.4 % (ref 5–12)
LYMPHOCYTES NFR BLD MANUAL: 5.1 % (ref 5–12)
LYMPHOCYTES NFR BLD MANUAL: 9.2 % (ref 5–12)
Lab: NORMAL
MAGNESIUM SERPL-MCNC: 1.5 MG/DL (ref 1.6–2.4)
MAGNESIUM SERPL-MCNC: 1.5 MG/DL (ref 1.6–2.4)
MAGNESIUM SERPL-MCNC: 1.6 MG/DL (ref 1.6–2.4)
MAGNESIUM SERPL-MCNC: 1.7 MG/DL (ref 1.6–2.4)
MAGNESIUM SERPL-MCNC: 1.8 MG/DL (ref 1.6–2.4)
MAGNESIUM SERPL-MCNC: 1.9 MG/DL (ref 1.6–2.4)
MAGNESIUM SERPL-MCNC: 2 MG/DL (ref 1.6–2.4)
MAGNESIUM SERPL-MCNC: 2.1 MG/DL (ref 1.6–2.4)
MAGNESIUM SERPL-MCNC: 2.1 MG/DL (ref 1.6–2.4)
MAGNESIUM SERPL-MCNC: 2.2 MG/DL (ref 1.6–2.4)
MAGNESIUM SERPL-MCNC: 2.4 MG/DL (ref 1.6–2.4)
MAXIMAL PREDICTED HEART RATE: 148 BPM
MCH RBC QN AUTO: 26.4 PG (ref 26.6–33)
MCH RBC QN AUTO: 26.8 PG (ref 26.6–33)
MCH RBC QN AUTO: 26.9 PG (ref 26.6–33)
MCH RBC QN AUTO: 26.9 PG (ref 26.6–33)
MCH RBC QN AUTO: 27.2 PG (ref 26.6–33)
MCH RBC QN AUTO: 27.2 PG (ref 26.6–33)
MCH RBC QN AUTO: 27.3 PG (ref 26.6–33)
MCH RBC QN AUTO: 27.5 PG (ref 26.6–33)
MCH RBC QN AUTO: 27.6 PG (ref 26.6–33)
MCH RBC QN AUTO: 27.7 PG (ref 26.6–33)
MCH RBC QN AUTO: 27.8 PG (ref 26.6–33)
MCH RBC QN AUTO: 27.8 PG (ref 26.6–33)
MCH RBC QN AUTO: 28 PG (ref 26.6–33)
MCH RBC QN AUTO: 28.1 PG (ref 26.6–33)
MCH RBC QN AUTO: 28.1 PG (ref 26.6–33)
MCHC RBC AUTO-ENTMCNC: 30 G/DL (ref 31.5–35.7)
MCHC RBC AUTO-ENTMCNC: 30.2 G/DL (ref 31.5–35.7)
MCHC RBC AUTO-ENTMCNC: 30.5 G/DL (ref 31.5–35.7)
MCHC RBC AUTO-ENTMCNC: 30.6 G/DL (ref 31.5–35.7)
MCHC RBC AUTO-ENTMCNC: 30.7 G/DL (ref 31.5–35.7)
MCHC RBC AUTO-ENTMCNC: 30.8 G/DL (ref 31.5–35.7)
MCHC RBC AUTO-ENTMCNC: 30.8 G/DL (ref 31.5–35.7)
MCHC RBC AUTO-ENTMCNC: 30.9 G/DL (ref 31.5–35.7)
MCHC RBC AUTO-ENTMCNC: 31 G/DL (ref 31.5–35.7)
MCHC RBC AUTO-ENTMCNC: 31 G/DL (ref 31.5–35.7)
MCHC RBC AUTO-ENTMCNC: 31.1 G/DL (ref 31.5–35.7)
MCHC RBC AUTO-ENTMCNC: 31.2 G/DL (ref 31.5–35.7)
MCHC RBC AUTO-ENTMCNC: 31.2 G/DL (ref 31.5–35.7)
MCHC RBC AUTO-ENTMCNC: 31.3 G/DL (ref 31.5–35.7)
MCHC RBC AUTO-ENTMCNC: 31.4 G/DL (ref 31.5–35.7)
MCHC RBC AUTO-ENTMCNC: 31.4 G/DL (ref 31.5–35.7)
MCHC RBC AUTO-ENTMCNC: 31.5 G/DL (ref 31.5–35.7)
MCHC RBC AUTO-ENTMCNC: 31.5 G/DL (ref 31.5–35.7)
MCHC RBC AUTO-ENTMCNC: 31.7 G/DL (ref 31.5–35.7)
MCHC RBC AUTO-ENTMCNC: 31.7 G/DL (ref 31.5–35.7)
MCHC RBC AUTO-ENTMCNC: 31.8 G/DL (ref 31.5–35.7)
MCHC RBC AUTO-ENTMCNC: 31.9 G/DL (ref 31.5–35.7)
MCHC RBC AUTO-ENTMCNC: 32.1 G/DL (ref 31.5–35.7)
MCHC RBC AUTO-ENTMCNC: 32.1 G/DL (ref 31.5–35.7)
MCHC RBC AUTO-ENTMCNC: 32.8 G/DL (ref 31.5–35.7)
MCV RBC AUTO: 83.2 FL (ref 79–97)
MCV RBC AUTO: 84.9 FL (ref 79–97)
MCV RBC AUTO: 85.8 FL (ref 79–97)
MCV RBC AUTO: 86.1 FL (ref 79–97)
MCV RBC AUTO: 86.6 FL (ref 79–97)
MCV RBC AUTO: 86.9 FL (ref 79–97)
MCV RBC AUTO: 87.2 FL (ref 79–97)
MCV RBC AUTO: 87.3 FL (ref 79–97)
MCV RBC AUTO: 87.4 FL (ref 79–97)
MCV RBC AUTO: 87.6 FL (ref 79–97)
MCV RBC AUTO: 87.7 FL (ref 79–97)
MCV RBC AUTO: 87.8 FL (ref 79–97)
MCV RBC AUTO: 87.8 FL (ref 79–97)
MCV RBC AUTO: 88.2 FL (ref 79–97)
MCV RBC AUTO: 88.4 FL (ref 79–97)
MCV RBC AUTO: 88.5 FL (ref 79–97)
MCV RBC AUTO: 88.7 FL (ref 79–97)
MCV RBC AUTO: 89.4 FL (ref 79–97)
MCV RBC AUTO: 89.5 FL (ref 79–97)
MCV RBC AUTO: 89.8 FL (ref 79–97)
MCV RBC AUTO: 89.9 FL (ref 79–97)
MCV RBC AUTO: 90 FL (ref 79–97)
MCV RBC AUTO: 90.1 FL (ref 79–97)
MCV RBC AUTO: 90.3 FL (ref 79–97)
MCV RBC AUTO: 91 FL (ref 79–97)
MCV RBC AUTO: 91 FL (ref 79–97)
MCV RBC AUTO: 92.9 FL (ref 79–97)
METAMYELOCYTES NFR BLD MANUAL: 1 % (ref 0–0)
METAMYELOCYTES NFR BLD MANUAL: 2.1 % (ref 0–0)
MICROCYTES BLD QL: ABNORMAL
MONOCYTES # BLD AUTO: 0.16 10*3/MM3 (ref 0.1–0.9)
MONOCYTES # BLD AUTO: 0.23 10*3/MM3 (ref 0.1–0.9)
MONOCYTES # BLD AUTO: 0.44 10*3/MM3 (ref 0.1–0.9)
MONOCYTES # BLD AUTO: 0.47 10*3/MM3 (ref 0.1–0.9)
MONOCYTES # BLD AUTO: 0.56 10*3/MM3 (ref 0.1–0.9)
MONOCYTES # BLD AUTO: 0.64 10*3/MM3 (ref 0.1–0.9)
MONOCYTES # BLD AUTO: 0.73 10*3/MM3 (ref 0.1–0.9)
MONOCYTES # BLD AUTO: 0.73 10*3/MM3 (ref 0.1–0.9)
MONOCYTES # BLD AUTO: 0.74 10*3/MM3 (ref 0.1–0.9)
MONOCYTES # BLD AUTO: 0.8 10*3/MM3 (ref 0.1–0.9)
MONOCYTES # BLD AUTO: 0.86 10*3/MM3 (ref 0.1–0.9)
MONOCYTES # BLD AUTO: 0.9 10*3/MM3 (ref 0.1–0.9)
MONOCYTES # BLD AUTO: 0.93 10*3/MM3 (ref 0.1–0.9)
MONOCYTES # BLD AUTO: 0.99 10*3/MM3 (ref 0.1–0.9)
MONOCYTES # BLD AUTO: 1.06 10*3/MM3 (ref 0.1–0.9)
MONOCYTES # BLD AUTO: 1.18 10*3/MM3 (ref 0.1–0.9)
MONOCYTES # BLD AUTO: 1.19 10*3/MM3 (ref 0.1–0.9)
MONOCYTES # BLD AUTO: 1.2 10*3/MM3 (ref 0.1–0.9)
MONOCYTES # BLD AUTO: 1.31 10*3/MM3 (ref 0.1–0.9)
MONOCYTES # BLD AUTO: 1.31 10*3/MM3 (ref 0.1–0.9)
MONOCYTES # BLD AUTO: 1.42 10*3/MM3 (ref 0.1–0.9)
MONOCYTES # BLD: 0.17 10*3/MM3 (ref 0.1–0.9)
MONOCYTES # BLD: 0.28 10*3/MM3 (ref 0.1–0.9)
MONOCYTES # BLD: 0.59 10*3/MM3 (ref 0.1–0.9)
MONOCYTES # BLD: 1.45 10*3/MM3 (ref 0.1–0.9)
MONOCYTES NFR BLD AUTO: 10.1 % (ref 5–12)
MONOCYTES NFR BLD AUTO: 10.8 % (ref 5–12)
MONOCYTES NFR BLD AUTO: 11.6 % (ref 5–12)
MONOCYTES NFR BLD AUTO: 2.3 % (ref 5–12)
MONOCYTES NFR BLD AUTO: 3.3 % (ref 5–12)
MONOCYTES NFR BLD AUTO: 3.9 % (ref 5–12)
MONOCYTES NFR BLD AUTO: 4.7 % (ref 5–12)
MONOCYTES NFR BLD AUTO: 5.5 % (ref 5–12)
MONOCYTES NFR BLD AUTO: 5.5 % (ref 5–12)
MONOCYTES NFR BLD AUTO: 5.6 % (ref 5–12)
MONOCYTES NFR BLD AUTO: 5.8 % (ref 5–12)
MONOCYTES NFR BLD AUTO: 6.4 % (ref 5–12)
MONOCYTES NFR BLD AUTO: 6.7 % (ref 5–12)
MONOCYTES NFR BLD AUTO: 7.2 % (ref 5–12)
MONOCYTES NFR BLD AUTO: 7.2 % (ref 5–12)
MONOCYTES NFR BLD AUTO: 7.4 % (ref 5–12)
MONOCYTES NFR BLD AUTO: 7.5 % (ref 5–12)
MONOCYTES NFR BLD AUTO: 7.6 % (ref 5–12)
MONOCYTES NFR BLD AUTO: 8 % (ref 5–12)
MONOCYTES NFR BLD AUTO: 8.1 % (ref 5–12)
MONOCYTES NFR BLD AUTO: 8.6 % (ref 5–12)
MONOCYTES NFR BLD AUTO: 8.9 % (ref 5–12)
MONOCYTES NFR BLD AUTO: 9.4 % (ref 5–12)
MYELOCYTES NFR BLD MANUAL: 1 % (ref 0–0)
MYELOCYTES NFR BLD MANUAL: 1 % (ref 0–0)
NEUTROPHILS # BLD AUTO: 10.13 10*3/MM3 (ref 1.7–7)
NEUTROPHILS # BLD AUTO: 13.03 10*3/MM3 (ref 1.7–7)
NEUTROPHILS # BLD AUTO: 5.19 10*3/MM3 (ref 1.7–7)
NEUTROPHILS # BLD AUTO: 6.09 10*3/MM3 (ref 1.7–7)
NEUTROPHILS # BLD AUTO: 7.61 10*3/MM3 (ref 1.7–7)
NEUTROPHILS NFR BLD AUTO: 10.82 10*3/MM3 (ref 1.7–7)
NEUTROPHILS NFR BLD AUTO: 11.02 10*3/MM3 (ref 1.7–7)
NEUTROPHILS NFR BLD AUTO: 11.29 10*3/MM3 (ref 1.7–7)
NEUTROPHILS NFR BLD AUTO: 11.63 10*3/MM3 (ref 1.7–7)
NEUTROPHILS NFR BLD AUTO: 12.57 10*3/MM3 (ref 1.7–7)
NEUTROPHILS NFR BLD AUTO: 13.67 10*3/MM3 (ref 1.7–7)
NEUTROPHILS NFR BLD AUTO: 13.76 10*3/MM3 (ref 1.7–7)
NEUTROPHILS NFR BLD AUTO: 15.85 10*3/MM3 (ref 1.7–7)
NEUTROPHILS NFR BLD AUTO: 4.39 10*3/MM3 (ref 1.7–7)
NEUTROPHILS NFR BLD AUTO: 4.8 10*3/MM3 (ref 1.7–7)
NEUTROPHILS NFR BLD AUTO: 62.5 % (ref 42.7–76)
NEUTROPHILS NFR BLD AUTO: 68.9 % (ref 42.7–76)
NEUTROPHILS NFR BLD AUTO: 73.4 % (ref 42.7–76)
NEUTROPHILS NFR BLD AUTO: 76.5 % (ref 42.7–76)
NEUTROPHILS NFR BLD AUTO: 77.1 % (ref 42.7–76)
NEUTROPHILS NFR BLD AUTO: 77.5 % (ref 42.7–76)
NEUTROPHILS NFR BLD AUTO: 78.4 % (ref 42.7–76)
NEUTROPHILS NFR BLD AUTO: 78.5 % (ref 42.7–76)
NEUTROPHILS NFR BLD AUTO: 78.6 % (ref 42.7–76)
NEUTROPHILS NFR BLD AUTO: 79.7 % (ref 42.7–76)
NEUTROPHILS NFR BLD AUTO: 79.7 % (ref 42.7–76)
NEUTROPHILS NFR BLD AUTO: 8.02 10*3/MM3 (ref 1.7–7)
NEUTROPHILS NFR BLD AUTO: 8.25 10*3/MM3 (ref 1.7–7)
NEUTROPHILS NFR BLD AUTO: 8.32 10*3/MM3 (ref 1.7–7)
NEUTROPHILS NFR BLD AUTO: 8.37 10*3/MM3 (ref 1.7–7)
NEUTROPHILS NFR BLD AUTO: 8.4 10*3/MM3 (ref 1.7–7)
NEUTROPHILS NFR BLD AUTO: 80.8 % (ref 42.7–76)
NEUTROPHILS NFR BLD AUTO: 80.8 % (ref 42.7–76)
NEUTROPHILS NFR BLD AUTO: 81.9 % (ref 42.7–76)
NEUTROPHILS NFR BLD AUTO: 82.4 % (ref 42.7–76)
NEUTROPHILS NFR BLD AUTO: 82.9 % (ref 42.7–76)
NEUTROPHILS NFR BLD AUTO: 83.3 % (ref 42.7–76)
NEUTROPHILS NFR BLD AUTO: 83.4 % (ref 42.7–76)
NEUTROPHILS NFR BLD AUTO: 83.6 % (ref 42.7–76)
NEUTROPHILS NFR BLD AUTO: 83.7 % (ref 42.7–76)
NEUTROPHILS NFR BLD AUTO: 84.8 % (ref 42.7–76)
NEUTROPHILS NFR BLD AUTO: 85.7 % (ref 42.7–76)
NEUTROPHILS NFR BLD AUTO: 87.1 % (ref 42.7–76)
NEUTROPHILS NFR BLD AUTO: 9.32 10*3/MM3 (ref 1.7–7)
NEUTROPHILS NFR BLD AUTO: 9.37 10*3/MM3 (ref 1.7–7)
NEUTROPHILS NFR BLD AUTO: 9.47 10*3/MM3 (ref 1.7–7)
NEUTROPHILS NFR BLD AUTO: 9.49 10*3/MM3 (ref 1.7–7)
NEUTROPHILS NFR BLD AUTO: 9.9 10*3/MM3 (ref 1.7–7)
NEUTROPHILS NFR BLD AUTO: 9.94 10*3/MM3 (ref 1.7–7)
NEUTROPHILS NFR BLD AUTO: 9.95 10*3/MM3 (ref 1.7–7)
NEUTROPHILS NFR BLD AUTO: 9.96 10*3/MM3 (ref 1.7–7)
NEUTROPHILS NFR BLD MANUAL: 74.2 % (ref 42.7–76)
NEUTROPHILS NFR BLD MANUAL: 76 % (ref 42.7–76)
NEUTROPHILS NFR BLD MANUAL: 82.7 % (ref 42.7–76)
NEUTROPHILS NFR BLD MANUAL: 87.9 % (ref 42.7–76)
NEUTROPHILS NFR BLD MANUAL: 90.9 % (ref 42.7–76)
NITRITE UR QL STRIP: NEGATIVE
NITRITE UR QL STRIP: NEGATIVE
NRBC BLD AUTO-RTO: 0 /100 WBC (ref 0–0.2)
NRBC BLD AUTO-RTO: 0.1 /100 WBC (ref 0–0.2)
NRBC BLD AUTO-RTO: 0.2 /100 WBC (ref 0–0.2)
NRBC BLD AUTO-RTO: 0.3 /100 WBC (ref 0–0.2)
NRBC BLD AUTO-RTO: 0.3 /100 WBC (ref 0–0.2)
NRBC BLD AUTO-RTO: 0.4 /100 WBC (ref 0–0.2)
NRBC BLD AUTO-RTO: 0.7 /100 WBC (ref 0–0.2)
NRBC SPEC MANUAL: 1 /100 WBC (ref 0–0.2)
NT-PROBNP SERPL-MCNC: 449 PG/ML (ref 0–900)
PH UR STRIP.AUTO: 7 [PH] (ref 5–8)
PH UR STRIP.AUTO: <=5 [PH] (ref 5–8)
PLAT MORPH BLD: NORMAL
PLATELET # BLD AUTO: 156 10*3/MM3 (ref 140–450)
PLATELET # BLD AUTO: 156 10*3/MM3 (ref 140–450)
PLATELET # BLD AUTO: 183 10*3/MM3 (ref 140–450)
PLATELET # BLD AUTO: 192 10*3/MM3 (ref 140–450)
PLATELET # BLD AUTO: 219 10*3/MM3 (ref 140–450)
PLATELET # BLD AUTO: 257 10*3/MM3 (ref 140–450)
PLATELET # BLD AUTO: 261 10*3/MM3 (ref 140–450)
PLATELET # BLD AUTO: 264 10*3/MM3 (ref 140–450)
PLATELET # BLD AUTO: 271 10*3/MM3 (ref 140–450)
PLATELET # BLD AUTO: 276 10*3/MM3 (ref 140–450)
PLATELET # BLD AUTO: 276 10*3/MM3 (ref 140–450)
PLATELET # BLD AUTO: 283 10*3/MM3 (ref 140–450)
PLATELET # BLD AUTO: 299 10*3/MM3 (ref 140–450)
PLATELET # BLD AUTO: 304 10*3/MM3 (ref 140–450)
PLATELET # BLD AUTO: 307 10*3/MM3 (ref 140–450)
PLATELET # BLD AUTO: 316 10*3/MM3 (ref 140–450)
PLATELET # BLD AUTO: 326 10*3/MM3 (ref 140–450)
PLATELET # BLD AUTO: 342 10*3/MM3 (ref 140–450)
PLATELET # BLD AUTO: 346 10*3/MM3 (ref 140–450)
PLATELET # BLD AUTO: 352 10*3/MM3 (ref 140–450)
PLATELET # BLD AUTO: 360 10*3/MM3 (ref 140–450)
PLATELET # BLD AUTO: 363 10*3/MM3 (ref 140–450)
PLATELET # BLD AUTO: 371 10*3/MM3 (ref 140–450)
PLATELET # BLD AUTO: 381 10*3/MM3 (ref 140–450)
PLATELET # BLD AUTO: 397 10*3/MM3 (ref 140–450)
PLATELET # BLD AUTO: 436 10*3/MM3 (ref 140–450)
PLATELET # BLD AUTO: 437 10*3/MM3 (ref 140–450)
PLATELET # BLD AUTO: 459 10*3/MM3 (ref 140–450)
PLATELET # BLD AUTO: 503 10*3/MM3 (ref 140–450)
PMV BLD AUTO: 10 FL (ref 6–12)
PMV BLD AUTO: 10 FL (ref 6–12)
PMV BLD AUTO: 10.1 FL (ref 6–12)
PMV BLD AUTO: 10.1 FL (ref 6–12)
PMV BLD AUTO: 10.2 FL (ref 6–12)
PMV BLD AUTO: 10.3 FL (ref 6–12)
PMV BLD AUTO: 10.4 FL (ref 6–12)
PMV BLD AUTO: 10.5 FL (ref 6–12)
PMV BLD AUTO: 10.5 FL (ref 6–12)
PMV BLD AUTO: 10.6 FL (ref 6–12)
PMV BLD AUTO: 10.6 FL (ref 6–12)
PMV BLD AUTO: 10.8 FL (ref 6–12)
PMV BLD AUTO: 11.1 FL (ref 6–12)
PMV BLD AUTO: 9.5 FL (ref 6–12)
PMV BLD AUTO: 9.8 FL (ref 6–12)
PMV BLD AUTO: 9.9 FL (ref 6–12)
PMV BLD AUTO: 9.9 FL (ref 6–12)
POLYCHROMASIA BLD QL SMEAR: ABNORMAL
POTASSIUM SERPL-SCNC: 3.3 MMOL/L (ref 3.5–5.2)
POTASSIUM SERPL-SCNC: 3.5 MMOL/L (ref 3.5–5.2)
POTASSIUM SERPL-SCNC: 3.6 MMOL/L (ref 3.5–5.2)
POTASSIUM SERPL-SCNC: 3.8 MMOL/L (ref 3.5–5.2)
POTASSIUM SERPL-SCNC: 3.9 MMOL/L (ref 3.5–5.2)
POTASSIUM SERPL-SCNC: 4 MMOL/L (ref 3.5–5.2)
POTASSIUM SERPL-SCNC: 4 MMOL/L (ref 3.5–5.2)
POTASSIUM SERPL-SCNC: 4.1 MMOL/L (ref 3.5–5.2)
POTASSIUM SERPL-SCNC: 4.2 MMOL/L (ref 3.5–5.2)
POTASSIUM SERPL-SCNC: 4.5 MMOL/L (ref 3.5–5.2)
POTASSIUM SERPL-SCNC: 4.6 MMOL/L (ref 3.5–5.2)
POTASSIUM SERPL-SCNC: 4.7 MMOL/L (ref 3.5–5.2)
POTASSIUM SERPL-SCNC: 4.8 MMOL/L (ref 3.5–5.2)
POTASSIUM SERPL-SCNC: 4.9 MMOL/L (ref 3.5–5.2)
POTASSIUM SERPL-SCNC: 5 MMOL/L (ref 3.5–5.2)
POTASSIUM SERPL-SCNC: 5.1 MMOL/L (ref 3.5–5.2)
PROCALCITONIN SERPL-MCNC: 0.44 NG/ML (ref 0–0.25)
PROCALCITONIN SERPL-MCNC: 229.2 NG/ML (ref 0–0.25)
PROT SERPL-MCNC: 4.7 G/DL (ref 6–8.5)
PROT SERPL-MCNC: 4.8 G/DL (ref 6–8.5)
PROT SERPL-MCNC: 4.8 G/DL (ref 6–8.5)
PROT SERPL-MCNC: 5.1 G/DL (ref 6–8.5)
PROT SERPL-MCNC: 5.1 G/DL (ref 6–8.5)
PROT SERPL-MCNC: 5.2 G/DL (ref 6–8.5)
PROT SERPL-MCNC: 5.3 G/DL (ref 6–8.5)
PROT SERPL-MCNC: 5.4 G/DL (ref 6–8.5)
PROT SERPL-MCNC: 5.4 G/DL (ref 6–8.5)
PROT SERPL-MCNC: 5.5 G/DL (ref 6–8.5)
PROT SERPL-MCNC: 5.6 G/DL (ref 6–8.5)
PROT SERPL-MCNC: 5.7 G/DL (ref 6–8.5)
PROT SERPL-MCNC: 5.8 G/DL (ref 6–8.5)
PROT SERPL-MCNC: 5.9 G/DL (ref 6–8.5)
PROT SERPL-MCNC: 5.9 G/DL (ref 6–8.5)
PROT SERPL-MCNC: 6 G/DL (ref 6–8.5)
PROT SERPL-MCNC: 6.2 G/DL (ref 6–8.5)
PROT SERPL-MCNC: 6.3 G/DL (ref 6–8.5)
PROT SERPL-MCNC: 6.5 G/DL (ref 6–8.5)
PROT SERPL-MCNC: 6.7 G/DL (ref 6–8.5)
PROT SERPL-MCNC: 6.8 G/DL (ref 6–8.5)
PROT SERPL-MCNC: 6.8 G/DL (ref 6–8.5)
PROT SERPL-MCNC: 7 G/DL (ref 6–8.5)
PROT UR QL STRIP: ABNORMAL
PROT UR QL STRIP: ABNORMAL
PROTHROMBIN TIME: 13.5 SECONDS (ref 11.7–14.2)
PROTHROMBIN TIME: 14 SECONDS (ref 11.7–14.2)
PROTHROMBIN TIME: 15.3 SECONDS (ref 11.7–14.2)
PROTHROMBIN TIME: 15.8 SECONDS (ref 11.7–14.2)
PROTHROMBIN TIME: 17.6 SECONDS (ref 11.7–14.2)
PROTHROMBIN TIME: 24.2 SECONDS (ref 11.7–14.2)
QT INTERVAL: 248 MS
QT INTERVAL: 261 MS
QT INTERVAL: 332 MS
QT INTERVAL: 340 MS
QT INTERVAL: 363 MS
RBC # BLD AUTO: 2.6 10*6/MM3 (ref 3.77–5.28)
RBC # BLD AUTO: 2.65 10*6/MM3 (ref 3.77–5.28)
RBC # BLD AUTO: 2.7 10*6/MM3 (ref 3.77–5.28)
RBC # BLD AUTO: 2.79 10*6/MM3 (ref 3.77–5.28)
RBC # BLD AUTO: 2.86 10*6/MM3 (ref 3.77–5.28)
RBC # BLD AUTO: 2.89 10*6/MM3 (ref 3.77–5.28)
RBC # BLD AUTO: 2.92 10*6/MM3 (ref 3.77–5.28)
RBC # BLD AUTO: 2.94 10*6/MM3 (ref 3.77–5.28)
RBC # BLD AUTO: 2.96 10*6/MM3 (ref 3.77–5.28)
RBC # BLD AUTO: 2.96 10*6/MM3 (ref 3.77–5.28)
RBC # BLD AUTO: 2.97 10*6/MM3 (ref 3.77–5.28)
RBC # BLD AUTO: 2.98 10*6/MM3 (ref 3.77–5.28)
RBC # BLD AUTO: 3.01 10*6/MM3 (ref 3.77–5.28)
RBC # BLD AUTO: 3.02 10*6/MM3 (ref 3.77–5.28)
RBC # BLD AUTO: 3.04 10*6/MM3 (ref 3.77–5.28)
RBC # BLD AUTO: 3.06 10*6/MM3 (ref 3.77–5.28)
RBC # BLD AUTO: 3.1 10*6/MM3 (ref 3.77–5.28)
RBC # BLD AUTO: 3.15 10*6/MM3 (ref 3.77–5.28)
RBC # BLD AUTO: 3.16 10*6/MM3 (ref 3.77–5.28)
RBC # BLD AUTO: 3.25 10*6/MM3 (ref 3.77–5.28)
RBC # BLD AUTO: 3.25 10*6/MM3 (ref 3.77–5.28)
RBC # BLD AUTO: 3.28 10*6/MM3 (ref 3.77–5.28)
RBC # BLD AUTO: 3.38 10*6/MM3 (ref 3.77–5.28)
RBC # BLD AUTO: 3.74 10*6/MM3 (ref 3.77–5.28)
RBC # BLD AUTO: 3.79 10*6/MM3 (ref 3.77–5.28)
RBC # BLD AUTO: 3.98 10*6/MM3 (ref 3.77–5.28)
RBC # BLD AUTO: 3.99 10*6/MM3 (ref 3.77–5.28)
RBC # BLD AUTO: 4.03 10*6/MM3 (ref 3.77–5.28)
RBC # BLD AUTO: 4.6 10*6/MM3 (ref 3.77–5.28)
RBC # UR STRIP: ABNORMAL /HPF
RBC # UR STRIP: ABNORMAL /HPF
RBC MORPH BLD: NORMAL
RBC MORPH BLD: NORMAL
REF LAB TEST METHOD: ABNORMAL
REF LAB TEST METHOD: ABNORMAL
RH BLD: POSITIVE
RH BLD: POSITIVE
SARS-COV-2 RNA RESP QL NAA+PROBE: DETECTED
SARS-COV-2 RNA RESP QL NAA+PROBE: DETECTED
SODIUM SERPL-SCNC: 129 MMOL/L (ref 136–145)
SODIUM SERPL-SCNC: 134 MMOL/L (ref 136–145)
SODIUM SERPL-SCNC: 135 MMOL/L (ref 136–145)
SODIUM SERPL-SCNC: 136 MMOL/L (ref 136–145)
SODIUM SERPL-SCNC: 137 MMOL/L (ref 136–145)
SODIUM SERPL-SCNC: 138 MMOL/L (ref 136–145)
SODIUM SERPL-SCNC: 139 MMOL/L (ref 136–145)
SODIUM SERPL-SCNC: 140 MMOL/L (ref 136–145)
SODIUM SERPL-SCNC: 141 MMOL/L (ref 136–145)
SODIUM SERPL-SCNC: 143 MMOL/L (ref 136–145)
SODIUM SERPL-SCNC: 145 MMOL/L (ref 136–145)
SODIUM SERPL-SCNC: 145 MMOL/L (ref 136–145)
SP GR UR STRIP: 1.03 (ref 1–1.03)
SP GR UR STRIP: 1.03 (ref 1–1.03)
SQUAMOUS #/AREA URNS HPF: ABNORMAL /HPF
SQUAMOUS #/AREA URNS HPF: ABNORMAL /HPF
STRESS TARGET HR: 126 BPM
T&S EXPIRATION DATE: NORMAL
T&S EXPIRATION DATE: NORMAL
TRANS CELLS #/AREA URNS HPF: ABNORMAL /HPF
TRI-PHOS CRY URNS QL MICRO: ABNORMAL /HPF
TRIGL SERPL-MCNC: 97 MG/DL (ref 0–150)
TROPONIN T SERPL-MCNC: 0.21 NG/ML (ref 0–0.03)
TROPONIN T SERPL-MCNC: 0.29 NG/ML (ref 0–0.03)
UNIT  ABO: NORMAL
UNIT  RH: NORMAL
URATE SERPL-MCNC: 3.9 MG/DL (ref 2.4–5.7)
URATE SERPL-MCNC: 4.6 MG/DL (ref 2.4–5.7)
URATE SERPL-MCNC: 4.7 MG/DL (ref 2.4–5.7)
UROBILINOGEN UR QL STRIP: ABNORMAL
UROBILINOGEN UR QL STRIP: ABNORMAL
VARIANT LYMPHS NFR BLD MANUAL: 17.5 % (ref 19.6–45.3)
VARIANT LYMPHS NFR BLD MANUAL: 22 % (ref 19.6–45.3)
VARIANT LYMPHS NFR BLD MANUAL: 5.7 % (ref 19.6–45.3)
VARIANT LYMPHS NFR BLD MANUAL: 7.1 % (ref 19.6–45.3)
VARIANT LYMPHS NFR BLD MANUAL: 8.2 % (ref 19.6–45.3)
VLDLC SERPL-MCNC: 19 MG/DL (ref 5–40)
WBC # UR STRIP: ABNORMAL /HPF
WBC # UR STRIP: ABNORMAL /HPF
WBC CLUMPS # UR AUTO: ABNORMAL /HPF
WBC MORPH BLD: NORMAL
WBC NRBC COR # BLD: 10.09 10*3/MM3 (ref 3.4–10.8)
WBC NRBC COR # BLD: 10.7 10*3/MM3 (ref 3.4–10.8)
WBC NRBC COR # BLD: 10.79 10*3/MM3 (ref 3.4–10.8)
WBC NRBC COR # BLD: 11.25 10*3/MM3 (ref 3.4–10.8)
WBC NRBC COR # BLD: 11.36 10*3/MM3 (ref 3.4–10.8)
WBC NRBC COR # BLD: 11.42 10*3/MM3 (ref 3.4–10.8)
WBC NRBC COR # BLD: 11.53 10*3/MM3 (ref 3.4–10.8)
WBC NRBC COR # BLD: 11.67 10*3/MM3 (ref 3.4–10.8)
WBC NRBC COR # BLD: 11.73 10*3/MM3 (ref 3.4–10.8)
WBC NRBC COR # BLD: 11.95 10*3/MM3 (ref 3.4–10.8)
WBC NRBC COR # BLD: 12.17 10*3/MM3 (ref 3.4–10.8)
WBC NRBC COR # BLD: 12.28 10*3/MM3 (ref 3.4–10.8)
WBC NRBC COR # BLD: 12.49 10*3/MM3 (ref 3.4–10.8)
WBC NRBC COR # BLD: 12.68 10*3/MM3 (ref 3.4–10.8)
WBC NRBC COR # BLD: 13.17 10*3/MM3 (ref 3.4–10.8)
WBC NRBC COR # BLD: 13.18 10*3/MM3 (ref 3.4–10.8)
WBC NRBC COR # BLD: 14.04 10*3/MM3 (ref 3.4–10.8)
WBC NRBC COR # BLD: 14.59 10*3/MM3 (ref 3.4–10.8)
WBC NRBC COR # BLD: 14.82 10*3/MM3 (ref 3.4–10.8)
WBC NRBC COR # BLD: 15.75 10*3/MM3 (ref 3.4–10.8)
WBC NRBC COR # BLD: 16.46 10*3/MM3 (ref 3.4–10.8)
WBC NRBC COR # BLD: 16.68 10*3/MM3 (ref 3.4–10.8)
WBC NRBC COR # BLD: 19 10*3/MM3 (ref 3.4–10.8)
WBC NRBC COR # BLD: 6.83 10*3/MM3 (ref 3.4–10.8)
WBC NRBC COR # BLD: 6.97 10*3/MM3 (ref 3.4–10.8)
WBC NRBC COR # BLD: 7.01 10*3/MM3 (ref 3.4–10.8)
WBC NRBC COR # BLD: 8.21 10*3/MM3 (ref 3.4–10.8)
WBC NRBC COR # BLD: 8.37 10*3/MM3 (ref 3.4–10.8)
WBC NRBC COR # BLD: 9.63 10*3/MM3 (ref 3.4–10.8)

## 2022-01-01 PROCEDURE — 94640 AIRWAY INHALATION TREATMENT: CPT

## 2022-01-01 PROCEDURE — 85025 COMPLETE CBC W/AUTO DIFF WBC: CPT | Performed by: INTERNAL MEDICINE

## 2022-01-01 PROCEDURE — 82962 GLUCOSE BLOOD TEST: CPT

## 2022-01-01 PROCEDURE — 86140 C-REACTIVE PROTEIN: CPT | Performed by: NURSE PRACTITIONER

## 2022-01-01 PROCEDURE — 83735 ASSAY OF MAGNESIUM: CPT | Performed by: NURSE PRACTITIONER

## 2022-01-01 PROCEDURE — 94799 UNLISTED PULMONARY SVC/PX: CPT

## 2022-01-01 PROCEDURE — 99232 SBSQ HOSP IP/OBS MODERATE 35: CPT | Performed by: INTERNAL MEDICINE

## 2022-01-01 PROCEDURE — 94761 N-INVAS EAR/PLS OXIMETRY MLT: CPT

## 2022-01-01 PROCEDURE — 74018 RADEX ABDOMEN 1 VIEW: CPT

## 2022-01-01 PROCEDURE — 76000 FLUOROSCOPY <1 HR PHYS/QHP: CPT | Performed by: ORTHOPAEDIC SURGERY

## 2022-01-01 PROCEDURE — 80048 BASIC METABOLIC PNL TOTAL CA: CPT | Performed by: NURSE PRACTITIONER

## 2022-01-01 PROCEDURE — 87040 BLOOD CULTURE FOR BACTERIA: CPT | Performed by: INTERNAL MEDICINE

## 2022-01-01 PROCEDURE — 80053 COMPREHEN METABOLIC PANEL: CPT | Performed by: NURSE PRACTITIONER

## 2022-01-01 PROCEDURE — 86140 C-REACTIVE PROTEIN: CPT | Performed by: INTERNAL MEDICINE

## 2022-01-01 PROCEDURE — 85025 COMPLETE CBC W/AUTO DIFF WBC: CPT | Performed by: NURSE PRACTITIONER

## 2022-01-01 PROCEDURE — 97535 SELF CARE MNGMENT TRAINING: CPT

## 2022-01-01 PROCEDURE — P9016 RBC LEUKOCYTES REDUCED: HCPCS

## 2022-01-01 PROCEDURE — 97530 THERAPEUTIC ACTIVITIES: CPT

## 2022-01-01 PROCEDURE — 71045 X-RAY EXAM CHEST 1 VIEW: CPT

## 2022-01-01 PROCEDURE — 93010 ELECTROCARDIOGRAM REPORT: CPT | Performed by: INTERNAL MEDICINE

## 2022-01-01 PROCEDURE — 83880 ASSAY OF NATRIURETIC PEPTIDE: CPT | Performed by: INTERNAL MEDICINE

## 2022-01-01 PROCEDURE — 99232 SBSQ HOSP IP/OBS MODERATE 35: CPT | Performed by: STUDENT IN AN ORGANIZED HEALTH CARE EDUCATION/TRAINING PROGRAM

## 2022-01-01 PROCEDURE — 0 DIATRIZOATE MEGLUMINE & SODIUM PER 1 ML: Performed by: STUDENT IN AN ORGANIZED HEALTH CARE EDUCATION/TRAINING PROGRAM

## 2022-01-01 PROCEDURE — 36415 COLL VENOUS BLD VENIPUNCTURE: CPT | Performed by: NURSE PRACTITIONER

## 2022-01-01 PROCEDURE — 85384 FIBRINOGEN ACTIVITY: CPT | Performed by: INTERNAL MEDICINE

## 2022-01-01 PROCEDURE — 25010000002 ENOXAPARIN PER 10 MG: Performed by: INTERNAL MEDICINE

## 2022-01-01 PROCEDURE — 25010000002 MAGNESIUM SULFATE IN D5W 1G/100ML (PREMIX) 1-5 GM/100ML-% SOLUTION: Performed by: INTERNAL MEDICINE

## 2022-01-01 PROCEDURE — U0005 INFEC AGEN DETEC AMPLI PROBE: HCPCS | Performed by: STUDENT IN AN ORGANIZED HEALTH CARE EDUCATION/TRAINING PROGRAM

## 2022-01-01 PROCEDURE — 25010000002 DIGOXIN PER 500 MCG: Performed by: INTERNAL MEDICINE

## 2022-01-01 PROCEDURE — 25010000002 DEXAMETHASONE PER 1 MG: Performed by: INTERNAL MEDICINE

## 2022-01-01 PROCEDURE — 86901 BLOOD TYPING SEROLOGIC RH(D): CPT | Performed by: INTERNAL MEDICINE

## 2022-01-01 PROCEDURE — 36415 COLL VENOUS BLD VENIPUNCTURE: CPT | Performed by: INTERNAL MEDICINE

## 2022-01-01 PROCEDURE — U0005 INFEC AGEN DETEC AMPLI PROBE: HCPCS | Performed by: EMERGENCY MEDICINE

## 2022-01-01 PROCEDURE — 93005 ELECTROCARDIOGRAM TRACING: CPT | Performed by: NURSE PRACTITIONER

## 2022-01-01 PROCEDURE — 97166 OT EVAL MOD COMPLEX 45 MIN: CPT

## 2022-01-01 PROCEDURE — 74176 CT ABD & PELVIS W/O CONTRAST: CPT

## 2022-01-01 PROCEDURE — 86850 RBC ANTIBODY SCREEN: CPT | Performed by: NURSE PRACTITIONER

## 2022-01-01 PROCEDURE — 80053 COMPREHEN METABOLIC PANEL: CPT | Performed by: INTERNAL MEDICINE

## 2022-01-01 PROCEDURE — 99232 SBSQ HOSP IP/OBS MODERATE 35: CPT | Performed by: NURSE PRACTITIONER

## 2022-01-01 PROCEDURE — 25010000002 ONDANSETRON PER 1 MG: Performed by: NURSE PRACTITIONER

## 2022-01-01 PROCEDURE — 85018 HEMOGLOBIN: CPT | Performed by: HOSPITALIST

## 2022-01-01 PROCEDURE — 83735 ASSAY OF MAGNESIUM: CPT | Performed by: HOSPITALIST

## 2022-01-01 PROCEDURE — 63710000001 ONDANSETRON PER 8 MG: Performed by: NURSE PRACTITIONER

## 2022-01-01 PROCEDURE — 83036 HEMOGLOBIN GLYCOSYLATED A1C: CPT | Performed by: NURSE PRACTITIONER

## 2022-01-01 PROCEDURE — 63710000001 INSULIN LISPRO (HUMAN) PER 5 UNITS: Performed by: HOSPITALIST

## 2022-01-01 PROCEDURE — 63710000001 INSULIN LISPRO (HUMAN) PER 5 UNITS: Performed by: NURSE PRACTITIONER

## 2022-01-01 PROCEDURE — 63710000001 DEXAMETHASONE PER 0.25 MG: Performed by: HOSPITALIST

## 2022-01-01 PROCEDURE — 25010000002 PIPERACILLIN SOD-TAZOBACTAM PER 1 G: Performed by: NURSE PRACTITIONER

## 2022-01-01 PROCEDURE — 25010000002 PIPERACILLIN SOD-TAZOBACTAM PER 1 G: Performed by: EMERGENCY MEDICINE

## 2022-01-01 PROCEDURE — 86015 ACTIN ANTIBODY EACH: CPT | Performed by: INTERNAL MEDICINE

## 2022-01-01 PROCEDURE — 25010000002 HYDROMORPHONE 1 MG/ML SOLUTION: Performed by: INTERNAL MEDICINE

## 2022-01-01 PROCEDURE — 0QH634Z INSERTION OF INTERNAL FIXATION DEVICE INTO RIGHT UPPER FEMUR, PERCUTANEOUS APPROACH: ICD-10-PCS | Performed by: ORTHOPAEDIC SURGERY

## 2022-01-01 PROCEDURE — 87040 BLOOD CULTURE FOR BACTERIA: CPT | Performed by: EMERGENCY MEDICINE

## 2022-01-01 PROCEDURE — 85014 HEMATOCRIT: CPT | Performed by: INTERNAL MEDICINE

## 2022-01-01 PROCEDURE — 85007 BL SMEAR W/DIFF WBC COUNT: CPT | Performed by: NURSE PRACTITIONER

## 2022-01-01 PROCEDURE — 25010000002 FUROSEMIDE PER 20 MG: Performed by: HOSPITALIST

## 2022-01-01 PROCEDURE — 25010000002 HYDROMORPHONE 1 MG/ML SOLUTION: Performed by: NURSE PRACTITIONER

## 2022-01-01 PROCEDURE — 85018 HEMOGLOBIN: CPT | Performed by: NURSE PRACTITIONER

## 2022-01-01 PROCEDURE — 73552 X-RAY EXAM OF FEMUR 2/>: CPT

## 2022-01-01 PROCEDURE — 97110 THERAPEUTIC EXERCISES: CPT

## 2022-01-01 PROCEDURE — 25010000002 CEFTRIAXONE PER 250 MG: Performed by: INTERNAL MEDICINE

## 2022-01-01 PROCEDURE — 73502 X-RAY EXAM HIP UNI 2-3 VIEWS: CPT

## 2022-01-01 PROCEDURE — 81001 URINALYSIS AUTO W/SCOPE: CPT | Performed by: EMERGENCY MEDICINE

## 2022-01-01 PROCEDURE — 86900 BLOOD TYPING SEROLOGIC ABO: CPT | Performed by: NURSE PRACTITIONER

## 2022-01-01 PROCEDURE — 94760 N-INVAS EAR/PLS OXIMETRY 1: CPT

## 2022-01-01 PROCEDURE — 85014 HEMATOCRIT: CPT | Performed by: NURSE PRACTITIONER

## 2022-01-01 PROCEDURE — 83690 ASSAY OF LIPASE: CPT | Performed by: EMERGENCY MEDICINE

## 2022-01-01 PROCEDURE — 25010000002 EPINEPHRINE 1 MG/10ML SOLUTION PREFILLED SYRINGE: Performed by: EMERGENCY MEDICINE

## 2022-01-01 PROCEDURE — XW033E5 INTRODUCTION OF REMDESIVIR ANTI-INFECTIVE INTO PERIPHERAL VEIN, PERCUTANEOUS APPROACH, NEW TECHNOLOGY GROUP 5: ICD-10-PCS | Performed by: INTERNAL MEDICINE

## 2022-01-01 PROCEDURE — 85610 PROTHROMBIN TIME: CPT | Performed by: INTERNAL MEDICINE

## 2022-01-01 PROCEDURE — 36430 TRANSFUSION BLD/BLD COMPNT: CPT

## 2022-01-01 PROCEDURE — 86235 NUCLEAR ANTIGEN ANTIBODY: CPT | Performed by: INTERNAL MEDICINE

## 2022-01-01 PROCEDURE — 94664 DEMO&/EVAL PT USE INHALER: CPT

## 2022-01-01 PROCEDURE — 86923 COMPATIBILITY TEST ELECTRIC: CPT

## 2022-01-01 PROCEDURE — 25010000002 DIGOXIN PER 500 MCG: Performed by: NURSE PRACTITIONER

## 2022-01-01 PROCEDURE — 84550 ASSAY OF BLOOD/URIC ACID: CPT | Performed by: HOSPITALIST

## 2022-01-01 PROCEDURE — 83735 ASSAY OF MAGNESIUM: CPT | Performed by: INTERNAL MEDICINE

## 2022-01-01 PROCEDURE — 86901 BLOOD TYPING SEROLOGIC RH(D): CPT | Performed by: NURSE PRACTITIONER

## 2022-01-01 PROCEDURE — 85014 HEMATOCRIT: CPT | Performed by: HOSPITALIST

## 2022-01-01 PROCEDURE — 85379 FIBRIN DEGRADATION QUANT: CPT | Performed by: INTERNAL MEDICINE

## 2022-01-01 PROCEDURE — 25010000002 PIPERACILLIN SOD-TAZOBACTAM PER 1 G: Performed by: INTERNAL MEDICINE

## 2022-01-01 PROCEDURE — 99285 EMERGENCY DEPT VISIT HI MDM: CPT

## 2022-01-01 PROCEDURE — 99232 SBSQ HOSP IP/OBS MODERATE 35: CPT | Performed by: SURGERY

## 2022-01-01 PROCEDURE — 99233 SBSQ HOSP IP/OBS HIGH 50: CPT | Performed by: STUDENT IN AN ORGANIZED HEALTH CARE EDUCATION/TRAINING PROGRAM

## 2022-01-01 PROCEDURE — 80061 LIPID PANEL: CPT | Performed by: INTERNAL MEDICINE

## 2022-01-01 PROCEDURE — 73501 X-RAY EXAM HIP UNI 1 VIEW: CPT

## 2022-01-01 PROCEDURE — 99231 SBSQ HOSP IP/OBS SF/LOW 25: CPT | Performed by: INTERNAL MEDICINE

## 2022-01-01 PROCEDURE — 93005 ELECTROCARDIOGRAM TRACING: CPT | Performed by: EMERGENCY MEDICINE

## 2022-01-01 PROCEDURE — 93005 ELECTROCARDIOGRAM TRACING: CPT | Performed by: STUDENT IN AN ORGANIZED HEALTH CARE EDUCATION/TRAINING PROGRAM

## 2022-01-01 PROCEDURE — 93005 ELECTROCARDIOGRAM TRACING: CPT | Performed by: INTERNAL MEDICINE

## 2022-01-01 PROCEDURE — 85025 COMPLETE CBC W/AUTO DIFF WBC: CPT | Performed by: EMERGENCY MEDICINE

## 2022-01-01 PROCEDURE — 99024 POSTOP FOLLOW-UP VISIT: CPT | Performed by: NURSE PRACTITIONER

## 2022-01-01 PROCEDURE — 74250 X-RAY XM SM INT 1CNTRST STD: CPT

## 2022-01-01 PROCEDURE — 82728 ASSAY OF FERRITIN: CPT | Performed by: INTERNAL MEDICINE

## 2022-01-01 PROCEDURE — 25010000002 ENOXAPARIN PER 10 MG: Performed by: ORTHOPAEDIC SURGERY

## 2022-01-01 PROCEDURE — 84145 PROCALCITONIN (PCT): CPT | Performed by: INTERNAL MEDICINE

## 2022-01-01 PROCEDURE — P9612 CATHETERIZE FOR URINE SPEC: HCPCS

## 2022-01-01 PROCEDURE — 85610 PROTHROMBIN TIME: CPT | Performed by: EMERGENCY MEDICINE

## 2022-01-01 PROCEDURE — 99222 1ST HOSP IP/OBS MODERATE 55: CPT | Performed by: INTERNAL MEDICINE

## 2022-01-01 PROCEDURE — 99222 1ST HOSP IP/OBS MODERATE 55: CPT | Performed by: ORTHOPAEDIC SURGERY

## 2022-01-01 PROCEDURE — 83010 ASSAY OF HAPTOGLOBIN QUANT: CPT | Performed by: INTERNAL MEDICINE

## 2022-01-01 PROCEDURE — U0003 INFECTIOUS AGENT DETECTION BY NUCLEIC ACID (DNA OR RNA); SEVERE ACUTE RESPIRATORY SYNDROME CORONAVIRUS 2 (SARS-COV-2) (CORONAVIRUS DISEASE [COVID-19]), AMPLIFIED PROBE TECHNIQUE, MAKING USE OF HIGH THROUGHPUT TECHNOLOGIES AS DESCRIBED BY CMS-2020-01-R: HCPCS | Performed by: EMERGENCY MEDICINE

## 2022-01-01 PROCEDURE — 25010000002 FENTANYL CITRATE (PF) 50 MCG/ML SOLUTION

## 2022-01-01 PROCEDURE — 0 CEFAZOLIN IN DEXTROSE 2-4 GM/100ML-% SOLUTION: Performed by: NURSE PRACTITIONER

## 2022-01-01 PROCEDURE — G0179 MD RECERTIFICATION HHA PT: HCPCS | Performed by: STUDENT IN AN ORGANIZED HEALTH CARE EDUCATION/TRAINING PROGRAM

## 2022-01-01 PROCEDURE — 25010000002 DIPHENHYDRAMINE PER 50 MG: Performed by: INTERNAL MEDICINE

## 2022-01-01 PROCEDURE — U0003 INFECTIOUS AGENT DETECTION BY NUCLEIC ACID (DNA OR RNA); SEVERE ACUTE RESPIRATORY SYNDROME CORONAVIRUS 2 (SARS-COV-2) (CORONAVIRUS DISEASE [COVID-19]), AMPLIFIED PROBE TECHNIQUE, MAKING USE OF HIGH THROUGHPUT TECHNOLOGIES AS DESCRIBED BY CMS-2020-01-R: HCPCS | Performed by: STUDENT IN AN ORGANIZED HEALTH CARE EDUCATION/TRAINING PROGRAM

## 2022-01-01 PROCEDURE — C1889 IMPLANT/INSERT DEVICE, NOC: HCPCS | Performed by: ORTHOPAEDIC SURGERY

## 2022-01-01 PROCEDURE — 73502 X-RAY EXAM HIP UNI 2-3 VIEWS: CPT | Performed by: ORTHOPAEDIC SURGERY

## 2022-01-01 PROCEDURE — 25010000002 VANCOMYCIN: Performed by: NURSE PRACTITIONER

## 2022-01-01 PROCEDURE — 84145 PROCALCITONIN (PCT): CPT | Performed by: EMERGENCY MEDICINE

## 2022-01-01 PROCEDURE — 99221 1ST HOSP IP/OBS SF/LOW 40: CPT | Performed by: STUDENT IN AN ORGANIZED HEALTH CARE EDUCATION/TRAINING PROGRAM

## 2022-01-01 PROCEDURE — 73030 X-RAY EXAM OF SHOULDER: CPT

## 2022-01-01 PROCEDURE — 81001 URINALYSIS AUTO W/SCOPE: CPT | Performed by: INTERNAL MEDICINE

## 2022-01-01 PROCEDURE — 97168 OT RE-EVAL EST PLAN CARE: CPT

## 2022-01-01 PROCEDURE — 99231 SBSQ HOSP IP/OBS SF/LOW 25: CPT | Performed by: SURGERY

## 2022-01-01 PROCEDURE — 85007 BL SMEAR W/DIFF WBC COUNT: CPT | Performed by: INTERNAL MEDICINE

## 2022-01-01 PROCEDURE — 80162 ASSAY OF DIGOXIN TOTAL: CPT | Performed by: INTERNAL MEDICINE

## 2022-01-01 PROCEDURE — 76705 ECHO EXAM OF ABDOMEN: CPT

## 2022-01-01 PROCEDURE — 25010000002 PROPOFOL 10 MG/ML EMULSION

## 2022-01-01 PROCEDURE — 86850 RBC ANTIBODY SCREEN: CPT | Performed by: INTERNAL MEDICINE

## 2022-01-01 PROCEDURE — 80076 HEPATIC FUNCTION PANEL: CPT | Performed by: INTERNAL MEDICINE

## 2022-01-01 PROCEDURE — 0 MAGNESIUM SULFATE 4 GM/100ML SOLUTION: Performed by: HOSPITALIST

## 2022-01-01 PROCEDURE — 80048 BASIC METABOLIC PNL TOTAL CA: CPT | Performed by: HOSPITALIST

## 2022-01-01 PROCEDURE — 25010000002 ENOXAPARIN PER 10 MG: Performed by: STUDENT IN AN ORGANIZED HEALTH CARE EDUCATION/TRAINING PROGRAM

## 2022-01-01 PROCEDURE — 85730 THROMBOPLASTIN TIME PARTIAL: CPT | Performed by: INTERNAL MEDICINE

## 2022-01-01 PROCEDURE — 97164 PT RE-EVAL EST PLAN CARE: CPT

## 2022-01-01 PROCEDURE — 25010000002 PIPERACILLIN SOD-TAZOBACTAM PER 1 G: Performed by: HOSPITALIST

## 2022-01-01 PROCEDURE — 27235 TREAT THIGH FRACTURE: CPT | Performed by: ORTHOPAEDIC SURGERY

## 2022-01-01 PROCEDURE — 80053 COMPREHEN METABOLIC PANEL: CPT | Performed by: HOSPITALIST

## 2022-01-01 PROCEDURE — 84550 ASSAY OF BLOOD/URIC ACID: CPT | Performed by: INTERNAL MEDICINE

## 2022-01-01 PROCEDURE — 72192 CT PELVIS W/O DYE: CPT

## 2022-01-01 PROCEDURE — 85027 COMPLETE CBC AUTOMATED: CPT | Performed by: NURSE PRACTITIONER

## 2022-01-01 PROCEDURE — 82248 BILIRUBIN DIRECT: CPT | Performed by: EMERGENCY MEDICINE

## 2022-01-01 PROCEDURE — 93975 VASCULAR STUDY: CPT

## 2022-01-01 PROCEDURE — 83735 ASSAY OF MAGNESIUM: CPT | Performed by: EMERGENCY MEDICINE

## 2022-01-01 PROCEDURE — 86900 BLOOD TYPING SEROLOGIC ABO: CPT

## 2022-01-01 PROCEDURE — 92950 HEART/LUNG RESUSCITATION CPR: CPT

## 2022-01-01 PROCEDURE — 85018 HEMOGLOBIN: CPT | Performed by: INTERNAL MEDICINE

## 2022-01-01 PROCEDURE — 86225 DNA ANTIBODY NATIVE: CPT | Performed by: INTERNAL MEDICINE

## 2022-01-01 PROCEDURE — 99233 SBSQ HOSP IP/OBS HIGH 50: CPT | Performed by: INTERNAL MEDICINE

## 2022-01-01 PROCEDURE — 86376 MICROSOMAL ANTIBODY EACH: CPT | Performed by: INTERNAL MEDICINE

## 2022-01-01 PROCEDURE — 80053 COMPREHEN METABOLIC PANEL: CPT | Performed by: EMERGENCY MEDICINE

## 2022-01-01 PROCEDURE — 83605 ASSAY OF LACTIC ACID: CPT | Performed by: EMERGENCY MEDICINE

## 2022-01-01 PROCEDURE — 0 DIATRIZOATE MEGLUMINE & SODIUM PER 1 ML: Performed by: RADIOLOGY

## 2022-01-01 PROCEDURE — 82550 ASSAY OF CK (CPK): CPT | Performed by: INTERNAL MEDICINE

## 2022-01-01 PROCEDURE — 84484 ASSAY OF TROPONIN QUANT: CPT | Performed by: NURSE PRACTITIONER

## 2022-01-01 PROCEDURE — 86900 BLOOD TYPING SEROLOGIC ABO: CPT | Performed by: INTERNAL MEDICINE

## 2022-01-01 PROCEDURE — 25010000002 HYDROMORPHONE PER 4 MG

## 2022-01-01 PROCEDURE — 25010000002 DIPHENHYDRAMINE PER 50 MG: Performed by: STUDENT IN AN ORGANIZED HEALTH CARE EDUCATION/TRAINING PROGRAM

## 2022-01-01 PROCEDURE — 25010000002 FENTANYL CITRATE (PF) 50 MCG/ML SOLUTION: Performed by: EMERGENCY MEDICINE

## 2022-01-01 PROCEDURE — 97162 PT EVAL MOD COMPLEX 30 MIN: CPT

## 2022-01-01 PROCEDURE — 84484 ASSAY OF TROPONIN QUANT: CPT | Performed by: EMERGENCY MEDICINE

## 2022-01-01 PROCEDURE — 87086 URINE CULTURE/COLONY COUNT: CPT | Performed by: INTERNAL MEDICINE

## 2022-01-01 PROCEDURE — 83615 LACTATE (LD) (LDH) ENZYME: CPT | Performed by: INTERNAL MEDICINE

## 2022-01-01 PROCEDURE — 25010000002 ONDANSETRON PER 1 MG: Performed by: EMERGENCY MEDICINE

## 2022-01-01 DEVICE — KNOTLESS TISSUE CONTROL DEVICE, UNDYED UNIDIRECTIONAL (ANTIBACTERIAL) SYNTHETIC ABSORBABLE DEVICE
Type: IMPLANTABLE DEVICE | Site: HIP | Status: FUNCTIONAL
Brand: STRATAFIX

## 2022-01-01 RX ORDER — INSULIN LISPRO 100 [IU]/ML
10 INJECTION, SOLUTION INTRAVENOUS; SUBCUTANEOUS
Status: DISCONTINUED | OUTPATIENT
Start: 2022-01-01 | End: 2022-01-01 | Stop reason: HOSPADM

## 2022-01-01 RX ORDER — SIMETHICONE 80 MG
80 TABLET,CHEWABLE ORAL 4 TIMES DAILY PRN
Status: DISCONTINUED | OUTPATIENT
Start: 2022-01-01 | End: 2022-01-01 | Stop reason: HOSPADM

## 2022-01-01 RX ORDER — DILTIAZEM HYDROCHLORIDE 5 MG/ML
10 INJECTION INTRAVENOUS ONCE
Status: COMPLETED | OUTPATIENT
Start: 2022-01-01 | End: 2022-01-01

## 2022-01-01 RX ORDER — LISINOPRIL 40 MG/1
40 TABLET ORAL
Status: DISCONTINUED | OUTPATIENT
Start: 2022-01-01 | End: 2022-01-01

## 2022-01-01 RX ORDER — LAMOTRIGINE 25 MG/1
25 TABLET ORAL DAILY
Status: DISCONTINUED | OUTPATIENT
Start: 2022-01-01 | End: 2022-01-01

## 2022-01-01 RX ORDER — DIGOXIN 0.25 MG/ML
250 INJECTION INTRAMUSCULAR; INTRAVENOUS ONCE
Status: COMPLETED | OUTPATIENT
Start: 2022-01-01 | End: 2022-01-01

## 2022-01-01 RX ORDER — DEXAMETHASONE 2 MG/1
2 TABLET ORAL
Status: COMPLETED | OUTPATIENT
Start: 2022-01-01 | End: 2022-01-01

## 2022-01-01 RX ORDER — MAGNESIUM HYDROXIDE 1200 MG/15ML
LIQUID ORAL AS NEEDED
Status: DISCONTINUED | OUTPATIENT
Start: 2022-01-01 | End: 2022-01-01 | Stop reason: HOSPADM

## 2022-01-01 RX ORDER — ASPIRIN 81 MG/1
81 TABLET ORAL DAILY
Status: DISCONTINUED | OUTPATIENT
Start: 2022-01-01 | End: 2022-01-01

## 2022-01-01 RX ORDER — DIGOXIN 0.25 MG/ML
250 INJECTION INTRAMUSCULAR; INTRAVENOUS ONCE
Status: DISCONTINUED | OUTPATIENT
Start: 2022-01-01 | End: 2022-01-01

## 2022-01-01 RX ORDER — DIPHENHYDRAMINE HYDROCHLORIDE 50 MG/ML
12.5 INJECTION INTRAMUSCULAR; INTRAVENOUS
Status: DISCONTINUED | OUTPATIENT
Start: 2022-01-01 | End: 2022-01-01 | Stop reason: HOSPADM

## 2022-01-01 RX ORDER — SODIUM CHLORIDE, SODIUM LACTATE, POTASSIUM CHLORIDE, CALCIUM CHLORIDE 600; 310; 30; 20 MG/100ML; MG/100ML; MG/100ML; MG/100ML
100 INJECTION, SOLUTION INTRAVENOUS CONTINUOUS
Status: DISCONTINUED | OUTPATIENT
Start: 2022-01-01 | End: 2022-01-01

## 2022-01-01 RX ORDER — DILTIAZEM HYDROCHLORIDE 60 MG/1
60 TABLET, FILM COATED ORAL ONCE
Status: COMPLETED | OUTPATIENT
Start: 2022-01-01 | End: 2022-01-01

## 2022-01-01 RX ORDER — LISINOPRIL 20 MG/1
20 TABLET ORAL
Status: DISCONTINUED | OUTPATIENT
Start: 2022-01-01 | End: 2022-01-01

## 2022-01-01 RX ORDER — LIDOCAINE HYDROCHLORIDE 20 MG/ML
INJECTION, SOLUTION INFILTRATION; PERINEURAL AS NEEDED
Status: DISCONTINUED | OUTPATIENT
Start: 2022-01-01 | End: 2022-01-01 | Stop reason: SURG

## 2022-01-01 RX ORDER — PROMETHAZINE HYDROCHLORIDE 25 MG/1
25 TABLET ORAL ONCE AS NEEDED
Status: DISCONTINUED | OUTPATIENT
Start: 2022-01-01 | End: 2022-01-01 | Stop reason: HOSPADM

## 2022-01-01 RX ORDER — DIGOXIN 0.25 MG/ML
125 INJECTION INTRAMUSCULAR; INTRAVENOUS
Status: DISCONTINUED | OUTPATIENT
Start: 2022-01-01 | End: 2022-01-01

## 2022-01-01 RX ORDER — OXYCODONE HYDROCHLORIDE AND ACETAMINOPHEN 5; 325 MG/1; MG/1
1 TABLET ORAL EVERY 6 HOURS PRN
Status: DISCONTINUED | OUTPATIENT
Start: 2022-01-01 | End: 2022-01-01

## 2022-01-01 RX ORDER — LAMOTRIGINE 100 MG/1
100 TABLET ORAL DAILY
Status: DISCONTINUED | OUTPATIENT
Start: 2022-01-01 | End: 2022-01-01 | Stop reason: HOSPADM

## 2022-01-01 RX ORDER — POLYETHYLENE GLYCOL 3350 17 G/17G
17 POWDER, FOR SOLUTION ORAL DAILY
Status: DISCONTINUED | OUTPATIENT
Start: 2022-01-01 | End: 2022-01-01

## 2022-01-01 RX ORDER — DEXAMETHASONE SODIUM PHOSPHATE 10 MG/ML
8 INJECTION INTRAMUSCULAR; INTRAVENOUS ONCE
Status: COMPLETED | OUTPATIENT
Start: 2022-01-01 | End: 2022-01-01

## 2022-01-01 RX ORDER — SODIUM CHLORIDE 9 MG/ML
125 INJECTION, SOLUTION INTRAVENOUS CONTINUOUS
Status: DISCONTINUED | OUTPATIENT
Start: 2022-01-01 | End: 2022-01-01

## 2022-01-01 RX ORDER — DILTIAZEM HYDROCHLORIDE 5 MG/ML
5 INJECTION INTRAVENOUS ONCE
Status: COMPLETED | OUTPATIENT
Start: 2022-01-01 | End: 2022-01-01

## 2022-01-01 RX ORDER — SODIUM CHLORIDE 0.9 % (FLUSH) 0.9 %
10 SYRINGE (ML) INJECTION AS NEEDED
Status: DISCONTINUED | OUTPATIENT
Start: 2022-01-01 | End: 2022-01-01 | Stop reason: HOSPADM

## 2022-01-01 RX ORDER — NALOXONE HCL 0.4 MG/ML
0.2 VIAL (ML) INJECTION AS NEEDED
Status: DISCONTINUED | OUTPATIENT
Start: 2022-01-01 | End: 2022-01-01 | Stop reason: HOSPADM

## 2022-01-01 RX ORDER — FLUOXETINE HYDROCHLORIDE 20 MG/1
40 CAPSULE ORAL DAILY
Status: DISCONTINUED | OUTPATIENT
Start: 2022-01-01 | End: 2022-01-01 | Stop reason: HOSPADM

## 2022-01-01 RX ORDER — LAMOTRIGINE 25 MG/1
25 TABLET ORAL NIGHTLY
Status: COMPLETED | OUTPATIENT
Start: 2022-01-01 | End: 2022-01-01

## 2022-01-01 RX ORDER — PROMETHAZINE HYDROCHLORIDE 25 MG/1
25 SUPPOSITORY RECTAL ONCE AS NEEDED
Status: DISCONTINUED | OUTPATIENT
Start: 2022-01-01 | End: 2022-01-01 | Stop reason: HOSPADM

## 2022-01-01 RX ORDER — DILTIAZEM HYDROCHLORIDE 5 MG/ML
20 INJECTION INTRAVENOUS ONCE
Status: DISCONTINUED | OUTPATIENT
Start: 2022-01-01 | End: 2022-01-01 | Stop reason: HOSPADM

## 2022-01-01 RX ORDER — FENTANYL CITRATE 50 UG/ML
50 INJECTION, SOLUTION INTRAMUSCULAR; INTRAVENOUS
Status: DISCONTINUED | OUTPATIENT
Start: 2022-01-01 | End: 2022-01-01 | Stop reason: HOSPADM

## 2022-01-01 RX ORDER — POLYETHYLENE GLYCOL 3350 17 G/17G
17 POWDER, FOR SOLUTION ORAL 2 TIMES DAILY
Status: DISCONTINUED | OUTPATIENT
Start: 2022-01-01 | End: 2022-01-01 | Stop reason: HOSPADM

## 2022-01-01 RX ORDER — POTASSIUM CHLORIDE 750 MG/1
40 TABLET, FILM COATED, EXTENDED RELEASE ORAL ONCE
Status: COMPLETED | OUTPATIENT
Start: 2022-01-01 | End: 2022-01-01

## 2022-01-01 RX ORDER — ACETAMINOPHEN 325 MG/1
325 TABLET ORAL EVERY 4 HOURS PRN
Status: DISCONTINUED | OUTPATIENT
Start: 2022-01-01 | End: 2022-01-01

## 2022-01-01 RX ORDER — INSULIN LISPRO 100 [IU]/ML
12 INJECTION, SOLUTION INTRAVENOUS; SUBCUTANEOUS
Status: DISCONTINUED | OUTPATIENT
Start: 2022-01-01 | End: 2022-01-01

## 2022-01-01 RX ORDER — EPHEDRINE SULFATE 50 MG/ML
5 INJECTION, SOLUTION INTRAVENOUS ONCE AS NEEDED
Status: DISCONTINUED | OUTPATIENT
Start: 2022-01-01 | End: 2022-01-01 | Stop reason: HOSPADM

## 2022-01-01 RX ORDER — DOCOSANOL 100 MG/G
1 CREAM TOPICAL
Status: DISCONTINUED | OUTPATIENT
Start: 2022-01-01 | End: 2022-01-01 | Stop reason: HOSPADM

## 2022-01-01 RX ORDER — OXYCODONE HYDROCHLORIDE 5 MG/1
5 TABLET ORAL EVERY 6 HOURS PRN
Status: DISCONTINUED | OUTPATIENT
Start: 2022-01-01 | End: 2022-01-01 | Stop reason: HOSPADM

## 2022-01-01 RX ORDER — INSULIN LISPRO 100 [IU]/ML
13 INJECTION, SOLUTION INTRAVENOUS; SUBCUTANEOUS
Status: DISCONTINUED | OUTPATIENT
Start: 2022-01-01 | End: 2022-01-01

## 2022-01-01 RX ORDER — HYDRALAZINE HYDROCHLORIDE 20 MG/ML
5 INJECTION INTRAMUSCULAR; INTRAVENOUS
Status: DISCONTINUED | OUTPATIENT
Start: 2022-01-01 | End: 2022-01-01 | Stop reason: HOSPADM

## 2022-01-01 RX ORDER — DIPHENHYDRAMINE HYDROCHLORIDE 50 MG/ML
50 INJECTION INTRAMUSCULAR; INTRAVENOUS ONCE
Status: COMPLETED | OUTPATIENT
Start: 2022-01-01 | End: 2022-01-01

## 2022-01-01 RX ORDER — ENALAPRILAT 2.5 MG/2ML
1.25 INJECTION INTRAVENOUS EVERY 6 HOURS
Status: DISCONTINUED | OUTPATIENT
Start: 2022-01-01 | End: 2022-01-01

## 2022-01-01 RX ORDER — IBUPROFEN 600 MG/1
600 TABLET ORAL ONCE AS NEEDED
Status: DISCONTINUED | OUTPATIENT
Start: 2022-01-01 | End: 2022-01-01 | Stop reason: HOSPADM

## 2022-01-01 RX ORDER — FENTANYL CITRATE 50 UG/ML
50 INJECTION, SOLUTION INTRAMUSCULAR; INTRAVENOUS ONCE
Status: COMPLETED | OUTPATIENT
Start: 2022-01-01 | End: 2022-01-01

## 2022-01-01 RX ORDER — NITROGLYCERIN 0.4 MG/1
0.4 TABLET SUBLINGUAL
Status: DISCONTINUED | OUTPATIENT
Start: 2022-01-01 | End: 2022-01-01 | Stop reason: HOSPADM

## 2022-01-01 RX ORDER — HYDROMORPHONE HYDROCHLORIDE 1 MG/ML
0.5 INJECTION, SOLUTION INTRAMUSCULAR; INTRAVENOUS; SUBCUTANEOUS
Status: DISCONTINUED | OUTPATIENT
Start: 2022-01-01 | End: 2022-01-01 | Stop reason: HOSPADM

## 2022-01-01 RX ORDER — ONDANSETRON 2 MG/ML
4 INJECTION INTRAMUSCULAR; INTRAVENOUS ONCE
Status: COMPLETED | OUTPATIENT
Start: 2022-01-01 | End: 2022-01-01

## 2022-01-01 RX ORDER — DILTIAZEM HCL IN NACL,ISO-OSM 125 MG/125
5-15 PLASTIC BAG, INJECTION (ML) INTRAVENOUS
Status: DISCONTINUED | OUTPATIENT
Start: 2022-01-01 | End: 2022-01-01

## 2022-01-01 RX ORDER — DIGOXIN 0.25 MG/ML
250 INJECTION INTRAMUSCULAR; INTRAVENOUS EVERY 8 HOURS
Status: COMPLETED | OUTPATIENT
Start: 2022-01-01 | End: 2022-01-01

## 2022-01-01 RX ORDER — CEFAZOLIN SODIUM 2 G/100ML
2 INJECTION, SOLUTION INTRAVENOUS EVERY 8 HOURS
Status: DISPENSED | OUTPATIENT
Start: 2022-01-01 | End: 2022-01-01

## 2022-01-01 RX ORDER — PROPOFOL 10 MG/ML
VIAL (ML) INTRAVENOUS AS NEEDED
Status: DISCONTINUED | OUTPATIENT
Start: 2022-01-01 | End: 2022-01-01 | Stop reason: SURG

## 2022-01-01 RX ORDER — PROPOFOL 10 MG/ML
VIAL (ML) INTRAVENOUS CONTINUOUS PRN
Status: DISCONTINUED | OUTPATIENT
Start: 2022-01-01 | End: 2022-01-01 | Stop reason: SURG

## 2022-01-01 RX ORDER — BUDESONIDE AND FORMOTEROL FUMARATE DIHYDRATE 160; 4.5 UG/1; UG/1
2 AEROSOL RESPIRATORY (INHALATION)
Status: DISCONTINUED | OUTPATIENT
Start: 2022-01-01 | End: 2022-01-01 | Stop reason: HOSPADM

## 2022-01-01 RX ORDER — ONDANSETRON 2 MG/ML
4 INJECTION INTRAMUSCULAR; INTRAVENOUS ONCE AS NEEDED
Status: DISCONTINUED | OUTPATIENT
Start: 2022-01-01 | End: 2022-01-01 | Stop reason: HOSPADM

## 2022-01-01 RX ORDER — PANTOPRAZOLE SODIUM 40 MG/1
40 TABLET, DELAYED RELEASE ORAL
Status: DISCONTINUED | OUTPATIENT
Start: 2022-01-01 | End: 2022-01-01

## 2022-01-01 RX ORDER — DEXAMETHASONE 4 MG/1
4 TABLET ORAL
Status: COMPLETED | OUTPATIENT
Start: 2022-01-01 | End: 2022-01-01

## 2022-01-01 RX ORDER — DEXAMETHASONE 6 MG/1
6 TABLET ORAL DAILY
Status: DISCONTINUED | OUTPATIENT
Start: 2022-01-01 | End: 2022-01-01

## 2022-01-01 RX ORDER — FUROSEMIDE 40 MG/1
40 TABLET ORAL DAILY
Status: DISCONTINUED | OUTPATIENT
Start: 2022-01-01 | End: 2022-01-01 | Stop reason: HOSPADM

## 2022-01-01 RX ORDER — NICOTINE POLACRILEX 4 MG
15 LOZENGE BUCCAL
Status: DISCONTINUED | OUTPATIENT
Start: 2022-01-01 | End: 2022-01-01 | Stop reason: HOSPADM

## 2022-01-01 RX ORDER — INSULIN LISPRO 100 [IU]/ML
0-9 INJECTION, SOLUTION INTRAVENOUS; SUBCUTANEOUS
Status: DISCONTINUED | OUTPATIENT
Start: 2022-01-01 | End: 2022-01-01 | Stop reason: HOSPADM

## 2022-01-01 RX ORDER — DOCUSATE SODIUM 100 MG/1
100 CAPSULE, LIQUID FILLED ORAL EVERY OTHER DAY
Status: DISCONTINUED | OUTPATIENT
Start: 2022-01-01 | End: 2022-01-01 | Stop reason: HOSPADM

## 2022-01-01 RX ORDER — MAGNESIUM SULFATE 1 G/100ML
1 INJECTION INTRAVENOUS
Status: COMPLETED | OUTPATIENT
Start: 2022-01-01 | End: 2022-01-01

## 2022-01-01 RX ORDER — CEFAZOLIN SODIUM 2 G/100ML
2 INJECTION, SOLUTION INTRAVENOUS ONCE
Status: COMPLETED | OUTPATIENT
Start: 2022-01-01 | End: 2022-01-01

## 2022-01-01 RX ORDER — EPINEPHRINE 0.1 MG/ML
SYRINGE (ML) INJECTION
Status: COMPLETED | OUTPATIENT
Start: 2022-01-01 | End: 2022-01-01

## 2022-01-01 RX ORDER — DIPHENHYDRAMINE HCL 25 MG
25 CAPSULE ORAL
Status: DISCONTINUED | OUTPATIENT
Start: 2022-01-01 | End: 2022-01-01 | Stop reason: HOSPADM

## 2022-01-01 RX ORDER — DIPHENHYDRAMINE HYDROCHLORIDE 50 MG/ML
25 INJECTION INTRAMUSCULAR; INTRAVENOUS ONCE
Status: COMPLETED | OUTPATIENT
Start: 2022-01-01 | End: 2022-01-01

## 2022-01-01 RX ORDER — ALUMINA, MAGNESIA, AND SIMETHICONE 2400; 2400; 240 MG/30ML; MG/30ML; MG/30ML
15 SUSPENSION ORAL EVERY 6 HOURS PRN
Status: DISCONTINUED | OUTPATIENT
Start: 2022-01-01 | End: 2022-01-01 | Stop reason: HOSPADM

## 2022-01-01 RX ORDER — DILTIAZEM HYDROCHLORIDE 60 MG/1
60 TABLET, FILM COATED ORAL EVERY 6 HOURS SCHEDULED
Status: DISCONTINUED | OUTPATIENT
Start: 2022-01-01 | End: 2022-01-01

## 2022-01-01 RX ORDER — ONDANSETRON 2 MG/ML
4 INJECTION INTRAMUSCULAR; INTRAVENOUS EVERY 6 HOURS PRN
Status: DISCONTINUED | OUTPATIENT
Start: 2022-01-01 | End: 2022-01-01 | Stop reason: HOSPADM

## 2022-01-01 RX ORDER — INSULIN LISPRO 100 [IU]/ML
0-14 INJECTION, SOLUTION INTRAVENOUS; SUBCUTANEOUS
Status: DISCONTINUED | OUTPATIENT
Start: 2022-01-01 | End: 2022-01-01

## 2022-01-01 RX ORDER — PANTOPRAZOLE SODIUM 40 MG/1
40 TABLET, DELAYED RELEASE ORAL 2 TIMES DAILY
Status: DISCONTINUED | OUTPATIENT
Start: 2022-01-01 | End: 2022-01-01 | Stop reason: HOSPADM

## 2022-01-01 RX ORDER — VERAPAMIL HYDROCHLORIDE 240 MG/1
240 TABLET, FILM COATED, EXTENDED RELEASE ORAL
Status: DISCONTINUED | OUTPATIENT
Start: 2022-01-01 | End: 2022-01-01

## 2022-01-01 RX ORDER — DILTIAZEM HCL IN NACL,ISO-OSM 125 MG/125
5 PLASTIC BAG, INJECTION (ML) INTRAVENOUS
Status: DISCONTINUED | OUTPATIENT
Start: 2022-01-01 | End: 2022-01-01

## 2022-01-01 RX ORDER — LAMOTRIGINE 100 MG/1
100 TABLET ORAL NIGHTLY
Status: DISCONTINUED | OUTPATIENT
Start: 2022-02-09 | End: 2022-01-01

## 2022-01-01 RX ORDER — FUROSEMIDE 10 MG/ML
40 INJECTION INTRAMUSCULAR; INTRAVENOUS ONCE
Status: COMPLETED | OUTPATIENT
Start: 2022-01-01 | End: 2022-01-01

## 2022-01-01 RX ORDER — IPRATROPIUM BROMIDE AND ALBUTEROL SULFATE 2.5; .5 MG/3ML; MG/3ML
3 SOLUTION RESPIRATORY (INHALATION)
Status: DISCONTINUED | OUTPATIENT
Start: 2022-01-01 | End: 2022-01-01 | Stop reason: CLARIF

## 2022-01-01 RX ORDER — ALBUTEROL SULFATE 2.5 MG/3ML
2.5 SOLUTION RESPIRATORY (INHALATION)
Status: DISCONTINUED | OUTPATIENT
Start: 2022-01-01 | End: 2022-01-01 | Stop reason: HOSPADM

## 2022-01-01 RX ORDER — LAMOTRIGINE 25 MG/1
50 TABLET ORAL NIGHTLY
Status: DISCONTINUED | OUTPATIENT
Start: 2022-01-01 | End: 2022-01-01

## 2022-01-01 RX ORDER — FAMOTIDINE 20 MG/1
20 TABLET, FILM COATED ORAL 2 TIMES DAILY PRN
Status: DISCONTINUED | OUTPATIENT
Start: 2022-01-01 | End: 2022-01-01 | Stop reason: HOSPADM

## 2022-01-01 RX ORDER — SODIUM CHLORIDE 0.9 % (FLUSH) 0.9 %
10 SYRINGE (ML) INJECTION EVERY 12 HOURS SCHEDULED
Status: DISCONTINUED | OUTPATIENT
Start: 2022-01-01 | End: 2022-01-01 | Stop reason: HOSPADM

## 2022-01-01 RX ORDER — IPRATROPIUM BROMIDE AND ALBUTEROL SULFATE 2.5; .5 MG/3ML; MG/3ML
3 SOLUTION RESPIRATORY (INHALATION) EVERY 4 HOURS PRN
Status: DISCONTINUED | OUTPATIENT
Start: 2022-01-01 | End: 2022-01-01

## 2022-01-01 RX ORDER — FLUMAZENIL 0.1 MG/ML
0.2 INJECTION INTRAVENOUS AS NEEDED
Status: DISCONTINUED | OUTPATIENT
Start: 2022-01-01 | End: 2022-01-01 | Stop reason: HOSPADM

## 2022-01-01 RX ORDER — GLYCOPYRROLATE 0.2 MG/ML
INJECTION INTRAMUSCULAR; INTRAVENOUS AS NEEDED
Status: DISCONTINUED | OUTPATIENT
Start: 2022-01-01 | End: 2022-01-01 | Stop reason: SURG

## 2022-01-01 RX ORDER — DILTIAZEM HYDROCHLORIDE 60 MG/1
120 TABLET, FILM COATED ORAL EVERY 8 HOURS SCHEDULED
Status: DISCONTINUED | OUTPATIENT
Start: 2022-01-01 | End: 2022-01-01 | Stop reason: HOSPADM

## 2022-01-01 RX ORDER — DIGOXIN 0.25 MG/ML
250 INJECTION INTRAMUSCULAR; INTRAVENOUS EVERY 6 HOURS
Status: DISPENSED | OUTPATIENT
Start: 2022-01-01 | End: 2022-01-01

## 2022-01-01 RX ORDER — DIGOXIN 0.25 MG/ML
250 INJECTION INTRAMUSCULAR; INTRAVENOUS EVERY 6 HOURS
Status: COMPLETED | OUTPATIENT
Start: 2022-01-01 | End: 2022-01-01

## 2022-01-01 RX ORDER — PREGABALIN 75 MG/1
150 CAPSULE ORAL ONCE
Status: DISCONTINUED | OUTPATIENT
Start: 2022-01-01 | End: 2022-01-01 | Stop reason: HOSPADM

## 2022-01-01 RX ORDER — INSULIN LISPRO 100 [IU]/ML
0-9 INJECTION, SOLUTION INTRAVENOUS; SUBCUTANEOUS
Status: DISCONTINUED | OUTPATIENT
Start: 2022-01-01 | End: 2022-01-01

## 2022-01-01 RX ORDER — DEXAMETHASONE SODIUM PHOSPHATE 10 MG/ML
6 INJECTION INTRAMUSCULAR; INTRAVENOUS DAILY
Status: DISCONTINUED | OUTPATIENT
Start: 2022-01-01 | End: 2022-01-01

## 2022-01-01 RX ORDER — SODIUM CHLORIDE, SODIUM LACTATE, POTASSIUM CHLORIDE, CALCIUM CHLORIDE 600; 310; 30; 20 MG/100ML; MG/100ML; MG/100ML; MG/100ML
INJECTION, SOLUTION INTRAVENOUS CONTINUOUS PRN
Status: DISCONTINUED | OUTPATIENT
Start: 2022-01-01 | End: 2022-01-01 | Stop reason: SURG

## 2022-01-01 RX ORDER — ONDANSETRON 4 MG/1
4 TABLET, FILM COATED ORAL EVERY 6 HOURS PRN
Status: DISCONTINUED | OUTPATIENT
Start: 2022-01-01 | End: 2022-01-01 | Stop reason: HOSPADM

## 2022-01-01 RX ORDER — DEXTROSE MONOHYDRATE 25 G/50ML
25 INJECTION, SOLUTION INTRAVENOUS
Status: DISCONTINUED | OUTPATIENT
Start: 2022-01-01 | End: 2022-01-01 | Stop reason: HOSPADM

## 2022-01-01 RX ORDER — MAGNESIUM SULFATE HEPTAHYDRATE 40 MG/ML
4 INJECTION, SOLUTION INTRAVENOUS ONCE
Status: COMPLETED | OUTPATIENT
Start: 2022-01-01 | End: 2022-01-01

## 2022-01-01 RX ORDER — INSULIN LISPRO 100 [IU]/ML
5 INJECTION, SOLUTION INTRAVENOUS; SUBCUTANEOUS
Status: DISCONTINUED | OUTPATIENT
Start: 2022-01-01 | End: 2022-01-01

## 2022-01-01 RX ORDER — ONDANSETRON 2 MG/ML
4 INJECTION INTRAMUSCULAR; INTRAVENOUS EVERY 6 HOURS PRN
Status: DISCONTINUED | OUTPATIENT
Start: 2022-01-01 | End: 2022-01-01 | Stop reason: SDUPTHER

## 2022-01-01 RX ORDER — SODIUM CHLORIDE 9 MG/ML
100 INJECTION, SOLUTION INTRAVENOUS CONTINUOUS
Status: DISCONTINUED | OUTPATIENT
Start: 2022-01-01 | End: 2022-01-01

## 2022-01-01 RX ADMIN — ENOXAPARIN SODIUM 40 MG: 40 INJECTION SUBCUTANEOUS at 11:34

## 2022-01-01 RX ADMIN — APIXABAN 5 MG: 5 TABLET, FILM COATED ORAL at 08:37

## 2022-01-01 RX ADMIN — INSULIN LISPRO 10 UNITS: 100 INJECTION, SOLUTION INTRAVENOUS; SUBCUTANEOUS at 16:33

## 2022-01-01 RX ADMIN — VERAPAMIL HYDROCHLORIDE 240 MG: 240 TABLET, FILM COATED, EXTENDED RELEASE ORAL at 09:22

## 2022-01-01 RX ADMIN — DOCOSANOL 1 APPLICATION: 100 CREAM TOPICAL at 10:13

## 2022-01-01 RX ADMIN — HYDROMORPHONE HYDROCHLORIDE 1 MG: 1 INJECTION, SOLUTION INTRAMUSCULAR; INTRAVENOUS; SUBCUTANEOUS at 14:31

## 2022-01-01 RX ADMIN — POLYETHYLENE GLYCOL 3350 17 G: 17 POWDER, FOR SOLUTION ORAL at 20:11

## 2022-01-01 RX ADMIN — SODIUM CHLORIDE, POTASSIUM CHLORIDE, SODIUM LACTATE AND CALCIUM CHLORIDE 150 ML/HR: 600; 310; 30; 20 INJECTION, SOLUTION INTRAVENOUS at 18:05

## 2022-01-01 RX ADMIN — APIXABAN 5 MG: 5 TABLET, FILM COATED ORAL at 20:43

## 2022-01-01 RX ADMIN — BUDESONIDE AND FORMOTEROL FUMARATE DIHYDRATE 2 PUFF: 160; 4.5 AEROSOL RESPIRATORY (INHALATION) at 08:44

## 2022-01-01 RX ADMIN — INSULIN LISPRO 4 UNITS: 100 INJECTION, SOLUTION INTRAVENOUS; SUBCUTANEOUS at 08:30

## 2022-01-01 RX ADMIN — IPRATROPIUM BROMIDE AND ALBUTEROL SULFATE 3 ML: .5; 3 SOLUTION RESPIRATORY (INHALATION) at 16:37

## 2022-01-01 RX ADMIN — POLYETHYLENE GLYCOL 3350 17 G: 17 POWDER, FOR SOLUTION ORAL at 09:54

## 2022-01-01 RX ADMIN — DILTIAZEM HYDROCHLORIDE 120 MG: 60 TABLET, FILM COATED ORAL at 21:00

## 2022-01-01 RX ADMIN — METFORMIN HYDROCHLORIDE 500 MG: 500 TABLET ORAL at 18:20

## 2022-01-01 RX ADMIN — INSULIN LISPRO 10 UNITS: 100 INJECTION, SOLUTION INTRAVENOUS; SUBCUTANEOUS at 16:32

## 2022-01-01 RX ADMIN — SODIUM CHLORIDE, POTASSIUM CHLORIDE, SODIUM LACTATE AND CALCIUM CHLORIDE 150 ML/HR: 600; 310; 30; 20 INJECTION, SOLUTION INTRAVENOUS at 09:57

## 2022-01-01 RX ADMIN — LISINOPRIL 20 MG: 20 TABLET ORAL at 08:28

## 2022-01-01 RX ADMIN — DILTIAZEM HYDROCHLORIDE 120 MG: 60 TABLET, FILM COATED ORAL at 13:01

## 2022-01-01 RX ADMIN — DILTIAZEM HYDROCHLORIDE 120 MG: 60 TABLET, FILM COATED ORAL at 14:51

## 2022-01-01 RX ADMIN — BUDESONIDE AND FORMOTEROL FUMARATE DIHYDRATE 2 PUFF: 160; 4.5 AEROSOL RESPIRATORY (INHALATION) at 10:22

## 2022-01-01 RX ADMIN — TAZOBACTAM SODIUM AND PIPERACILLIN SODIUM 3.38 G: 375; 3 INJECTION, SOLUTION INTRAVENOUS at 03:05

## 2022-01-01 RX ADMIN — HYDROMORPHONE HYDROCHLORIDE 1 MG: 1 INJECTION, SOLUTION INTRAMUSCULAR; INTRAVENOUS; SUBCUTANEOUS at 04:13

## 2022-01-01 RX ADMIN — PANTOPRAZOLE SODIUM 8 MG/HR: 40 INJECTION, POWDER, FOR SOLUTION INTRAVENOUS at 21:21

## 2022-01-01 RX ADMIN — SODIUM CHLORIDE, PRESERVATIVE FREE 10 ML: 5 INJECTION INTRAVENOUS at 09:54

## 2022-01-01 RX ADMIN — DILTIAZEM HYDROCHLORIDE 60 MG: 60 TABLET, FILM COATED ORAL at 00:01

## 2022-01-01 RX ADMIN — LAMOTRIGINE 100 MG: 100 TABLET ORAL at 17:44

## 2022-01-01 RX ADMIN — DIGOXIN 250 MCG: 250 INJECTION, SOLUTION INTRAMUSCULAR; INTRAVENOUS; PARENTERAL at 15:46

## 2022-01-01 RX ADMIN — SODIUM CHLORIDE, PRESERVATIVE FREE 10 ML: 5 INJECTION INTRAVENOUS at 21:14

## 2022-01-01 RX ADMIN — SODIUM CHLORIDE 500 ML: 9 INJECTION, SOLUTION INTRAVENOUS at 15:29

## 2022-01-01 RX ADMIN — LISINOPRIL 20 MG: 20 TABLET ORAL at 08:50

## 2022-01-01 RX ADMIN — SODIUM CHLORIDE, POTASSIUM CHLORIDE, SODIUM LACTATE AND CALCIUM CHLORIDE 150 ML/HR: 600; 310; 30; 20 INJECTION, SOLUTION INTRAVENOUS at 04:37

## 2022-01-01 RX ADMIN — FLUOXETINE HYDROCHLORIDE 40 MG: 20 CAPSULE ORAL at 08:50

## 2022-01-01 RX ADMIN — DOCOSANOL 1 APPLICATION: 100 CREAM TOPICAL at 12:01

## 2022-01-01 RX ADMIN — BUDESONIDE AND FORMOTEROL FUMARATE DIHYDRATE 2 PUFF: 160; 4.5 AEROSOL RESPIRATORY (INHALATION) at 21:00

## 2022-01-01 RX ADMIN — SODIUM CHLORIDE, PRESERVATIVE FREE 10 ML: 5 INJECTION INTRAVENOUS at 08:59

## 2022-01-01 RX ADMIN — DILTIAZEM HYDROCHLORIDE 60 MG: 60 TABLET, FILM COATED ORAL at 22:08

## 2022-01-01 RX ADMIN — DOCOSANOL 1 APPLICATION: 100 CREAM TOPICAL at 11:28

## 2022-01-01 RX ADMIN — METFORMIN HYDROCHLORIDE 500 MG: 500 TABLET ORAL at 18:36

## 2022-01-01 RX ADMIN — INSULIN LISPRO 12 UNITS: 100 INJECTION, SOLUTION INTRAVENOUS; SUBCUTANEOUS at 17:05

## 2022-01-01 RX ADMIN — POLYETHYLENE GLYCOL 3350 17 G: 17 POWDER, FOR SOLUTION ORAL at 08:58

## 2022-01-01 RX ADMIN — LAMOTRIGINE 25 MG: 25 TABLET ORAL at 22:15

## 2022-01-01 RX ADMIN — APIXABAN 5 MG: 5 TABLET, FILM COATED ORAL at 09:45

## 2022-01-01 RX ADMIN — TIOTROPIUM BROMIDE INHALATION SPRAY 2 PUFF: 3.12 SPRAY, METERED RESPIRATORY (INHALATION) at 07:22

## 2022-01-01 RX ADMIN — ENOXAPARIN SODIUM 40 MG: 40 INJECTION SUBCUTANEOUS at 12:23

## 2022-01-01 RX ADMIN — DIATRIZOATE MEGLUMINE AND DIATRIZOATE SODIUM 240 ML: 660; 100 LIQUID ORAL; RECTAL at 10:15

## 2022-01-01 RX ADMIN — DOCOSANOL 1 APPLICATION: 100 CREAM TOPICAL at 12:57

## 2022-01-01 RX ADMIN — LAMOTRIGINE 100 MG: 100 TABLET ORAL at 08:57

## 2022-01-01 RX ADMIN — DEXAMETHASONE SODIUM PHOSPHATE 8 MG: 10 INJECTION INTRAMUSCULAR; INTRAVENOUS at 02:00

## 2022-01-01 RX ADMIN — LISINOPRIL 40 MG: 40 TABLET ORAL at 09:22

## 2022-01-01 RX ADMIN — SODIUM CHLORIDE, PRESERVATIVE FREE 10 ML: 5 INJECTION INTRAVENOUS at 09:06

## 2022-01-01 RX ADMIN — PANTOPRAZOLE SODIUM 8 MG/HR: 40 INJECTION, POWDER, FOR SOLUTION INTRAVENOUS at 13:24

## 2022-01-01 RX ADMIN — SODIUM CHLORIDE, PRESERVATIVE FREE 10 ML: 5 INJECTION INTRAVENOUS at 23:10

## 2022-01-01 RX ADMIN — LISINOPRIL 40 MG: 40 TABLET ORAL at 08:16

## 2022-01-01 RX ADMIN — BUDESONIDE AND FORMOTEROL FUMARATE DIHYDRATE 2 PUFF: 160; 4.5 AEROSOL RESPIRATORY (INHALATION) at 07:26

## 2022-01-01 RX ADMIN — DEXAMETHASONE SODIUM PHOSPHATE 6 MG: 10 INJECTION INTRAMUSCULAR; INTRAVENOUS at 08:09

## 2022-01-01 RX ADMIN — OXYCODONE HYDROCHLORIDE 5 MG: 5 TABLET ORAL at 06:37

## 2022-01-01 RX ADMIN — TAZOBACTAM SODIUM AND PIPERACILLIN SODIUM 3.38 G: 375; 3 INJECTION, SOLUTION INTRAVENOUS at 20:25

## 2022-01-01 RX ADMIN — DEXAMETHASONE SODIUM PHOSPHATE 6 MG: 10 INJECTION INTRAMUSCULAR; INTRAVENOUS at 08:07

## 2022-01-01 RX ADMIN — BUDESONIDE AND FORMOTEROL FUMARATE DIHYDRATE 2 PUFF: 160; 4.5 AEROSOL RESPIRATORY (INHALATION) at 08:42

## 2022-01-01 RX ADMIN — BUDESONIDE AND FORMOTEROL FUMARATE DIHYDRATE 2 PUFF: 160; 4.5 AEROSOL RESPIRATORY (INHALATION) at 20:17

## 2022-01-01 RX ADMIN — REMDESIVIR 200 MG: 100 INJECTION, POWDER, LYOPHILIZED, FOR SOLUTION INTRAVENOUS at 00:59

## 2022-01-01 RX ADMIN — APIXABAN 5 MG: 5 TABLET, FILM COATED ORAL at 20:49

## 2022-01-01 RX ADMIN — HYDROMORPHONE HYDROCHLORIDE 1 MG: 1 INJECTION, SOLUTION INTRAMUSCULAR; INTRAVENOUS; SUBCUTANEOUS at 09:11

## 2022-01-01 RX ADMIN — ENALAPRILAT 1.25 MG: 1.25 INJECTION INTRAVENOUS at 04:14

## 2022-01-01 RX ADMIN — METFORMIN HYDROCHLORIDE 500 MG: 500 TABLET ORAL at 16:32

## 2022-01-01 RX ADMIN — INSULIN LISPRO 10 UNITS: 100 INJECTION, SOLUTION INTRAVENOUS; SUBCUTANEOUS at 16:34

## 2022-01-01 RX ADMIN — INSULIN LISPRO 10 UNITS: 100 INJECTION, SOLUTION INTRAVENOUS; SUBCUTANEOUS at 11:25

## 2022-01-01 RX ADMIN — INSULIN LISPRO 8 UNITS: 100 INJECTION, SOLUTION INTRAVENOUS; SUBCUTANEOUS at 11:18

## 2022-01-01 RX ADMIN — ENALAPRILAT 1.25 MG: 1.25 INJECTION INTRAVENOUS at 08:07

## 2022-01-01 RX ADMIN — SODIUM CHLORIDE, PRESERVATIVE FREE 10 ML: 5 INJECTION INTRAVENOUS at 21:00

## 2022-01-01 RX ADMIN — DILTIAZEM HYDROCHLORIDE 60 MG: 60 TABLET, FILM COATED ORAL at 18:08

## 2022-01-01 RX ADMIN — BUDESONIDE AND FORMOTEROL FUMARATE DIHYDRATE 2 PUFF: 160; 4.5 AEROSOL RESPIRATORY (INHALATION) at 07:36

## 2022-01-01 RX ADMIN — SODIUM CHLORIDE, POTASSIUM CHLORIDE, SODIUM LACTATE AND CALCIUM CHLORIDE 150 ML/HR: 600; 310; 30; 20 INJECTION, SOLUTION INTRAVENOUS at 02:59

## 2022-01-01 RX ADMIN — INSULIN LISPRO 12 UNITS: 100 INJECTION, SOLUTION INTRAVENOUS; SUBCUTANEOUS at 17:19

## 2022-01-01 RX ADMIN — DILTIAZEM HYDROCHLORIDE 120 MG: 60 TABLET, FILM COATED ORAL at 13:18

## 2022-01-01 RX ADMIN — SODIUM CHLORIDE, PRESERVATIVE FREE 10 ML: 5 INJECTION INTRAVENOUS at 20:27

## 2022-01-01 RX ADMIN — APIXABAN 5 MG: 5 TABLET, FILM COATED ORAL at 08:53

## 2022-01-01 RX ADMIN — BUDESONIDE AND FORMOTEROL FUMARATE DIHYDRATE 2 PUFF: 160; 4.5 AEROSOL RESPIRATORY (INHALATION) at 20:00

## 2022-01-01 RX ADMIN — APIXABAN 5 MG: 5 TABLET, FILM COATED ORAL at 08:23

## 2022-01-01 RX ADMIN — INSULIN LISPRO 10 UNITS: 100 INJECTION, SOLUTION INTRAVENOUS; SUBCUTANEOUS at 07:40

## 2022-01-01 RX ADMIN — LISINOPRIL 20 MG: 20 TABLET ORAL at 09:45

## 2022-01-01 RX ADMIN — INSULIN LISPRO 4 UNITS: 100 INJECTION, SOLUTION INTRAVENOUS; SUBCUTANEOUS at 12:24

## 2022-01-01 RX ADMIN — METFORMIN HYDROCHLORIDE 500 MG: 500 TABLET ORAL at 09:09

## 2022-01-01 RX ADMIN — INSULIN LISPRO 4 UNITS: 100 INJECTION, SOLUTION INTRAVENOUS; SUBCUTANEOUS at 20:09

## 2022-01-01 RX ADMIN — CEFTRIAXONE 2 G: 2 INJECTION, POWDER, FOR SOLUTION INTRAMUSCULAR; INTRAVENOUS at 11:49

## 2022-01-01 RX ADMIN — INSULIN LISPRO 8 UNITS: 100 INJECTION, SOLUTION INTRAVENOUS; SUBCUTANEOUS at 16:42

## 2022-01-01 RX ADMIN — DILTIAZEM HYDROCHLORIDE 60 MG: 60 TABLET, FILM COATED ORAL at 06:13

## 2022-01-01 RX ADMIN — SODIUM CHLORIDE, PRESERVATIVE FREE 10 ML: 5 INJECTION INTRAVENOUS at 20:44

## 2022-01-01 RX ADMIN — OXYCODONE HYDROCHLORIDE 5 MG: 5 TABLET ORAL at 06:12

## 2022-01-01 RX ADMIN — INSULIN LISPRO 8 UNITS: 100 INJECTION, SOLUTION INTRAVENOUS; SUBCUTANEOUS at 12:18

## 2022-01-01 RX ADMIN — DOCOSANOL 1 APPLICATION: 100 CREAM TOPICAL at 14:08

## 2022-01-01 RX ADMIN — INSULIN LISPRO 4 UNITS: 100 INJECTION, SOLUTION INTRAVENOUS; SUBCUTANEOUS at 11:28

## 2022-01-01 RX ADMIN — METFORMIN HYDROCHLORIDE 500 MG: 500 TABLET ORAL at 09:23

## 2022-01-01 RX ADMIN — SIMETHICONE 80 MG: 80 TABLET, CHEWABLE ORAL at 11:31

## 2022-01-01 RX ADMIN — DOCOSANOL 1 APPLICATION: 100 CREAM TOPICAL at 21:22

## 2022-01-01 RX ADMIN — METFORMIN HYDROCHLORIDE 500 MG: 500 TABLET ORAL at 17:05

## 2022-01-01 RX ADMIN — DOCOSANOL 1 APPLICATION: 100 CREAM TOPICAL at 21:00

## 2022-01-01 RX ADMIN — TIOTROPIUM BROMIDE INHALATION SPRAY 2 PUFF: 3.12 SPRAY, METERED RESPIRATORY (INHALATION) at 07:41

## 2022-01-01 RX ADMIN — LAMOTRIGINE 25 MG: 25 TABLET ORAL at 21:58

## 2022-01-01 RX ADMIN — LISINOPRIL 40 MG: 40 TABLET ORAL at 08:36

## 2022-01-01 RX ADMIN — DIPHENHYDRAMINE HYDROCHLORIDE 25 MG: 50 INJECTION, SOLUTION INTRAMUSCULAR; INTRAVENOUS at 10:07

## 2022-01-01 RX ADMIN — APIXABAN 5 MG: 5 TABLET, FILM COATED ORAL at 21:09

## 2022-01-01 RX ADMIN — INSULIN LISPRO 8 UNITS: 100 INJECTION, SOLUTION INTRAVENOUS; SUBCUTANEOUS at 21:56

## 2022-01-01 RX ADMIN — Medication 15 MG/HR: at 01:51

## 2022-01-01 RX ADMIN — SODIUM CHLORIDE, PRESERVATIVE FREE 10 ML: 5 INJECTION INTRAVENOUS at 09:09

## 2022-01-01 RX ADMIN — BUDESONIDE AND FORMOTEROL FUMARATE DIHYDRATE 2 PUFF: 160; 4.5 AEROSOL RESPIRATORY (INHALATION) at 08:06

## 2022-01-01 RX ADMIN — HYDROMORPHONE HYDROCHLORIDE 1 MG: 1 INJECTION, SOLUTION INTRAMUSCULAR; INTRAVENOUS; SUBCUTANEOUS at 19:16

## 2022-01-01 RX ADMIN — APIXABAN 5 MG: 5 TABLET, FILM COATED ORAL at 08:15

## 2022-01-01 RX ADMIN — SODIUM CHLORIDE, PRESERVATIVE FREE 10 ML: 5 INJECTION INTRAVENOUS at 08:38

## 2022-01-01 RX ADMIN — DOCOSANOL 1 APPLICATION: 100 CREAM TOPICAL at 17:37

## 2022-01-01 RX ADMIN — FLUOXETINE HYDROCHLORIDE 40 MG: 20 CAPSULE ORAL at 09:22

## 2022-01-01 RX ADMIN — OXYCODONE HYDROCHLORIDE 5 MG: 5 TABLET ORAL at 06:13

## 2022-01-01 RX ADMIN — FLUOXETINE HYDROCHLORIDE 40 MG: 20 CAPSULE ORAL at 09:54

## 2022-01-01 RX ADMIN — ENOXAPARIN SODIUM 40 MG: 40 INJECTION SUBCUTANEOUS at 11:46

## 2022-01-01 RX ADMIN — INSULIN LISPRO 4 UNITS: 100 INJECTION, SOLUTION INTRAVENOUS; SUBCUTANEOUS at 15:11

## 2022-01-01 RX ADMIN — DOCOSANOL 1 APPLICATION: 100 CREAM TOPICAL at 21:29

## 2022-01-01 RX ADMIN — Medication 5 MG/HR: at 09:05

## 2022-01-01 RX ADMIN — SODIUM CHLORIDE, PRESERVATIVE FREE 10 ML: 5 INJECTION INTRAVENOUS at 20:12

## 2022-01-01 RX ADMIN — HYDROMORPHONE HYDROCHLORIDE 1 MG: 1 INJECTION, SOLUTION INTRAMUSCULAR; INTRAVENOUS; SUBCUTANEOUS at 11:25

## 2022-01-01 RX ADMIN — SODIUM CHLORIDE, PRESERVATIVE FREE 10 ML: 5 INJECTION INTRAVENOUS at 09:32

## 2022-01-01 RX ADMIN — POLYETHYLENE GLYCOL 3350 17 G: 17 POWDER, FOR SOLUTION ORAL at 20:30

## 2022-01-01 RX ADMIN — TAZOBACTAM SODIUM AND PIPERACILLIN SODIUM 3.38 G: 375; 3 INJECTION, SOLUTION INTRAVENOUS at 11:04

## 2022-01-01 RX ADMIN — POLYETHYLENE GLYCOL 3350 17 G: 17 POWDER, FOR SOLUTION ORAL at 14:18

## 2022-01-01 RX ADMIN — INSULIN LISPRO 4 UNITS: 100 INJECTION, SOLUTION INTRAVENOUS; SUBCUTANEOUS at 17:24

## 2022-01-01 RX ADMIN — INSULIN LISPRO 5 UNITS: 100 INJECTION, SOLUTION INTRAVENOUS; SUBCUTANEOUS at 09:18

## 2022-01-01 RX ADMIN — TAZOBACTAM SODIUM AND PIPERACILLIN SODIUM 3.38 G: 375; 3 INJECTION, SOLUTION INTRAVENOUS at 04:10

## 2022-01-01 RX ADMIN — ENALAPRILAT 1.25 MG: 1.25 INJECTION INTRAVENOUS at 08:55

## 2022-01-01 RX ADMIN — POLYETHYLENE GLYCOL 3350 17 G: 17 POWDER, FOR SOLUTION ORAL at 20:43

## 2022-01-01 RX ADMIN — DILTIAZEM HYDROCHLORIDE 10 MG: 5 INJECTION INTRAVENOUS at 13:13

## 2022-01-01 RX ADMIN — SODIUM CHLORIDE, PRESERVATIVE FREE 10 ML: 5 INJECTION INTRAVENOUS at 08:08

## 2022-01-01 RX ADMIN — SODIUM CHLORIDE, PRESERVATIVE FREE 10 ML: 5 INJECTION INTRAVENOUS at 08:57

## 2022-01-01 RX ADMIN — TAZOBACTAM SODIUM AND PIPERACILLIN SODIUM 3.38 G: 375; 3 INJECTION, SOLUTION INTRAVENOUS at 11:46

## 2022-01-01 RX ADMIN — INSULIN LISPRO 4 UNITS: 100 INJECTION, SOLUTION INTRAVENOUS; SUBCUTANEOUS at 08:07

## 2022-01-01 RX ADMIN — TIOTROPIUM BROMIDE INHALATION SPRAY 2 PUFF: 3.12 SPRAY, METERED RESPIRATORY (INHALATION) at 07:35

## 2022-01-01 RX ADMIN — Medication 15 MG/HR: at 17:18

## 2022-01-01 RX ADMIN — DILTIAZEM HYDROCHLORIDE 60 MG: 60 TABLET, FILM COATED ORAL at 12:45

## 2022-01-01 RX ADMIN — POLYETHYLENE GLYCOL 3350 17 G: 17 POWDER, FOR SOLUTION ORAL at 09:05

## 2022-01-01 RX ADMIN — BUDESONIDE AND FORMOTEROL FUMARATE DIHYDRATE 2 PUFF: 160; 4.5 AEROSOL RESPIRATORY (INHALATION) at 07:06

## 2022-01-01 RX ADMIN — TAZOBACTAM SODIUM AND PIPERACILLIN SODIUM 3.38 G: 375; 3 INJECTION, SOLUTION INTRAVENOUS at 14:34

## 2022-01-01 RX ADMIN — TAZOBACTAM SODIUM AND PIPERACILLIN SODIUM 3.38 G: 375; 3 INJECTION, SOLUTION INTRAVENOUS at 02:17

## 2022-01-01 RX ADMIN — DOCOSANOL 1 APPLICATION: 100 CREAM TOPICAL at 22:01

## 2022-01-01 RX ADMIN — DOCOSANOL 1 APPLICATION: 100 CREAM TOPICAL at 06:14

## 2022-01-01 RX ADMIN — ONDANSETRON HYDROCHLORIDE 4 MG: 4 TABLET, FILM COATED ORAL at 21:17

## 2022-01-01 RX ADMIN — TAZOBACTAM SODIUM AND PIPERACILLIN SODIUM 3.38 G: 375; 3 INJECTION, SOLUTION INTRAVENOUS at 20:11

## 2022-01-01 RX ADMIN — BUDESONIDE AND FORMOTEROL FUMARATE DIHYDRATE 2 PUFF: 160; 4.5 AEROSOL RESPIRATORY (INHALATION) at 21:48

## 2022-01-01 RX ADMIN — BUDESONIDE AND FORMOTEROL FUMARATE DIHYDRATE 2 PUFF: 160; 4.5 AEROSOL RESPIRATORY (INHALATION) at 08:34

## 2022-01-01 RX ADMIN — DEXAMETHASONE 4 MG: 4 TABLET ORAL at 09:23

## 2022-01-01 RX ADMIN — OXYCODONE HYDROCHLORIDE 5 MG: 5 TABLET ORAL at 06:21

## 2022-01-01 RX ADMIN — INSULIN GLARGINE-YFGN 30 UNITS: 100 INJECTION, SOLUTION SUBCUTANEOUS at 20:47

## 2022-01-01 RX ADMIN — TIOTROPIUM BROMIDE INHALATION SPRAY 2 PUFF: 3.12 SPRAY, METERED RESPIRATORY (INHALATION) at 12:16

## 2022-01-01 RX ADMIN — POLYETHYLENE GLYCOL 3350 17 G: 17 POWDER, FOR SOLUTION ORAL at 08:25

## 2022-01-01 RX ADMIN — DEXAMETHASONE 6 MG: 6 TABLET ORAL at 09:07

## 2022-01-01 RX ADMIN — VERAPAMIL HYDROCHLORIDE 240 MG: 240 TABLET, FILM COATED, EXTENDED RELEASE ORAL at 09:54

## 2022-01-01 RX ADMIN — DILTIAZEM HYDROCHLORIDE 120 MG: 60 TABLET, FILM COATED ORAL at 21:58

## 2022-01-01 RX ADMIN — DILTIAZEM HYDROCHLORIDE 120 MG: 60 TABLET, FILM COATED ORAL at 13:03

## 2022-01-01 RX ADMIN — DILTIAZEM HYDROCHLORIDE 60 MG: 60 TABLET, FILM COATED ORAL at 17:14

## 2022-01-01 RX ADMIN — SODIUM CHLORIDE 100 ML/HR: 9 INJECTION, SOLUTION INTRAVENOUS at 09:03

## 2022-01-01 RX ADMIN — INSULIN LISPRO 10 UNITS: 100 INJECTION, SOLUTION INTRAVENOUS; SUBCUTANEOUS at 11:26

## 2022-01-01 RX ADMIN — Medication 7.5 MG/HR: at 20:29

## 2022-01-01 RX ADMIN — ENOXAPARIN SODIUM 40 MG: 40 INJECTION SUBCUTANEOUS at 12:44

## 2022-01-01 RX ADMIN — INSULIN LISPRO 10 UNITS: 100 INJECTION, SOLUTION INTRAVENOUS; SUBCUTANEOUS at 12:02

## 2022-01-01 RX ADMIN — ENALAPRILAT 1.25 MG: 1.25 INJECTION INTRAVENOUS at 21:56

## 2022-01-01 RX ADMIN — INSULIN LISPRO 2 UNITS: 100 INJECTION, SOLUTION INTRAVENOUS; SUBCUTANEOUS at 23:10

## 2022-01-01 RX ADMIN — SODIUM CHLORIDE, PRESERVATIVE FREE 10 ML: 5 INJECTION INTRAVENOUS at 09:18

## 2022-01-01 RX ADMIN — BUDESONIDE AND FORMOTEROL FUMARATE DIHYDRATE 2 PUFF: 160; 4.5 AEROSOL RESPIRATORY (INHALATION) at 22:19

## 2022-01-01 RX ADMIN — BUDESONIDE AND FORMOTEROL FUMARATE DIHYDRATE 2 PUFF: 160; 4.5 AEROSOL RESPIRATORY (INHALATION) at 08:52

## 2022-01-01 RX ADMIN — PANTOPRAZOLE SODIUM 40 MG: 40 TABLET, DELAYED RELEASE ORAL at 06:21

## 2022-01-01 RX ADMIN — INSULIN LISPRO 4 UNITS: 100 INJECTION, SOLUTION INTRAVENOUS; SUBCUTANEOUS at 11:34

## 2022-01-01 RX ADMIN — INSULIN LISPRO 8 UNITS: 100 INJECTION, SOLUTION INTRAVENOUS; SUBCUTANEOUS at 07:45

## 2022-01-01 RX ADMIN — DOCOSANOL 1 APPLICATION: 100 CREAM TOPICAL at 13:17

## 2022-01-01 RX ADMIN — METFORMIN HYDROCHLORIDE 500 MG: 500 TABLET ORAL at 08:36

## 2022-01-01 RX ADMIN — ENALAPRILAT 1.25 MG: 1.25 INJECTION INTRAVENOUS at 03:04

## 2022-01-01 RX ADMIN — BUDESONIDE AND FORMOTEROL FUMARATE DIHYDRATE 2 PUFF: 160; 4.5 AEROSOL RESPIRATORY (INHALATION) at 21:54

## 2022-01-01 RX ADMIN — INSULIN LISPRO 10 UNITS: 100 INJECTION, SOLUTION INTRAVENOUS; SUBCUTANEOUS at 11:27

## 2022-01-01 RX ADMIN — Medication 2.5 MG/HR: at 17:49

## 2022-01-01 RX ADMIN — SODIUM CHLORIDE 250 ML: 9 INJECTION, SOLUTION INTRAVENOUS at 10:06

## 2022-01-01 RX ADMIN — SODIUM CHLORIDE, PRESERVATIVE FREE 10 ML: 5 INJECTION INTRAVENOUS at 08:51

## 2022-01-01 RX ADMIN — DILTIAZEM HYDROCHLORIDE 60 MG: 60 TABLET, FILM COATED ORAL at 06:48

## 2022-01-01 RX ADMIN — SODIUM CHLORIDE 125 ML/HR: 9 INJECTION, SOLUTION INTRAVENOUS at 22:30

## 2022-01-01 RX ADMIN — HYDROMORPHONE HYDROCHLORIDE 1 MG: 1 INJECTION, SOLUTION INTRAMUSCULAR; INTRAVENOUS; SUBCUTANEOUS at 16:17

## 2022-01-01 RX ADMIN — FLUOXETINE HYDROCHLORIDE 40 MG: 20 CAPSULE ORAL at 08:52

## 2022-01-01 RX ADMIN — DOCOSANOL 1 APPLICATION: 100 CREAM TOPICAL at 11:08

## 2022-01-01 RX ADMIN — BUDESONIDE AND FORMOTEROL FUMARATE DIHYDRATE 2 PUFF: 160; 4.5 AEROSOL RESPIRATORY (INHALATION) at 08:05

## 2022-01-01 RX ADMIN — SODIUM CHLORIDE, PRESERVATIVE FREE 10 ML: 5 INJECTION INTRAVENOUS at 08:58

## 2022-01-01 RX ADMIN — SODIUM CHLORIDE, PRESERVATIVE FREE 10 ML: 5 INJECTION INTRAVENOUS at 08:56

## 2022-01-01 RX ADMIN — IPRATROPIUM BROMIDE AND ALBUTEROL SULFATE 3 ML: .5; 3 SOLUTION RESPIRATORY (INHALATION) at 12:14

## 2022-01-01 RX ADMIN — LAMOTRIGINE 100 MG: 100 TABLET ORAL at 08:37

## 2022-01-01 RX ADMIN — POTASSIUM CHLORIDE 40 MEQ: 750 TABLET, EXTENDED RELEASE ORAL at 08:29

## 2022-01-01 RX ADMIN — INSULIN LISPRO 10 UNITS: 100 INJECTION, SOLUTION INTRAVENOUS; SUBCUTANEOUS at 12:33

## 2022-01-01 RX ADMIN — INSULIN LISPRO 10 UNITS: 100 INJECTION, SOLUTION INTRAVENOUS; SUBCUTANEOUS at 16:57

## 2022-01-01 RX ADMIN — SODIUM CHLORIDE, PRESERVATIVE FREE 10 ML: 5 INJECTION INTRAVENOUS at 20:35

## 2022-01-01 RX ADMIN — SODIUM CHLORIDE, PRESERVATIVE FREE 10 ML: 5 INJECTION INTRAVENOUS at 21:13

## 2022-01-01 RX ADMIN — SODIUM CHLORIDE, PRESERVATIVE FREE 10 ML: 5 INJECTION INTRAVENOUS at 08:55

## 2022-01-01 RX ADMIN — LAMOTRIGINE 25 MG: 25 TABLET ORAL at 20:44

## 2022-01-01 RX ADMIN — SODIUM CHLORIDE, PRESERVATIVE FREE 10 ML: 5 INJECTION INTRAVENOUS at 20:17

## 2022-01-01 RX ADMIN — BUDESONIDE AND FORMOTEROL FUMARATE DIHYDRATE 2 PUFF: 160; 4.5 AEROSOL RESPIRATORY (INHALATION) at 12:15

## 2022-01-01 RX ADMIN — APIXABAN 5 MG: 5 TABLET, FILM COATED ORAL at 08:50

## 2022-01-01 RX ADMIN — Medication 10 MG/HR: at 14:02

## 2022-01-01 RX ADMIN — VANCOMYCIN HYDROCHLORIDE 1500 MG: 10 INJECTION, POWDER, LYOPHILIZED, FOR SOLUTION INTRAVENOUS at 14:25

## 2022-01-01 RX ADMIN — BUDESONIDE AND FORMOTEROL FUMARATE DIHYDRATE 2 PUFF: 160; 4.5 AEROSOL RESPIRATORY (INHALATION) at 09:41

## 2022-01-01 RX ADMIN — ENOXAPARIN SODIUM 40 MG: 100 INJECTION SUBCUTANEOUS at 20:57

## 2022-01-01 RX ADMIN — APIXABAN 5 MG: 5 TABLET, FILM COATED ORAL at 12:03

## 2022-01-01 RX ADMIN — FLUOXETINE HYDROCHLORIDE 40 MG: 20 CAPSULE ORAL at 09:18

## 2022-01-01 RX ADMIN — DILTIAZEM HYDROCHLORIDE 60 MG: 60 TABLET, FILM COATED ORAL at 18:16

## 2022-01-01 RX ADMIN — PROPOFOL 100 MG: 10 INJECTION, EMULSION INTRAVENOUS at 14:28

## 2022-01-01 RX ADMIN — INSULIN LISPRO 4 UNITS: 100 INJECTION, SOLUTION INTRAVENOUS; SUBCUTANEOUS at 17:43

## 2022-01-01 RX ADMIN — ENALAPRILAT 1.25 MG: 1.25 INJECTION INTRAVENOUS at 02:26

## 2022-01-01 RX ADMIN — SODIUM CHLORIDE 125 ML/HR: 9 INJECTION, SOLUTION INTRAVENOUS at 14:32

## 2022-01-01 RX ADMIN — SODIUM CHLORIDE, PRESERVATIVE FREE 10 ML: 5 INJECTION INTRAVENOUS at 08:30

## 2022-01-01 RX ADMIN — DIGOXIN 250 MCG: 250 INJECTION, SOLUTION INTRAMUSCULAR; INTRAVENOUS; PARENTERAL at 14:21

## 2022-01-01 RX ADMIN — ONDANSETRON 4 MG: 2 INJECTION INTRAMUSCULAR; INTRAVENOUS at 06:51

## 2022-01-01 RX ADMIN — ACETAMINOPHEN 325 MG: 325 TABLET, FILM COATED ORAL at 12:13

## 2022-01-01 RX ADMIN — INSULIN LISPRO 13 UNITS: 100 INJECTION, SOLUTION INTRAVENOUS; SUBCUTANEOUS at 18:37

## 2022-01-01 RX ADMIN — INSULIN LISPRO 2 UNITS: 100 INJECTION, SOLUTION INTRAVENOUS; SUBCUTANEOUS at 17:39

## 2022-01-01 RX ADMIN — PANTOPRAZOLE SODIUM 40 MG: 40 TABLET, DELAYED RELEASE ORAL at 06:22

## 2022-01-01 RX ADMIN — DILTIAZEM HYDROCHLORIDE 120 MG: 60 TABLET, FILM COATED ORAL at 21:12

## 2022-01-01 RX ADMIN — INSULIN LISPRO 8 UNITS: 100 INJECTION, SOLUTION INTRAVENOUS; SUBCUTANEOUS at 08:16

## 2022-01-01 RX ADMIN — APIXABAN 5 MG: 5 TABLET, FILM COATED ORAL at 08:51

## 2022-01-01 RX ADMIN — SODIUM CHLORIDE, PRESERVATIVE FREE 10 ML: 5 INJECTION INTRAVENOUS at 20:30

## 2022-01-01 RX ADMIN — DOCOSANOL 1 APPLICATION: 100 CREAM TOPICAL at 11:00

## 2022-01-01 RX ADMIN — SODIUM CHLORIDE, PRESERVATIVE FREE 10 ML: 5 INJECTION INTRAVENOUS at 20:11

## 2022-01-01 RX ADMIN — INSULIN GLARGINE-YFGN 30 UNITS: 100 INJECTION, SOLUTION SUBCUTANEOUS at 20:30

## 2022-01-01 RX ADMIN — APIXABAN 5 MG: 5 TABLET, FILM COATED ORAL at 21:36

## 2022-01-01 RX ADMIN — INSULIN LISPRO 5 UNITS: 100 INJECTION, SOLUTION INTRAVENOUS; SUBCUTANEOUS at 19:15

## 2022-01-01 RX ADMIN — Medication 15 MG/HR: at 14:24

## 2022-01-01 RX ADMIN — DILTIAZEM HYDROCHLORIDE 120 MG: 60 TABLET, FILM COATED ORAL at 06:22

## 2022-01-01 RX ADMIN — BUDESONIDE AND FORMOTEROL FUMARATE DIHYDRATE 2 PUFF: 160; 4.5 AEROSOL RESPIRATORY (INHALATION) at 22:13

## 2022-01-01 RX ADMIN — INSULIN LISPRO 10 UNITS: 100 INJECTION, SOLUTION INTRAVENOUS; SUBCUTANEOUS at 12:05

## 2022-01-01 RX ADMIN — ENOXAPARIN SODIUM 40 MG: 100 INJECTION SUBCUTANEOUS at 08:36

## 2022-01-01 RX ADMIN — ENOXAPARIN SODIUM 40 MG: 100 INJECTION SUBCUTANEOUS at 23:34

## 2022-01-01 RX ADMIN — DEXAMETHASONE 6 MG: 6 TABLET ORAL at 09:54

## 2022-01-01 RX ADMIN — FUROSEMIDE 40 MG: 40 TABLET ORAL at 10:13

## 2022-01-01 RX ADMIN — SODIUM CHLORIDE, PRESERVATIVE FREE 10 ML: 5 INJECTION INTRAVENOUS at 08:26

## 2022-01-01 RX ADMIN — Medication 7.5 MG/HR: at 20:11

## 2022-01-01 RX ADMIN — INSULIN LISPRO 7 UNITS: 100 INJECTION, SOLUTION INTRAVENOUS; SUBCUTANEOUS at 13:07

## 2022-01-01 RX ADMIN — METFORMIN HYDROCHLORIDE 500 MG: 500 TABLET ORAL at 08:14

## 2022-01-01 RX ADMIN — DILTIAZEM HYDROCHLORIDE 60 MG: 60 TABLET, FILM COATED ORAL at 05:35

## 2022-01-01 RX ADMIN — TIOTROPIUM BROMIDE INHALATION SPRAY 2 PUFF: 3.12 SPRAY, METERED RESPIRATORY (INHALATION) at 09:26

## 2022-01-01 RX ADMIN — DEXAMETHASONE SODIUM PHOSPHATE 6 MG: 10 INJECTION INTRAMUSCULAR; INTRAVENOUS at 08:55

## 2022-01-01 RX ADMIN — TAZOBACTAM SODIUM AND PIPERACILLIN SODIUM 3.38 G: 375; 3 INJECTION, SOLUTION INTRAVENOUS at 11:22

## 2022-01-01 RX ADMIN — LAMOTRIGINE 25 MG: 25 TABLET ORAL at 20:43

## 2022-01-01 RX ADMIN — INSULIN LISPRO 6 UNITS: 100 INJECTION, SOLUTION INTRAVENOUS; SUBCUTANEOUS at 17:52

## 2022-01-01 RX ADMIN — LAMOTRIGINE 25 MG: 25 TABLET ORAL at 20:58

## 2022-01-01 RX ADMIN — INSULIN LISPRO 12 UNITS: 100 INJECTION, SOLUTION INTRAVENOUS; SUBCUTANEOUS at 11:25

## 2022-01-01 RX ADMIN — IPRATROPIUM BROMIDE AND ALBUTEROL SULFATE 3 ML: .5; 3 SOLUTION RESPIRATORY (INHALATION) at 16:51

## 2022-01-01 RX ADMIN — INSULIN LISPRO 2 UNITS: 100 INJECTION, SOLUTION INTRAVENOUS; SUBCUTANEOUS at 08:09

## 2022-01-01 RX ADMIN — BUDESONIDE AND FORMOTEROL FUMARATE DIHYDRATE 2 PUFF: 160; 4.5 AEROSOL RESPIRATORY (INHALATION) at 20:35

## 2022-01-01 RX ADMIN — OXYCODONE HYDROCHLORIDE 5 MG: 5 TABLET ORAL at 14:35

## 2022-01-01 RX ADMIN — VERAPAMIL HYDROCHLORIDE 240 MG: 240 TABLET, FILM COATED, EXTENDED RELEASE ORAL at 08:25

## 2022-01-01 RX ADMIN — POLYETHYLENE GLYCOL 3350 17 G: 17 POWDER, FOR SOLUTION ORAL at 21:00

## 2022-01-01 RX ADMIN — Medication 15 MG/HR: at 21:56

## 2022-01-01 RX ADMIN — FENTANYL CITRATE 50 MCG: 50 INJECTION INTRAMUSCULAR; INTRAVENOUS at 15:45

## 2022-01-01 RX ADMIN — DIGOXIN 250 MCG: 250 INJECTION, SOLUTION INTRAMUSCULAR; INTRAVENOUS; PARENTERAL at 03:04

## 2022-01-01 RX ADMIN — LAMOTRIGINE 100 MG: 100 TABLET ORAL at 08:15

## 2022-01-01 RX ADMIN — SODIUM CHLORIDE, PRESERVATIVE FREE 10 ML: 5 INJECTION INTRAVENOUS at 09:24

## 2022-01-01 RX ADMIN — MAGNESIUM SULFATE HEPTAHYDRATE 1 G: 1 INJECTION, SOLUTION INTRAVENOUS at 15:55

## 2022-01-01 RX ADMIN — LISINOPRIL 40 MG: 40 TABLET ORAL at 09:54

## 2022-01-01 RX ADMIN — PANTOPRAZOLE SODIUM 40 MG: 40 TABLET, DELAYED RELEASE ORAL at 08:15

## 2022-01-01 RX ADMIN — LISINOPRIL 20 MG: 20 TABLET ORAL at 08:24

## 2022-01-01 RX ADMIN — SODIUM CHLORIDE, PRESERVATIVE FREE 10 ML: 5 INJECTION INTRAVENOUS at 20:54

## 2022-01-01 RX ADMIN — LAMOTRIGINE 100 MG: 100 TABLET ORAL at 08:28

## 2022-01-01 RX ADMIN — INSULIN GLARGINE-YFGN 20 UNITS: 100 INJECTION, SOLUTION SUBCUTANEOUS at 20:57

## 2022-01-01 RX ADMIN — DOCOSANOL 1 APPLICATION: 100 CREAM TOPICAL at 13:44

## 2022-01-01 RX ADMIN — BUDESONIDE AND FORMOTEROL FUMARATE DIHYDRATE 2 PUFF: 160; 4.5 AEROSOL RESPIRATORY (INHALATION) at 08:33

## 2022-01-01 RX ADMIN — OXYCODONE HYDROCHLORIDE 5 MG: 5 TABLET ORAL at 04:43

## 2022-01-01 RX ADMIN — ALBUTEROL SULFATE 2.5 MG: 2.5 SOLUTION RESPIRATORY (INHALATION) at 16:12

## 2022-01-01 RX ADMIN — ENOXAPARIN SODIUM 40 MG: 40 INJECTION SUBCUTANEOUS at 11:18

## 2022-01-01 RX ADMIN — DOCOSANOL 1 APPLICATION: 100 CREAM TOPICAL at 14:00

## 2022-01-01 RX ADMIN — Medication 7.5 MG/HR: at 09:57

## 2022-01-01 RX ADMIN — Medication 7.5 MG/HR: at 12:21

## 2022-01-01 RX ADMIN — POLYETHYLENE GLYCOL 3350 17 G: 17 POWDER, FOR SOLUTION ORAL at 20:17

## 2022-01-01 RX ADMIN — Medication 15 MG/HR: at 08:55

## 2022-01-01 RX ADMIN — BUDESONIDE AND FORMOTEROL FUMARATE DIHYDRATE 2 PUFF: 160; 4.5 AEROSOL RESPIRATORY (INHALATION) at 21:18

## 2022-01-01 RX ADMIN — SODIUM CHLORIDE, PRESERVATIVE FREE 10 ML: 5 INJECTION INTRAVENOUS at 20:56

## 2022-01-01 RX ADMIN — MUPIROCIN 1 APPLICATION: 20 OINTMENT TOPICAL at 09:25

## 2022-01-01 RX ADMIN — ENOXAPARIN SODIUM 40 MG: 40 INJECTION SUBCUTANEOUS at 12:33

## 2022-01-01 RX ADMIN — HYDROMORPHONE HYDROCHLORIDE 1 MG: 1 INJECTION, SOLUTION INTRAMUSCULAR; INTRAVENOUS; SUBCUTANEOUS at 03:20

## 2022-01-01 RX ADMIN — INSULIN LISPRO 10 UNITS: 100 INJECTION, SOLUTION INTRAVENOUS; SUBCUTANEOUS at 18:24

## 2022-01-01 RX ADMIN — DILTIAZEM HYDROCHLORIDE 120 MG: 60 TABLET, FILM COATED ORAL at 13:44

## 2022-01-01 RX ADMIN — FLUOXETINE HYDROCHLORIDE 40 MG: 20 CAPSULE ORAL at 08:57

## 2022-01-01 RX ADMIN — LAMOTRIGINE 25 MG: 25 TABLET ORAL at 21:31

## 2022-01-01 RX ADMIN — DOCOSANOL 1 APPLICATION: 100 CREAM TOPICAL at 21:02

## 2022-01-01 RX ADMIN — DOCOSANOL 1 APPLICATION: 100 CREAM TOPICAL at 06:13

## 2022-01-01 RX ADMIN — SODIUM CHLORIDE, POTASSIUM CHLORIDE, SODIUM LACTATE AND CALCIUM CHLORIDE 100 ML/HR: 600; 310; 30; 20 INJECTION, SOLUTION INTRAVENOUS at 02:08

## 2022-01-01 RX ADMIN — LAMOTRIGINE 25 MG: 25 TABLET ORAL at 20:26

## 2022-01-01 RX ADMIN — PANTOPRAZOLE SODIUM 40 MG: 40 TABLET, DELAYED RELEASE ORAL at 20:56

## 2022-01-01 RX ADMIN — DILTIAZEM HYDROCHLORIDE 120 MG: 60 TABLET, FILM COATED ORAL at 21:09

## 2022-01-01 RX ADMIN — DILTIAZEM HYDROCHLORIDE 120 MG: 60 TABLET, FILM COATED ORAL at 06:12

## 2022-01-01 RX ADMIN — FLUOXETINE HYDROCHLORIDE 40 MG: 20 CAPSULE ORAL at 08:51

## 2022-01-01 RX ADMIN — VERAPAMIL HYDROCHLORIDE 240 MG: 240 TABLET, FILM COATED, EXTENDED RELEASE ORAL at 09:07

## 2022-01-01 RX ADMIN — ENOXAPARIN SODIUM 40 MG: 100 INJECTION SUBCUTANEOUS at 11:25

## 2022-01-01 RX ADMIN — DOCOSANOL 1 APPLICATION: 100 CREAM TOPICAL at 13:02

## 2022-01-01 RX ADMIN — SODIUM CHLORIDE, PRESERVATIVE FREE 10 ML: 5 INJECTION INTRAVENOUS at 09:08

## 2022-01-01 RX ADMIN — OXYCODONE AND ACETAMINOPHEN 1 TABLET: 5; 325 TABLET ORAL at 08:51

## 2022-01-01 RX ADMIN — INSULIN GLARGINE-YFGN 39 UNITS: 100 INJECTION, SOLUTION SUBCUTANEOUS at 21:37

## 2022-01-01 RX ADMIN — PANTOPRAZOLE SODIUM 8 MG/HR: 40 INJECTION, POWDER, FOR SOLUTION INTRAVENOUS at 00:07

## 2022-01-01 RX ADMIN — DILTIAZEM HYDROCHLORIDE 120 MG: 60 TABLET, FILM COATED ORAL at 06:42

## 2022-01-01 RX ADMIN — POTASSIUM CHLORIDE 40 MEQ: 750 TABLET, EXTENDED RELEASE ORAL at 02:52

## 2022-01-01 RX ADMIN — Medication 10 MG/HR: at 17:52

## 2022-01-01 RX ADMIN — DIGOXIN 250 MCG: 250 INJECTION, SOLUTION INTRAMUSCULAR; INTRAVENOUS; PARENTERAL at 14:50

## 2022-01-01 RX ADMIN — SODIUM CHLORIDE, POTASSIUM CHLORIDE, SODIUM LACTATE AND CALCIUM CHLORIDE 150 ML/HR: 600; 310; 30; 20 INJECTION, SOLUTION INTRAVENOUS at 15:05

## 2022-01-01 RX ADMIN — DILTIAZEM HYDROCHLORIDE 60 MG: 60 TABLET, FILM COATED ORAL at 12:02

## 2022-01-01 RX ADMIN — DOCOSANOL 1 APPLICATION: 100 CREAM TOPICAL at 06:43

## 2022-01-01 RX ADMIN — INSULIN LISPRO 10 UNITS: 100 INJECTION, SOLUTION INTRAVENOUS; SUBCUTANEOUS at 11:49

## 2022-01-01 RX ADMIN — TAZOBACTAM SODIUM AND PIPERACILLIN SODIUM 3.38 G: 375; 3 INJECTION, SOLUTION INTRAVENOUS at 10:08

## 2022-01-01 RX ADMIN — DOCOSANOL 1 APPLICATION: 100 CREAM TOPICAL at 08:58

## 2022-01-01 RX ADMIN — CEFTRIAXONE 2 G: 2 INJECTION, POWDER, FOR SOLUTION INTRAMUSCULAR; INTRAVENOUS at 09:21

## 2022-01-01 RX ADMIN — FUROSEMIDE 40 MG: 40 TABLET ORAL at 08:24

## 2022-01-01 RX ADMIN — SODIUM CHLORIDE, PRESERVATIVE FREE 10 ML: 5 INJECTION INTRAVENOUS at 20:32

## 2022-01-01 RX ADMIN — SODIUM CHLORIDE, PRESERVATIVE FREE 10 ML: 5 INJECTION INTRAVENOUS at 08:17

## 2022-01-01 RX ADMIN — DEXAMETHASONE 6 MG: 6 TABLET ORAL at 08:16

## 2022-01-01 RX ADMIN — REMDESIVIR 100 MG: 100 INJECTION, POWDER, LYOPHILIZED, FOR SOLUTION INTRAVENOUS at 17:39

## 2022-01-01 RX ADMIN — METFORMIN HYDROCHLORIDE 500 MG: 500 TABLET ORAL at 18:03

## 2022-01-01 RX ADMIN — LISINOPRIL 40 MG: 40 TABLET ORAL at 23:17

## 2022-01-01 RX ADMIN — INSULIN LISPRO 10 UNITS: 100 INJECTION, SOLUTION INTRAVENOUS; SUBCUTANEOUS at 12:52

## 2022-01-01 RX ADMIN — INSULIN LISPRO 2 UNITS: 100 INJECTION, SOLUTION INTRAVENOUS; SUBCUTANEOUS at 17:37

## 2022-01-01 RX ADMIN — MAGNESIUM SULFATE 1 G: 1 INJECTION INTRAVENOUS at 12:04

## 2022-01-01 RX ADMIN — METFORMIN HYDROCHLORIDE 500 MG: 500 TABLET ORAL at 08:51

## 2022-01-01 RX ADMIN — SODIUM CHLORIDE 100 ML/HR: 9 INJECTION, SOLUTION INTRAVENOUS at 00:44

## 2022-01-01 RX ADMIN — INSULIN LISPRO 10 UNITS: 100 INJECTION, SOLUTION INTRAVENOUS; SUBCUTANEOUS at 08:36

## 2022-01-01 RX ADMIN — HYDROMORPHONE HYDROCHLORIDE 1 MG: 1 INJECTION, SOLUTION INTRAMUSCULAR; INTRAVENOUS; SUBCUTANEOUS at 14:45

## 2022-01-01 RX ADMIN — FLUOXETINE HYDROCHLORIDE 40 MG: 20 CAPSULE ORAL at 08:15

## 2022-01-01 RX ADMIN — SODIUM CHLORIDE 250 ML: 9 INJECTION, SOLUTION INTRAVENOUS at 13:02

## 2022-01-01 RX ADMIN — OXYCODONE AND ACETAMINOPHEN 1 TABLET: 5; 325 TABLET ORAL at 12:03

## 2022-01-01 RX ADMIN — SODIUM CHLORIDE, PRESERVATIVE FREE 10 ML: 5 INJECTION INTRAVENOUS at 09:10

## 2022-01-01 RX ADMIN — BUDESONIDE AND FORMOTEROL FUMARATE DIHYDRATE 2 PUFF: 160; 4.5 AEROSOL RESPIRATORY (INHALATION) at 21:09

## 2022-01-01 RX ADMIN — Medication 5 MG/HR: at 02:58

## 2022-01-01 RX ADMIN — DILTIAZEM HYDROCHLORIDE 5 MG: 5 INJECTION INTRAVENOUS at 04:51

## 2022-01-01 RX ADMIN — BUDESONIDE AND FORMOTEROL FUMARATE DIHYDRATE 2 PUFF: 160; 4.5 AEROSOL RESPIRATORY (INHALATION) at 21:11

## 2022-01-01 RX ADMIN — INSULIN LISPRO 2 UNITS: 100 INJECTION, SOLUTION INTRAVENOUS; SUBCUTANEOUS at 21:12

## 2022-01-01 RX ADMIN — INSULIN LISPRO 10 UNITS: 100 INJECTION, SOLUTION INTRAVENOUS; SUBCUTANEOUS at 17:53

## 2022-01-01 RX ADMIN — INSULIN LISPRO 4 UNITS: 100 INJECTION, SOLUTION INTRAVENOUS; SUBCUTANEOUS at 17:56

## 2022-01-01 RX ADMIN — ONDANSETRON HYDROCHLORIDE 4 MG: 4 TABLET, FILM COATED ORAL at 08:51

## 2022-01-01 RX ADMIN — SODIUM CHLORIDE, PRESERVATIVE FREE 10 ML: 5 INJECTION INTRAVENOUS at 21:58

## 2022-01-01 RX ADMIN — DOCOSANOL 1 APPLICATION: 100 CREAM TOPICAL at 12:53

## 2022-01-01 RX ADMIN — BUDESONIDE AND FORMOTEROL FUMARATE DIHYDRATE 2 PUFF: 160; 4.5 AEROSOL RESPIRATORY (INHALATION) at 20:19

## 2022-01-01 RX ADMIN — DILTIAZEM HYDROCHLORIDE 120 MG: 60 TABLET, FILM COATED ORAL at 13:20

## 2022-01-01 RX ADMIN — HYDROMORPHONE HYDROCHLORIDE 1 MG: 1 INJECTION, SOLUTION INTRAMUSCULAR; INTRAVENOUS; SUBCUTANEOUS at 17:58

## 2022-01-01 RX ADMIN — DIPHENHYDRAMINE HYDROCHLORIDE 25 MG: 50 INJECTION, SOLUTION INTRAMUSCULAR; INTRAVENOUS at 11:34

## 2022-01-01 RX ADMIN — INSULIN LISPRO 8 UNITS: 100 INJECTION, SOLUTION INTRAVENOUS; SUBCUTANEOUS at 19:15

## 2022-01-01 RX ADMIN — DOCUSATE SODIUM 100 MG: 100 CAPSULE, LIQUID FILLED ORAL at 08:51

## 2022-01-01 RX ADMIN — VERAPAMIL HYDROCHLORIDE 240 MG: 240 TABLET, FILM COATED, EXTENDED RELEASE ORAL at 12:42

## 2022-01-01 RX ADMIN — VERAPAMIL HYDROCHLORIDE 240 MG: 240 TABLET, FILM COATED, EXTENDED RELEASE ORAL at 08:16

## 2022-01-01 RX ADMIN — OXYCODONE AND ACETAMINOPHEN 1 TABLET: 5; 325 TABLET ORAL at 18:36

## 2022-01-01 RX ADMIN — DILTIAZEM HYDROCHLORIDE 10 MG: 5 INJECTION INTRAVENOUS at 03:29

## 2022-01-01 RX ADMIN — TAZOBACTAM SODIUM AND PIPERACILLIN SODIUM 3.38 G: 375; 3 INJECTION, SOLUTION INTRAVENOUS at 17:39

## 2022-01-01 RX ADMIN — SODIUM CHLORIDE, PRESERVATIVE FREE 10 ML: 5 INJECTION INTRAVENOUS at 08:16

## 2022-01-01 RX ADMIN — DILTIAZEM HYDROCHLORIDE 120 MG: 60 TABLET, FILM COATED ORAL at 14:33

## 2022-01-01 RX ADMIN — LISINOPRIL 40 MG: 40 TABLET ORAL at 09:23

## 2022-01-01 RX ADMIN — INSULIN GLARGINE-YFGN 10 UNITS: 100 INJECTION, SOLUTION SUBCUTANEOUS at 22:15

## 2022-01-01 RX ADMIN — DIGOXIN 125 MCG: 0.25 INJECTION INTRAMUSCULAR; INTRAVENOUS at 11:46

## 2022-01-01 RX ADMIN — INSULIN LISPRO 2 UNITS: 100 INJECTION, SOLUTION INTRAVENOUS; SUBCUTANEOUS at 12:02

## 2022-01-01 RX ADMIN — TIOTROPIUM BROMIDE INHALATION SPRAY 1 PUFF: 3.12 SPRAY, METERED RESPIRATORY (INHALATION) at 07:21

## 2022-01-01 RX ADMIN — TIOTROPIUM BROMIDE INHALATION SPRAY 2 PUFF: 3.12 SPRAY, METERED RESPIRATORY (INHALATION) at 07:06

## 2022-01-01 RX ADMIN — DEXTROSE MONOHYDRATE 25 G: 25 INJECTION, SOLUTION INTRAVENOUS at 18:15

## 2022-01-01 RX ADMIN — BUDESONIDE AND FORMOTEROL FUMARATE DIHYDRATE 2 PUFF: 160; 4.5 AEROSOL RESPIRATORY (INHALATION) at 09:26

## 2022-01-01 RX ADMIN — BUDESONIDE AND FORMOTEROL FUMARATE DIHYDRATE 2 PUFF: 160; 4.5 AEROSOL RESPIRATORY (INHALATION) at 20:49

## 2022-01-01 RX ADMIN — Medication 5 MG/HR: at 05:22

## 2022-01-01 RX ADMIN — HYDROMORPHONE HYDROCHLORIDE 0.5 MG: 1 INJECTION, SOLUTION INTRAMUSCULAR; INTRAVENOUS; SUBCUTANEOUS at 16:04

## 2022-01-01 RX ADMIN — METFORMIN HYDROCHLORIDE 500 MG: 500 TABLET ORAL at 08:58

## 2022-01-01 RX ADMIN — ENALAPRILAT 1.25 MG: 1.25 INJECTION INTRAVENOUS at 20:11

## 2022-01-01 RX ADMIN — DEXAMETHASONE 6 MG: 6 TABLET ORAL at 09:22

## 2022-01-01 RX ADMIN — METFORMIN HYDROCHLORIDE 500 MG: 500 TABLET ORAL at 19:16

## 2022-01-01 RX ADMIN — LAMOTRIGINE 25 MG: 25 TABLET ORAL at 21:36

## 2022-01-01 RX ADMIN — ENALAPRILAT 1.25 MG: 1.25 INJECTION INTRAVENOUS at 15:50

## 2022-01-01 RX ADMIN — INSULIN LISPRO 10 UNITS: 100 INJECTION, SOLUTION INTRAVENOUS; SUBCUTANEOUS at 08:58

## 2022-01-01 RX ADMIN — PANTOPRAZOLE SODIUM 40 MG: 40 TABLET, DELAYED RELEASE ORAL at 08:59

## 2022-01-01 RX ADMIN — SODIUM CHLORIDE, PRESERVATIVE FREE 10 ML: 5 INJECTION INTRAVENOUS at 21:07

## 2022-01-01 RX ADMIN — DOCOSANOL 1 APPLICATION: 100 CREAM TOPICAL at 11:48

## 2022-01-01 RX ADMIN — ALBUTEROL SULFATE 2.5 MG: 2.5 SOLUTION RESPIRATORY (INHALATION) at 15:51

## 2022-01-01 RX ADMIN — OXYCODONE AND ACETAMINOPHEN 1 TABLET: 5; 325 TABLET ORAL at 04:49

## 2022-01-01 RX ADMIN — DEXAMETHASONE 6 MG: 6 TABLET ORAL at 09:06

## 2022-01-01 RX ADMIN — DEXAMETHASONE 6 MG: 6 TABLET ORAL at 12:42

## 2022-01-01 RX ADMIN — TAZOBACTAM SODIUM AND PIPERACILLIN SODIUM 3.38 G: 375; 3 INJECTION, SOLUTION INTRAVENOUS at 00:14

## 2022-01-01 RX ADMIN — DEXAMETHASONE 6 MG: 6 TABLET ORAL at 08:25

## 2022-01-01 RX ADMIN — INSULIN LISPRO 12 UNITS: 100 INJECTION, SOLUTION INTRAVENOUS; SUBCUTANEOUS at 09:24

## 2022-01-01 RX ADMIN — METFORMIN HYDROCHLORIDE 500 MG: 500 TABLET ORAL at 08:25

## 2022-01-01 RX ADMIN — TAZOBACTAM SODIUM AND PIPERACILLIN SODIUM 3.38 G: 375; 3 INJECTION, SOLUTION INTRAVENOUS at 18:01

## 2022-01-01 RX ADMIN — SODIUM CHLORIDE, PRESERVATIVE FREE 10 ML: 5 INJECTION INTRAVENOUS at 09:47

## 2022-01-01 RX ADMIN — SODIUM CHLORIDE, PRESERVATIVE FREE 10 ML: 5 INJECTION INTRAVENOUS at 20:58

## 2022-01-01 RX ADMIN — INSULIN LISPRO 13 UNITS: 100 INJECTION, SOLUTION INTRAVENOUS; SUBCUTANEOUS at 11:38

## 2022-01-01 RX ADMIN — INSULIN LISPRO 10 UNITS: 100 INJECTION, SOLUTION INTRAVENOUS; SUBCUTANEOUS at 09:46

## 2022-01-01 RX ADMIN — METFORMIN HYDROCHLORIDE 500 MG: 500 TABLET ORAL at 08:37

## 2022-01-01 RX ADMIN — LAMOTRIGINE 25 MG: 25 TABLET ORAL at 20:11

## 2022-01-01 RX ADMIN — BUDESONIDE AND FORMOTEROL FUMARATE DIHYDRATE 2 PUFF: 160; 4.5 AEROSOL RESPIRATORY (INHALATION) at 22:04

## 2022-01-01 RX ADMIN — CEFTRIAXONE 2 G: 2 INJECTION, POWDER, FOR SOLUTION INTRAMUSCULAR; INTRAVENOUS at 10:00

## 2022-01-01 RX ADMIN — SODIUM CHLORIDE 125 ML/HR: 9 INJECTION, SOLUTION INTRAVENOUS at 06:04

## 2022-01-01 RX ADMIN — DILTIAZEM HYDROCHLORIDE 60 MG: 60 TABLET, FILM COATED ORAL at 06:01

## 2022-01-01 RX ADMIN — SODIUM CHLORIDE 100 ML/HR: 9 INJECTION, SOLUTION INTRAVENOUS at 22:12

## 2022-01-01 RX ADMIN — HYDROMORPHONE HYDROCHLORIDE 1 MG: 1 INJECTION, SOLUTION INTRAMUSCULAR; INTRAVENOUS; SUBCUTANEOUS at 21:53

## 2022-01-01 RX ADMIN — OXYCODONE HYDROCHLORIDE 5 MG: 5 TABLET ORAL at 18:30

## 2022-01-01 RX ADMIN — GLYCOPYRROLATE 0.2 MG: 0.2 INJECTION INTRAMUSCULAR; INTRAVENOUS at 14:28

## 2022-01-01 RX ADMIN — TAZOBACTAM SODIUM AND PIPERACILLIN SODIUM 3.38 G: 375; 3 INJECTION, SOLUTION INTRAVENOUS at 02:06

## 2022-01-01 RX ADMIN — INSULIN LISPRO 10 UNITS: 100 INJECTION, SOLUTION INTRAVENOUS; SUBCUTANEOUS at 18:36

## 2022-01-01 RX ADMIN — TAZOBACTAM SODIUM AND PIPERACILLIN SODIUM 3.38 G: 375; 3 INJECTION, SOLUTION INTRAVENOUS at 20:57

## 2022-01-01 RX ADMIN — SODIUM CHLORIDE, PRESERVATIVE FREE 10 ML: 5 INJECTION INTRAVENOUS at 20:31

## 2022-01-01 RX ADMIN — METFORMIN HYDROCHLORIDE 500 MG: 500 TABLET ORAL at 18:24

## 2022-01-01 RX ADMIN — INSULIN GLARGINE-YFGN 30 UNITS: 100 INJECTION, SOLUTION SUBCUTANEOUS at 22:00

## 2022-01-01 RX ADMIN — DIGOXIN 125 MCG: 0.25 INJECTION INTRAMUSCULAR; INTRAVENOUS at 11:18

## 2022-01-01 RX ADMIN — POLYETHYLENE GLYCOL 3350 17 G: 17 POWDER, FOR SOLUTION ORAL at 20:53

## 2022-01-01 RX ADMIN — BUDESONIDE AND FORMOTEROL FUMARATE DIHYDRATE 2 PUFF: 160; 4.5 AEROSOL RESPIRATORY (INHALATION) at 19:48

## 2022-01-01 RX ADMIN — APIXABAN 5 MG: 5 TABLET, FILM COATED ORAL at 20:56

## 2022-01-01 RX ADMIN — APIXABAN 5 MG: 5 TABLET, FILM COATED ORAL at 20:11

## 2022-01-01 RX ADMIN — SODIUM CHLORIDE, PRESERVATIVE FREE 10 ML: 5 INJECTION INTRAVENOUS at 08:52

## 2022-01-01 RX ADMIN — DILTIAZEM HYDROCHLORIDE 60 MG: 60 TABLET, FILM COATED ORAL at 23:54

## 2022-01-01 RX ADMIN — INSULIN LISPRO 10 UNITS: 100 INJECTION, SOLUTION INTRAVENOUS; SUBCUTANEOUS at 11:28

## 2022-01-01 RX ADMIN — ENOXAPARIN SODIUM 40 MG: 40 INJECTION SUBCUTANEOUS at 14:21

## 2022-01-01 RX ADMIN — INSULIN LISPRO 5 UNITS: 100 INJECTION, SOLUTION INTRAVENOUS; SUBCUTANEOUS at 09:06

## 2022-01-01 RX ADMIN — OXYCODONE HYDROCHLORIDE 5 MG: 5 TABLET ORAL at 09:44

## 2022-01-01 RX ADMIN — SODIUM CHLORIDE, PRESERVATIVE FREE 10 ML: 5 INJECTION INTRAVENOUS at 09:26

## 2022-01-01 RX ADMIN — TAZOBACTAM SODIUM AND PIPERACILLIN SODIUM 3.38 G: 375; 3 INJECTION, SOLUTION INTRAVENOUS at 18:18

## 2022-01-01 RX ADMIN — SODIUM CHLORIDE, POTASSIUM CHLORIDE, SODIUM LACTATE AND CALCIUM CHLORIDE 150 ML/HR: 600; 310; 30; 20 INJECTION, SOLUTION INTRAVENOUS at 11:46

## 2022-01-01 RX ADMIN — OXYCODONE AND ACETAMINOPHEN 1 TABLET: 5; 325 TABLET ORAL at 20:38

## 2022-01-01 RX ADMIN — SODIUM CHLORIDE, PRESERVATIVE FREE 10 ML: 5 INJECTION INTRAVENOUS at 20:53

## 2022-01-01 RX ADMIN — OXYCODONE AND ACETAMINOPHEN 1 TABLET: 5; 325 TABLET ORAL at 09:23

## 2022-01-01 RX ADMIN — POLYETHYLENE GLYCOL 3350 17 G: 17 POWDER, FOR SOLUTION ORAL at 20:44

## 2022-01-01 RX ADMIN — INSULIN LISPRO 2 UNITS: 100 INJECTION, SOLUTION INTRAVENOUS; SUBCUTANEOUS at 08:50

## 2022-01-01 RX ADMIN — FLUOXETINE HYDROCHLORIDE 40 MG: 20 CAPSULE ORAL at 08:28

## 2022-01-01 RX ADMIN — DOCOSANOL 1 APPLICATION: 100 CREAM TOPICAL at 21:07

## 2022-01-01 RX ADMIN — DOCUSATE SODIUM 100 MG: 100 CAPSULE, LIQUID FILLED ORAL at 09:18

## 2022-01-01 RX ADMIN — OXYCODONE HYDROCHLORIDE 5 MG: 5 TABLET ORAL at 06:07

## 2022-01-01 RX ADMIN — LAMOTRIGINE 25 MG: 25 TABLET ORAL at 20:29

## 2022-01-01 RX ADMIN — APIXABAN 5 MG: 5 TABLET, FILM COATED ORAL at 20:30

## 2022-01-01 RX ADMIN — Medication 5 MG/HR: at 00:25

## 2022-01-01 RX ADMIN — VERAPAMIL HYDROCHLORIDE 240 MG: 240 TABLET, FILM COATED, EXTENDED RELEASE ORAL at 23:17

## 2022-01-01 RX ADMIN — ENALAPRILAT 1.25 MG: 1.25 INJECTION INTRAVENOUS at 18:17

## 2022-01-01 RX ADMIN — INSULIN LISPRO 2 UNITS: 100 INJECTION, SOLUTION INTRAVENOUS; SUBCUTANEOUS at 12:04

## 2022-01-01 RX ADMIN — TAZOBACTAM SODIUM AND PIPERACILLIN SODIUM 3.38 G: 375; 3 INJECTION, SOLUTION INTRAVENOUS at 17:43

## 2022-01-01 RX ADMIN — INSULIN LISPRO 2 UNITS: 100 INJECTION, SOLUTION INTRAVENOUS; SUBCUTANEOUS at 20:26

## 2022-01-01 RX ADMIN — TAZOBACTAM SODIUM AND PIPERACILLIN SODIUM 3.38 G: 375; 3 INJECTION, SOLUTION INTRAVENOUS at 12:33

## 2022-01-01 RX ADMIN — DEXAMETHASONE 4 MG: 4 TABLET ORAL at 08:58

## 2022-01-01 RX ADMIN — SODIUM CHLORIDE, PRESERVATIVE FREE 10 ML: 5 INJECTION INTRAVENOUS at 20:24

## 2022-01-01 RX ADMIN — SODIUM CHLORIDE, PRESERVATIVE FREE 10 ML: 5 INJECTION INTRAVENOUS at 22:30

## 2022-01-01 RX ADMIN — INSULIN LISPRO 10 UNITS: 100 INJECTION, SOLUTION INTRAVENOUS; SUBCUTANEOUS at 07:02

## 2022-01-01 RX ADMIN — PANTOPRAZOLE SODIUM 8 MG/HR: 40 INJECTION, POWDER, FOR SOLUTION INTRAVENOUS at 23:54

## 2022-01-01 RX ADMIN — INSULIN GLARGINE-YFGN 36 UNITS: 100 INJECTION, SOLUTION SUBCUTANEOUS at 20:45

## 2022-01-01 RX ADMIN — Medication 7.5 MG/HR: at 20:24

## 2022-01-01 RX ADMIN — ALBUTEROL SULFATE 2.5 MG: 2.5 SOLUTION RESPIRATORY (INHALATION) at 14:49

## 2022-01-01 RX ADMIN — EPINEPHRINE 1 MG: 0.1 INJECTION INTRACARDIAC; INTRAVENOUS at 15:30

## 2022-01-01 RX ADMIN — METFORMIN HYDROCHLORIDE 500 MG: 500 TABLET ORAL at 08:16

## 2022-01-01 RX ADMIN — SODIUM CHLORIDE, PRESERVATIVE FREE 10 ML: 5 INJECTION INTRAVENOUS at 11:38

## 2022-01-01 RX ADMIN — DOCOSANOL 1 APPLICATION: 100 CREAM TOPICAL at 18:18

## 2022-01-01 RX ADMIN — INSULIN LISPRO 4 UNITS: 100 INJECTION, SOLUTION INTRAVENOUS; SUBCUTANEOUS at 08:11

## 2022-01-01 RX ADMIN — DILTIAZEM HYDROCHLORIDE 120 MG: 60 TABLET, FILM COATED ORAL at 06:21

## 2022-01-01 RX ADMIN — TAZOBACTAM SODIUM AND PIPERACILLIN SODIUM 3.38 G: 375; 3 INJECTION, SOLUTION INTRAVENOUS at 04:02

## 2022-01-01 RX ADMIN — OXYCODONE HYDROCHLORIDE 5 MG: 5 TABLET ORAL at 06:00

## 2022-01-01 RX ADMIN — INSULIN LISPRO 10 UNITS: 100 INJECTION, SOLUTION INTRAVENOUS; SUBCUTANEOUS at 07:14

## 2022-01-01 RX ADMIN — ENALAPRILAT 1.25 MG: 1.25 INJECTION INTRAVENOUS at 09:19

## 2022-01-01 RX ADMIN — DILTIAZEM HYDROCHLORIDE 120 MG: 60 TABLET, FILM COATED ORAL at 14:07

## 2022-01-01 RX ADMIN — DIGOXIN 250 MCG: 0.25 INJECTION INTRAMUSCULAR; INTRAVENOUS at 05:00

## 2022-01-01 RX ADMIN — POLYETHYLENE GLYCOL 3350 17 G: 17 POWDER, FOR SOLUTION ORAL at 20:31

## 2022-01-01 RX ADMIN — INSULIN LISPRO 5 UNITS: 100 INJECTION, SOLUTION INTRAVENOUS; SUBCUTANEOUS at 11:44

## 2022-01-01 RX ADMIN — SODIUM CHLORIDE, PRESERVATIVE FREE 10 ML: 5 INJECTION INTRAVENOUS at 08:14

## 2022-01-01 RX ADMIN — LISINOPRIL 40 MG: 40 TABLET ORAL at 09:07

## 2022-01-01 RX ADMIN — APIXABAN 5 MG: 5 TABLET, FILM COATED ORAL at 20:33

## 2022-01-01 RX ADMIN — PANTOPRAZOLE SODIUM 8 MG/HR: 40 INJECTION, POWDER, FOR SOLUTION INTRAVENOUS at 18:31

## 2022-01-01 RX ADMIN — BUDESONIDE AND FORMOTEROL FUMARATE DIHYDRATE 2 PUFF: 160; 4.5 AEROSOL RESPIRATORY (INHALATION) at 09:17

## 2022-01-01 RX ADMIN — DOCOSANOL 1 APPLICATION: 100 CREAM TOPICAL at 06:22

## 2022-01-01 RX ADMIN — TAZOBACTAM SODIUM AND PIPERACILLIN SODIUM 3.38 G: 375; 3 INJECTION, SOLUTION INTRAVENOUS at 20:49

## 2022-01-01 RX ADMIN — INSULIN GLARGINE-YFGN 30 UNITS: 100 INJECTION, SOLUTION SUBCUTANEOUS at 21:36

## 2022-01-01 RX ADMIN — OXYCODONE HYDROCHLORIDE 5 MG: 5 TABLET ORAL at 18:31

## 2022-01-01 RX ADMIN — DIGOXIN 250 MCG: 250 INJECTION, SOLUTION INTRAMUSCULAR; INTRAVENOUS; PARENTERAL at 22:28

## 2022-01-01 RX ADMIN — DOCOSANOL 1 APPLICATION: 100 CREAM TOPICAL at 13:57

## 2022-01-01 RX ADMIN — BUDESONIDE AND FORMOTEROL FUMARATE DIHYDRATE 2 PUFF: 160; 4.5 AEROSOL RESPIRATORY (INHALATION) at 07:41

## 2022-01-01 RX ADMIN — FLUOXETINE HYDROCHLORIDE 40 MG: 20 CAPSULE ORAL at 09:07

## 2022-01-01 RX ADMIN — INSULIN LISPRO 12 UNITS: 100 INJECTION, SOLUTION INTRAVENOUS; SUBCUTANEOUS at 11:44

## 2022-01-01 RX ADMIN — FLUOXETINE HYDROCHLORIDE 40 MG: 20 CAPSULE ORAL at 09:45

## 2022-01-01 RX ADMIN — INSULIN LISPRO 2 UNITS: 100 INJECTION, SOLUTION INTRAVENOUS; SUBCUTANEOUS at 11:38

## 2022-01-01 RX ADMIN — FLUOXETINE HYDROCHLORIDE 40 MG: 20 CAPSULE ORAL at 08:14

## 2022-01-01 RX ADMIN — DILTIAZEM HYDROCHLORIDE 60 MG: 60 TABLET, FILM COATED ORAL at 13:01

## 2022-01-01 RX ADMIN — HYDROMORPHONE HYDROCHLORIDE 1 MG: 1 INJECTION, SOLUTION INTRAMUSCULAR; INTRAVENOUS; SUBCUTANEOUS at 13:13

## 2022-01-01 RX ADMIN — INSULIN LISPRO 2 UNITS: 100 INJECTION, SOLUTION INTRAVENOUS; SUBCUTANEOUS at 08:51

## 2022-01-01 RX ADMIN — HYDROMORPHONE HYDROCHLORIDE 1 MG: 1 INJECTION, SOLUTION INTRAMUSCULAR; INTRAVENOUS; SUBCUTANEOUS at 23:09

## 2022-01-01 RX ADMIN — FLUOXETINE HYDROCHLORIDE 40 MG: 20 CAPSULE ORAL at 08:16

## 2022-01-01 RX ADMIN — INSULIN LISPRO 6 UNITS: 100 INJECTION, SOLUTION INTRAVENOUS; SUBCUTANEOUS at 17:15

## 2022-01-01 RX ADMIN — HYDROMORPHONE HYDROCHLORIDE 1 MG: 1 INJECTION, SOLUTION INTRAMUSCULAR; INTRAVENOUS; SUBCUTANEOUS at 23:26

## 2022-01-01 RX ADMIN — OXYCODONE HYDROCHLORIDE 5 MG: 5 TABLET ORAL at 21:12

## 2022-01-01 RX ADMIN — BUDESONIDE AND FORMOTEROL FUMARATE DIHYDRATE 2 PUFF: 160; 4.5 AEROSOL RESPIRATORY (INHALATION) at 21:01

## 2022-01-01 RX ADMIN — INSULIN LISPRO 6 UNITS: 100 INJECTION, SOLUTION INTRAVENOUS; SUBCUTANEOUS at 17:18

## 2022-01-01 RX ADMIN — DOCUSATE SODIUM 100 MG: 100 CAPSULE, LIQUID FILLED ORAL at 09:23

## 2022-01-01 RX ADMIN — OXYCODONE AND ACETAMINOPHEN 1 TABLET: 5; 325 TABLET ORAL at 08:57

## 2022-01-01 RX ADMIN — INSULIN LISPRO 12 UNITS: 100 INJECTION, SOLUTION INTRAVENOUS; SUBCUTANEOUS at 11:12

## 2022-01-01 RX ADMIN — OXYCODONE HYDROCHLORIDE 5 MG: 5 TABLET ORAL at 17:56

## 2022-01-01 RX ADMIN — PANTOPRAZOLE SODIUM 40 MG: 40 TABLET, DELAYED RELEASE ORAL at 12:05

## 2022-01-01 RX ADMIN — ONDANSETRON 4 MG: 2 INJECTION INTRAMUSCULAR; INTRAVENOUS at 02:10

## 2022-01-01 RX ADMIN — DIGOXIN 250 MCG: 0.25 INJECTION INTRAMUSCULAR; INTRAVENOUS at 03:41

## 2022-01-01 RX ADMIN — SODIUM CHLORIDE, PRESERVATIVE FREE 10 ML: 5 INJECTION INTRAVENOUS at 08:29

## 2022-01-01 RX ADMIN — SODIUM CHLORIDE, PRESERVATIVE FREE 10 ML: 5 INJECTION INTRAVENOUS at 08:12

## 2022-01-01 RX ADMIN — TIOTROPIUM BROMIDE INHALATION SPRAY 2 PUFF: 3.12 SPRAY, METERED RESPIRATORY (INHALATION) at 08:33

## 2022-01-01 RX ADMIN — TIOTROPIUM BROMIDE INHALATION SPRAY 2 PUFF: 3.12 SPRAY, METERED RESPIRATORY (INHALATION) at 09:17

## 2022-01-01 RX ADMIN — FUROSEMIDE 40 MG: 10 INJECTION, SOLUTION INTRAMUSCULAR; INTRAVENOUS at 13:01

## 2022-01-01 RX ADMIN — DOCUSATE SODIUM 100 MG: 100 CAPSULE, LIQUID FILLED ORAL at 08:57

## 2022-01-01 RX ADMIN — PANTOPRAZOLE SODIUM 8 MG/HR: 40 INJECTION, POWDER, FOR SOLUTION INTRAVENOUS at 18:51

## 2022-01-01 RX ADMIN — DOCOSANOL 1 APPLICATION: 100 CREAM TOPICAL at 17:45

## 2022-01-01 RX ADMIN — CEFAZOLIN SODIUM 2 G: 2 INJECTION, SOLUTION INTRAVENOUS at 14:25

## 2022-01-01 RX ADMIN — TAZOBACTAM SODIUM AND PIPERACILLIN SODIUM 3.38 G: 375; 3 INJECTION, SOLUTION INTRAVENOUS at 12:53

## 2022-01-01 RX ADMIN — BUDESONIDE AND FORMOTEROL FUMARATE DIHYDRATE 2 PUFF: 160; 4.5 AEROSOL RESPIRATORY (INHALATION) at 09:40

## 2022-01-01 RX ADMIN — OXYCODONE HYDROCHLORIDE 5 MG: 5 TABLET ORAL at 10:13

## 2022-01-01 RX ADMIN — INSULIN GLARGINE-YFGN 30 UNITS: 100 INJECTION, SOLUTION SUBCUTANEOUS at 21:22

## 2022-01-01 RX ADMIN — DOCOSANOL 1 APPLICATION: 100 CREAM TOPICAL at 06:07

## 2022-01-01 RX ADMIN — DOCOSANOL 1 APPLICATION: 100 CREAM TOPICAL at 13:25

## 2022-01-01 RX ADMIN — DILTIAZEM HYDROCHLORIDE 60 MG: 60 TABLET, FILM COATED ORAL at 00:07

## 2022-01-01 RX ADMIN — HYDROMORPHONE HYDROCHLORIDE 1 MG: 1 INJECTION, SOLUTION INTRAMUSCULAR; INTRAVENOUS; SUBCUTANEOUS at 06:22

## 2022-01-01 RX ADMIN — APIXABAN 5 MG: 5 TABLET, FILM COATED ORAL at 09:22

## 2022-01-01 RX ADMIN — LISINOPRIL 40 MG: 40 TABLET ORAL at 08:37

## 2022-01-01 RX ADMIN — FLUOXETINE HYDROCHLORIDE 40 MG: 20 CAPSULE ORAL at 08:58

## 2022-01-01 RX ADMIN — FUROSEMIDE 40 MG: 40 TABLET ORAL at 08:50

## 2022-01-01 RX ADMIN — ALBUTEROL SULFATE 2.5 MG: 2.5 SOLUTION RESPIRATORY (INHALATION) at 16:02

## 2022-01-01 RX ADMIN — APIXABAN 5 MG: 5 TABLET, FILM COATED ORAL at 21:00

## 2022-01-01 RX ADMIN — CEFTRIAXONE 2 G: 2 INJECTION, POWDER, FOR SOLUTION INTRAMUSCULAR; INTRAVENOUS at 09:09

## 2022-01-01 RX ADMIN — ONDANSETRON 4 MG: 2 INJECTION INTRAMUSCULAR; INTRAVENOUS at 13:13

## 2022-01-01 RX ADMIN — LAMOTRIGINE 100 MG: 100 TABLET ORAL at 09:45

## 2022-01-01 RX ADMIN — DEXAMETHASONE SODIUM PHOSPHATE 6 MG: 10 INJECTION INTRAMUSCULAR; INTRAVENOUS at 09:09

## 2022-01-01 RX ADMIN — LISINOPRIL 40 MG: 40 TABLET ORAL at 12:43

## 2022-01-01 RX ADMIN — DILTIAZEM HYDROCHLORIDE 120 MG: 60 TABLET, FILM COATED ORAL at 06:26

## 2022-01-01 RX ADMIN — INSULIN LISPRO 6 UNITS: 100 INJECTION, SOLUTION INTRAVENOUS; SUBCUTANEOUS at 16:57

## 2022-01-01 RX ADMIN — ENALAPRILAT 1.25 MG: 1.25 INJECTION INTRAVENOUS at 15:08

## 2022-01-01 RX ADMIN — ENALAPRILAT 1.25 MG: 1.25 INJECTION INTRAVENOUS at 20:25

## 2022-01-01 RX ADMIN — INSULIN LISPRO 8 UNITS: 100 INJECTION, SOLUTION INTRAVENOUS; SUBCUTANEOUS at 08:26

## 2022-01-01 RX ADMIN — LAMOTRIGINE 100 MG: 100 TABLET ORAL at 08:50

## 2022-01-01 RX ADMIN — INSULIN LISPRO 7 UNITS: 100 INJECTION, SOLUTION INTRAVENOUS; SUBCUTANEOUS at 11:21

## 2022-01-01 RX ADMIN — DOCOSANOL 1 APPLICATION: 100 CREAM TOPICAL at 18:10

## 2022-01-01 RX ADMIN — DIGOXIN 250 MCG: 0.25 INJECTION INTRAMUSCULAR; INTRAVENOUS at 22:32

## 2022-01-01 RX ADMIN — BUDESONIDE AND FORMOTEROL FUMARATE DIHYDRATE 2 PUFF: 160; 4.5 AEROSOL RESPIRATORY (INHALATION) at 08:41

## 2022-01-01 RX ADMIN — CEFAZOLIN SODIUM 2 G: 2 INJECTION, SOLUTION INTRAVENOUS at 21:58

## 2022-01-01 RX ADMIN — MUPIROCIN 1 APPLICATION: 20 OINTMENT TOPICAL at 20:44

## 2022-01-01 RX ADMIN — TAZOBACTAM SODIUM AND PIPERACILLIN SODIUM 3.38 G: 375; 3 INJECTION, SOLUTION INTRAVENOUS at 11:42

## 2022-01-01 RX ADMIN — INSULIN LISPRO 10 UNITS: 100 INJECTION, SOLUTION INTRAVENOUS; SUBCUTANEOUS at 13:07

## 2022-01-01 RX ADMIN — MAGNESIUM SULFATE 1 G: 1 INJECTION INTRAVENOUS at 13:02

## 2022-01-01 RX ADMIN — SODIUM CHLORIDE, PRESERVATIVE FREE 10 ML: 5 INJECTION INTRAVENOUS at 08:25

## 2022-01-01 RX ADMIN — SODIUM CHLORIDE, PRESERVATIVE FREE 10 ML: 5 INJECTION INTRAVENOUS at 22:19

## 2022-01-01 RX ADMIN — LIDOCAINE HYDROCHLORIDE 60 MG: 20 INJECTION, SOLUTION INFILTRATION; PERINEURAL at 14:28

## 2022-01-01 RX ADMIN — FLUOXETINE HYDROCHLORIDE 40 MG: 20 CAPSULE ORAL at 08:55

## 2022-01-01 RX ADMIN — VERAPAMIL HYDROCHLORIDE 240 MG: 240 TABLET, FILM COATED, EXTENDED RELEASE ORAL at 08:36

## 2022-01-01 RX ADMIN — DOCOSANOL 1 APPLICATION: 100 CREAM TOPICAL at 22:31

## 2022-01-01 RX ADMIN — LISINOPRIL 20 MG: 20 TABLET ORAL at 08:37

## 2022-01-01 RX ADMIN — BUDESONIDE AND FORMOTEROL FUMARATE DIHYDRATE 2 PUFF: 160; 4.5 AEROSOL RESPIRATORY (INHALATION) at 19:07

## 2022-01-01 RX ADMIN — DILTIAZEM HYDROCHLORIDE 60 MG: 60 TABLET, FILM COATED ORAL at 18:27

## 2022-01-01 RX ADMIN — BUDESONIDE AND FORMOTEROL FUMARATE DIHYDRATE 2 PUFF: 160; 4.5 AEROSOL RESPIRATORY (INHALATION) at 07:23

## 2022-01-01 RX ADMIN — INSULIN GLARGINE-YFGN 10 UNITS: 100 INJECTION, SOLUTION SUBCUTANEOUS at 23:34

## 2022-01-01 RX ADMIN — INSULIN LISPRO 4 UNITS: 100 INJECTION, SOLUTION INTRAVENOUS; SUBCUTANEOUS at 08:16

## 2022-01-01 RX ADMIN — DILTIAZEM HYDROCHLORIDE 10 MG: 5 INJECTION INTRAVENOUS at 00:24

## 2022-01-01 RX ADMIN — FENTANYL CITRATE 50 MCG: 50 INJECTION INTRAMUSCULAR; INTRAVENOUS at 15:29

## 2022-01-01 RX ADMIN — OXYCODONE AND ACETAMINOPHEN 1 TABLET: 5; 325 TABLET ORAL at 23:19

## 2022-01-01 RX ADMIN — TIOTROPIUM BROMIDE INHALATION SPRAY 2 PUFF: 3.12 SPRAY, METERED RESPIRATORY (INHALATION) at 08:44

## 2022-01-01 RX ADMIN — INSULIN LISPRO 10 UNITS: 100 INJECTION, SOLUTION INTRAVENOUS; SUBCUTANEOUS at 13:18

## 2022-01-01 RX ADMIN — PANTOPRAZOLE SODIUM 8 MG/HR: 40 INJECTION, POWDER, FOR SOLUTION INTRAVENOUS at 13:15

## 2022-01-01 RX ADMIN — OXYCODONE AND ACETAMINOPHEN 1 TABLET: 5; 325 TABLET ORAL at 13:17

## 2022-01-01 RX ADMIN — DOCOSANOL 1 APPLICATION: 100 CREAM TOPICAL at 17:53

## 2022-01-01 RX ADMIN — INSULIN LISPRO 10 UNITS: 100 INJECTION, SOLUTION INTRAVENOUS; SUBCUTANEOUS at 13:09

## 2022-01-01 RX ADMIN — INSULIN LISPRO 5 UNITS: 100 INJECTION, SOLUTION INTRAVENOUS; SUBCUTANEOUS at 12:34

## 2022-01-01 RX ADMIN — INSULIN LISPRO 10 UNITS: 100 INJECTION, SOLUTION INTRAVENOUS; SUBCUTANEOUS at 17:37

## 2022-01-01 RX ADMIN — REMDESIVIR 100 MG: 100 INJECTION, POWDER, LYOPHILIZED, FOR SOLUTION INTRAVENOUS at 21:12

## 2022-01-01 RX ADMIN — HYDROMORPHONE HYDROCHLORIDE 1 MG: 1 INJECTION, SOLUTION INTRAMUSCULAR; INTRAVENOUS; SUBCUTANEOUS at 11:32

## 2022-01-01 RX ADMIN — HYDROMORPHONE HYDROCHLORIDE 1 MG: 1 INJECTION, SOLUTION INTRAMUSCULAR; INTRAVENOUS; SUBCUTANEOUS at 22:49

## 2022-01-01 RX ADMIN — INSULIN LISPRO 6 UNITS: 100 INJECTION, SOLUTION INTRAVENOUS; SUBCUTANEOUS at 17:33

## 2022-01-01 RX ADMIN — TAZOBACTAM SODIUM AND PIPERACILLIN SODIUM 3.38 G: 375; 3 INJECTION, SOLUTION INTRAVENOUS at 19:55

## 2022-01-01 RX ADMIN — DILTIAZEM HYDROCHLORIDE 60 MG: 60 TABLET, FILM COATED ORAL at 13:17

## 2022-01-01 RX ADMIN — FUROSEMIDE 40 MG: 10 INJECTION, SOLUTION INTRAMUSCULAR; INTRAVENOUS at 12:53

## 2022-01-01 RX ADMIN — FLUOXETINE HYDROCHLORIDE 40 MG: 20 CAPSULE ORAL at 08:37

## 2022-01-01 RX ADMIN — DILTIAZEM HYDROCHLORIDE 5 MG: 5 INJECTION INTRAVENOUS at 05:17

## 2022-01-01 RX ADMIN — BUDESONIDE AND FORMOTEROL FUMARATE DIHYDRATE 2 PUFF: 160; 4.5 AEROSOL RESPIRATORY (INHALATION) at 07:22

## 2022-01-01 RX ADMIN — LISINOPRIL 40 MG: 40 TABLET ORAL at 08:25

## 2022-01-01 RX ADMIN — APIXABAN 5 MG: 5 TABLET, FILM COATED ORAL at 08:58

## 2022-01-01 RX ADMIN — DOCUSATE SODIUM 100 MG: 100 CAPSULE, LIQUID FILLED ORAL at 08:15

## 2022-01-01 RX ADMIN — OXYCODONE HYDROCHLORIDE 5 MG: 5 TABLET ORAL at 21:06

## 2022-01-01 RX ADMIN — DOCOSANOL 1 APPLICATION: 100 CREAM TOPICAL at 12:25

## 2022-01-01 RX ADMIN — DILTIAZEM HYDROCHLORIDE 120 MG: 60 TABLET, FILM COATED ORAL at 21:06

## 2022-01-01 RX ADMIN — DOCOSANOL 1 APPLICATION: 100 CREAM TOPICAL at 17:18

## 2022-01-01 RX ADMIN — OXYCODONE AND ACETAMINOPHEN 1 TABLET: 5; 325 TABLET ORAL at 06:13

## 2022-01-01 RX ADMIN — SODIUM CHLORIDE, PRESERVATIVE FREE 10 ML: 5 INJECTION INTRAVENOUS at 08:50

## 2022-01-01 RX ADMIN — Medication 15 MG/HR: at 05:35

## 2022-01-01 RX ADMIN — SODIUM CHLORIDE, PRESERVATIVE FREE 10 ML: 5 INJECTION INTRAVENOUS at 20:26

## 2022-01-01 RX ADMIN — INSULIN LISPRO 10 UNITS: 100 INJECTION, SOLUTION INTRAVENOUS; SUBCUTANEOUS at 11:39

## 2022-01-01 RX ADMIN — APIXABAN 5 MG: 5 TABLET, FILM COATED ORAL at 08:14

## 2022-01-01 RX ADMIN — MAGNESIUM SULFATE HEPTAHYDRATE 4 G: 40 INJECTION, SOLUTION INTRAVENOUS at 18:26

## 2022-01-01 RX ADMIN — DEXAMETHASONE SODIUM PHOSPHATE 8 MG: 10 INJECTION INTRAMUSCULAR; INTRAVENOUS at 08:12

## 2022-01-01 RX ADMIN — VERAPAMIL HYDROCHLORIDE 240 MG: 240 TABLET, FILM COATED, EXTENDED RELEASE ORAL at 09:23

## 2022-01-01 RX ADMIN — POLYETHYLENE GLYCOL 3350 17 G: 17 POWDER, FOR SOLUTION ORAL at 20:56

## 2022-01-01 RX ADMIN — ENALAPRILAT 1.25 MG: 1.25 INJECTION INTRAVENOUS at 09:09

## 2022-01-01 RX ADMIN — DEXAMETHASONE 2 MG: 2 TABLET ORAL at 08:57

## 2022-01-01 RX ADMIN — POTASSIUM CHLORIDE 40 MEQ: 750 TABLET, EXTENDED RELEASE ORAL at 12:53

## 2022-01-01 RX ADMIN — ONDANSETRON 4 MG: 2 INJECTION INTRAMUSCULAR; INTRAVENOUS at 04:05

## 2022-01-01 RX ADMIN — TAZOBACTAM SODIUM AND PIPERACILLIN SODIUM 3.38 G: 375; 3 INJECTION, SOLUTION INTRAVENOUS at 14:19

## 2022-01-01 RX ADMIN — INSULIN LISPRO 2 UNITS: 100 INJECTION, SOLUTION INTRAVENOUS; SUBCUTANEOUS at 16:33

## 2022-01-01 RX ADMIN — BUDESONIDE AND FORMOTEROL FUMARATE DIHYDRATE 2 PUFF: 160; 4.5 AEROSOL RESPIRATORY (INHALATION) at 21:02

## 2022-01-01 RX ADMIN — SIMETHICONE 80 MG: 80 TABLET, CHEWABLE ORAL at 08:33

## 2022-01-01 RX ADMIN — TAZOBACTAM SODIUM AND PIPERACILLIN SODIUM 3.38 G: 375; 3 INJECTION, SOLUTION INTRAVENOUS at 04:08

## 2022-01-01 RX ADMIN — DOCOSANOL 1 APPLICATION: 100 CREAM TOPICAL at 18:56

## 2022-01-01 RX ADMIN — POLYETHYLENE GLYCOL 3350 17 G: 17 POWDER, FOR SOLUTION ORAL at 08:56

## 2022-01-01 RX ADMIN — HYDROMORPHONE HYDROCHLORIDE 1 MG: 1 INJECTION, SOLUTION INTRAMUSCULAR; INTRAVENOUS; SUBCUTANEOUS at 12:13

## 2022-01-01 RX ADMIN — DILTIAZEM HYDROCHLORIDE 120 MG: 60 TABLET, FILM COATED ORAL at 23:07

## 2022-01-01 RX ADMIN — POLYETHYLENE GLYCOL 3350 17 G: 17 POWDER, FOR SOLUTION ORAL at 08:14

## 2022-01-01 RX ADMIN — ONDANSETRON 4 MG: 2 INJECTION INTRAMUSCULAR; INTRAVENOUS at 08:51

## 2022-01-01 RX ADMIN — INSULIN LISPRO 10 UNITS: 100 INJECTION, SOLUTION INTRAVENOUS; SUBCUTANEOUS at 08:15

## 2022-01-01 RX ADMIN — DIGOXIN 250 MCG: 250 INJECTION, SOLUTION INTRAMUSCULAR; INTRAVENOUS; PARENTERAL at 17:39

## 2022-01-01 RX ADMIN — Medication 10 MG/HR: at 22:19

## 2022-01-01 RX ADMIN — DEXAMETHASONE 4 MG: 4 TABLET ORAL at 08:53

## 2022-01-01 RX ADMIN — BUDESONIDE AND FORMOTEROL FUMARATE DIHYDRATE 2 PUFF: 160; 4.5 AEROSOL RESPIRATORY (INHALATION) at 19:00

## 2022-01-01 RX ADMIN — INSULIN LISPRO 5 UNITS: 100 INJECTION, SOLUTION INTRAVENOUS; SUBCUTANEOUS at 08:16

## 2022-01-01 RX ADMIN — METFORMIN HYDROCHLORIDE 500 MG: 500 TABLET ORAL at 18:37

## 2022-01-01 RX ADMIN — METFORMIN HYDROCHLORIDE 500 MG: 500 TABLET ORAL at 09:22

## 2022-01-01 RX ADMIN — INSULIN LISPRO 8 UNITS: 100 INJECTION, SOLUTION INTRAVENOUS; SUBCUTANEOUS at 20:26

## 2022-01-01 RX ADMIN — DILTIAZEM HYDROCHLORIDE 60 MG: 60 TABLET, FILM COATED ORAL at 15:08

## 2022-01-01 RX ADMIN — PANTOPRAZOLE SODIUM 40 MG: 40 TABLET, DELAYED RELEASE ORAL at 08:24

## 2022-01-01 RX ADMIN — FUROSEMIDE 40 MG: 40 TABLET ORAL at 08:15

## 2022-01-01 RX ADMIN — DEXAMETHASONE SODIUM PHOSPHATE 6 MG: 10 INJECTION INTRAMUSCULAR; INTRAVENOUS at 09:18

## 2022-01-01 RX ADMIN — ENOXAPARIN SODIUM 40 MG: 40 INJECTION SUBCUTANEOUS at 23:10

## 2022-01-01 RX ADMIN — INSULIN LISPRO 8 UNITS: 100 INJECTION, SOLUTION INTRAVENOUS; SUBCUTANEOUS at 09:06

## 2022-01-01 RX ADMIN — SODIUM CHLORIDE, PRESERVATIVE FREE 10 ML: 5 INJECTION INTRAVENOUS at 08:37

## 2022-01-01 RX ADMIN — INSULIN LISPRO 2 UNITS: 100 INJECTION, SOLUTION INTRAVENOUS; SUBCUTANEOUS at 07:15

## 2022-01-01 RX ADMIN — SODIUM CHLORIDE, PRESERVATIVE FREE 10 ML: 5 INJECTION INTRAVENOUS at 21:36

## 2022-01-01 RX ADMIN — SODIUM CHLORIDE, PRESERVATIVE FREE 10 ML: 5 INJECTION INTRAVENOUS at 08:10

## 2022-01-01 RX ADMIN — POLYETHYLENE GLYCOL 3350 17 G: 17 POWDER, FOR SOLUTION ORAL at 20:24

## 2022-01-01 RX ADMIN — LAMOTRIGINE 25 MG: 25 TABLET ORAL at 20:33

## 2022-01-01 RX ADMIN — VERAPAMIL HYDROCHLORIDE 240 MG: 240 TABLET, FILM COATED, EXTENDED RELEASE ORAL at 08:37

## 2022-01-01 RX ADMIN — PANTOPRAZOLE SODIUM 40 MG: 40 TABLET, DELAYED RELEASE ORAL at 08:37

## 2022-01-01 RX ADMIN — SODIUM CHLORIDE, PRESERVATIVE FREE 10 ML: 5 INJECTION INTRAVENOUS at 22:08

## 2022-01-01 RX ADMIN — ENALAPRILAT 1.25 MG: 1.25 INJECTION INTRAVENOUS at 02:01

## 2022-01-01 RX ADMIN — PANTOPRAZOLE SODIUM 40 MG: 40 TABLET, DELAYED RELEASE ORAL at 09:46

## 2022-01-01 RX ADMIN — TAZOBACTAM SODIUM AND PIPERACILLIN SODIUM 3.38 G: 375; 3 INJECTION, SOLUTION INTRAVENOUS at 03:35

## 2022-01-01 RX ADMIN — DILTIAZEM HYDROCHLORIDE 60 MG: 60 TABLET, FILM COATED ORAL at 06:37

## 2022-01-01 RX ADMIN — DOCOSANOL 1 APPLICATION: 100 CREAM TOPICAL at 06:00

## 2022-01-01 RX ADMIN — INSULIN LISPRO 10 UNITS: 100 INJECTION, SOLUTION INTRAVENOUS; SUBCUTANEOUS at 18:04

## 2022-01-01 RX ADMIN — TAZOBACTAM SODIUM AND PIPERACILLIN SODIUM 3.38 G: 375; 3 INJECTION, SOLUTION INTRAVENOUS at 10:23

## 2022-01-01 RX ADMIN — ONDANSETRON 4 MG: 2 INJECTION INTRAMUSCULAR; INTRAVENOUS at 15:28

## 2022-01-01 RX ADMIN — FLUOXETINE HYDROCHLORIDE 40 MG: 20 CAPSULE ORAL at 08:36

## 2022-01-01 RX ADMIN — TAZOBACTAM SODIUM AND PIPERACILLIN SODIUM 3.38 G: 375; 3 INJECTION, SOLUTION INTRAVENOUS at 02:41

## 2022-01-01 RX ADMIN — SODIUM CHLORIDE 100 ML/HR: 9 INJECTION, SOLUTION INTRAVENOUS at 04:10

## 2022-01-01 RX ADMIN — REMDESIVIR 100 MG: 100 INJECTION, POWDER, LYOPHILIZED, FOR SOLUTION INTRAVENOUS at 17:56

## 2022-01-01 RX ADMIN — BUDESONIDE AND FORMOTEROL FUMARATE DIHYDRATE 2 PUFF: 160; 4.5 AEROSOL RESPIRATORY (INHALATION) at 06:41

## 2022-01-01 RX ADMIN — OXYCODONE HYDROCHLORIDE 5 MG: 5 TABLET ORAL at 03:44

## 2022-01-01 RX ADMIN — BUDESONIDE AND FORMOTEROL FUMARATE DIHYDRATE 2 PUFF: 160; 4.5 AEROSOL RESPIRATORY (INHALATION) at 08:03

## 2022-01-01 RX ADMIN — DILTIAZEM HYDROCHLORIDE 60 MG: 60 TABLET, FILM COATED ORAL at 00:30

## 2022-01-01 RX ADMIN — TIOTROPIUM BROMIDE INHALATION SPRAY 2 PUFF: 3.12 SPRAY, METERED RESPIRATORY (INHALATION) at 07:25

## 2022-01-01 RX ADMIN — SODIUM CHLORIDE 100 ML/HR: 9 INJECTION, SOLUTION INTRAVENOUS at 03:59

## 2022-01-01 RX ADMIN — SODIUM CHLORIDE, PRESERVATIVE FREE 10 ML: 5 INJECTION INTRAVENOUS at 20:29

## 2022-01-01 RX ADMIN — DEXAMETHASONE SODIUM PHOSPHATE 8 MG: 10 INJECTION INTRAMUSCULAR; INTRAVENOUS at 19:55

## 2022-01-01 RX ADMIN — ENALAPRILAT 1.25 MG: 1.25 INJECTION INTRAVENOUS at 15:11

## 2022-01-01 RX ADMIN — OXYCODONE HYDROCHLORIDE 5 MG: 5 TABLET ORAL at 13:38

## 2022-01-01 RX ADMIN — LAMOTRIGINE 100 MG: 100 TABLET ORAL at 08:55

## 2022-01-01 RX ADMIN — Medication 15 MG/HR: at 22:29

## 2022-01-01 RX ADMIN — DILTIAZEM HYDROCHLORIDE 60 MG: 60 TABLET, FILM COATED ORAL at 20:29

## 2022-01-01 RX ADMIN — INSULIN LISPRO 10 UNITS: 100 INJECTION, SOLUTION INTRAVENOUS; SUBCUTANEOUS at 08:51

## 2022-01-01 RX ADMIN — LAMOTRIGINE 100 MG: 100 TABLET ORAL at 08:24

## 2022-01-01 RX ADMIN — BUDESONIDE AND FORMOTEROL FUMARATE DIHYDRATE 2 PUFF: 160; 4.5 AEROSOL RESPIRATORY (INHALATION) at 08:16

## 2022-01-01 RX ADMIN — REMDESIVIR 100 MG: 100 INJECTION, POWDER, LYOPHILIZED, FOR SOLUTION INTRAVENOUS at 17:34

## 2022-01-01 RX ADMIN — MUPIROCIN 1 APPLICATION: 20 OINTMENT TOPICAL at 21:58

## 2022-01-01 RX ADMIN — Medication 10 MG/HR: at 09:50

## 2022-01-01 RX ADMIN — INSULIN LISPRO 10 UNITS: 100 INJECTION, SOLUTION INTRAVENOUS; SUBCUTANEOUS at 07:15

## 2022-01-01 RX ADMIN — INSULIN LISPRO 2 UNITS: 100 INJECTION, SOLUTION INTRAVENOUS; SUBCUTANEOUS at 11:46

## 2022-01-01 RX ADMIN — Medication 50 MCG/KG/MIN: at 14:30

## 2022-01-01 RX ADMIN — LAMOTRIGINE 25 MG: 25 TABLET ORAL at 20:17

## 2022-01-01 RX ADMIN — OXYCODONE HYDROCHLORIDE 5 MG: 5 TABLET ORAL at 18:27

## 2022-01-01 RX ADMIN — INSULIN LISPRO 10 UNITS: 100 INJECTION, SOLUTION INTRAVENOUS; SUBCUTANEOUS at 17:18

## 2022-01-01 RX ADMIN — POLYETHYLENE GLYCOL 3350 17 G: 17 POWDER, FOR SOLUTION ORAL at 21:58

## 2022-01-01 RX ADMIN — DIGOXIN 250 MCG: 250 INJECTION, SOLUTION INTRAMUSCULAR; INTRAVENOUS; PARENTERAL at 08:07

## 2022-01-01 RX ADMIN — POLYETHYLENE GLYCOL 3350 17 G: 17 POWDER, FOR SOLUTION ORAL at 09:09

## 2022-01-01 RX ADMIN — Medication 10 MG/HR: at 05:57

## 2022-01-01 RX ADMIN — DEXAMETHASONE SODIUM PHOSPHATE 6 MG: 10 INJECTION INTRAMUSCULAR; INTRAVENOUS at 23:11

## 2022-01-01 RX ADMIN — ENOXAPARIN SODIUM 40 MG: 100 INJECTION SUBCUTANEOUS at 00:00

## 2022-01-01 RX ADMIN — DOCOSANOL 1 APPLICATION: 100 CREAM TOPICAL at 14:42

## 2022-01-01 RX ADMIN — METFORMIN HYDROCHLORIDE 500 MG: 500 TABLET ORAL at 07:45

## 2022-01-01 RX ADMIN — PANTOPRAZOLE SODIUM 40 MG: 40 TABLET, DELAYED RELEASE ORAL at 21:09

## 2022-01-01 RX ADMIN — METFORMIN HYDROCHLORIDE 500 MG: 500 TABLET ORAL at 17:17

## 2022-01-01 RX ADMIN — POLYETHYLENE GLYCOL 3350 17 G: 17 POWDER, FOR SOLUTION ORAL at 09:18

## 2022-01-01 RX ADMIN — DILTIAZEM HYDROCHLORIDE 60 MG: 60 TABLET, FILM COATED ORAL at 18:36

## 2022-01-01 RX ADMIN — BUDESONIDE AND FORMOTEROL FUMARATE DIHYDRATE 2 PUFF: 160; 4.5 AEROSOL RESPIRATORY (INHALATION) at 20:36

## 2022-01-01 RX ADMIN — INSULIN LISPRO 4 UNITS: 100 INJECTION, SOLUTION INTRAVENOUS; SUBCUTANEOUS at 22:29

## 2022-01-01 RX ADMIN — BUDESONIDE AND FORMOTEROL FUMARATE DIHYDRATE 2 PUFF: 160; 4.5 AEROSOL RESPIRATORY (INHALATION) at 08:23

## 2022-01-01 RX ADMIN — BUDESONIDE AND FORMOTEROL FUMARATE DIHYDRATE 2 PUFF: 160; 4.5 AEROSOL RESPIRATORY (INHALATION) at 07:21

## 2022-01-01 RX ADMIN — DILTIAZEM HYDROCHLORIDE 60 MG: 60 TABLET, FILM COATED ORAL at 13:09

## 2022-01-01 RX ADMIN — DIATRIZOATE MEGLUMINE AND DIATRIZOATE SODIUM 30 ML: 600; 100 SOLUTION ORAL; RECTAL at 10:07

## 2022-01-01 RX ADMIN — FLUOXETINE HYDROCHLORIDE 40 MG: 20 CAPSULE ORAL at 08:25

## 2022-01-01 RX ADMIN — LISINOPRIL 20 MG: 20 TABLET ORAL at 15:42

## 2022-01-01 RX ADMIN — BUDESONIDE AND FORMOTEROL FUMARATE DIHYDRATE 2 PUFF: 160; 4.5 AEROSOL RESPIRATORY (INHALATION) at 21:20

## 2022-01-01 RX ADMIN — METFORMIN HYDROCHLORIDE 500 MG: 500 TABLET ORAL at 17:39

## 2022-01-01 RX ADMIN — LISINOPRIL 40 MG: 40 TABLET ORAL at 08:52

## 2022-01-01 RX ADMIN — DEXAMETHASONE 2 MG: 2 TABLET ORAL at 08:14

## 2022-01-01 RX ADMIN — INSULIN GLARGINE-YFGN 30 UNITS: 100 INJECTION, SOLUTION SUBCUTANEOUS at 20:27

## 2022-01-01 RX ADMIN — PANTOPRAZOLE SODIUM 40 MG: 40 TABLET, DELAYED RELEASE ORAL at 21:00

## 2022-01-01 RX ADMIN — HYDROMORPHONE HYDROCHLORIDE 1 MG: 1 INJECTION, SOLUTION INTRAMUSCULAR; INTRAVENOUS; SUBCUTANEOUS at 11:28

## 2022-01-01 RX ADMIN — METFORMIN HYDROCHLORIDE 500 MG: 500 TABLET ORAL at 18:04

## 2022-01-01 RX ADMIN — TIOTROPIUM BROMIDE INHALATION SPRAY 2 PUFF: 3.12 SPRAY, METERED RESPIRATORY (INHALATION) at 08:34

## 2022-01-01 RX ADMIN — ONDANSETRON 4 MG: 2 INJECTION INTRAMUSCULAR; INTRAVENOUS at 03:17

## 2022-01-01 RX ADMIN — LISINOPRIL 40 MG: 40 TABLET ORAL at 08:58

## 2022-01-01 RX ADMIN — DOCOSANOL 1 APPLICATION: 100 CREAM TOPICAL at 06:09

## 2022-01-01 RX ADMIN — DOCOSANOL 1 APPLICATION: 100 CREAM TOPICAL at 21:36

## 2022-01-01 RX ADMIN — SODIUM CHLORIDE, PRESERVATIVE FREE 10 ML: 5 INJECTION INTRAVENOUS at 21:33

## 2022-01-01 RX ADMIN — POLYETHYLENE GLYCOL 3350 17 G: 17 POWDER, FOR SOLUTION ORAL at 20:10

## 2022-01-01 RX ADMIN — PANTOPRAZOLE SODIUM 8 MG/HR: 40 INJECTION, POWDER, FOR SOLUTION INTRAVENOUS at 04:36

## 2022-01-01 RX ADMIN — OXYCODONE AND ACETAMINOPHEN 1 TABLET: 5; 325 TABLET ORAL at 22:11

## 2022-01-01 RX ADMIN — INSULIN GLARGINE-YFGN 10 UNITS: 100 INJECTION, SOLUTION SUBCUTANEOUS at 21:32

## 2022-01-01 RX ADMIN — INSULIN LISPRO 10 UNITS: 100 INJECTION, SOLUTION INTRAVENOUS; SUBCUTANEOUS at 17:17

## 2022-01-01 RX ADMIN — SODIUM CHLORIDE, PRESERVATIVE FREE 10 ML: 5 INJECTION INTRAVENOUS at 20:00

## 2022-01-01 RX ADMIN — DILTIAZEM HYDROCHLORIDE 60 MG: 60 TABLET, FILM COATED ORAL at 00:49

## 2022-01-01 RX ADMIN — INSULIN LISPRO 12 UNITS: 100 INJECTION, SOLUTION INTRAVENOUS; SUBCUTANEOUS at 18:03

## 2022-01-01 RX ADMIN — HYDROMORPHONE HYDROCHLORIDE 1 MG: 1 INJECTION, SOLUTION INTRAMUSCULAR; INTRAVENOUS; SUBCUTANEOUS at 13:07

## 2022-01-01 RX ADMIN — PANTOPRAZOLE SODIUM 8 MG/HR: 40 INJECTION, POWDER, FOR SOLUTION INTRAVENOUS at 16:54

## 2022-01-01 RX ADMIN — OXYCODONE HYDROCHLORIDE 5 MG: 5 TABLET ORAL at 21:11

## 2022-01-01 RX ADMIN — INSULIN LISPRO 8 UNITS: 100 INJECTION, SOLUTION INTRAVENOUS; SUBCUTANEOUS at 09:10

## 2022-01-01 RX ADMIN — DILTIAZEM HYDROCHLORIDE 60 MG: 60 TABLET, FILM COATED ORAL at 12:04

## 2022-01-01 RX ADMIN — METFORMIN HYDROCHLORIDE 500 MG: 500 TABLET ORAL at 09:06

## 2022-01-01 RX ADMIN — TIOTROPIUM BROMIDE INHALATION SPRAY 2 PUFF: 3.12 SPRAY, METERED RESPIRATORY (INHALATION) at 06:41

## 2022-01-01 RX ADMIN — OXYCODONE AND ACETAMINOPHEN 1 TABLET: 5; 325 TABLET ORAL at 15:59

## 2022-01-01 RX ADMIN — LISINOPRIL 40 MG: 40 TABLET ORAL at 04:29

## 2022-01-01 RX ADMIN — TAZOBACTAM SODIUM AND PIPERACILLIN SODIUM 3.38 G: 375; 3 INJECTION, SOLUTION INTRAVENOUS at 09:58

## 2022-01-01 RX ADMIN — HYDROMORPHONE HYDROCHLORIDE 1 MG: 1 INJECTION, SOLUTION INTRAMUSCULAR; INTRAVENOUS; SUBCUTANEOUS at 22:35

## 2022-01-01 RX ADMIN — DILTIAZEM HYDROCHLORIDE 60 MG: 60 TABLET, FILM COATED ORAL at 18:37

## 2022-01-01 RX ADMIN — MUPIROCIN 1 APPLICATION: 20 OINTMENT TOPICAL at 08:17

## 2022-01-01 RX ADMIN — FLUOXETINE HYDROCHLORIDE 40 MG: 20 CAPSULE ORAL at 09:08

## 2022-01-01 RX ADMIN — HYDROMORPHONE HYDROCHLORIDE 1 MG: 1 INJECTION, SOLUTION INTRAMUSCULAR; INTRAVENOUS; SUBCUTANEOUS at 18:54

## 2022-01-01 RX ADMIN — SODIUM CHLORIDE 100 ML/HR: 9 INJECTION, SOLUTION INTRAVENOUS at 22:11

## 2022-01-01 RX ADMIN — DEXAMETHASONE 2 MG: 2 TABLET ORAL at 08:51

## 2022-01-01 RX ADMIN — POTASSIUM CHLORIDE 40 MEQ: 750 TABLET, EXTENDED RELEASE ORAL at 08:38

## 2022-01-01 RX ADMIN — HYDROMORPHONE HYDROCHLORIDE 1 MG: 1 INJECTION, SOLUTION INTRAMUSCULAR; INTRAVENOUS; SUBCUTANEOUS at 05:55

## 2022-01-01 RX ADMIN — BUDESONIDE AND FORMOTEROL FUMARATE DIHYDRATE 2 PUFF: 160; 4.5 AEROSOL RESPIRATORY (INHALATION) at 20:33

## 2022-01-01 RX ADMIN — BUDESONIDE AND FORMOTEROL FUMARATE DIHYDRATE 2 PUFF: 160; 4.5 AEROSOL RESPIRATORY (INHALATION) at 07:55

## 2022-01-01 RX ADMIN — TIOTROPIUM BROMIDE INHALATION SPRAY 2 PUFF: 3.12 SPRAY, METERED RESPIRATORY (INHALATION) at 08:22

## 2022-01-01 RX ADMIN — SODIUM CHLORIDE, PRESERVATIVE FREE 10 ML: 5 INJECTION INTRAVENOUS at 20:57

## 2022-01-01 RX ADMIN — TIOTROPIUM BROMIDE INHALATION SPRAY 2 PUFF: 3.12 SPRAY, METERED RESPIRATORY (INHALATION) at 08:06

## 2022-01-01 RX ADMIN — FLUOXETINE HYDROCHLORIDE 40 MG: 20 CAPSULE ORAL at 16:36

## 2022-01-01 RX ADMIN — LISINOPRIL 40 MG: 40 TABLET ORAL at 09:06

## 2022-01-01 RX ADMIN — PANTOPRAZOLE SODIUM 40 MG: 40 TABLET, DELAYED RELEASE ORAL at 20:49

## 2022-01-01 RX ADMIN — LISINOPRIL 40 MG: 40 TABLET ORAL at 08:14

## 2022-01-01 RX ADMIN — TAZOBACTAM SODIUM AND PIPERACILLIN SODIUM 3.38 G: 375; 3 INJECTION, SOLUTION INTRAVENOUS at 06:48

## 2022-01-01 RX ADMIN — DEXAMETHASONE SODIUM PHOSPHATE 6 MG: 10 INJECTION INTRAMUSCULAR; INTRAVENOUS at 08:36

## 2022-01-01 RX ADMIN — METFORMIN HYDROCHLORIDE 500 MG: 500 TABLET ORAL at 18:08

## 2022-01-01 RX ADMIN — OXYCODONE AND ACETAMINOPHEN 1 TABLET: 5; 325 TABLET ORAL at 11:37

## 2022-01-01 RX ADMIN — DIGOXIN 250 MCG: 250 INJECTION, SOLUTION INTRAMUSCULAR; INTRAVENOUS; PARENTERAL at 21:39

## 2022-01-01 RX ADMIN — Medication 12.5 MG/HR: at 11:52

## 2022-01-01 RX ADMIN — BUDESONIDE AND FORMOTEROL FUMARATE DIHYDRATE 2 PUFF: 160; 4.5 AEROSOL RESPIRATORY (INHALATION) at 07:00

## 2022-01-01 RX ADMIN — INSULIN LISPRO 6 UNITS: 100 INJECTION, SOLUTION INTRAVENOUS; SUBCUTANEOUS at 08:55

## 2022-01-01 RX ADMIN — FUROSEMIDE 40 MG: 10 INJECTION, SOLUTION INTRAMUSCULAR; INTRAVENOUS at 12:03

## 2022-01-01 RX ADMIN — INSULIN LISPRO 3 UNITS: 100 INJECTION, SOLUTION INTRAVENOUS; SUBCUTANEOUS at 09:24

## 2022-01-01 RX ADMIN — DOCOSANOL 1 APPLICATION: 100 CREAM TOPICAL at 21:38

## 2022-01-01 RX ADMIN — DOCOSANOL 1 APPLICATION: 100 CREAM TOPICAL at 17:14

## 2022-01-01 RX ADMIN — ENALAPRILAT 1.25 MG: 1.25 INJECTION INTRAVENOUS at 15:46

## 2022-01-01 RX ADMIN — SODIUM CHLORIDE, POTASSIUM CHLORIDE, SODIUM LACTATE AND CALCIUM CHLORIDE: 600; 310; 30; 20 INJECTION, SOLUTION INTRAVENOUS at 14:20

## 2022-01-01 RX ADMIN — FLUOXETINE HYDROCHLORIDE 40 MG: 20 CAPSULE ORAL at 08:23

## 2022-01-01 RX ADMIN — ENALAPRILAT 1.25 MG: 1.25 INJECTION INTRAVENOUS at 20:57

## 2022-01-01 RX ADMIN — TAZOBACTAM SODIUM AND PIPERACILLIN SODIUM 3.38 G: 375; 3 INJECTION, SOLUTION INTRAVENOUS at 03:10

## 2022-01-01 RX ADMIN — LAMOTRIGINE 25 MG: 25 TABLET ORAL at 20:30

## 2022-01-01 RX ADMIN — INSULIN LISPRO 2 UNITS: 100 INJECTION, SOLUTION INTRAVENOUS; SUBCUTANEOUS at 11:13

## 2022-01-01 RX ADMIN — SODIUM CHLORIDE, PRESERVATIVE FREE 10 ML: 5 INJECTION INTRAVENOUS at 20:33

## 2022-01-01 RX ADMIN — SODIUM CHLORIDE 100 ML/HR: 9 INJECTION, SOLUTION INTRAVENOUS at 18:01

## 2022-01-01 RX ADMIN — TIOTROPIUM BROMIDE INHALATION SPRAY 2 PUFF: 3.12 SPRAY, METERED RESPIRATORY (INHALATION) at 07:28

## 2022-01-01 RX ADMIN — INSULIN LISPRO 10 UNITS: 100 INJECTION, SOLUTION INTRAVENOUS; SUBCUTANEOUS at 17:36

## 2022-01-01 RX ADMIN — DIPHENHYDRAMINE HYDROCHLORIDE 50 MG: 50 INJECTION, SOLUTION INTRAMUSCULAR; INTRAVENOUS at 08:12

## 2022-01-01 RX ADMIN — MAGNESIUM SULFATE HEPTAHYDRATE 1 G: 1 INJECTION, SOLUTION INTRAVENOUS at 13:17

## 2022-01-01 RX ADMIN — BUDESONIDE AND FORMOTEROL FUMARATE DIHYDRATE 2 PUFF: 160; 4.5 AEROSOL RESPIRATORY (INHALATION) at 20:46

## 2022-01-01 RX ADMIN — DOCOSANOL 1 APPLICATION: 100 CREAM TOPICAL at 13:39

## 2022-01-01 RX ADMIN — TAZOBACTAM SODIUM AND PIPERACILLIN SODIUM 3.38 G: 375; 3 INJECTION, SOLUTION INTRAVENOUS at 12:23

## 2022-01-01 RX ADMIN — INSULIN LISPRO 8 UNITS: 100 INJECTION, SOLUTION INTRAVENOUS; SUBCUTANEOUS at 17:39

## 2022-01-01 RX ADMIN — METFORMIN HYDROCHLORIDE 500 MG: 500 TABLET ORAL at 08:52

## 2022-01-01 RX ADMIN — VERAPAMIL HYDROCHLORIDE 240 MG: 240 TABLET, FILM COATED, EXTENDED RELEASE ORAL at 09:06

## 2022-01-01 RX ADMIN — INSULIN LISPRO 12 UNITS: 100 INJECTION, SOLUTION INTRAVENOUS; SUBCUTANEOUS at 11:38

## 2022-01-01 RX ADMIN — DILTIAZEM HYDROCHLORIDE 60 MG: 60 TABLET, FILM COATED ORAL at 17:44

## 2022-01-01 RX ADMIN — SODIUM CHLORIDE 100 ML/HR: 9 INJECTION, SOLUTION INTRAVENOUS at 14:53

## 2022-01-01 RX ADMIN — PANTOPRAZOLE SODIUM 8 MG/HR: 40 INJECTION, POWDER, FOR SOLUTION INTRAVENOUS at 06:37

## 2022-01-01 RX ADMIN — FLUOXETINE HYDROCHLORIDE 40 MG: 20 CAPSULE ORAL at 12:43

## 2022-01-01 RX ADMIN — ENOXAPARIN SODIUM 40 MG: 40 INJECTION SUBCUTANEOUS at 11:25

## 2022-01-01 RX ADMIN — INSULIN LISPRO 13 UNITS: 100 INJECTION, SOLUTION INTRAVENOUS; SUBCUTANEOUS at 09:22

## 2022-01-01 RX ADMIN — PANTOPRAZOLE SODIUM 40 MG: 40 TABLET, DELAYED RELEASE ORAL at 20:11

## 2022-01-01 RX ADMIN — PANTOPRAZOLE SODIUM 8 MG/HR: 40 INJECTION, POWDER, FOR SOLUTION INTRAVENOUS at 04:19

## 2022-01-01 RX ADMIN — DILTIAZEM HYDROCHLORIDE 120 MG: 60 TABLET, FILM COATED ORAL at 05:54

## 2022-01-01 RX ADMIN — DEXAMETHASONE SODIUM PHOSPHATE 6 MG: 10 INJECTION INTRAMUSCULAR; INTRAVENOUS at 10:23

## 2022-01-01 RX ADMIN — INSULIN LISPRO 14 UNITS: 100 INJECTION, SOLUTION INTRAVENOUS; SUBCUTANEOUS at 12:43

## 2022-01-01 RX ADMIN — SODIUM CHLORIDE, POTASSIUM CHLORIDE, SODIUM LACTATE AND CALCIUM CHLORIDE 150 ML/HR: 600; 310; 30; 20 INJECTION, SOLUTION INTRAVENOUS at 20:24

## 2022-01-01 RX ADMIN — DILTIAZEM HYDROCHLORIDE 60 MG: 60 TABLET, FILM COATED ORAL at 11:49

## 2022-01-01 RX ADMIN — DEXAMETHASONE 6 MG: 6 TABLET ORAL at 08:36

## 2022-01-01 RX ADMIN — METFORMIN HYDROCHLORIDE 500 MG: 500 TABLET ORAL at 18:47

## 2022-01-01 RX ADMIN — DILTIAZEM HYDROCHLORIDE 120 MG: 60 TABLET, FILM COATED ORAL at 06:38

## 2022-01-04 PROBLEM — K43.6 VENTRAL HERNIA WITH BOWEL OBSTRUCTION: Status: ACTIVE | Noted: 2018-07-30

## 2022-01-04 PROBLEM — K56.609 INTESTINAL OBSTRUCTION (HCC): Status: ACTIVE | Noted: 2022-01-01

## 2022-01-04 PROBLEM — K56.600 PARTIAL SMALL BOWEL OBSTRUCTION (HCC): Status: ACTIVE | Noted: 2022-01-01

## 2022-01-04 PROBLEM — S72.001A CLOSED DISPLACED FRACTURE OF RIGHT FEMORAL NECK (HCC): Status: ACTIVE | Noted: 2022-01-01

## 2022-01-06 PROBLEM — D64.9 ANEMIA: Status: ACTIVE | Noted: 2022-01-01

## 2022-01-17 NOTE — ANESTHESIA PREPROCEDURE EVALUATION
Anesthesia Evaluation     Patient summary reviewed and Nursing notes reviewed   no history of anesthetic complications:  NPO Solid Status: > 8 hours  NPO Liquid Status: > 8 hours           Airway   Mallampati: II  TM distance: >3 FB  Neck ROM: full  No difficulty expected  Dental    (+) upper dentures and lower dentures    Pulmonary    (+) a smoker (quit 2014, 80 pk yr hx) Former, COPD moderate, home oxygen (3 L, 24x7), shortness of breath, decreased breath sounds,     ROS comment: Chronic respiratory failure with hypoxia   O2 SAT's 88-91 on 3L O2  Cardiovascular - normal exam    Rhythm: regular  Rate: normal    (+) hypertension well controlled 2 medications or greater, CAD, dysrhythmias Atrial Fib, YADAV, PVD, hyperlipidemia,  carotid artery disease (R Carotid endarterectomy) carotid bilateral      Neuro/Psych  (+) seizures (> 2yrs) well controlled, CVA (memory issues) residual symptoms, numbness, psychiatric history Anxiety and Depression,     GI/Hepatic/Renal/Endo    (+) obesity, morbid obesity, hiatal hernia,  renal disease CRI, diabetes mellitus (accu check 174, A1c 6.1) type 2 poorly controlled,     Musculoskeletal     (+) back pain, joint swelling,   Abdominal   (+) obese,    Substance History      OB/GYN          Other   arthritis,                        Anesthesia Plan    ASA 4     general     intravenous induction   Postoperative Plan: Expected vent after surgery  Anesthetic plan, all risks, benefits, and alternatives have been provided, discussed and informed consent has been obtained with: patient.  Use of blood products discussed with patient  Consented to blood products.

## 2022-01-19 NOTE — ANESTHESIA PREPROCEDURE EVALUATION
Anesthesia Evaluation     Patient summary reviewed and Nursing notes reviewed   no history of anesthetic complications:  NPO Solid Status: > 8 hours  NPO Liquid Status: > 8 hours           Airway   Mallampati: II  TM distance: >3 FB  Neck ROM: full  No difficulty expected  Dental    (+) upper dentures and lower dentures    Pulmonary    (+) a smoker (quit 2014, 80 pk yr hx) Former, COPD moderate, home oxygen (3 L, 24x7), shortness of breath, decreased breath sounds,     ROS comment: Chronic respiratory failure with hypoxia   O2 SAT's 88-91 on 3L O2  Cardiovascular - normal exam    Rhythm: regular  Rate: normal    (+) hypertension well controlled 2 medications or greater, CAD, dysrhythmias Atrial Fib, YADAV, PVD, hyperlipidemia,  carotid artery disease (R Carotid endarterectomy) carotid bilateral      Neuro/Psych  (+) seizures (> 2yrs) well controlled, CVA (memory issues) residual symptoms, numbness, psychiatric history Anxiety and Depression,     GI/Hepatic/Renal/Endo    (+) obesity, morbid obesity, hiatal hernia,  renal disease CRI, diabetes mellitus (accu check 174, A1c 6.1) type 2 poorly controlled,     Musculoskeletal     (+) back pain, joint swelling,   Abdominal   (+) obese,    Substance History      OB/GYN          Other   arthritis, blood dyscrasia (Hb 8.1) anemia,                       Anesthesia Plan    ASA 4     MAC   (D/W Dr Barajas who requesting MAC with Propofol and if needed will progress to LMA  With +COVID screen surgeon aware that may require time delay before opening doorway)  intravenous induction     Anesthetic plan, all risks, benefits, and alternatives have been provided, discussed and informed consent has been obtained with: patient.  Use of blood products discussed with patient  Consented to blood products.

## 2022-01-19 NOTE — ANESTHESIA POSTPROCEDURE EVALUATION
"Patient: Mar Camilo    Procedure Summary     Date: 01/19/22 Room / Location: [facility] OR [address] [place] [address] MAIN OR    Anesthesia Start: 1419 Anesthesia Stop: 1540    Procedure: Anterior Right Hip Percutaneous Pinning (Right Hip) Diagnosis:       Closed displaced fracture of right femoral neck (HCC)      (Closed displaced fracture of right femoral neck (HCC) [S72.001A])    Surgeons: Naun Barajas MD Provider: Federico Pandya MD    Anesthesia Type: MAC ASA Status: 4          Anesthesia Type: MAC    Vitals  Vitals Value Taken Time   /56 01/19/22 1630   Temp 35.8 °C (96.5 °F) 01/19/22 1630   Pulse 57 01/19/22 1630   Resp 16 01/19/22 1630   SpO2 97 % 01/19/22 1630           Post Anesthesia Care and Evaluation    Patient location during evaluation: bedside  Patient participation: complete - patient participated  Level of consciousness: awake  Pain score: 2  Pain management: adequate  Airway patency: patent  Anesthetic complications: No anesthetic complications  PONV Status: none  Cardiovascular status: acceptable  Respiratory status: acceptable  Hydration status: acceptable    Comments: /56   Pulse 57   Temp (S) 35.8 °C (96.5 °F) (Oral) Comment: Cocoon warmer applied  Resp 16   Ht 162.6 cm (64.02\")   Wt 103 kg (228 lb)   LMP  (LMP Unknown)   SpO2 97%   BMI 39.12 kg/m²         "

## 2022-02-01 NOTE — PROGRESS NOTES
DAILY PROGRESS NOTE  [facility]    Patient Identification:  Name: Mar Camilo  Age: 71 y.o.  Sex: female  :  1950  MRN: [id number]         Primary Care Physician: Vianey Barrios PA-C    Subjective:  Interval History: Heart rate has continued to be quite labile and cardiology has been addressing with IV medications involving digoxin and diltiazem.  Patient had excellent output with 40 mg IV Lasix yesterday and when she is put out over a liter and a half.  She immediately looks much better upon entering the room as I cannot appreciate the same pending respiratory distress and failure I saw yesterday with pursed lip breathing and retractions.  She seems very comfortable in her breathing today.  Admits to some nausea but not really brought to emesis.  She admits to large BM last night but also admits to difficulties tolerating diet    Objective: Very elderly and frail.  Clinically much better overnight.  Patient has not asked me to discuss her case with anyone additionally    Scheduled Meds:albuterol, 2.5 mg, Nebulization, Q12H - RT  budesonide-formoterol, 2 puff, Inhalation, BID - RT  dilTIAZem, 10 mg, Intravenous, Once  dilTIAZem, 120 mg, Oral, Q8H  docosanol, 1 application, Topical, 5x Daily  docusate sodium, 100 mg, Oral, Every Other Day  FLUoxetine, 40 mg, Oral, Daily  insulin lispro, 0-9 Units, Subcutaneous, TID AC  insulin lispro, 10 Units, Subcutaneous, TID With Meals  lamoTRIgine, 100 mg, Oral, Daily  lisinopril, 20 mg, Oral, Q24H  pantoprazole, 40 mg, Oral, Q AM  piperacillin-tazobactam, 3.375 g, Intravenous, Q8H  polyethylene glycol, 17 g, Oral, BID  sodium chloride, 10 mL, Intravenous, Q12H  tiotropium bromide monohydrate, 2 puff, Inhalation, Daily - RT      Continuous Infusions:     Vital signs in last 24 hours:  Temp:  [96.5 °F (35.8 °C)-97.9 °F (36.6 °C)] 97.9 °F (36.6 °C)  Heart Rate:  [] 95  Resp:  [16-18] 18  BP: (153-189)/(76-84)  "159/84    Intake/Output:    Intake/Output Summary (Last 24 hours) at 2/1/2022 1122  Last data filed at 2/1/2022 0619  Gross per 24 hour   Intake 240 ml   Output 2500 ml   Net -2260 ml       Exam:  /84   Pulse 95   Temp 97.9 °F (36.6 °C) (Oral)   Resp 18   Ht 162.6 cm (64.02\")   Wt 102 kg (225 lb 6.4 oz)   LMP  (LMP Unknown)   SpO2 94%   BMI 38.67 kg/m²     General Appearance:    Alert, cooperative, AAOx3                          Head:    Normocephalic, without obvious abnormality, atraumatic                           Eyes:    Conjunctivae/corneas clear, EOM's intact, both eyes                         Throat:   Oral mucosa pink and moist                           Neck:   Supple, symmetrical, trachea midline, no JVD                         Lungs:    Decreased with much improved coarse Rales to auscultation bilaterally, respirations unlabored and clinically much better                 Chest Wall:    No tenderness or deformity                          Heart:  Regular/tachycardic rate and rhythm, S1 and S2 normal                  Abdomen:     Soft, nontender, bowel sounds active                 Extremities: Generalized weakness though moving all, no cyanosis or edema                        Pulses:   Pulses palpable in all extremities                            Skin:   Skin is warm and dry                  Neurologic:   CNII-XII intact     Data Review:  Labs in chart were reviewed.    Assessment:  Active Hospital Problems    Diagnosis  POA   • **Large bowel obstruction ([facility]) [K56.609]  Yes   • Anemia [D64.9]  Yes   • Closed displaced fracture of right femoral neck ([facility]) [S72.001A]  Yes   • Crohn's disease of colon without complication ([facility]) [K50.10]  Yes   • CAD (coronary artery disease) [I25.10]  Yes   • PAD (peripheral artery disease) ([facility]) [I73.9]  Yes   • PAF (paroxysmal atrial fibrillation) ([facility]) [I48.0]  Yes   • Ventral hernia with bowel obstruction [K43.6]  Yes   • History of CVA (cerebrovascular " accident) [Z86.73]  Not Applicable   • Chronic respiratory failure with hypoxia ([facility]) [J96.11]  Yes   • Class 3 severe obesity in adult ([facility]) [E66.01]  Yes   • COPD (chronic obstructive pulmonary disease) ([facility]) [J44.9]  Yes   • Essential hypertension [I10]  Yes   • Type 2 diabetes mellitus with complication, without long-term current use of insulin ([facility]) [E11.8]  Yes      Resolved Hospital Problems   No resolved problems to display.       Plan:    Acute diastolic CHF secondary to cardiac arrhythmia/SVT/A. Fib   -Excellent response to IV Lasix 40 mg yesterday and will give a similar dose now   -Appreciate cardiology assistance on try to get her rate controlled.  I note digoxin and diltiazem IV.  I discussed the case with Dr. Carmona.  At the time of my exam her heart rate was ranging between 150 and 170s and she was completely asymptomatic at that rate   -Respiratory distress pursed lip breathing and use of accessory muscles resolved   -Give additional K for low normal value    DC Zosyn - chest x-ray with no pneumonia    Large ventral hernia with resolved high-grade bowel obstruction              -Surgery advancing diet -patient's p.o. intake still very low and she is have a difficult time tolerating        Abnormal LFTs likely secondary to hypoperfusion of liver              -Discussed with  and suggest follow-up with Dr. Umaña post discharge              -Numbers improving -no need for daily monitoring     Right femoral neck fracture status post 1/19/2022 hip percutaneous pinning    Bladder mass noted on 1/26/2022 CT was concerning for hematoma   -CT 1/31/2022 shows dependent layering with cystoscopy endorsed   -Urology consulted     DM2 with circulatory component with an A1c of 8 -number stable     Anemia of chronic disease status post previous transfusion 2 units              -Hgb stable and improved to 9.4 and no need to further monitor   -Eliquis to be resumed today.  Previously held secondary to  concerns for surgical intervention       Murphy Cueto MD  2/1/2022  11:22 EST

## 2022-02-01 NOTE — PLAN OF CARE
Goal Outcome Evaluation:  Plan of Care Reviewed With: patient        Progress: no change  Outcome Summary: one time dose iv cardizem for elevated HR, remains in afib, 4L at baseline, more nauseated today, not much appetite, one time dose lasix, urology consulted for poss bladder mass/layering, restarted eliquis, wound care completed, d/c iv abx since no pna on cxray, vss, will continue to monitor

## 2022-02-01 NOTE — NURSING NOTE
4191  This RN received call from [place] saying that patient was in SVT sustaining a HR in the 160s-170s. This RN was attending to another patient so had another RN go and check on patient. Anjelica RN assessed patient. She was not having any symptoms associated with it. Anjelica secured chatted Dr Cain. This RN will place call to rounding nurse. Patient currently going in and out of SVT with highest rate in the 170s.     1069  Spoke with Sharla Shah, rounding RN with Dr Carmona. Explained the situation. Asked me to order a Magnesium level and that she will call Dr Carmona to let him know.

## 2022-02-01 NOTE — PROGRESS NOTES
Continued Stay Note  [facility]     Patient Name: Mar Camilo  MRN: [id number]  Today's Date: 2/1/2022    Admit Date: 1/4/2022     Discharge Plan     Row Name 02/01/22 1337       Plan    Plan Skilled rehab [facility] following when medically ready for snu; pending an available bed    Plan Comments Followed up with Camille with [facility] and she continues to follow for medical readiness for a bed at [facility].  She accepts pending an available bed.  She may be able to place at another [facility] facility if no beds avaialble at [facility] when she is ready.  Partial packet in office for the patient.......................Tiffanie Baron RN               Discharge Codes    No documentation.               Expected Discharge Date and Time     Expected Discharge Date Expected Discharge Time    Feb 1, 2022             Tiffanie Baron RN

## 2022-02-01 NOTE — PROGRESS NOTES
"p  General Surgery Progress Note    Summary: Mrs. Mar Camilo is a 71 y.o. year old lady with Crohn's disease on Remicade, a large ventral hernia with bowel, and a resolved high grade SBO/GOO. Having some abdominal pain but having bowel movements. Pain overall controlled. Will monitor.     Chief Complaint: abdominal pain    Interval Events: Feeling a little worse. Has had a stomach ache since yesterday afternoon after eating green beans. She has had a few large bowel movements since then. She ate a banana this morning.     Vitals: /84   Pulse 95   Temp 97.9 °F (36.6 °C) (Oral)   Resp 18   Ht 162.6 cm (64.02\")   Wt 102 kg (225 lb 6.4 oz)   LMP  (LMP Unknown)   SpO2 94%   BMI 38.67 kg/m²     GENERAL: alert, chronically ill appearing, laying in bed, not in distress   HEENT: normocephalic, atraumatic, moist mucous membranes, clear sclerae, NG bilious   CHEST: on 4L NC, increased work of breathing, symmetric air entry  CARDIAC: tachycardia, regular rhythm    ABDOMEN: soft, nondistended, no erythema over her hernia, hernia appears soft, chronically incarcerated hernia   EXTREMITIES: no cyanosis, clubbing, or edema   SKIN: Warm and moist, no rashes    Labs:  Results from last 7 days   Lab Units 02/01/22  0334 01/31/22  0540 01/30/22  0335 01/29/22  0408 01/29/22  0408 01/27/22  1101 01/27/22  0355   WBC 10*3/mm3  --  8.21 7.01  --  6.97   < > 8.37   HEMOGLOBIN g/dL 9.4* 8.1* 8.2*   < > 8.4*   < > 7.0*   HEMATOCRIT % 29.4* 24.7* 25.8*   < > 26.5*   < > 22.3*   PLATELETS 10*3/mm3  --  192 156  --  156   < > 219   MONOCYTES % %  --  2.1*  --   --   --   --  3.4*    < > = values in this interval not displayed.     Results from last 7 days   Lab Units 02/01/22  0334 01/31/22  0540 01/30/22  0335   SODIUM mmol/L 135* 138 141   POTASSIUM mmol/L 3.5 3.9 3.6   CHLORIDE mmol/L 93* 100 103   CO2 mmol/L 24.3 25.3 28.3   BUN mg/dL 8 8 15   CREATININE mg/dL 0.57 0.21* 0.42*   CALCIUM mg/dL 8.8 8.2* 8.1*   BILIRUBIN " mg/dL 0.5 0.4 0.4   ALK PHOS U/L 158* 154* 144*   ALT (SGPT) U/L 677* 859* 1,309*   AST (SGOT) U/L 49* 70* 172*   GLUCOSE mg/dL 165* 118* 120*     Results from last 7 days   Lab Units 01/31/22  0540 01/30/22  0335 01/29/22  0408 01/28/22  0517 01/27/22  1101   INR  1.09 1.28* 1.23*   < > 2.20*   APTT seconds  --   --   --   --  29.7    < > = values in this interval not displayed.       HAILEY LOPES MD  General and Endoscopic Surgery  [facility]    [address], [address]  [place], [place], [address]  P: [phone]  F: [phone]

## 2022-02-01 NOTE — PLAN OF CARE
Goal Outcome Evaluation:  Plan of Care Reviewed With: patient        Progress: no change  Outcome Summary: One time dose Digoxin given at 2230 for elevated HR from 's. One time dose cardizem IV push given at 0330 for HR sustaining >160. HR ranging 's currently. Percocet given x1 for c/o R hip pain. Pt refusing some turns, favors L side. IV abx. Had a large BM, good urine output. K+ 3.5 this AM, no new orders per [facility]. Will continue to monitor closely.

## 2022-02-01 NOTE — PROGRESS NOTES
Adult Nutrition  Assessment/PES    Patient Name:  Mar Camilo  YOB: 1950  MRN: [id number]  Admit Date:  1/4/2022    Assessment Date:  2/1/2022    Comments:  F/u previously NPO x 3 days. Pt reported she's not eating very well. She said she ate macaroni and cheese that upset her stomach and she hasn't been able to eat anything this morning. Last three documented meal intakes were 0%, 100%, 100%. BMI 38.67. Continue to follow.     Reason for Assessment     Row Name 02/01/22 0835          Reason for Assessment    Reason For Assessment follow-up protocol     Diagnosis other (see comments)  admit w/: large bowel obstruction, chronic airway obstruction, high bp, T2DM, chronic resp failure, hx stroke, hernia with obstruction, afib, decreased circulation, obesity III, coronary heart disease, crohn's disease of colon, anemia, fracture     Identified At Risk by Screening Criteria other (see comments)  previously NPO x 3 days                Nutrition/Diet History     Row Name 02/01/22 0839          Nutrition/Diet History    Typical Food/Fluid Intake Pt reported she ate macaroni and cheese last night that made her stomach very upset last night and this morning.     Factors Affecting Nutritional Intake other (see comments)  upset stomach                Anthropometrics     Row Name 02/01/22 0621          Anthropometrics    Weight 102 kg (225 lb 6.4 oz)                Labs/Tests/Procedures/Meds     Row Name 02/01/22 0840          Labs/Procedures/Meds    Lab Results Reviewed reviewed     Lab Results Comments sodium (low), glucose (high), chloride (low), ALT (high), albumin (low), procalcitonin (high)            Diagnostic Tests/Procedures    Diagnostic Test/Procedure Reviewed reviewed     Diagnostic Test/Procedures Comments complete CAR            Medications    Pertinent Medications Reviewed reviewed     Pertinent Medications Comments cardizem, colace, prozac, insulin, lamictal, lisinopril, protonix, zosyn,  miralax, sodium chloride, PRN: dextrose, dilaudid, zofran, oxycodone                    Nutrition Prescription Ordered     Row Name 02/01/22 0842          Nutrition Prescription PO    Current PO Diet Regular     Common Modifiers GI Soft/Lewis                Evaluation of Received Nutrient/Fluid Intake     Row Name 02/01/22 0842          PO Evaluation    % PO Intake 67% average x 3 meals                     Problem/Interventions:                    Electronically signed by:  Saskia Mcgill RD  02/01/22 08:43 EST

## 2022-02-01 NOTE — PROGRESS NOTES
Hospital Follow Up    LOS:  LOS: 28 days   Patient Name: Mar Camilo  Age/Sex: 71 y.o. female  : 1950  MRN: [id number]    Day of Service: 22   Length of Stay: 28  Encounter Provider: David Carmona MD  Place of Service: [facility] CARDIOLOGY  Patient Care Team:  Vianey Barrios PA-C as PCP - General (Family Medicine)  Bhanu Mata MD as Consulting Physician (Cardiology)  Mann Hernadez MD as Consulting Physician (Pulmonary Disease)  Becky Haji MD as Surgeon (General Surgery)  Laquita Carbajal RN as Ambulatory  ([facility] [place])    Subjective:     Chief Complaint: SVT    Interval History: Patient still said her abdomen hurt quite a bit.  When I saw her her rate was going about 170 bpm surprisingly she was completely asymptomatic.  Patient said no chest pains or shortness of breath.    Objective:     Objective:  Temp:  [96.5 °F (35.8 °C)-97.9 °F (36.6 °C)] 97.9 °F (36.6 °C)  Heart Rate:  [] 95  Resp:  [16-18] 18  BP: (153-189)/(76-84) 159/84     Intake/Output Summary (Last 24 hours) at 2022 1042  Last data filed at 2022 0619  Gross per 24 hour   Intake 240 ml   Output 3100 ml   Net -2860 ml     Body mass index is 38.67 kg/m².      22  0641 22  0448 22  0621   Weight: 101 kg (223 lb 4.8 oz) 105 kg (230 lb 12.8 oz) 102 kg (225 lb 6.4 oz)     Weight change: -2.449 kg (-5 lb 6.4 oz)      Physical Exam:   General : Alert, cooperative, patient looks moderately ill  Neuro: Alert,cooperative and oriented.  Lungs: CTAB. Normal respiratory effort and rate.  CV: Tachycardic  ABD: Soft, diffusely tender, obese  Extr: No edema or cyanosis, moves all extremities.    Lab Review:   Results from last 7 days   Lab Units 22  0334 22  0540   SODIUM mmol/L 135* 138   POTASSIUM mmol/L 3.5 3.9   CHLORIDE mmol/L 93* 100   CO2 mmol/L 24.3 25.3   BUN mg/dL 8 8   CREATININE mg/dL 0.57 0.21*   GLUCOSE mg/dL 165* 118*    CALCIUM mg/dL 8.8 8.2*   AST (SGOT) U/L 49* 70*   ALT (SGPT) U/L 677* 859*     Results from last 7 days   Lab Units 01/27/22  1101   CK TOTAL U/L 105     Results from last 7 days   Lab Units 02/01/22  0334 01/31/22  0540 01/30/22  0335 01/30/22  0335   WBC 10*3/mm3  --  8.21  --  7.01   HEMOGLOBIN g/dL 9.4* 8.1*   < > 8.2*   HEMATOCRIT % 29.4* 24.7*   < > 25.8*   PLATELETS 10*3/mm3  --  192  --  156    < > = values in this interval not displayed.     Results from last 7 days   Lab Units 01/31/22  0540 01/30/22  0335 01/28/22  0517 01/27/22  1101   INR  1.09 1.28*   < > 2.20*   APTT seconds  --   --   --  29.7    < > = values in this interval not displayed.     Results from last 7 days   Lab Units 01/31/22  0540 01/30/22  0335   MAGNESIUM mg/dL 1.6 1.5*     Results from last 7 days   Lab Units 01/27/22  0605   CHOLESTEROL mg/dL 113   TRIGLYCERIDES mg/dL 97   HDL CHOL mg/dL 28*               Current Medications:   Scheduled Meds:albuterol, 2.5 mg, Nebulization, Q12H - RT  budesonide-formoterol, 2 puff, Inhalation, BID - RT  dilTIAZem, 120 mg, Oral, Q8H  docosanol, 1 application, Topical, 5x Daily  docusate sodium, 100 mg, Oral, Every Other Day  FLUoxetine, 40 mg, Oral, Daily  insulin lispro, 0-9 Units, Subcutaneous, TID AC  insulin lispro, 10 Units, Subcutaneous, TID With Meals  lamoTRIgine, 100 mg, Oral, Daily  lisinopril, 20 mg, Oral, Q24H  pantoprazole, 40 mg, Oral, Q AM  piperacillin-tazobactam, 3.375 g, Intravenous, Q8H  polyethylene glycol, 17 g, Oral, BID  sodium chloride, 10 mL, Intravenous, Q12H  tiotropium bromide monohydrate, 2 puff, Inhalation, Daily - RT      Continuous Infusions:     Allergies:  Allergies   Allergen Reactions   • Contrast Dye Hives   • Iodinated Diagnostic Agents Hives   • Norco [Hydrocodone-Acetaminophen] Hallucinations   • Tenoretic [Atenolol-Chlorthalidone] Anaphylaxis       Assessment:       Large bowel obstruction (HCC)    COPD (chronic obstructive pulmonary disease) (HCC)     Essential hypertension    Type 2 diabetes mellitus with complication, without long-term current use of insulin ([facility])    Chronic respiratory failure with hypoxia ([facility])    History of CVA (cerebrovascular accident)    Ventral hernia with bowel obstruction    PAF (paroxysmal atrial fibrillation) ([facility])    PAD (peripheral artery disease) ([facility])    Class 3 severe obesity in adult ([facility])    CAD (coronary artery disease)    Crohn's disease of colon without complication ([facility])    Closed displaced fracture of right femoral neck ([facility])    Anemia        Plan:   1.  Paroxysmal SVT.  When I saw patient her heart rate was in the 170s.  I had been notified earlier by the nurse heart rate was running 160s or so.  Surprisingly she is asymptomatic.  Patient has been controlled on a calcium channel blocker obviously broke through that.  We will give another dose of IV diltiazem to see if we get her slow down.  Please note patient was stable yesterday however deteriorated day she is persisting in SVT and was giving IV medication.  Will redose if she does not improve.  2.  Patient was diuresed yesterday doing better with this.  Spoke with Dr. Cueto plan is to diurese him more today.  3.  Large bowel obstruction.  GI following  4.  COVID-19 infection.  All appropriate PPE was utilized including gowns gloves N95 mask goggles.  5.  Will control heart rate we will try to diurese and will follow clinically.        David Carmona MD  02/01/22  10:42 EST

## 2022-02-01 NOTE — PROGRESS NOTES
Dr. JOSE DAVID Hernadez    [address]    2/1/2022    Patient ID:  Name:  Mar Camilo  MRN:  [id number]  1950  71 y.o.  female            CC/Reason for visit: Shortness of breath, COPD, chronic respiratory failure, heart failure, fluid overload, A. fib    Interval hx: Improved after Lasix.  Less short of breath.  On baseline 4 L of oxygen via nasal cannula which she does at home    ROS: No chest pain, no abdominal pain.  Leg edema is improving    Vitals:  Vitals:    02/01/22 0730 02/01/22 0925 02/01/22 0934 02/01/22 1012   BP: 159/77  159/84    BP Location: Right leg      Patient Position: Lying      Pulse: 90 (!) 166 (!) 169 95   Resp: 18      Temp: 97.9 °F (36.6 °C)      TempSrc: Oral      SpO2: 96%  94%    Weight:       Height:               Body mass index is 38.67 kg/m².    Intake/Output Summary (Last 24 hours) at 2/1/2022 1154  Last data filed at 2/1/2022 0619  Gross per 24 hour   Intake 240 ml   Output 2500 ml   Net -2260 ml       Exam:  GEN:  No distress  Alert, oriented x 3.   LUNGS: Diminished but clear breath sounds bilat, no use of accessory muscles  CV:  Irregularly irregular, without murmur, improving leg edema  ABD:  Non tender, no enlarged liver or masses      Scheduled meds:  albuterol, 2.5 mg, Nebulization, Q12H - RT  apixaban, 5 mg, Oral, Q12H  budesonide-formoterol, 2 puff, Inhalation, BID - RT  dilTIAZem, 10 mg, Intravenous, Once  dilTIAZem, 120 mg, Oral, Q8H  docosanol, 1 application, Topical, 5x Daily  docusate sodium, 100 mg, Oral, Every Other Day  FLUoxetine, 40 mg, Oral, Daily  furosemide, 40 mg, Intravenous, Once  insulin lispro, 0-9 Units, Subcutaneous, TID AC  insulin lispro, 10 Units, Subcutaneous, TID With Meals  lamoTRIgine, 100 mg, Oral, Daily  lisinopril, 20 mg, Oral, Q24H  pantoprazole, 40 mg, Oral, Q AM  polyethylene glycol, 17 g, Oral, BID  sodium chloride, 10 mL, Intravenous, Q12H  tiotropium bromide monohydrate, 2 puff, Inhalation, Daily - RT      IV meds:                            Data Review:   I reviewed the patient's medications and new clinical results.    COVID19   Date Value Ref Range Status   01/17/2022 Detected (C) Not Detected - Ref. Range Final         Lab Results   Component Value Date    CALCIUM 8.8 02/01/2022    PHOS 2.6 09/04/2020    MG 1.6 01/31/2022    MG 1.5 (L) 01/30/2022    MG 1.9 01/29/2022     Results from last 7 days   Lab Units 02/01/22  0334 01/31/22  0540 01/30/22  0335 01/29/22  0408 01/29/22  0408 01/26/22  0643 01/25/22  2100   SODIUM mmol/L 135* 138 141   < > 143   < >  --    POTASSIUM mmol/L 3.5 3.9 3.6   < > 4.0   < >  --    CHLORIDE mmol/L 93* 100 103   < > 103   < >  --    CO2 mmol/L 24.3 25.3 28.3   < > 27.2   < >  --    BUN mg/dL 8 8 15   < > 32*   < >  --    CREATININE mg/dL 0.57 0.21* 0.42*   < > 0.45*   < >  --    CALCIUM mg/dL 8.8 8.2* 8.1*   < > 8.6   < >  --    BILIRUBIN mg/dL 0.5 0.4 0.4   < > 0.4   < >  --    ALK PHOS U/L 158* 154* 144*   < > 171*   < >  --    ALT (SGPT) U/L 677* 859* 1,309*   < > 2,324*   < >  --    AST (SGOT) U/L 49* 70* 172*   < > 621*   < >  --    GLUCOSE mg/dL 165* 118* 120*   < > 95   < >  --    WBC 10*3/mm3  --  8.21 7.01  --  6.97   < >  --    HEMOGLOBIN g/dL 9.4* 8.1* 8.2*   < > 8.4*   < >  --    PLATELETS 10*3/mm3  --  192 156  --  156   < >  --    INR   --  1.09 1.28*  --  1.23*   < >  --    PROCALCITONIN ng/mL  --   --   --   --   --   --  0.44*    < > = values in this interval not displayed.     Results from last 7 days   Lab Units 01/25/22  2101 01/25/22  2100 01/25/22  1946   BLOODCX  No growth at 5 days No growth at 5 days  --    URINECX   --   --  No growth         Results from last 7 days   Lab Units 01/27/22  1101   CK TOTAL U/L 105           Estimated Creatinine Clearance: 73.9 mL/min (by C-G formula based on SCr of 0.57 mg/dL).      ASSESSMENT:   Fluid overload  Heart failure  Large bowel obstruction (HCC)    COPD (chronic obstructive pulmonary disease) (HCC)    Essential hypertension     Type 2 diabetes mellitus with complication, without long-term current use of insulin ([facility])    Chronic respiratory failure with hypoxia ([facility])    History of CVA (cerebrovascular accident)    Ventral hernia with bowel obstruction    PAF (paroxysmal atrial fibrillation) ([facility])    PAD (peripheral artery disease) ([facility])    Class 3 severe obesity in adult ([facility])    CAD (coronary artery disease)    Crohn's disease of colon without complication ([facility])    Closed displaced fracture of right femoral neck ([facility])    Anemia        PLAN:  Responded very well to Lasix.  I suspect fluid overload due to heart failure, possibly due to underlying atrial fibrillation with rapid heart rate.  From the respiratory standpoint she is stable.  She is on 4 L nasal cannula which is her usual baseline oxygen requirement.  Continue nebs with DuoNeb for COPD management.  Seems to be mostly cardiac etiology for her decompensation.  Defer to cardiology for further medication adjustments        Mann Hernadez MD  2/1/2022

## 2022-02-02 NOTE — PLAN OF CARE
Plan of Care Reviewed With: patient           Outcome Summary: Pt seen today by OT, co treat with PT to safely advance mobility. Pt has not stood since surgery in OT notes. She is very anxious with mobility and reports she is fearful of falling and declines any attempts to transfers today. Pt sat EOB with only min A for a few minutes then returns self to bed. OT spent extra time discussing recovery and advaning transfers for ADL, strength, endurance. Encouraged pt to increase OOB or EOB activity with therapy. She reports she will attempt next session.    OT wore PPE following enhanced droplet precautions. Pt wore a mask. OT completed hand hygiene.

## 2022-02-02 NOTE — CONSULTS
FIRST UROLOGY CONSULT      Patient Identification:  NAME:  Mar Camilo  Age:  71 y.o.   Sex:  female   :  1950   MRN:  [id number]       Chief complaint: Unable to urinate    History of present illness: 71-year-old female no prior urologic history.  She lives in [place].  We had seen her earlier this hospitalization due to retention.  No prior history of any bladder complaints.  She is continent and has no difficulty voiding as a baseline.  She initially presented with symptoms concerning for bowel obstruction.  She was seen by general surgery.  Her bowel issues have resolved  A catheter was placed for her retention at the time and this has been removed and she is voiding.  She had a follow-up CT scan that showed layering of debris in the base of her bladder concerning for either hematoma but no clear discernible mass was seen.  She has had no gross hematuria.  She has a pure wick canal and her urine is relatively clear..      Past medical history:  Past Medical History:   Diagnosis Date   • Arthritis    • Carotid arterial disease ([facility])     US 2015 with 80-99% right and 16-49% left, but CTA with 60-70% right, not a surgical candidate   • Chronic back pain    • Closed fracture of left tibial plateau 2018   • COPD (chronic obstructive pulmonary disease) ([facility])    • Coronary artery disease    • Crohn's disease ([facility])     Remicade on hold d/t antibiotics   • DDD (degenerative disc disease)    • Depression    • Diabetes mellitus ([facility])     Type II   • Elevated cholesterol    • Heartburn 2016   • Hernia of abdominal wall    • History of stroke 2017    left parietal   • Hyperlipidemia    • Hypertension    • Hypokalemia    • Morbid obesity ([facility])    • Obstructive pyelonephritis    • On home oxygen therapy     2L UNLESS STRESSED THEN 2.5-3 L   • Osteopenia 2016   • PAF (paroxysmal atrial fibrillation) ([facility])    • PVD (peripheral vascular disease) ([facility])     RT CAROTID STENOSIS   •  Radiculopathy     NECK PAIN   • Right shoulder pain     scheduled for sx   • Seizure (HCC)     with stroke   • Stroke (HCC) 2017    memory,    • Subclavian artery stenosis (HCC)     and axillary artery stenosis, on the left       Past surgical history:  Past Surgical History:   Procedure Laterality Date   • APPENDECTOMY N/A    • BRONCHOSCOPY N/A 1/5/2018    Procedure: BRONCHOSCOPY;  Surgeon: Gustavo Muniz MD;  Location: [facility] OR;  Service:    • COLON SURGERY     • COLONOSCOPY     • CYSTOSCOPY URETEROSCOPY LASER LITHOTRIPSY Right 4/17/2017    Procedure: CYSTOSCOPY with  right stent removal, right URETEROSCOPY LASER LITHOTRIPSY,  with STONE BASKET EXTRACTION;  Surgeon: Dipesh Bean MD;  Location: [facility] OR;  Service:    • CYSTOSCOPY W/ URETERAL STENT PLACEMENT Right 3/18/2017    Procedure: CYSTOSCOPY URETERAL CATHETER/STENT INSERTION;  Surgeon: Dipesh Bean MD;  Location: [facility] OR;  Service:    • ENDOSCOPY     • FLEXIBLE SIGMOIDOSCOPY N/A 01/25/2016    Dr. Luis Rosas   • FRACTURE SURGERY  2017    broken femur, left   • HIP PERCUTANEOUS PINNING Right 1/19/2022    Procedure: Anterior Right Hip Percutaneous Pinning;  Surgeon: Naun Barajas MD;  Location: [facility];  Service: Orthopedics;  Laterality: Right;   • JOINT REPLACEMENT  12/30/2019    right shoulder replacement   • LAPAROSCOPIC COLON RESECTION N/A 01/25/2016    Laparoscopic mobilization of splenic flexure, transverse colectomy with primary anastomosis-Dr. Christopher Richardson   • LUNG BIOPSY      NEGATIVE   • OK THROMBOENDARTECTMY NECK,NECK INCIS Right 3/14/2016    Procedure: RT CAROTID ENDARTERECTOMY;  Surgeon: Federico Schuler MD;  Location: [facility];  Service: Vascular   • TIBIAL PLATEAU OPEN REDUCTION INTERNAL FIXATION Left 05/09/2018    Open reduction internal fixation of left tibial plateau fracture-Dr. Ayden Cárdenas   • TOTAL SHOULDER ARTHROPLASTY W/ DISTAL CLAVICLE EXCISION Right 12/30/2019    Procedure: TOTAL SHOULDER  REVERSE ARTHROPLASTY, open  biceps tenodesis;  Surgeon: Altaf Reyes MD;  Location: [facility];  Service: Orthopedics   • VASCULAR SURGERY  2017    caroid artery   • WRIST ARTHROSCOPY Right     WITH RELEASE OF TRANSVERSE CARPAL LIGAMENT       Allergies:  Contrast dye, Iodinated diagnostic agents, Norco [hydrocodone-acetaminophen], and Tenoretic [atenolol-chlorthalidone]    Home medications:  Medications Prior to Admission   Medication Sig Dispense Refill Last Dose   • Acetaminophen 325 MG capsule 650 mg Every 8 (Eight) Hours As Needed.      • aspirin 81 MG EC tablet Take 81 mg by mouth 2 (Two) Times a Day.      • azithromycin (Zithromax Z-Hu) 250 MG tablet Take 2 tablets by mouth on day 1, then 1 tablet daily on days 2-5 6 tablet 0 Patient Taking Differently   • bacitracin 500 UNIT/GM ointment Apply to affected area daily 28 g 3    • Blood Glucose Monitoring Suppl (ONETOUCH VERIO) w/Device kit 1 kit 2 (Two) Times a Day. 1 kit 0    • docusate sodium (COLACE) 100 MG capsule Take 100 mg by mouth Every Other Day.      • fenofibrate (TRICOR) 145 MG tablet Take 1 tablet by mouth once daily 90 tablet 2    • FLUoxetine (PROzac) 40 MG capsule Take 1 capsule by mouth Daily. 90 capsule 2    • gabapentin (NEURONTIN) 300 MG capsule Take 1 capsule by mouth 2 (Two) Times a Day. 60 capsule 4    • glucose blood (ONETOUCH VERIO) test strip Use as instructed as to check blood sugars 2-3 times a day for type 2 diabetes 100 each 12    • guaiFENesin (MUCINEX) 600 MG 12 hr tablet Take 1,200 mg by mouth Every Morning.      • inFLIXimab (REMICADE) 100 MG injection Infuse 5 mg/kg into a venous catheter Every 2 (Two) Months.      • ipratropium-albuterol (DUO-NEB) 0.5-2.5 mg/3 ml nebulizer Take 3 mL by nebulization Every 4 (Four) Hours While Awake. 360 mL     • lamoTRIgine (LaMICtal) 150 MG tablet Take 150 mg by mouth Daily.      • Lancets (ONETOUCH ULTRASOFT) lancets Used to check blood sugars twice a day as needed for type 2 diabetes  monitoring 100 each 12    • lisinopril (PRINIVIL,ZESTRIL) 40 MG tablet Take 1 tablet by mouth Daily. 90 tablet 0    • metFORMIN (GLUCOPHAGE) 500 MG tablet Take 2 tablets in a.m. and 1 tablet at dinner 180 tablet 0    • Multiple Vitamins-Minerals (CENTRUM SILVER PO) Take 1 tablet by mouth Daily.      • O2 (OXYGEN) Inhale 4 L/min Continuous. May resume prior 3L/min when O2 sat remains 88% or above on this setting. Patient was given an oximeter here. (Patient taking differently: Inhale 5 L/min Continuous. May resume prior 3L/min when O2 sat remains 88% or above on this setting. Patient was given an oximeter here.)      • rosuvastatin (CRESTOR) 40 MG tablet Take 1 tablet by mouth once daily 90 tablet 0    • SYMBICORT 80-4.5 MCG/ACT inhaler Inhale 2 puffs 2 (Two) Times a Day.      • tiotropium (Spiriva HandiHaler) 18 MCG per inhalation capsule Place 1 capsule into inhaler and inhale Daily.      • Ventolin  (90 Base) MCG/ACT inhaler INHALE 2 PUFFS BY MOUTH EVERY 4 HOURS AS NEEDED 18 g 6    • verapamil SR (CALAN-SR) 240 MG CR tablet TAKE 1 TABLET BY MOUTH EVERY 12 HOURS 180 tablet 2        Hospital medications:  albuterol, 2.5 mg, Nebulization, Q12H - RT  apixaban, 5 mg, Oral, Q12H  budesonide-formoterol, 2 puff, Inhalation, BID - RT  dilTIAZem, 120 mg, Oral, Q8H  docosanol, 1 application, Topical, 5x Daily  docusate sodium, 100 mg, Oral, Every Other Day  FLUoxetine, 40 mg, Oral, Daily  insulin lispro, 0-9 Units, Subcutaneous, TID AC  insulin lispro, 10 Units, Subcutaneous, TID With Meals  lamoTRIgine, 100 mg, Oral, Daily  lisinopril, 20 mg, Oral, Q24H  pantoprazole, 40 mg, Oral, BID  polyethylene glycol, 17 g, Oral, BID  sodium chloride, 10 mL, Intravenous, Q12H  tiotropium bromide monohydrate, 2 puff, Inhalation, Daily - RT         dextrose  •  dextrose  •  glucagon (human recombinant)  •  HYDROmorphone  •  nitroglycerin  •  ondansetron **OR** ondansetron  •  oxyCODONE  •  simethicone  •  Insert peripheral IV  **AND** sodium chloride  •  sodium chloride    Family history:  Family History   Problem Relation Age of Onset   • Diabetes Mother    • Stroke Mother    • Other Father         RESPIRATORY FAILURE   • Cancer Other         lung cancer   • Crohn's disease Brother    • Crohn's disease Maternal Aunt        Social history:  Social History     Tobacco Use   • Smoking status: Former Smoker     Packs/day: 2.00     Years: 40.00     Pack years: 80.00     Types: Cigarettes     Quit date: 2013     Years since quittin.8   • Smokeless tobacco: Never Used   • Tobacco comment: 1 cup coffee daily   Vaping Use   • Vaping Use: Never used   Substance Use Topics   • Alcohol use: No     Comment: history of heavy drinker    • Drug use: No       Review of systems:    Negative 12-system ROS except for the following: As above.  She denies incontinence she denies any obstructive irritable urinary symptoms.      Objective:  TMax 24 hours:   Temp (24hrs), Av.3 °F (36.3 °C), Min:96.6 °F (35.9 °C), Max:97.9 °F (36.6 °C)      Vitals Ranges:   Temp:  [96.6 °F (35.9 °C)-97.9 °F (36.6 °C)] 97.9 °F (36.6 °C)  Heart Rate:  [] 78  Resp:  [16-18] 18  BP: (141-171)/(55-71) 142/71    Intake/Output Last 3 shifts:  I/O last 3 completed shifts:  In: 480 [P.O.:480]  Out: 1500 [Urine:1500]     Physical Exam:       General Appearance:    Alert, cooperative, in no acute distress   Head:    Normocephalic, without obvious abnormality, atraumatic   Eyes:          PERRL, conjunctivae and corneas clear   Ears:    Normal external inspection   Throat:   No oral lesions, oral mucosa moist   Neck:   Supple, no LAD, trachea midline   Back:     No CVA tenderness   Lungs:     Respirations unlabored, symmetric excursion    Heart:    RRR, intact peripheral pulses   Abdomen:    Obese no bladder distention   :   Pure wick   Extremities:   No edema, no deformity   Skin:   No bleeding, bruising or rashes   Neuro/Psych:   Orientation intact, mood/affect pleasant,  no focal findings       Results review:   I reviewed the patient's new clinical results.    Data review:  Lab Results (last 24 hours)     Procedure Component Value Units Date/Time    POC Glucose Once [[id number]]  (Normal) Collected: 02/02/22 1222    Specimen: Blood Updated: 02/02/22 1223     Glucose 102 mg/dL      Comment: Meter: [id number] : [id number] Niko Ortega RN       POC Glucose Once [[id number]]  (Abnormal) Collected: 02/02/22 0636    Specimen: Blood Updated: 02/02/22 0638     Glucose 131 mg/dL      Comment: Meter: [id number] : [id number] Rene Camilla NA       POC Glucose Once [[id number]]  (Abnormal) Collected: 02/02/22 0059    Specimen: Blood Updated: 02/02/22 0101     Glucose 211 mg/dL      Comment: RN Notified R and V JOY Meter: [id number] : [id number] Rene Camilla        POC Glucose Once [[id number]]  (Normal) Collected: 02/01/22 2033    Specimen: Blood Updated: 02/01/22 2034     Glucose 80 mg/dL      Comment: RN Notified R and V GEO Meter: [id number] : [id number] Rene Camilla NA       POC Glucose Once [[id number]]  (Abnormal) Collected: 02/01/22 1645    Specimen: Blood Updated: 02/01/22 1646     Glucose 187 mg/dL      Comment: Meter: [id number] : [id number] Teresa HILL              Imaging:  Imaging Results (Last 24 Hours)     ** No results found for the last 24 hours. **             Assessment:       Large bowel obstruction (HCC)    COPD (chronic obstructive pulmonary disease) (HCC)    Essential hypertension    Type 2 diabetes mellitus with complication, without long-term current use of insulin (HCC)    Chronic respiratory failure with hypoxia (HCC)    History of CVA (cerebrovascular accident)    Ventral hernia with bowel obstruction    PAF (paroxysmal atrial fibrillation) (HCC)    PAD (peripheral artery disease) ([facility])    Class 3 severe obesity in adult (HCC)    CAD (coronary artery disease)    Crohn's disease of colon without complication (HCC)    Closed displaced  fracture of right femoral neck (HCC)    Anemia    Urinary retention likely multifactorial.  This has resolved and she is voiding well and her residuals have been acceptable.    Abnormal CT scan of her pelvis with layering of material possible hematoma in her bladder but more likely debris and mucus from her indwelling catheter and recent infections    Plan:     At this point I do not think she needs to have a cystoscopy and we can arrange to do this in the office setting in 3 to 4 weeks when her bladder is healed up.  She lives in [place] and we can conceivably try to arrange for this in the [place] office or your local.  Can be discharged at any time from our standpoint.    Dipesh Bena MD  02/02/22  13:10 EST

## 2022-02-02 NOTE — PROGRESS NOTES
"p  General Surgery Progress Note    Summary: Mrs. Mar Camilo is a 71 y.o. year old lady with Crohn's disease on Remicade, a large ventral hernia with bowel, and a resolved high grade SBO/GOO. Feels better today, tolerating a regular diet and having bowel function.     Chief Complaint: abdominal pain    Interval Events: No acute events overnight. Feels much better today. Tolerating breakfast and lunch. Having several loose BM's. Eager to go to rehab.     Vitals: /71   Pulse 78   Temp 97.9 °F (36.6 °C) (Oral)   Resp 18   Ht 162.6 cm (64.02\")   Wt 106 kg (232 lb 12.8 oz)   LMP  (LMP Unknown)   SpO2 90%   BMI 39.94 kg/m²     GENERAL: alert, chronically ill appearing, laying in bed, not in distress   HEENT: normocephalic, atraumatic, moist mucous membranes, clear sclerae  CHEST: on 4L NC, increased work of breathing, symmetric air entry  CARDIAC: tachycardia, regular rhythm    ABDOMEN: soft, nondistended, no erythema over her hernia, hernia appears soft, chronically incarcerated hernia   EXTREMITIES: no cyanosis, clubbing, or edema   SKIN: Warm and moist, no rashes    Labs:  Results from last 7 days   Lab Units 02/01/22 0334 01/31/22  0540 01/30/22  0335 01/29/22  0408 01/29/22  0408 01/27/22  1101 01/27/22  0355   WBC 10*3/mm3  --  8.21 7.01  --  6.97   < > 8.37   HEMOGLOBIN g/dL 9.4* 8.1* 8.2*   < > 8.4*   < > 7.0*   HEMATOCRIT % 29.4* 24.7* 25.8*   < > 26.5*   < > 22.3*   PLATELETS 10*3/mm3  --  192 156  --  156   < > 219   MONOCYTES % %  --  2.1*  --   --   --   --  3.4*    < > = values in this interval not displayed.     Results from last 7 days   Lab Units 02/01/22  0334 01/31/22  0540 01/30/22  0335   SODIUM mmol/L 135* 138 141   POTASSIUM mmol/L 3.5 3.9 3.6   CHLORIDE mmol/L 93* 100 103   CO2 mmol/L 24.3 25.3 28.3   BUN mg/dL 8 8 15   CREATININE mg/dL 0.57 0.21* 0.42*   CALCIUM mg/dL 8.8 8.2* 8.1*   BILIRUBIN mg/dL 0.5 0.4 0.4   ALK PHOS U/L 158* 154* 144*   ALT (SGPT) U/L 677* 859* 1,309* "   AST (SGOT) U/L 49* 70* 172*   GLUCOSE mg/dL 165* 118* 120*     Results from last 7 days   Lab Units 01/31/22  0540 01/30/22  0335 01/29/22  0408 01/28/22  0517 01/27/22  1101   INR  1.09 1.28* 1.23*   < > 2.20*   APTT seconds  --   --   --   --  29.7    < > = values in this interval not displayed.       HAILEY LOPES MD  General and Endoscopic Surgery  [facility]    [address], [address]  [place], [place], [address]  P: [phone]  F: [phone]

## 2022-02-02 NOTE — PLAN OF CARE
Goal Outcome Evaluation:  Plan of Care Reviewed With: patient        Progress: no change  Outcome Summary: Pt agreeable to skilled PT/OT, req's encouragement 2' fear of falling, pt very apprehensive w mobilization. Pt req'ed Min A for bed mobility, good static sitting balance, performed BLE strengthening therex 10 reps. will cont to progress as dony and pt agreeable.    .Patient was wearing a face mask during this therapy encounter. Therapist used appropriate personal protective equipment including gown, eye protection, mask and gloves.  Mask used was N95/duckbill. Appropriate PPE was worn during the entire therapy session. Hand hygiene was completed before and after therapy session. Patient is in enhanced droplet precautions.

## 2022-02-02 NOTE — PROGRESS NOTES
"    DAILY PROGRESS NOTE  [facility]    Patient Identification:  Name: Mar Camilo  Age: 71 y.o.  Sex: female  :  1950  MRN: [id number]         Primary Care Physician: Vianey Barrios PA-C    Subjective:  Interval History: Feeling and breathing much much better.  She completely feels as if she is back to baseline in regards to her breathing.  She states she is on 4 L at home via nasal cannula.  Denies any nausea vomiting.  She states her abdominal pain is much improved and she has had further BMs.  She is getting proactive to go home given her clinical improvement    Objective: Much better clinically.  No family at bedside.  Case discussed at length with patient as well as in multidisciplinary rounds    Scheduled Meds:albuterol, 2.5 mg, Nebulization, Q12H - RT  apixaban, 5 mg, Oral, Q12H  budesonide-formoterol, 2 puff, Inhalation, BID - RT  dilTIAZem, 120 mg, Oral, Q8H  docosanol, 1 application, Topical, 5x Daily  docusate sodium, 100 mg, Oral, Every Other Day  FLUoxetine, 40 mg, Oral, Daily  insulin lispro, 0-9 Units, Subcutaneous, TID AC  insulin lispro, 10 Units, Subcutaneous, TID With Meals  lamoTRIgine, 100 mg, Oral, Daily  lisinopril, 20 mg, Oral, Q24H  pantoprazole, 40 mg, Oral, BID  polyethylene glycol, 17 g, Oral, BID  sodium chloride, 10 mL, Intravenous, Q12H  tiotropium bromide monohydrate, 2 puff, Inhalation, Daily - RT      Continuous Infusions:     Vital signs in last 24 hours:  Temp:  [96.6 °F (35.9 °C)-97.9 °F (36.6 °C)] 97.9 °F (36.6 °C)  Heart Rate:  [] 78  Resp:  [16-18] 18  BP: (141-171)/(55-71) 142/71    Intake/Output:    Intake/Output Summary (Last 24 hours) at 2022 1101  Last data filed at 2022 0731  Gross per 24 hour   Intake 480 ml   Output --   Net 480 ml       Exam:  /71   Pulse 78   Temp 97.9 °F (36.6 °C) (Oral)   Resp 18   Ht 162.6 cm (64.02\")   Wt 106 kg (232 lb 12.8 oz)   LMP  (LMP Unknown)   SpO2 90%   BMI 39.94 kg/m² "     General Appearance:    Alert, cooperative, no distress, AAOx3                         Throat:   Oral mucosa pink and moist                           Neck:   No JVD                         Lungs:    Much improved air entry to bases with resolving bibasilar Rales to auscultation bilaterally, respirations unlabored no further accessory muscles or pursed lip breathing appreciated                 Chest Wall:    No tenderness or deformity                          Heart:  Irregular rate and rhythm, S1 and S2 normal                  Abdomen:     Soft, nontender, bowel sounds active, large ventral hernia                 extremities: Moving all, no cyanosis or edema                           Data Review:  Labs in chart were reviewed.    Assessment:  Active Hospital Problems    Diagnosis  POA   • **Large bowel obstruction ([facility]) [K56.609]  Yes   • Anemia [D64.9]  Yes   • Closed displaced fracture of right femoral neck ([facility]) [S72.001A]  Yes   • Crohn's disease of colon without complication ([facility]) [K50.10]  Yes   • CAD (coronary artery disease) [I25.10]  Yes   • PAD (peripheral artery disease) ([facility]) [I73.9]  Yes   • PAF (paroxysmal atrial fibrillation) ([facility]) [I48.0]  Yes   • Ventral hernia with bowel obstruction [K43.6]  Yes   • History of CVA (cerebrovascular accident) [Z86.73]  Not Applicable   • Chronic respiratory failure with hypoxia ([facility]) [J96.11]  Yes   • Class 3 severe obesity in adult ([facility]) [E66.01]  Yes   • COPD (chronic obstructive pulmonary disease) ([facility]) [J44.9]  Yes   • Essential hypertension [I10]  Yes   • Type 2 diabetes mellitus with complication, without long-term current use of insulin ([facility]) [E11.8]  Yes      Resolved Hospital Problems   No resolved problems to display.       Plan:    Acute diastolic CHF secondary to cardiac arrhythmia/SVT/A. Fib              -Excellent response to IV Lasix 40 mg and will give 1 more dose today              -Appreciate cardiology assistance on try to get her rate controlled.        DC Zosyn - chest x-ray with no pneumonia     Large ventral hernia with resolved high-grade bowel obstruction              -Surgery advancing diet -seems to be clinically improving/stable     Abnormal LFTs likely secondary to hypoperfusion of liver              -Dr. Umaña post discharge              -Numbers improving -no need for daily monitoring     Right femoral neck fracture status post 1/19/2022 hip percutaneous pinning     Bladder mass noted on 1/26/2022 CT was concerning for hematoma              -CT 1/31/2022 shows dependent layering with cystoscopy endorsed              -Urology consulted and will see today     DM2 with circulatory component with an A1c of 8 -number stable     Anemia of chronic disease status post previous transfusion 2 units              -Hgb stable and improved to 9.4               -Eliquis to be resumed 2/2/22.  Previously held secondary to concerns for surgical intervention    Murphy Cueto MD  2/2/2022  11:01 EST

## 2022-02-02 NOTE — PLAN OF CARE
Goal Outcome Evaluation:  Plan of Care Reviewed With: patient        Progress: no change  Outcome Summary: HR remained in Afib overnight, rate ranging from 's. Remains on baseline 4L NC. Unable to sleep well. Oxycodone given x1 per pt request, c/o R hip pain. Incontinence care prn. will continue to monitor closely.

## 2022-02-02 NOTE — PLAN OF CARE
Goal Outcome Evaluation:  Plan of Care Reviewed With: patient        Progress: improving     Patient improving today and able to tolerate diet better with no nausea or vomiting. Patient will be going to outpt rehab and awaiting approval for bed.

## 2022-02-02 NOTE — PROGRESS NOTES
Dr. JOSE DAVID Hernadez    [address]    2/2/2022    Patient ID:  Name:  Mar Camilo  MRN:  [id number]  1950  71 y.o.  female            CC/Reason for visit: Shortness of breath, COPD, chronic respiratory failure, heart failure, fluid overload, A. fib     Interval hx:  No new complaints.  Shortness of breath is at baseline.  On 3 to 4 L nasal cannula.  Participated a little bit with physical therapy at the edge of the bed today but did not stand     ROS: No chest pain, no abdominal pain.  Leg edema is improving    Vitals:  Vitals:    02/02/22 0612 02/02/22 0731 02/02/22 0739 02/02/22 1319   BP: 142/71 142/71  144/74   BP Location:    Right leg   Patient Position:    Lying   Pulse: 81 88 78 103   Resp:  18 18 20   Temp:  97.9 °F (36.6 °C)  97.7 °F (36.5 °C)   TempSrc:  Oral  Oral   SpO2:  98% 90% 98%   Weight:       Height:               Body mass index is 39.94 kg/m².    Intake/Output Summary (Last 24 hours) at 2/2/2022 1507  Last data filed at 2/2/2022 0731  Gross per 24 hour   Intake 480 ml   Output --   Net 480 ml       Exam:  GEN:  No distress  Alert, oriented x 3.   LUNGS: Scattered coarse breath sounds bilat, no use of accessory muscles  CV:  Normal S1S2, without murmur, some ankle edema  ABD:  Non tender, obesity hampers exam      Scheduled meds:  albuterol, 2.5 mg, Nebulization, Q12H - RT  apixaban, 5 mg, Oral, Q12H  budesonide-formoterol, 2 puff, Inhalation, BID - RT  dilTIAZem, 120 mg, Oral, Q8H  docosanol, 1 application, Topical, 5x Daily  docusate sodium, 100 mg, Oral, Every Other Day  FLUoxetine, 40 mg, Oral, Daily  insulin lispro, 0-9 Units, Subcutaneous, TID AC  insulin lispro, 10 Units, Subcutaneous, TID With Meals  lamoTRIgine, 100 mg, Oral, Daily  lisinopril, 20 mg, Oral, Q24H  pantoprazole, 40 mg, Oral, BID  polyethylene glycol, 17 g, Oral, BID  sodium chloride, 10 mL, Intravenous, Q12H  tiotropium bromide monohydrate, 2 puff, Inhalation, Daily - RT      IV meds:                            Data Review:   I reviewed the patient's medications and new clinical results.    COVID19   Date Value Ref Range Status   01/17/2022 Detected (C) Not Detected - Ref. Range Final         Lab Results   Component Value Date    CALCIUM 8.8 02/01/2022    PHOS 2.6 09/04/2020    MG 1.5 (L) 02/01/2022    MG 1.6 01/31/2022    MG 1.5 (L) 01/30/2022     Results from last 7 days   Lab Units 02/01/22  0334 01/31/22  0540 01/30/22  0335 01/29/22  0408 01/29/22  0408   SODIUM mmol/L 135* 138 141   < > 143   POTASSIUM mmol/L 3.5 3.9 3.6   < > 4.0   CHLORIDE mmol/L 93* 100 103   < > 103   CO2 mmol/L 24.3 25.3 28.3   < > 27.2   BUN mg/dL 8 8 15   < > 32*   CREATININE mg/dL 0.57 0.21* 0.42*   < > 0.45*   CALCIUM mg/dL 8.8 8.2* 8.1*   < > 8.6   BILIRUBIN mg/dL 0.5 0.4 0.4   < > 0.4   ALK PHOS U/L 158* 154* 144*   < > 171*   ALT (SGPT) U/L 677* 859* 1,309*   < > 2,324*   AST (SGOT) U/L 49* 70* 172*   < > 621*   GLUCOSE mg/dL 165* 118* 120*   < > 95   WBC 10*3/mm3  --  8.21 7.01  --  6.97   HEMOGLOBIN g/dL 9.4* 8.1* 8.2*   < > 8.4*   PLATELETS 10*3/mm3  --  192 156  --  156   INR   --  1.09 1.28*  --  1.23*    < > = values in this interval not displayed.             ASSESSMENT:   Fluid overload  Heart failure  Large bowel obstruction (HCC)    COPD (chronic obstructive pulmonary disease) ([facility])    Essential hypertension    Type 2 diabetes mellitus with complication, without long-term current use of insulin (HCC)    Chronic respiratory failure with hypoxia (HCC)    History of CVA (cerebrovascular accident)    Ventral hernia with bowel obstruction    PAF (paroxysmal atrial fibrillation) ([facility])    PAD (peripheral artery disease) ([facility])    Class 3 severe obesity in adult ([facility])    CAD (coronary artery disease)    Crohn's disease of colon without complication ([facility])    Closed displaced fracture of right femoral neck ([facility])    Anemia      PLAN:  Stable from respiratory standpoint.  Continue nebulized bronchodilators 4 times a  day.  Continue oxygen which is at baseline  Increased mobility is of utmost importance for this person to start improving.        Mann Hernadez MD  2/2/2022

## 2022-02-02 NOTE — THERAPY TREATMENT NOTE
Acute Care - Physical Therapy Treatment Note  [facility]     Patient Name: Mar Camilo  : 1950  MRN: [id number]  Today's Date: 2022      Visit Dx:     ICD-10-CM ICD-9-CM   1. Intestinal obstruction, unspecified cause, unspecified whether partial or complete ([facility])  K56.609 560.9   2. Closed fracture of right hip, initial encounter ([facility])  S72.001A 820.8   3. Abdominal wall cellulitis  L03.311 682.2   4. Troponin level elevated  R77.8 790.6   5. Closed displaced fracture of right femoral neck ([facility])  S72.001A 820.8     Patient Active Problem List   Diagnosis   • Obstructive pyelonephritis   • Chronic allergic conjunctivitis   • Chronic back pain   • Chronic bronchitis ([facility])   • Non-specific colitis   • COPD (chronic obstructive pulmonary disease) ([facility])   • Degeneration of intervertebral disc of lumbar region   • Depression   • Heartburn   • Herpes labialis   • Mixed hyperlipidemia   • Essential hypertension   • Spinal stenosis of lumbar region   • Osteopenia   • Lumbar radiculopathy   • Type 2 diabetes mellitus with complication, without long-term current use of insulin ([facility])   • Anemia   • Asymptomatic stenosis of right carotid artery   • Chronic respiratory failure with hypoxia ([facility])   • Hospital discharge follow-up   • Osteoarthritis of lumbar spine   • Need for immunization against influenza   • Chronic right hip pain   • Chronic pain of both shoulders   • Acute cystitis without hematuria   • Seizure ([facility])   • History of CVA (cerebrovascular accident)   • Tendinopathy of rotator cuff   • Primary osteoarthritis, left shoulder   • Pneumonia of left lower lobe due to infectious organism   • Need for pneumococcal vaccine   • Regional colitis ([facility])   • Ventral hernia with bowel obstruction   • PAF (paroxysmal atrial fibrillation) ([facility])   • PAD (peripheral artery disease) ([facility])   • Class 3 severe obesity in adult ([facility])   • Chronic pain syndrome   • Unsteady gait   • High risk medication use   • H/O  carotid stenosis   • Cerebrovascular accident (CVA) ([facility])   • Neuropathy   • Foot swelling   • Status post reverse total arthroplasty of right shoulder, DOS 12/30/2019   • Dyspnea on exertion   • Pain in both lower extremities   • Recent major surgery   • COPD exacerbation ([facility])   • Hematuria   • Long-term use of high-risk medication   • Acute maxillary sinusitis   • Abnormal urine odor   • Urinary tract infection with hematuria   • Intussusception ([facility])   • Crohn's disease ([facility])   • CAD (coronary artery disease)   • Hyperkalemia   • Crohn's disease of colon without complication ([facility])   • Oxygen dependent   • Panic attack   • Irregular heart rhythm   • Hiatal hernia   • Crohn's disease of small intestine ([facility])   • Chronic cough   • Cataract   • Acute on chronic respiratory failure with hypoxia ([facility])   • Medicare annual wellness visit, subsequent   • Closed displaced fracture of right femoral neck ([facility])   • Large bowel obstruction ([facility])   • Anemia     Past Medical History:   Diagnosis Date   • Arthritis    • Carotid arterial disease ([facility])     US 2015 with 80-99% right and 16-49% left, but CTA with 60-70% right, not a surgical candidate   • Chronic back pain    • Closed fracture of left tibial plateau 7/30/2018   • COPD (chronic obstructive pulmonary disease) ([facility])    • Coronary artery disease    • Crohn's disease ([facility])     Remicade on hold d/t antibiotics   • DDD (degenerative disc disease)    • Depression    • Diabetes mellitus ([facility])     Type II   • Elevated cholesterol    • Heartburn 2/21/2016   • Hernia of abdominal wall    • History of stroke 05/03/2017    left parietal   • Hyperlipidemia    • Hypertension    • Hypokalemia    • Morbid obesity ([facility])    • Obstructive pyelonephritis    • On home oxygen therapy     2L UNLESS STRESSED THEN 2.5-3 L   • Osteopenia 2/21/2016   • PAF (paroxysmal atrial fibrillation) ([facility])    • PVD (peripheral vascular disease) ([facility])     RT CAROTID STENOSIS   • Radiculopathy     NECK PAIN    • Right shoulder pain     scheduled for sx   • Seizure (HCC)     with stroke   • Stroke (HCC) 2017    memory,    • Subclavian artery stenosis (HCC)     and axillary artery stenosis, on the left     Past Surgical History:   Procedure Laterality Date   • APPENDECTOMY N/A    • BRONCHOSCOPY N/A 1/5/2018    Procedure: BRONCHOSCOPY;  Surgeon: Gustavo Muniz MD;  Location: [facility];  Service:    • COLON SURGERY     • COLONOSCOPY     • CYSTOSCOPY URETEROSCOPY LASER LITHOTRIPSY Right 4/17/2017    Procedure: CYSTOSCOPY with  right stent removal, right URETEROSCOPY LASER LITHOTRIPSY,  with STONE BASKET EXTRACTION;  Surgeon: Dipesh Bean MD;  Location: [facility] OR;  Service:    • CYSTOSCOPY W/ URETERAL STENT PLACEMENT Right 3/18/2017    Procedure: CYSTOSCOPY URETERAL CATHETER/STENT INSERTION;  Surgeon: Dipesh Bean MD;  Location: [facility];  Service:    • ENDOSCOPY     • FLEXIBLE SIGMOIDOSCOPY N/A 01/25/2016    Dr. Luis Rosas   • FRACTURE SURGERY  2017    broken femur, left   • HIP PERCUTANEOUS PINNING Right 1/19/2022    Procedure: Anterior Right Hip Percutaneous Pinning;  Surgeon: Naun Barajas MD;  Location: [facility];  Service: Orthopedics;  Laterality: Right;   • JOINT REPLACEMENT  12/30/2019    right shoulder replacement   • LAPAROSCOPIC COLON RESECTION N/A 01/25/2016    Laparoscopic mobilization of splenic flexure, transverse colectomy with primary anastomosis-Dr. Christopher Richardson   • LUNG BIOPSY      NEGATIVE   • AL THROMBOENDARTECTMY NECK,NECK INCIS Right 3/14/2016    Procedure: RT CAROTID ENDARTERECTOMY;  Surgeon: Federico Schuler MD;  Location: [facility];  Service: Vascular   • TIBIAL PLATEAU OPEN REDUCTION INTERNAL FIXATION Left 05/09/2018    Open reduction internal fixation of left tibial plateau fracture-Dr. Ayden Cárdenas   • TOTAL SHOULDER ARTHROPLASTY W/ DISTAL CLAVICLE EXCISION Right 12/30/2019    Procedure: TOTAL SHOULDER REVERSE ARTHROPLASTY, open  biceps tenodesis;  Surgeon:  Altaf Reyes MD;  Location: [facility];  Service: Orthopedics   • VASCULAR SURGERY  2017    caroid artery   • WRIST ARTHROSCOPY Right     WITH RELEASE OF TRANSVERSE CARPAL LIGAMENT     PT Assessment (last 12 hours)     PT Evaluation and Treatment     [facility] Name 02/02/22 1500          Physical Therapy Time and Intention    Subjective Information no complaints  -     Document Type therapy note (daily note)  -     Mode of Treatment occupational therapy; physical therapy  co tx appropriate 2' skilled care needs of pt  -     Patient Effort good  -     Symptoms Noted During/After Treatment none  -[facility] Name 02/02/22 1500          General Information    Patient Profile Reviewed yes  -     Patient Observations alert; cooperative  -     General Observations of Patient pt supine in bed no acute distress, needs encouragement 2' fear of falling  -     Existing Precautions/Restrictions fall; weight bearing; oxygen therapy device and L/min  -     Barriers to Rehab medically complex  -[facility] Name 02/02/22 1500          Cognition    Orientation Status (Cognition) oriented x 3  -[facility] Name 02/02/22 1500          Pain Scale: Numbers Pre/Post-Treatment    Pretreatment Pain Rating 2/10  -     Posttreatment Pain Rating 2/10  -     Pain Location - Side Right  -     Pain Location - Orientation lower  -     Pain Location extremity  -[facility] Name 02/02/22 1500          Mobility    Extremity Weight-bearing Status right lower extremity  -     Right Lower Extremity (Weight-bearing Status) toe touch weight-bearing (TTWB)   -[facility] Name 02/02/22 1500          Bed Mobility    Supine-Sit [place] (Bed Mobility) minimum assist (75% patient effort); verbal cues  -     Sit-Supine [place] (Bed Mobility) minimum assist (75% patient effort); verbal cues  -     Assistive Device (Bed Mobility) head of bed elevated; bed rails  -[facility] Name 02/02/22 1500          Transfers    Comment  (Transfers) refused despite encouragement, pt very apprehensive 2' fear of falling  -     Row Name 02/02/22 1500          Balance    Static Sitting Balance mild impairment; supported; sitting, edge of bed  -     Row Name 02/02/22 1500          Motor Skills    Therapeutic Exercise --  LE APs, LAQs, hip ER/IR, abd/add, quad sets x 10  -     Row Name             Wound 01/04/22 2354 Bilateral midline coccyx    Wound - Properties Group Placement Date: 01/04/22  -EB Placement Time: 2354  -EB Present on Hospital Admission: Y  -EB Side: Bilateral  -EB Orientation: midline  -EB Location: coccyx  -EB     Retired Wound - Properties Group Date first assessed: 01/04/22  -EB Time first assessed: 2354  -EB Present on Hospital Admission: Y  -EB Side: Bilateral  -EB Location: coccyx  -EB     Row Name             Wound 09/01/21 1229 Left abdomen    Wound - Properties Group Placement Date: 09/01/21  -AS Placement Time: 1229  -AS Side: Left  -AS Location: abdomen  -AS     Retired Wound - Properties Group Date first assessed: 09/01/21  -AS Time first assessed: 1229  -AS Side: Left  -AS Location: abdomen  -AS     Row Name             Wound 01/19/22 1418 Left anterior hip Incision    Wound - Properties Group Placement Date: 01/19/22  -AR Placement Time: 1418  -AR Present on Hospital Admission: N  -AR Side: Left  -AR Orientation: anterior  -AR Location: hip  -AR Primary Wound Type: Incision  -AR     Retired Wound - Properties Group Date first assessed: 01/19/22  -AR Time first assessed: 1418  -AR Present on Hospital Admission: N  -AR Side: Left  -AR Location: hip  -AR Primary Wound Type: Incision  -AR     Row Name 02/02/22 1500          Plan of Care Review    Plan of Care Reviewed With patient  -     Progress no change  -     Outcome Summary Pt agreeable to skilled PT/OT, req's encouragement 2' fear of falling, pt very apprehensive w mobilization. Pt req'ed Min A for bed mobility, good static sitting balance, performed BLE  strengthening therex 10 reps. will cont to progress as dony and pt agreeable.  -     Row Name 02/02/22 1500          Positioning and Restraints    Pre-Treatment Position in bed  -LH     Post Treatment Position bed  -LH     In Bed supine; call light within reach; encouraged to call for assist; exit alarm on  -           User Key  (r) = Recorded By, (t) = Taken By, (c) = Cosigned By    Initials Name Provider Type     Joyce Gutierres, PT Physical Therapist    AS Cha Londono, RN Registered Nurse    Laina Soto RN Registered Nurse    Belia Shankar RN Registered Nurse                Physical Therapy Education                 Title: PT OT SLP Therapies (In Progress)     Topic: Physical Therapy (In Progress)     Point: Mobility training (In Progress)     Learning Progress Summary           Patient Acceptance, E, NR by  at 2/2/2022 1531    Acceptance, E, NR by DB at 1/31/2022 1207    Acceptance, E, NR by AR at 1/28/2022 1614    Acceptance, E, VU,NR by CF at 1/25/2022 1501    Acceptance, E, VU,NR by CF at 1/21/2022 1153    Acceptance, E,TB,D, VU,NR by SV at 1/20/2022 1657    Acceptance, E, NR by CF at 1/12/2022 1309    Acceptance, E, NR by CF at 1/10/2022 1452                   Point: Home exercise program (In Progress)     Learning Progress Summary           Patient Acceptance, E, NR by LH at 2/2/2022 1531    Acceptance, E, NR by DB at 1/31/2022 1207    Acceptance, E, NR by AR at 1/28/2022 1614    Acceptance, E, VU,NR by CF at 1/21/2022 1153    Acceptance, E,TB,D, VU,NR by SV at 1/20/2022 1657    Acceptance, E, NR by CF at 1/12/2022 1309                   Point: Body mechanics (In Progress)     Learning Progress Summary           Patient Acceptance, E, NR by DB at 1/31/2022 1207    Acceptance, E, NR by AR at 1/28/2022 1614    Acceptance, E, VU,NR by CF at 1/21/2022 1153    Acceptance, E, NR by CF at 1/10/2022 1452                   Point: Precautions (In Progress)     Learning Progress Summary            Patient Acceptance, E, NR by DB at 1/31/2022 1207    Acceptance, E, NR by AR at 1/28/2022 1614    Acceptance, E, VU,NR by CF at 1/21/2022 1153    Acceptance, E,TB,D, VU,NR by SV at 1/20/2022 1657    Acceptance, E, NR by CF at 1/10/2022 1452                               User Key     Initials Effective Dates Name Provider Type [facility] 06/16/21 -  Joyce Gutierres, PT Physical Therapist PT    AR 06/16/21 -  Thais Kelsey, PT Physical Therapist PT    SV 06/16/21 -  Anna Forde, PT Physical Therapist PT    DB 06/16/21 -  Gris James, PT Physical Therapist PT    CF 06/16/21 -  Amaya Preciado, PT Physical Therapist PT              PT Recommendation and Plan     Plan of Care Reviewed With: patient  Progress: no change  Outcome Summary: Pt agreeable to skilled PT/OT, req's encouragement 2' fear of falling, pt very apprehensive w mobilization. Pt req'ed Min A for bed mobility, good static sitting balance, performed BLE strengthening therex 10 reps. will cont to progress as dony and pt agreeable.   Outcome Measures     Row Name 02/02/22 1500             How much help from another person do you currently need...    Turning from your back to your side while in flat bed without using bedrails? 3  -LH      Moving from lying on back to sitting on the side of a flat bed without bedrails? 3  -LH      Moving to and from a bed to a chair (including a wheelchair)? 2  -LH      Standing up from a chair using your arms (e.g., wheelchair, bedside chair)? 2  -LH      Climbing 3-5 steps with a railing? 1  -LH      To walk in hospital room? 1  -LH      AM-PAC 6 Clicks Score (PT) 12  -              Functional Assessment    Outcome Measure Options AM-PAC 6 Clicks Basic Mobility (PT)  -            User Key  (r) = Recorded By, (t) = Taken By, (c) = Cosigned By    Initials Name Provider Type     Joyce Gutierres, PT Physical Therapist                 Time Calculation:    PT Charges     Row Name 02/02/22 2090              Time Calculation    Start Time 1345  -      Stop Time 1410  -      Time Calculation (min) 25 min  -      PT Received On 02/02/22  -      PT - Next Appointment 02/04/22  -              Timed Charges    28772 - PT Therapeutic Exercise Minutes 15  -      01769 - PT Therapeutic Activity Minutes 10  -              Total Minutes    Timed Charges Total Minutes 25  -       Total Minutes 25  -            User Key  (r) = Recorded By, (t) = Taken By, (c) = Cosigned By    Initials Name Provider Type     Joyce Gutierres, PT Physical Therapist              Therapy Charges for Today     Code Description Service Date Service Provider Modifiers Qty    65780818956  PT THERAPEUTIC ACT EA 15 MIN 2/2/2022 Joyce Gutierres, PT GP 1    02872835808 HC PT THER PROC EA 15 MIN 2/2/2022 Joyce Gutierres, PT GP 1          PT G-Codes  Outcome Measure Options: AM-PAC 6 Clicks Basic Mobility (PT)  AM-PAC 6 Clicks Score (PT): 12  AM-PAC 6 Clicks Score (OT): 12  Modified [place] Scale: 5 - Severe disability.  Bedridden, incontinent, and requiring constant nursing care and attention.    Joyce Gutierres, PT  2/2/2022

## 2022-02-02 NOTE — PROGRESS NOTES
Hospital Follow Up    LOS:  LOS: 29 days   Patient Name: Mar Camilo  Age/Sex: 71 y.o. female  : 1950  MRN: [id number]    Day of Service: 22   Length of Stay: 29  Encounter Provider: David Carmona MD  Place of Service: [facility] CARDIOLOGY  Patient Care Team:  Vianey Barrios PA-C as PCP - General (Family Medicine)  Bhanu Mata MD as Consulting Physician (Cardiology)  Mann Hernadez MD as Consulting Physician (Pulmonary Disease)  Becky Haji MD as Surgeon (General Surgery)  Laquita Carbajal RN as Ambulatory  ([facility])    Subjective:     Chief Complaint: SVT    Interval History: Patient looks and says she feels significantly better today.  Abdomen is not hurting her her rhythms have been stable.    Objective:     Objective:  Temp:  [96.6 °F (35.9 °C)-97.9 °F (36.6 °C)] 97.9 °F (36.6 °C)  Heart Rate:  [] 78  Resp:  [16-18] 18  BP: (141-171)/(55-71) 142/71     Intake/Output Summary (Last 24 hours) at 2022 1032  Last data filed at 2022 0731  Gross per 24 hour   Intake 480 ml   Output --   Net 480 ml     Body mass index is 39.94 kg/m².      22  0448 22  0621 22  0504   Weight: 105 kg (230 lb 12.8 oz) 102 kg (225 lb 6.4 oz) 106 kg (232 lb 12.8 oz)     Weight change: 3.357 kg (7 lb 6.4 oz)      Physical Exam:   General : Alert, cooperative, in no acute distress.  Neuro: Alert,cooperative and oriented.  Lungs: CTAB. Normal respiratory effort and rate.  CV: Regular rate and rhythm, normal S1 and S2, no murmurs, gallops or rubs.  ABD: Soft, nontender, nondistended. Positive bowel sounds.  Extr: No edema or cyanosis, moves all extremities.    Lab Review:   Results from last 7 days   Lab Units 22  0334 22  0540   SODIUM mmol/L 135* 138   POTASSIUM mmol/L 3.5 3.9   CHLORIDE mmol/L 93* 100   CO2 mmol/L 24.3 25.3   BUN mg/dL 8 8   CREATININE mg/dL 0.57 0.21*   GLUCOSE mg/dL 165* 118*   CALCIUM  mg/dL 8.8 8.2*   AST (SGOT) U/L 49* 70*   ALT (SGPT) U/L 677* 859*     Results from last 7 days   Lab Units 01/27/22  1101   CK TOTAL U/L 105     Results from last 7 days   Lab Units 02/01/22  0334 01/31/22  0540 01/30/22  0335 01/30/22  0335   WBC 10*3/mm3  --  8.21  --  7.01   HEMOGLOBIN g/dL 9.4* 8.1*   < > 8.2*   HEMATOCRIT % 29.4* 24.7*   < > 25.8*   PLATELETS 10*3/mm3  --  192  --  156    < > = values in this interval not displayed.     Results from last 7 days   Lab Units 01/31/22  0540 01/30/22  0335 01/28/22  0517 01/27/22  1101   INR  1.09 1.28*   < > 2.20*   APTT seconds  --   --   --  29.7    < > = values in this interval not displayed.     Results from last 7 days   Lab Units 02/01/22  1111 01/31/22  0540   MAGNESIUM mg/dL 1.5* 1.6     Results from last 7 days   Lab Units 01/27/22  0605   CHOLESTEROL mg/dL 113   TRIGLYCERIDES mg/dL 97   HDL CHOL mg/dL 28*               Current Medications:   Scheduled Meds:albuterol, 2.5 mg, Nebulization, Q12H - RT  apixaban, 5 mg, Oral, Q12H  budesonide-formoterol, 2 puff, Inhalation, BID - RT  dilTIAZem, 120 mg, Oral, Q8H  docosanol, 1 application, Topical, 5x Daily  docusate sodium, 100 mg, Oral, Every Other Day  FLUoxetine, 40 mg, Oral, Daily  insulin lispro, 0-9 Units, Subcutaneous, TID AC  insulin lispro, 10 Units, Subcutaneous, TID With Meals  lamoTRIgine, 100 mg, Oral, Daily  lisinopril, 20 mg, Oral, Q24H  pantoprazole, 40 mg, Oral, BID  polyethylene glycol, 17 g, Oral, BID  sodium chloride, 10 mL, Intravenous, Q12H  tiotropium bromide monohydrate, 2 puff, Inhalation, Daily - RT      Continuous Infusions:     Allergies:  Allergies   Allergen Reactions   • Contrast Dye Hives   • Iodinated Diagnostic Agents Hives   • Norco [Hydrocodone-Acetaminophen] Hallucinations   • Tenoretic [Atenolol-Chlorthalidone] Anaphylaxis       Assessment:       Large bowel obstruction (HCC)    COPD (chronic obstructive pulmonary disease) (HCC)    Essential hypertension    Type 2  diabetes mellitus with complication, without long-term current use of insulin (HCC)    Chronic respiratory failure with hypoxia (HCC)    History of CVA (cerebrovascular accident)    Ventral hernia with bowel obstruction    PAF (paroxysmal atrial fibrillation) (HCC)    PAD (peripheral artery disease) ([facility])    Class 3 severe obesity in adult (HCC)    CAD (coronary artery disease)    Crohn's disease of colon without complication ([facility])    Closed displaced fracture of right femoral neck ([facility])    Anemia        Plan:   1.  Paroxysmal SVT.  Currently appears stable doing well.  2.  Patient was diuresed doing significantly better.  3.  Large bowel obstruction appears to have resolved she says she feels good no abdominal discomfort.  4.  COVID-19 infection all appropriate PPE was utilized in the same manner as yesterday's note.  5.  Patient looks good we will make sure her magnesium got replaced yesterday if it has not we will do so.  She appears stable I would see the patient on an as-needed basis.        David Carmona MD  02/02/22  10:33 EST

## 2022-02-02 NOTE — THERAPY TREATMENT NOTE
Patient Name: Mar Camilo  : 1950    MRN: [id number]                              Today's Date: 2022       Admit Date: 2022    Visit Dx:     ICD-10-CM ICD-9-CM   1. Intestinal obstruction, unspecified cause, unspecified whether partial or complete ([facility])  K56.609 560.9   2. Closed fracture of right hip, initial encounter ([facility])  S72.001A 820.8   3. Abdominal wall cellulitis  L03.311 682.2   4. Troponin level elevated  R77.8 790.6   5. Closed displaced fracture of right femoral neck ([facility])  S72.001A 820.8     Patient Active Problem List   Diagnosis   • Obstructive pyelonephritis   • Chronic allergic conjunctivitis   • Chronic back pain   • Chronic bronchitis ([facility])   • Non-specific colitis   • COPD (chronic obstructive pulmonary disease) ([facility])   • Degeneration of intervertebral disc of lumbar region   • Depression   • Heartburn   • Herpes labialis   • Mixed hyperlipidemia   • Essential hypertension   • Spinal stenosis of lumbar region   • Osteopenia   • Lumbar radiculopathy   • Type 2 diabetes mellitus with complication, without long-term current use of insulin ([facility])   • Anemia   • Asymptomatic stenosis of right carotid artery   • Chronic respiratory failure with hypoxia ([facility])   • Hospital discharge follow-up   • Osteoarthritis of lumbar spine   • Need for immunization against influenza   • Chronic right hip pain   • Chronic pain of both shoulders   • Acute cystitis without hematuria   • Seizure ([facility])   • History of CVA (cerebrovascular accident)   • Tendinopathy of rotator cuff   • Primary osteoarthritis, left shoulder   • Pneumonia of left lower lobe due to infectious organism   • Need for pneumococcal vaccine   • Regional colitis ([facility])   • Ventral hernia with bowel obstruction   • PAF (paroxysmal atrial fibrillation) ([facility])   • PAD (peripheral artery disease) ([facility])   • Class 3 severe obesity in adult ([facility])   • Chronic pain syndrome   • Unsteady gait   • High risk medication use   • H/O carotid  stenosis   • Cerebrovascular accident (CVA) ([facility])   • Neuropathy   • Foot swelling   • Status post reverse total arthroplasty of right shoulder, DOS 12/30/2019   • Dyspnea on exertion   • Pain in both lower extremities   • Recent major surgery   • COPD exacerbation ([facility])   • Hematuria   • Long-term use of high-risk medication   • Acute maxillary sinusitis   • Abnormal urine odor   • Urinary tract infection with hematuria   • Intussusception ([facility])   • Crohn's disease ([facility])   • CAD (coronary artery disease)   • Hyperkalemia   • Crohn's disease of colon without complication ([facility])   • Oxygen dependent   • Panic attack   • Irregular heart rhythm   • Hiatal hernia   • Crohn's disease of small intestine ([facility])   • Chronic cough   • Cataract   • Acute on chronic respiratory failure with hypoxia ([facility])   • Medicare annual wellness visit, subsequent   • Closed displaced fracture of right femoral neck ([facility])   • Large bowel obstruction ([facility])   • Anemia     Past Medical History:   Diagnosis Date   • Arthritis    • Carotid arterial disease ([facility])     US 2015 with 80-99% right and 16-49% left, but CTA with 60-70% right, not a surgical candidate   • Chronic back pain    • Closed fracture of left tibial plateau 7/30/2018   • COPD (chronic obstructive pulmonary disease) ([facility])    • Coronary artery disease    • Crohn's disease ([facility])     Remicade on hold d/t antibiotics   • DDD (degenerative disc disease)    • Depression    • Diabetes mellitus ([facility])     Type II   • Elevated cholesterol    • Heartburn 2/21/2016   • Hernia of abdominal wall    • History of stroke 05/03/2017    left parietal   • Hyperlipidemia    • Hypertension    • Hypokalemia    • Morbid obesity ([facility])    • Obstructive pyelonephritis    • On home oxygen therapy     2L UNLESS STRESSED THEN 2.5-3 L   • Osteopenia 2/21/2016   • PAF (paroxysmal atrial fibrillation) ([facility])    • PVD (peripheral vascular disease) ([facility])     RT CAROTID STENOSIS   • Radiculopathy     NECK PAIN   •  Right shoulder pain     scheduled for sx   • Seizure (HCC)     with stroke   • Stroke (HCC) 2017    memory,    • Subclavian artery stenosis (HCC)     and axillary artery stenosis, on the left     Past Surgical History:   Procedure Laterality Date   • APPENDECTOMY N/A    • BRONCHOSCOPY N/A 1/5/2018    Procedure: BRONCHOSCOPY;  Surgeon: Gustavo Muniz MD;  Location: [facility];  Service:    • COLON SURGERY     • COLONOSCOPY     • CYSTOSCOPY URETEROSCOPY LASER LITHOTRIPSY Right 4/17/2017    Procedure: CYSTOSCOPY with  right stent removal, right URETEROSCOPY LASER LITHOTRIPSY,  with STONE BASKET EXTRACTION;  Surgeon: Dipesh Bean MD;  Location: [facility] OR;  Service:    • CYSTOSCOPY W/ URETERAL STENT PLACEMENT Right 3/18/2017    Procedure: CYSTOSCOPY URETERAL CATHETER/STENT INSERTION;  Surgeon: Dipesh Bean MD;  Location: [facility];  Service:    • ENDOSCOPY     • FLEXIBLE SIGMOIDOSCOPY N/A 01/25/2016    Dr. Luis Rosas   • FRACTURE SURGERY  2017    broken femur, left   • HIP PERCUTANEOUS PINNING Right 1/19/2022    Procedure: Anterior Right Hip Percutaneous Pinning;  Surgeon: Naun Barajas MD;  Location: [facility];  Service: Orthopedics;  Laterality: Right;   • JOINT REPLACEMENT  12/30/2019    right shoulder replacement   • LAPAROSCOPIC COLON RESECTION N/A 01/25/2016    Laparoscopic mobilization of splenic flexure, transverse colectomy with primary anastomosis-Dr. Christopher Richardson   • LUNG BIOPSY      NEGATIVE   • KS THROMBOENDARTECTMY NECK,NECK INCIS Right 3/14/2016    Procedure: RT CAROTID ENDARTERECTOMY;  Surgeon: Federico Schuler MD;  Location: [facility];  Service: Vascular   • TIBIAL PLATEAU OPEN REDUCTION INTERNAL FIXATION Left 05/09/2018    Open reduction internal fixation of left tibial plateau fracture-Dr. Ayden Cárdenas   • TOTAL SHOULDER ARTHROPLASTY W/ DISTAL CLAVICLE EXCISION Right 12/30/2019    Procedure: TOTAL SHOULDER REVERSE ARTHROPLASTY, open  biceps tenodesis;  Surgeon:  Altaf Reyes MD;  Location: [facility];  Service: Orthopedics   • VASCULAR SURGERY  2017    caroid artery   • WRIST ARTHROSCOPY Right     WITH RELEASE OF TRANSVERSE CARPAL LIGAMENT      General Information     Row Name 02/02/22 1437          OT Time and Intention    Document Type therapy note (daily note)  -     Mode of Treatment occupational therapy; physical therapy  co treat to progress mobility for pt safety, pt is very anxious and in COVID isolation.  -     Row Name 02/02/22 1437          General Information    Existing Precautions/Restrictions fall; weight bearing; oxygen therapy device and L/min  TTWB RLE  -     Row Name 02/02/22 1437          Cognition    Orientation Status (Cognition) oriented x 3  -     Row Name 02/02/22 1437          Safety Issues, Functional Mobility    Safety Issues Affecting Function (Mobility) insight into deficits/self-awareness; awareness of need for assistance  pt very fearful of falling  -     Impairments Affecting Function (Mobility) endurance/activity tolerance; shortness of breath; strength; pain; balance  -           User Key  (r) = Recorded By, (t) = Taken By, (c) = Cosigned By    Initials Name Provider Type     Valerie Sanz, MARY Occupational Therapist                 Mobility/ADL's     Row Name 02/02/22 1440          Bed Mobility    Supine-Sit [place] (Bed Mobility) minimum assist (75% patient effort); verbal cues  -     Sit-Supine [place] (Bed Mobility) minimum assist (75% patient effort); verbal cues  -[facility] Name 02/02/22 1440          Transfers    Comment (Transfers) Encouragement provided, OT educated pt on safe technique and attempted to calm anxiety with standing. Educated in importance of progressing mobility for recovery.  -     Row Name 02/02/22 1440          Activities of Daily Living    BADL Assessment/Intervention --  Pt declines all ADLs, currently max/total A for all LB ADLs.  -[facility] Name 02/02/22 1440           "Mobility    Right Lower Extremity (Weight-bearing Status) toe touch weight-bearing (TTWB)  -           User Key  (r) = Recorded By, (t) = Taken By, (c) = Cosigned By    Initials Name Provider Type    Valerie Horne OT Occupational Therapist               Obj/Interventions     [facility] Name 02/02/22 1442          Balance    Static Sitting Balance WFL; sitting, edge of bed  -     Comment, Balance Sat EOB only a 2-3 minutes before reporting \"Im going to have a pannic attack. I need to lay down\"  -[facility] Name 02/02/22 1442          Therapeutic Exercise    Therapeutic Exercise --  Pt declines UE ther ex for general strenghtening and endurance  -           User Key  (r) = Recorded By, (t) = Taken By, (c) = Cosigned By    Initials Name Provider Type    Valerie Horne, OT Occupational Therapist               Goals/Plan    No documentation.                Clinical Impression     [facility] Name 02/02/22 1443          Pain Assessment    Additional Documentation Pain Scale: FACES Pre/Post-Treatment (Group)  -SM     Row Name 02/02/22 1443          Pain Scale: Numbers Pre/Post-Treatment    Pre/Posttreatment Pain Comment reports R shoulder pain, reports need to have shoulder replacement.  -SM     Row Name 02/02/22 1443          Pain Scale: FACES Pre/Post-Treatment    Pain: FACES Scale, Pretreatment 4-->hurts little more  -SM     Row Name 02/02/22 1443          Plan of Care Review    Plan of Care Reviewed With patient  -     Outcome Summary Pt seen today by OT, co treat with PT to safely advance mobility. Pt has not stood since surgery in OT notes. She is very anxious with mobility and reports she is fearful of falling and declines any attempts to transfers today. Pt sat EOB with only min A for a few minutes then returns self to bed. OT spent extra time discussing recovery and advaning transfers for ADL, strength, endurance. Encouraged pt to increase OOB or EOB activity with therapy. She reports she will attempt next " session.  -     Row Name 02/02/22 1443          Therapy Plan Review/Discharge Plan (OT)    Anticipated Discharge Disposition (OT) skilled nursing facility  -     Row Name 02/02/22 1443          Vital Signs    Pre SpO2 (%) 98  -SM     O2 Delivery Pre Treatment supplemental O2  -SM     Post SpO2 (%) 100  -SM     O2 Delivery Post Treatment supplemental O2  -     Row Name 02/02/22 1443          Positioning and Restraints    Pre-Treatment Position in bed  -SM     Post Treatment Position bed  -SM     In Bed fowlers; call light within reach; encouraged to call for assist; notified nsg; with family/caregiver  -           User Key  (r) = Recorded By, (t) = Taken By, (c) = Cosigned By    Initials Name Provider Type    Valerie Horen, OT Occupational Therapist               Outcome Measures     Row Name 02/02/22 1448          How much help from another is currently needed...    Putting on and taking off regular lower body clothing? 1  -SM     Bathing (including washing, rinsing, and drying) 1  -SM     Toileting (which includes using toilet bed pan or urinal) 1  -SM     Putting on and taking off regular upper body clothing 2  -SM     Taking care of personal grooming (such as brushing teeth) 3  -SM     Eating meals 4  -SM     AM-PAC 6 Clicks Score (OT) 12  -     Row Name 02/02/22 1500          How much help from another person do you currently need...    Turning from your back to your side while in flat bed without using bedrails? 3  -LH     Moving from lying on back to sitting on the side of a flat bed without bedrails? 3  -LH     Moving to and from a bed to a chair (including a wheelchair)? 2  -LH     Standing up from a chair using your arms (e.g., wheelchair, bedside chair)? 2  -LH     Climbing 3-5 steps with a railing? 1  -LH     To walk in hospital room? 1  -LH     AM-PAC 6 Clicks Score (PT) 12  -     Row Name 02/02/22 1500 02/02/22 1448       Functional Assessment    Outcome Measure Options AM-PAC 6  Clicks Basic Mobility (PT)  - AM-PAC 6 Clicks Daily Activity (OT)  -          User Key  (r) = Recorded By, (t) = Taken By, (c) = Cosigned By    Initials Name Provider Type     Joyce Gutierres, PT Physical Therapist    SM Valerie Sanz, OT Occupational Therapist                Occupational Therapy Education                 Title: PT OT SLP Therapies (In Progress)     Topic: Occupational Therapy (In Progress)     Point: ADL training (In Progress)     Description:   Instruct learner(s) on proper safety adaptation and remediation techniques during self care or transfers.   Instruct in proper use of assistive devices.              Learning Progress Summary           Patient Acceptance, E, NR by RB at 1/10/2022 1423    Comment: TTWB                   Point: Home exercise program (In Progress)     Description:   Instruct learner(s) on appropriate technique for monitoring, assisting and/or progressing therapeutic exercises/activities.              Learning Progress Summary           Patient Acceptance, E, NR by RB at 1/10/2022 1423    Comment: TTWB                   Point: Precautions (In Progress)     Description:   Instruct learner(s) on prescribed precautions during self-care and functional transfers.              Learning Progress Summary           Patient Acceptance, E, NR by  at 1/24/2022 0417    Acceptance, E, NR by RB at 1/10/2022 1423    Comment: TTWB                   Point: Body mechanics (In Progress)     Description:   Instruct learner(s) on proper positioning and spine alignment during self-care, functional mobility activities and/or exercises.              Learning Progress Summary           Patient Acceptance, E, NR by  at 1/24/2022 0417    Acceptance, E, NR by RB at 1/10/2022 1423    Comment: TTWB                               User Key     Initials Effective Dates Name Provider Type Discipline     01/21/22 -  Shilpi Fernandez, RN Registered Nurse Nurse    RB 06/16/21 -  Brigitte Banks OT  Occupational Therapist OT              OT Recommendation and Plan     Plan of Care Review  Plan of Care Reviewed With: patient  Outcome Summary: Pt seen today by OT, co treat with PT to safely advance mobility. Pt has not stood since surgery in OT notes. She is very anxious with mobility and reports she is fearful of falling and declines any attempts to transfers today. Pt sat EOB with only min A for a few minutes then returns self to bed. OT spent extra time discussing recovery and advaning transfers for ADL, strength, endurance. Encouraged pt to increase OOB or EOB activity with therapy. She reports she will attempt next session.     Time Calculation:    Time Calculation- OT     Row Name 02/02/22 1542             Time Calculation- OT    OT Start Time 1351  -SM      OT Stop Time 1409  -SM      OT Time Calculation (min) 18 min  -SM      Total Timed Code Minutes- OT 18 minute(s)  -SM      OT Received On 02/02/22  -              Timed Charges    69260 - OT Therapeutic Activity Minutes 18  -SM              Total Minutes    Timed Charges Total Minutes 18  -SM       Total Minutes 18  -SM            User Key  (r) = Recorded By, (t) = Taken By, (c) = Cosigned By    Initials Name Provider Type     Valerie Sanz OT Occupational Therapist              Therapy Charges for Today     Code Description Service Date Service Provider Modifiers Qty    [id number]  OT THERAPEUTIC ACT EA 15 MIN 2/2/2022 Valerie Sanz OT GO 1               Valerie Sanz OT  2/2/2022

## 2022-02-03 NOTE — PROGRESS NOTES
"p  General Surgery Progress Note    Summary: Mrs. Mar Camilo is a 71 y.o. year old lady with Crohn's disease on Remicade, a large ventral hernia with bowel, and a resolved high grade SBO/GOO. Tolerating a regular diet and having bowel function. Ok for dispo from general surgery standpoint.     Chief Complaint: abdominal pain    Interval Events: No acute events overnight. Feels good today. Some indigestion but tolerating her diet, having loose BM's. Out of isolation, her sister is on the way to visit.     Vitals: /69 (BP Location: Right arm, Patient Position: Lying)   Pulse 120   Temp 98.3 °F (36.8 °C) (Oral)   Resp 18   Ht 162.6 cm (64.02\")   Wt 102 kg (225 lb 6.4 oz)   LMP  (LMP Unknown)   SpO2 93%   BMI 38.67 kg/m²     GENERAL: alert, chronically ill appearing, laying in bed, not in distress   HEENT: normocephalic, atraumatic, moist mucous membranes, clear sclerae  CHEST: on 4L NC, increased work of breathing, symmetric air entry  CARDIAC: tachycardia, regular rhythm    ABDOMEN: soft, nondistended, no erythema over her hernia, hernia appears soft, chronically incarcerated hernia   EXTREMITIES: no cyanosis, clubbing, or edema   SKIN: Warm and moist, no rashes    Labs:  Results from last 7 days   Lab Units 02/03/22 0136 02/01/22 0334 01/31/22  0540 01/30/22  0335 01/30/22  0335 01/29/22  0408 01/29/22  0408   WBC 10*3/mm3  --   --  8.21  --  7.01  --  6.97   HEMOGLOBIN g/dL 8.7* 9.4* 8.1*   < > 8.2*   < > 8.4*   HEMATOCRIT % 28.4* 29.4* 24.7*   < > 25.8*   < > 26.5*   PLATELETS 10*3/mm3  --   --  192  --  156  --  156   MONOCYTES % %  --   --  2.1*  --   --   --   --     < > = values in this interval not displayed.     Results from last 7 days   Lab Units 02/03/22 0136 02/01/22 0334 01/31/22  0540   SODIUM mmol/L 136 135* 138   POTASSIUM mmol/L 3.3* 3.5 3.9   CHLORIDE mmol/L 92* 93* 100   CO2 mmol/L 34.0* 24.3 25.3   BUN mg/dL 13 8 8   CREATININE mg/dL 0.48* 0.57 0.21*   CALCIUM mg/dL 8.0* 8.8 " 8.2*   BILIRUBIN mg/dL 0.5 0.5 0.4   ALK PHOS U/L 132* 158* 154*   ALT (SGPT) U/L 316* 677* 859*   AST (SGOT) U/L 21 49* 70*   GLUCOSE mg/dL 98 165* 118*     Results from last 7 days   Lab Units 01/31/22  0540 01/30/22  0335 01/29/22  0408   INR  1.09 1.28* 1.23*       HAILEY LOPES MD  General and Endoscopic Surgery  [facility]    [address] [place] [address], [address]  [place], [place], [address]  P: [phone]  F: [phone]

## 2022-02-03 NOTE — PROGRESS NOTES
Dr. JOSE DAVID Hernadez    [address]    2/3/2022    Patient ID:  Name:  Mar Camilo  MRN:  [id number]  1950  71 y.o.  female            CC/Reason for visit: Shortness of breath, COPD, chronic respiratory failure, heart failure, fluid overload, A. fib     Interval hx:  No new complaints, no shortness of breath, no chest pain  On 4 L nasal cannula which is her baseline.       ROS: No chest pain, no abdominal pain. No palpitations    Vitals:  Vitals:    02/02/22 2318 02/03/22 0554 02/03/22 0705 02/03/22 0714   BP: 153/73 132/64  131/69   BP Location: Right leg   Right arm   Patient Position: Lying   Lying   Pulse: 89 103 108 120   Resp: 18  16 18   Temp: 96.5 °F (35.8 °C)   98.3 °F (36.8 °C)   TempSrc: Oral   Oral   SpO2: 98%  92% 93%   Weight:  102 kg (225 lb 6.4 oz)     Height:               Body mass index is 38.67 kg/m².    Intake/Output Summary (Last 24 hours) at 2/3/2022 1214  Last data filed at 2/2/2022 2102  Gross per 24 hour   Intake 240 ml   Output 600 ml   Net -360 ml       Exam:  GEN:  No distress  Alert, oriented x 3.   LUNGS: Diminished but clear breath sounds bilat, no use of accessory muscles  CV:  Irregularly irregular rhythm, distant heart tones, no edema  ABD:  Non tender, no enlarged liver or masses      Scheduled meds:  albuterol, 2.5 mg, Nebulization, Q12H - RT  apixaban, 5 mg, Oral, Q12H  budesonide-formoterol, 2 puff, Inhalation, BID - RT  dilTIAZem, 120 mg, Oral, Q8H  docosanol, 1 application, Topical, 5x Daily  docusate sodium, 100 mg, Oral, Every Other Day  FLUoxetine, 40 mg, Oral, Daily  furosemide, 40 mg, Oral, Daily  insulin lispro, 0-9 Units, Subcutaneous, TID AC  insulin lispro, 10 Units, Subcutaneous, TID With Meals  lamoTRIgine, 100 mg, Oral, Daily  lisinopril, 20 mg, Oral, Q24H  pantoprazole, 40 mg, Oral, BID  polyethylene glycol, 17 g, Oral, BID  sodium chloride, 10 mL, Intravenous, Q12H  tiotropium bromide monohydrate, 2 puff, Inhalation, Daily - RT      IV  meds:                           Data Review:   I reviewed the patient's medications and new clinical results.    COVID19   Date Value Ref Range Status   01/17/2022 Detected (C) Not Detected - Ref. Range Final         Lab Results   Component Value Date    CALCIUM 8.0 (L) 02/03/2022    PHOS 2.6 09/04/2020    MG 1.7 02/03/2022    MG 1.5 (L) 02/01/2022    MG 1.6 01/31/2022     Results from last 7 days   Lab Units 02/03/22  0136 02/01/22  0334 01/31/22  0540 01/30/22  0335 01/30/22  0335 01/29/22  0408 01/29/22  0408   SODIUM mmol/L 136 135* 138   < > 141   < > 143   POTASSIUM mmol/L 3.3* 3.5 3.9   < > 3.6   < > 4.0   CHLORIDE mmol/L 92* 93* 100   < > 103   < > 103   CO2 mmol/L 34.0* 24.3 25.3   < > 28.3   < > 27.2   BUN mg/dL 13 8 8   < > 15   < > 32*   CREATININE mg/dL 0.48* 0.57 0.21*   < > 0.42*   < > 0.45*   CALCIUM mg/dL 8.0* 8.8 8.2*   < > 8.1*   < > 8.6   BILIRUBIN mg/dL 0.5 0.5 0.4   < > 0.4   < > 0.4   ALK PHOS U/L 132* 158* 154*   < > 144*   < > 171*   ALT (SGPT) U/L 316* 677* 859*   < > 1,309*   < > 2,324*   AST (SGOT) U/L 21 49* 70*   < > 172*   < > 621*   GLUCOSE mg/dL 98 165* 118*   < > 120*   < > 95   WBC 10*3/mm3  --   --  8.21  --  7.01  --  6.97   HEMOGLOBIN g/dL 8.7* 9.4* 8.1*   < > 8.2*   < > 8.4*   PLATELETS 10*3/mm3  --   --  192  --  156  --  156   INR   --   --  1.09  --  1.28*  --  1.23*    < > = values in this interval not displayed.                       Estimated Creatinine Clearance: 73.9 mL/min (A) (by C-G formula based on SCr of 0.48 mg/dL (L)).      ASSESSMENT:   Fluid overload  Heart failure  Large bowel obstruction (HCC)    COPD (chronic obstructive pulmonary disease) (HCC)    Essential hypertension    Type 2 diabetes mellitus with complication, without long-term current use of insulin (HCC)    Chronic respiratory failure with hypoxia (HCC)    History of CVA (cerebrovascular accident)    Ventral hernia with bowel obstruction    PAF (paroxysmal atrial fibrillation) (HCC)    PAD  (peripheral artery disease) (HCC)    Class 3 severe obesity in adult (HCC)    CAD (coronary artery disease)    Crohn's disease of colon without complication ([facility])    Closed displaced fracture of right femoral neck ([facility])    Anemia      PLAN:  Remains very from a respiratory standpoint.  Nothing new to add from the pulmonary perspective.  Continue scheduled bronchodilators 4 times a day, DuoNeb for COPD.  We will be available if needed.  Other ongoing problems are generalized weakness and atrial fibrillation.      Mann Hernadez MD  2/3/2022

## 2022-02-03 NOTE — PLAN OF CARE
Goal Outcome Evaluation:  Plan of Care Reviewed With: patient        Progress: improving  Outcome Summary: plan for rehab possibly Saturday, a little bit of pain and nausea today, no longer in isolation, remains in afib, 4L, vss, will continue to monitor

## 2022-02-03 NOTE — PROGRESS NOTES
Continued Stay Note  [facility]     Patient Name: Mar Camilo  MRN: [id number]  Today's Date: 2/3/2022    Admit Date: 1/4/2022     Discharge Plan     Row Name 02/03/22 1536       Plan    Plan Skilled rehab referrals are pending    Plan Comments Patient has asked that [facility] speak with sister about dc planning.  Spoke with sister Clau Harvey [phone].  She states she would like a referral for facilities closer to [facility] but declines [facility].  Mallika with [facility] can accept with a possible bed on Saturday if no orders for Remicade while in rehab.  [facility] not accepting admissions at this time per oscar Mari.  Awaiting a return call from [facility] for updates on [facility] or other [facility] facilities.  Fredy with [facility] does not have a bed over the weekend and could reevaluate on Monday.  ..............................Tiffanie Baron RN               Discharge Codes    No documentation.               Expected Discharge Date and Time     Expected Discharge Date Expected Discharge Time    Feb 4, 2022             Tiffanie Baron RN

## 2022-02-03 NOTE — PROGRESS NOTES
"    DAILY PROGRESS NOTE  [facility]    Patient Identification:  Name: Mar Camilo  Age: 71 y.o.  Sex: female  :  1950  MRN: [id number]         Primary Care Physician: Vianey Barrios PA-C    Subjective:  Interval History: Clinically feeling much better again today.  She is reading at her baseline.  She does not complain of shortness of breath or troubles.  Denies chest pain.  She claims she is tolerating oral intake without issue with no nausea no vomiting no abdominal pain and has had multiple BMs    Objective: Clinically much better.  Conversational and pleasant.  Nontoxic    Scheduled Meds:albuterol, 2.5 mg, Nebulization, Q12H - RT  apixaban, 5 mg, Oral, Q12H  budesonide-formoterol, 2 puff, Inhalation, BID - RT  dilTIAZem, 120 mg, Oral, Q8H  docosanol, 1 application, Topical, 5x Daily  docusate sodium, 100 mg, Oral, Every Other Day  FLUoxetine, 40 mg, Oral, Daily  insulin lispro, 0-9 Units, Subcutaneous, TID AC  insulin lispro, 10 Units, Subcutaneous, TID With Meals  lamoTRIgine, 100 mg, Oral, Daily  lisinopril, 20 mg, Oral, Q24H  pantoprazole, 40 mg, Oral, BID  polyethylene glycol, 17 g, Oral, BID  sodium chloride, 10 mL, Intravenous, Q12H  tiotropium bromide monohydrate, 2 puff, Inhalation, Daily - RT      Continuous Infusions:     Vital signs in last 24 hours:  Temp:  [96.5 °F (35.8 °C)-98.3 °F (36.8 °C)] 98.3 °F (36.8 °C)  Heart Rate:  [] 120  Resp:  [16-20] 18  BP: (131-153)/(64-80) 131/69    Intake/Output:    Intake/Output Summary (Last 24 hours) at 2/3/2022 09  Last data filed at 2022 2102  Gross per 24 hour   Intake 240 ml   Output 600 ml   Net -360 ml       Exam:  /69 (BP Location: Right arm, Patient Position: Lying)   Pulse 120   Temp 98.3 °F (36.8 °C) (Oral)   Resp 18   Ht 162.6 cm (64.02\")   Wt 102 kg (225 lb 6.4 oz)   LMP  (LMP Unknown)   SpO2 93%   BMI 38.67 kg/m²     General Appearance:    Alert, cooperative,  AAOx3                          " Head:    Normocephalic, without obvious abnormality, atraumatic                           Eyes:    Conjunctivae/corneas clear, EOM's intact, both eyes                         Throat:   Oral mucosa pink and moist                           Neck:   No JVD                         Lungs:    Decreased/resolving Rales to auscultation bilaterally, respirations unlabored                 Chest Wall:    No tenderness or deformity                          Heart:    Irregular rate and rhythm, S1 and S2 normal -rate is still very labile and can jump to the 140-150s and be completely asymptomatic                  Abdomen:     Soft, nontender, bowel sounds active, stable large ventral hernia                 extremities: Moving all, no cyanosis or edema                        Pulses:   Pulses palpable in lower extremities                  Neurologic:   CNII-XII intact     Data Review:  Labs in chart were reviewed.    Assessment:  Active Hospital Problems    Diagnosis  POA   • **Large bowel obstruction ([facility]) [K56.609]  Yes   • Anemia [D64.9]  Yes   • Closed displaced fracture of right femoral neck ([facility]) [S72.001A]  Yes   • Crohn's disease of colon without complication ([facility]) [K50.10]  Yes   • CAD (coronary artery disease) [I25.10]  Yes   • PAD (peripheral artery disease) ([facility]) [I73.9]  Yes   • PAF (paroxysmal atrial fibrillation) ([facility]) [I48.0]  Yes   • Ventral hernia with bowel obstruction [K43.6]  Yes   • History of CVA (cerebrovascular accident) [Z86.73]  Not Applicable   • Chronic respiratory failure with hypoxia ([facility]) [J96.11]  Yes   • Class 3 severe obesity in adult ([facility]) [E66.01]  Yes   • COPD (chronic obstructive pulmonary disease) ([facility]) [J44.9]  Yes   • Essential hypertension [I10]  Yes   • Type 2 diabetes mellitus with complication, without long-term current use of insulin ([facility]) [E11.8]  Yes      Resolved Hospital Problems   No resolved problems to display.       Plan:    Acute diastolic CHF secondary to cardiac  arrhythmia/SVT/A. Fib              -Excellent response to IV Lasix 40 mg  and will transition over to 40 mg p.o. daily              -Appreciate cardiology -rate still can jump up to 140s and she remains asymptomatic but this definitely guards me with the ability to maintain discharge without the facility sending right back to the hospital and I am holding in the idea of discharge today     DC Zosyn - chest x-ray with no pneumonia     Large ventral hernia with resolved high-grade bowel obstruction              -Surgery advancing diet -seems to be clinically improving/stable     Abnormal LFTs likely secondary to hypoperfusion of liver              -Dr. mUaña post discharge              -Numbers improving     Right femoral neck fracture status post 1/19/2022 hip percutaneous pinning     Bladder mass noted on 1/26/2022 CT was concerning for hematoma              -CT 1/31/2022 shows dependent layering with cystoscopy endorsed              -Urology consulted  and will consider cystoscopy in the next 3 to 4 weeks but otherwise stable for discharge from their perspective     DM2 with circulatory component with an A1c of 8 -number stable     Anemia of chronic disease status post previous transfusion 2 units              -Hgb stable and improved to 9.4-8.7              -Eliquis to be resumed 2/2/22.  Previously held secondary to concerns for surgical intervention       Murphy Cueto MD  2/3/2022  09:29 EST

## 2022-02-04 NOTE — PROGRESS NOTES
Dr. JOSE DAVID Hernadez    [address]    2/4/2022    Patient ID:  Name:  Mar Camilo  MRN:  [id number]  1950  71 y.o.  female            CC/Reason for visit: Shortness of breath, COPD, chronic respiratory failure, heart failure, fluid overload, A. fib     Interval hx: Doing well from a respiratory standpoint.  No new complaints.  States she has some abdominal pain.  On 4 L of oxygen via nasal cannula        ROS: No chest pain, no sputum, no fever.    Vitals:  Vitals:    02/04/22 0022 02/04/22 0516 02/04/22 0637 02/04/22 0805   BP:   130/59    BP Location:   Right leg    Patient Position:   Lying    Pulse:   117 99   Resp:    18   Temp: 98 °F (36.7 °C)      TempSrc: Oral      SpO2:    95%   Weight:  103 kg (227 lb 9.6 oz)     Height:               Body mass index is 39.05 kg/m².    Intake/Output Summary (Last 24 hours) at 2/4/2022 1415  Last data filed at 2/4/2022 0116  Gross per 24 hour   Intake 480 ml   Output 450 ml   Net 30 ml       Exam:  GEN:  No distress  Alert, oriented x 3.   LUNGS: Distant and diminished breath sounds bilat, no use of accessory muscles  CV:  Normal S1S2, without murmur, no edema  ABD:  Non tender, obese, nontender      Scheduled meds:  albuterol, 2.5 mg, Nebulization, Q12H - RT  apixaban, 5 mg, Oral, Q12H  budesonide-formoterol, 2 puff, Inhalation, BID - RT  dilTIAZem, 120 mg, Oral, Q8H  docosanol, 1 application, Topical, 5x Daily  docusate sodium, 100 mg, Oral, Every Other Day  FLUoxetine, 40 mg, Oral, Daily  furosemide, 40 mg, Oral, Daily  insulin lispro, 0-9 Units, Subcutaneous, TID AC  insulin lispro, 10 Units, Subcutaneous, TID With Meals  lamoTRIgine, 100 mg, Oral, Daily  lisinopril, 20 mg, Oral, Q24H  pantoprazole, 40 mg, Oral, BID  polyethylene glycol, 17 g, Oral, BID  sodium chloride, 10 mL, Intravenous, Q12H  tiotropium bromide monohydrate, 2 puff, Inhalation, Daily - RT      IV meds:                           Data Review:   I reviewed the patient's  medications and new clinical results.    COVID19   Date Value Ref Range Status   01/17/2022 Detected (C) Not Detected - Ref. Range Final         Lab Results   Component Value Date    CALCIUM 8.3 (L) 02/04/2022    PHOS 2.6 09/04/2020    MG 1.7 02/03/2022    MG 1.5 (L) 02/01/2022    MG 1.6 01/31/2022     Results from last 7 days   Lab Units 02/04/22  0237 02/03/22  0136 02/01/22  0334 01/31/22  0540 01/31/22  0540 01/30/22  0335 01/30/22  0335 01/29/22  0408 01/29/22  0408   SODIUM mmol/L 136 136 135*   < > 138   < > 141   < > 143   POTASSIUM mmol/L 4.1 3.3* 3.5   < > 3.9   < > 3.6   < > 4.0   CHLORIDE mmol/L 94* 92* 93*   < > 100   < > 103   < > 103   CO2 mmol/L 30.9* 34.0* 24.3   < > 25.3   < > 28.3   < > 27.2   BUN mg/dL 12 13 8   < > 8   < > 15   < > 32*   CREATININE mg/dL 0.46* 0.48* 0.57   < > 0.21*   < > 0.42*   < > 0.45*   CALCIUM mg/dL 8.3* 8.0* 8.8   < > 8.2*   < > 8.1*   < > 8.6   BILIRUBIN mg/dL  --  0.5 0.5  --  0.4   < > 0.4   < > 0.4   ALK PHOS U/L  --  132* 158*  --  154*   < > 144*   < > 171*   ALT (SGPT) U/L  --  316* 677*  --  859*   < > 1,309*   < > 2,324*   AST (SGOT) U/L  --  21 49*  --  70*   < > 172*   < > 621*   GLUCOSE mg/dL 103* 98 165*   < > 118*   < > 120*   < > 95   WBC 10*3/mm3  --   --   --   --  8.21  --  7.01  --  6.97   HEMOGLOBIN g/dL 8.9* 8.7* 9.4*   < > 8.1*   < > 8.2*   < > 8.4*   PLATELETS 10*3/mm3  --   --   --   --  192  --  156  --  156   INR   --   --   --   --  1.09  --  1.28*  --  1.23*    < > = values in this interval not displayed.                             ASSESSMENT:   Fluid overload  Heart failure  Large bowel obstruction (HCC)    COPD (chronic obstructive pulmonary disease) (HCC)    Essential hypertension    Type 2 diabetes mellitus with complication, without long-term current use of insulin (HCC)    Chronic respiratory failure with hypoxia (HCC)    History of CVA (cerebrovascular accident)    Ventral hernia with bowel obstruction    PAF (paroxysmal atrial  fibrillation) (HCC)    PAD (peripheral artery disease) ([facility])    Class 3 severe obesity in adult (HCC)    CAD (coronary artery disease)    Crohn's disease of colon without complication (HCC)    Closed displaced fracture of right femoral neck ([facility])    Anemia      PLAN:  Nothing new to add from pulmonary perspective.  Continue COPD management with 4 times a day DuoNeb.  Continue oxygen, which is her baseline, chronic oxygen needs between 3 and 4 L.  May be discharged anytime from our standpoint.  Follow-up with me in the office in April.        Mann Hernadez MD  2/4/2022

## 2022-02-04 NOTE — PLAN OF CARE
Goal Outcome Evaluation:           Progress: improving  Outcome Summary: Pt aox4, on 4L nc, currently in afib on monitor, on eliquis and Cardizem, oxycodone given for inguinal hernia pain, tolerating regular diet, wound care completed, planning for rehab just when ambulance pickup available.

## 2022-02-04 NOTE — PROGRESS NOTES
"p  General Surgery Progress Note    Summary: Mrs. Mar Camilo is a 71 y.o. year old lady with Crohn's disease on Remicade, a large ventral hernia with bowel, and a resolved high grade SBO/GOO. Tolerating a regular diet and having bowel function. Ok for dispo from general surgery standpoint.     Chief Complaint: abdominal pain    Interval Events: No acute events overnight. Sleeping comfortably.    Vitals: /59 (BP Location: Right leg, Patient Position: Lying)   Pulse 99   Temp 98 °F (36.7 °C) (Oral)   Resp 18   Ht 162.6 cm (64.02\")   Wt 103 kg (227 lb 9.6 oz)   LMP  (LMP Unknown)   SpO2 95%   BMI 39.05 kg/m²     GENERAL: alert, chronically ill appearing, laying in bed, not in distress   HEENT: normocephalic, atraumatic, moist mucous membranes, clear sclerae  CHEST: on 4L NC, increased work of breathing, symmetric air entry  CARDIAC: tachycardia, regular rhythm    ABDOMEN: soft, nondistended, no erythema over her hernia, hernia appears soft, chronically incarcerated hernia   EXTREMITIES: no cyanosis, clubbing, or edema   SKIN: Warm and moist, no rashes    Labs:  Results from last 7 days   Lab Units 02/04/22  0237 02/03/22  0136 02/01/22  0334 01/31/22  0540 01/31/22  0540 01/30/22  0335 01/30/22  0335 01/29/22  0408 01/29/22  0408   WBC 10*3/mm3  --   --   --   --  8.21  --  7.01  --  6.97   HEMOGLOBIN g/dL 8.9* 8.7* 9.4*   < > 8.1*   < > 8.2*   < > 8.4*   HEMATOCRIT % 28.4* 28.4* 29.4*   < > 24.7*   < > 25.8*   < > 26.5*   PLATELETS 10*3/mm3  --   --   --   --  192  --  156  --  156   MONOCYTES % %  --   --   --   --  2.1*  --   --   --   --     < > = values in this interval not displayed.     Results from last 7 days   Lab Units 02/04/22  0237 02/03/22  0136 02/01/22  0334 01/31/22  0540 01/31/22  0540   SODIUM mmol/L 136 136 135*   < > 138   POTASSIUM mmol/L 4.1 3.3* 3.5   < > 3.9   CHLORIDE mmol/L 94* 92* 93*   < > 100   CO2 mmol/L 30.9* 34.0* 24.3   < > 25.3   BUN mg/dL 12 13 8   < > 8 "   CREATININE mg/dL 0.46* 0.48* 0.57   < > 0.21*   CALCIUM mg/dL 8.3* 8.0* 8.8   < > 8.2*   BILIRUBIN mg/dL  --  0.5 0.5  --  0.4   ALK PHOS U/L  --  132* 158*  --  154*   ALT (SGPT) U/L  --  316* 677*  --  859*   AST (SGOT) U/L  --  21 49*  --  70*   GLUCOSE mg/dL 103* 98 165*   < > 118*    < > = values in this interval not displayed.     Results from last 7 days   Lab Units 01/31/22  0540 01/30/22  0335 01/29/22  0408   INR  1.09 1.28* 1.23*       HAILEY LOPES MD  General and Endoscopic Surgery  [facility]    [address], [address]  [place], [place], [address]  P: [phone]  F: [phone]

## 2022-02-04 NOTE — PROGRESS NOTES
Continued Stay Note  [facility]     Patient Name: Mar Camilo  MRN: [id number]  Today's Date: 2/4/2022    Admit Date: 1/4/2022     Discharge Plan     Row Name 02/04/22 1423       Plan    Plan [facility] skilled rehab bed available on Saturday and Sunday    Plan Comments Spoke wtih Tootie with [facility] and noitfied that [facility] can not accept with Remicade while in rehab.  Unable to obtain any accepting rehab facility that could accept on Remicade.  Mallika states that a bed is available at [facility] on Sat or Sunday.  Patient needs an ambulance and needs dc orders inorder to schedule ambulance.  Patient accepts the bed at [facility].  Report is [phone] fax is [phone].  .....................Tiffanie Baron RN               Discharge Codes    No documentation.               Expected Discharge Date and Time     Expected Discharge Date Expected Discharge Time    Feb 4, 2022             Tiffanie Baron RN

## 2022-02-05 NOTE — PROGRESS NOTES
[facility]  Progress note: 2022    Patient Identification:  Name:Mar Camilo  Age:71 y.o.  Sex: female  :  1950  MRN: [id number]           CC:  Recurrent atrial fibrillation, diastolic heart failure    Interval history:  Called with rates again elevated in the 140s.  Patient states that she has epigastric pain which is consistent with chronic indigestion.  This occurred after she drank large quantities of juice short while ago.  Otherwise she denies any true chest pain or palpitations or shortness of breath.    Vital Signs:   Temp:  [96.2 °F (35.7 °C)-96.8 °F (36 °C)] 96.2 °F (35.7 °C)  Heart Rate:  [110-142] 142  Resp:  [14-18] 18  BP: (102-160)/(46-64) 102/46    Intake/Output Summary (Last 24 hours) at 2022 1325  Last data filed at 2022 0806  Gross per 24 hour   Intake 360 ml   Output --   Net 360 ml       Physical Examination:  Not performed to limit disease dissemination    Lab Review:  Personally reviewed the labs, radiology imaging and other cardiac procedures.   Results from last 7 days   Lab Units 22  0237 22  0136 22  013   SODIUM mmol/L 136   < > 136   POTASSIUM mmol/L 4.1   < > 3.3*   CHLORIDE mmol/L 94*   < > 92*   CO2 mmol/L 30.9*   < > 34.0*   BUN mg/dL 12   < > 13   CREATININE mg/dL 0.46*   < > 0.48*   CALCIUM mg/dL 8.3*   < > 8.0*   BILIRUBIN mg/dL  --   --  0.5   ALK PHOS U/L  --   --  132*   ALT (SGPT) U/L  --   --  316*   AST (SGOT) U/L  --   --  21   GLUCOSE mg/dL 103*   < > 98    < > = values in this interval not displayed.         Results from last 7 days   Lab Units 22  0237 22  0136 22  0334 22  0540 22  0540 22  0335 22  0335   WBC 10*3/mm3  --   --   --   --  8.21  --  7.01   HEMOGLOBIN g/dL 8.9* 8.7* 9.4*   < > 8.1*   < > 8.2*   HEMATOCRIT % 28.4* 28.4* 29.4*   < > 24.7*   < > 25.8*   PLATELETS 10*3/mm3  --   --   --   --  192  --  156    < > = values in this interval not displayed.     Results  from last 7 days   Lab Units 01/31/22  0540 01/30/22  0335   INR  1.09 1.28*     Medication Review:   Meds reviewed  Scheduled Meds:albuterol, 2.5 mg, Nebulization, Q12H - RT  apixaban, 5 mg, Oral, Q12H  budesonide-formoterol, 2 puff, Inhalation, BID - RT  dilTIAZem, 120 mg, Oral, Q8H  docosanol, 1 application, Topical, 5x Daily  docusate sodium, 100 mg, Oral, Every Other Day  FLUoxetine, 40 mg, Oral, Daily  furosemide, 40 mg, Oral, Daily  insulin lispro, 0-9 Units, Subcutaneous, TID AC  insulin lispro, 10 Units, Subcutaneous, TID With Meals  lamoTRIgine, 100 mg, Oral, Daily  lisinopril, 20 mg, Oral, Q24H  pantoprazole, 40 mg, Oral, BID  polyethylene glycol, 17 g, Oral, BID  sodium chloride, 10 mL, Intravenous, Q12H  tiotropium bromide monohydrate, 2 puff, Inhalation, Daily - RT        I personally viewed and interpreted the patient's EKG/Telemetry data    Assessment and Plan    1.  Recurrent atrial fibrillation.  We will give digoxin 0.25 mg IV now repeat in 8 hours.  Hold lisinopril and if blood pressure improves by tomorrow would add low-dose beta-blocker.  2.  Diastolic heart failure no dyspnea symptoms  3.  Hypertension blood pressure low and will hold lisinopril  2.  Ventral hernia with bowel obstruction  3.  COPD  4.  Urinary REtension  5.  Diabetes  6.  Hip fracture atus post surgery  7.  COVID-19 infection    Mini Shi  2/5/202213:25 EST  25min spent in reviewing records, discussion and examination of the patient and discussion with other members of the patient's medical team.     Dictated utilizing Dragon dictation

## 2022-02-05 NOTE — PLAN OF CARE
Goal Outcome Evaluation:  Plan of Care Reviewed With: patient        Progress: declining  Outcome Summary: Pt aox4 4L nasal cannula, afib on monitor running up to 170s but asymptomatic. Cardiology made aware and plan to round on patient this morning. PO Cardizem PO Lasix.Has been refusing q 2 turns. Incontinenet of bowl and bladder, has had 2 bowel movements this shift so far. Complaining of abdominal pain, well controlled with oxycodone. No complaints of his pain where fracture was repaired at patient has good range of motion on that hip. Daily abdominal dressing changes. Rehab referral pending

## 2022-02-05 NOTE — NURSING NOTE
Paged cardiology and spoke with Kylah HUNTER to let her know that patient's heart rate has reached 140s-170s while sleeping. Pt has been asymptomatic. No new orders received.

## 2022-02-05 NOTE — PROGRESS NOTES
Name: Mar Camilo ADMIT: 2022   : 1950  PCP: Vianey Barrios PA-C    MRN: [id number] LOS: 32 days   AGE/SEX: 71 y.o. female  ROOM: [facility]   Subjective   Chief Complaint   Patient presents with   • Fall      Main complaint is heartburn.  She reports she has left upper quadrant pain which is near a abdominal hernia.  That hernia is reducible and nontender on exam.  No chest pain or shortness of breath reported.  She has nausea but no vomiting.  She had a bowel movement which was normal last night per her report.    Objective   Vital Signs  Temp:  [96.2 °F (35.7 °C)-96.8 °F (36 °C)] 96.2 °F (35.7 °C)  Heart Rate:  [110-142] 142  Resp:  [14-18] 18  BP: (102-160)/(46-64) 102/46  SpO2:  [91 %-95 %] 91 %  on  Flow (L/min):  [4] 4;   Device (Oxygen Therapy): nasal cannula  Body mass index is 39.05 kg/m².    Physical Exam  Vitals and nursing note reviewed.   Constitutional:       General: She is not in acute distress.     Appearance: She is ill-appearing (chronically). She is not diaphoretic.   HENT:      Head: Normocephalic and atraumatic.   Eyes:      General:         Right eye: No discharge.         Left eye: No discharge.      Conjunctiva/sclera: Conjunctivae normal.   Cardiovascular:      Rate and Rhythm: Normal rate. Rhythm irregular.      Pulses: Normal pulses.   Pulmonary:      Effort: Pulmonary effort is normal.      Breath sounds: Decreased breath sounds present. No wheezing.   Abdominal:      General: There is no distension.      Palpations: Abdomen is soft.      Tenderness: There is abdominal tenderness. There is no guarding or rebound.      Hernia: A hernia is present.   Musculoskeletal:         General: No swelling or tenderness.      Cervical back: Neck supple. No tenderness.      Comments: Left hip dressed   Skin:     General: Skin is warm and dry.      Capillary Refill: Capillary refill takes less than 2 seconds.   Neurological:      General: No focal deficit present.      Mental  Status: She is alert.   Psychiatric:         Mood and Affect: Mood normal.         Behavior: Behavior normal.         Results Review:       I reviewed the patient's new clinical results.     I reviewed imaging, agree with interpretation.     I reviewed telemetry/EKG results, rate was okay during my exam earlier.  Since then she has had tachycardia.      Results from last 7 days   Lab Units 02/04/22  0237 02/03/22  0136 02/01/22  0334 01/31/22  0540 01/30/22  0335 01/30/22  0335   WBC 10*3/mm3  --   --   --  8.21  --  7.01   HEMOGLOBIN g/dL 8.9* 8.7* 9.4* 8.1*   < > 8.2*   PLATELETS 10*3/mm3  --   --   --  192  --  156    < > = values in this interval not displayed.     Results from last 7 days   Lab Units 02/04/22  0237 02/03/22 0136 02/01/22  0334 01/31/22  0540   SODIUM mmol/L 136 136 135* 138   POTASSIUM mmol/L 4.1 3.3* 3.5 3.9   CHLORIDE mmol/L 94* 92* 93* 100   CO2 mmol/L 30.9* 34.0* 24.3 25.3   BUN mg/dL 12 13 8 8   CREATININE mg/dL 0.46* 0.48* 0.57 0.21*   GLUCOSE mg/dL 103* 98 165* 118*   Estimated Creatinine Clearance: 74.3 mL/min (A) (by C-G formula based on SCr of 0.46 mg/dL (L)).  Results from last 7 days   Lab Units 02/04/22  0237 02/03/22  0136 02/01/22  1111 02/01/22  0334 01/31/22  0540 01/30/22  0335 01/30/22  0335   CALCIUM mg/dL 8.3* 8.0*  --  8.8 8.2*   < > 8.1*   ALBUMIN g/dL  --  2.30*  --  1.80* 1.90*  --  1.80*   MAGNESIUM mg/dL  --  1.7 1.5*  --  1.6  --  1.5*    < > = values in this interval not displayed.     Results from last 7 days   Lab Units 01/31/22  0540 01/30/22  0335   INR  1.09 1.28*        albuterol, 2.5 mg, Nebulization, Q12H - RT  apixaban, 5 mg, Oral, Q12H  budesonide-formoterol, 2 puff, Inhalation, BID - RT  digoxin, 250 mcg, Intravenous, Q8H  dilTIAZem, 120 mg, Oral, Q8H  docosanol, 1 application, Topical, 5x Daily  docusate sodium, 100 mg, Oral, Every Other Day  FLUoxetine, 40 mg, Oral, Daily  furosemide, 40 mg, Oral, Daily  insulin lispro, 0-9 Units, Subcutaneous, TID  AC  insulin lispro, 10 Units, Subcutaneous, TID With Meals  lamoTRIgine, 100 mg, Oral, Daily  pantoprazole, 40 mg, Oral, BID  polyethylene glycol, 17 g, Oral, BID  sodium chloride, 10 mL, Intravenous, Q12H  tiotropium bromide monohydrate, 2 puff, Inhalation, Daily - RT       Diet Regular; GI Soft    Assessment/Plan      Active Hospital Problems    Diagnosis  POA   • **Large bowel obstruction ([facility]) [K56.609]  Yes   • Anemia [D64.9]  Yes   • Closed displaced fracture of right femoral neck ([facility]) [S72.001A]  Yes   • Crohn's disease of colon without complication ([facility]) [K50.10]  Yes   • CAD (coronary artery disease) [I25.10]  Yes   • PAD (peripheral artery disease) ([facility]) [I73.9]  Yes   • PAF (paroxysmal atrial fibrillation) ([facility]) [I48.0]  Yes   • Ventral hernia with bowel obstruction [K43.6]  Yes   • History of CVA (cerebrovascular accident) [Z86.73]  Not Applicable   • Chronic respiratory failure with hypoxia ([facility]) [J96.11]  Yes   • Class 3 severe obesity in adult ([facility]) [E66.01]  Yes   • COPD (chronic obstructive pulmonary disease) ([facility]) [J44.9]  Yes   • Essential hypertension [I10]  Yes   • Type 2 diabetes mellitus with complication, without long-term current use of insulin ([facility]) [E11.8]  Yes      Resolved Hospital Problems   No resolved problems to display.       · Acute on chronic diastolic heart failure, SVT, A. fib: Had tachycardia again this afternoon.  Cardiology has reevaluated and are giving additional medications.  Monitor for tolerance and stability.  · GERD: On Protonix.  Will start as needed medication and monitor.  · Crohn's disease on Remicade, large ventral hernia, sigmoid bowel obstruction: Hernia was reducible on exam for me today.  She did not have any guarding.  Had bowel movement.  She tells me her Remicade dose was due a few weeks ago while she was admitted here so she did not receive it.  Surgery evaluated.  Gastroenterology evaluated.  · Elevated LFT: Follow-up with Dr. Umaña on  discharge.  · Right femoral neck fracture: Status post right hip percutaneous screw fixation 01/19.  Follow-up with orthopedic surgery in clinic.  · Bladder mass: Urology evaluated and considering outpatient cystoscopy.  · Diabetes: A1c 8.0.  Continue insulin  · COPD/chronic hypoxia: Baseline 3 to 4 L.  Pulmonology evaluated.  Follow-up with them in clinic.  · Obesity  · Disposition: SNF/TBD    Peter Clancy MD  [facility]  02/05/22  15:37 EST    Dictated portions using Dragon dictation software.    During the entire encounter, I was wearing recommended PPE including face mask and eye protection. Hand sanitization was performed prior to entering room and upon exit.

## 2022-02-05 NOTE — PLAN OF CARE
Goal Outcome Evaluation:  Plan of Care Reviewed With: patient        Progress: no change  Outcome Summary: Alert and Orientedx4.on 4 L Oxygen via NC. complaints of abdominal pain. pain pills given as PRN. A.fib on monitor reached 120-150's with asymptomatic. called cardiology. Dr. Shi aware. IV digoxin q8h ordered. had one BM this pm. Abdominal dressing changed.will continue to monitor.

## 2022-02-06 NOTE — PROGRESS NOTES
"p  General Surgery Progress Note    Summary: Mrs. Mar Camilo is a 71 y.o. year old lady with Crohn's disease on Remicade, a large ventral hernia with bowel, and a resolved high grade SBO/GOO. Having increasing abdominal pain and nausea today, WBC up to 13. Will check KUB, may need repeat CT pending findings.     Chief Complaint: abdominal pain    Interval Events: Complaining of worse abdominal pain/nausea today. Had an episode of emesis yesterday. Last BM last night.     Vitals: /78   Pulse (!) 158   Temp 96.9 °F (36.1 °C) (Axillary)   Resp 16   Ht 162.6 cm (64.02\")   Wt 103 kg (227 lb 9.6 oz)   LMP  (LMP Unknown)   SpO2 95%   BMI 39.05 kg/m²     GENERAL: alert, chronically ill appearing, laying in bed, uncomfortable but not in distress   HEENT: normocephalic, atraumatic, moist mucous membranes, clear sclerae  CHEST: on 4L NC, increased work of breathing, symmetric air entry  CARDIAC: tachycardia, regular rhythm    ABDOMEN: soft, nondistended, no erythema over her hernia, hernia appears soft, chronically incarcerated hernia   EXTREMITIES: no cyanosis, clubbing, or edema   SKIN: Warm and moist, no rashes    Labs:  Results from last 7 days   Lab Units 02/06/22 0633 02/04/22 0237 02/03/22 0136 02/01/22  0334 01/31/22  0540   WBC 10*3/mm3 13.17*  --   --   --  8.21   HEMOGLOBIN g/dL 9.2* 8.9* 8.7*   < > 8.1*   HEMATOCRIT % 29.9* 28.4* 28.4*   < > 24.7*   PLATELETS 10*3/mm3 503*  --   --   --  192   MONOCYTES % %  --   --   --   --  2.1*    < > = values in this interval not displayed.     Results from last 7 days   Lab Units 02/06/22 0633 02/04/22 0237 02/03/22  0136 02/01/22  0334 02/01/22  0334   SODIUM mmol/L 134* 136 136   < > 135*   POTASSIUM mmol/L 4.9 4.1 3.3*   < > 3.5   CHLORIDE mmol/L 92* 94* 92*   < > 93*   CO2 mmol/L 27.9 30.9* 34.0*   < > 24.3   BUN mg/dL 23 12 13   < > 8   CREATININE mg/dL 0.70 0.46* 0.48*   < > 0.57   CALCIUM mg/dL 8.5* 8.3* 8.0*   < > 8.8   BILIRUBIN mg/dL 0.5  --  " 0.5  --  0.5   ALK PHOS U/L 133*  --  132*  --  158*   ALT (SGPT) U/L 131*  --  316*  --  677*   AST (SGOT) U/L 18  --  21  --  49*   GLUCOSE mg/dL 201* 103* 98   < > 165*    < > = values in this interval not displayed.     Results from last 7 days   Lab Units 01/31/22  0540   INR  1.09       HAILEY LOPES MD  General and Endoscopic Surgery  [facility]    [address] [place] [address], [address]  [place], [place], [address]  P: [phone]  F: [phone]

## 2022-02-06 NOTE — PLAN OF CARE
Goal Outcome Evaluation:      Alert and Orientedx4.on 4 L Oxygen via NC. complaints of abdominal pain. pain pills given as PRN. Pt had nausea and vomiting overnight, Zofran given PRN. Tachycardia on monitor. EKG showed SVT reached 170's with asymptomatic. Digoxin didn't help. Cardiology aware, will address in morning. will continue to monitor.

## 2022-02-06 NOTE — DISCHARGE SUMMARY
Discharge As      Date of Admisssion:  2022  Date of Death:  2022  Time of Death:  3:50 PM    Patient Care Team:  Vianey Barrios PA-C as PCP - General (Family Medicine)  Bhanu Mata MD as Consulting Physician (Cardiology)  Mann Hernadez MD as Consulting Physician (Pulmonary Disease)  Becky Haji MD as Surgeon (General Surgery)  Laquita Carbajal RN as Ambulatory  ([facility])    Final Diagnosis:   Ventral Abdominal Hernia with Obstruction    Presenting Problem/History of Present Illness  Troponin level elevated [R77.8]  Abdominal wall cellulitis [L03.311]  Closed fracture of right hip, initial encounter ([facility]) [S72.001A]  Intestinal obstruction, unspecified cause, unspecified whether partial or complete ([facility]) [K56.609]      Hospital Course  Patient was a 71 y.o. female presented with colon obstruction secondary to large ventral abdominal hernia with associated skin infection over the site in setting of Crohn's disease. She was found to have COIVD-19 pneumonia. She also had sustained a fall and had a right femoral neck fracture. She had acute on chronic respiratory failure with baseline oxygenation ~4L. She was felt to be too high risk for surgery and was managed conservatively. She had initial improvement but then had worse abdominal pain requiring NG decompression. She had improvement of bowel function and NG removed. She completed a course of Zosyn. She reconsidered surgical intervention for the femoral neck fracture and underwent right hip percutaneous pinning on 22. She tolerated the procedure well. She was treated with remdesivir and dexamethasone for covid. Oxygenation improved back to baseline level. She had new onset atrial fibrillation and cardiology was consulted. She was started on diltiazem and eventually achieved rate control. She had additional rate elevation with RVR and cardiology reevaluated and she had improved rate with medication  adjustment. She was placed on Eliquis for the recurrent atrial fibrillation. She had urinary retention and incidentally discovered bladder mass. Urology evaluated and green was placed with plan for outpatient follow up. She had a poor long term prognosis so met with palliative care and wished to be DNR/DNI. She began to have more LUQ abdominal pain. Surgery reevaluated and KUB showed stomach or colon distended in ventral hernia. Abdominal pain became severe. Stat CT was obtained. While in CT triage she had acute worsening of mentation. She had CT which showed severe distention of stomach into ventral hernia. NG was placed for decompression. She became unresponsive in radiology. Code blue was called and patient had spontaneous recovery of pulse. She had additional arrest and passed on 02/06/22.    Peter Clancy MD  02/06/22  16:24 EST    Dictated portions using Dragon dictation software.

## 2022-02-06 NOTE — NURSING NOTE
1505 Patient arrived to triage and transporter reported a change in the patients status.  She stated that the patients eyes are rolled back and that is a change from when she picked her up. I assessed her and found her to be moaning slightly with each exhalation and not responsive to voice or touch. Her exhalations sounded slightly wet. Respirations were a regular rhythm, eyes open and rolled back.  Patient went to CT for CT of abdomen while I phoned the floor RN and reviewed the epic chart for previous assessment and previous vital signs.  1512 While the patient was in CT, I spoke to Lei and she reported that this patient was alert and oriented on her most recent assessment.  I informed her of a change in status of the patient.  1520 Patient returned from CT. No improvement in responsiveness with movement to CT table and back to stretcher.  I called for the rapid response team.  1525 Patient remained unresponsive, eyes rolled back, 98.7 temp, HR 84, Sinus Rhythm on code cart cardiac monitor, palpable right radial pulse, right hand cool to touch, RR 20 and regular upon code team arrival.

## 2022-02-06 NOTE — PROGRESS NOTES
Name: Mar Camilo ADMIT: 2022   : 1950  PCP: Vianey Barrios PA-C    MRN: [id number] LOS: 33 days   AGE/SEX: 71 y.o. female  ROOM: [facility]   Subjective   Chief Complaint   Patient presents with   • Fall      She is having more left upper quadrant abdominal pain.  She has nausea and did have emesis yesterday.  She just received IV Dilaudid and pain is still severe.  Nonradiating.  She is in distress.    Denies chest pain shortness of breath.    Objective   Vital Signs  Temp:  [96.5 °F (35.8 °C)-97.4 °F (36.3 °C)] 96.9 °F (36.1 °C)  Heart Rate:  [] 94  Resp:  [16-18] 18  BP: (137-164)/(40-96) 142/40  SpO2:  [89 %-100 %] 90 %  on  Flow (L/min):  [4] 4;   Device (Oxygen Therapy): nasal cannula  Body mass index is 39.05 kg/m².    Physical Exam  Vitals and nursing note reviewed.   Constitutional:       General: She is in acute distress.      Appearance: She is ill-appearing (chronically). She is not diaphoretic.   HENT:      Head: Normocephalic and atraumatic.   Eyes:      General:         Right eye: No discharge.         Left eye: No discharge.      Conjunctiva/sclera: Conjunctivae normal.   Cardiovascular:      Rate and Rhythm: Normal rate. Rhythm irregular.      Pulses: Normal pulses.   Pulmonary:      Effort: Pulmonary effort is normal.      Breath sounds: Decreased breath sounds present. No wheezing.   Abdominal:      Palpations: Abdomen is soft.      Tenderness: There is abdominal tenderness. There is guarding.      Hernia: A hernia is present.   Musculoskeletal:         General: No swelling or tenderness.      Cervical back: Neck supple. No tenderness.      Comments: Left hip dressed   Skin:     General: Skin is warm and dry.      Capillary Refill: Capillary refill takes less than 2 seconds.   Neurological:      General: No focal deficit present.      Mental Status: She is alert.   Psychiatric:         Mood and Affect: Mood normal.         Behavior: Behavior normal.         Results  Review:       I reviewed the patient's new clinical results.     I reviewed imaging, agree with interpretation.     I reviewed telemetry/EKG results, rate okay during exam.      Results from last 7 days   Lab Units 02/06/22 0633 02/04/22 0237 02/03/22 0136 02/01/22  0334 01/31/22  0540 01/31/22  0540   WBC 10*3/mm3 13.17*  --   --   --   --  8.21   HEMOGLOBIN g/dL 9.2* 8.9* 8.7* 9.4*   < > 8.1*   PLATELETS 10*3/mm3 503*  --   --   --   --  192    < > = values in this interval not displayed.     Results from last 7 days   Lab Units 02/06/22 0633 02/04/22 0237 02/03/22 0136 02/01/22  0334   SODIUM mmol/L 134* 136 136 135*   POTASSIUM mmol/L 4.9 4.1 3.3* 3.5   CHLORIDE mmol/L 92* 94* 92* 93*   CO2 mmol/L 27.9 30.9* 34.0* 24.3   BUN mg/dL 23 12 13 8   CREATININE mg/dL 0.70 0.46* 0.48* 0.57   GLUCOSE mg/dL 201* 103* 98 165*   Estimated Creatinine Clearance: 74.3 mL/min (by C-G formula based on SCr of 0.7 mg/dL).  Results from last 7 days   Lab Units 02/06/22 0633 02/04/22 0237 02/03/22 0136 02/01/22  1111 02/01/22  0334 01/31/22  0540 01/31/22  0540   CALCIUM mg/dL 8.5* 8.3* 8.0*  --  8.8   < > 8.2*   ALBUMIN g/dL 2.40*  --  2.30*  --  1.80*  --  1.90*   MAGNESIUM mg/dL 1.8  --  1.7 1.5*  --   --  1.6    < > = values in this interval not displayed.     Results from last 7 days   Lab Units 01/31/22  0540   INR  1.09        albuterol, 2.5 mg, Nebulization, Q12H - RT  budesonide-formoterol, 2 puff, Inhalation, BID - RT  dilTIAZem, 20 mg, Intravenous, Once  dilTIAZem, 120 mg, Oral, Q8H  docosanol, 1 application, Topical, 5x Daily  docusate sodium, 100 mg, Oral, Every Other Day  FLUoxetine, 40 mg, Oral, Daily  furosemide, 40 mg, Oral, Daily  insulin lispro, 0-9 Units, Subcutaneous, TID AC  insulin lispro, 10 Units, Subcutaneous, TID With Meals  lamoTRIgine, 100 mg, Oral, Daily  pantoprazole, 40 mg, Oral, BID  polyethylene glycol, 17 g, Oral, BID  sodium chloride, 10 mL, Intravenous, Q12H  tiotropium bromide  monohydrate, 2 puff, Inhalation, Daily - RT       NPO Diet NPO Except: Sips With Meds, Ice Chips    Assessment/Plan      Active Hospital Problems    Diagnosis  POA   • **Large bowel obstruction ([facility]) [K56.609]  Yes   • Anemia [D64.9]  Yes   • Closed displaced fracture of right femoral neck ([facility]) [S72.001A]  Yes   • Crohn's disease of colon without complication ([facility]) [K50.10]  Yes   • CAD (coronary artery disease) [I25.10]  Yes   • PAD (peripheral artery disease) ([facility]) [I73.9]  Yes   • PAF (paroxysmal atrial fibrillation) ([facility]) [I48.0]  Yes   • Ventral hernia with bowel obstruction [K43.6]  Yes   • History of CVA (cerebrovascular accident) [Z86.73]  Not Applicable   • Chronic respiratory failure with hypoxia ([facility]) [J96.11]  Yes   • Class 3 severe obesity in adult ([facility]) [E66.01]  Yes   • COPD (chronic obstructive pulmonary disease) ([facility]) [J44.9]  Yes   • Essential hypertension [I10]  Yes   • Type 2 diabetes mellitus with complication, without long-term current use of insulin ([facility]) [E11.8]  Yes      Resolved Hospital Problems   No resolved problems to display.       · Acute on chronic diastolic heart failure, SVT, A. fib: Rate is improved today.  Stopping Eliquis given her worse abdominal symptoms and possible need for procedure.  Cardiology following.  · GERD: On Protonix.   · Crohn's disease on Remicade, large ventral hernia, sigmoid bowel obstruction: Stat CT abdomen . asked that surgery be called with update.  N.p.o. Place NG if needed.  · Elevated LFT: Follow-up with Dr. Umaña on discharge.  · Right femoral neck fracture: Status post right hip percutaneous screw fixation 01/19.  Follow-up with orthopedic surgery in clinic.  · Bladder mass: Urology evaluated and considering outpatient cystoscopy.  · Diabetes: A1c 8.0.  Continue insulin  · COPD/chronic hypoxia: Baseline 3 to 4 L.  Pulmonology evaluated.  Follow-up with them in clinic.  · Obesity  · Disposition: SNF/TBD    Peter Clancy MD  [facility]  02/06/22  13:45 EST    Dictated portions using Dragon dictation software.    During the entire encounter, I was wearing recommended PPE including face mask and eye protection. Hand sanitization was performed prior to entering room and upon exit.

## 2022-02-06 NOTE — PROGRESS NOTES
HOSPITAL FOLLOW UP NOTE    Patient Name: Mar Camilo  Patient : 1950        Date of Service:22  Provider of Service: DALE Li  Place of Service: [facility]  Referral Provider: Shawn Lawson MD          Follow Up: Recurrent afib    Interval Hx: Tired.  NO complaints of chest pain, palpitations or dizziness.  Chronic dyspnea, wears O2 at 4L at home.     OBJECTIVE  Temp:  [96.5 °F (35.8 °C)-97.4 °F (36.3 °C)] 96.9 °F (36.1 °C)  Heart Rate:  [120-169] 158  Resp:  [16] 16  BP: (102-164)/(46-96) 146/78     Intake/Output Summary (Last 24 hours) at 2022 1047  Last data filed at 2022 1550  Gross per 24 hour   Intake --   Output 400 ml   Net -400 ml     Body mass index is 39.05 kg/m².      22  0504 22  0554 22  0516   Weight: 106 kg (232 lb 12.8 oz) 102 kg (225 lb 6.4 oz) 103 kg (227 lb 9.6 oz)         Physical Exam:     General Appearance:    Alert, cooperative, in no acute distress, obese           Neck:   no thyromegaly, no   carotid bruit, no JVD   Lungs:    Slightly diminished bilateral bases,respirations regular, even and unlabored    Heart:   Irregular rhythm/rate, normal S1 and S2, no murmur, no gallop, no rub, no click       Abdomen:     Normal bowel sounds, obese, soft, nontender, nondistended, no guarding, no rebound  tenderness   Extremities:   Moves all extremities well, no edema, no cyanosis, no redness   Pulses:   Pulses palpable and equal bilaterally.          CURRENT MEDS    Scheduled Meds:albuterol, 2.5 mg, Nebulization, Q12H - RT  apixaban, 5 mg, Oral, Q12H  budesonide-formoterol, 2 puff, Inhalation, BID - RT  dilTIAZem, 120 mg, Oral, Q8H  docosanol, 1 application, Topical, 5x Daily  docusate sodium, 100 mg, Oral, Every Other Day  FLUoxetine, 40 mg, Oral, Daily  furosemide, 40 mg, Oral, Daily  insulin lispro, 0-9 Units, Subcutaneous, TID AC  insulin lispro, 10 Units, Subcutaneous, TID With Meals  lamoTRIgine, 100 mg, Oral,  Daily  pantoprazole, 40 mg, Oral, BID  polyethylene glycol, 17 g, Oral, BID  sodium chloride, 10 mL, Intravenous, Q12H  tiotropium bromide monohydrate, 2 puff, Inhalation, Daily - RT      Continuous Infusions:       Lab Review:   Results from last 7 days   Lab Units 02/06/22  0633 02/04/22  0237 02/03/22  0136 02/03/22  0136   SODIUM mmol/L 134* 136   < > 136   POTASSIUM mmol/L 4.9 4.1   < > 3.3*   CHLORIDE mmol/L 92* 94*   < > 92*   CO2 mmol/L 27.9 30.9*   < > 34.0*   BUN mg/dL 23 12   < > 13   CREATININE mg/dL 0.70 0.46*   < > 0.48*   GLUCOSE mg/dL 201* 103*   < > 98   CALCIUM mg/dL 8.5* 8.3*   < > 8.0*   AST (SGOT) U/L 18  --   --  21   ALT (SGPT) U/L 131*  --   --  316*    < > = values in this interval not displayed.         Results from last 7 days   Lab Units 02/06/22  0633 02/04/22 0237 02/01/22  0334 01/31/22  0540   WBC 10*3/mm3 13.17*  --   --  8.21   HEMOGLOBIN g/dL 9.2* 8.9*   < > 8.1*   HEMATOCRIT % 29.9* 28.4*   < > 24.7*   PLATELETS 10*3/mm3 503*  --   --  192    < > = values in this interval not displayed.     Results from last 7 days   Lab Units 01/31/22  0540   INR  1.09     Results from last 7 days   Lab Units 02/06/22  0633 02/03/22 0136   MAGNESIUM mg/dL 1.8 1.7         Results from last 7 days   Lab Units 02/04/22 0237   PROBNP pg/mL 449.0               ASSESSMENT & PLAN    Large bowel obstruction (HCC)    COPD (chronic obstructive pulmonary disease) ([facility])    Essential hypertension    Type 2 diabetes mellitus with complication, without long-term current use of insulin (HCC)    Chronic respiratory failure with hypoxia (HCC)    History of CVA (cerebrovascular accident)    Ventral hernia with bowel obstruction    PAF (paroxysmal atrial fibrillation) ([facility])    PAD (peripheral artery disease) ([facility])    Class 3 severe obesity in adult (HCC)    CAD (coronary artery disease)    Crohn's disease of colon without complication (HCC)    Closed displaced fracture of right femoral neck (HCC)    Anemia    1.   Recurrent atrial fibrillation: He was given 2 doses of IV digoxin last night.  No improvement in her heart rate.  She is also on diltiazem 120 mg every 8hr.  Anticoagulation with Eliquis.  She is asymptomatic.  Blood pressure is well controlled. Normal TSH. Give one time iv cardizem bolus. Patient has anaphylactic allergy to atenolol-hydrochlorothiazide.  Will not give beta-blocker due to this reason.  2.  Status post COVID-19 infection  3.  Chronic diastolic heart failure  4.  Hypertension: Well-controlled  5.  COPD  6.  Diabetes  7.  Hip fracture status post surgery      Rebeca Frederick, APRN  02/06/22

## 2022-05-26 NOTE — ADDENDUM NOTE
Addendum  created 12/30/19 1711 by Aristides Miles CRNA    Order list changed       Problem: Falls - Risk of  Goal: *Absence of Falls  Description: Document Morene Hole Fall Risk and appropriate interventions in the flowsheet. Outcome: Progressing Towards Goal  Note: Fall Risk Interventions:  Mobility Interventions: Bed/chair exit alarm    Mentation Interventions: Adequate sleep, hydration, pain control    Medication Interventions: Bed/chair exit alarm    Elimination Interventions: Call light in reach    History of Falls Interventions: Bed/chair exit alarm         Problem: Patient Education: Go to Patient Education Activity  Goal: Patient/Family Education  Outcome: Progressing Towards Goal     Problem: Pressure Injury - Risk of  Goal: *Prevention of pressure injury  Description: Document Dejuan Scale and appropriate interventions in the flowsheet.   Outcome: Progressing Towards Goal  Note: Pressure Injury Interventions:  Sensory Interventions: Assess changes in LOC,Keep linens dry and wrinkle-free,Maintain/enhance activity level,Assess need for specialty bed    Moisture Interventions: Absorbent underpads,Apply protective barrier, creams and emollients,Maintain skin hydration (lotion/cream),Moisture barrier    Activity Interventions: Assess need for specialty bed    Mobility Interventions: Assess need for specialty bed,HOB 30 degrees or less    Nutrition Interventions: Document food/fluid/supplement intake,Offer support with meals,snacks and hydration    Friction and Shear Interventions: HOB 30 degrees or less,Apply protective barrier, creams and emollients                Problem: Patient Education: Go to Patient Education Activity  Goal: Patient/Family Education  Outcome: Progressing Towards Goal     Problem: Diabetes Maintenance:Ongoing  Goal: Activity/Safety  Outcome: Progressing Towards Goal  Goal: Treatments/Interventsions/Procedures  Outcome: Progressing Towards Goal  Goal: *Blood Glucose 80 to 180 md/dl  Outcome: Progressing Towards Goal     Problem: Diabetes Maintenance:Discharge Outcomes  Goal: *Describes follow-up/return visits to physicians  Outcome: Progressing Towards Goal  Goal: *Blood glucose at patient's target range or approaching  Outcome: Progressing Towards Goal  Goal: *Aware of nutrition guidelines  Outcome: Progressing Towards Goal  Goal: *Verbalizes information about medication  Description: Verbalizes name, dosage, time, side effects, and number of days to  continue medications. Outcome: Progressing Towards Goal  Goal: *Describes goals, rules, symptoms, and treatments  Description: Describes blood glucose goals, monitoring, sick day rules,  hypo/hyperglycemia prevention, symptoms, and treatment  Outcome: Progressing Towards Goal  Goal: *Describes available outpatient diabetes resources and support systems  Outcome: Progressing Towards Goal     Problem: Diabetes Self-Management  Goal: *Disease process and treatment process  Description: Define diabetes and identify own type of diabetes; list 3 options for treating diabetes. Outcome: Progressing Towards Goal  Goal: *Incorporating nutritional management into lifestyle  Description: Describe effect of type, amount and timing of food on blood glucose; list 3 methods for planning meals. Outcome: Progressing Towards Goal  Goal: *Incorporating physical activity into lifestyle  Description: State effect of exercise on blood glucose levels. Outcome: Progressing Towards Goal  Goal: *Developing strategies to promote health/change behavior  Description: Define the ABC's of diabetes; identify appropriate screenings, schedule and personal plan for screenings. Outcome: Progressing Towards Goal  Goal: *Using medications safely  Description: State effect of diabetes medications on diabetes; name diabetes medication taking, action and side effects. Outcome: Progressing Towards Goal  Goal: *Monitoring blood glucose, interpreting and using results  Description: Identify recommended blood glucose targets  and personal targets.   Outcome: Progressing Towards Goal  Goal: *Prevention, detection, treatment of acute complications  Description: List symptoms of hyper- and hypoglycemia; describe how to treat low blood sugar and actions for lowering  high blood glucose level. Outcome: Progressing Towards Goal  Goal: *Prevention, detection and treatment of chronic complications  Description: Define the natural course of diabetes and describe the relationship of blood glucose levels to long term complications of diabetes.   Outcome: Progressing Towards Goal  Goal: *Developing strategies to address psychosocial issues  Description: Describe feelings about living with diabetes; identify support needed and support network  Outcome: Progressing Towards Goal  Goal: *Patient Specific Goal (EDIT GOAL, INSERT TEXT)  Outcome: Progressing Towards Goal     Problem: Patient Education: Go to Patient Education Activity  Goal: Patient/Family Education  Outcome: Progressing Towards Goal     Problem: Patient Education: Go to Patient Education Activity  Goal: Patient/Family Education  Outcome: Progressing Towards Goal     Problem: Nutrition Deficit  Goal: *Optimize nutritional status  Outcome: Progressing Towards Goal

## 2023-01-13 NOTE — PLAN OF CARE
Problem: Patient Care Overview (Adult)  Goal: Plan of Care Review  Outcome: Ongoing (interventions implemented as appropriate)    03/21/17 0447   Coping/Psychosocial Response Interventions   Plan Of Care Reviewed With patient   Patient Care Overview   Progress improving       Goal: Adult Individualization and Mutuality  Outcome: Ongoing (interventions implemented as appropriate)    03/21/17 0447   Individualization   Patient Specific Preferences Prefers to be called Krys       Goal: Discharge Needs Assessment  Outcome: Ongoing (interventions implemented as appropriate)    03/21/17 0447   Discharge Needs Assessment   Concerns To Be Addressed denies needs/concerns at this time   Readmission Within The Last 30 Days no previous admission in last 30 days   Self-Care   Equipment Currently Used at Home oxygen   Living Environment   Transportation Available family or friend will provide         Problem: Perioperative Period (Adult)  Goal: Signs and Symptoms of Listed Potential Problems Will be Absent or Manageable (Perioperative Period)  Outcome: Ongoing (interventions implemented as appropriate)    03/21/17 0447   Perioperative Period   Problems Assessed (Perioperative Period) all   Problems Present (Perioperative Period) pain         Problem: Pain, Acute (Adult)  Goal: Identify Related Risk Factors and Signs and Symptoms  Outcome: Ongoing (interventions implemented as appropriate)    03/21/17 0447   Pain, Acute   Related Risk Factors (Acute Pain) surgery   Signs and Symptoms (Acute Pain) verbalization of pain descriptors       Goal: Acceptable Pain Control/Comfort Level  Outcome: Ongoing (interventions implemented as appropriate)    03/21/17 0447   Pain, Acute (Adult)   Acceptable Pain Control/Comfort Level making progress toward outcome            show

## (undated) DEVICE — PK HIP PINNING 40

## (undated) DEVICE — SUT VIC 2/0 CP2 CR8 18IN J762D

## (undated) DEVICE — GLV SURG SENSICARE W/ALOE PF LF 8 STRL

## (undated) DEVICE — TRAP FLD MINIVAC MEGADYNE 100ML

## (undated) DEVICE — PIN DRL NOHEAD TROC 3.2X75MM

## (undated) DEVICE — BW-412T DISP COMBO CLEANING BRUSH: Brand: SINGLE USE COMBINATION CLEANING BRUSH

## (undated) DEVICE — TUBING, SUCTION, 1/4" X 12', STRAIGHT: Brand: MEDLINE

## (undated) DEVICE — PK CYSTO 90

## (undated) DEVICE — DRP Z/FRICTION 10X16IN

## (undated) DEVICE — INTENDED FOR TISSUE SEPARATION, AND OTHER PROCEDURES THAT REQUIRE A SHARP SURGICAL BLADE TO PUNCTURE OR CUT.: Brand: BARD-PARKER ® STAINLESS STEEL BLADES

## (undated) DEVICE — FRAZIER SUCTION INSTRUMENT 10 FR W/CONTROL VENT & OBTURATOR: Brand: FRAZIER

## (undated) DEVICE — SPNG LAP 18X18IN LF STRL PK/5

## (undated) DEVICE — DRSNG SURESITE WNDW 4X4.5

## (undated) DEVICE — DRP C/ARMOR

## (undated) DEVICE — NEEDLE, QUINCKE 22GX3.5": Brand: MEDLINE INDUSTRIES, INC.

## (undated) DEVICE — COVER,TABLE,HEAVY DUTY,79"X110",STRL: Brand: MEDLINE

## (undated) DEVICE — SPNG GZ WOVN 4X4IN 12PLY 10/BX STRL

## (undated) DEVICE — CONTAINER,SPECIMEN,OR STERILE,4OZ: Brand: MEDLINE

## (undated) DEVICE — GLV SURG NEOLON 2G PF LF 6.5 STRL

## (undated) DEVICE — DECANT BG O JET

## (undated) DEVICE — 3M™ STERI-DRAPE™ INSTRUMENT POUCH 1018: Brand: STERI-DRAPE™

## (undated) DEVICE — MASK,FACE,SHIELD,BLUE,ANTI FOG,TIES: Brand: MEDLINE

## (undated) DEVICE — GLV SURG NEOLON 2G PF LF 7.5 STRL

## (undated) DEVICE — DUAL CUT SAGITTAL BLADE

## (undated) DEVICE — SOL ISO/ALC RUB 70PCT 4OZ

## (undated) DEVICE — GLV SURG SENSICARE MICRO PF LF 6.5 STRL

## (undated) DEVICE — IRRIGATOR BULB 60CC

## (undated) DEVICE — 1010 S-DRAPE TOWEL DRAPE 10/BX: Brand: STERI-DRAPE™

## (undated) DEVICE — 1016 S-DRAPE IRRIG POUCH 10/BOX: Brand: STERI-DRAPE™

## (undated) DEVICE — GLV SURG BIOGEL LTX PF 7 1/2

## (undated) DEVICE — APPL CHLORAPREP W/TINT 26ML ORNG

## (undated) DEVICE — SUCTION CANISTER, 3000CC,SAFELINER: Brand: DEROYAL

## (undated) DEVICE — PREP SOL POVIDONE/IODINE BT 4OZ

## (undated) DEVICE — SINGLE USE BIOPSY VALVE MAJ-210: Brand: SINGLE USE BIOPSY VALVE (STERILE)

## (undated) DEVICE — SYR CONTRL LUERLOK 10CC

## (undated) DEVICE — MAYO STAND COVER: Brand: CONVERTORS

## (undated) DEVICE — 3M™ STERI-DRAPE™ U-DRAPE 1015: Brand: STERI-DRAPE™

## (undated) DEVICE — GLV SURG SENSICARE ORTHO PF LF 7 STRL

## (undated) DEVICE — TRANSPOSAL ULTRAFLEX DUO/QUAD ULTRA CART MANIFOLD

## (undated) DEVICE — SHEET, ORTHO, SPLIT, STERILE: Brand: MEDLINE

## (undated) DEVICE — DRSNG SURESITE WNDW 2.38X2.75

## (undated) DEVICE — NITINOL STONE RETRIEVAL BASKET: Brand: ZERO TIP

## (undated) DEVICE — TP SXN YANKR BULB STRL

## (undated) DEVICE — MAT FLR ABSORBENT LG 4FT 10 2.5FT

## (undated) DEVICE — T-MAX DISPOSABLE FACE MASK 8 PER BOX

## (undated) DEVICE — SYS SKIN CLS DERMABOND PRINEO W/22CM MESH TP

## (undated) DEVICE — ULTRACLEAN ACCESSORY ELECTRODE 1" (2.54 CM) COATED NEEDLE: Brand: ULTRACLEAN

## (undated) DEVICE — GLV SURG NEOLON 2G PF LF 7 STRL

## (undated) DEVICE — SUT VIC 0 CT1 CR8 18IN J840D

## (undated) DEVICE — GLV SURG PREMIERPRO ORTHO LTX PF SZ7.5 BRN

## (undated) DEVICE — SINGLE USE SUCTION VALVE MAJ-209: Brand: SINGLE USE SUCTION VALVE (STERILE)

## (undated) DEVICE — Device

## (undated) DEVICE — DRP C/ARM 41X74IN

## (undated) DEVICE — DRSNG TELFA PAD NONADH STR 1S 3X4IN

## (undated) DEVICE — PAD SANI MAXI W/ADHS SNG WRP 11IN

## (undated) DEVICE — 3M™ MEDIPORE™ H SOFT CLOTH SURGICAL TAPE 2864, 4 INCH X 10 YARD (10CM X 9,14M), 12 ROLLS/CASE: Brand: 3M™ MEDIPORE™

## (undated) DEVICE — PENCL E/S ULTRAVAC TELESCP NOSE HOLSTR 10FT

## (undated) DEVICE — PK SHLDR ARTHSCP 90

## (undated) DEVICE — GUIDE SHLDR POSTN SIGNATURE 110004347

## (undated) DEVICE — DRAPE,REIN 53X77,STERILE: Brand: MEDLINE

## (undated) DEVICE — PREMIUM WET SKIN PREP TRAY: Brand: MEDLINE INDUSTRIES, INC.

## (undated) DEVICE — GLV SURG SENSICARE W/ALOE PF LF 7.5 STRL

## (undated) DEVICE — CATH URETRL FLXITP POLLACK STD 5F 70CM

## (undated) DEVICE — NDL SPINE 22G 31/2IN BLK

## (undated) DEVICE — LAG BRONCHOSCOPY: Brand: MEDLINE INDUSTRIES, INC.

## (undated) DEVICE — HOOD: Brand: FLYTE

## (undated) DEVICE — APPL DURAPREP IODOPHOR APL 26ML

## (undated) DEVICE — GLV SURG SENSICARE LT W/ALOE PF LF 7 STRL

## (undated) DEVICE — PAD,ABDOMINAL,8"X7.5",STERILE,LF,1/PK: Brand: MEDLINE

## (undated) DEVICE — NITINOL WIRE WITH HYDROPHILIC TIP: Brand: SENSOR

## (undated) DEVICE — TOWEL,OR,DSP,ST,BLUE,STD,4/PK,20PK/CS: Brand: MEDLINE

## (undated) DEVICE — PENCL E/S HNDSWCH PUSHBTN HOLSTR 10FT

## (undated) DEVICE — SUCTION CANISTER, 1000CC,SAFELINER: Brand: DEROYAL

## (undated) DEVICE — ABDOMINAL PAD: Brand: CURITY

## (undated) DEVICE — FIBR LASR MH 1P/U 200

## (undated) DEVICE — DRSNG TELFA PAD NONADH STR 1S 3X8IN

## (undated) DEVICE — BOWL PLSTC LG 32OZ BLU STRL